# Patient Record
Sex: MALE | Race: BLACK OR AFRICAN AMERICAN | Employment: UNEMPLOYED | URBAN - METROPOLITAN AREA
[De-identification: names, ages, dates, MRNs, and addresses within clinical notes are randomized per-mention and may not be internally consistent; named-entity substitution may affect disease eponyms.]

---

## 2020-06-09 ENCOUNTER — HOSPITAL ENCOUNTER (EMERGENCY)
Age: 65
Discharge: HOME | End: 2020-06-09
Attending: EMERGENCY MEDICINE
Payer: MEDICAID

## 2020-06-09 ENCOUNTER — APPOINTMENT (OUTPATIENT)
Dept: GENERAL RADIOLOGY | Age: 65
End: 2020-06-09
Attending: EMERGENCY MEDICINE
Payer: MEDICAID

## 2020-06-09 VITALS
HEIGHT: 68 IN | OXYGEN SATURATION: 98 % | TEMPERATURE: 99.6 F | RESPIRATION RATE: 18 BRPM | BODY MASS INDEX: 26.07 KG/M2 | SYSTOLIC BLOOD PRESSURE: 136 MMHG | DIASTOLIC BLOOD PRESSURE: 64 MMHG | HEART RATE: 81 BPM | WEIGHT: 172 LBS

## 2020-06-09 DIAGNOSIS — S61.211A LACERATION OF LEFT INDEX FINGER WITHOUT FOREIGN BODY WITHOUT DAMAGE TO NAIL, INITIAL ENCOUNTER: ICD-10-CM

## 2020-06-09 DIAGNOSIS — S62.631B OPEN DISPLACED FRACTURE OF DISTAL PHALANX OF LEFT INDEX FINGER, INITIAL ENCOUNTER: Primary | ICD-10-CM

## 2020-06-09 PROCEDURE — 74011000258 HC RX REV CODE- 258: Performed by: EMERGENCY MEDICINE

## 2020-06-09 PROCEDURE — 73140 X-RAY EXAM OF FINGER(S): CPT

## 2020-06-09 PROCEDURE — 77030003028 HC SUT VCRL J&J -A

## 2020-06-09 PROCEDURE — 74011000250 HC RX REV CODE- 250: Performed by: PHYSICIAN ASSISTANT

## 2020-06-09 PROCEDURE — 99284 EMERGENCY DEPT VISIT MOD MDM: CPT

## 2020-06-09 PROCEDURE — 77030002916 HC SUT ETHLN J&J -A

## 2020-06-09 PROCEDURE — 74011000250 HC RX REV CODE- 250: Performed by: EMERGENCY MEDICINE

## 2020-06-09 PROCEDURE — 74011250637 HC RX REV CODE- 250/637: Performed by: EMERGENCY MEDICINE

## 2020-06-09 PROCEDURE — 96365 THER/PROPH/DIAG IV INF INIT: CPT

## 2020-06-09 PROCEDURE — 75810000293 HC SIMP/SUPERF WND  RPR

## 2020-06-09 PROCEDURE — 74011250636 HC RX REV CODE- 250/636: Performed by: EMERGENCY MEDICINE

## 2020-06-09 RX ORDER — LIDOCAINE HYDROCHLORIDE 10 MG/ML
8 INJECTION, SOLUTION EPIDURAL; INFILTRATION; INTRACAUDAL; PERINEURAL ONCE
Status: COMPLETED | OUTPATIENT
Start: 2020-06-09 | End: 2020-06-09

## 2020-06-09 RX ORDER — IBUPROFEN 800 MG/1
800 TABLET ORAL
Qty: 30 TAB | Refills: 0 | Status: SHIPPED | OUTPATIENT
Start: 2020-06-09 | End: 2021-01-30

## 2020-06-09 RX ORDER — OXYCODONE AND ACETAMINOPHEN 5; 325 MG/1; MG/1
1 TABLET ORAL
Status: COMPLETED | OUTPATIENT
Start: 2020-06-09 | End: 2020-06-09

## 2020-06-09 RX ORDER — AMOXICILLIN AND CLAVULANATE POTASSIUM 875; 125 MG/1; MG/1
1 TABLET, FILM COATED ORAL 2 TIMES DAILY
Qty: 20 TAB | Refills: 0 | Status: SHIPPED | OUTPATIENT
Start: 2020-06-09 | End: 2020-06-19

## 2020-06-09 RX ORDER — HYDROCODONE BITARTRATE AND ACETAMINOPHEN 5; 325 MG/1; MG/1
1 TABLET ORAL
Qty: 10 TAB | Refills: 0 | Status: SHIPPED | OUTPATIENT
Start: 2020-06-09 | End: 2020-06-12

## 2020-06-09 RX ORDER — BUPIVACAINE HYDROCHLORIDE 5 MG/ML
5 INJECTION, SOLUTION EPIDURAL; INTRACAUDAL
Status: COMPLETED | OUTPATIENT
Start: 2020-06-09 | End: 2020-06-09

## 2020-06-09 RX ADMIN — SODIUM CHLORIDE 1 G: 900 INJECTION, SOLUTION INTRAVENOUS at 13:42

## 2020-06-09 RX ADMIN — BUPIVACAINE HYDROCHLORIDE 25 MG: 5 INJECTION, SOLUTION EPIDURAL; INTRACAUDAL; PERINEURAL at 13:49

## 2020-06-09 RX ADMIN — LIDOCAINE HYDROCHLORIDE 8 ML: 10 INJECTION, SOLUTION EPIDURAL; INFILTRATION; INTRACAUDAL; PERINEURAL at 13:42

## 2020-06-09 RX ADMIN — OXYCODONE HYDROCHLORIDE AND ACETAMINOPHEN 1 TABLET: 5; 325 TABLET ORAL at 12:29

## 2020-06-09 NOTE — ED TRIAGE NOTES
Left 2nd finger avulsion, working with a band saw immediately prior to arrival, bleeding controlled with pressure and ice.

## 2020-06-09 NOTE — ED PROVIDER NOTES
EMERGENCY DEPARTMENT HISTORY AND PHYSICAL EXAM      Date: 6/9/2020  Patient Name: Etta Menjivar    History of Presenting Illness     Chief Complaint   Patient presents with    Hand Laceration     History Provided By: Patient    HPI: Etta Menjivar, 60 y.o. male with past medical history significant for diabetes, hypertension, and CAD who presents via private vehicle to the ED with cc of left index finger laceration that occurred 15 minutes prior to arrival.  Patient states he was using a band saw to frame in a door at his house and believes his hand got too close to the band saw and cut his finger. He thought he had cut more than just the one finger but after cleaning of the blood, only noticed injury to that left index finger. He is right-hand dominant. He denies take anything for the pain prior to arrival.  He believes his tetanus shot is up-to-date. PMHx: Diabetes, hypertension, CAD  Social Hx: Former smoker, denies alcohol use, denies illegal drug use    PCP: No primary care provider on file. There are no other complaints, changes, or physical findings at this time. No current facility-administered medications on file prior to encounter. No current outpatient medications on file prior to encounter. Past History     Past Medical History:  Past Medical History:   Diagnosis Date    Diabetes ([address]     Hypertension      Past Surgical History:  History reviewed. No pertinent surgical history. Family History:  History reviewed. No pertinent family history. Social History:  Social History     Tobacco Use    Smoking status: Former Smoker    Smokeless tobacco: Never Used   Substance Use Topics    Alcohol use: Not Currently    Drug use: Not Currently     Allergies:  No Known Allergies  Review of Systems   Review of Systems   Constitutional: Negative for chills and fever. HENT: Negative for congestion, rhinorrhea, sneezing and sore throat.     Eyes: Negative for redness and visual disturbance. Respiratory: Negative for shortness of breath. Cardiovascular: Negative for chest pain and leg swelling. Gastrointestinal: Negative for abdominal pain, nausea and vomiting. Genitourinary: Negative for difficulty urinating and frequency. Musculoskeletal: Positive for arthralgias. Negative for back pain, myalgias and neck stiffness. Skin: Positive for wound. Negative for rash. Neurological: Negative for dizziness, syncope, weakness and headaches. Hematological: Negative for adenopathy. All other systems reviewed and are negative. Physical Exam   Physical Exam  Vitals signs and nursing note reviewed. Constitutional:       Appearance: Normal appearance. He is well-developed. HENT:      Head: Normocephalic and atraumatic. Neck:      Musculoskeletal: Full passive range of motion without pain, normal range of motion and neck supple. Cardiovascular:      Rate and Rhythm: Normal rate and regular rhythm. Pulses: Normal pulses. Heart sounds: Normal heart sounds. No murmur. Pulmonary:      Effort: Pulmonary effort is normal. No respiratory distress. Breath sounds: Normal breath sounds. Chest:      Chest wall: No tenderness. Abdominal:      General: Bowel sounds are normal.      Palpations: Abdomen is soft. Tenderness: There is no abdominal tenderness. There is no guarding or rebound. Musculoskeletal:        Hands:    Skin:     General: Skin is warm and dry. Findings: Laceration present. No erythema or rash. Neurological:      Mental Status: He is alert and oriented to person, place, and time. Psychiatric:         Speech: Speech normal.         Behavior: Behavior normal.         Thought Content: Thought content normal.         Judgment: Judgment normal.       Diagnostic Study Results   Labs -   No results found for this or any previous visit (from the past 12 hour(s)).     Radiologic Studies -   XR 2ND FINGER LT MIN 2 V   Final Result   IMPRESSION: Soft tissue injury of the distal second digit with displacement   comminuted fractures of the second distal phalanx. No results found. Medical Decision Making   I am the first provider for this patient. I reviewed the vital signs, available nursing notes, past medical history, past surgical history, family history and social history. Vital Signs-Reviewed the patient's vital signs. Patient Vitals for the past 24 hrs:   Temp Pulse Resp BP SpO2   06/09/20 1219    152/78 100 %   06/09/20 1215 99.6 °F (37.6 °C) 81 18 152/78 98 %     Pulse Oximetry Analysis - 100% on RA    Records Reviewed: Nursing Notes    Provider Notes (Medical Decision Making):   51-year-old male presents with a left index finger laceration and obvious deformity suggestive of a distal phalanx fracture. His tetanus shot is up-to-date. Will x-ray the hand, treat the wound with a dose of Ancef given that it is an open fracture, and repair of the laceration and splint. ED Course:   Initial assessment performed. The patients presenting problems have been discussed, and they are in agreement with the care plan formulated and outlined with them. I have encouraged them to ask questions as they arise throughout their visit.    1:06 PM  Aly Trevino MD spoke with Dr. Daniel Hilario, Consult for Hand Surgery. Discussed HPI and PE, available diagnostic tests and clinical findings. He is in agreement with care plans as outlined. He is still in clinic until 4:00 and can come see the patient after he gets out of clinic. If the patient is unwilling to wait, he recommends reapproximating the wound and ensuring that the open fracture is completely covered and loosely suturing the laceration and applying a big, bulky dressing and have the patient follow-up in the clinic in the next few days. ED Course as of Ramin 10 1112   Tue Jun 09, 2020   1520 Procedure Note - Wound Repair:    Performed by Heather Grant PA-C .      Tendon/Joint function was Abnormal: Open fracture. Neurovascular function was Intact. Site prepped with Betadine. Anesthesia was obtained via digital block with 0.5% bupivicaine and 1% lidocaine. Wound irrigated with copious amounts of normal saline and explored. Wound was located on the left index finger, measured 2-3 cm and was stellate, avulsion, and skin loss and mangled due to type of trauam.  Level of complexity was: complex. Wound was closed using One layer suture closure: Skin Layer:  10 sutures placed, stitch type:simple interrupted, suture: 5-0 nylon. .  Foreign body was not suspected. Foreign body was not found. Procedure was tolerated well      [AH]      ED Course User Index  [AH] Chiquis Blood PA-C     Progress Note:   Updated pt on all returned results and findings. Discussed the importance of proper follow up as referred below along with return precautions. Pt in agreement with the care plan and expresses agreement with and understanding of all items discussed. Disposition:  Discharge Note:  The pt is ready for discharge. The pt's signs, symptoms, diagnosis, and discharge instructions have been discussed and pt has conveyed their understanding. The pt is to follow up as recommended or return to ER should their symptoms worsen. Plan has been discussed and pt is in agreement. PLAN:  1. Discharge Medication List as of 6/9/2020  3:26 PM      START taking these medications    Details   HYDROcodone-acetaminophen (Norco) 5-325 mg per tablet Take 1 Tab by mouth every six (6) hours as needed for Pain for up to 3 days. Max Daily Amount: 4 Tabs., Normal, Disp-10 Tab, R-0      ibuprofen (MOTRIN) 800 mg tablet Take 1 Tab by mouth every eight (8) hours as needed for Pain., Normal, Disp-30 Tab, R-0      amoxicillin-clavulanate (Augmentin) 875-125 mg per tablet Take 1 Tab by mouth two (2) times a day for 10 days. , Normal, Disp-20 Tab, R-0           2.    Follow-up Information     Follow up With Specialties Details Why Contact Info    Shoshana Edward MD Hand Surgery, General Surgery Call in 1 day  [address]  [address] [facility] EMERGENCY DEPT Emergency Medicine  As needed, If symptoms worsen [address]  [phone]        Return to ED if worse     Diagnosis     Clinical Impression:   1. Open displaced fracture of distal phalanx of left index finger, initial encounter    2. Laceration of left index finger without foreign body without damage to nail, initial encounter            Please note that this dictation was completed with Dragon, computer voice recognition software. Quite often unanticipated grammatical, syntax, homophones, and other interpretive errors are inadvertently transcribed by the computer software. Please disregard these errors. Additionally, please excuse any errors that have escaped final proofreading.

## 2020-06-09 NOTE — DISCHARGE INSTRUCTIONS
Patient Education        Finger Fracture: Care Instructions  Your Care Instructions     Breaks in the bones of the finger usually heal well in about 3 to 4 weeks. The pain and swelling from a broken finger can last for weeks. But it should steadily improve, starting a few days after you break it. It is very important that you wear and take care of the cast or splint exactly as your doctor tells you to so that your finger heals properly and does not end up crooked. Wearing a splint may interfere with your normal activities. Ask for help with daily tasks if you need it. You heal best when you take good care of yourself. Eat a variety of healthy foods, and don't smoke. Follow-up care is a key part of your treatment and safety. Be sure to make and go to all appointments, and call your doctor if you are having problems. It's also a good idea to know your test results and keep a list of the medicines you take. How can you care for yourself at home? · If your doctor put a splint on your finger, wear the splint exactly as directed. Do not remove it until your doctor says that you can. · Keep your hand raised above the level of your heart as much as you can. This will help reduce swelling. · Put ice or a cold pack on your finger for 10 to 20 minutes at a time. Try to do this every 1 to 2 hours for the next 3 days (when you are awake) or until the swelling goes down. Put a thin cloth between the ice and your skin. Keep the splint dry. · Be safe with medicines. Take pain medicines exactly as directed. ? If the doctor gave you a prescription medicine for pain, take it as prescribed. ? If you are not taking a prescription pain medicine, ask your doctor if you can take an over-the-counter medicine. When should you call for help? [id number] anytime you think you may need emergency care. For example, call if:  · Your finger is cool or pale or changes color.   Call your doctor now or seek immediate medical care if:  · Your pain gets much worse. · You have tingling, weakness, or numbness in your finger. · You have signs of infection, such as:  ? Increased pain, swelling, warmth, or redness. ? Red streaks leading from the area. ? Pus draining from the area. ? Swollen lymph nodes in your neck, armpits, or groin. ? A fever. Watch closely for changes in your health, and be sure to contact your doctor if:  · Your finger is not steadily improving. Where can you learn more? Go to http://dylan-carson.info/  Enter X243 in the search box to learn more about \"Finger Fracture: Care Instructions. \"  Current as of: March 2, 2020               Content Version: 12.5  © 2006-2020 [employer]. Care instructions adapted under license by [employer] (which disclaims liability or warranty for this information). If you have questions about a medical condition or this instruction, always ask your healthcare professional. [place] [address] any warranty or liability for your use of this information. Patient Education        Cuts Closed With Stitches: Care Instructions  Your Care Instructions  A cut can happen anywhere on your body. The doctor used stitches to close the cut. Using stitches also helps the cut heal and reduces scarring. Sometimes pieces of tape called Steri-Strips are put over the stitches. If the cut went deep and through the skin, the doctor may have put in two layers of stitches. The deeper layer brings the deep part of the cut together. These stitches will dissolve and don't need to be removed. The stitches in the upper layer are the ones you see on the cut. You will probably have a bandage over the stitches. You will need to have the stitches removed, usually in 7 to 14 days. The doctor has checked you carefully, but problems can develop later. If you notice any problems or new symptoms, get medical treatment right away.    Follow-up care is a key part of your treatment and safety. Be sure to make and go to all appointments, and call your doctor if you are having problems. It's also a good idea to know your test results and keep a list of the medicines you take. How can you care for yourself at home? · Keep the cut dry for the first 24 to 48 hours. After this, you can shower if your doctor okays it. Pat the cut dry. · Don't soak the cut, such as in a bathtub. Your doctor will tell you when it's safe to get the cut wet. · If your doctor told you how to care for your cut, follow your doctor's instructions. If you did not get instructions, follow this general advice:  ? After the first 24 to 48 hours, wash around the cut with clean water 2 times a day. Don't use hydrogen peroxide or alcohol, which can slow healing. ? You may cover the cut with a thin layer of petroleum jelly, such as Vaseline, and a nonstick bandage. ? Apply more petroleum jelly and replace the bandage as needed. · Prop up the sore area on a pillow anytime you sit or lie down during the next 3 days. Try to keep it above the level of your heart. This will help reduce swelling. · Avoid any activity that could cause your cut to reopen. · Do not remove the stitches on your own. Your doctor will tell you when to come back to have the stitches removed. · Leave Steri-Strips on until they fall off. · Be safe with medicines. Read and follow all instructions on the label. ? If the doctor gave you a prescription medicine for pain, take it as prescribed. ? If you are not taking a prescription pain medicine, ask your doctor if you can take an over-the-counter medicine. When should you call for help? Call your doctor now or seek immediate medical care if:  · You have new pain, or your pain gets worse. · The skin near the cut is cold or pale or changes color. · You have tingling, weakness, or numbness near the cut. · The cut starts to bleed, and blood soaks through the bandage.  Oozing small amounts of blood is normal.  · You have trouble moving the area near the cut. · You have symptoms of infection, such as:  ? Increased pain, swelling, warmth, or redness around the cut.  ? Red streaks leading from the cut.  ? Pus draining from the cut.  ? A fever. Watch closely for changes in your health, and be sure to contact your doctor if:  · The cut reopens. · You do not get better as expected. Where can you learn more? Go to http://dylan-carson.info/  Enter R217 in the search box to learn more about \"Cuts Closed With Stitches: Care Instructions. \"  Current as of: June 26, 2019               Content Version: 12.5  © 7437-4317 [employer], [employer]. Care instructions adapted under license by [employer] (which disclaims liability or warranty for this information). If you have questions about a medical condition or this instruction, always ask your healthcare professional. [address] any warranty or liability for your use of this information.

## 2020-06-09 NOTE — ED NOTES
Emergency Department Nursing Plan of Care       The Nursing Plan of Care is developed from the Nursing assessment and Emergency Department Attending provider initial evaluation. The plan of care may be reviewed in the ED Provider note.     The Plan of Care was developed with the following considerations:   Patient / Family readiness to learn indicated by:verbalized understanding  Persons(s) to be included in education: patient  Barriers to Learning/Limitations:No    [facility]    6/9/2020   12:20 PM

## 2020-06-09 NOTE — ED NOTES
Patient has been instructed that they have been given PERCOCET which contains opioids, benzodiazepines, or other sedating drugs. Patient is aware that they  will need to refrain from driving or operating heavy machinery after taking this medication. Patient also instructed that they need to avoid drinking alcohol and using other products containing opioids, benzodiazepines, or other sedating drugs. Patient verbalized understanding.

## 2021-01-30 ENCOUNTER — APPOINTMENT (OUTPATIENT)
Dept: ULTRASOUND IMAGING | Age: 66
DRG: 720 | End: 2021-01-30
Attending: EMERGENCY MEDICINE
Payer: MEDICAID

## 2021-01-30 ENCOUNTER — APPOINTMENT (OUTPATIENT)
Dept: GENERAL RADIOLOGY | Age: 66
DRG: 720 | End: 2021-01-30
Attending: EMERGENCY MEDICINE
Payer: MEDICAID

## 2021-01-30 ENCOUNTER — APPOINTMENT (OUTPATIENT)
Dept: CT IMAGING | Age: 66
DRG: 720 | End: 2021-01-30
Attending: EMERGENCY MEDICINE
Payer: MEDICAID

## 2021-01-30 ENCOUNTER — HOSPITAL ENCOUNTER (INPATIENT)
Age: 66
LOS: 34 days | Discharge: NURSING, REHAB OR LONG-TERM CARE | DRG: 720 | End: 2021-03-05
Attending: EMERGENCY MEDICINE | Admitting: EMERGENCY MEDICINE
Payer: MEDICAID

## 2021-01-30 DIAGNOSIS — J96.01 ACUTE RESPIRATORY FAILURE WITH HYPOXIA (HCC): ICD-10-CM

## 2021-01-30 DIAGNOSIS — U07.1 PNEUMONIA DUE TO COVID-19 VIRUS: Primary | ICD-10-CM

## 2021-01-30 DIAGNOSIS — J12.82 PNEUMONIA DUE TO COVID-19 VIRUS: Primary | ICD-10-CM

## 2021-01-30 DIAGNOSIS — I50.21 ACUTE SYSTOLIC CONGESTIVE HEART FAILURE (HCC): ICD-10-CM

## 2021-01-30 DIAGNOSIS — R77.8 ELEVATED TROPONIN: ICD-10-CM

## 2021-01-30 LAB
ALBUMIN SERPL-MCNC: 1.9 G/DL (ref 3.5–5)
ALBUMIN SERPL-MCNC: 2.3 G/DL (ref 3.5–5)
ALBUMIN/GLOB SERPL: 0.5 {RATIO} (ref 1.1–2.2)
ALBUMIN/GLOB SERPL: 0.5 {RATIO} (ref 1.1–2.2)
ALP SERPL-CCNC: 124 U/L (ref 45–117)
ALP SERPL-CCNC: 94 U/L (ref 45–117)
ALT SERPL-CCNC: 22 U/L (ref 12–78)
ALT SERPL-CCNC: 28 U/L (ref 12–78)
ANION GAP SERPL CALC-SCNC: 8 MMOL/L (ref 5–15)
ANION GAP SERPL CALC-SCNC: 8 MMOL/L (ref 5–15)
APPEARANCE UR: ABNORMAL
APTT PPP: 24.2 SEC (ref 22.1–31)
APTT PPP: 25.5 SEC (ref 22.1–31)
APTT PPP: 27 SEC (ref 22.1–31)
ARTERIAL PATENCY WRIST A: ABNORMAL
ARTERIAL PATENCY WRIST A: YES
ARTERIAL PATENCY WRIST A: YES
AST SERPL-CCNC: 32 U/L (ref 15–37)
AST SERPL-CCNC: 53 U/L (ref 15–37)
BACTERIA URNS QL MICRO: NEGATIVE /HPF
BASE DEFICIT BLD-SCNC: 10 MMOL/L
BASE DEFICIT BLD-SCNC: 11 MMOL/L
BASE DEFICIT BLD-SCNC: 3 MMOL/L
BASOPHILS # BLD: 0 K/UL (ref 0–0.1)
BASOPHILS # BLD: 0 K/UL (ref 0–0.1)
BASOPHILS NFR BLD: 0 % (ref 0–1)
BASOPHILS NFR BLD: 0 % (ref 0–1)
BDY SITE: ABNORMAL
BILIRUB SERPL-MCNC: 0.3 MG/DL (ref 0.2–1)
BILIRUB SERPL-MCNC: 0.3 MG/DL (ref 0.2–1)
BILIRUB UR QL: NEGATIVE
BNP SERPL-MCNC: ABNORMAL PG/ML
BUN SERPL-MCNC: 40 MG/DL (ref 6–20)
BUN SERPL-MCNC: 43 MG/DL (ref 6–20)
BUN/CREAT SERPL: 23 (ref 12–20)
BUN/CREAT SERPL: 24 (ref 12–20)
CA-I BLD-SCNC: 1.14 MMOL/L (ref 1.12–1.32)
CA-I BLD-SCNC: 1.16 MMOL/L (ref 1.12–1.32)
CA-I BLD-SCNC: 1.17 MMOL/L (ref 1.12–1.32)
CALCIUM SERPL-MCNC: 7.5 MG/DL (ref 8.5–10.1)
CALCIUM SERPL-MCNC: 8.4 MG/DL (ref 8.5–10.1)
CHLORIDE SERPL-SCNC: 114 MMOL/L (ref 97–108)
CHLORIDE SERPL-SCNC: 123 MMOL/L (ref 97–108)
CK SERPL-CCNC: 135 U/L (ref 39–308)
CO2 SERPL-SCNC: 18 MMOL/L (ref 21–32)
CO2 SERPL-SCNC: 21 MMOL/L (ref 21–32)
COLOR UR: ABNORMAL
COVID-19 RAPID TEST, COVR: DETECTED
CREAT SERPL-MCNC: 1.73 MG/DL (ref 0.7–1.3)
CREAT SERPL-MCNC: 1.8 MG/DL (ref 0.7–1.3)
CREAT UR-MCNC: 50 MG/DL
D DIMER PPP FEU-MCNC: 5.37 MG/L FEU (ref 0–0.65)
DIFFERENTIAL METHOD BLD: ABNORMAL
DIFFERENTIAL METHOD BLD: ABNORMAL
EOSINOPHIL # BLD: 0 K/UL (ref 0–0.4)
EOSINOPHIL # BLD: 0 K/UL (ref 0–0.4)
EOSINOPHIL NFR BLD: 0 % (ref 0–7)
EOSINOPHIL NFR BLD: 0 % (ref 0–7)
EPITH CASTS URNS QL MICRO: ABNORMAL /LPF
ERYTHROCYTE [DISTWIDTH] IN BLOOD BY AUTOMATED COUNT: 16.6 % (ref 11.5–14.5)
ERYTHROCYTE [DISTWIDTH] IN BLOOD BY AUTOMATED COUNT: 16.9 % (ref 11.5–14.5)
GAS FLOW.O2 O2 DELIVERY SYS: ABNORMAL L/MIN
GAS FLOW.O2 SETTING OXYMISER: 16 BPM
GAS FLOW.O2 SETTING OXYMISER: 16 BPM
GLOBULIN SER CALC-MCNC: 3.9 G/DL (ref 2–4)
GLOBULIN SER CALC-MCNC: 4.7 G/DL (ref 2–4)
GLUCOSE BLD STRIP.AUTO-MCNC: 132 MG/DL (ref 65–100)
GLUCOSE BLD STRIP.AUTO-MCNC: 185 MG/DL (ref 65–100)
GLUCOSE BLD STRIP.AUTO-MCNC: 294 MG/DL (ref 65–100)
GLUCOSE SERPL-MCNC: 139 MG/DL (ref 65–100)
GLUCOSE SERPL-MCNC: 242 MG/DL (ref 65–100)
GLUCOSE UR STRIP.AUTO-MCNC: NEGATIVE MG/DL
GRAN CASTS URNS QL MICRO: ABNORMAL /LPF
HCO3 BLD-SCNC: 17.2 MMOL/L (ref 22–26)
HCO3 BLD-SCNC: 17.8 MMOL/L (ref 22–26)
HCO3 BLD-SCNC: 22.9 MMOL/L (ref 22–26)
HCT VFR BLD AUTO: 33.1 % (ref 36.6–50.3)
HCT VFR BLD AUTO: 36.3 % (ref 36.6–50.3)
HGB BLD-MCNC: 10.1 G/DL (ref 12.1–17)
HGB BLD-MCNC: 10.8 G/DL (ref 12.1–17)
HGB UR QL STRIP: ABNORMAL
IMM GRANULOCYTES # BLD AUTO: 0 K/UL
IMM GRANULOCYTES # BLD AUTO: 0.1 K/UL (ref 0–0.04)
IMM GRANULOCYTES NFR BLD AUTO: 0 %
IMM GRANULOCYTES NFR BLD AUTO: 1 % (ref 0–0.5)
INSPIRATION.DURATION SETTING TIME VENT: 0.8 SEC
KETONES UR QL STRIP.AUTO: NEGATIVE MG/DL
LACTATE SERPL-SCNC: 1.1 MMOL/L (ref 0.4–2)
LACTATE SERPL-SCNC: 1.5 MMOL/L (ref 0.4–2)
LACTATE SERPL-SCNC: 2.8 MMOL/L (ref 0.4–2)
LACTATE SERPL-SCNC: 3.1 MMOL/L (ref 0.4–2)
LEUKOCYTE ESTERASE UR QL STRIP.AUTO: NEGATIVE
LYMPHOCYTES # BLD: 1.6 K/UL (ref 0.8–3.5)
LYMPHOCYTES # BLD: 4.5 K/UL (ref 0.8–3.5)
LYMPHOCYTES NFR BLD: 13 % (ref 12–49)
LYMPHOCYTES NFR BLD: 24 % (ref 12–49)
MAGNESIUM SERPL-MCNC: 1.6 MG/DL (ref 1.6–2.4)
MAGNESIUM SERPL-MCNC: 1.7 MG/DL (ref 1.6–2.4)
MCH RBC QN AUTO: 25.4 PG (ref 26–34)
MCH RBC QN AUTO: 25.4 PG (ref 26–34)
MCHC RBC AUTO-ENTMCNC: 29.8 G/DL (ref 30–36.5)
MCHC RBC AUTO-ENTMCNC: 30.5 G/DL (ref 30–36.5)
MCV RBC AUTO: 83.4 FL (ref 80–99)
MCV RBC AUTO: 85.2 FL (ref 80–99)
MONOCYTES # BLD: 0.6 K/UL (ref 0–1)
MONOCYTES # BLD: 0.7 K/UL (ref 0–1)
MONOCYTES NFR BLD: 4 % (ref 5–13)
MONOCYTES NFR BLD: 5 % (ref 5–13)
NEUTS SEG # BLD: 13.5 K/UL (ref 1.8–8)
NEUTS SEG # BLD: 9.9 K/UL (ref 1.8–8)
NEUTS SEG NFR BLD: 72 % (ref 32–75)
NEUTS SEG NFR BLD: 81 % (ref 32–75)
NITRITE UR QL STRIP.AUTO: NEGATIVE
NRBC # BLD: 0 K/UL (ref 0–0.01)
NRBC # BLD: 0 K/UL (ref 0–0.01)
NRBC BLD-RTO: 0 PER 100 WBC
NRBC BLD-RTO: 0 PER 100 WBC
O2/TOTAL GAS SETTING VFR VENT: 100 %
O2/TOTAL GAS SETTING VFR VENT: 100 %
O2/TOTAL GAS SETTING VFR VENT: 60 %
PCO2 BLD: 43.5 MMHG (ref 35–45)
PCO2 BLD: 44.7 MMHG (ref 35–45)
PCO2 BLD: 45 MMHG (ref 35–45)
PEEP RESPIRATORY: 10 CMH2O
PEEP RESPIRATORY: 8 CMH2O
PEEP RESPIRATORY: 8 CMH2O
PH BLD: 7.19 [PH] (ref 7.35–7.45)
PH BLD: 7.22 [PH] (ref 7.35–7.45)
PH BLD: 7.31 [PH] (ref 7.35–7.45)
PH UR STRIP: 5 [PH] (ref 5–8)
PHOSPHATE SERPL-MCNC: 4.6 MG/DL (ref 2.6–4.7)
PIP ISTAT,IPIP: 14
PLATELET # BLD AUTO: 380 K/UL (ref 150–400)
PLATELET # BLD AUTO: 440 K/UL (ref 150–400)
PLATELET COMMENTS,PCOM: ABNORMAL
PMV BLD AUTO: 10 FL (ref 8.9–12.9)
PMV BLD AUTO: 10.7 FL (ref 8.9–12.9)
PO2 BLD: 109 MMHG (ref 80–100)
PO2 BLD: 35 MMHG (ref 80–100)
PO2 BLD: 57 MMHG (ref 80–100)
POTASSIUM SERPL-SCNC: 4.3 MMOL/L (ref 3.5–5.1)
POTASSIUM SERPL-SCNC: 5.2 MMOL/L (ref 3.5–5.1)
PROCALCITONIN SERPL-MCNC: 0.08 NG/ML
PROT SERPL-MCNC: 5.8 G/DL (ref 6.4–8.2)
PROT SERPL-MCNC: 7 G/DL (ref 6.4–8.2)
PROT UR STRIP-MCNC: 100 MG/DL
RBC # BLD AUTO: 3.97 M/UL (ref 4.1–5.7)
RBC # BLD AUTO: 4.26 M/UL (ref 4.1–5.7)
RBC #/AREA URNS HPF: ABNORMAL /HPF (ref 0–5)
RBC MORPH BLD: ABNORMAL
RBC MORPH BLD: ABNORMAL
SAO2 % BLD: 62 % (ref 92–97)
SAO2 % BLD: 82 % (ref 92–97)
SAO2 % BLD: 97 % (ref 92–97)
SARS-COV-2, COV2: NORMAL
SERVICE CMNT-IMP: ABNORMAL
SODIUM SERPL-SCNC: 140 MMOL/L (ref 136–145)
SODIUM SERPL-SCNC: 152 MMOL/L (ref 136–145)
SODIUM UR-SCNC: 66 MMOL/L
SOURCE, COVRS: ABNORMAL
SP GR UR REFRACTOMETRY: 1.02 (ref 1–1.03)
SPECIMEN TYPE: ABNORMAL
THERAPEUTIC RANGE,PTTT: NORMAL SECS (ref 58–77)
TOTAL RESP. RATE, ITRR: 21
TOTAL RESP. RATE, ITRR: 30
TOTAL RESP. RATE, ITRR: 38
TROPONIN I SERPL-MCNC: 0.22 NG/ML
TROPONIN I SERPL-MCNC: 0.81 NG/ML
TROPONIN I SERPL-MCNC: 1.32 NG/ML
TROPONIN I SERPL-MCNC: 1.38 NG/ML
URATE SERPL-MCNC: 7.8 MG/DL (ref 3.5–7.2)
UROBILINOGEN UR QL STRIP.AUTO: 0.2 EU/DL (ref 0.2–1)
UUN UR-MCNC: 293 MG/DL
VENTILATION MODE VENT: ABNORMAL
VENTILATION MODE VENT: ABNORMAL
VT SETTING VENT: 450 ML
VT SETTING VENT: 450 ML
WBC # BLD AUTO: 12.4 K/UL (ref 4.1–11.1)
WBC # BLD AUTO: 18.7 K/UL (ref 4.1–11.1)
WBC URNS QL MICRO: ABNORMAL /HPF (ref 0–4)

## 2021-01-30 PROCEDURE — 82803 BLOOD GASES ANY COMBINATION: CPT

## 2021-01-30 PROCEDURE — 74011000636 HC RX REV CODE- 636: Performed by: EMERGENCY MEDICINE

## 2021-01-30 PROCEDURE — 85730 THROMBOPLASTIN TIME PARTIAL: CPT

## 2021-01-30 PROCEDURE — 87449 NOS EACH ORGANISM AG IA: CPT

## 2021-01-30 PROCEDURE — 31500 INSERT EMERGENCY AIRWAY: CPT

## 2021-01-30 PROCEDURE — 74011000258 HC RX REV CODE- 258: Performed by: EMERGENCY MEDICINE

## 2021-01-30 PROCEDURE — 71045 X-RAY EXAM CHEST 1 VIEW: CPT

## 2021-01-30 PROCEDURE — 82962 GLUCOSE BLOOD TEST: CPT

## 2021-01-30 PROCEDURE — 84540 ASSAY OF URINE/UREA-N: CPT

## 2021-01-30 PROCEDURE — 83605 ASSAY OF LACTIC ACID: CPT

## 2021-01-30 PROCEDURE — 36600 WITHDRAWAL OF ARTERIAL BLOOD: CPT

## 2021-01-30 PROCEDURE — 84100 ASSAY OF PHOSPHORUS: CPT

## 2021-01-30 PROCEDURE — 80053 COMPREHEN METABOLIC PANEL: CPT

## 2021-01-30 PROCEDURE — 94660 CPAP INITIATION&MGMT: CPT

## 2021-01-30 PROCEDURE — 74011000250 HC RX REV CODE- 250: Performed by: EMERGENCY MEDICINE

## 2021-01-30 PROCEDURE — 74011250636 HC RX REV CODE- 250/636: Performed by: EMERGENCY MEDICINE

## 2021-01-30 PROCEDURE — 84550 ASSAY OF BLOOD/URIC ACID: CPT

## 2021-01-30 PROCEDURE — 85379 FIBRIN DEGRADATION QUANT: CPT

## 2021-01-30 PROCEDURE — 74011000250 HC RX REV CODE- 250

## 2021-01-30 PROCEDURE — 74011250636 HC RX REV CODE- 250/636

## 2021-01-30 PROCEDURE — 71275 CT ANGIOGRAPHY CHEST: CPT

## 2021-01-30 PROCEDURE — 87086 URINE CULTURE/COLONY COUNT: CPT

## 2021-01-30 PROCEDURE — 5A09357 ASSISTANCE WITH RESPIRATORY VENTILATION, LESS THAN 24 CONSECUTIVE HOURS, CONTINUOUS POSITIVE AIRWAY PRESSURE: ICD-10-PCS | Performed by: EMERGENCY MEDICINE

## 2021-01-30 PROCEDURE — 83735 ASSAY OF MAGNESIUM: CPT

## 2021-01-30 PROCEDURE — 94002 VENT MGMT INPAT INIT DAY: CPT

## 2021-01-30 PROCEDURE — 83880 ASSAY OF NATRIURETIC PEPTIDE: CPT

## 2021-01-30 PROCEDURE — 87040 BLOOD CULTURE FOR BACTERIA: CPT

## 2021-01-30 PROCEDURE — 74011250637 HC RX REV CODE- 250/637: Performed by: EMERGENCY MEDICINE

## 2021-01-30 PROCEDURE — 76770 US EXAM ABDO BACK WALL COMP: CPT

## 2021-01-30 PROCEDURE — 84484 ASSAY OF TROPONIN QUANT: CPT

## 2021-01-30 PROCEDURE — 82570 ASSAY OF URINE CREATININE: CPT

## 2021-01-30 PROCEDURE — 0BH17EZ INSERTION OF ENDOTRACHEAL AIRWAY INTO TRACHEA, VIA NATURAL OR ARTIFICIAL OPENING: ICD-10-PCS | Performed by: EMERGENCY MEDICINE

## 2021-01-30 PROCEDURE — 93005 ELECTROCARDIOGRAM TRACING: CPT

## 2021-01-30 PROCEDURE — 99285 EMERGENCY DEPT VISIT HI MDM: CPT

## 2021-01-30 PROCEDURE — 36415 COLL VENOUS BLD VENIPUNCTURE: CPT

## 2021-01-30 PROCEDURE — 85025 COMPLETE CBC W/AUTO DIFF WBC: CPT

## 2021-01-30 PROCEDURE — 87635 SARS-COV-2 COVID-19 AMP PRB: CPT

## 2021-01-30 PROCEDURE — 02HV33Z INSERTION OF INFUSION DEVICE INTO SUPERIOR VENA CAVA, PERCUTANEOUS APPROACH: ICD-10-PCS | Performed by: EMERGENCY MEDICINE

## 2021-01-30 PROCEDURE — 81001 URINALYSIS AUTO W/SCOPE: CPT

## 2021-01-30 PROCEDURE — 5A1945Z RESPIRATORY VENTILATION, 24-96 CONSECUTIVE HOURS: ICD-10-PCS | Performed by: EMERGENCY MEDICINE

## 2021-01-30 PROCEDURE — 96375 TX/PRO/DX INJ NEW DRUG ADDON: CPT

## 2021-01-30 PROCEDURE — 96376 TX/PRO/DX INJ SAME DRUG ADON: CPT

## 2021-01-30 PROCEDURE — 82550 ASSAY OF CK (CPK): CPT

## 2021-01-30 PROCEDURE — 84145 PROCALCITONIN (PCT): CPT

## 2021-01-30 PROCEDURE — 65610000006 HC RM INTENSIVE CARE

## 2021-01-30 PROCEDURE — 96365 THER/PROPH/DIAG IV INF INIT: CPT

## 2021-01-30 PROCEDURE — 84300 ASSAY OF URINE SODIUM: CPT

## 2021-01-30 RX ORDER — SENNOSIDES 8.6 MG/1
1 TABLET ORAL DAILY
COMMUNITY
End: 2022-08-09

## 2021-01-30 RX ORDER — SUCCINYLCHOLINE CHLORIDE 20 MG/ML
INJECTION INTRAMUSCULAR; INTRAVENOUS
Status: COMPLETED
Start: 2021-01-30 | End: 2021-01-30

## 2021-01-30 RX ORDER — HEPARIN SODIUM 10000 [USP'U]/100ML
11-25 INJECTION, SOLUTION INTRAVENOUS
Status: DISCONTINUED | OUTPATIENT
Start: 2021-01-30 | End: 2021-01-30

## 2021-01-30 RX ORDER — ETOMIDATE 2 MG/ML
INJECTION INTRAVENOUS
Status: COMPLETED
Start: 2021-01-30 | End: 2021-01-30

## 2021-01-30 RX ORDER — GUAIFENESIN 100 MG/5ML
81 LIQUID (ML) ORAL DAILY
COMMUNITY

## 2021-01-30 RX ORDER — VANCOMYCIN 1.75 GRAM/500 ML IN 0.9 % SODIUM CHLORIDE INTRAVENOUS
1750 ONCE
Status: COMPLETED | OUTPATIENT
Start: 2021-01-30 | End: 2021-01-31

## 2021-01-30 RX ORDER — ATORVASTATIN CALCIUM 80 MG/1
80 TABLET, FILM COATED ORAL DAILY
Status: ON HOLD | COMMUNITY
End: 2021-03-04 | Stop reason: SDUPTHER

## 2021-01-30 RX ORDER — DEXTROSE 50 % IN WATER (D50W) INTRAVENOUS SYRINGE
12.5-25 AS NEEDED
Status: DISCONTINUED | OUTPATIENT
Start: 2021-01-30 | End: 2021-03-05 | Stop reason: HOSPADM

## 2021-01-30 RX ORDER — ETOMIDATE 2 MG/ML
20 INJECTION INTRAVENOUS ONCE
Status: COMPLETED | OUTPATIENT
Start: 2021-01-30 | End: 2021-01-30

## 2021-01-30 RX ORDER — GUAIFENESIN 100 MG/5ML
81 LIQUID (ML) ORAL DAILY
Status: DISCONTINUED | OUTPATIENT
Start: 2021-01-30 | End: 2021-03-05 | Stop reason: HOSPADM

## 2021-01-30 RX ORDER — FENTANYL CITRATE 50 UG/ML
50 INJECTION, SOLUTION INTRAMUSCULAR; INTRAVENOUS ONCE
Status: COMPLETED | OUTPATIENT
Start: 2021-01-30 | End: 2021-01-30

## 2021-01-30 RX ORDER — GABAPENTIN 250 MG/5ML
300 SOLUTION ORAL EVERY 8 HOURS
Status: DISCONTINUED | OUTPATIENT
Start: 2021-01-30 | End: 2021-02-01

## 2021-01-30 RX ORDER — METRONIDAZOLE 500 MG/100ML
500 INJECTION, SOLUTION INTRAVENOUS EVERY 12 HOURS
Status: DISCONTINUED | OUTPATIENT
Start: 2021-01-30 | End: 2021-02-04

## 2021-01-30 RX ORDER — MELATONIN
1000 DAILY
COMMUNITY
End: 2022-08-09

## 2021-01-30 RX ORDER — FUROSEMIDE 10 MG/ML
40 INJECTION INTRAMUSCULAR; INTRAVENOUS ONCE
Status: COMPLETED | OUTPATIENT
Start: 2021-01-30 | End: 2021-01-30

## 2021-01-30 RX ORDER — PROPOFOL 10 MG/ML
0-50 VIAL (ML) INTRAVENOUS
Status: DISCONTINUED | OUTPATIENT
Start: 2021-01-30 | End: 2021-02-01 | Stop reason: SDUPTHER

## 2021-01-30 RX ORDER — FENTANYL CITRATE 50 UG/ML
INJECTION, SOLUTION INTRAMUSCULAR; INTRAVENOUS
Status: COMPLETED
Start: 2021-01-30 | End: 2021-01-30

## 2021-01-30 RX ORDER — ASCORBIC ACID 500 MG
500 TABLET ORAL 2 TIMES DAILY
COMMUNITY
End: 2022-08-09

## 2021-01-30 RX ORDER — MAGNESIUM SULFATE 100 %
4 CRYSTALS MISCELLANEOUS AS NEEDED
Status: DISCONTINUED | OUTPATIENT
Start: 2021-01-30 | End: 2021-03-05 | Stop reason: HOSPADM

## 2021-01-30 RX ORDER — ONDANSETRON 2 MG/ML
8 INJECTION INTRAMUSCULAR; INTRAVENOUS
Status: COMPLETED | OUTPATIENT
Start: 2021-01-30 | End: 2021-01-30

## 2021-01-30 RX ORDER — SODIUM BICARBONATE 650 MG/1
650 TABLET ORAL EVERY 8 HOURS
Status: DISCONTINUED | OUTPATIENT
Start: 2021-01-30 | End: 2021-01-31

## 2021-01-30 RX ORDER — HEPARIN SODIUM 10000 [USP'U]/100ML
12-25 INJECTION, SOLUTION INTRAVENOUS
Status: DISCONTINUED | OUTPATIENT
Start: 2021-01-30 | End: 2021-01-30 | Stop reason: ALTCHOICE

## 2021-01-30 RX ORDER — ZINC SULFATE 50(220)MG
220 CAPSULE ORAL DAILY
COMMUNITY
End: 2022-08-09

## 2021-01-30 RX ORDER — LANOLIN ALCOHOL/MO/W.PET/CERES
CREAM (GRAM) TOPICAL
COMMUNITY
End: 2022-08-09

## 2021-01-30 RX ORDER — DOXYCYCLINE 100 MG/1
100 TABLET ORAL 2 TIMES DAILY
COMMUNITY
End: 2021-03-05

## 2021-01-30 RX ORDER — SODIUM CHLORIDE 0.9 % (FLUSH) 0.9 %
5-40 SYRINGE (ML) INJECTION EVERY 8 HOURS
Status: DISCONTINUED | OUTPATIENT
Start: 2021-01-30 | End: 2021-03-05 | Stop reason: HOSPADM

## 2021-01-30 RX ORDER — ATORVASTATIN CALCIUM 40 MG/1
40 TABLET, FILM COATED ORAL
Status: DISCONTINUED | OUTPATIENT
Start: 2021-01-30 | End: 2021-03-05 | Stop reason: HOSPADM

## 2021-01-30 RX ORDER — SUCCINYLCHOLINE CHLORIDE 20 MG/ML
100 INJECTION INTRAMUSCULAR; INTRAVENOUS
Status: COMPLETED | OUTPATIENT
Start: 2021-01-30 | End: 2021-01-30

## 2021-01-30 RX ORDER — INSULIN LISPRO 100 [IU]/ML
INJECTION, SOLUTION INTRAVENOUS; SUBCUTANEOUS EVERY 6 HOURS
Status: DISCONTINUED | OUTPATIENT
Start: 2021-01-30 | End: 2021-02-08

## 2021-01-30 RX ORDER — CARVEDILOL 12.5 MG/1
TABLET ORAL 2 TIMES DAILY
Status: ON HOLD | COMMUNITY
End: 2021-03-04 | Stop reason: SDUPTHER

## 2021-01-30 RX ORDER — LISINOPRIL 20 MG/1
20 TABLET ORAL DAILY
COMMUNITY
End: 2021-03-05

## 2021-01-30 RX ORDER — ONDANSETRON 2 MG/ML
8 INJECTION INTRAMUSCULAR; INTRAVENOUS
Status: DISPENSED | OUTPATIENT
Start: 2021-01-30 | End: 2021-01-30

## 2021-01-30 RX ORDER — INSULIN LISPRO 100 [IU]/ML
10 INJECTION, SOLUTION INTRAVENOUS; SUBCUTANEOUS
COMMUNITY
End: 2022-04-13

## 2021-01-30 RX ORDER — ACETAMINOPHEN 650 MG/1
650 SUPPOSITORY RECTAL
Status: DISCONTINUED | OUTPATIENT
Start: 2021-01-30 | End: 2021-03-05 | Stop reason: HOSPADM

## 2021-01-30 RX ORDER — DEXAMETHASONE SODIUM PHOSPHATE 10 MG/ML
6 INJECTION INTRAMUSCULAR; INTRAVENOUS EVERY 24 HOURS
Status: COMPLETED | OUTPATIENT
Start: 2021-01-30 | End: 2021-02-08

## 2021-01-30 RX ORDER — ACETAMINOPHEN 325 MG/1
975 TABLET ORAL
COMMUNITY

## 2021-01-30 RX ORDER — HEPARIN SODIUM 5000 [USP'U]/ML
4000 INJECTION, SOLUTION INTRAVENOUS; SUBCUTANEOUS ONCE
Status: DISCONTINUED | OUTPATIENT
Start: 2021-01-30 | End: 2021-01-30

## 2021-01-30 RX ORDER — SODIUM CHLORIDE 0.9 % (FLUSH) 0.9 %
5-40 SYRINGE (ML) INJECTION AS NEEDED
Status: DISCONTINUED | OUTPATIENT
Start: 2021-01-30 | End: 2021-03-05 | Stop reason: HOSPADM

## 2021-01-30 RX ORDER — THERA TABS 400 MCG
1 TAB ORAL DAILY
COMMUNITY
End: 2022-08-09

## 2021-01-30 RX ORDER — ENOXAPARIN SODIUM 100 MG/ML
30 INJECTION SUBCUTANEOUS EVERY 12 HOURS
Status: DISCONTINUED | OUTPATIENT
Start: 2021-01-30 | End: 2021-02-11

## 2021-01-30 RX ORDER — ACETAMINOPHEN 325 MG/1
650 TABLET ORAL
Status: DISCONTINUED | OUTPATIENT
Start: 2021-01-30 | End: 2021-03-05 | Stop reason: HOSPADM

## 2021-01-30 RX ORDER — DEXMEDETOMIDINE HYDROCHLORIDE 4 UG/ML
.1-1.5 INJECTION, SOLUTION INTRAVENOUS
Status: DISCONTINUED | OUTPATIENT
Start: 2021-01-31 | End: 2021-02-12

## 2021-01-30 RX ORDER — DOCUSATE SODIUM 100 MG/1
100 CAPSULE, LIQUID FILLED ORAL
COMMUNITY
End: 2022-08-09

## 2021-01-30 RX ORDER — FENTANYL CITRATE 50 UG/ML
50 INJECTION, SOLUTION INTRAMUSCULAR; INTRAVENOUS
Status: DISPENSED | OUTPATIENT
Start: 2021-01-30 | End: 2021-01-30

## 2021-01-30 RX ORDER — CODEINE PHOSPHATE AND GUAIFENESIN 10; 100 MG/5ML; MG/5ML
5 SOLUTION ORAL
COMMUNITY
End: 2021-03-05

## 2021-01-30 RX ORDER — ONDANSETRON 2 MG/ML
INJECTION INTRAMUSCULAR; INTRAVENOUS
Status: COMPLETED
Start: 2021-01-30 | End: 2021-01-30

## 2021-01-30 RX ORDER — ASPIRIN 81 MG
1 TABLET, DELAYED RELEASE (ENTERIC COATED) ORAL DAILY
COMMUNITY
End: 2021-01-30

## 2021-01-30 RX ORDER — GABAPENTIN 400 MG/1
400 CAPSULE ORAL 3 TIMES DAILY
COMMUNITY
End: 2022-08-09

## 2021-01-30 RX ORDER — VANCOMYCIN 1.75 GRAM/500 ML IN 0.9 % SODIUM CHLORIDE INTRAVENOUS
1750 ONCE
Status: DISPENSED | OUTPATIENT
Start: 2021-01-30 | End: 2021-01-30

## 2021-01-30 RX ORDER — METHOCARBAMOL 500 MG/1
500 TABLET, FILM COATED ORAL 3 TIMES DAILY
COMMUNITY
End: 2021-03-05

## 2021-01-30 RX ORDER — LANOLIN ALCOHOL/MO/W.PET/CERES
1 CREAM (GRAM) TOPICAL
Status: DISCONTINUED | OUTPATIENT
Start: 2021-01-30 | End: 2021-02-01

## 2021-01-30 RX ORDER — PANTOPRAZOLE SODIUM 40 MG/1
40 TABLET, DELAYED RELEASE ORAL DAILY
COMMUNITY
End: 2022-04-13

## 2021-01-30 RX ORDER — PROPOFOL 10 MG/ML
INJECTION, EMULSION INTRAVENOUS
Status: COMPLETED
Start: 2021-01-30 | End: 2021-01-30

## 2021-01-30 RX ORDER — INSULIN GLARGINE 100 [IU]/ML
20 INJECTION, SOLUTION SUBCUTANEOUS
Status: ON HOLD | COMMUNITY
End: 2022-04-13 | Stop reason: SDUPTHER

## 2021-01-30 RX ORDER — MIDODRINE HYDROCHLORIDE 5 MG/1
15 TABLET ORAL EVERY 8 HOURS
Status: DISCONTINUED | OUTPATIENT
Start: 2021-01-30 | End: 2021-02-01

## 2021-01-30 RX ORDER — VANCOMYCIN HYDROCHLORIDE
1250
Status: DISCONTINUED | OUTPATIENT
Start: 2021-01-31 | End: 2021-01-31 | Stop reason: DRUGHIGH

## 2021-01-30 RX ORDER — SODIUM BICARBONATE 84 MG/ML
50 INJECTION, SOLUTION INTRAVENOUS EVERY 6 HOURS
Status: DISCONTINUED | OUTPATIENT
Start: 2021-01-30 | End: 2021-01-31

## 2021-01-30 RX ORDER — OXYCODONE HYDROCHLORIDE 5 MG/1
5 TABLET ORAL
COMMUNITY
End: 2021-03-05

## 2021-01-30 RX ORDER — ISOSORBIDE MONONITRATE 60 MG/1
60 TABLET, EXTENDED RELEASE ORAL
COMMUNITY
End: 2021-03-05

## 2021-01-30 RX ORDER — PROPOFOL 10 MG/ML
0-50 VIAL (ML) INTRAVENOUS
Status: DISCONTINUED | OUTPATIENT
Start: 2021-01-30 | End: 2021-02-01

## 2021-01-30 RX ADMIN — PROPOFOL 50 MCG/KG/MIN: 10 INJECTION, EMULSION INTRAVENOUS at 07:06

## 2021-01-30 RX ADMIN — FENTANYL CITRATE 50 MCG: 50 INJECTION, SOLUTION INTRAMUSCULAR; INTRAVENOUS at 06:51

## 2021-01-30 RX ADMIN — IOPAMIDOL 70 ML: 755 INJECTION, SOLUTION INTRAVENOUS at 01:25

## 2021-01-30 RX ADMIN — Medication 10 ML: at 22:43

## 2021-01-30 RX ADMIN — MIDODRINE HYDROCHLORIDE 15 MG: 5 TABLET ORAL at 21:34

## 2021-01-30 RX ADMIN — FUROSEMIDE 40 MG: 10 INJECTION, SOLUTION INTRAVENOUS at 07:27

## 2021-01-30 RX ADMIN — PROPOFOL 25 MCG/KG/MIN: 10 INJECTION, EMULSION INTRAVENOUS at 21:34

## 2021-01-30 RX ADMIN — Medication 50 MCG/HR: at 09:47

## 2021-01-30 RX ADMIN — AZITHROMYCIN MONOHYDRATE 500 MG: 500 INJECTION, POWDER, LYOPHILIZED, FOR SOLUTION INTRAVENOUS at 02:52

## 2021-01-30 RX ADMIN — ONDANSETRON 8 MG: 2 INJECTION INTRAMUSCULAR; INTRAVENOUS at 06:49

## 2021-01-30 RX ADMIN — METRONIDAZOLE 500 MG: 500 INJECTION, SOLUTION INTRAVENOUS at 09:01

## 2021-01-30 RX ADMIN — PROPOFOL 50 MCG/KG/MIN: 10 INJECTION, EMULSION INTRAVENOUS at 09:36

## 2021-01-30 RX ADMIN — MIDODRINE HYDROCHLORIDE 15 MG: 5 TABLET ORAL at 15:54

## 2021-01-30 RX ADMIN — SODIUM BICARBONATE 50 MEQ: 84 INJECTION, SOLUTION INTRAVENOUS at 14:02

## 2021-01-30 RX ADMIN — SODIUM BICARBONATE 50 MEQ: 84 INJECTION, SOLUTION INTRAVENOUS at 18:43

## 2021-01-30 RX ADMIN — SODIUM BICARBONATE 50 MEQ: 84 INJECTION, SOLUTION INTRAVENOUS at 09:44

## 2021-01-30 RX ADMIN — DOXYCYCLINE 100 MG: 100 INJECTION, POWDER, LYOPHILIZED, FOR SOLUTION INTRAVENOUS at 22:41

## 2021-01-30 RX ADMIN — LANSOPRAZOLE 30 MG: KIT at 09:11

## 2021-01-30 RX ADMIN — FERROUS SULFATE TAB 325 MG (65 MG ELEMENTAL FE) 325 MG: 325 (65 FE) TAB at 09:17

## 2021-01-30 RX ADMIN — Medication 10 ML: at 14:48

## 2021-01-30 RX ADMIN — CEFEPIME 2 G: 2 INJECTION, POWDER, FOR SOLUTION INTRAVENOUS at 09:01

## 2021-01-30 RX ADMIN — DEXAMETHASONE SODIUM PHOSPHATE 6 MG: 10 INJECTION, SOLUTION INTRAMUSCULAR; INTRAVENOUS at 09:00

## 2021-01-30 RX ADMIN — Medication 75 MCG/HR: at 10:17

## 2021-01-30 RX ADMIN — ETOMIDATE 20 MG: 2 INJECTION INTRAVENOUS at 06:29

## 2021-01-30 RX ADMIN — SODIUM CHLORIDE 1000 ML: 9 INJECTION, SOLUTION INTRAVENOUS at 02:54

## 2021-01-30 RX ADMIN — CEFEPIME 2 G: 2 INJECTION, POWDER, FOR SOLUTION INTRAVENOUS at 21:25

## 2021-01-30 RX ADMIN — CEFEPIME 2 G: 2 INJECTION, POWDER, FOR SOLUTION INTRAVENOUS at 02:13

## 2021-01-30 RX ADMIN — MIDODRINE HYDROCHLORIDE 15 MG: 5 TABLET ORAL at 09:17

## 2021-01-30 RX ADMIN — SODIUM BICARBONATE 650 MG: 650 TABLET ORAL at 21:35

## 2021-01-30 RX ADMIN — VANCOMYCIN HYDROCHLORIDE 1750 MG: 10 INJECTION, POWDER, LYOPHILIZED, FOR SOLUTION INTRAVENOUS at 20:37

## 2021-01-30 RX ADMIN — ASPIRIN 81 MG: 81 TABLET, CHEWABLE ORAL at 09:17

## 2021-01-30 RX ADMIN — SUCCINYLCHOLINE CHLORIDE 100 MG: 20 INJECTION INTRAMUSCULAR; INTRAVENOUS at 06:29

## 2021-01-30 RX ADMIN — SODIUM BICARBONATE 650 MG: 650 TABLET ORAL at 12:40

## 2021-01-30 RX ADMIN — ENOXAPARIN SODIUM 30 MG: 30 INJECTION SUBCUTANEOUS at 09:00

## 2021-01-30 RX ADMIN — PROPOFOL 50 MCG/KG/MIN: 10 INJECTION, EMULSION INTRAVENOUS at 15:21

## 2021-01-30 RX ADMIN — DOXYCYCLINE 100 MG: 100 INJECTION, POWDER, LYOPHILIZED, FOR SOLUTION INTRAVENOUS at 09:01

## 2021-01-30 RX ADMIN — GABAPENTIN 300 MG: 250 SOLUTION ORAL at 09:11

## 2021-01-30 RX ADMIN — SUCCINYLCHOLINE CHLORIDE 100 MG: 20 INJECTION, SOLUTION INTRAMUSCULAR; INTRAVENOUS at 06:29

## 2021-01-30 RX ADMIN — ONDANSETRON 8 MG: 2 INJECTION INTRAMUSCULAR; INTRAVENOUS at 02:12

## 2021-01-30 RX ADMIN — METRONIDAZOLE 500 MG: 500 INJECTION, SOLUTION INTRAVENOUS at 22:40

## 2021-01-30 RX ADMIN — ENOXAPARIN SODIUM 30 MG: 30 INJECTION SUBCUTANEOUS at 21:36

## 2021-01-30 RX ADMIN — SODIUM CHLORIDE 1000 ML: 9 INJECTION, SOLUTION INTRAVENOUS at 02:14

## 2021-01-30 RX ADMIN — ATORVASTATIN CALCIUM 40 MG: 40 TABLET, FILM COATED ORAL at 21:35

## 2021-01-30 RX ADMIN — GABAPENTIN 300 MG: 250 SOLUTION ORAL at 15:54

## 2021-01-30 NOTE — ED NOTES
Multiple unsuccessful attempts of venipucture sticks and arterial sticks to draw troponin and lactic. Intensivitst notified. Awaiting central line placement.

## 2021-01-30 NOTE — ED PROVIDER NOTES
HPI     82-year-old male with a history of diabetes hypertension presents from [facility] with acute onset shortness of breath and hypoxia. Patient states his symptoms started tonight. EMS states the patient is Covid positive. Patient denies any chest pain. He denies any underlying lung disease. He does not smoke. He states he is never felt like this before. Patient states he has not been tested for Covid, there is Covid at his facility    Past Medical History:   Diagnosis Date    Diabetes ([facility] [address]     Hypertension        No past surgical history on file. No family history on file.     Social History     Socioeconomic History    Marital status:      Spouse name: Not on file    Number of children: Not on file    Years of education: Not on file    Highest education level: Not on file   Occupational History    Not on file   Social Needs    Financial resource strain: Not on file    Food insecurity     Worry: Not on file     Inability: Not on file    Transportation needs     Medical: Not on file     Non-medical: Not on file   Tobacco Use    Smoking status: Former Smoker    Smokeless tobacco: Never Used   Substance and Sexual Activity    Alcohol use: Not Currently    Drug use: Not Currently    Sexual activity: Not on file   Lifestyle    Physical activity     Days per week: Not on file     Minutes per session: Not on file    Stress: Not on file   Relationships    Social connections     Talks on phone: Not on file     Gets together: Not on file     Attends Je[employer]'s witness service: Not on file     Active member of club or organization: Not on file     Attends meetings of clubs or organizations: Not on file     Relationship status: Not on file    Intimate partner violence     Fear of current or ex partner: Not on file     Emotionally abused: Not on file     Physically abused: Not on file     Forced sexual activity: Not on file   Other Topics Concern    Not on file   Social History Narrative    Not on file         ALLERGIES: Patient has no known allergies. Review of Systems   Unable to perform ROS: Severe respiratory distress   Constitutional: Negative for fever. Respiratory: Positive for shortness of breath. Cardiovascular: Negative for chest pain and leg swelling. There were no vitals filed for this visit. Physical Exam  Constitutional:       General: He is in acute distress. Appearance: He is well-developed. HENT:      Head: Normocephalic and atraumatic. Mouth/Throat:      Pharynx: No oropharyngeal exudate. Eyes:      General: No scleral icterus. Right eye: No discharge. Left eye: No discharge. Pupils: Pupils are equal, round, and reactive to light. Neck:      Musculoskeletal: Normal range of motion and neck supple. Vascular: No JVD. Cardiovascular:      Rate and Rhythm: Regular rhythm. Tachycardia present. Heart sounds: Normal heart sounds. No murmur. Pulmonary:      Effort: Respiratory distress present. Breath sounds: Normal breath sounds. No stridor. No wheezing or rales. Comments: Coarse breath sounds throughout  Chest:      Chest wall: No tenderness. Abdominal:      General: Bowel sounds are normal. There is no distension. Palpations: Abdomen is soft. There is no mass. Tenderness: There is no abdominal tenderness. There is no guarding or rebound. Musculoskeletal: Normal range of motion. Skin:     General: Skin is warm and dry. Capillary Refill: Capillary refill takes less than 2 seconds. Findings: No rash. Neurological:      Mental Status: He is oriented to person, place, and time. Psychiatric:         Behavior: Behavior normal.         Thought Content:  Thought content normal.         Judgment: Judgment normal.          [facility]  90 minutes of critical care time       Intubation    Date/Time: 1/30/2021 6:50 AM  Performed by: Jaydon Cummins MD  Authorized by: Jaydon Cummins MD     Consent:     Consent obtained:  Emergent situation    Alternatives discussed:  No treatment  Pre-procedure details:     Patient status:  Unresponsive    Mallampati score:  III    Pretreatment meds: etomidate. Paralytics:  Succinylcholine  Procedure details:     CPR in progress: no      Intubation method:  Oral    Oral intubation technique:  Video-assisted    Laryngoscope blade: Mac 3    Tube size (mm):  7.5    Tube type:  Cuffed    Number of attempts:  1    Ventilation between attempts: no      Cricoid pressure: no      Tube visualized through cords: yes    Placement assessment:     ETT to lip:  23    Tube secured with:  ETT de santiago    Breath sounds:  Equal    Placement verification: CXR verification and ETCO2 detector    Post-procedure details:     Patient tolerance of procedure: Tolerated well, no immediate complications          ED EKG interpretation:  Rhythm: sinus tachycardia; and regular . Rate (approx.): 131; Axis: normal; P wave: normal; QRS interval: normal ; ST/T wave: non-specific changes; This EKG was interpreted by Ragini Newell MD,ED Provider. Patient placed on BiPap on arrival. States he is starting to feel a little better       Ragini Newell MD  1:27 AM  Patient is doing much better on BiPAP. He tells me now that he had acute onset of nausea vomiting and diarrhea and after vomiting he developed difficulty breathing. He states he has 4 stents but has no chest pain. No leg swelling. He states he was tested for Covid 2 weeks ago but has not had any symptoms until tonight. Patient states he is in the nursing home because of an infection in his neck he is on doxycycline and is going to have a skin graft      [facility] Consult for Admission  5:25 AM    ED Room Number: ER16/16  Patient Name and age:  Kenyetta Momin 63 y.o.  male  Working Diagnosis: No diagnosis found.     COVID-19 Suspicion:  yes  Sepsis present:  yes  Reassessment needed: yes  Code Status:  Full Code  Readmission: no  Isolation Requirements: yes  Recommended Level of Care:  step down  Department:[facility] Adult ED - ([phone]  Other:    63year old male coming from rehab center with a history of diabetes and hypertension, in rehab for wound infection around  his neck, tested Covid 2 weeks ago but did not get short of breath until tonight. He is on BiPAP resting comfortably. His tachycardia and hypertension have resolved. He does have an elevated lactate of 2.8. His BNP is markedly elevated his troponin is 0.22. CT scan is negative for PE. Misty Walsh MD  6:49 AM      Patient acutely decompensated. He became lethargic less responsive with dropping oxygen levels. Decision was made to intubate him. Patient is very difficult to oxygenate even after intubation and suction. I have ordered Lasix to marcello. I spoken with the ICU he will come down to evaluate. I did speak to CT surgery  in case ECMO was a consideration-he would like the ICU to evaluate first.  He put somebody on ECMO yesterday he is not sure the system can support another one.

## 2021-01-30 NOTE — H&P
[facility]    ICU Team Consult Note    Name: Anderson Mcdonald   : 1955   MRN: [id number]   Date: 2021      Subjective:     19-year-old man who presented from [facility] with acute onset shortness of breath, hypoxia, nausea, vomiting and diarrhea-shortness of breath acutely got worse after one of the vomiting episodes. Diagnosed with Covid about 2 weeks prior to presentation. Patient placed on BiPAP on arrival-felt much better. After getting fluids for resuscitation/elevated lactate level patient acutely decompensated-developed lethargy and drop in oxygen levels-intubated emergently requiring moderate to high vent support. Patient was at [facility] because he he was getting IV antibiotics for neck infection and was going to have a skin graft    Active Problem List:     Problem List  Never Reviewed          Codes Class    Acute hypoxemic respiratory failure due to COVID-19 [facility]) ICD-10-CM: U07.1, J96.01  ICD-9-CM: 518.81, 079.89, 799.02               Past Medical History:      has a past medical history of Diabetes ([facility] [address] and Hypertension. Past Surgical History:      has no past surgical history on file. Home Medications:     Prior to Admission medications    Medication Sig Start Date End Date Taking? Authorizing Provider   aspirin 81 mg chewable tablet Take 81 mg by mouth daily. Yes Candelaria, MD Jessi   atorvastatin (LIPITOR) 80 mg tablet Take 80 mg by mouth daily. Yes Jessi Gaona MD   carvediloL (COREG) 12.5 mg tablet Take  by mouth two (2) times a day. Yes Jessi Gaona MD   docusate sodium (Colace) 100 mg capsule Take 100 mg by mouth two (2) times daily as needed for Constipation. Yes Candelaria, MD Jessi   doxycycline (ADOXA) 100 mg tablet Take 100 mg by mouth two (2) times a day. Yes Jessi Gaona MD   ferrous sulfate 325 mg (65 mg iron) tablet Take  by mouth every fourty-eight (48) hours.    Yes Jessi Gaona MD   gabapentin (NEURONTIN) 400 mg capsule Take 400 mg by mouth three (3) times daily. Yes Candelaria, MD Jessi   insulin lispro (HUMALOG) 100 unit/mL injection 10 Units by SubCUTAneous route Before breakfast, lunch, and dinner. Yes Jessi Gaona MD   isosorbide mononitrate ER (IMDUR) 60 mg CR tablet Take 60 mg by mouth every morning. Yes Candelaria, MD Jessi   insulin glargine (Lantus U-100 Insulin) 100 unit/mL injection 20 Units by SubCUTAneous route nightly. Yes Jessi Gaona MD   lisinopriL (PRINIVIL, ZESTRIL) 20 mg tablet Take 20 mg by mouth daily. Yes Jessi Gaona MD   methocarbamoL (ROBAXIN) 500 mg tablet Take 500 mg by mouth three (3) times daily. Yes Jessi Gaona MD   oxyCODONE IR (ROXICODONE) 5 mg immediate release tablet Take 5 mg by mouth every six (6) hours as needed for Pain. Yes Jessi Gaona MD   pantoprazole (PROTONIX) 40 mg tablet Take 40 mg by mouth daily. Yes Jesis Gaona MD   guaiFENesin-codeine (Guaiatussin AC) 100-10 mg/5 mL solution Take 5 mL by mouth four (4) times daily as needed for Cough. Yes Jessi Gaona MD   senna (Senna) 8.6 mg tablet Take 1 Tab by mouth daily. Yes Jessi Gaona MD   ticagrelor (BRILINTA) 90 mg tablet Take 90 mg by mouth two (2) times a day. Yes Jessi Gaona MD   ascorbic acid, vitamin C, (Vitamin C) 500 mg tablet Take 500 mg by mouth two (2) times a day. Yes Candelaria, MD Jessi   cholecalciferol (Vitamin D3) (1000 Units /25 mcg) tablet Take 1,000 Units by mouth daily. Yes Candelaria, MD Jessi   zinc sulfate (ZINCATE) 220 (50) mg capsule Take 220 mg by mouth daily. Yes Provider, Historical   therapeutic multivitamin (THERAGRAN) tablet Take 1 Tab by mouth daily. Yes Provider, Historical   acetaminophen (TYLENOL) 325 mg tablet Take 975 mg by mouth every six (6) hours as needed for Pain.     Other, MD Jessi       Allergies/Social/Family History:     No Known Allergies   Social History     Tobacco Use    Smoking status: Former Smoker    Smokeless tobacco: Never Used   Substance Use Topics    Alcohol use: Not Currently      No family history on file. Review of Systems:     Unable to assess at this time    Objective:   Vital Signs:  Visit Vitals  /78   Pulse (!) 115   Temp 98.4 °F (36.9 °C)   Resp 16   Wt 84.8 kg (187 lb)   SpO2 99%   BMI 28.43 kg/m²      O2 Device: Endotracheal tube, Ventilator Temp (24hrs), Av.4 °F (36.9 °C), Min:98.4 °F (36.9 °C), Max:98.4 °F (36.9 °C)           Intake/Output:     Intake/Output Summary (Last 24 hours) at 2021 1529  Last data filed at 2021 1447  Gross per 24 hour   Intake --   Output 1200 ml   Net -1200 ml       Physical Exam:  General-intubated, sedated  Neuro-pupils reactive, withdraws in uppers, strong cough and gag  Cardiac-tachycardic, regular  Lungs-coarse bilaterally  Abdomen-soft, nontender, nondistended  Extremities-warm    LABS AND  DATA: Personally reviewed  Recent Labs     21  0541 21  0145   WBC 18.7* 12.4*   HGB 10.8* 10.1*   HCT 36.3* 33.1*   * 380     Recent Labs     21  1413 21  0145   * 140   K 4.3 5.2*   * 114*   CO2 21 18*   BUN 43* 40*   CREA 1.80* 1.73*   * 242*   CA 7.5* 8.4*   MG 1.6 1.7   PHOS 4.6  --      Recent Labs     21  1413 21  0145   AP 94 124*   TP 5.8* 7.0   ALB 1.9* 2.3*   GLOB 3.9 4.7*     Recent Labs     21  0542 21  0451   APTT 25.5 24.2      Recent Labs     21  0753 21  0057   PHI 7.19* 7.22*   PCO2I 44.7 43.5   PO2I 57* 109*   FIO2I 100 100     Recent Labs     21  1413 21  0800 21  0541   CPK  --  135  --    TROIQ 1.38*  --  0.81*     Ventilator Settings:  Mode Rate Tidal Volume Pressure FiO2 PEEP   Assist control, VC+   450 ml    60 % 8 cm H20     Peak airway pressure: 18 cm H2O    Minute ventilation: 11 l/min        MEDS: Reviewed    Imaging reviewed    Assessment:   Acute hypoxic respiratory failure-pulmonary edema, Covid pneumonia, aspiration pneumonitis  Tachycardia secondary to above  Acute kidney injury  Diarrhea-?   Secondary to Covid or C. difficile    ICU Comprehensive Plan of Care:     Analgesia/sedation with opioids and propofol as needed, early mobility as tolerated    Tachycardic-likely secondary to respiratory embarrassment-monitor, elevated lactate levels-trend for now, keep maps above 65-we will likely need high-dose sedation-use pressors if necessary    Continue lung protective strategies on the ventilator, PEEP currently at 8, permissive hypercapnia, saturation goal greater than 88%, start steroid therapy, pulmonary edema-diuresed in the ER-we will diurese as needed    Start tube feeding, PPI GI prophylaxis, trend LFTs, monitor stool output, test for C. difficile    JUAN C-monitor urine output closely, dose medication renally, avoid nephrotoxic agents, granular casts on UA-ATN, will work-up further, pulmonary edema-we will diurese as needed, significant metabolic acidosis-we will treat with bicarb to achieve a pH greater than 7.2, correct electrolyte derangements as needed    Lovenox for DVT prophylaxis    For now we will treat for Covid pneumonia, possible HCAP and possible aspiration pneumonia-vancomycin, cefepime, Flagyl, will work-up for different causes of pneumonia    Keep glucose less than 180    NOTE OF PERSONAL INVOLVEMENT IN CARE   This patient has a high probability of imminent, clinically significant deterioration, which requires the highest level of preparedness to intervene urgently. I participated in the decision-making and personally managed or directed the management of the following life and organ supporting interventions that required my frequent assessment to treat or prevent imminent deterioration. I personally spent 80 minutes of critical care time. This does not include any procedural time.     Signed By: Vega Travis MD     January 30, 2021

## 2021-01-30 NOTE — ED NOTES
Patient became less responsive, spO2 in the high 80's. Respiratory and Dr. Angela Marino informed. Patient was intubated, given etomidate and Succ. ETT 23 at the lip with positive color change.

## 2021-01-30 NOTE — ED TRIAGE NOTES
Patient presents to ED via EMS from [facility] for respiratory distress. EMS states patient is COVID + and having trouble breathing.  When EMS arrived patient was spO2 57% on 4L NC.

## 2021-01-30 NOTE — PROGRESS NOTES
Admission Medication Reconciliation:        PTA med list compiled from [facility] transfer document, which were already scanned into electronic medical record. Notes:  Doxycycline: started 1/4/2021 for wound management x 4 weeks    Medication changes (since last review): Added:  Zinc sulfate    Revised:  MVT to formulary alternative  Ferrous sulfate to every other day  Lispro to 3x daily before meals    Deleted:  Ibuprofen    Thank you for allowing me to participate in the care of your patient. Bernard Stewart PharmD, RN # [phone]       [facility] pharmacy benefit data reflects medications filled and processed through the patient's insurance, however   this data does NOT capture whether the medication was picked up or is currently being taken by the patient. Allergies:  Patient has no known allergies. Significant PMH/Disease States:   Past Medical History:   Diagnosis Date    Diabetes ([address]     Hypertension      Chief Complaint for this Admission:    Chief Complaint   Patient presents with    Respiratory Distress     Prior to Admission Medications:   Prior to Admission Medications   Prescriptions Last Dose Informant Taking?   acetaminophen (TYLENOL) 325 mg tablet   No   Sig: Take 975 mg by mouth every six (6) hours as needed for Pain. ascorbic acid, vitamin C, (Vitamin C) 500 mg tablet   Yes   Sig: Take 500 mg by mouth two (2) times a day. aspirin 81 mg chewable tablet   Yes   Sig: Take 81 mg by mouth daily. atorvastatin (LIPITOR) 80 mg tablet   Yes   Sig: Take 80 mg by mouth daily. carvediloL (COREG) 12.5 mg tablet   Yes   Sig: Take  by mouth two (2) times a day. cholecalciferol (Vitamin D3) (1000 Units /25 mcg) tablet   Yes   Sig: Take 1,000 Units by mouth daily. docusate sodium (Colace) 100 mg capsule   Yes   Sig: Take 100 mg by mouth two (2) times daily as needed for Constipation. doxycycline (ADOXA) 100 mg tablet   Yes   Sig: Take 100 mg by mouth two (2) times a day.    ferrous sulfate 325 mg (65 mg iron) tablet   Yes   Sig: Take  by mouth every fourty-eight (48) hours. gabapentin (NEURONTIN) 400 mg capsule   Yes   Sig: Take 400 mg by mouth three (3) times daily. guaiFENesin-codeine (Guaiatussin AC) 100-10 mg/5 mL solution   Yes   Sig: Take 5 mL by mouth four (4) times daily as needed for Cough. insulin glargine (Lantus U-100 Insulin) 100 unit/mL injection   Yes   Si Units by SubCUTAneous route nightly. insulin lispro (HUMALOG) 100 unit/mL injection   Yes   Sig: 10 Units by SubCUTAneous route Before breakfast, lunch, and dinner. isosorbide mononitrate ER (IMDUR) 60 mg CR tablet   Yes   Sig: Take 60 mg by mouth every morning. lisinopriL (PRINIVIL, ZESTRIL) 20 mg tablet   Yes   Sig: Take 20 mg by mouth daily. methocarbamoL (ROBAXIN) 500 mg tablet   Yes   Sig: Take 500 mg by mouth three (3) times daily. oxyCODONE IR (ROXICODONE) 5 mg immediate release tablet   Yes   Sig: Take 5 mg by mouth every six (6) hours as needed for Pain.   pantoprazole (PROTONIX) 40 mg tablet   Yes   Sig: Take 40 mg by mouth daily. senna (Senna) 8.6 mg tablet   Yes   Sig: Take 1 Tab by mouth daily. therapeutic multivitamin (THERAGRAN) tablet   Yes   Sig: Take 1 Tab by mouth daily. ticagrelor (BRILINTA) 90 mg tablet   Yes   Sig: Take 90 mg by mouth two (2) times a day. zinc sulfate (ZINCATE) 220 (50) mg capsule   Yes   Sig: Take 220 mg by mouth daily. Facility-Administered Medications: None     Please contact the main inpatient pharmacy with any questions or concerns at [phone] and we will direct you to the clinical pharmacist covering this patient's care while in-house.    MARIO Dumont

## 2021-01-30 NOTE — PROCEDURES
Done emergently. The patient was placed in a dependent position appropriate for central line placement based on the vein to be cannulated. The patients left neck/shoulder was prepped and draped in sterile fashion. 1% Lidocaine was used to anesthetize the surrounding skin area. A quadruple lumen catheter was introduced into the the subclavian vein using the Seldinger technique. The catheter was threaded smoothly over the guide wire and appropriate blood return was obtained. Each lumen of the catheter was evacuated of air and flushed with sterile saline. The catheter was then sutured in place to the skin and a sterile dressing applied.

## 2021-01-30 NOTE — ED NOTES
Bedside shift change report given to Dontrell Del Castillo (oncoming nurse) by Fco Goldstein (offgoing nurse). Report included the following information SBAR, Kardex, ED Summary and MAR.

## 2021-01-30 NOTE — PROGRESS NOTES
08:15: Discussed with  Ettube placement. Initial placement is at 22 cm at the gum. Cheched Xray with Dr, he agreed Ida Brooks needs to be moved to 25 cm at the gum. 08:20: Moved Ettube to 25 cm at the gum. Equal bilateral Breath sound, good chest excursion, + tube condensation.

## 2021-01-30 NOTE — PROGRESS NOTES
Pharmacist Note - Vancomycin Dosing    Consult provided for this 63 y.o. male for indication of sepsis. Antibiotic regimen(s): Vancomycin and Cefepime  Patient on vancomycin PTA? NO     Recent Labs     21  0541 21  0145   WBC 18.7* 12.4*   CREA  --  1.73*   BUN  --  40*     Frequency of BMP: daily through   Height: 68 inches in   Weight: 84.8 kg  Est CrCl: ~45-50 ml/min  Temp (24hrs), Av.4 °F (36.9 °C), Min:98.4 °F (36.9 °C), Max:98.4 °F (36.9 °C)    Cultures:   Blood pending    Goal trough = 15 - 20 mcg/mL    Therapy will be initiated with a loading dose of 1750 mg IV x 1 to be followed by a maintenance dose of 1250 mg IV every 18 hours. Pharmacy to follow patient daily and order levels / make dose adjustments as appropriate.

## 2021-01-31 LAB
ALBUMIN SERPL-MCNC: 1.8 G/DL (ref 3.5–5)
ALBUMIN SERPL-MCNC: 1.8 G/DL (ref 3.5–5)
ALBUMIN/GLOB SERPL: 0.5 {RATIO} (ref 1.1–2.2)
ALBUMIN/GLOB SERPL: 0.5 {RATIO} (ref 1.1–2.2)
ALP SERPL-CCNC: 83 U/L (ref 45–117)
ALP SERPL-CCNC: 89 U/L (ref 45–117)
ALT SERPL-CCNC: 20 U/L (ref 12–78)
ALT SERPL-CCNC: 25 U/L (ref 12–78)
ANION GAP SERPL CALC-SCNC: 5 MMOL/L (ref 5–15)
ANION GAP SERPL CALC-SCNC: 6 MMOL/L (ref 5–15)
APTT PPP: 30.8 SEC (ref 22.1–31)
ARTERIAL PATENCY WRIST A: ABNORMAL
ARTERIAL PATENCY WRIST A: YES
AST SERPL-CCNC: 28 U/L (ref 15–37)
AST SERPL-CCNC: 35 U/L (ref 15–37)
BACTERIA SPEC CULT: NORMAL
BASE DEFICIT BLDA-SCNC: 4.9 MMOL/L
BASE DEFICIT BLDA-SCNC: 6.2 MMOL/L
BDY SITE: ABNORMAL
BDY SITE: ABNORMAL
BILIRUB SERPL-MCNC: 0.3 MG/DL (ref 0.2–1)
BILIRUB SERPL-MCNC: 0.3 MG/DL (ref 0.2–1)
BUN SERPL-MCNC: 47 MG/DL (ref 6–20)
BUN SERPL-MCNC: 50 MG/DL (ref 6–20)
BUN/CREAT SERPL: 21 (ref 12–20)
BUN/CREAT SERPL: 22 (ref 12–20)
CALCIUM SERPL-MCNC: 7.6 MG/DL (ref 8.5–10.1)
CALCIUM SERPL-MCNC: 7.6 MG/DL (ref 8.5–10.1)
CHLORIDE SERPL-SCNC: 121 MMOL/L (ref 97–108)
CHLORIDE SERPL-SCNC: 122 MMOL/L (ref 97–108)
CO2 SERPL-SCNC: 22 MMOL/L (ref 21–32)
CO2 SERPL-SCNC: 23 MMOL/L (ref 21–32)
CREAT SERPL-MCNC: 2.1 MG/DL (ref 0.7–1.3)
CREAT SERPL-MCNC: 2.38 MG/DL (ref 0.7–1.3)
ERYTHROCYTE [DISTWIDTH] IN BLOOD BY AUTOMATED COUNT: 16.9 % (ref 11.5–14.5)
GLOBULIN SER CALC-MCNC: 4 G/DL (ref 2–4)
GLOBULIN SER CALC-MCNC: 4 G/DL (ref 2–4)
GLUCOSE BLD STRIP.AUTO-MCNC: 155 MG/DL (ref 65–100)
GLUCOSE BLD STRIP.AUTO-MCNC: 165 MG/DL (ref 65–100)
GLUCOSE BLD STRIP.AUTO-MCNC: 194 MG/DL (ref 65–100)
GLUCOSE BLD STRIP.AUTO-MCNC: 203 MG/DL (ref 65–100)
GLUCOSE SERPL-MCNC: 155 MG/DL (ref 65–100)
GLUCOSE SERPL-MCNC: 172 MG/DL (ref 65–100)
HCO3 BLDA-SCNC: 19 MMOL/L (ref 22–26)
HCO3 BLDA-SCNC: 22 MMOL/L (ref 22–26)
HCT VFR BLD AUTO: 27.4 % (ref 36.6–50.3)
HGB BLD-MCNC: 8.3 G/DL (ref 12.1–17)
MAGNESIUM SERPL-MCNC: 1.5 MG/DL (ref 1.6–2.4)
MAGNESIUM SERPL-MCNC: 1.9 MG/DL (ref 1.6–2.4)
MCH RBC QN AUTO: 25.6 PG (ref 26–34)
MCHC RBC AUTO-ENTMCNC: 30.3 G/DL (ref 30–36.5)
MCV RBC AUTO: 84.6 FL (ref 80–99)
NRBC # BLD: 0 K/UL (ref 0–0.01)
NRBC BLD-RTO: 0 PER 100 WBC
PCO2 BLDA: 36 MMHG (ref 35–45)
PCO2 BLDA: 44 MMHG (ref 35–45)
PH BLDA: 7.3 [PH] (ref 7.35–7.45)
PH BLDA: 7.33 [PH] (ref 7.35–7.45)
PHOSPHATE SERPL-MCNC: 5.7 MG/DL (ref 2.6–4.7)
PHOSPHATE SERPL-MCNC: 6.4 MG/DL (ref 2.6–4.7)
PLATELET # BLD AUTO: 295 K/UL (ref 150–400)
PMV BLD AUTO: 10.9 FL (ref 8.9–12.9)
PO2 BLDA: 203 MMHG (ref 80–100)
PO2 BLDA: 295 MMHG (ref 80–100)
POTASSIUM SERPL-SCNC: 4.6 MMOL/L (ref 3.5–5.1)
POTASSIUM SERPL-SCNC: 4.9 MMOL/L (ref 3.5–5.1)
PROT SERPL-MCNC: 5.8 G/DL (ref 6.4–8.2)
PROT SERPL-MCNC: 5.8 G/DL (ref 6.4–8.2)
RBC # BLD AUTO: 3.24 M/UL (ref 4.1–5.7)
SAO2 % BLD: 100 % (ref 92–97)
SAO2 % BLD: 99 % (ref 92–97)
SAO2% DEVICE SAO2% SENSOR NAME: ABNORMAL
SAO2% DEVICE SAO2% SENSOR NAME: ABNORMAL
SERVICE CMNT-IMP: ABNORMAL
SERVICE CMNT-IMP: NORMAL
SODIUM SERPL-SCNC: 149 MMOL/L (ref 136–145)
SODIUM SERPL-SCNC: 150 MMOL/L (ref 136–145)
SPECIMEN SITE: ABNORMAL
SPECIMEN SITE: ABNORMAL
THERAPEUTIC RANGE,PTTT: NORMAL SECS (ref 58–77)
UR CULT HOLD, URHOLD: NORMAL
WBC # BLD AUTO: 12.7 K/UL (ref 4.1–11.1)

## 2021-01-31 PROCEDURE — 85730 THROMBOPLASTIN TIME PARTIAL: CPT

## 2021-01-31 PROCEDURE — 36600 WITHDRAWAL OF ARTERIAL BLOOD: CPT

## 2021-01-31 PROCEDURE — 65610000006 HC RM INTENSIVE CARE

## 2021-01-31 PROCEDURE — 74011000258 HC RX REV CODE- 258: Performed by: EMERGENCY MEDICINE

## 2021-01-31 PROCEDURE — 84100 ASSAY OF PHOSPHORUS: CPT

## 2021-01-31 PROCEDURE — 74011250636 HC RX REV CODE- 250/636: Performed by: EMERGENCY MEDICINE

## 2021-01-31 PROCEDURE — 74011000250 HC RX REV CODE- 250: Performed by: NURSE PRACTITIONER

## 2021-01-31 PROCEDURE — 74011636637 HC RX REV CODE- 636/637: Performed by: EMERGENCY MEDICINE

## 2021-01-31 PROCEDURE — 83735 ASSAY OF MAGNESIUM: CPT

## 2021-01-31 PROCEDURE — 94003 VENT MGMT INPAT SUBQ DAY: CPT

## 2021-01-31 PROCEDURE — 87899 AGENT NOS ASSAY W/OPTIC: CPT

## 2021-01-31 PROCEDURE — 82962 GLUCOSE BLOOD TEST: CPT

## 2021-01-31 PROCEDURE — 85027 COMPLETE CBC AUTOMATED: CPT

## 2021-01-31 PROCEDURE — 86738 MYCOPLASMA ANTIBODY: CPT

## 2021-01-31 PROCEDURE — 74011250637 HC RX REV CODE- 250/637: Performed by: EMERGENCY MEDICINE

## 2021-01-31 PROCEDURE — 80053 COMPREHEN METABOLIC PANEL: CPT

## 2021-01-31 PROCEDURE — 87205 SMEAR GRAM STAIN: CPT

## 2021-01-31 PROCEDURE — 87070 CULTURE OTHR SPECIMN AEROBIC: CPT

## 2021-01-31 PROCEDURE — 36415 COLL VENOUS BLD VENIPUNCTURE: CPT

## 2021-01-31 RX ORDER — ALLOPURINOL 100 MG/1
100 TABLET ORAL DAILY
Status: DISCONTINUED | OUTPATIENT
Start: 2021-01-31 | End: 2021-03-05 | Stop reason: HOSPADM

## 2021-01-31 RX ADMIN — ASPIRIN 81 MG: 81 TABLET, CHEWABLE ORAL at 10:32

## 2021-01-31 RX ADMIN — MIDODRINE HYDROCHLORIDE 15 MG: 5 TABLET ORAL at 05:37

## 2021-01-31 RX ADMIN — PROPOFOL 30 MCG/KG/MIN: 10 INJECTION, EMULSION INTRAVENOUS at 20:56

## 2021-01-31 RX ADMIN — DEXAMETHASONE SODIUM PHOSPHATE 6 MG: 10 INJECTION, SOLUTION INTRAMUSCULAR; INTRAVENOUS at 10:32

## 2021-01-31 RX ADMIN — INSULIN LISPRO 2 UNITS: 100 INJECTION, SOLUTION INTRAVENOUS; SUBCUTANEOUS at 00:15

## 2021-01-31 RX ADMIN — GABAPENTIN 300 MG: 250 SOLUTION ORAL at 22:09

## 2021-01-31 RX ADMIN — CEFEPIME 2 G: 2 INJECTION, POWDER, FOR SOLUTION INTRAVENOUS at 11:40

## 2021-01-31 RX ADMIN — INSULIN LISPRO 2 UNITS: 100 INJECTION, SOLUTION INTRAVENOUS; SUBCUTANEOUS at 11:45

## 2021-01-31 RX ADMIN — GABAPENTIN 300 MG: 250 SOLUTION ORAL at 10:31

## 2021-01-31 RX ADMIN — ENOXAPARIN SODIUM 30 MG: 30 INJECTION SUBCUTANEOUS at 10:31

## 2021-01-31 RX ADMIN — METRONIDAZOLE 500 MG: 500 INJECTION, SOLUTION INTRAVENOUS at 20:56

## 2021-01-31 RX ADMIN — INSULIN LISPRO 3 UNITS: 100 INJECTION, SOLUTION INTRAVENOUS; SUBCUTANEOUS at 17:44

## 2021-01-31 RX ADMIN — Medication 150 MCG/HR: at 17:49

## 2021-01-31 RX ADMIN — DOXYCYCLINE 100 MG: 100 INJECTION, POWDER, LYOPHILIZED, FOR SOLUTION INTRAVENOUS at 10:39

## 2021-01-31 RX ADMIN — DEXMEDETOMIDINE HYDROCHLORIDE 0.4 MCG/KG/HR: 400 INJECTION INTRAVENOUS at 00:11

## 2021-01-31 RX ADMIN — METRONIDAZOLE 500 MG: 500 INJECTION, SOLUTION INTRAVENOUS at 10:32

## 2021-01-31 RX ADMIN — PROPOFOL 25 MCG/KG/MIN: 10 INJECTION, EMULSION INTRAVENOUS at 14:49

## 2021-01-31 RX ADMIN — LANSOPRAZOLE 30 MG: KIT at 10:31

## 2021-01-31 RX ADMIN — DEXMEDETOMIDINE HYDROCHLORIDE 0.5 MCG/KG/HR: 400 INJECTION INTRAVENOUS at 19:14

## 2021-01-31 RX ADMIN — DOXYCYCLINE 100 MG: 100 INJECTION, POWDER, LYOPHILIZED, FOR SOLUTION INTRAVENOUS at 22:07

## 2021-01-31 RX ADMIN — ATORVASTATIN CALCIUM 40 MG: 40 TABLET, FILM COATED ORAL at 21:00

## 2021-01-31 RX ADMIN — GABAPENTIN 300 MG: 250 SOLUTION ORAL at 00:15

## 2021-01-31 RX ADMIN — GABAPENTIN 300 MG: 250 SOLUTION ORAL at 17:39

## 2021-01-31 RX ADMIN — ALLOPURINOL 100 MG: 100 TABLET ORAL at 10:32

## 2021-01-31 RX ADMIN — ENOXAPARIN SODIUM 30 MG: 30 INJECTION SUBCUTANEOUS at 20:56

## 2021-01-31 RX ADMIN — Medication 10 ML: at 13:43

## 2021-01-31 RX ADMIN — FERROUS SULFATE TAB 325 MG (65 MG ELEMENTAL FE) 325 MG: 325 (65 FE) TAB at 10:32

## 2021-01-31 RX ADMIN — SODIUM BICARBONATE 650 MG: 650 TABLET ORAL at 05:37

## 2021-01-31 RX ADMIN — INSULIN LISPRO 2 UNITS: 100 INJECTION, SOLUTION INTRAVENOUS; SUBCUTANEOUS at 06:30

## 2021-01-31 RX ADMIN — Medication 10 ML: at 21:00

## 2021-01-31 RX ADMIN — Medication 10 ML: at 05:39

## 2021-01-31 RX ADMIN — DEXMEDETOMIDINE HYDROCHLORIDE 0.4 MCG/KG/HR: 400 INJECTION INTRAVENOUS at 10:30

## 2021-01-31 NOTE — PROGRESS NOTES
Day #2 of Vancomycin  Indication:  Sepsis  Current regimen:  1250 mg IV Q18hrs  Abx regimen:  Vancomycin, cefepime, doxycycline, and Flagyl  ID Following ?: NO  Concomitant nephrotoxic drugs (requires more frequent monitoring): None  Frequency of BMP?: Daily through   Recent Labs     21  0704 21  0412 21  2132 21  1413 21  0541 21  0145   WBC 12.7*  --   --   --  18.7* 12.4*   CREA  --  2.38* 2.10* 1.80*  --  1.73*   BUN  --  50* 47* 43*  --  40*   Est CrCl: 33.5 ml/min; UO: 0.8  ml/kg/hr  Temp (24hrs), Av.6 °F (37 °C), Min:97.8 °F (36.6 °C), Max:99.2 °F (37.3 °C)    Cultures:    Blood - NGTD - pending   COVID+     Goal trough = 15 - 20 mcg/mL    Recent trough history (date/time/level/dose/action taken):  None thus far    Original LD scheduled and send for administration [email or web address] however was never given and order . When this was discovered the LD reordered, sent, and finally administered [email or web address]. Plan: SCr has jumped significantly since maintenance dose calculated (1.73->2.38 mg/dL). Will discontinue maintenance dose and dose by levels. Vancomycin, random level tomorrow with AM labs. Pharmacy will continue to monitor this patient daily for changes in clinical status and renal function.     Kristie Hernandez, PharmD  Clinical Pharmacist  [facility] ()

## 2021-01-31 NOTE — PROGRESS NOTES
01/31/21 1149   Weaning Parameters   Spontaneous Breathing Trial Complete No (Comments)   Resp Rate Observed 7.6   Ve 7.8   VT 1101   RSBI 6.8   SBT Results  Patient returned to previous vent settings due to decreased RR & periods of apnea. RN at bedside. Will continue to monitor patient.

## 2021-01-31 NOTE — ED NOTES
TRANSFER - OUT REPORT:    Verbal report given to ANAHI Vee(name) on Jason Sams  being transferred to ICU(unit) for routine progression of care       Report consisted of patients Situation, Background, Assessment and   Recommendations(SBAR). Information from the following report(s) SBAR, ED Summary, MAR, Recent Results and Cardiac Rhythm SR was reviewed with the receiving nurse. Lines:   Quad Lumen 01/30/21 Right Subclavian (Active)   Central Line Being Utilized Yes 01/30/21 1243   Criteria for Appropriate Use Hemodynamically unstable, requiring monitoring lines, vasopressors, or volume resuscitation 01/30/21 1243   Site Assessment Clean, dry, & intact 01/30/21 1243   Infiltration Assessment 0 01/30/21 1243   Date of Last Dressing Change 01/30/21 01/30/21 1243   Dressing Status Clean, dry, & intact 01/30/21 1243   Dressing Type Disk with Chlorhexadine gluconate (CHG); Transparent 01/30/21 1243   Action Taken Blood drawn 01/30/21 1243   Proximal Hub Color/Line Status Brown;Flushed 01/30/21 1243   Positive Blood Return (Medial Site) Yes 01/30/21 1243   Medial 1 Hub Color/Line Status Blue;Flushed 01/30/21 1243   Positive Blood Return (Lateral Site) Yes 01/30/21 1243   Medial 2 Hub Color/Line Status Gray;Flushed 01/30/21 1243   Positive Blood Return (Site #3) Yes 01/30/21 1243   Distal Hub Color/Line Status White;Flushed 01/30/21 1243   Positive Blood Return (Site #4) Yes 01/30/21 1243       Peripheral IV 01/30/21 Left Forearm (Active)   Site Assessment Clean, dry, & intact 01/30/21 0128   Phlebitis Assessment 0 01/30/21 0128   Infiltration Assessment 0 01/30/21 0128   Dressing Status Clean, dry, & intact 01/30/21 0128       Peripheral IV 01/30/21 Right Forearm (Active)   Site Assessment Clean, dry, & intact 01/30/21 0926   Phlebitis Assessment 0 01/30/21 0926   Infiltration Assessment 0 01/30/21 0926   Dressing Status Clean, dry, & intact 01/30/21 1021        Opportunity for questions and clarification was provided.       Patient transported with:   Monitor  O2 @ 15 liters  Registered Nurse   Respiratory Therapist      Deann Gaming RN

## 2021-01-31 NOTE — PROGRESS NOTES
[facility]    ICU Team Consult Note    Name: Adrianne Leach   : 1955   MRN: [id number]   Date: 2021      Subjective:     61-year-old man who presented from [facility] with acute onset shortness of breath, hypoxia, nausea, vomiting and diarrhea-shortness of breath acutely got worse after one of the vomiting episodes. Diagnosed with Covid about 2 weeks prior to presentation. Patient placed on BiPAP on arrival-felt much better. After getting fluids for resuscitation/elevated lactate level patient acutely decompensated-developed lethargy and drop in oxygen levels-intubated emergently requiring moderate to high vent support. Patient was at [facility] because he he was getting IV antibiotics for neck infection and was going to have a skin graft        Interval changes      - remains intubated    Active Problem List:     Problem List  Never Reviewed          Codes Class    Acute hypoxemic respiratory failure due to COVID-19 [facility]) ICD-10-CM: U07.1, J96.01  ICD-9-CM: 518.81, 079.89, 799.02               Past Medical History:      has a past medical history of Diabetes ([facility] [address] and Hypertension. Past Surgical History:      has no past surgical history on file. Home Medications:     Prior to Admission medications    Medication Sig Start Date End Date Taking? Authorizing Provider   aspirin 81 mg chewable tablet Take 81 mg by mouth daily. Yes Candelaria, MD Jessi   atorvastatin (LIPITOR) 80 mg tablet Take 80 mg by mouth daily. Yes Jessi Gaona MD   carvediloL (COREG) 12.5 mg tablet Take  by mouth two (2) times a day. Yes Jessi Gaona MD   docusate sodium (Colace) 100 mg capsule Take 100 mg by mouth two (2) times daily as needed for Constipation. Yes Jessi Gaona MD   doxycycline (ADOXA) 100 mg tablet Take 100 mg by mouth two (2) times a day. Yes Jessi Gaona MD   ferrous sulfate 325 mg (65 mg iron) tablet Take  by mouth every fourty-eight (48) hours.    Yes Jessi Gaona MD   gabapentin (NEURONTIN) 400 mg capsule Take 400 mg by mouth three (3) times daily. Yes Candelaria, MD Jessi   insulin lispro (HUMALOG) 100 unit/mL injection 10 Units by SubCUTAneous route Before breakfast, lunch, and dinner. Yes Jessi Gaona MD   isosorbide mononitrate ER (IMDUR) 60 mg CR tablet Take 60 mg by mouth every morning. Yes Candelaria, MD Jessi   insulin glargine (Lantus U-100 Insulin) 100 unit/mL injection 20 Units by SubCUTAneous route nightly. Yes Jessi Gaona MD   lisinopriL (PRINIVIL, ZESTRIL) 20 mg tablet Take 20 mg by mouth daily. Yes Jessi Gaona MD   methocarbamoL (ROBAXIN) 500 mg tablet Take 500 mg by mouth three (3) times daily. Yes Jessi Gaona MD   oxyCODONE IR (ROXICODONE) 5 mg immediate release tablet Take 5 mg by mouth every six (6) hours as needed for Pain. Yes Jessi Gaona MD   pantoprazole (PROTONIX) 40 mg tablet Take 40 mg by mouth daily. Yes Candelaria, MD Jessi   guaiFENesin-codeine (Guaiatussin AC) 100-10 mg/5 mL solution Take 5 mL by mouth four (4) times daily as needed for Cough. Yes Jessi Gaona MD   senna (Senna) 8.6 mg tablet Take 1 Tab by mouth daily. Yes Jessi Gaona MD   ticagrelor (BRILINTA) 90 mg tablet Take 90 mg by mouth two (2) times a day. Yes Candelaria, MD Jessi   ascorbic acid, vitamin C, (Vitamin C) 500 mg tablet Take 500 mg by mouth two (2) times a day. Yes Candelaria, MD Jessi   cholecalciferol (Vitamin D3) (1000 Units /25 mcg) tablet Take 1,000 Units by mouth daily. Yes Candelaria, MD Jessi   zinc sulfate (ZINCATE) 220 (50) mg capsule Take 220 mg by mouth daily. Yes Provider, Historical   therapeutic multivitamin (THERAGRAN) tablet Take 1 Tab by mouth daily. Yes Provider, Historical   acetaminophen (TYLENOL) 325 mg tablet Take 975 mg by mouth every six (6) hours as needed for Pain.     Other, MD Jessi       Allergies/Social/Family History:     No Known Allergies   Social History     Tobacco Use    Smoking status: Former Smoker    Smokeless tobacco: Never Used   Substance Use Topics    Alcohol use: Not Currently      No family history on file.     Review of Systems:     Unable to assess at this time    Objective:   Vital Signs:  Visit Vitals  BP (!) 153/108   Pulse (!) 111   Temp 97.8 °F (36.6 °C)   Resp 16   Ht 5' 7.99\" (1.727 m) Comment: Historic   Wt 84 kg (185 lb 3 oz)   SpO2 100%   BMI 28.16 kg/m²      O2 Device: Endotracheal tube, Ventilator Temp (24hrs), Av.6 °F (37 °C), Min:97.8 °F (36.6 °C), Max:99.2 °F (37.3 °C)           Intake/Output:     Intake/Output Summary (Last 24 hours) at 2021 1437  Last data filed at 2021 0700  Gross per 24 hour   Intake 1376.25 ml   Output 1575 ml   Net -198.75 ml       Physical Exam:  General-intubated, sedated  Neuro-pupils reactive, withdraws in uppers, strong cough and gag  Cardiac-tachycardic, regular  Lungs-coarse bilaterally  Abdomen-soft, nontender, nondistended  Extremities-warm    LABS AND  DATA: Personally reviewed  Recent Labs     21  0704 21  0541   WBC 12.7* 18.7*   HGB 8.3* 10.8*   HCT 27.4* 36.3*    440*     Recent Labs     21  0412 21   * 150*   K 4.9 4.6   * 122*   CO2 22 23   BUN 50* 47*   CREA 2.38* 2.10*   * 172*   CA 7.6* 7.6*   MG 1.9 1.5*   PHOS 6.4* 5.7*     Recent Labs     21  0412 21   AP 83 89   TP 5.8* 5.8*   ALB 1.8* 1.8*   GLOB 4.0 4.0     Recent Labs     21  0412 21   APTT 30.8 27.0      Recent Labs     21  1928 21  0753   PHI 7.31* 7.19*   PCO2I 45.0 44.7   PO2I 35* 57*   FIO2I 60 100     Recent Labs     21  2132 21  1413 21  0800   CPK  --   --  135   TROIQ 1.32* 1.38*  --      Ventilator Settings:  Mode Rate Tidal Volume Pressure FiO2 PEEP   Spontaneous, Pressure support   450 ml  12 cm H2O 35 %(weaned sats remain 100%) 8 cm H20     Peak airway pressure: 20 cm H2O    Minute ventilation: 7.85 l/min        MEDS: Reviewed    Imaging reviewed    Assessment:   Acute hypoxic respiratory failure-pulmonary edema, Covid pneumonia, aspiration pneumonitis  Tachycardia secondary to above  Acute kidney injury    ICU Comprehensive Plan of Care:     Analgesia/sedation with opioids and precedex as needed, early mobility as tolerated    Cont ASA, lipitor, midodrine    Continue lung protective strategies on the ventilator, PEEP currently at 8, permissive hypercapnia, saturation goal greater than 88%, continue steroid therapy, daily weaning trials     Start tube feeding, PPI GI prophylaxis, trend LFTs, ensure BMs    JUAN C, worsening Cr-monitor urine output closely, dose medication renally, avoid nephrotoxic agents, granular casts on UA-ATN, elevated uric acid levels - start allopurino, correct electrolyte derangements as needed     Lovenox for DVT prophylaxis, iron    Cont vancomycin, cefepime, Flagyl, doxy for Covid pneumonia, possible HCAP and possible aspiration pneumonia, f/u micro studies, wound consult for neck wound    Keep glucose less than 180 with as needed SC insulin    NOTE OF PERSONAL INVOLVEMENT IN CARE   This patient has a high probability of imminent, clinically significant deterioration, which requires the highest level of preparedness to intervene urgently. I participated in the decision-making and personally managed or directed the management of the following life and organ supporting interventions that required my frequent assessment to treat or prevent imminent deterioration. I personally spent 40 minutes of critical care time. This does not include any procedural time.     Signed By: Cruz Miller MD     January 31, 2021

## 2021-01-31 NOTE — PROGRESS NOTES
2130: TRANSFER - IN REPORT:    Verbal report received from Yue(name) on Yumiko King  being received from ED(unit) for routine progression of care      Report consisted of patients Situation, Background, Assessment and   Recommendations(SBAR). Information from the following report(s) SBAR, Kardex, ED Summary, Intake/Output, MAR, Accordion, Recent Results and Cardiac Rhythm NSR was reviewed with the receiving nurse. Opportunity for questions and clarification was provided. Assessment completed upon patients arrival to unit and care assumed. Pt arrived to unit stable on vent. On Fentanyl, propofol gtt. 0000: Per intensivist, transitioned pt to precedex gtt, off propofol gtt. Pt moving AE spontaneously. Will open eyes to pain.

## 2021-02-01 ENCOUNTER — APPOINTMENT (OUTPATIENT)
Dept: NON INVASIVE DIAGNOSTICS | Age: 66
DRG: 720 | End: 2021-02-01
Attending: EMERGENCY MEDICINE
Payer: MEDICAID

## 2021-02-01 ENCOUNTER — APPOINTMENT (OUTPATIENT)
Dept: GENERAL RADIOLOGY | Age: 66
DRG: 720 | End: 2021-02-01
Attending: EMERGENCY MEDICINE
Payer: MEDICAID

## 2021-02-01 ENCOUNTER — APPOINTMENT (OUTPATIENT)
Dept: GENERAL RADIOLOGY | Age: 66
DRG: 720 | End: 2021-02-01
Attending: ANESTHESIOLOGY
Payer: MEDICAID

## 2021-02-01 LAB
ALBUMIN SERPL-MCNC: 1.9 G/DL (ref 3.5–5)
ALBUMIN/GLOB SERPL: 0.4 {RATIO} (ref 1.1–2.2)
ALP SERPL-CCNC: 100 U/L (ref 45–117)
ALT SERPL-CCNC: 359 U/L (ref 12–78)
ANION GAP SERPL CALC-SCNC: 7 MMOL/L (ref 5–15)
APTT PPP: 28.2 SEC (ref 22.1–31)
ARTERIAL PATENCY WRIST A: YES
AST SERPL-CCNC: 673 U/L (ref 15–37)
ATRIAL RATE: 131 BPM
ATRIAL RATE: 89 BPM
BASE DEFICIT BLDA-SCNC: 5.3 MMOL/L
BASOPHILS # BLD: 0 K/UL (ref 0–0.1)
BASOPHILS NFR BLD: 0 % (ref 0–1)
BDY SITE: ABNORMAL
BILIRUB SERPL-MCNC: 0.3 MG/DL (ref 0.2–1)
BUN SERPL-MCNC: 64 MG/DL (ref 6–20)
BUN/CREAT SERPL: 27 (ref 12–20)
CALCIUM SERPL-MCNC: 8.4 MG/DL (ref 8.5–10.1)
CALCULATED P AXIS, ECG09: 35 DEGREES
CALCULATED P AXIS, ECG09: 44 DEGREES
CALCULATED R AXIS, ECG10: 32 DEGREES
CALCULATED R AXIS, ECG10: 34 DEGREES
CALCULATED T AXIS, ECG11: 118 DEGREES
CALCULATED T AXIS, ECG11: 90 DEGREES
CHLORIDE SERPL-SCNC: 120 MMOL/L (ref 97–108)
CO2 SERPL-SCNC: 20 MMOL/L (ref 21–32)
CREAT SERPL-MCNC: 2.33 MG/DL (ref 0.7–1.3)
DIAGNOSIS, 93000: NORMAL
DIAGNOSIS, 93000: NORMAL
DIFFERENTIAL METHOD BLD: ABNORMAL
ECHO AO ROOT DIAM: 3.06 CM
ECHO AR MAX VEL PISA: 272.75 CM/S
ECHO AV PEAK GRADIENT: 6.46 MMHG
ECHO AV PEAK VELOCITY: 127.07 CM/S
ECHO AV REGURGITANT PHT: 3839.13 MS
ECHO LA MAJOR AXIS: 4.35 CM
ECHO LA MINOR AXIS: 2.22 CM
ECHO LV EDV A2C: 137.32 ML
ECHO LV EDV A4C: 157.24 ML
ECHO LV EDV BP: 155.83 ML (ref 67–155)
ECHO LV EDV INDEX A4C: 80.3 ML/M2
ECHO LV EDV INDEX BP: 79.6 ML/M2
ECHO LV EDV NDEX A2C: 70.2 ML/M2
ECHO LV EJECTION FRACTION A2C: 29 PERCENT
ECHO LV EJECTION FRACTION A4C: 22 PERCENT
ECHO LV EJECTION FRACTION BIPLANE: 23.3 PERCENT (ref 55–100)
ECHO LV ESV A2C: 98.09 ML
ECHO LV ESV A4C: 122.24 ML
ECHO LV ESV BP: 119.52 ML (ref 22–58)
ECHO LV ESV INDEX A2C: 50.1 ML/M2
ECHO LV ESV INDEX A4C: 62.5 ML/M2
ECHO LV ESV INDEX BP: 61.1 ML/M2
ECHO LV INTERNAL DIMENSION DIASTOLIC: 5.95 CM (ref 4.2–5.9)
ECHO LV INTERNAL DIMENSION SYSTOLIC: 5.25 CM
ECHO LV IVSD: 1.15 CM (ref 0.6–1)
ECHO LV MASS 2D: 306.2 G (ref 88–224)
ECHO LV MASS INDEX 2D: 156.5 G/M2 (ref 49–115)
ECHO LV POSTERIOR WALL DIASTOLIC: 1.23 CM (ref 0.6–1)
ECHO LVOT PEAK GRADIENT: 2.81 MMHG
ECHO LVOT PEAK VELOCITY: 83.83 CM/S
ECHO MV A VELOCITY: 109.53 CM/S
ECHO MV AREA PHT: 3.26 CM2
ECHO MV E DECELERATION TIME (DT): 232.39 MS
ECHO MV E VELOCITY: 44.86 CM/S
ECHO MV E/A RATIO: 0.41
ECHO MV PRESSURE HALF TIME (PHT): 67.39 MS
ECHO PV PEAK INSTANTANEOUS GRADIENT SYSTOLIC: 1.87 MMHG
ECHO RV TAPSE: 1.33 CM (ref 1.5–2)
ECHO TV REGURGITANT MAX VELOCITY: 363 CM/S
ECHO TV REGURGITANT PEAK GRADIENT: 52.71 MMHG
EOSINOPHIL # BLD: 0 K/UL (ref 0–0.4)
EOSINOPHIL NFR BLD: 0 % (ref 0–7)
ERYTHROCYTE [DISTWIDTH] IN BLOOD BY AUTOMATED COUNT: 17.3 % (ref 11.5–14.5)
GLOBULIN SER CALC-MCNC: 4.3 G/DL (ref 2–4)
GLUCOSE BLD STRIP.AUTO-MCNC: 214 MG/DL (ref 65–100)
GLUCOSE BLD STRIP.AUTO-MCNC: 223 MG/DL (ref 65–100)
GLUCOSE BLD STRIP.AUTO-MCNC: 226 MG/DL (ref 65–100)
GLUCOSE BLD STRIP.AUTO-MCNC: 242 MG/DL (ref 65–100)
GLUCOSE BLD STRIP.AUTO-MCNC: 257 MG/DL (ref 65–100)
GLUCOSE SERPL-MCNC: 208 MG/DL (ref 65–100)
HCO3 BLDA-SCNC: 20 MMOL/L (ref 22–26)
HCT VFR BLD AUTO: 27.6 % (ref 36.6–50.3)
HGB BLD-MCNC: 8.4 G/DL (ref 12.1–17)
IMM GRANULOCYTES # BLD AUTO: 0.1 K/UL (ref 0–0.04)
IMM GRANULOCYTES NFR BLD AUTO: 1 % (ref 0–0.5)
L PNEUMO1 AG UR QL IA: NEGATIVE
LYMPHOCYTES # BLD: 1.4 K/UL (ref 0.8–3.5)
LYMPHOCYTES NFR BLD: 13 % (ref 12–49)
MAGNESIUM SERPL-MCNC: 2.1 MG/DL (ref 1.6–2.4)
MCH RBC QN AUTO: 25.1 PG (ref 26–34)
MCHC RBC AUTO-ENTMCNC: 30.4 G/DL (ref 30–36.5)
MCV RBC AUTO: 82.6 FL (ref 80–99)
MONOCYTES # BLD: 0.7 K/UL (ref 0–1)
MONOCYTES NFR BLD: 6 % (ref 5–13)
NEUTS SEG # BLD: 8.6 K/UL (ref 1.8–8)
NEUTS SEG NFR BLD: 80 % (ref 32–75)
NRBC # BLD: 0 K/UL (ref 0–0.01)
NRBC BLD-RTO: 0 PER 100 WBC
P-R INTERVAL, ECG05: 140 MS
P-R INTERVAL, ECG05: 152 MS
PCO2 BLDA: 40 MMHG (ref 35–45)
PH BLDA: 7.33 [PH] (ref 7.35–7.45)
PHOSPHATE SERPL-MCNC: 5.9 MG/DL (ref 2.6–4.7)
PLATELET # BLD AUTO: 290 K/UL (ref 150–400)
PMV BLD AUTO: 12 FL (ref 8.9–12.9)
PO2 BLDA: 71 MMHG (ref 80–100)
POTASSIUM SERPL-SCNC: 5 MMOL/L (ref 3.5–5.1)
PROT SERPL-MCNC: 6.2 G/DL (ref 6.4–8.2)
Q-T INTERVAL, ECG07: 292 MS
Q-T INTERVAL, ECG07: 394 MS
QRS DURATION, ECG06: 84 MS
QRS DURATION, ECG06: 84 MS
QTC CALCULATION (BEZET), ECG08: 431 MS
QTC CALCULATION (BEZET), ECG08: 479 MS
RBC # BLD AUTO: 3.34 M/UL (ref 4.1–5.7)
SAO2 % BLD: 93 % (ref 92–97)
SAO2% DEVICE SAO2% SENSOR NAME: ABNORMAL
SERVICE CMNT-IMP: ABNORMAL
SODIUM SERPL-SCNC: 147 MMOL/L (ref 136–145)
SPECIMEN SITE: ABNORMAL
SPECIMEN SOURCE: NORMAL
THERAPEUTIC RANGE,PTTT: NORMAL SECS (ref 58–77)
URATE SERPL-MCNC: 9 MG/DL (ref 3.5–7.2)
VANCOMYCIN SERPL-MCNC: 10.8 UG/ML
VENTRICULAR RATE, ECG03: 131 BPM
VENTRICULAR RATE, ECG03: 89 BPM
WBC # BLD AUTO: 10.8 K/UL (ref 4.1–11.1)

## 2021-02-01 PROCEDURE — 82962 GLUCOSE BLOOD TEST: CPT

## 2021-02-01 PROCEDURE — 74011250636 HC RX REV CODE- 250/636

## 2021-02-01 PROCEDURE — 82803 BLOOD GASES ANY COMBINATION: CPT

## 2021-02-01 PROCEDURE — 94003 VENT MGMT INPAT SUBQ DAY: CPT

## 2021-02-01 PROCEDURE — 74011636637 HC RX REV CODE- 636/637: Performed by: EMERGENCY MEDICINE

## 2021-02-01 PROCEDURE — 65610000006 HC RM INTENSIVE CARE

## 2021-02-01 PROCEDURE — 74011250637 HC RX REV CODE- 250/637: Performed by: ANESTHESIOLOGY

## 2021-02-01 PROCEDURE — 74011000250 HC RX REV CODE- 250: Performed by: ANESTHESIOLOGY

## 2021-02-01 PROCEDURE — 5A1955Z RESPIRATORY VENTILATION, GREATER THAN 96 CONSECUTIVE HOURS: ICD-10-PCS | Performed by: ANESTHESIOLOGY

## 2021-02-01 PROCEDURE — 93005 ELECTROCARDIOGRAM TRACING: CPT

## 2021-02-01 PROCEDURE — 74011250636 HC RX REV CODE- 250/636: Performed by: EMERGENCY MEDICINE

## 2021-02-01 PROCEDURE — 85730 THROMBOPLASTIN TIME PARTIAL: CPT

## 2021-02-01 PROCEDURE — 74011000250 HC RX REV CODE- 250: Performed by: NURSE PRACTITIONER

## 2021-02-01 PROCEDURE — 36600 WITHDRAWAL OF ARTERIAL BLOOD: CPT

## 2021-02-01 PROCEDURE — 84100 ASSAY OF PHOSPHORUS: CPT

## 2021-02-01 PROCEDURE — 74011250636 HC RX REV CODE- 250/636: Performed by: ANESTHESIOLOGY

## 2021-02-01 PROCEDURE — 74011000258 HC RX REV CODE- 258: Performed by: EMERGENCY MEDICINE

## 2021-02-01 PROCEDURE — 0BH17EZ INSERTION OF ENDOTRACHEAL AIRWAY INTO TRACHEA, VIA NATURAL OR ARTIFICIAL OPENING: ICD-10-PCS | Performed by: ANESTHESIOLOGY

## 2021-02-01 PROCEDURE — 71045 X-RAY EXAM CHEST 1 VIEW: CPT

## 2021-02-01 PROCEDURE — 83735 ASSAY OF MAGNESIUM: CPT

## 2021-02-01 PROCEDURE — 84550 ASSAY OF BLOOD/URIC ACID: CPT

## 2021-02-01 PROCEDURE — 36415 COLL VENOUS BLD VENIPUNCTURE: CPT

## 2021-02-01 PROCEDURE — 77030008771 HC TU NG SALEM SUMP -A

## 2021-02-01 PROCEDURE — 87899 AGENT NOS ASSAY W/OPTIC: CPT

## 2021-02-01 PROCEDURE — 85025 COMPLETE CBC W/AUTO DIFF WBC: CPT

## 2021-02-01 PROCEDURE — 80202 ASSAY OF VANCOMYCIN: CPT

## 2021-02-01 PROCEDURE — 74011000250 HC RX REV CODE- 250

## 2021-02-01 PROCEDURE — 80053 COMPREHEN METABOLIC PANEL: CPT

## 2021-02-01 PROCEDURE — 87449 NOS EACH ORGANISM AG IA: CPT

## 2021-02-01 PROCEDURE — 74011250637 HC RX REV CODE- 250/637: Performed by: EMERGENCY MEDICINE

## 2021-02-01 PROCEDURE — 74018 RADEX ABDOMEN 1 VIEW: CPT

## 2021-02-01 PROCEDURE — 93306 TTE W/DOPPLER COMPLETE: CPT

## 2021-02-01 RX ORDER — ETOMIDATE 2 MG/ML
20 INJECTION INTRAVENOUS ONCE
Status: COMPLETED | OUTPATIENT
Start: 2021-02-01 | End: 2021-02-01

## 2021-02-01 RX ORDER — ROCURONIUM BROMIDE 10 MG/ML
INJECTION, SOLUTION INTRAVENOUS
Status: COMPLETED
Start: 2021-02-01 | End: 2021-02-01

## 2021-02-01 RX ORDER — VANCOMYCIN HYDROCHLORIDE
1250 ONCE
Status: COMPLETED | OUTPATIENT
Start: 2021-02-01 | End: 2021-02-01

## 2021-02-01 RX ORDER — PROPOFOL 10 MG/ML
0-50 VIAL (ML) INTRAVENOUS
Status: DISCONTINUED | OUTPATIENT
Start: 2021-02-01 | End: 2021-02-01

## 2021-02-01 RX ORDER — PROPOFOL 10 MG/ML
0-50 VIAL (ML) INTRAVENOUS
Status: DISCONTINUED | OUTPATIENT
Start: 2021-02-01 | End: 2021-02-10

## 2021-02-01 RX ORDER — GABAPENTIN 250 MG/5ML
150 SOLUTION ORAL EVERY 8 HOURS
Status: DISCONTINUED | OUTPATIENT
Start: 2021-02-01 | End: 2021-02-06

## 2021-02-01 RX ORDER — METOPROLOL TARTRATE 5 MG/5ML
5 INJECTION INTRAVENOUS
Status: DISCONTINUED | OUTPATIENT
Start: 2021-02-01 | End: 2021-03-04

## 2021-02-01 RX ORDER — FERROUS SULFATE 300 MG/5ML
300 LIQUID (ML) ORAL DAILY
Status: DISCONTINUED | OUTPATIENT
Start: 2021-02-02 | End: 2021-02-18

## 2021-02-01 RX ORDER — VANCOMYCIN HYDROCHLORIDE
1250 EVERY 24 HOURS
Status: DISCONTINUED | OUTPATIENT
Start: 2021-02-02 | End: 2021-02-04

## 2021-02-01 RX ORDER — NOREPINEPHRINE BITARTRATE/D5W 8 MG/250ML
.5-3 PLASTIC BAG, INJECTION (ML) INTRAVENOUS
Status: DISCONTINUED | OUTPATIENT
Start: 2021-02-01 | End: 2021-02-01

## 2021-02-01 RX ORDER — ROCURONIUM BROMIDE 10 MG/ML
100 INJECTION, SOLUTION INTRAVENOUS
Status: COMPLETED | OUTPATIENT
Start: 2021-02-01 | End: 2021-02-01

## 2021-02-01 RX ORDER — PROPOFOL 10 MG/ML
INJECTION, EMULSION INTRAVENOUS
Status: COMPLETED
Start: 2021-02-01 | End: 2021-02-01

## 2021-02-01 RX ORDER — CHLORHEXIDINE GLUCONATE 0.12 MG/ML
15 RINSE ORAL EVERY 12 HOURS
Status: DISCONTINUED | OUTPATIENT
Start: 2021-02-01 | End: 2021-02-15

## 2021-02-01 RX ADMIN — DOXYCYCLINE 100 MG: 100 INJECTION, POWDER, LYOPHILIZED, FOR SOLUTION INTRAVENOUS at 21:35

## 2021-02-01 RX ADMIN — PROPOFOL 30 MCG/KG/MIN: 10 INJECTION, EMULSION INTRAVENOUS at 21:46

## 2021-02-01 RX ADMIN — INSULIN LISPRO 3 UNITS: 100 INJECTION, SOLUTION INTRAVENOUS; SUBCUTANEOUS at 06:09

## 2021-02-01 RX ADMIN — PROPOFOL 20 MCG/KG/MIN: 10 INJECTION, EMULSION INTRAVENOUS at 03:30

## 2021-02-01 RX ADMIN — GABAPENTIN 150 MG: 250 SOLUTION ORAL at 18:51

## 2021-02-01 RX ADMIN — FERROUS SULFATE TAB 325 MG (65 MG ELEMENTAL FE) 325 MG: 325 (65 FE) TAB at 08:43

## 2021-02-01 RX ADMIN — Medication 10 ML: at 15:09

## 2021-02-01 RX ADMIN — ETOMIDATE 20 MG: 2 INJECTION INTRAVENOUS at 14:30

## 2021-02-01 RX ADMIN — PROPOFOL 25 MCG/KG/MIN: 10 INJECTION, EMULSION INTRAVENOUS at 14:56

## 2021-02-01 RX ADMIN — ROCURONIUM BROMIDE 100 MG: 50 INJECTION INTRAVENOUS at 14:30

## 2021-02-01 RX ADMIN — ASPIRIN 81 MG: 81 TABLET, CHEWABLE ORAL at 08:44

## 2021-02-01 RX ADMIN — Medication 50 MCG/HR: at 15:09

## 2021-02-01 RX ADMIN — VANCOMYCIN HYDROCHLORIDE 1250 MG: 10 INJECTION, POWDER, LYOPHILIZED, FOR SOLUTION INTRAVENOUS at 07:01

## 2021-02-01 RX ADMIN — GABAPENTIN 300 MG: 250 SOLUTION ORAL at 08:43

## 2021-02-01 RX ADMIN — ROCURONIUM BROMIDE 100 MG: 10 INJECTION, SOLUTION INTRAVENOUS at 14:30

## 2021-02-01 RX ADMIN — INSULIN LISPRO 3 UNITS: 100 INJECTION, SOLUTION INTRAVENOUS; SUBCUTANEOUS at 23:39

## 2021-02-01 RX ADMIN — ALLOPURINOL 100 MG: 100 TABLET ORAL at 08:43

## 2021-02-01 RX ADMIN — DEXAMETHASONE SODIUM PHOSPHATE 6 MG: 10 INJECTION, SOLUTION INTRAMUSCULAR; INTRAVENOUS at 08:43

## 2021-02-01 RX ADMIN — CEFEPIME 2 G: 2 INJECTION, POWDER, FOR SOLUTION INTRAVENOUS at 23:37

## 2021-02-01 RX ADMIN — DEXMEDETOMIDINE HYDROCHLORIDE 0.5 MCG/KG/HR: 400 INJECTION INTRAVENOUS at 03:50

## 2021-02-01 RX ADMIN — METRONIDAZOLE 500 MG: 500 INJECTION, SOLUTION INTRAVENOUS at 20:31

## 2021-02-01 RX ADMIN — INSULIN LISPRO 3 UNITS: 100 INJECTION, SOLUTION INTRAVENOUS; SUBCUTANEOUS at 00:32

## 2021-02-01 RX ADMIN — CEFEPIME 2 G: 2 INJECTION, POWDER, FOR SOLUTION INTRAVENOUS at 00:06

## 2021-02-01 RX ADMIN — Medication 150 MCG/HR: at 03:30

## 2021-02-01 RX ADMIN — CEFEPIME 2 G: 2 INJECTION, POWDER, FOR SOLUTION INTRAVENOUS at 12:19

## 2021-02-01 RX ADMIN — INSULIN LISPRO 3 UNITS: 100 INJECTION, SOLUTION INTRAVENOUS; SUBCUTANEOUS at 18:50

## 2021-02-01 RX ADMIN — DOXYCYCLINE 100 MG: 100 INJECTION, POWDER, LYOPHILIZED, FOR SOLUTION INTRAVENOUS at 08:42

## 2021-02-01 RX ADMIN — METRONIDAZOLE 500 MG: 500 INJECTION, SOLUTION INTRAVENOUS at 08:44

## 2021-02-01 RX ADMIN — Medication 10 ML: at 05:25

## 2021-02-01 RX ADMIN — ENOXAPARIN SODIUM 30 MG: 30 INJECTION SUBCUTANEOUS at 20:31

## 2021-02-01 RX ADMIN — ENOXAPARIN SODIUM 30 MG: 30 INJECTION SUBCUTANEOUS at 08:43

## 2021-02-01 RX ADMIN — INSULIN LISPRO 5 UNITS: 100 INJECTION, SOLUTION INTRAVENOUS; SUBCUTANEOUS at 12:35

## 2021-02-01 RX ADMIN — ATORVASTATIN CALCIUM 40 MG: 40 TABLET, FILM COATED ORAL at 21:35

## 2021-02-01 RX ADMIN — Medication 10 ML: at 21:35

## 2021-02-01 RX ADMIN — LANSOPRAZOLE 30 MG: KIT at 07:02

## 2021-02-01 RX ADMIN — CHLORHEXIDINE GLUCONATE 15 ML: 0.12 RINSE ORAL at 20:31

## 2021-02-01 RX ADMIN — GABAPENTIN 150 MG: 250 SOLUTION ORAL at 22:02

## 2021-02-01 NOTE — PROGRESS NOTES
[facility]    ICU Team Consult Note    Name: Kolby Meneses   : 1955   MRN: [id number]   Date: 2021      Subjective:     65-year-old man who presented from [facility] with acute onset shortness of breath, hypoxia, nausea, vomiting and diarrhea-shortness of breath acutely got worse after one of the vomiting episodes. Diagnosed with Covid about 2 weeks prior to presentation.  Patient placed on BiPAP on arrival-felt much better.  After getting fluids for resuscitation/elevated lactate level patient acutely decompensated-developed lethargy and drop in oxygen levels-intubated emergently requiring moderate to high vent support.    Patient was at [facility] because he he was getting IV antibiotics for neck infection and was going to have a skin graft        Interval changes      - Remains intubated     - Precedex; MV    Active Problem List:     Problem List  Never Reviewed          Codes Class    Acute hypoxemic respiratory failure due to COVID-19 (HCC) ICD-10-CM: U07.1, J96.01  ICD-9-CM: 518.81, 079.89, 799.02               Past Medical History:      has a past medical history of Diabetes (HCC) and Hypertension.    Past Surgical History:      has no past surgical history on file.    Home Medications:     Prior to Admission medications    Medication Sig Start Date End Date Taking? Authorizing Provider   aspirin 81 mg chewable tablet Take 81 mg by mouth daily.   Yes Candelaria, MD Jessi   atorvastatin (LIPITOR) 80 mg tablet Take 80 mg by mouth daily.   Yes Candelaria, MD Jessi   carvediloL (COREG) 12.5 mg tablet Take  by mouth two (2) times a day.   Yes Candelaria, MD Jessi   docusate sodium (Colace) 100 mg capsule Take 100 mg by mouth two (2) times daily as needed for Constipation.   Yes Candelaria, MD Jessi   doxycycline (ADOXA) 100 mg tablet Take 100 mg by mouth two (2) times a day.   Yes Candelaria, MD Jessi   ferrous sulfate 325 mg (65 mg iron) tablet Take  by mouth every fourty-eight (48) hours.   Yes Candelaria,  MD Jessi   gabapentin (NEURONTIN) 400 mg capsule Take 400 mg by mouth three (3) times daily. Yes Candelaria, MD Jessi   insulin lispro (HUMALOG) 100 unit/mL injection 10 Units by SubCUTAneous route Before breakfast, lunch, and dinner. Yes Jessi Gaona MD   isosorbide mononitrate ER (IMDUR) 60 mg CR tablet Take 60 mg by mouth every morning. Yes Jessi Gaona MD   insulin glargine (Lantus U-100 Insulin) 100 unit/mL injection 20 Units by SubCUTAneous route nightly. Yes Jessi Gaona MD   lisinopriL (PRINIVIL, ZESTRIL) 20 mg tablet Take 20 mg by mouth daily. Yes Jessi Gaona MD   methocarbamoL (ROBAXIN) 500 mg tablet Take 500 mg by mouth three (3) times daily. Yes Jessi Gaona MD   oxyCODONE IR (ROXICODONE) 5 mg immediate release tablet Take 5 mg by mouth every six (6) hours as needed for Pain. Yes Jessi Gaona MD   pantoprazole (PROTONIX) 40 mg tablet Take 40 mg by mouth daily. Yes Jessi Gaona MD   guaiFENesin-codeine (Guaiatussin AC) 100-10 mg/5 mL solution Take 5 mL by mouth four (4) times daily as needed for Cough. Yes Jessi Gaona MD   senna (Senna) 8.6 mg tablet Take 1 Tab by mouth daily. Yes Jessi Gaona MD   ticagrelor (BRILINTA) 90 mg tablet Take 90 mg by mouth two (2) times a day. Yes Candelaria, MD Jessi   ascorbic acid, vitamin C, (Vitamin C) 500 mg tablet Take 500 mg by mouth two (2) times a day. Yes Candelaria, MD Jessi   cholecalciferol (Vitamin D3) (1000 Units /25 mcg) tablet Take 1,000 Units by mouth daily. Yes Candelaria, MD Jessi   zinc sulfate (ZINCATE) 220 (50) mg capsule Take 220 mg by mouth daily. Yes Provider, Historical   therapeutic multivitamin (THERAGRAN) tablet Take 1 Tab by mouth daily. Yes Provider, Historical   acetaminophen (TYLENOL) 325 mg tablet Take 975 mg by mouth every six (6) hours as needed for Pain.     Other, MD Jessi       Allergies/Social/Family History:     No Known Allergies   Social History     Tobacco Use    Smoking status: Former Smoker    Smokeless tobacco: Never Used   Substance Use Topics    Alcohol use: Not Currently      No family history on file.     Review of Systems:     Unable to assess at this time    Objective:   Vital Signs:  Visit Vitals  /78   Pulse 79   Temp 98 °F (36.7 °C)   Resp 20   Ht 5' 7.99\" (1.727 m) Comment: Historic   Wt 84.4 kg (186 lb 1.1 oz)   SpO2 100%   BMI 28.30 kg/m²      O2 Device: Endotracheal tube, Ventilator Temp (24hrs), Av.3 °F (37.4 °C), Min:97.8 °F (36.6 °C), Max:100.3 °F (37.9 °C)           Intake/Output:     Intake/Output Summary (Last 24 hours) at 2021 1007  Last data filed at 2021 0800  Gross per 24 hour   Intake 1083.62 ml   Output 2035 ml   Net -951.38 ml       Physical Exam:  General-intubated, sedated  Neuro-pupils reactive, withdraws in uppers, strong cough and gag  Cardiac-tachycardic, regular  Lungs-coarse bilaterally  Abdomen-soft, nontender, nondistended  Extremities-warm    LABS AND  DATA: Personally reviewed  Recent Labs     21  03521  0704   WBC 10.8 12.7*   HGB 8.4* 8.3*   HCT 27.6* 27.4*    295     Recent Labs     21  03521  0412   * 149*   K 5.0 4.9   * 121*   CO2 20* 22   BUN 64* 50*   CREA 2.33* 2.38*   * 155*   CA 8.4* 7.6*   MG 2.1 1.9   PHOS 5.9* 6.4*     Recent Labs     21  03521  041    83   TP 6.2* 5.8*   ALB 1.9* 1.8*   GLOB 4.3* 4.0     Recent Labs     21  03521  0412   APTT 28.2 30.8      Recent Labs     21  1928 21  0753   PHI 7.31* 7.19*   PCO2I 45.0 44.7   PO2I 35* 57*   FIO2I 60 100     Recent Labs     21  2132 21  1413 21  0800   CPK  --   --  135   TROIQ 1.32* 1.38*  --      Ventilator Settings:  Mode Rate Tidal Volume Pressure FiO2 PEEP   VC+   450 ml  12 cm H2O 30 % 5 cm H20     Peak airway pressure: 14 cm H2O    Minute ventilation: 9.91 l/min        MEDS: Reviewed    Imaging reviewed    Assessment:   Acute hypoxic respiratory failure  Pulmonary edema  Covid pneumonia  Aspiration pneumonitis  Tachycardia secondary to above  Acute kidney injury    ICU Comprehensive Plan of Care:     Analgesia/sedation with opioids and precedex as needed, early mobility as tolerated    Cont ASA, lipitor, midodrine    SAT/SBT this AM    Tube feeding, PPI GI prophylaxis, trend LFTs, ensure BMs    JUAN C, worsening Cr-monitor urine output closely, dose medication renally, avoid nephrotoxic agents, granular casts on UA-ATN, elevated uric acid levels - start allopurinol, correct electrolyte derangements as needed     Lovenox for DVT prophylaxis, iron    Cont vancomycin, cefepime, Flagyl, doxy for Covid pneumonia, possible HCAP and possible aspiration pneumonia, f/u micro studies, wound consult for neck wound - de-escalate in coming days once culture data is back    Keep glucose less than 180 with as needed SC insulin    NOTE OF PERSONAL INVOLVEMENT IN CARE   This patient has a high probability of imminent, clinically significant deterioration, which requires the highest level of preparedness to intervene urgently. I participated in the decision-making and personally managed or directed the management of the following life and organ supporting interventions that required my frequent assessment to treat or prevent imminent deterioration. I personally spent 85 minutes of critical care time. This does not include any procedural time.     Signed By: Casimiro Luna DO     February 1, 2021

## 2021-02-01 NOTE — PROGRESS NOTES
0730: Bedside shift change report given to [facility] ANAHI JUÁREZ (oncoming nurse) by Natalia Marie RN (offgoing nurse). Report included the following information SBAR, Intake/Output, MAR, Recent Results, Cardiac Rhythm SR and Alarm Parameters . 0800: Pt alert and following commands, TF paused for SBT  1145: RT at bedside, pt pulling out ETT, extubated by RT, placed on NC. OGT removed with extubation. 21 : RN at bedside to assess pt post extubation, pt oriented to self and place, weak cough present, precedex and fentanyl gtt stopped  1240: EKG obtained to evaluate rhythm, NSR with PACs. 1415: Spoke with Dr. Aide Whiting rt SBP >160, orders placed for PRN metoprolol. 1420: RN at bedside, pt attempting to get out of bed, pt alert, oriented, and converses appropriately, pt concerned about getting cleaned up from BM. Pt with increased respiratory effort. MD notified   821 [id number]: Pt in marked respiratory distress RR 40's, 's, hypertensive, with harsh rattlely breath sounds.  at bedside for emergent intubation. 1430: 20 mg etomidate and 100 mg rocuronium administered, 7.5 ETT inserted, 23 at teeth, (+) colometric, (B) chest rise, CXR ordered. 1456: Propofol started  1509: Fentanyl gtt started at 50 mcg. 1515: NGT inserted, 65 cm in (R) nare. 1900: Tube feed restarted. 1930: Bedside shift change report given to ANAHI Wyman (oncoming nurse) by [facility] ANAHI JUÁREZ (offgoing nurse). Report included the following information SBAR, Intake/Output, MAR, Recent Results, Cardiac Rhythm NSR, PAC and Alarm Parameters .

## 2021-02-01 NOTE — PROCEDURES
[facility]      Procedure Note - Intubation:   Performed by Jabier Lazo DO .   Staff Anesthesiologist/Intensivist    Immediately prior to the procedure, the patient was reevaluated and found suitable for the planned procedure and any planned medications. Immediately prior to the procedure a time out was called to verify the correct patient, procedure, equipment, staff, and marking as appropriate. Medications given were etomidate and rocuronium (Zemuron). A number 7.5 cuffed   ETT was placed to 23 cm at the teeth. Placement was evaluated by noting bilateral, symmetric breath sounds, good end-tidal CO2 detector color change , no breath sounds over stomach, bulb aspirator expands promptly and chest x-ray visualization. Attempts required: 1. Complications: none. RSI was used. .  The procedure was tolerated well.

## 2021-02-01 NOTE — WOUND CARE
Wound Care Note:     New consult placed by nurse request for PTA neck wound    Patient on Droplet Plus Precautions for COVID-19 positive. PPE:  N95, face shield, gown and gloves. Chart shows:  Admitted for    Past Medical History:   Diagnosis Date    Diabetes ([facility] [address]     Hypertension      WBC = 10.8 on 2/1/21  Admitted from [facility] 31:   Patient is intubated and sedated, incontinent with moderate assistance needed in repositioning. Bed: In Touch [place]  Patient has a Bourgeois. Diet: NPO- tube feeding  Patient did not moan or grimace with repositioning. Bilateral heels, buttocks, and sacral skin intact and without erythema. 1. POA posterior neck with wound measuring 2.5 cm x 5 cm x 0.1 cm wound bed is beefy red, no granulation tissue, small sero/sang drainage, wound edges are open, carlene-wound intact. Opticell AG and Optifoam Gentle applied. This is a chronic wound and patient awaiting skin graft. 2.  POA gluteal cleft with small fissure, wound bed is pink, no drainage, wound edges are open, carlene-wound intact. Z guard paste to be applied. Spoke with Dr. Raad Sung, wound care orders obtained. Patient repositioned on right side. Heels offloaded on pillows. Recommendations:    Posterior neck- Every other day cleanse with normal saline, wipe wound bed clean and dry, apply a piece of Opticell AG and cover with Optifoam Gentle (or other dressing that will remain in place). Gluteal cleft- Every 12 hours and as needed apply Z guard paste (orange tube). Skin Care & Pressure Prevention:  Minimize layers of linen/pads under patient to optimize support surface. Turn/reposition approximately every 2 hours and offload heels.   Manage incontinence / promote continence   Nourishing Skin Cream to dry skin, minimize use of briefs when able    Discussed above plan with patient & Karlee Witt RN    Transition of Care: Plan to follow as needed while admitted to hospital.    SONAM HaskinsN, RN, [facility], [facility].  office [phone]  pager [phone] or call  to page

## 2021-02-01 NOTE — PROGRESS NOTES
Day #3 of Vancomycin - Renal Dosing Update  Indication:  Sepsis, possibly 2/2 HAP versus neck wound (POA)  Current regimen:  Based on levels, last dose: 1250 mg on  @ 0701  Abx regimen: Cefepime + Doxycycline + Flagyl + Vancomycin  ID Following ?: NO  Concomitant nephrotoxic drugs (requires more frequent monitoring): None  Frequency of BMP?: Daily through     Recent Labs     21  0359 21  0704 21  0412 21  2132 21  0541 21  0541   WBC 10.8 12.7*  --   --   --  18.7*   CREA 2.33*  --  2.38* 2.10*   < >  --    BUN 64*  --  50* 47*   < >  --     < > = values in this interval not displayed. Est CrCl: ~30-40 ml/min; UO: ~1 ml/kg/hr  Temp (24hrs), Av.2 °F (37.3 °C), Min:97.8 °F (36.6 °C), Max:100.3 °F (37.9 °C)    Cultures:    Blood: NGTD   COVID-19: (+)   Urine: NG, final   Legionella Ag [Ur]: pending   Wound [neck]: pending   Sputum: pending    Goal trough = 15 - 20 mcg/mL    Recent trough history (date/time/level/dose/action taken):   @ 0359 = 10.8 mcg/ml (drawn ~31 hrs post-dose), 1.25 gm IV x 1 given    Plan: Scr has started to stabilize today (2.33 from 2.38 yesterday), will go ahead and order a maintenance dose of 1250 mg IV Q 24 hr (to start tomorrow am at 0700). Pharmacy will continue to monitor patient daily and will make dosage adjustments based upon changing renal function.

## 2021-02-01 NOTE — PROGRESS NOTES
[place]  Patient presented to the ED from [facility] with acute shortness of breath and hypoxia  COVID positive  RUR 24%   Disposition Return to [facility]    Patient admitted to the ICU intubated. Patient was at [facility] for IV abx treatment for neck infection in preparation for a skin graft. CM attempted to contact wife Brittny Garsia, however the phone number was not in service. CM left a message in admissions at [facility] to obtain a working number for patient's wife.    Lion Rodriguez RN,Care Management

## 2021-02-01 NOTE — PROGRESS NOTES
Pharmacist Note - Vancomycin Dosing  Therapy day 3  Indication: Sepsis  Current regimen: Dosing by levels. Loading dose of 1750mg given 20:37 on 1/30/2021    Recent Labs     02/01/21  0359 01/31/21  0704 01/31/21  0412 01/30/21  2132 01/30/21  0541 01/30/21  0541   WBC 10.8 12.7*  --   --   --  18.7*   CREA 2.33*  --  2.38* 2.10*   < >  --    BUN 64*  --  50* 47*   < >  --     < > = values in this interval not displayed. A Random Level resulted at 10.8 mcg/mL which was obtained ~31 hrs post-dose. This level is after only the loading dose and as such can only allow an additional dose to be given at this time as the patient is not yet at steady state. Goal trough: 15 - 20 mcg/mL       Plan: Will order 1250mg IV x1. Recommend checking a random level 24 hours after this dose (not yet ordered). Pharmacy will continue to monitor this patient daily for changes in clinical status and renal function.

## 2021-02-02 LAB
ANION GAP SERPL CALC-SCNC: 9 MMOL/L (ref 5–15)
ARTERIAL PATENCY WRIST A: ABNORMAL
BACTERIA SPEC CULT: ABNORMAL
BACTERIA SPEC CULT: NORMAL
BASE DEFICIT BLDA-SCNC: 6.7 MMOL/L
BASOPHILS # BLD: 0 K/UL (ref 0–0.1)
BASOPHILS NFR BLD: 0 % (ref 0–1)
BDY SITE: ABNORMAL
BUN SERPL-MCNC: 74 MG/DL (ref 6–20)
BUN/CREAT SERPL: 31 (ref 12–20)
CALCIUM SERPL-MCNC: 8.6 MG/DL (ref 8.5–10.1)
CHLORIDE SERPL-SCNC: 122 MMOL/L (ref 97–108)
CO2 SERPL-SCNC: 18 MMOL/L (ref 21–32)
CREAT SERPL-MCNC: 2.39 MG/DL (ref 0.7–1.3)
DIFFERENTIAL METHOD BLD: ABNORMAL
EOSINOPHIL # BLD: 0 K/UL (ref 0–0.4)
EOSINOPHIL NFR BLD: 0 % (ref 0–7)
ERYTHROCYTE [DISTWIDTH] IN BLOOD BY AUTOMATED COUNT: 17.3 % (ref 11.5–14.5)
FIO2 ON VENT: 60 %
FLUID CULTURE, SPNG2: ABNORMAL
FLUID CULTURE, SPNG2: NORMAL
GAS FLOW.O2 SETTING OXYMISER: 16 L/MIN
GLUCOSE BLD STRIP.AUTO-MCNC: 165 MG/DL (ref 65–100)
GLUCOSE BLD STRIP.AUTO-MCNC: 178 MG/DL (ref 65–100)
GLUCOSE BLD STRIP.AUTO-MCNC: 185 MG/DL (ref 65–100)
GLUCOSE BLD STRIP.AUTO-MCNC: 254 MG/DL (ref 65–100)
GLUCOSE SERPL-MCNC: 162 MG/DL (ref 65–100)
GRAM STN SPEC: ABNORMAL
GRAM STN SPEC: ABNORMAL
GRAM STN SPEC: NORMAL
HCO3 BLDA-SCNC: 18 MMOL/L (ref 22–26)
HCT VFR BLD AUTO: 29 % (ref 36.6–50.3)
HGB BLD-MCNC: 8.8 G/DL (ref 12.1–17)
IMM GRANULOCYTES # BLD AUTO: 0.1 K/UL (ref 0–0.04)
IMM GRANULOCYTES NFR BLD AUTO: 1 % (ref 0–0.5)
L PNEUMO1 AG UR QL IA: NEGATIVE
LACTATE SERPL-SCNC: 2 MMOL/L (ref 0.4–2)
LYMPHOCYTES # BLD: 1.7 K/UL (ref 0.8–3.5)
LYMPHOCYTES NFR BLD: 15 % (ref 12–49)
MAGNESIUM SERPL-MCNC: 2.4 MG/DL (ref 1.6–2.4)
MCH RBC QN AUTO: 25.4 PG (ref 26–34)
MCHC RBC AUTO-ENTMCNC: 30.3 G/DL (ref 30–36.5)
MCV RBC AUTO: 83.8 FL (ref 80–99)
MONOCYTES # BLD: 0.8 K/UL (ref 0–1)
MONOCYTES NFR BLD: 7 % (ref 5–13)
NEUTS SEG # BLD: 8.9 K/UL (ref 1.8–8)
NEUTS SEG NFR BLD: 77 % (ref 32–75)
NRBC # BLD: 0.02 K/UL (ref 0–0.01)
NRBC BLD-RTO: 0.2 PER 100 WBC
ORGANISM ID, SPNG3: ABNORMAL
ORGANISM ID, SPNG3: NORMAL
PCO2 BLDA: 36 MMHG (ref 35–45)
PEEP RESPIRATORY: 5 CM[H2O]
PH BLDA: 7.33 [PH] (ref 7.35–7.45)
PHOSPHATE SERPL-MCNC: 5 MG/DL (ref 2.6–4.7)
PLATELET # BLD AUTO: 332 K/UL (ref 150–400)
PLEASE NOTE, SPNG4: ABNORMAL
PLEASE NOTE, SPNG4: NORMAL
PMV BLD AUTO: 11.7 FL (ref 8.9–12.9)
PO2 BLDA: 88 MMHG (ref 80–100)
POTASSIUM SERPL-SCNC: 4.9 MMOL/L (ref 3.5–5.1)
RBC # BLD AUTO: 3.46 M/UL (ref 4.1–5.7)
S PNEUM AG SPEC QL LA: NEGATIVE
S PNEUM AG SPEC QL LA: POSITIVE
SAO2 % BLD: 96 % (ref 92–97)
SAO2% DEVICE SAO2% SENSOR NAME: ABNORMAL
SERVICE CMNT-IMP: ABNORMAL
SERVICE CMNT-IMP: NORMAL
SODIUM SERPL-SCNC: 149 MMOL/L (ref 136–145)
SPECIMEN SITE: ABNORMAL
SPECIMEN SOURCE: ABNORMAL
SPECIMEN SOURCE: NORMAL
SPECIMEN SOURCE: NORMAL
SPECIMEN, SPNG1: ABNORMAL
SPECIMEN, SPNG1: NORMAL
URATE SERPL-MCNC: 9 MG/DL (ref 3.5–7.2)
VENTILATION MODE VENT: ABNORMAL
VT SETTING VENT: 450 ML
WBC # BLD AUTO: 11.5 K/UL (ref 4.1–11.1)

## 2021-02-02 PROCEDURE — 84550 ASSAY OF BLOOD/URIC ACID: CPT

## 2021-02-02 PROCEDURE — 83735 ASSAY OF MAGNESIUM: CPT

## 2021-02-02 PROCEDURE — 74011250637 HC RX REV CODE- 250/637: Performed by: ANESTHESIOLOGY

## 2021-02-02 PROCEDURE — 74011250636 HC RX REV CODE- 250/636: Performed by: ANESTHESIOLOGY

## 2021-02-02 PROCEDURE — 74011636637 HC RX REV CODE- 636/637: Performed by: STUDENT IN AN ORGANIZED HEALTH CARE EDUCATION/TRAINING PROGRAM

## 2021-02-02 PROCEDURE — 85025 COMPLETE CBC W/AUTO DIFF WBC: CPT

## 2021-02-02 PROCEDURE — 74011000250 HC RX REV CODE- 250: Performed by: ANESTHESIOLOGY

## 2021-02-02 PROCEDURE — 83605 ASSAY OF LACTIC ACID: CPT

## 2021-02-02 PROCEDURE — 36600 WITHDRAWAL OF ARTERIAL BLOOD: CPT

## 2021-02-02 PROCEDURE — 80048 BASIC METABOLIC PNL TOTAL CA: CPT

## 2021-02-02 PROCEDURE — 74011250636 HC RX REV CODE- 250/636: Performed by: EMERGENCY MEDICINE

## 2021-02-02 PROCEDURE — 74011250637 HC RX REV CODE- 250/637: Performed by: EMERGENCY MEDICINE

## 2021-02-02 PROCEDURE — 36415 COLL VENOUS BLD VENIPUNCTURE: CPT

## 2021-02-02 PROCEDURE — 82803 BLOOD GASES ANY COMBINATION: CPT

## 2021-02-02 PROCEDURE — 65610000006 HC RM INTENSIVE CARE

## 2021-02-02 PROCEDURE — 74011000258 HC RX REV CODE- 258: Performed by: EMERGENCY MEDICINE

## 2021-02-02 PROCEDURE — 94003 VENT MGMT INPAT SUBQ DAY: CPT

## 2021-02-02 PROCEDURE — 74011636637 HC RX REV CODE- 636/637: Performed by: EMERGENCY MEDICINE

## 2021-02-02 PROCEDURE — 84100 ASSAY OF PHOSPHORUS: CPT

## 2021-02-02 PROCEDURE — 82962 GLUCOSE BLOOD TEST: CPT

## 2021-02-02 RX ORDER — INSULIN GLARGINE 100 [IU]/ML
10 INJECTION, SOLUTION SUBCUTANEOUS DAILY
Status: DISCONTINUED | OUTPATIENT
Start: 2021-02-02 | End: 2021-02-03

## 2021-02-02 RX ADMIN — LANSOPRAZOLE 30 MG: KIT at 06:46

## 2021-02-02 RX ADMIN — INSULIN LISPRO 2 UNITS: 100 INJECTION, SOLUTION INTRAVENOUS; SUBCUTANEOUS at 06:45

## 2021-02-02 RX ADMIN — Medication 150 MCG/HR: at 14:10

## 2021-02-02 RX ADMIN — ENOXAPARIN SODIUM 30 MG: 30 INJECTION SUBCUTANEOUS at 08:31

## 2021-02-02 RX ADMIN — METOPROLOL TARTRATE 5 MG: 5 INJECTION INTRAVENOUS at 06:46

## 2021-02-02 RX ADMIN — ATORVASTATIN CALCIUM 40 MG: 40 TABLET, FILM COATED ORAL at 21:22

## 2021-02-02 RX ADMIN — GABAPENTIN 150 MG: 250 SOLUTION ORAL at 08:31

## 2021-02-02 RX ADMIN — METRONIDAZOLE 500 MG: 500 INJECTION, SOLUTION INTRAVENOUS at 21:29

## 2021-02-02 RX ADMIN — ENOXAPARIN SODIUM 30 MG: 30 INJECTION SUBCUTANEOUS at 21:28

## 2021-02-02 RX ADMIN — ASPIRIN 81 MG: 81 TABLET, CHEWABLE ORAL at 08:31

## 2021-02-02 RX ADMIN — INSULIN LISPRO 5 UNITS: 100 INJECTION, SOLUTION INTRAVENOUS; SUBCUTANEOUS at 17:30

## 2021-02-02 RX ADMIN — DOXYCYCLINE 100 MG: 100 INJECTION, POWDER, LYOPHILIZED, FOR SOLUTION INTRAVENOUS at 21:19

## 2021-02-02 RX ADMIN — METOPROLOL TARTRATE 5 MG: 5 INJECTION INTRAVENOUS at 14:38

## 2021-02-02 RX ADMIN — DOXYCYCLINE 100 MG: 100 INJECTION, POWDER, LYOPHILIZED, FOR SOLUTION INTRAVENOUS at 08:32

## 2021-02-02 RX ADMIN — INSULIN GLARGINE 10 UNITS: 100 INJECTION, SOLUTION SUBCUTANEOUS at 12:06

## 2021-02-02 RX ADMIN — PROPOFOL 40 MCG/KG/MIN: 10 INJECTION, EMULSION INTRAVENOUS at 06:44

## 2021-02-02 RX ADMIN — GABAPENTIN 150 MG: 250 SOLUTION ORAL at 15:31

## 2021-02-02 RX ADMIN — INSULIN LISPRO 2 UNITS: 100 INJECTION, SOLUTION INTRAVENOUS; SUBCUTANEOUS at 12:13

## 2021-02-02 RX ADMIN — CHLORHEXIDINE GLUCONATE 15 ML: 0.12 RINSE ORAL at 08:34

## 2021-02-02 RX ADMIN — PROPOFOL 30 MCG/KG/MIN: 10 INJECTION, EMULSION INTRAVENOUS at 02:00

## 2021-02-02 RX ADMIN — VANCOMYCIN HYDROCHLORIDE 1250 MG: 10 INJECTION, POWDER, LYOPHILIZED, FOR SOLUTION INTRAVENOUS at 06:46

## 2021-02-02 RX ADMIN — Medication 10 ML: at 21:42

## 2021-02-02 RX ADMIN — PROPOFOL 40 MCG/KG/MIN: 10 INJECTION, EMULSION INTRAVENOUS at 12:05

## 2021-02-02 RX ADMIN — DEXAMETHASONE SODIUM PHOSPHATE 6 MG: 10 INJECTION, SOLUTION INTRAMUSCULAR; INTRAVENOUS at 08:31

## 2021-02-02 RX ADMIN — Medication 150 MCG/HR: at 05:00

## 2021-02-02 RX ADMIN — PROPOFOL 35 MCG/KG/MIN: 10 INJECTION, EMULSION INTRAVENOUS at 17:30

## 2021-02-02 RX ADMIN — PROPOFOL 40 MCG/KG/MIN: 10 INJECTION, EMULSION INTRAVENOUS at 22:00

## 2021-02-02 RX ADMIN — CEFEPIME 2 G: 2 INJECTION, POWDER, FOR SOLUTION INTRAVENOUS at 12:06

## 2021-02-02 RX ADMIN — METRONIDAZOLE 500 MG: 500 INJECTION, SOLUTION INTRAVENOUS at 08:33

## 2021-02-02 RX ADMIN — METOPROLOL TARTRATE 5 MG: 5 INJECTION INTRAVENOUS at 21:23

## 2021-02-02 RX ADMIN — CHLORHEXIDINE GLUCONATE 15 ML: 0.12 RINSE ORAL at 21:18

## 2021-02-02 RX ADMIN — ALLOPURINOL 100 MG: 100 TABLET ORAL at 08:31

## 2021-02-02 RX ADMIN — Medication 10 ML: at 05:21

## 2021-02-02 RX ADMIN — MINERAL SUPPLEMENT IRON 300 MG / 5 ML STRENGTH LIQUID 100 PER BOX UNFLAVORED 300 MG: at 08:32

## 2021-02-02 NOTE — PROGRESS NOTES
0730: Bedside and Verbal shift change report given to Seven Rich RN (oncoming nurse) by Surinder Cali RN (offgoing nurse). Report included the following information SBAR, Kardex, Procedure Summary, Intake/Output, MAR, Accordion, Recent Results, Med Rec Status, Cardiac Rhythm Sinus Tach and Alarm Parameters . 0800: Primary Nurse Cindy Hanna and ANAHI Boo performed a dual skin assessment on this patient Impairment noted- see wound doc flow sheet  Cruzito score is 12    1100: ICU multidisciplinary rounds lead by Dr. Harriet Nguyen (Intensivist): The following were reviewed and discussed: current labs,  recent imaging, lines/drains, review of body systems, nutrition, cultures, mobility, length of ICU stay. The plan of care for the day is as follows  Lantus ordered; wean sedation as tolerated; increase tube feeds (written note by RN)       1930: Bedside and Verbal shift change report given to Freedom Sauceda (oncoming nurse) by Seven Rich RN (offgoing nurse). Report included the following information SBAR, Kardex, Intake/Output, MAR, Accordion, Recent Results, Med Rec Status, Cardiac Rhythm NSR and Alarm Parameters .

## 2021-02-02 NOTE — PROGRESS NOTES
Bedside shift change report given to Myesha Cottrell RN (oncoming nurse) by Preston Anderson RN (offgoing nurse). Report included the following information SBAR, Kardex, Intake/Output, MAR, Recent Results and Cardiac Rhythm NSR w/ PACs. 2030: Patient sedated and restrained, No movement to any stimulus. Pupils 1mm sluggish. Sedation titrated down. Will continue to monitor. 0400: Patient asynchronous with vent. Sedation increased slightly. 0630: Patient HR and BP elevated. PRN Metoprolol given. Bedside shift change report given to Hoa Varela RN (oncoming nurse) by Myesha Cottrell RN (offgoing nurse). Report included the following information SBAR, Kardex, Intake/Output, MAR, Recent Results and Cardiac Rhythm NSR/Sinus Tach.

## 2021-02-02 NOTE — PROGRESS NOTES
[facility]    ICU Team Consult Note    Name: Carroll Lara   : 1955   MRN: [id number]   Date: 2021      Subjective:     81-year-old man who presented from [facility] with acute onset shortness of breath, hypoxia, nausea, vomiting and diarrhea-shortness of breath acutely got worse after one of the vomiting episodes. Diagnosed with Covid about 2 weeks prior to presentation. Patient placed on BiPAP on arrival-felt much better. After getting fluids for resuscitation/elevated lactate level patient acutely decompensated-developed lethargy and drop in oxygen levels-intubated emergently requiring moderate to high vent support. Patient was at [facility] because he he was getting IV antibiotics for neck infection and was going to have a skin graft    Interval changes      - Remains intubated     - Self extubated - reintubated several hours later for worsening hypoxia and agitation.       - No acute events overnight    Active Problem List:     Problem List  Never Reviewed          Codes Class    Acute hypoxemic respiratory failure due to COVID-19 [facility]) ICD-10-CM: U07.1, J96.01  ICD-9-CM: 518.81, 079.89, 799.02               Objective:   Visit Vitals  /81   Pulse (!) 102   Temp 97.4 °F (36.3 °C)   Resp 17   Ht 5' 7\" (1.702 m)   Wt 84 kg (185 lb 3 oz)   SpO2 98%   BMI 29.00 kg/m²    O2 Flow Rate (L/min): 6 l/min O2 Device: Endotracheal tube Temp (24hrs), Av.7 °F (36.5 °C), Min:96.4 °F (35.8 °C), Max:99.1 °F (37.3 °C)    Intake/Output:     Intake/Output Summary (Last 24 hours) at 2021 0841  Last data filed at 2021 0700  Gross per 24 hour   Intake 2140.45 ml   Output 1700 ml   Net 440.45 ml       Physical Exam:  General-intubated, sedated, comfortable  Neuro-pupils reactive, withdraws in uppers, strong cough and gag  Cardiac-tachycardic, regular  Lungs-coarse bilaterally  Abdomen-soft, nontender, nondistended  Extremities-warm    LABS AND  DATA: Personally reviewed  Recent Labs     02/02/21  0401 02/01/21  0359   WBC 11.5* 10.8   HGB 8.8* 8.4*   HCT 29.0* 27.6*    290     Recent Labs     02/02/21  0401 02/01/21  0359   * 147*   K 4.9 5.0   * 120*   CO2 18* 20*   BUN 74* 64*   CREA 2.39* 2.33*   * 208*   CA 8.6 8.4*   MG 2.4 2.1   PHOS 5.0* 5.9*     Recent Labs     02/01/21  0359 01/31/21  0412    83   TP 6.2* 5.8*   ALB 1.9* 1.8*   GLOB 4.3* 4.0     Recent Labs     02/01/21  0359 01/31/21  0412   APTT 28.2 30.8      Recent Labs     01/30/21  1928   PHI 7.31*   PCO2I 45.0   PO2I 35*   FIO2I 60     Recent Labs     01/30/21  2132 01/30/21  1413   TROIQ 1.32* 1.38*     Ventilator Settings:  Mode Rate Tidal Volume Pressure FiO2 PEEP   Assist control   450 ml  12 cm H2O 60 % 5 cm H20     Peak airway pressure: 27 cm H2O    Minute ventilation: 8.36 l/min        MEDS: Reviewed    Imaging reviewed    Assessment:   Acute hypoxic respiratory failure  Pulmonary edema  Covid pneumonia  Aspiration pneumonitis  Tachycardia secondary to above  Acute kidney injury    ICU Comprehensive Plan of Care:     Analgesia/sedation with opioids and precedex as needed, early mobility as tolerated    Cont ASA, lipitor, midodrine    Daily SAT/SBT     Tube feeding, PPI GI prophylaxis, trend LFTs, ensure BMs    JUAN C, worsening Cr-monitor urine output closely, dose medication renally, avoid nephrotoxic agents, granular casts on UA-ATN, elevated uric acid levels - start allopurinol, correct electrolyte derangements as needed     Lovenox for DVT prophylaxis, iron    Cont vancomycin, cefepime, Flagyl, doxy for Covid pneumonia, possible HCAP and possible aspiration pneumonia, f/u micro studies, wound consult for neck wound - de-escalate today    Keep glucose less than 180 with as needed SC insulin    NOTE OF PERSONAL INVOLVEMENT IN CARE   This patient has a high probability of imminent, clinically significant deterioration, which requires the highest level of preparedness to intervene urgently. I participated in the decision-making and personally managed or directed the management of the following life and organ supporting interventions that required my frequent assessment to treat or prevent imminent deterioration. I personally spent 45 minutes of critical care time. This does not include any procedural time.     Signed By: Marylu Slater MD     February 2, 2021

## 2021-02-02 NOTE — PROGRESS NOTES
Comprehensive Nutrition Assessment    Type and Reason for Visit: Initial, Consult    Nutrition Recommendations/Plan:      Modify tube feeding: Nepro @ 30 ml/hr with 2 packets Prosource daily; continue 250 ml water flush q 4 hr    Supplement with Vit C and zinc sulfate (x 10 days.)    Check for Vit D deficiency     Nutrition Assessment:    Patient admitted with Acute hypoxemic respiratory failure d/t COVID 19. PMHx: DM, HTN, neck wound/infection-@ NH for IV antibiotics, plan for skin graft. Intubated in ED d/t acute decompensation (pulmonary edema, COVID 19 PNA, Aspiration pneumonitis). Self- extubated yesterday but had to be re-intubated. JUAN C. WOCN following for neck wound (beefy red). Trickle tube feeding ordered 1/30-patient has tolerated well. Will advance tube feeding to better meet estimated protein needs. Propofol @ current rate provides 465 lipid calories per day. Suggest Nepro @ 30 ml/hr with 2 packets Prosource daily; continue 250 ml water flush q 4 hr 2/2 hypernatremia. This will provide 660 ml, 1290 calories (1755 including Diprivan), 83 gm protein and ~2100 ml free water (tube feeding/flush) per day to meet estimated needs. BUN and creatinine trending up d/t JUAN C. Phosphorus elevated but actually improving. Lantus ordered today. A1c not available-?how well BG controlled PTA. Recommend supplementing with Vit C and zinc sulfate (x 10 days) to support immune system. Check Vit D for deficiency. Malnutrition Assessment:  Malnutrition Status:  Insufficient data-at risk for malnutrition d/t critical illness   Context:  Acute illness       Nutritionally Significant Medications:   Propofol, Fentanyl, Lipitor, ferrous sulfate, Lantus, Humalog, Prevacid    Estimated Daily Nutrient Needs:  Energy (kcal): 1760 (20 kcal/kg); Weight Used for Energy Requirements: Current(88 kg)  Protein (g): 88-97 (1.0-1.1g/kg or 1.3-1.4g/kg IBW);  Weight Used for Protein Requirements: Current(88 kg)  Fluid (ml/day): 1760; Method Used for Fluid Requirements: 1 ml/kcal    Nutrition Related Findings:       BM: 2/2  Edema: None  Wounds:  Open wounds       Current Nutrition Therapies:   Diet: NPO  Tube Feeding: Nepro @ 10 ml/hr with 250 ml water flush q 4 hr  Additional Caloric Sources:  Propofol @ 17.6 ml/hr    Anthropometric Measures:  · Height:  5' 7\" (170.2 cm)  · Current Body Wt:  88 kg (194 lb 0.1 oz)   · Admission Body Wt:  186 lb 15.2 oz     · Ideal Body Wt: 148#  :  131.1 %   Wt Readings from Last 10 Encounters:   02/02/21 88 kg (194 lb 0.1 oz)   06/09/20 78 kg (172 lb)       Nutrition Diagnosis:   · Inadequate oral intake related to impaired respiratory function as evidenced by NPO or clear liquid status due to medical condition, intubation    Nutrition Interventions:   Food and/or Nutrient Delivery: Modify tube feeding  Nutrition Education and Counseling: No recommendations at this time  Coordination of Nutrition Care: Continue to monitor while inpatient, Interdisciplinary rounds    Goals: Tolerate tube feeding at goal in next 1-2 days. Nutrition Monitoring and Evaluation:   Behavioral-Environmental Outcomes: None identified  Food/Nutrient Intake Outcomes: Enteral nutrition intake/tolerance  Physical Signs/Symptoms Outcomes: Biochemical data, GI status, Fluid status or edema, Hemodynamic status, Weight    Discharge Planning:     Too soon to determine     Elena Weldon RD CNSC  Contact: Jacques Pepper

## 2021-02-02 NOTE — PROGRESS NOTES
DIEGO  Patient admitted from [facility] with hypoxia, COVID positive. Patient remains intubated. Care manager has left 2 messages with admissions at [facility] to obtain a working phone number for patient's wife. Waiting for return call.    Julia Mcdaniel RN,Care Management

## 2021-02-03 LAB
ANION GAP SERPL CALC-SCNC: 8 MMOL/L (ref 5–15)
ARTERIAL PATENCY WRIST A: ABNORMAL
BASE DEFICIT BLDA-SCNC: 7 MMOL/L
BASOPHILS # BLD: 0 K/UL (ref 0–0.1)
BASOPHILS NFR BLD: 0 % (ref 0–1)
BDY SITE: ABNORMAL
BODY TEMPERATURE: 37
BUN SERPL-MCNC: 78 MG/DL (ref 6–20)
BUN/CREAT SERPL: 36 (ref 12–20)
CALCIUM SERPL-MCNC: 8.4 MG/DL (ref 8.5–10.1)
CHLORIDE SERPL-SCNC: 122 MMOL/L (ref 97–108)
CO2 SERPL-SCNC: 20 MMOL/L (ref 21–32)
CREAT SERPL-MCNC: 2.17 MG/DL (ref 0.7–1.3)
DIFFERENTIAL METHOD BLD: ABNORMAL
EOSINOPHIL # BLD: 0 K/UL (ref 0–0.4)
EOSINOPHIL NFR BLD: 0 % (ref 0–7)
ERYTHROCYTE [DISTWIDTH] IN BLOOD BY AUTOMATED COUNT: 17.5 % (ref 11.5–14.5)
FIO2 ON VENT: 50 %
GAS FLOW.O2 SETTING OXYMISER: 16 L/MIN
GLUCOSE BLD STRIP.AUTO-MCNC: 165 MG/DL (ref 65–100)
GLUCOSE BLD STRIP.AUTO-MCNC: 189 MG/DL (ref 65–100)
GLUCOSE BLD STRIP.AUTO-MCNC: 211 MG/DL (ref 65–100)
GLUCOSE BLD STRIP.AUTO-MCNC: 285 MG/DL (ref 65–100)
GLUCOSE SERPL-MCNC: 164 MG/DL (ref 65–100)
HCO3 BLDA-SCNC: 19 MMOL/L (ref 22–26)
HCT VFR BLD AUTO: 28.8 % (ref 36.6–50.3)
HGB BLD-MCNC: 8.5 G/DL (ref 12.1–17)
IMM GRANULOCYTES # BLD AUTO: 0.2 K/UL (ref 0–0.04)
IMM GRANULOCYTES NFR BLD AUTO: 1 % (ref 0–0.5)
LYMPHOCYTES # BLD: 1.7 K/UL (ref 0.8–3.5)
LYMPHOCYTES NFR BLD: 14 % (ref 12–49)
M PNEUMO IGG SER IA-ACNC: 1411 U/ML (ref 0–99)
MAGNESIUM SERPL-MCNC: 2.5 MG/DL (ref 1.6–2.4)
MCH RBC QN AUTO: 24.9 PG (ref 26–34)
MCHC RBC AUTO-ENTMCNC: 29.5 G/DL (ref 30–36.5)
MCV RBC AUTO: 84.5 FL (ref 80–99)
MONOCYTES # BLD: 1.1 K/UL (ref 0–1)
MONOCYTES NFR BLD: 9 % (ref 5–13)
NEUTS SEG # BLD: 8.9 K/UL (ref 1.8–8)
NEUTS SEG NFR BLD: 76 % (ref 32–75)
NRBC # BLD: 0.03 K/UL (ref 0–0.01)
NRBC BLD-RTO: 0.3 PER 100 WBC
PCO2 BLDA: 38 MMHG (ref 35–45)
PEEP RESPIRATORY: 5 CM[H2O]
PH BLDA: 7.31 [PH] (ref 7.35–7.45)
PHOSPHATE SERPL-MCNC: 4.2 MG/DL (ref 2.6–4.7)
PLATELET # BLD AUTO: 307 K/UL (ref 150–400)
PMV BLD AUTO: 11.2 FL (ref 8.9–12.9)
PO2 BLDA: 82 MMHG (ref 80–100)
POTASSIUM SERPL-SCNC: 4.8 MMOL/L (ref 3.5–5.1)
RBC # BLD AUTO: 3.41 M/UL (ref 4.1–5.7)
SAO2 % BLD: 95 % (ref 92–97)
SAO2% DEVICE SAO2% SENSOR NAME: ABNORMAL
SERVICE CMNT-IMP: ABNORMAL
SODIUM SERPL-SCNC: 150 MMOL/L (ref 136–145)
SPECIMEN SITE: ABNORMAL
URATE SERPL-MCNC: 7.5 MG/DL (ref 3.5–7.2)
VENTILATION MODE VENT: ABNORMAL
VT SETTING VENT: 450 ML
WBC # BLD AUTO: 11.9 K/UL (ref 4.1–11.1)

## 2021-02-03 PROCEDURE — 74011636637 HC RX REV CODE- 636/637: Performed by: STUDENT IN AN ORGANIZED HEALTH CARE EDUCATION/TRAINING PROGRAM

## 2021-02-03 PROCEDURE — 94003 VENT MGMT INPAT SUBQ DAY: CPT

## 2021-02-03 PROCEDURE — 85025 COMPLETE CBC W/AUTO DIFF WBC: CPT

## 2021-02-03 PROCEDURE — 74011250636 HC RX REV CODE- 250/636: Performed by: ANESTHESIOLOGY

## 2021-02-03 PROCEDURE — 82962 GLUCOSE BLOOD TEST: CPT

## 2021-02-03 PROCEDURE — 74011250637 HC RX REV CODE- 250/637: Performed by: EMERGENCY MEDICINE

## 2021-02-03 PROCEDURE — 84100 ASSAY OF PHOSPHORUS: CPT

## 2021-02-03 PROCEDURE — 80048 BASIC METABOLIC PNL TOTAL CA: CPT

## 2021-02-03 PROCEDURE — 82803 BLOOD GASES ANY COMBINATION: CPT

## 2021-02-03 PROCEDURE — 74011000258 HC RX REV CODE- 258: Performed by: EMERGENCY MEDICINE

## 2021-02-03 PROCEDURE — 65610000006 HC RM INTENSIVE CARE

## 2021-02-03 PROCEDURE — 36415 COLL VENOUS BLD VENIPUNCTURE: CPT

## 2021-02-03 PROCEDURE — 94760 N-INVAS EAR/PLS OXIMETRY 1: CPT

## 2021-02-03 PROCEDURE — 74011000250 HC RX REV CODE- 250: Performed by: ANESTHESIOLOGY

## 2021-02-03 PROCEDURE — 83735 ASSAY OF MAGNESIUM: CPT

## 2021-02-03 PROCEDURE — 36600 WITHDRAWAL OF ARTERIAL BLOOD: CPT

## 2021-02-03 PROCEDURE — 74011250637 HC RX REV CODE- 250/637: Performed by: ANESTHESIOLOGY

## 2021-02-03 PROCEDURE — 84550 ASSAY OF BLOOD/URIC ACID: CPT

## 2021-02-03 PROCEDURE — 74011250636 HC RX REV CODE- 250/636: Performed by: EMERGENCY MEDICINE

## 2021-02-03 PROCEDURE — 74011000250 HC RX REV CODE- 250: Performed by: STUDENT IN AN ORGANIZED HEALTH CARE EDUCATION/TRAINING PROGRAM

## 2021-02-03 PROCEDURE — 74011636637 HC RX REV CODE- 636/637: Performed by: EMERGENCY MEDICINE

## 2021-02-03 RX ORDER — INSULIN GLARGINE 100 [IU]/ML
5 INJECTION, SOLUTION SUBCUTANEOUS ONCE
Status: COMPLETED | OUTPATIENT
Start: 2021-02-03 | End: 2021-02-03

## 2021-02-03 RX ORDER — LABETALOL HYDROCHLORIDE 5 MG/ML
20 INJECTION, SOLUTION INTRAVENOUS ONCE
Status: COMPLETED | OUTPATIENT
Start: 2021-02-03 | End: 2021-02-03

## 2021-02-03 RX ORDER — HYDRALAZINE HYDROCHLORIDE 20 MG/ML
20 INJECTION INTRAMUSCULAR; INTRAVENOUS ONCE
Status: DISPENSED | OUTPATIENT
Start: 2021-02-03 | End: 2021-02-03

## 2021-02-03 RX ORDER — INSULIN GLARGINE 100 [IU]/ML
15 INJECTION, SOLUTION SUBCUTANEOUS DAILY
Status: DISCONTINUED | OUTPATIENT
Start: 2021-02-04 | End: 2021-02-05

## 2021-02-03 RX ADMIN — GABAPENTIN 150 MG: 250 SOLUTION ORAL at 08:32

## 2021-02-03 RX ADMIN — ENOXAPARIN SODIUM 30 MG: 30 INJECTION SUBCUTANEOUS at 08:31

## 2021-02-03 RX ADMIN — DOXYCYCLINE 100 MG: 100 INJECTION, POWDER, LYOPHILIZED, FOR SOLUTION INTRAVENOUS at 08:33

## 2021-02-03 RX ADMIN — PROPOFOL 40 MCG/KG/MIN: 10 INJECTION, EMULSION INTRAVENOUS at 06:43

## 2021-02-03 RX ADMIN — ASPIRIN 81 MG: 81 TABLET, CHEWABLE ORAL at 08:32

## 2021-02-03 RX ADMIN — ENOXAPARIN SODIUM 30 MG: 30 INJECTION SUBCUTANEOUS at 21:19

## 2021-02-03 RX ADMIN — INSULIN GLARGINE 10 UNITS: 100 INJECTION, SOLUTION SUBCUTANEOUS at 08:32

## 2021-02-03 RX ADMIN — ATORVASTATIN CALCIUM 40 MG: 40 TABLET, FILM COATED ORAL at 21:19

## 2021-02-03 RX ADMIN — CEFEPIME 2 G: 2 INJECTION, POWDER, FOR SOLUTION INTRAVENOUS at 23:18

## 2021-02-03 RX ADMIN — INSULIN LISPRO 2 UNITS: 100 INJECTION, SOLUTION INTRAVENOUS; SUBCUTANEOUS at 00:09

## 2021-02-03 RX ADMIN — LABETALOL HYDROCHLORIDE 20 MG: 5 INJECTION INTRAVENOUS at 09:56

## 2021-02-03 RX ADMIN — DEXAMETHASONE SODIUM PHOSPHATE 6 MG: 10 INJECTION, SOLUTION INTRAMUSCULAR; INTRAVENOUS at 08:32

## 2021-02-03 RX ADMIN — INSULIN LISPRO 2 UNITS: 100 INJECTION, SOLUTION INTRAVENOUS; SUBCUTANEOUS at 23:20

## 2021-02-03 RX ADMIN — CHLORHEXIDINE GLUCONATE 15 ML: 0.12 RINSE ORAL at 08:33

## 2021-02-03 RX ADMIN — PROPOFOL 35 MCG/KG/MIN: 10 INJECTION, EMULSION INTRAVENOUS at 16:57

## 2021-02-03 RX ADMIN — MINERAL SUPPLEMENT IRON 300 MG / 5 ML STRENGTH LIQUID 100 PER BOX UNFLAVORED 300 MG: at 08:32

## 2021-02-03 RX ADMIN — Medication 150 MCG/HR: at 19:04

## 2021-02-03 RX ADMIN — GABAPENTIN 150 MG: 250 SOLUTION ORAL at 00:08

## 2021-02-03 RX ADMIN — LANSOPRAZOLE 30 MG: KIT at 06:42

## 2021-02-03 RX ADMIN — GABAPENTIN 150 MG: 250 SOLUTION ORAL at 23:18

## 2021-02-03 RX ADMIN — CHLORHEXIDINE GLUCONATE 15 ML: 0.12 RINSE ORAL at 21:23

## 2021-02-03 RX ADMIN — INSULIN LISPRO 2 UNITS: 100 INJECTION, SOLUTION INTRAVENOUS; SUBCUTANEOUS at 06:41

## 2021-02-03 RX ADMIN — Medication 10 ML: at 21:23

## 2021-02-03 RX ADMIN — INSULIN LISPRO 5 UNITS: 100 INJECTION, SOLUTION INTRAVENOUS; SUBCUTANEOUS at 17:20

## 2021-02-03 RX ADMIN — Medication 10 ML: at 06:41

## 2021-02-03 RX ADMIN — METOPROLOL TARTRATE 5 MG: 5 INJECTION INTRAVENOUS at 19:11

## 2021-02-03 RX ADMIN — DOXYCYCLINE 100 MG: 100 INJECTION, POWDER, LYOPHILIZED, FOR SOLUTION INTRAVENOUS at 21:20

## 2021-02-03 RX ADMIN — ALLOPURINOL 100 MG: 100 TABLET ORAL at 08:33

## 2021-02-03 RX ADMIN — METRONIDAZOLE 500 MG: 500 INJECTION, SOLUTION INTRAVENOUS at 21:23

## 2021-02-03 RX ADMIN — METOPROLOL TARTRATE 5 MG: 5 INJECTION INTRAVENOUS at 12:40

## 2021-02-03 RX ADMIN — METOPROLOL TARTRATE 5 MG: 5 INJECTION INTRAVENOUS at 04:22

## 2021-02-03 RX ADMIN — Medication 150 MCG/HR: at 00:00

## 2021-02-03 RX ADMIN — Medication 150 MCG/HR: at 09:51

## 2021-02-03 RX ADMIN — INSULIN GLARGINE 5 UNITS: 100 INJECTION, SOLUTION SUBCUTANEOUS at 12:40

## 2021-02-03 RX ADMIN — CEFEPIME 2 G: 2 INJECTION, POWDER, FOR SOLUTION INTRAVENOUS at 11:14

## 2021-02-03 RX ADMIN — GABAPENTIN 150 MG: 250 SOLUTION ORAL at 16:57

## 2021-02-03 RX ADMIN — INSULIN LISPRO 3 UNITS: 100 INJECTION, SOLUTION INTRAVENOUS; SUBCUTANEOUS at 11:27

## 2021-02-03 RX ADMIN — PROPOFOL 40 MCG/KG/MIN: 10 INJECTION, EMULSION INTRAVENOUS at 04:12

## 2021-02-03 RX ADMIN — PROPOFOL 40 MCG/KG/MIN: 10 INJECTION, EMULSION INTRAVENOUS at 21:17

## 2021-02-03 RX ADMIN — VANCOMYCIN HYDROCHLORIDE 1250 MG: 10 INJECTION, POWDER, LYOPHILIZED, FOR SOLUTION INTRAVENOUS at 06:42

## 2021-02-03 RX ADMIN — CEFEPIME 2 G: 2 INJECTION, POWDER, FOR SOLUTION INTRAVENOUS at 00:09

## 2021-02-03 RX ADMIN — PROPOFOL 40 MCG/KG/MIN: 10 INJECTION, EMULSION INTRAVENOUS at 11:27

## 2021-02-03 RX ADMIN — METRONIDAZOLE 500 MG: 500 INJECTION, SOLUTION INTRAVENOUS at 08:33

## 2021-02-03 NOTE — PROGRESS NOTES
[facility]    ICU Team Consult Note    Name: Gillian Trujillo   : 1955   MRN: [id number]   Date: 2/3/2021      Subjective:     62-year-old man who presented from [facility] with acute onset shortness of breath, hypoxia, nausea, vomiting and diarrhea-shortness of breath acutely got worse after one of the vomiting episodes. Diagnosed with Covid about 2 weeks prior to presentation. Patient placed on BiPAP on arrival-felt much better. After getting fluids for resuscitation/elevated lactate level patient acutely decompensated-developed lethargy and drop in oxygen levels-intubated emergently requiring moderate to high vent support. Patient was at [facility] because he he was getting IV antibiotics for neck infection and was going to have a skin graft    Interval changes      - Remains intubated     - Self extubated - reintubated several hours later for worsening hypoxia and agitation.  - No acute events overnight    2/3 - No acute events overnight. Perplexing micro data returning. Continuing to wean FiO2.      Active Problem List:     Problem List  Never Reviewed          Codes Class    Acute hypoxemic respiratory failure due to COVID-19 [facility]) ICD-10-CM: U07.1, J96.01  ICD-9-CM: 518.81, 079.89, 799.02               Objective:     Visit Vitals  /67   Pulse 66   Temp 99.2 °F (37.3 °C)   Resp 16   Ht 5' 7\" (1.702 m)   Wt 88 kg (194 lb 0.1 oz)   SpO2 100%   BMI 30.39 kg/m²    O2 Flow Rate (L/min): 6 l/min O2 Device: Endotracheal tube Temp (24hrs), Av.7 °F (37.1 °C), Min:97.7 °F (36.5 °C), Max:99.5 °F (37.5 °C)    Intake/Output:     Intake/Output Summary (Last 24 hours) at 2/3/2021 0901  Last data filed at 2/3/2021 0700  Gross per 24 hour   Intake 3020.86 ml   Output 2275 ml   Net 745.86 ml     Ventilator Settings:  Mode Rate Tidal Volume Pressure FiO2 PEEP   Assist control, Volume control   450 ml  12 cm H2O 50 % 5 cm H20     Peak airway pressure: 19 cm H2O    Minute ventilation: 7.41 l/min      Physical Exam:  General-intubated, sedated, comfortable  Neuro-pupils reactive, withdraws in uppers, strong cough and gag  Cardiac-tachycardic, regular  Lungs-coarse bilaterally  Abdomen-soft, nontender, nondistended  Extremities-warm    Labs and Data: Reviewed    MEDS: Reviewed    Imaging: Reviewed    Assessment:   Acute hypoxic respiratory failure  Pulmonary edema  Covid pneumonia  Aspiration pneumonitis  Tachycardia secondary to above  Acute kidney injury    ICU Comprehensive Plan of Care:     Analgesia/sedation with opioids and precedex as needed, early mobility as tolerated    Cont ASA, lipitor, midodrine    Daily SAT/SBT. OK to wean FiO2 by SpO2    Tube feeding, PPI GI prophylaxis, trend LFTs, ensure BMs    JUAN C, worsening Cr-monitor urine output closely, dose medication renally, avoid nephrotoxic agents, granular casts on UA-ATN, elevated uric acid levels - start allopurinol, correct electrolyte derangements as needed     Lovenox for DVT prophylaxis, iron    Cont vancomycin, cefepime, Flagyl, doxy for Covid pneumonia, possible HCAP and possible aspiration pneumonia, f/u micro studies, wound consult for neck wound - ID consult     Keep glucose less than 180 with as needed SC insulin    NOTE OF PERSONAL INVOLVEMENT IN CARE   This patient has a high probability of imminent, clinically significant deterioration, which requires the highest level of preparedness to intervene urgently. I participated in the decision-making and personally managed or directed the management of the following life and organ supporting interventions that required my frequent assessment to treat or prevent imminent deterioration. I personally spent 45 minutes of critical care time. This does not include any procedural time.     Signed By: Mary Sanz MD     February 3, 2021

## 2021-02-03 NOTE — PROGRESS NOTES
Bedside shift change report given to Ariella Smith RN (oncoming nurse) by Airam Khan RN (offgoing nurse). Report included the following information SBAR, Kardex, Intake/Output, MAR, Recent Results and Cardiac Rhythm NSR/Sinus Tach. 2000: Golf ball sized, semi soft mass noted in upper left abdomen. NP notified. Bedside shift change report given to Airam Khan RN (oncoming nurse) by Ariella Smith RN (offgoing nurse). Report included the following information SBAR, Kardex, Intake/Output, MAR, Recent Results and Cardiac Rhythm NSR.

## 2021-02-04 ENCOUNTER — APPOINTMENT (OUTPATIENT)
Dept: GENERAL RADIOLOGY | Age: 66
DRG: 720 | End: 2021-02-04
Attending: STUDENT IN AN ORGANIZED HEALTH CARE EDUCATION/TRAINING PROGRAM
Payer: MEDICAID

## 2021-02-04 LAB
ANION GAP SERPL CALC-SCNC: 3 MMOL/L (ref 5–15)
APTT PPP: 22.8 SEC (ref 22.1–31)
ARTERIAL PATENCY WRIST A: YES
BACTERIA SPEC CULT: NORMAL
BASE DEFICIT BLDA-SCNC: 9 MMOL/L
BASOPHILS # BLD: 0 K/UL (ref 0–0.1)
BASOPHILS NFR BLD: 0 % (ref 0–1)
BDY SITE: ABNORMAL
BUN SERPL-MCNC: 71 MG/DL (ref 6–20)
BUN/CREAT SERPL: 36 (ref 12–20)
CALCIUM SERPL-MCNC: 8.3 MG/DL (ref 8.5–10.1)
CHLORIDE SERPL-SCNC: 123 MMOL/L (ref 97–108)
CO2 SERPL-SCNC: 22 MMOL/L (ref 21–32)
CREAT SERPL-MCNC: 1.96 MG/DL (ref 0.7–1.3)
D DIMER PPP FEU-MCNC: 3.62 MG/L FEU (ref 0–0.65)
DATE LAST DOSE: ABNORMAL
DIFFERENTIAL METHOD BLD: ABNORMAL
EOSINOPHIL # BLD: 0 K/UL (ref 0–0.4)
EOSINOPHIL NFR BLD: 0 % (ref 0–7)
ERYTHROCYTE [DISTWIDTH] IN BLOOD BY AUTOMATED COUNT: 17.2 % (ref 11.5–14.5)
FIO2 ON VENT: 35 %
GAS FLOW.O2 SETTING OXYMISER: 16 L/MIN
GLUCOSE BLD STRIP.AUTO-MCNC: 173 MG/DL (ref 65–100)
GLUCOSE BLD STRIP.AUTO-MCNC: 226 MG/DL (ref 65–100)
GLUCOSE BLD STRIP.AUTO-MCNC: 274 MG/DL (ref 65–100)
GLUCOSE SERPL-MCNC: 149 MG/DL (ref 65–100)
HCO3 BLDA-SCNC: 17 MMOL/L (ref 22–26)
HCT VFR BLD AUTO: 24 % (ref 36.6–50.3)
HCT VFR BLD AUTO: 30.7 % (ref 36.6–50.3)
HGB BLD-MCNC: 7.2 G/DL (ref 12.1–17)
HGB BLD-MCNC: 9 G/DL (ref 12.1–17)
IMM GRANULOCYTES # BLD AUTO: 0.1 K/UL (ref 0–0.04)
IMM GRANULOCYTES NFR BLD AUTO: 1 % (ref 0–0.5)
LACTATE SERPL-SCNC: 1.1 MMOL/L (ref 0.4–2)
LACTATE SERPL-SCNC: 1.9 MMOL/L (ref 0.4–2)
LYMPHOCYTES # BLD: 1.7 K/UL (ref 0.8–3.5)
LYMPHOCYTES NFR BLD: 20 % (ref 12–49)
MAGNESIUM SERPL-MCNC: 2.5 MG/DL (ref 1.6–2.4)
MCH RBC QN AUTO: 25.5 PG (ref 26–34)
MCHC RBC AUTO-ENTMCNC: 30 G/DL (ref 30–36.5)
MCV RBC AUTO: 85.1 FL (ref 80–99)
MONOCYTES # BLD: 1 K/UL (ref 0–1)
MONOCYTES NFR BLD: 12 % (ref 5–13)
NEUTS SEG # BLD: 5.5 K/UL (ref 1.8–8)
NEUTS SEG NFR BLD: 67 % (ref 32–75)
NRBC # BLD: 0.03 K/UL (ref 0–0.01)
NRBC BLD-RTO: 0.4 PER 100 WBC
PCO2 BLDA: 37 MMHG (ref 35–45)
PEEP RESPIRATORY: 5 CM[H2O]
PH BLDA: 7.28 [PH] (ref 7.35–7.45)
PHOSPHATE SERPL-MCNC: 3.2 MG/DL (ref 2.6–4.7)
PLATELET # BLD AUTO: 226 K/UL (ref 150–400)
PMV BLD AUTO: 11.1 FL (ref 8.9–12.9)
PO2 BLDA: 96 MMHG (ref 80–100)
POTASSIUM SERPL-SCNC: 4.6 MMOL/L (ref 3.5–5.1)
RBC # BLD AUTO: 2.82 M/UL (ref 4.1–5.7)
REPORTED DOSE,DOSE: ABNORMAL UNITS
REPORTED DOSE/TIME,TMG: ABNORMAL
SAO2 % BLD: 97 % (ref 92–97)
SAO2% DEVICE SAO2% SENSOR NAME: ABNORMAL
SERVICE CMNT-IMP: ABNORMAL
SERVICE CMNT-IMP: NORMAL
SODIUM SERPL-SCNC: 148 MMOL/L (ref 136–145)
SPECIMEN SITE: ABNORMAL
THERAPEUTIC RANGE,PTTT: NORMAL SECS (ref 58–77)
URATE SERPL-MCNC: 6.1 MG/DL (ref 3.5–7.2)
VANCOMYCIN TROUGH SERPL-MCNC: 18.8 UG/ML (ref 5–10)
VENTILATION MODE VENT: ABNORMAL
VT SETTING VENT: 450 ML
WBC # BLD AUTO: 8.2 K/UL (ref 4.1–11.1)

## 2021-02-04 PROCEDURE — 87040 BLOOD CULTURE FOR BACTERIA: CPT

## 2021-02-04 PROCEDURE — 74011250636 HC RX REV CODE- 250/636: Performed by: STUDENT IN AN ORGANIZED HEALTH CARE EDUCATION/TRAINING PROGRAM

## 2021-02-04 PROCEDURE — 74011250636 HC RX REV CODE- 250/636: Performed by: ANESTHESIOLOGY

## 2021-02-04 PROCEDURE — 80202 ASSAY OF VANCOMYCIN: CPT

## 2021-02-04 PROCEDURE — 85014 HEMATOCRIT: CPT

## 2021-02-04 PROCEDURE — 74011000250 HC RX REV CODE- 250: Performed by: STUDENT IN AN ORGANIZED HEALTH CARE EDUCATION/TRAINING PROGRAM

## 2021-02-04 PROCEDURE — 84100 ASSAY OF PHOSPHORUS: CPT

## 2021-02-04 PROCEDURE — 94003 VENT MGMT INPAT SUBQ DAY: CPT

## 2021-02-04 PROCEDURE — 74011250636 HC RX REV CODE- 250/636: Performed by: EMERGENCY MEDICINE

## 2021-02-04 PROCEDURE — 85730 THROMBOPLASTIN TIME PARTIAL: CPT

## 2021-02-04 PROCEDURE — 74011000250 HC RX REV CODE- 250: Performed by: ANESTHESIOLOGY

## 2021-02-04 PROCEDURE — 71045 X-RAY EXAM CHEST 1 VIEW: CPT

## 2021-02-04 PROCEDURE — 83605 ASSAY OF LACTIC ACID: CPT

## 2021-02-04 PROCEDURE — 77030018798 HC PMP KT ENTRL FED COVD -A

## 2021-02-04 PROCEDURE — 74011250637 HC RX REV CODE- 250/637: Performed by: EMERGENCY MEDICINE

## 2021-02-04 PROCEDURE — 83735 ASSAY OF MAGNESIUM: CPT

## 2021-02-04 PROCEDURE — 74011636637 HC RX REV CODE- 636/637: Performed by: STUDENT IN AN ORGANIZED HEALTH CARE EDUCATION/TRAINING PROGRAM

## 2021-02-04 PROCEDURE — 85379 FIBRIN DEGRADATION QUANT: CPT

## 2021-02-04 PROCEDURE — 80048 BASIC METABOLIC PNL TOTAL CA: CPT

## 2021-02-04 PROCEDURE — 74011000250 HC RX REV CODE- 250: Performed by: NURSE PRACTITIONER

## 2021-02-04 PROCEDURE — 94760 N-INVAS EAR/PLS OXIMETRY 1: CPT

## 2021-02-04 PROCEDURE — 36415 COLL VENOUS BLD VENIPUNCTURE: CPT

## 2021-02-04 PROCEDURE — 36600 WITHDRAWAL OF ARTERIAL BLOOD: CPT

## 2021-02-04 PROCEDURE — 65610000006 HC RM INTENSIVE CARE

## 2021-02-04 PROCEDURE — 85025 COMPLETE CBC W/AUTO DIFF WBC: CPT

## 2021-02-04 PROCEDURE — 84550 ASSAY OF BLOOD/URIC ACID: CPT

## 2021-02-04 PROCEDURE — 82803 BLOOD GASES ANY COMBINATION: CPT

## 2021-02-04 PROCEDURE — 74011000258 HC RX REV CODE- 258: Performed by: EMERGENCY MEDICINE

## 2021-02-04 PROCEDURE — 74011250637 HC RX REV CODE- 250/637: Performed by: ANESTHESIOLOGY

## 2021-02-04 PROCEDURE — 82962 GLUCOSE BLOOD TEST: CPT

## 2021-02-04 PROCEDURE — 74011636637 HC RX REV CODE- 636/637: Performed by: EMERGENCY MEDICINE

## 2021-02-04 RX ORDER — FENTANYL CITRATE 50 UG/ML
100 INJECTION, SOLUTION INTRAMUSCULAR; INTRAVENOUS ONCE
Status: COMPLETED | OUTPATIENT
Start: 2021-02-04 | End: 2021-02-04

## 2021-02-04 RX ADMIN — LANSOPRAZOLE 30 MG: KIT at 07:04

## 2021-02-04 RX ADMIN — ENOXAPARIN SODIUM 30 MG: 30 INJECTION SUBCUTANEOUS at 21:43

## 2021-02-04 RX ADMIN — Medication 10 ML: at 15:53

## 2021-02-04 RX ADMIN — DOXYCYCLINE 100 MG: 100 INJECTION, POWDER, LYOPHILIZED, FOR SOLUTION INTRAVENOUS at 21:43

## 2021-02-04 RX ADMIN — MINERAL SUPPLEMENT IRON 300 MG / 5 ML STRENGTH LIQUID 100 PER BOX UNFLAVORED 300 MG: at 09:00

## 2021-02-04 RX ADMIN — DEXMEDETOMIDINE HYDROCHLORIDE 0.4 MCG/KG/HR: 400 INJECTION INTRAVENOUS at 02:00

## 2021-02-04 RX ADMIN — GABAPENTIN 150 MG: 250 SOLUTION ORAL at 22:04

## 2021-02-04 RX ADMIN — METOPROLOL TARTRATE 5 MG: 5 INJECTION INTRAVENOUS at 08:47

## 2021-02-04 RX ADMIN — ASPIRIN 81 MG: 81 TABLET, CHEWABLE ORAL at 08:47

## 2021-02-04 RX ADMIN — ATORVASTATIN CALCIUM 40 MG: 40 TABLET, FILM COATED ORAL at 21:44

## 2021-02-04 RX ADMIN — INSULIN LISPRO 2 UNITS: 100 INJECTION, SOLUTION INTRAVENOUS; SUBCUTANEOUS at 07:04

## 2021-02-04 RX ADMIN — PROPOFOL 35 MCG/KG/MIN: 10 INJECTION, EMULSION INTRAVENOUS at 10:19

## 2021-02-04 RX ADMIN — METRONIDAZOLE 500 MG: 500 INJECTION, SOLUTION INTRAVENOUS at 09:20

## 2021-02-04 RX ADMIN — DEXAMETHASONE SODIUM PHOSPHATE 6 MG: 10 INJECTION, SOLUTION INTRAMUSCULAR; INTRAVENOUS at 08:47

## 2021-02-04 RX ADMIN — PROPOFOL 25 MCG/KG/MIN: 10 INJECTION, EMULSION INTRAVENOUS at 09:14

## 2021-02-04 RX ADMIN — GABAPENTIN 150 MG: 250 SOLUTION ORAL at 08:47

## 2021-02-04 RX ADMIN — CHLORHEXIDINE GLUCONATE 15 ML: 0.12 RINSE ORAL at 21:44

## 2021-02-04 RX ADMIN — PROPOFOL 20 MCG/KG/MIN: 10 INJECTION, EMULSION INTRAVENOUS at 18:21

## 2021-02-04 RX ADMIN — CHLORHEXIDINE GLUCONATE 15 ML: 0.12 RINSE ORAL at 09:00

## 2021-02-04 RX ADMIN — CEFEPIME 2 G: 2 INJECTION, POWDER, FOR SOLUTION INTRAVENOUS at 12:35

## 2021-02-04 RX ADMIN — VANCOMYCIN HYDROCHLORIDE 1250 MG: 10 INJECTION, POWDER, LYOPHILIZED, FOR SOLUTION INTRAVENOUS at 07:04

## 2021-02-04 RX ADMIN — ALLOPURINOL 100 MG: 100 TABLET ORAL at 08:47

## 2021-02-04 RX ADMIN — INSULIN LISPRO 5 UNITS: 100 INJECTION, SOLUTION INTRAVENOUS; SUBCUTANEOUS at 18:00

## 2021-02-04 RX ADMIN — Medication 200 MCG/HR: at 22:03

## 2021-02-04 RX ADMIN — Medication 50 MCG/HR: at 13:20

## 2021-02-04 RX ADMIN — FENTANYL CITRATE 100 MCG: 50 INJECTION INTRAMUSCULAR; INTRAVENOUS at 15:49

## 2021-02-04 RX ADMIN — Medication 10 ML: at 06:00

## 2021-02-04 RX ADMIN — Medication 10 ML: at 21:44

## 2021-02-04 RX ADMIN — ENOXAPARIN SODIUM 30 MG: 30 INJECTION SUBCUTANEOUS at 10:22

## 2021-02-04 RX ADMIN — DOXYCYCLINE 100 MG: 100 INJECTION, POWDER, LYOPHILIZED, FOR SOLUTION INTRAVENOUS at 08:47

## 2021-02-04 RX ADMIN — GABAPENTIN 150 MG: 250 SOLUTION ORAL at 15:49

## 2021-02-04 RX ADMIN — DEXMEDETOMIDINE HYDROCHLORIDE 0.5 MCG/KG/HR: 400 INJECTION INTRAVENOUS at 18:28

## 2021-02-04 RX ADMIN — SODIUM CHLORIDE 5 MG/HR: 900 INJECTION, SOLUTION INTRAVENOUS at 18:42

## 2021-02-04 RX ADMIN — PROPOFOL 35 MCG/KG/MIN: 10 INJECTION, EMULSION INTRAVENOUS at 02:00

## 2021-02-04 RX ADMIN — INSULIN LISPRO 2 UNITS: 100 INJECTION, SOLUTION INTRAVENOUS; SUBCUTANEOUS at 12:46

## 2021-02-04 RX ADMIN — DEXMEDETOMIDINE HYDROCHLORIDE 0.5 MCG/KG/HR: 400 INJECTION INTRAVENOUS at 09:10

## 2021-02-04 RX ADMIN — INSULIN GLARGINE 15 UNITS: 100 INJECTION, SOLUTION SUBCUTANEOUS at 08:46

## 2021-02-04 NOTE — PROGRESS NOTES
0730: Bedside and Verbal shift change report given to Heather Montoya RN (oncoming nurse) by Terrell Larose RN (offgoing nurse). Report included the following information SBAR, Kardex, Procedure Summary, Intake/Output, MAR, Accordion, Recent Results, Med Rec Status, Cardiac Rhythm NSR and Alarm Parameters . Shift Summary: Overall uneventful shift; attempted to wean sedation but pt became hypertensive and agitated; RT weaned Fio2 to 35% and PEEP of 5, pt tolerating well; occasional episodes of increased secretions requiring suctioning; tolerating tube feeds at goal; UOP adequate; BM x 3    1930: Bedside and Verbal shift change report given to Terrell Larose RN (oncoming nurse) by Heather Montoya RN (offgoing nurse). Report included the following information SBAR, Kardex, Procedure Summary, Intake/Output, MAR, Accordion, Recent Results, Med Rec Status, Cardiac Rhythm NSR and Alarm Parameters .

## 2021-02-04 NOTE — PROGRESS NOTES
[facility]    ICU Team Consult Note    Name: Major Demarco   : 1955   MRN: [id number]   Date: 2021      Subjective:     61-year-old man who presented from [facility] with acute onset shortness of breath, hypoxia, nausea, vomiting and diarrhea-shortness of breath acutely got worse after one of the vomiting episodes. Diagnosed with Covid about 2 weeks prior to presentation. Patient placed on BiPAP on arrival-felt much better. After getting fluids for resuscitation/elevated lactate level patient acutely decompensated-developed lethargy and drop in oxygen levels-intubated emergently requiring moderate to high vent support. Patient was at [facility] because he he was getting IV antibiotics for neck infection and was going to have a skin graft    Interval changes      - Remains intubated     - Self extubated - reintubated several hours later for worsening hypoxia and agitation. 2/2 - No acute events overnight    2/3 - No acute events overnight. Perplexing micro data returning. Continuing to wean FiO2.      - Metabolic acidosis noted on labs. Try SBT again.      Active Problem List:     Problem List  Never Reviewed          Codes Class    Acute hypoxemic respiratory failure due to COVID-19 [facility]) ICD-10-CM: U07.1, J96.01  ICD-9-CM: 518.81, 079.89, 799.02               Objective:     Visit Vitals  BP (!) 203/98   Pulse 97   Temp 97.6 °F (36.4 °C)   Resp 28   Ht 5' 7\" (1.702 m)   Wt 87 kg (191 lb 12.8 oz)   SpO2 97%   BMI 30.04 kg/m²    O2 Flow Rate (L/min): 6 l/min O2 Device: Endotracheal tube Temp (24hrs), Av °F (36.7 °C), Min:97 °F (36.1 °C), Max:98.6 °F (37 °C)    Intake/Output:     Intake/Output Summary (Last 24 hours) at 2021 1000  Last data filed at 2021 0901  Gross per 24 hour   Intake 2754.85 ml   Output 2475 ml   Net 279.85 ml     Ventilator Settings:  Mode Rate Tidal Volume Pressure FiO2 PEEP   Assist control, Volume control   450 ml  12 cm H2O 35 % 5 cm H20 Peak airway pressure: 26 cm H2O    Minute ventilation: 10 l/min      Physical Exam:  General-intubated, sedated, comfortable  Neuro-pupils reactive, withdraws in uppers, strong cough and gag  Cardiac-tachycardic, regular  Lungs-coarse bilaterally  Abdomen-soft, nontender, nondistended  Extremities-warm    Labs and Data: Reviewed    MEDS: Reviewed    Imaging: Reviewed    Assessment:   Acute hypoxic respiratory failure  Pulmonary edema  Covid pneumonia  Aspiration pneumonitis  Tachycardia secondary to above  Acute kidney injury    ICU Comprehensive Plan of Care:     Analgesia/sedation with opioids and precedex as needed, early mobility as tolerated    Cont ASA, lipitor, midodrine    Daily SAT/SBT. OK to wean FiO2 by SpO2    Tube feeding, PPI GI prophylaxis, trend LFTs, ensure BMs    JUAN C stable, dose medication renally, avoid nephrotoxic agents, granular casts on UA-ATN, elevated uric acid levels - start allopurinol, correct electrolyte derangements as needed     Lovenox for DVT prophylaxis, iron    Possible HCAP and possible aspiration pneumonia, f/u micro studies, wound consult for neck wound - finish 7 day course of doxy/cefepime 2/5. D/C vanc and flagyl today. Keep glucose less than 180 with as needed SC insulin    NOTE OF PERSONAL INVOLVEMENT IN CARE   This patient has a high probability of imminent, clinically significant deterioration, which requires the highest level of preparedness to intervene urgently. I participated in the decision-making and personally managed or directed the management of the following life and organ supporting interventions that required my frequent assessment to treat or prevent imminent deterioration. I personally spent 35 minutes of critical care time. This does not include any procedural time.     Signed By: Mary Sanz MD     February 4, 2021

## 2021-02-04 NOTE — PROGRESS NOTES
note:    DIEGO:    RUR 26%    Patient admitted from [facility] for IV ABX for skilled care    Disposition - TBD    CM staff and Mita Torres [phone] or [phone] at [facility] have attempted to reach wife via phone at [phone] and then via patients contact info [phone]; [phone] - CM spoke with Mita Torres and obtained patients home address at [address] [place] [address] called Chance Dixon [phone] to send for a wellness check to mary's home in attempt to locate Oscar Merino wife. CM supervisor made aware. AMBER De Jesus Cm received a call back from Oscar Merino at [phone]. She is agreeable for patient to return to [facility] if appropriate upon discharge.  AMBER De Jesus

## 2021-02-04 NOTE — PROGRESS NOTES
Spiritual Care Assessment/Progress Note  [facility]      NAME: Candelario Cranker      MRN: [id number]  AGE: 63 y.o. SEX: male  Druze Affiliation: Other   Language: English     2/4/2021           Spiritual Assessment begun in [facility] [address] through conversation with:         []Patient        [] Family    [] Friend(s)        Reason for Consult: Initial/Spiritual assessment, critical care     Spiritual beliefs: (Please include comment if needed)     [] Identifies with a andrez tradition:         [] Supported by a andrez community:            [] Claims no spiritual orientation:           [] Seeking spiritual identity:                [] Adheres to an individual form of spirituality:           [x] Not able to assess:                           Identified resources for coping:      [] Prayer                               [] Music                  [] Guided Imagery     [] Family/friends                 [] Pet visits     [] Devotional reading                         [] Unknown     [] Other:                                               Interventions offered during this visit: (See comments for more details)                Plan of Care:     [] Support spiritual and/or cultural needs    [] Support AMD and/or advance care planning process      [] Support grieving process   [] Coordinate Rites and/or Rituals    [] Coordination with community clergy   [] No spiritual needs identified at this time   [] Detailed Plan of Care below (See Comments)  [] Make referral to Music Therapy  [] Make referral to Pet Therapy     [] Make referral to Addiction services  [] Make referral to [facility]  [] Make referral to Spiritual Care Partner  [] No future visits requested        [] Follow up upon further referrals     Comments: It was noted that Mr Mela Petersen in [address] [place] [address] was COVID+;  did not enter patient's room. Consulted with patient's nurse, Jamari Ames, who updated  on patient and family status.  Chaplains continue to be available for patient/family support per phone. : Rev. Clyde Muir.  Briana Mathur; [facility], to contact [phone] Trell Patel call: [phone]

## 2021-02-04 NOTE — PROGRESS NOTES
Bedside shift change report given to Beto Beaver RN (oncoming nurse) by Dorothy Mreedith RN (offgoing nurse). Report included the following information SBAR, Kardex, Intake/Output, MAR, Recent Results and Cardiac Rhythm NSR. Shift summary: Slowly weaning patient off sedation for SBT in the morning. Bedside shift change report given to Antonia Valdes RN (oncoming nurse) by Beto Beaver RN (offgoing nurse). Report included the following information SBAR, Kardex, Intake/Output, MAR, Recent Results and Cardiac Rhythm NSR.

## 2021-02-04 NOTE — PROGRESS NOTES
Physician Progress Note      PATIENT:               Km Harvey  CSN #:                  [id number]  :                       1955  ADMIT DATE:       2021 12:48 AM  DISCH DATE:  RESPONDING  PROVIDER #:        Nelson Clark MD          QUERY TEXT:    Pt admitted with COVID-19 PNA and noted to have elevated WBC, elevated lactic acid, tachycardia, and tachypnea on admission. If possible, please document in progress notes and discharge summary if you are evaluating and/or treating: The medical record reflects the following:  Risk Factors: 66yo admitted with COVID PNA  Clinical Indicators:  - WBC: 12.4 -- 18.7 -- 12.7 -- 10.8 -- 11.5 -- 11.9  - Lactic Acid: 2.8 -- 3.1 -- 1.1 -- 1.5 -- 2.0  - HR (on admission): 134  - RR (on admission): 38  Treatment: vancomycin, cefepime, flagyl, doxy, f/u micro studies, daily labs    Thank you,  Cathy Hicks  [phone]  Options provided:  -- Sepsis present on admission due to COVID-19 pneumonia  -- Sepsis not present on admission due to COVID-19 pneumonia  -- Covid-19 pneumonia without sepsis  -- Other - I will add my own diagnosis  -- Disagree - Not applicable / Not valid  -- Disagree - Clinically unable to determine / Unknown  -- Refer to Clinical Documentation Reviewer    PROVIDER RESPONSE TEXT:    This patient has sepsis which was present on admission due to COVID-19 pneumonia.     Query created by: Martinez Calhoun on 2/3/2021 12:29 PM      Electronically signed by:  Nelson Clark MD 2021 5:34 PM

## 2021-02-04 NOTE — PROGRESS NOTES
Bedside and Verbal shift change report given to Kimberly Londono RN  (oncoming nurse) by [facility], RN (offgoing nurse). Report included the following information SBAR, Kardex, Intake/Output, MAR, Recent Results, Cardiac Rhythm ST and Alarm Parameters . 0800 Patient drowsy. Vent frequently alarming high peak pressures. Attempts made to calm patient unsuccessful.     0900 High peak pressures persist. Propofol gtt increased. Patient resting comfortably. ICU multidisciplinary rounds lead by Margie Ram (Intensivist): The following were reviewed and discussed: current labs,  recent imaging, lines/drains, review of body systems, nutrition, cultures, mobility, length of ICU stay. The plan of care for the day is as follows  SAT patient. Restart Fentanyl to allow for SAT and go down on propofol. (written note by RN) Fentanyl re-start to cover HTN per MD.       1320 Fentanyl restarted per MD. Patient diaphoretic and tachypneic on vent. FI02 in upper 80's. Nothing in line. HTN persists. Patient withdrawing to pain, otherwise resting in bed.     12 Called MD to bedside d/t respiratory decline. MD orders to restart propofol and increase FIO2 to 45%. 1330 RR 28 on 45% and FI02 in upper 80's.

## 2021-02-05 LAB
AMORPH CRY URNS QL MICRO: ABNORMAL
ANION GAP SERPL CALC-SCNC: 6 MMOL/L (ref 5–15)
APPEARANCE UR: ABNORMAL
APTT PPP: 25.7 SEC (ref 22.1–31)
ARTERIAL PATENCY WRIST A: YES
BACTERIA URNS QL MICRO: ABNORMAL /HPF
BASE DEFICIT BLDA-SCNC: 8.1 MMOL/L
BASOPHILS # BLD: 0 K/UL (ref 0–0.1)
BASOPHILS NFR BLD: 0 % (ref 0–1)
BDY SITE: ABNORMAL
BILIRUB UR QL: NEGATIVE
BUN SERPL-MCNC: 75 MG/DL (ref 6–20)
BUN/CREAT SERPL: 37 (ref 12–20)
CALCIUM SERPL-MCNC: 8.5 MG/DL (ref 8.5–10.1)
CHLORIDE SERPL-SCNC: 124 MMOL/L (ref 97–108)
CO2 SERPL-SCNC: 19 MMOL/L (ref 21–32)
COLOR UR: ABNORMAL
CREAT SERPL-MCNC: 2.02 MG/DL (ref 0.7–1.3)
DIFFERENTIAL METHOD BLD: ABNORMAL
EOSINOPHIL # BLD: 0 K/UL (ref 0–0.4)
EOSINOPHIL NFR BLD: 0 % (ref 0–7)
EPITH CASTS URNS QL MICRO: ABNORMAL /LPF
ERYTHROCYTE [DISTWIDTH] IN BLOOD BY AUTOMATED COUNT: 17.2 % (ref 11.5–14.5)
FIO2 ON VENT: 30 %
GAS FLOW.O2 SETTING OXYMISER: 18 L/MIN
GLUCOSE BLD STRIP.AUTO-MCNC: 194 MG/DL (ref 65–100)
GLUCOSE BLD STRIP.AUTO-MCNC: 210 MG/DL (ref 65–100)
GLUCOSE BLD STRIP.AUTO-MCNC: 245 MG/DL (ref 65–100)
GLUCOSE BLD STRIP.AUTO-MCNC: 270 MG/DL (ref 65–100)
GLUCOSE BLD STRIP.AUTO-MCNC: 277 MG/DL (ref 65–100)
GLUCOSE BLD STRIP.AUTO-MCNC: 288 MG/DL (ref 65–100)
GLUCOSE SERPL-MCNC: 210 MG/DL (ref 65–100)
GLUCOSE UR STRIP.AUTO-MCNC: NEGATIVE MG/DL
GRAN CASTS URNS QL MICRO: ABNORMAL /LPF
HCO3 BLDA-SCNC: 17 MMOL/L (ref 22–26)
HCT VFR BLD AUTO: 29.4 % (ref 36.6–50.3)
HGB BLD-MCNC: 9 G/DL (ref 12.1–17)
HGB UR QL STRIP: ABNORMAL
IMM GRANULOCYTES # BLD AUTO: 0.1 K/UL (ref 0–0.04)
IMM GRANULOCYTES NFR BLD AUTO: 1 % (ref 0–0.5)
KETONES UR QL STRIP.AUTO: NEGATIVE MG/DL
LACTATE SERPL-SCNC: 0.9 MMOL/L (ref 0.4–2)
LACTATE SERPL-SCNC: 1.3 MMOL/L (ref 0.4–2)
LEUKOCYTE ESTERASE UR QL STRIP.AUTO: NEGATIVE
LYMPHOCYTES # BLD: 2.3 K/UL (ref 0.8–3.5)
LYMPHOCYTES NFR BLD: 22 % (ref 12–49)
MAGNESIUM SERPL-MCNC: 2.4 MG/DL (ref 1.6–2.4)
MCH RBC QN AUTO: 25.9 PG (ref 26–34)
MCHC RBC AUTO-ENTMCNC: 30.6 G/DL (ref 30–36.5)
MCV RBC AUTO: 84.7 FL (ref 80–99)
MONOCYTES # BLD: 1.2 K/UL (ref 0–1)
MONOCYTES NFR BLD: 12 % (ref 5–13)
NEUTS SEG # BLD: 6.7 K/UL (ref 1.8–8)
NEUTS SEG NFR BLD: 65 % (ref 32–75)
NITRITE UR QL STRIP.AUTO: NEGATIVE
NRBC # BLD: 0.02 K/UL (ref 0–0.01)
NRBC BLD-RTO: 0.2 PER 100 WBC
PCO2 BLDA: 34 MMHG (ref 35–45)
PEEP RESPIRATORY: 5 CM[H2O]
PH BLDA: 7.32 [PH] (ref 7.35–7.45)
PH UR STRIP: 5 [PH] (ref 5–8)
PHOSPHATE SERPL-MCNC: 2.6 MG/DL (ref 2.6–4.7)
PLATELET # BLD AUTO: 260 K/UL (ref 150–400)
PMV BLD AUTO: 12.2 FL (ref 8.9–12.9)
PO2 BLDA: 100 MMHG (ref 80–100)
POTASSIUM SERPL-SCNC: 4.8 MMOL/L (ref 3.5–5.1)
PROT UR STRIP-MCNC: 100 MG/DL
RBC # BLD AUTO: 3.47 M/UL (ref 4.1–5.7)
RBC #/AREA URNS HPF: ABNORMAL /HPF (ref 0–5)
SAO2 % BLD: 97 % (ref 92–97)
SAO2% DEVICE SAO2% SENSOR NAME: ABNORMAL
SERVICE CMNT-IMP: ABNORMAL
SODIUM SERPL-SCNC: 149 MMOL/L (ref 136–145)
SP GR UR REFRACTOMETRY: 1.02 (ref 1–1.03)
SPECIMEN SITE: ABNORMAL
SPERM URNS QL MICRO: PRESENT
THERAPEUTIC RANGE,PTTT: NORMAL SECS (ref 58–77)
UA: UC IF INDICATED,UAUC: ABNORMAL
URATE SERPL-MCNC: 5.7 MG/DL (ref 3.5–7.2)
UROBILINOGEN UR QL STRIP.AUTO: 0.2 EU/DL (ref 0.2–1)
VENTILATION MODE VENT: ABNORMAL
VT SETTING VENT: 450 ML
WBC # BLD AUTO: 10.4 K/UL (ref 4.1–11.1)
WBC URNS QL MICRO: ABNORMAL /HPF (ref 0–4)

## 2021-02-05 PROCEDURE — 74011250636 HC RX REV CODE- 250/636: Performed by: STUDENT IN AN ORGANIZED HEALTH CARE EDUCATION/TRAINING PROGRAM

## 2021-02-05 PROCEDURE — 94760 N-INVAS EAR/PLS OXIMETRY 1: CPT

## 2021-02-05 PROCEDURE — 74011000258 HC RX REV CODE- 258: Performed by: EMERGENCY MEDICINE

## 2021-02-05 PROCEDURE — 74011000258 HC RX REV CODE- 258: Performed by: NURSE PRACTITIONER

## 2021-02-05 PROCEDURE — 84100 ASSAY OF PHOSPHORUS: CPT

## 2021-02-05 PROCEDURE — 85730 THROMBOPLASTIN TIME PARTIAL: CPT

## 2021-02-05 PROCEDURE — 36415 COLL VENOUS BLD VENIPUNCTURE: CPT

## 2021-02-05 PROCEDURE — 2709999900 HC NON-CHARGEABLE SUPPLY

## 2021-02-05 PROCEDURE — 74011000250 HC RX REV CODE- 250: Performed by: INTERNAL MEDICINE

## 2021-02-05 PROCEDURE — 84550 ASSAY OF BLOOD/URIC ACID: CPT

## 2021-02-05 PROCEDURE — 80048 BASIC METABOLIC PNL TOTAL CA: CPT

## 2021-02-05 PROCEDURE — 74011636637 HC RX REV CODE- 636/637: Performed by: EMERGENCY MEDICINE

## 2021-02-05 PROCEDURE — 83605 ASSAY OF LACTIC ACID: CPT

## 2021-02-05 PROCEDURE — 85025 COMPLETE CBC W/AUTO DIFF WBC: CPT

## 2021-02-05 PROCEDURE — 74011636637 HC RX REV CODE- 636/637: Performed by: INTERNAL MEDICINE

## 2021-02-05 PROCEDURE — 74011250636 HC RX REV CODE- 250/636: Performed by: NURSE PRACTITIONER

## 2021-02-05 PROCEDURE — 83735 ASSAY OF MAGNESIUM: CPT

## 2021-02-05 PROCEDURE — 94003 VENT MGMT INPAT SUBQ DAY: CPT

## 2021-02-05 PROCEDURE — 81001 URINALYSIS AUTO W/SCOPE: CPT

## 2021-02-05 PROCEDURE — 74011000250 HC RX REV CODE- 250: Performed by: NURSE PRACTITIONER

## 2021-02-05 PROCEDURE — 74011250636 HC RX REV CODE- 250/636: Performed by: INTERNAL MEDICINE

## 2021-02-05 PROCEDURE — 65610000006 HC RM INTENSIVE CARE

## 2021-02-05 PROCEDURE — 74011250637 HC RX REV CODE- 250/637: Performed by: ANESTHESIOLOGY

## 2021-02-05 PROCEDURE — 74011250637 HC RX REV CODE- 250/637: Performed by: EMERGENCY MEDICINE

## 2021-02-05 PROCEDURE — 82962 GLUCOSE BLOOD TEST: CPT

## 2021-02-05 PROCEDURE — 87086 URINE CULTURE/COLONY COUNT: CPT

## 2021-02-05 PROCEDURE — 87070 CULTURE OTHR SPECIMN AEROBIC: CPT

## 2021-02-05 PROCEDURE — 74011250636 HC RX REV CODE- 250/636: Performed by: EMERGENCY MEDICINE

## 2021-02-05 PROCEDURE — 36600 WITHDRAWAL OF ARTERIAL BLOOD: CPT

## 2021-02-05 PROCEDURE — 74011250636 HC RX REV CODE- 250/636: Performed by: ANESTHESIOLOGY

## 2021-02-05 PROCEDURE — 74011636637 HC RX REV CODE- 636/637: Performed by: STUDENT IN AN ORGANIZED HEALTH CARE EDUCATION/TRAINING PROGRAM

## 2021-02-05 PROCEDURE — 82803 BLOOD GASES ANY COMBINATION: CPT

## 2021-02-05 PROCEDURE — 74011000250 HC RX REV CODE- 250: Performed by: ANESTHESIOLOGY

## 2021-02-05 RX ORDER — INSULIN GLARGINE 100 [IU]/ML
22 INJECTION, SOLUTION SUBCUTANEOUS DAILY
Status: DISCONTINUED | OUTPATIENT
Start: 2021-02-06 | End: 2021-02-08

## 2021-02-05 RX ORDER — FENTANYL CITRATE 50 UG/ML
100 INJECTION, SOLUTION INTRAMUSCULAR; INTRAVENOUS ONCE
Status: COMPLETED | OUTPATIENT
Start: 2021-02-05 | End: 2021-02-05

## 2021-02-05 RX ORDER — INSULIN GLARGINE 100 [IU]/ML
7 INJECTION, SOLUTION SUBCUTANEOUS ONCE
Status: COMPLETED | OUTPATIENT
Start: 2021-02-05 | End: 2021-02-05

## 2021-02-05 RX ORDER — LABETALOL HYDROCHLORIDE 5 MG/ML
20 INJECTION, SOLUTION INTRAVENOUS ONCE
Status: COMPLETED | OUTPATIENT
Start: 2021-02-05 | End: 2021-02-05

## 2021-02-05 RX ADMIN — ALLOPURINOL 100 MG: 100 TABLET ORAL at 09:18

## 2021-02-05 RX ADMIN — CEFEPIME 2 G: 2 INJECTION, POWDER, FOR SOLUTION INTRAVENOUS at 00:45

## 2021-02-05 RX ADMIN — METOPROLOL TARTRATE 5 MG: 5 INJECTION INTRAVENOUS at 05:54

## 2021-02-05 RX ADMIN — GABAPENTIN 150 MG: 250 SOLUTION ORAL at 23:01

## 2021-02-05 RX ADMIN — INSULIN LISPRO 5 UNITS: 100 INJECTION, SOLUTION INTRAVENOUS; SUBCUTANEOUS at 14:00

## 2021-02-05 RX ADMIN — Medication 200 MCG/HR: at 05:46

## 2021-02-05 RX ADMIN — GABAPENTIN 150 MG: 250 SOLUTION ORAL at 09:16

## 2021-02-05 RX ADMIN — PROPOFOL 40 MCG/KG/MIN: 10 INJECTION, EMULSION INTRAVENOUS at 11:53

## 2021-02-05 RX ADMIN — INSULIN LISPRO 5 UNITS: 100 INJECTION, SOLUTION INTRAVENOUS; SUBCUTANEOUS at 18:36

## 2021-02-05 RX ADMIN — CEFEPIME 2 G: 2 INJECTION, POWDER, FOR SOLUTION INTRAVENOUS at 11:33

## 2021-02-05 RX ADMIN — DOXYCYCLINE 100 MG: 100 INJECTION, POWDER, LYOPHILIZED, FOR SOLUTION INTRAVENOUS at 09:17

## 2021-02-05 RX ADMIN — PROPOFOL 20 MCG/KG/MIN: 10 INJECTION, EMULSION INTRAVENOUS at 11:26

## 2021-02-05 RX ADMIN — ASPIRIN 81 MG: 81 TABLET, CHEWABLE ORAL at 09:16

## 2021-02-05 RX ADMIN — CEFEPIME 2 G: 2 INJECTION, POWDER, FOR SOLUTION INTRAVENOUS at 23:44

## 2021-02-05 RX ADMIN — LANSOPRAZOLE 30 MG: KIT at 06:51

## 2021-02-05 RX ADMIN — INSULIN LISPRO 5 UNITS: 100 INJECTION, SOLUTION INTRAVENOUS; SUBCUTANEOUS at 23:09

## 2021-02-05 RX ADMIN — ACETAMINOPHEN 650 MG: 325 TABLET ORAL at 14:00

## 2021-02-05 RX ADMIN — DEXAMETHASONE SODIUM PHOSPHATE 6 MG: 10 INJECTION, SOLUTION INTRAMUSCULAR; INTRAVENOUS at 09:18

## 2021-02-05 RX ADMIN — DEXMEDETOMIDINE HYDROCHLORIDE 1 MCG/KG/HR: 400 INJECTION INTRAVENOUS at 11:27

## 2021-02-05 RX ADMIN — Medication 10 ML: at 21:11

## 2021-02-05 RX ADMIN — INSULIN GLARGINE 15 UNITS: 100 INJECTION, SOLUTION SUBCUTANEOUS at 09:15

## 2021-02-05 RX ADMIN — INSULIN LISPRO 2 UNITS: 100 INJECTION, SOLUTION INTRAVENOUS; SUBCUTANEOUS at 05:54

## 2021-02-05 RX ADMIN — ATORVASTATIN CALCIUM 40 MG: 40 TABLET, FILM COATED ORAL at 21:11

## 2021-02-05 RX ADMIN — DOXYCYCLINE 100 MG: 100 INJECTION, POWDER, LYOPHILIZED, FOR SOLUTION INTRAVENOUS at 20:39

## 2021-02-05 RX ADMIN — Medication 200 MCG/HR: at 14:00

## 2021-02-05 RX ADMIN — PROPOFOL 20 MCG/KG/MIN: 10 INJECTION, EMULSION INTRAVENOUS at 23:08

## 2021-02-05 RX ADMIN — CHLORHEXIDINE GLUCONATE 15 ML: 0.12 RINSE ORAL at 11:34

## 2021-02-05 RX ADMIN — LABETALOL HYDROCHLORIDE 20 MG: 5 INJECTION INTRAVENOUS at 13:28

## 2021-02-05 RX ADMIN — GABAPENTIN 150 MG: 250 SOLUTION ORAL at 18:29

## 2021-02-05 RX ADMIN — CHLORHEXIDINE GLUCONATE 15 ML: 0.12 RINSE ORAL at 20:39

## 2021-02-05 RX ADMIN — Medication 10 ML: at 05:54

## 2021-02-05 RX ADMIN — Medication 10 ML: at 19:08

## 2021-02-05 RX ADMIN — PROPOFOL 20 MCG/KG/MIN: 10 INJECTION, EMULSION INTRAVENOUS at 04:08

## 2021-02-05 RX ADMIN — FENTANYL CITRATE 100 MCG: 50 INJECTION, SOLUTION INTRAMUSCULAR; INTRAVENOUS at 13:31

## 2021-02-05 RX ADMIN — INSULIN GLARGINE 7 UNITS: 100 INJECTION, SOLUTION SUBCUTANEOUS at 13:27

## 2021-02-05 RX ADMIN — DEXMEDETOMIDINE HYDROCHLORIDE 1 MCG/KG/HR: 400 INJECTION INTRAVENOUS at 12:33

## 2021-02-05 RX ADMIN — MINERAL SUPPLEMENT IRON 300 MG / 5 ML STRENGTH LIQUID 100 PER BOX UNFLAVORED 300 MG: at 09:21

## 2021-02-05 RX ADMIN — DEXMEDETOMIDINE HYDROCHLORIDE 0.4 MCG/KG/HR: 400 INJECTION INTRAVENOUS at 05:54

## 2021-02-05 RX ADMIN — ENOXAPARIN SODIUM 30 MG: 30 INJECTION SUBCUTANEOUS at 09:17

## 2021-02-05 RX ADMIN — INSULIN LISPRO 3 UNITS: 100 INJECTION, SOLUTION INTRAVENOUS; SUBCUTANEOUS at 00:55

## 2021-02-05 RX ADMIN — ENOXAPARIN SODIUM 30 MG: 30 INJECTION SUBCUTANEOUS at 20:39

## 2021-02-05 RX ADMIN — Medication 200 MCG/HR: at 22:58

## 2021-02-05 RX ADMIN — METOPROLOL TARTRATE 5 MG: 5 INJECTION INTRAVENOUS at 11:50

## 2021-02-05 NOTE — CONSULTS
Infectious Disease Consult    Today's Date: 2/5/2021   Admit Date: 1/30/2021    Impression:   Acute respiratory failure secondary to COVID 19 PNA  mycoplasma and s. pneumoniae   - Mycoplasma IgG  1411, S Pneumo (+)    Legionella (-)    T-max 100.3, WBC 10.4    Blood cx (1/30) no growth    Blood cx (2/4) no growth so far    CTA chest: no PE, airspace opacities in bilateral lower lobe consolidation. Supportive care for COVID 19    Mid posterior cervical wound infection  - chronic wound; pt was waiting for skin graft    Wound cx (1/31) LIGHT DIPHTHEROIDS     JUAN C  - creat 2.0    Plan:   - Continue with IV cefepime, flagyl, and vancomycin    Still febrile; send resp cx (frothy sputum when suction) and U/A with reflex (bird draining cloudy yellow with sediment)    Complete total 10 days of Doxycycline for mycoplasma    Send Quantiferon in am; hx of being homeless   - fever work up every 24 hours if temp >= 100.4  - posterior mid cervical wound; appreciate wound care team's input    Anti-infectives:   IV cefepime 1/30 -  IV decadrone 1/30 -   IV doxycycline 1/30 -  IV flagyl 1/30 -   IV vancomycin 1/30 -     Subjective:   Date of Consultation:  February 5, 2021  Referring Physician: Dr. Dayne Lopez    Patient is a 63 y.o. male was admitted to the hospital on 1/30 from [facility] with SOB, hypoxia, nausea, vomiting, and diarrhea. Pt was dianged with COVID  2 weeks ago prior to the admission. Pt was at the [facility] care for IV abx to treat posterior neck infection, and was waiting for skin graft. It is unclear which provider was following up on his IV ABX order as outpatient at this time. Pt was on BIPAP upon admission then got intubated due to hypoxia. Pt has been on maxium support medication therapy. Information obtained through chart. Other medical hx include type II DM and HTN. ID team was consulted for COVID PNA  And mycoplasma.      Patient Active Problem List   Diagnosis Code    Acute hypoxemic respiratory failure due to COVID-19 ([facility] U07.1, J96.01     Past Medical History:   Diagnosis Date    Diabetes ([facility]     Hypertension       No family history on file. Social History     Tobacco Use    Smoking status: Former Smoker    Smokeless tobacco: Never Used   Substance Use Topics    Alcohol use: Not Currently     No past surgical history on file. Prior to Admission medications    Medication Sig Start Date End Date Taking? Authorizing Provider   aspirin 81 mg chewable tablet Take 81 mg by mouth daily. Yes Candelaria, MD Jessi   atorvastatin (LIPITOR) 80 mg tablet Take 80 mg by mouth daily. Yes Candelaria, MD Jessi   carvediloL (COREG) 12.5 mg tablet Take  by mouth two (2) times a day. Yes Jessi Gaona MD   docusate sodium (Colace) 100 mg capsule Take 100 mg by mouth two (2) times daily as needed for Constipation. Yes Jessi Gaona MD   doxycycline (ADOXA) 100 mg tablet Take 100 mg by mouth two (2) times a day. Yes Jessi Gaona MD   ferrous sulfate 325 mg (65 mg iron) tablet Take  by mouth every fourty-eight (48) hours. Yes Jessi Gaona MD   gabapentin (NEURONTIN) 400 mg capsule Take 400 mg by mouth three (3) times daily. Yes Jessi Gaona MD   insulin lispro (HUMALOG) 100 unit/mL injection 10 Units by SubCUTAneous route Before breakfast, lunch, and dinner. Yes Jessi Gaona MD   isosorbide mononitrate ER (IMDUR) 60 mg CR tablet Take 60 mg by mouth every morning. Yes Jessi Gaona MD   insulin glargine (Lantus U-100 Insulin) 100 unit/mL injection 20 Units by SubCUTAneous route nightly. Yes Candelaria, MD Jessi   lisinopriL (PRINIVIL, ZESTRIL) 20 mg tablet Take 20 mg by mouth daily. Yes Jessi Gaona MD   methocarbamoL (ROBAXIN) 500 mg tablet Take 500 mg by mouth three (3) times daily. Yes Jessi Gaona MD   oxyCODONE IR (ROXICODONE) 5 mg immediate release tablet Take 5 mg by mouth every six (6) hours as needed for Pain. Yes Jessi Gaona MD   pantoprazole (PROTONIX) 40 mg tablet Take 40 mg by mouth daily.    Yes Other, MD Jessi   guaiFENesin-codeine (Guaiatussin AC) 100-10 mg/5 mL solution Take 5 mL by mouth four (4) times daily as needed for Cough. Yes Jessi Gaona MD   senna (Senna) 8.6 mg tablet Take 1 Tab by mouth daily. Yes Jessi Gaona MD   ticagrelor (BRILINTA) 90 mg tablet Take 90 mg by mouth two (2) times a day. Yes Jessi Gaona MD   ascorbic acid, vitamin C, (Vitamin C) 500 mg tablet Take 500 mg by mouth two (2) times a day. Yes Jessi Gaona MD   cholecalciferol (Vitamin D3) (1000 Units /25 mcg) tablet Take 1,000 Units by mouth daily. Yes Jessi Gaona MD   zinc sulfate (ZINCATE) 220 (50) mg capsule Take 220 mg by mouth daily. Yes Provider, Historical   therapeutic multivitamin (THERAGRAN) tablet Take 1 Tab by mouth daily. Yes Provider, Historical   acetaminophen (TYLENOL) 325 mg tablet Take 975 mg by mouth every six (6) hours as needed for Pain.     Other, MD Jessi     a  No Known Allergies     REVIEW OF SYSTEMS:     Total of 12 systems reviewed as follows:   I am not able to complete the review of systems because:     y The patient is intubated and sedated    The patient has altered mental status due to his acute medical problems    The patient has baseline aphasia from prior stroke(s)    The patient has baseline dementia and is not reliable historian                 POSITIVE= underlined text  Negative = text not underlined  General:  fever, chills, sweats, generalized weakness, weight loss/gain,      loss of appetite   Eyes:    blurred vision, eye pain, loss of vision, double vision  ENT:    rhinorrhea, pharyngitis   Respiratory:   cough, sputum production, SOB, wheezing, WEBB, pleuritic pain   Cardiology:   chest pain, palpitations, orthopnea, PND, edema, syncope   Gastrointestinal:  abdominal pain , N/V, dysphagia, diarrhea, constipation, bleeding   Genitourinary:  frequency, urgency, dysuria, hematuria, incontinence   Muskuloskeletal :  arthralgia, myalgia   Hematology:  easy bruising, nose or gum bleeding, lymphadenopathy   Dermatological: rash, ulceration, pruritis   Endocrine:   hot flashes or polydipsia   Neurological:  headache, dizziness, confusion, focal weakness, paresthesia,     Speech difficulties, memory loss, gait disturbance  Psychological: Feelings of anxiety, depression, agitation    Objective:     Visit Vitals  BP (!) 142/70   Pulse (!) 126   Temp 100.3 °F (37.9 °C)   Resp 22   Ht 5' 7\" (1.702 m)   Wt 85.4 kg (188 lb 4.4 oz)   SpO2 100%   BMI 29.49 kg/m²     Temp (24hrs), Av.6 °F (37 °C), Min:97 °F (36.1 °C), Max:100.3 °F (37.9 °C)       Lines:  C-line   PHYSICAL EXAM:   General:    Intubated it      HEENT: Atraumatic, anicteric sclerae, pink conjunctivae     No oral ulcers, mucosa dry  Neck:  No JVD or bruit, posterior mid cervical dressing dry and intact  Lungs:   Clear in apex with decreased breath sounds at bases  No Wheezing or Rhonchi. No rales.  Chest wall:  No tenderness  No Accessory muscle use.  Heart:   Regular  rhythm,  No  murmur   No edema  Abdomen:   Soft, non-tender. Not distended.  Bowel sounds normal  Extremities: No cyanosis.  No clubbing  Skin:     Not pale.  Not Jaundiced  No rashes   Psych:  Unable to assess insight.  Not anxious or agitated.  Neurologic: Pupils are reactive to light      Data Review:     CBC:  Recent Labs     21  1835 21  0413   WBC 10.4  --  8.2 11.9*   GRANS 65  --  67 76*   MONOS 12  --  12 9   EOS 0  --  0 0   ANEU 6.7  --  5.5 8.9*   ABL 2.3  --  1.7 1.7   HGB 9.0* 9.0* 7.2* 8.5*   HCT 29.4* 30.7* 24.0* 28.8*     --  226 307       BMP:  Recent Labs     21  0420 21  0438 21  0413   CREA 2.02* 1.96* 2.17*   BUN 75* 71* 78*   * 148* 150*   K 4.8 4.6 4.8   * 123* 122*   CO2 19* 22 20*   AGAP 6 3* 8   * 149* 164*       LFTS:  No results for input(s): TBILI, ALT, AP, TP, ALB in the last 72 hours.    No lab exists for component: SGOT    Microbiology:     All Micro  Results     Procedure Component Value Units Date/Time    CULTURE, BLOOD, PAIRED [[id number]] Collected: 02/04/21 1835    Order Status: Completed Specimen: Blood Updated: 02/05/21 0617     Special Requests: NO SPECIAL REQUESTS        Culture result: NO GROWTH AFTER 9 HOURS       CULTURE, BLOOD, PAIRED [[id number]]     Order Status: Sent Specimen: Blood     CULTURE, BLOOD, PAIRED [[id number]] Collected: 01/30/21 0145    Order Status: Completed Specimen: Blood Updated: 02/04/21 0532     Special Requests: NO SPECIAL REQUESTS        Culture result: NO GROWTH 5 DAYS       MYCOPLASMA AB, IGG [[id number]]  (Abnormal) Collected: 01/31/21 1447    Order Status: Completed Specimen: Serum Updated: 02/03/21 0135     M. pneumoniae Ab, IgG 1,411 U/mL      Comment: (NOTE)                              Negative:           <100                              Indeterminate: 100 - 320                              Positive:           >320  The reference interval established is intended as a  baseline only. Values >100 may indicate a recent  infection with Mycoplasma pneumoniae and need to be  confirmed either by a positive IgM result and/or an  additional specimen drawn 2-4 weeks later showing a  significant increase in antibody levels. Performed At: [address] [id number]  Prasad Chamberlain MD XI:[id number]         Sherral Fix STAIN [[id number]]  (Abnormal) Collected: 01/31/21 1439    Order Status: Completed Specimen: Wound from Neck Updated: 02/02/21 1605     Special Requests: NO SPECIAL REQUESTS        GRAM STAIN RARE WBCS SEEN         NO ORGANISMS SEEN        Culture result: LIGHT DIPHTHEROIDS       S. Bladimir Gauss, UR/CSF [[id number]]  (Abnormal) Collected: 02/01/21 1524    Order Status: Completed Specimen: Miscellaneous sample Updated: 02/02/21 1536     Source URINE        Specimen Urine     Streptococcus pneumoniae Ag Positive        Comment: (NOTE)  Reported pos strep pneumo ag to [facility] Bee Epps at 3:23 pm on  02/02/2021. Faxed to [phone] [facility]  Performed At: [facility] [address] [id number]  Saadia Snyder MD HY:[id number]          Fluid culture Not indicated. Organism ID Not indicated. Please note Comment        Comment: (NOTE)  College of [employer]ologists standards require a culture to be  performed on CSF specimens submitted for bacterial antigen testing. (CAP [id number]) Urine specimens will not be cultured. Performed At: [facility]  [employer] [address] [id number]  Saadia Snyder MD PH:[phone]         S. Lanora Agent, UR/CSF [[id number]] Collected: 01/31/21 1447    Order Status: Completed Specimen: Miscellaneous sample Updated: 02/02/21 1437     Source GAUTAM SPECIMEN        Specimen Urine     Streptococcus pneumoniae Ag Negative        Fluid culture Not indicated. Organism ID Not indicated. Please note Comment        Comment: (NOTE)  College of American Pathologists standards require a culture to be  performed on CSF specimens submitted for bacterial antigen testing. (CAP [id number]) Urine specimens will not be cultured. Performed At: [facility]  [employer] [address] [id number]  Saadia Snyder MD ZO:[id number]         Rolene Records [[id number]] Collected: 02/01/21 1504    Order Status: Completed Specimen: Urine Updated: 02/02/21 1437     Source URINE        L pneumophila S1 Ag, urine Negative        Comment: (NOTE)  Presumptive negative for L. pneumophila serogroup 1 antigen in urine,  suggesting no recent or current infection. Legionnaires' disease  cannot be ruled out since other serogroups and species may also  cause disease.   Performed At: [address] [id number]  Saadia Sndyer MD NE:[id number]         CULTURE, RESPIRATORY/SPUTUM/BRONCH Anita Virk [[id number]] Collected: 01/31/21 1447    Order Status: Completed Specimen: Sputum from Endotracheal aspirate Updated: 02/02/21 1240     Special Requests: NO SPECIAL REQUESTS        GRAM STAIN FEW WBCS SEEN               RARE EPITHELIAL CELLS SEEN            RARE GRAM NEGATIVE RODS        Culture result:       RARE NORMAL RESPIRATORY CORI          LEGIONELLA PNEUMOPHILA AG, URINE [[id number]] Collected: 01/30/21 1418    Order Status: Completed Specimen: Urine Updated: 02/01/21 1336     Source URINE        L pneumophila S1 Ag, urine Negative        Comment: (NOTE)  Presumptive negative for L. pneumophila serogroup 1 antigen in urine,  suggesting no recent or current infection. Legionnaires' disease  cannot be ruled out since other serogroups and species may also  cause disease.  Performed At:  [facility] [id number]  Demetris John MD Ph:[phone]         URINE CULTURE HOLD SAMPLE [[id number]] Collected: 01/31/21 1447    Order Status: Completed Specimen: Urine from Serum Updated: 01/31/21 1627     Urine culture hold       Urine on hold in Microbiology dept for 2 days.  If unpreserved urine is submitted, it cannot be used for addtional testing after 24 hours, recollection will be required.          CULTURE, URINE [[id number]] Collected: 01/30/21 1419    Order Status: Completed Specimen: Urine Updated: 01/31/21 1432     Special Requests: NO SPECIAL REQUESTS        Culture result: No growth (<1,000 CFU/ML)       URINE CULTURE HOLD SAMPLE [[id number]]     Order Status: Canceled Specimen: Urine     S.PNEUMO AG, UR/CSF [[id number]] Collected: 01/30/21 0745    Order Status: Canceled     MYCOPLASMA AB, IGM [[id number]] Collected: 01/30/21 0745    Order Status: Canceled Specimen: Serum     CULTURE, RESPIRATORY/SPUTUM/BRONCH W GRAM STAIN [[id number]] Collected: 01/30/21 0745    Order Status: Canceled Specimen: Sputum from Tracheal Aspirate     C. DIFFICILE AG & TOXIN A/B [[id number]] Collected: 01/30/21 0745    Order Status: Canceled Specimen: Stool     COVID-19 RAPID TEST  [[id number]]  (Abnormal) Collected: 01/30/21 0100    Order Status: Completed Specimen: Nasopharyngeal Updated: 01/30/21 0202     Specimen source Nasopharyngeal        COVID-19 rapid test Detected        Comment: Rapid Abbott ID Now       The specimen is POSITIVE for SARS-CoV-2, the novel coronavirus associated with COVID-19. This test has been authorized by the FDA under an Emergency Use Authorization (EUA) for use by authorized laboratories.         Fact sheet for Healthcare Providers: ConventionUpdate.co.nz  Fact sheet for Patients: ConventionUpdate.co.nz       Methodology: Isothermal Nucleic Acid Amplification  CALLED TO AND READ BACK BY  [email or web address]         CULTURE, BLOOD, PAIRED [[id number]]     Order Status: Canceled Specimen: Blood             Signed By: Gloria Swenson NP     February 5, 2021

## 2021-02-05 NOTE — ACP (ADVANCE CARE PLANNING)
Advance Care Planning     Advance Care Planning Activator (Inpatient)  Conversation Note      Date of ACP Conversation: 02/05/21     [facility] Conducted with:  Patient's wife Yonathan Denise [phone]   ACP Activator: Idris Talbot RN        Health Care Decision Maker:    Current Designated Health Care Decision Maker:     Click here to complete [employer] including selection of the Healthcare Decision Maker Relationship (ie \"Primary\")      Care Preferences    Ventilation: \"If you were in your present state of health and suddenly became very ill and were unable to breathe on your own, what would your preference be about the use of a ventilator (breathing machine) if it were available to you? \"Yes       \"If your health worsens and it becomes clear that your chance of recovery is unlikely, what would your preference be about the use of a ventilator (breathing machine) if it were available to you? \" No      Resuscitation  \"CPR works best to restart the heart when there is a sudden event, like a heart attack, in someone who is otherwise healthy. Unfortunately, CPR does not typically restart the heart for people who have serious health conditions or who are very sick. \"    \"In the event your heart stopped as a result of an underlying serious health condition, would you want attempts to be made to restart your heart Yes    [] Yes  [x] No   Educated Patient / Decision Maker regarding differences between Advance Directives and portable DNR orders.     Length of ACP Conversation in minutes:  10    Conversation Outcomes:  [x] ACP discussion completed  [] Existing advance directive reviewed with patient; no changes to patient's previously recorded wishes     [] New Advance Directive completed   [] Portable Do Not Resuscitate prepared for Provider review and signature  [] POLST/POST/MOLST/MOST prepared for Provider review and signature      Follow-up plan:    [] Schedule follow-up conversation to continue planning  [] Referred individual to Provider for additional questions/concerns   [] Advised patient/agent/surrogate to review completed ACP document and update if needed with changes in condition, patient preferences or care setting     [] This note routed to one or more involved healthcare providers

## 2021-02-05 NOTE — PROGRESS NOTES
1930: Bedside and Verbal shift change report given to Narda Santana RN (oncoming nurse) by Larisa Glez RN (offgoing nurse). Report included the following information SBAR, Kardex, Intake/Output, MAR, Cardiac Rhythm NSR-ST and Alarm Parameters . 0730: Bedside and Verbal shift change report given to Larisa Glez RN (oncoming nurse) by Narda Santana RN (offgoing nurse). Report included the following information SBAR.

## 2021-02-05 NOTE — PROGRESS NOTES
[facility]    ICU TEAM Progress Note    Name: Paulo Waters   : 1955   MRN: [id number]   Date: 2021      I  Subjective:   Progress Note: 2021      Reason for ICU Admission: Acute hypoxic respiratory failure, COVID-19 pneumonia    Interval history: From \[facility]\"" on  63year-old man who presented from [facility] with acute onset shortness of breath, hypoxia, nausea, vomiting and diarrhea-shortness of breath acutely got worse after one of the vomiting episodes. Diagnosed with Covid about 2 weeks prior to presentation. Patient placed on BiPAP on arrival-felt much better. After getting fluids for resuscitation/elevated lactate level patient acutely decompensated-developed lethargy and drop in oxygen levels-intubated emergently requiring moderate to high vent support.     Patient was at [facility] because he he was getting IV antibiotics for neck infection and was going to have a skin graft  Interval changes       - Remains intubated     / - Self extubated - reintubated several hours later for worsening hypoxia and agitation.      2/2 - No acute events overnight     2/3 - No acute events overnight. Perplexing micro data returning. Continuing to wean FiO2. Overnight Events:   No acute event overnight. Had to go back on sedation as he became agitated dyssynchronous with the ventilator. Otherwise no major issues    Active Problem List:     Problem List  Never Reviewed          Codes Class    Acute hypoxemic respiratory failure due to COVID-19 [facility]) ICD-10-CM: U07.1, J96.01  ICD-9-CM: 518.81, 079.89, 799.02               Past Medical History:      has a past medical history of Diabetes ([facility] [address] and Hypertension. Past Surgical History:      has no past surgical history on file. Home Medications:     Prior to Admission medications    Medication Sig Start Date End Date Taking? Authorizing Provider   aspirin 81 mg chewable tablet Take 81 mg by mouth daily.    Yes Other, MD Jessi atorvastatin (LIPITOR) 80 mg tablet Take 80 mg by mouth daily. Yes Candelaria, MD Jessi   carvediloL (COREG) 12.5 mg tablet Take  by mouth two (2) times a day. Yes Jessi Gaona MD   docusate sodium (Colace) 100 mg capsule Take 100 mg by mouth two (2) times daily as needed for Constipation. Yes Jessi Gaona MD   doxycycline (ADOXA) 100 mg tablet Take 100 mg by mouth two (2) times a day. Yes Jessi Gaona MD   ferrous sulfate 325 mg (65 mg iron) tablet Take  by mouth every fourty-eight (48) hours. Yes Jessi Gaona MD   gabapentin (NEURONTIN) 400 mg capsule Take 400 mg by mouth three (3) times daily. Yes Jessi Gaona MD   insulin lispro (HUMALOG) 100 unit/mL injection 10 Units by SubCUTAneous route Before breakfast, lunch, and dinner. Yes Jessi Gaona MD   isosorbide mononitrate ER (IMDUR) 60 mg CR tablet Take 60 mg by mouth every morning. Yes Jessi Gaona MD   insulin glargine (Lantus U-100 Insulin) 100 unit/mL injection 20 Units by SubCUTAneous route nightly. Yes Jessi Gaona MD   lisinopriL (PRINIVIL, ZESTRIL) 20 mg tablet Take 20 mg by mouth daily. Yes Jessi Gaona MD   methocarbamoL (ROBAXIN) 500 mg tablet Take 500 mg by mouth three (3) times daily. Yes Jessi Gaona MD   oxyCODONE IR (ROXICODONE) 5 mg immediate release tablet Take 5 mg by mouth every six (6) hours as needed for Pain. Yes Candelaria, MD Jessi   pantoprazole (PROTONIX) 40 mg tablet Take 40 mg by mouth daily. Yes Jessi Gaona MD   guaiFENesin-codeine (Guaiatussin AC) 100-10 mg/5 mL solution Take 5 mL by mouth four (4) times daily as needed for Cough. Yes Jessi Gaona MD   senna (Senna) 8.6 mg tablet Take 1 Tab by mouth daily. Yes Jessi Gaona MD   ticagrelor (BRILINTA) 90 mg tablet Take 90 mg by mouth two (2) times a day. Yes Jessi Gaona MD   ascorbic acid, vitamin C, (Vitamin C) 500 mg tablet Take 500 mg by mouth two (2) times a day.    Yes Jessi Gaona, MD   cholecalciferol (Vitamin D3) (1000 Units /25 mcg) tablet Take 1,000 Units by mouth daily. Yes Other, MD Jessi   zinc sulfate (ZINCATE) 220 (50) mg capsule Take 220 mg by mouth daily. Yes Provider, Historical   therapeutic multivitamin (THERAGRAN) tablet Take 1 Tab by mouth daily. Yes Provider, Historical   acetaminophen (TYLENOL) 325 mg tablet Take 975 mg by mouth every six (6) hours as needed for Pain. Other, MD Jessi       Allergies/Social/Family History:     No Known Allergies   Social History     Tobacco Use    Smoking status: Former Smoker    Smokeless tobacco: Never Used   Substance Use Topics    Alcohol use: Not Currently      No family history on file. Review of Systems:     Not able to obtain due to patient medical condition    Objective:   Vital Signs:  Visit Vitals  BP (!) 142/70   Pulse 72   Temp 97 °F (36.1 °C)   Resp 18   Ht 5' 7\" (1.702 m)   Wt 85.4 kg (188 lb 4.4 oz)   SpO2 100%   BMI 29.49 kg/m²    O2 Flow Rate (L/min): 6 l/min O2 Device: Ventilator, Endotracheal tube Temp (24hrs), Av.6 °F (37 °C), Min:97 °F (36.1 °C), Max:100.3 °F (37.9 °C)           Intake/Output:     Intake/Output Summary (Last 24 hours) at 2021 8765  Last data filed at 2021 0600  Gross per 24 hour   Intake 2100.59 ml   Output 1930 ml   Net 170.59 ml       Physical Exam:  General-intubated, sedated, comfortable  Neuro-pupils reactive, withdraws in uppers, strong cough and gag  Cardiac-tachycardic, regular  Lungs-coarse bilaterally  Abdomen-soft, nontender, nondistended  Extremities-warm    LABS AND  DATA: Personally reviewed  Recent Labs     21  1835 21  0438   WBC 10.4  --  8.2   HGB 9.0* 9.0* 7.2*   HCT 29.4* 30.7* 24.0*     --  226     Recent Labs     21  0438   * 148*   K 4.8 4.6   * 123*   CO2 19* 22   BUN 75* 71*   CREA 2.02* 1.96*   * 149*   CA 8.5 8.3*   MG 2.4 2.5*   PHOS 2.6 3.2     No results for input(s): AP, TBIL, TP, ALB, GLOB, AML, LPSE in the last 72 hours.     No lab exists for component: SGOT, GPT, AMYP  Recent Labs     02/04/21  1607   APTT 22.8      No results for input(s): PHI, PCO2I, PO2I, FIO2I in the last 72 hours. No results for input(s): CPK, CKMB, TROIQ, BNPP in the last 72 hours. Hemodynamics:   PAP:   CO:     Wedge:   CI:     CVP:    SVR:       PVR:       Ventilator Settings:  Mode Rate Tidal Volume Pressure FiO2 PEEP   Assist control, Volume control   450 ml  12 cm H2O 30 % 5 cm H20     Peak airway pressure: 15 cm H2O    Minute ventilation: 8.74 l/min        MEDS: Reviewed    Chest X-Ray:  CXR Results  (Last 48 hours)               02/04/21 1932  XR CHEST PORT Final result    Impression:  1. No significant change in the appearance of the support hardware. 2. Improved aeration in the right upper lobe. 3. Bilateral pleural effusions with associated passive atelectasis and/or   consolidation. Narrative:  INDICATION: . change in respiratory status   COMPARISON: Previous chest xray, 2/1/2021. LIMITATIONS: Portable technique. [facility] FINDINGS: Single frontal view of the chest.    .   Lines/tubes/surgical: No significant change in the appearance of the ET tube,   gastric tube and left subclavian approach catheter. Heart/mediastinum: Unremarkable. Lungs/pleura: Improved aeration in the right upper lung. Persistent hazy opacity   and obscured bases. No visible pneumothorax. Additional Comments: None. .             Assessment and Plan:   Acute hypoxic respiratory failure  Pulmonary edema  Covid pneumonia  Aspiration pneumonitis  Tachycardia secondary to above  Acute kidney injury    Analgesia/sedation with opioids and precedex as needed, early mobility as tolerated: Today with weaning propofol off, hopefully will be able to wean fentanyl as well and will titrate Precedex to assess readiness for liberation from mechanical ventilation     Cont ASA, lipitor, midodrine     Daily SAT/SBT.  OK to wean FiO2 by SpO2     Tube feeding, PPI GI prophylaxis, trend LFTs, ensure BMs     JUAN C, good urine output, some improvement in creatinine. Not sure what is his baseline but I suspect element of chronic kidney disease. Prior to this admission kidney function was normal but that was in 2010. Continue to monitor closely.     Lovenox for DVT prophylaxis, iron     Currently on cefepime and doxycycline, received Vanco and Flagyl on admission, I am not sure what was his antibiotic regimen in the nursing home for the neck wound and I am not sure about the plans for skin graft. We will try to obtain more records from the referring facility. I will place a consult infectious disease and I appreciate their input. Also his IgG mycoplasma was elevated though not sure of the significance of this finding and I appreciate ID input in that regard as well.     Keep glucose less than 180 with as needed SC insulin    DISPOSITION  Stay in ICU    CRITICAL CARE CONSULTANT NOTE  I had a face to face encounter with the patient, reviewed and interpreted patient data including clinical events, labs, images, vital signs, I/O's, and examined patient. I have discussed the case and the plan and management of the patient's care with the consulting services, the bedside nurses and the respiratory therapist.      NOTE OF PERSONAL INVOLVEMENT IN CARE   This patient has a high probability of imminent, clinically significant deterioration, which requires the highest level of preparedness to intervene urgently. I participated in the decision-making and personally managed or directed the management of the following life and organ supporting interventions that required my frequent assessment to treat or prevent imminent deterioration. I personally spent 40 minutes of critical care time. This is time spent at this critically ill patient's bedside actively involved in patient care as well as the coordination of care and discussions with the patient's family.   This does not include any procedural time which has been billed separately. Cm Padron M.D.   Staff Intensivist/Pulmonologist  [facility]  2/5/2021

## 2021-02-05 NOTE — PROGRESS NOTES
[place]IE[place]  Patient admitted from [facility] with hypoxia, COVID positive. RUR 23 %  Disposition: [facility] has accepted patient once medically stable. Patient remains in the ICU vented. Care manager spoke with patient's wife Meghann Long [phone] to introduce self and explain role. Patient is independent no previous HH or equipment needs, he drives himself to appointments. She does not know who his PCP is but he uses the [facility] as his pharmacy. Per wife she has agreed to be his health care decision maker. Per wife she and patient are , but live in the same household. Care management will continue to follow.    Bree Felder RN,Care Management

## 2021-02-06 ENCOUNTER — APPOINTMENT (OUTPATIENT)
Dept: GENERAL RADIOLOGY | Age: 66
DRG: 720 | End: 2021-02-06
Attending: INTERNAL MEDICINE
Payer: MEDICAID

## 2021-02-06 LAB
ANION GAP SERPL CALC-SCNC: 8 MMOL/L (ref 5–15)
ARTERIAL PATENCY WRIST A: ABNORMAL
BASE DEFICIT BLDA-SCNC: 8 MMOL/L
BASOPHILS # BLD: 0 K/UL (ref 0–0.1)
BASOPHILS NFR BLD: 0 % (ref 0–1)
BDY SITE: ABNORMAL
BUN SERPL-MCNC: 86 MG/DL (ref 6–20)
BUN/CREAT SERPL: 42 (ref 12–20)
CALCIUM SERPL-MCNC: 8.1 MG/DL (ref 8.5–10.1)
CHLORIDE SERPL-SCNC: 123 MMOL/L (ref 97–108)
CO2 SERPL-SCNC: 18 MMOL/L (ref 21–32)
CREAT SERPL-MCNC: 2.06 MG/DL (ref 0.7–1.3)
DIFFERENTIAL METHOD BLD: ABNORMAL
EOSINOPHIL # BLD: 0 K/UL (ref 0–0.4)
EOSINOPHIL NFR BLD: 0 % (ref 0–7)
ERYTHROCYTE [DISTWIDTH] IN BLOOD BY AUTOMATED COUNT: 17.2 % (ref 11.5–14.5)
GLUCOSE BLD STRIP.AUTO-MCNC: 217 MG/DL (ref 65–100)
GLUCOSE BLD STRIP.AUTO-MCNC: 237 MG/DL (ref 65–100)
GLUCOSE BLD STRIP.AUTO-MCNC: 321 MG/DL (ref 65–100)
GLUCOSE SERPL-MCNC: 209 MG/DL (ref 65–100)
HCO3 BLDA-SCNC: 17 MMOL/L (ref 22–26)
HCT VFR BLD AUTO: 27.1 % (ref 36.6–50.3)
HGB BLD-MCNC: 8.2 G/DL (ref 12.1–17)
IMM GRANULOCYTES # BLD AUTO: 0.1 K/UL (ref 0–0.04)
IMM GRANULOCYTES NFR BLD AUTO: 1 % (ref 0–0.5)
LACTATE SERPL-SCNC: 1.2 MMOL/L (ref 0.4–2)
LYMPHOCYTES # BLD: 1.8 K/UL (ref 0.8–3.5)
LYMPHOCYTES NFR BLD: 20 % (ref 12–49)
MAGNESIUM SERPL-MCNC: 2.7 MG/DL (ref 1.6–2.4)
MCH RBC QN AUTO: 25.6 PG (ref 26–34)
MCHC RBC AUTO-ENTMCNC: 30.3 G/DL (ref 30–36.5)
MCV RBC AUTO: 84.7 FL (ref 80–99)
MONOCYTES # BLD: 1 K/UL (ref 0–1)
MONOCYTES NFR BLD: 10 % (ref 5–13)
NEUTS SEG # BLD: 6.4 K/UL (ref 1.8–8)
NEUTS SEG NFR BLD: 69 % (ref 32–75)
NRBC # BLD: 0.03 K/UL (ref 0–0.01)
NRBC BLD-RTO: 0.3 PER 100 WBC
PCO2 BLDA: 36 MMHG (ref 35–45)
PH BLDA: 7.31 [PH] (ref 7.35–7.45)
PHOSPHATE SERPL-MCNC: 4.1 MG/DL (ref 2.6–4.7)
PLATELET # BLD AUTO: 188 K/UL (ref 150–400)
PMV BLD AUTO: 12 FL (ref 8.9–12.9)
PO2 BLDA: 102 MMHG (ref 80–100)
POTASSIUM SERPL-SCNC: 4.9 MMOL/L (ref 3.5–5.1)
RBC # BLD AUTO: 3.2 M/UL (ref 4.1–5.7)
SAO2 % BLD: 97 % (ref 92–97)
SAO2% DEVICE SAO2% SENSOR NAME: ABNORMAL
SERVICE CMNT-IMP: ABNORMAL
SODIUM SERPL-SCNC: 149 MMOL/L (ref 136–145)
SPECIMEN SITE: ABNORMAL
URATE SERPL-MCNC: 5.3 MG/DL (ref 3.5–7.2)
WBC # BLD AUTO: 9.3 K/UL (ref 4.1–11.1)

## 2021-02-06 PROCEDURE — 74011250636 HC RX REV CODE- 250/636: Performed by: EMERGENCY MEDICINE

## 2021-02-06 PROCEDURE — 82962 GLUCOSE BLOOD TEST: CPT

## 2021-02-06 PROCEDURE — 80048 BASIC METABOLIC PNL TOTAL CA: CPT

## 2021-02-06 PROCEDURE — 36600 WITHDRAWAL OF ARTERIAL BLOOD: CPT

## 2021-02-06 PROCEDURE — 84550 ASSAY OF BLOOD/URIC ACID: CPT

## 2021-02-06 PROCEDURE — 74011250636 HC RX REV CODE- 250/636: Performed by: ANESTHESIOLOGY

## 2021-02-06 PROCEDURE — 74011250637 HC RX REV CODE- 250/637: Performed by: EMERGENCY MEDICINE

## 2021-02-06 PROCEDURE — 65610000006 HC RM INTENSIVE CARE

## 2021-02-06 PROCEDURE — 74011250637 HC RX REV CODE- 250/637: Performed by: ANESTHESIOLOGY

## 2021-02-06 PROCEDURE — 82803 BLOOD GASES ANY COMBINATION: CPT

## 2021-02-06 PROCEDURE — 83735 ASSAY OF MAGNESIUM: CPT

## 2021-02-06 PROCEDURE — 74011000258 HC RX REV CODE- 258: Performed by: NURSE PRACTITIONER

## 2021-02-06 PROCEDURE — 74011000250 HC RX REV CODE- 250: Performed by: ANESTHESIOLOGY

## 2021-02-06 PROCEDURE — 36415 COLL VENOUS BLD VENIPUNCTURE: CPT

## 2021-02-06 PROCEDURE — 74011250636 HC RX REV CODE- 250/636: Performed by: STUDENT IN AN ORGANIZED HEALTH CARE EDUCATION/TRAINING PROGRAM

## 2021-02-06 PROCEDURE — 74011636637 HC RX REV CODE- 636/637: Performed by: INTERNAL MEDICINE

## 2021-02-06 PROCEDURE — 71045 X-RAY EXAM CHEST 1 VIEW: CPT

## 2021-02-06 PROCEDURE — 74011636637 HC RX REV CODE- 636/637: Performed by: EMERGENCY MEDICINE

## 2021-02-06 PROCEDURE — 86738 MYCOPLASMA ANTIBODY: CPT

## 2021-02-06 PROCEDURE — 83605 ASSAY OF LACTIC ACID: CPT

## 2021-02-06 PROCEDURE — 74011250636 HC RX REV CODE- 250/636: Performed by: NURSE PRACTITIONER

## 2021-02-06 PROCEDURE — 94003 VENT MGMT INPAT SUBQ DAY: CPT

## 2021-02-06 PROCEDURE — 84100 ASSAY OF PHOSPHORUS: CPT

## 2021-02-06 PROCEDURE — 86480 TB TEST CELL IMMUN MEASURE: CPT

## 2021-02-06 PROCEDURE — 74011000250 HC RX REV CODE- 250: Performed by: STUDENT IN AN ORGANIZED HEALTH CARE EDUCATION/TRAINING PROGRAM

## 2021-02-06 PROCEDURE — 85025 COMPLETE CBC W/AUTO DIFF WBC: CPT

## 2021-02-06 PROCEDURE — 74011000250 HC RX REV CODE- 250: Performed by: NURSE PRACTITIONER

## 2021-02-06 RX ORDER — GABAPENTIN 250 MG/5ML
300 SOLUTION ORAL EVERY 8 HOURS
Status: DISCONTINUED | OUTPATIENT
Start: 2021-02-06 | End: 2021-02-13

## 2021-02-06 RX ORDER — HYDROMORPHONE HYDROCHLORIDE 1 MG/ML
0.5 INJECTION, SOLUTION INTRAMUSCULAR; INTRAVENOUS; SUBCUTANEOUS
Status: DISCONTINUED | OUTPATIENT
Start: 2021-02-06 | End: 2021-02-17

## 2021-02-06 RX ADMIN — METOPROLOL TARTRATE 5 MG: 5 INJECTION INTRAVENOUS at 21:51

## 2021-02-06 RX ADMIN — PROPOFOL 20 MCG/KG/MIN: 10 INJECTION, EMULSION INTRAVENOUS at 06:52

## 2021-02-06 RX ADMIN — ASPIRIN 81 MG: 81 TABLET, CHEWABLE ORAL at 09:21

## 2021-02-06 RX ADMIN — PROPOFOL 50 MCG/KG/MIN: 10 INJECTION, EMULSION INTRAVENOUS at 12:35

## 2021-02-06 RX ADMIN — DOXYCYCLINE 100 MG: 100 INJECTION, POWDER, LYOPHILIZED, FOR SOLUTION INTRAVENOUS at 20:43

## 2021-02-06 RX ADMIN — CHLORHEXIDINE GLUCONATE 15 ML: 0.12 RINSE ORAL at 20:43

## 2021-02-06 RX ADMIN — GABAPENTIN 300 MG: 250 SOLUTION ORAL at 16:24

## 2021-02-06 RX ADMIN — ENOXAPARIN SODIUM 30 MG: 30 INJECTION SUBCUTANEOUS at 09:15

## 2021-02-06 RX ADMIN — LANSOPRAZOLE 30 MG: KIT at 06:53

## 2021-02-06 RX ADMIN — Medication 10 ML: at 14:30

## 2021-02-06 RX ADMIN — Medication 10 ML: at 22:54

## 2021-02-06 RX ADMIN — DEXMEDETOMIDINE HYDROCHLORIDE 0.6 MCG/KG/HR: 400 INJECTION INTRAVENOUS at 02:17

## 2021-02-06 RX ADMIN — GABAPENTIN 300 MG: 250 SOLUTION ORAL at 22:54

## 2021-02-06 RX ADMIN — ENOXAPARIN SODIUM 30 MG: 30 INJECTION SUBCUTANEOUS at 20:44

## 2021-02-06 RX ADMIN — DEXMEDETOMIDINE HYDROCHLORIDE 1.3 MCG/KG/HR: 400 INJECTION INTRAVENOUS at 21:15

## 2021-02-06 RX ADMIN — INSULIN LISPRO 3 UNITS: 100 INJECTION, SOLUTION INTRAVENOUS; SUBCUTANEOUS at 12:31

## 2021-02-06 RX ADMIN — Medication 10 ML: at 06:53

## 2021-02-06 RX ADMIN — INSULIN GLARGINE 22 UNITS: 100 INJECTION, SOLUTION SUBCUTANEOUS at 09:14

## 2021-02-06 RX ADMIN — Medication 125 MCG/HR: at 23:45

## 2021-02-06 RX ADMIN — PROPOFOL 30 MCG/KG/MIN: 10 INJECTION, EMULSION INTRAVENOUS at 17:17

## 2021-02-06 RX ADMIN — DEXAMETHASONE SODIUM PHOSPHATE 6 MG: 10 INJECTION, SOLUTION INTRAMUSCULAR; INTRAVENOUS at 09:15

## 2021-02-06 RX ADMIN — DOXYCYCLINE 100 MG: 100 INJECTION, POWDER, LYOPHILIZED, FOR SOLUTION INTRAVENOUS at 09:16

## 2021-02-06 RX ADMIN — SODIUM CHLORIDE 5 MG/HR: 900 INJECTION, SOLUTION INTRAVENOUS at 11:06

## 2021-02-06 RX ADMIN — INSULIN LISPRO 3 UNITS: 100 INJECTION, SOLUTION INTRAVENOUS; SUBCUTANEOUS at 07:03

## 2021-02-06 RX ADMIN — Medication 200 MCG/HR: at 06:30

## 2021-02-06 RX ADMIN — CHLORHEXIDINE GLUCONATE 15 ML: 0.12 RINSE ORAL at 09:21

## 2021-02-06 RX ADMIN — CEFEPIME 2 G: 2 INJECTION, POWDER, FOR SOLUTION INTRAVENOUS at 11:08

## 2021-02-06 RX ADMIN — Medication 175 MCG/HR: at 14:27

## 2021-02-06 RX ADMIN — METOPROLOL TARTRATE 5 MG: 5 INJECTION INTRAVENOUS at 10:48

## 2021-02-06 RX ADMIN — ALLOPURINOL 100 MG: 100 TABLET ORAL at 09:21

## 2021-02-06 RX ADMIN — INSULIN LISPRO 7 UNITS: 100 INJECTION, SOLUTION INTRAVENOUS; SUBCUTANEOUS at 17:24

## 2021-02-06 RX ADMIN — GABAPENTIN 150 MG: 250 SOLUTION ORAL at 09:16

## 2021-02-06 RX ADMIN — DEXMEDETOMIDINE HYDROCHLORIDE 0.6 MCG/KG/HR: 400 INJECTION INTRAVENOUS at 10:47

## 2021-02-06 RX ADMIN — ATORVASTATIN CALCIUM 40 MG: 40 TABLET, FILM COATED ORAL at 22:54

## 2021-02-06 RX ADMIN — DEXMEDETOMIDINE HYDROCHLORIDE 0.9 MCG/KG/HR: 400 INJECTION INTRAVENOUS at 16:27

## 2021-02-06 RX ADMIN — MINERAL SUPPLEMENT IRON 300 MG / 5 ML STRENGTH LIQUID 100 PER BOX UNFLAVORED 300 MG: at 09:21

## 2021-02-06 NOTE — PROGRESS NOTES
1930: Bedside and Verbal shift change report given to Gio Hilton RN (oncoming nurse) by Larry Rutherford RN (offgoing nurse). Report included the following information SBAR.     0630: Attempted to draw blood for Quanteferon test, no success after 30 minutes. 0730: Bedside and Verbal shift change report given to Michi Ramos RN (oncoming nurse) by Gio Hilton RN (offgoing nurse). Report included the following information SBAR, Kardex, Intake/Output, MAR, Recent Results, Cardiac Rhythm NSR and Alarm Parameters .

## 2021-02-06 NOTE — PROGRESS NOTES
0730 Bedside and Verbal shift change report given to MICHAEL DÍAZ RN (oncoming nurse) by Coleen Calhoun RN (offgoing nurse). Report included the following information SBAR, Kardex, ED Summary, Intake/Output, MAR, Accordion, Recent Results, Med Rec Status, Cardiac Rhythm NSR and Alarm Parameters . [address] for tubes for Quanteferon test.     1930 Bedside shift change report given to ANAHI BYRD (oncoming nurse) by MICHAEL DÍAZ RN (offgoing nurse). Report included the following information SBAR, Kardex, ED Summary, Intake/Output, MAR, Accordion, Recent Results, Med Rec Status, Cardiac Rhythm NSR and Alarm Parameters . SHIFT SUMMERY: Pt had a episode of becoming asynchronous with vent and SBP in 200's. PRN metoprolol given, which did not bring pressure down and Cardene was started briefly. Propofol increased to 50 mcg/kg/min. RT notified. MD would like propofol titrated down and precedex titrated up, as well as, titrated down fentanyl (PRN diluadid ordered). Pt currently on 25 mcg/kg/min propofol, 1 mcg/kg/hr of precedex, and 175 mcg/hr of fentanyl. Cardene currently off. VSS.

## 2021-02-07 LAB
ANION GAP SERPL CALC-SCNC: 8 MMOL/L (ref 5–15)
ARTERIAL PATENCY WRIST A: ABNORMAL
BACTERIA SPEC CULT: ABNORMAL
BACTERIA SPEC CULT: ABNORMAL
BASE DEFICIT BLDA-SCNC: 7.9 MMOL/L
BASOPHILS # BLD: 0 K/UL (ref 0–0.1)
BASOPHILS NFR BLD: 0 % (ref 0–1)
BDY SITE: ABNORMAL
BUN SERPL-MCNC: 88 MG/DL (ref 6–20)
BUN/CREAT SERPL: 41 (ref 12–20)
CALCIUM SERPL-MCNC: 8.1 MG/DL (ref 8.5–10.1)
CHLORIDE SERPL-SCNC: 124 MMOL/L (ref 97–108)
CO2 SERPL-SCNC: 17 MMOL/L (ref 21–32)
CREAT SERPL-MCNC: 2.13 MG/DL (ref 0.7–1.3)
DIFFERENTIAL METHOD BLD: ABNORMAL
EOSINOPHIL # BLD: 0.1 K/UL (ref 0–0.4)
EOSINOPHIL NFR BLD: 1 % (ref 0–7)
ERYTHROCYTE [DISTWIDTH] IN BLOOD BY AUTOMATED COUNT: 17.7 % (ref 11.5–14.5)
GLUCOSE BLD STRIP.AUTO-MCNC: 182 MG/DL (ref 65–100)
GLUCOSE BLD STRIP.AUTO-MCNC: 230 MG/DL (ref 65–100)
GLUCOSE BLD STRIP.AUTO-MCNC: 258 MG/DL (ref 65–100)
GLUCOSE BLD STRIP.AUTO-MCNC: 335 MG/DL (ref 65–100)
GLUCOSE BLD STRIP.AUTO-MCNC: 340 MG/DL (ref 65–100)
GLUCOSE BLD STRIP.AUTO-MCNC: 385 MG/DL (ref 65–100)
GLUCOSE SERPL-MCNC: 196 MG/DL (ref 65–100)
GRAM STN SPEC: ABNORMAL
HCO3 BLDA-SCNC: 15 MMOL/L (ref 22–26)
HCT VFR BLD AUTO: 28.9 % (ref 36.6–50.3)
HGB BLD-MCNC: 8.9 G/DL (ref 12.1–17)
IMM GRANULOCYTES # BLD AUTO: 0.2 K/UL (ref 0–0.04)
IMM GRANULOCYTES NFR BLD AUTO: 2 % (ref 0–0.5)
LACTATE SERPL-SCNC: 1 MMOL/L (ref 0.4–2)
LACTATE SERPL-SCNC: 1.2 MMOL/L (ref 0.4–2)
LYMPHOCYTES # BLD: 2.9 K/UL (ref 0.8–3.5)
LYMPHOCYTES NFR BLD: 24 % (ref 12–49)
MAGNESIUM SERPL-MCNC: 2.6 MG/DL (ref 1.6–2.4)
MCH RBC QN AUTO: 25.2 PG (ref 26–34)
MCHC RBC AUTO-ENTMCNC: 30.8 G/DL (ref 30–36.5)
MCV RBC AUTO: 81.9 FL (ref 80–99)
MONOCYTES # BLD: 1.4 K/UL (ref 0–1)
MONOCYTES NFR BLD: 12 % (ref 5–13)
NEUTS SEG # BLD: 7.4 K/UL (ref 1.8–8)
NEUTS SEG NFR BLD: 61 % (ref 32–75)
NRBC # BLD: 0.03 K/UL (ref 0–0.01)
NRBC BLD-RTO: 0.2 PER 100 WBC
PCO2 BLDA: 27 MMHG (ref 35–45)
PH BLDA: 7.38 [PH] (ref 7.35–7.45)
PHOSPHATE SERPL-MCNC: 2.8 MG/DL (ref 2.6–4.7)
PLATELET # BLD AUTO: 251 K/UL (ref 150–400)
PMV BLD AUTO: 12.8 FL (ref 8.9–12.9)
PO2 BLDA: 86 MMHG (ref 80–100)
POTASSIUM SERPL-SCNC: 4.2 MMOL/L (ref 3.5–5.1)
RBC # BLD AUTO: 3.53 M/UL (ref 4.1–5.7)
SAO2 % BLD: 97 % (ref 92–97)
SAO2% DEVICE SAO2% SENSOR NAME: ABNORMAL
SERVICE CMNT-IMP: ABNORMAL
SODIUM SERPL-SCNC: 149 MMOL/L (ref 136–145)
SPECIMEN SITE: ABNORMAL
URATE SERPL-MCNC: 5.6 MG/DL (ref 3.5–7.2)
WBC # BLD AUTO: 12.1 K/UL (ref 4.1–11.1)

## 2021-02-07 PROCEDURE — 36600 WITHDRAWAL OF ARTERIAL BLOOD: CPT

## 2021-02-07 PROCEDURE — 83605 ASSAY OF LACTIC ACID: CPT

## 2021-02-07 PROCEDURE — 74011250636 HC RX REV CODE- 250/636: Performed by: NURSE PRACTITIONER

## 2021-02-07 PROCEDURE — 74011250636 HC RX REV CODE- 250/636: Performed by: EMERGENCY MEDICINE

## 2021-02-07 PROCEDURE — 82962 GLUCOSE BLOOD TEST: CPT

## 2021-02-07 PROCEDURE — 74011250637 HC RX REV CODE- 250/637: Performed by: ANESTHESIOLOGY

## 2021-02-07 PROCEDURE — 74011636637 HC RX REV CODE- 636/637: Performed by: INTERNAL MEDICINE

## 2021-02-07 PROCEDURE — 85025 COMPLETE CBC W/AUTO DIFF WBC: CPT

## 2021-02-07 PROCEDURE — 74011636637 HC RX REV CODE- 636/637: Performed by: EMERGENCY MEDICINE

## 2021-02-07 PROCEDURE — 36415 COLL VENOUS BLD VENIPUNCTURE: CPT

## 2021-02-07 PROCEDURE — 83735 ASSAY OF MAGNESIUM: CPT

## 2021-02-07 PROCEDURE — 84100 ASSAY OF PHOSPHORUS: CPT

## 2021-02-07 PROCEDURE — 74011000258 HC RX REV CODE- 258: Performed by: STUDENT IN AN ORGANIZED HEALTH CARE EDUCATION/TRAINING PROGRAM

## 2021-02-07 PROCEDURE — 94003 VENT MGMT INPAT SUBQ DAY: CPT

## 2021-02-07 PROCEDURE — 80048 BASIC METABOLIC PNL TOTAL CA: CPT

## 2021-02-07 PROCEDURE — 74011000258 HC RX REV CODE- 258: Performed by: NURSE PRACTITIONER

## 2021-02-07 PROCEDURE — 74011250636 HC RX REV CODE- 250/636: Performed by: ANESTHESIOLOGY

## 2021-02-07 PROCEDURE — 84550 ASSAY OF BLOOD/URIC ACID: CPT

## 2021-02-07 PROCEDURE — 82803 BLOOD GASES ANY COMBINATION: CPT

## 2021-02-07 PROCEDURE — 74011250637 HC RX REV CODE- 250/637: Performed by: EMERGENCY MEDICINE

## 2021-02-07 PROCEDURE — 74011250636 HC RX REV CODE- 250/636: Performed by: STUDENT IN AN ORGANIZED HEALTH CARE EDUCATION/TRAINING PROGRAM

## 2021-02-07 PROCEDURE — 65610000006 HC RM INTENSIVE CARE

## 2021-02-07 PROCEDURE — 74011000250 HC RX REV CODE- 250: Performed by: NURSE PRACTITIONER

## 2021-02-07 PROCEDURE — 74011000250 HC RX REV CODE- 250: Performed by: STUDENT IN AN ORGANIZED HEALTH CARE EDUCATION/TRAINING PROGRAM

## 2021-02-07 RX ORDER — VANCOMYCIN 1.75 GRAM/500 ML IN 0.9 % SODIUM CHLORIDE INTRAVENOUS
1750 ONCE
Status: COMPLETED | OUTPATIENT
Start: 2021-02-07 | End: 2021-02-07

## 2021-02-07 RX ORDER — VANCOMYCIN HYDROCHLORIDE
1250 EVERY 24 HOURS
Status: DISCONTINUED | OUTPATIENT
Start: 2021-02-08 | End: 2021-02-09

## 2021-02-07 RX ORDER — QUETIAPINE FUMARATE 25 MG/1
50 TABLET, FILM COATED ORAL 3 TIMES DAILY
Status: DISCONTINUED | OUTPATIENT
Start: 2021-02-07 | End: 2021-02-08

## 2021-02-07 RX ADMIN — DOXYCYCLINE 100 MG: 100 INJECTION, POWDER, LYOPHILIZED, FOR SOLUTION INTRAVENOUS at 21:32

## 2021-02-07 RX ADMIN — Medication 125 MCG/HR: at 22:43

## 2021-02-07 RX ADMIN — CEFEPIME 2 G: 2 INJECTION, POWDER, FOR SOLUTION INTRAVENOUS at 23:43

## 2021-02-07 RX ADMIN — GABAPENTIN 300 MG: 250 SOLUTION ORAL at 22:43

## 2021-02-07 RX ADMIN — GABAPENTIN 300 MG: 250 SOLUTION ORAL at 16:52

## 2021-02-07 RX ADMIN — CEFEPIME 2 G: 2 INJECTION, POWDER, FOR SOLUTION INTRAVENOUS at 00:43

## 2021-02-07 RX ADMIN — DEXMEDETOMIDINE HYDROCHLORIDE 1.5 MCG/KG/HR: 400 INJECTION INTRAVENOUS at 18:53

## 2021-02-07 RX ADMIN — ASPIRIN 81 MG: 81 TABLET, CHEWABLE ORAL at 08:14

## 2021-02-07 RX ADMIN — DEXMEDETOMIDINE HYDROCHLORIDE 1.5 MCG/KG/HR: 400 INJECTION INTRAVENOUS at 16:52

## 2021-02-07 RX ADMIN — SODIUM CHLORIDE 5 MG/HR: 900 INJECTION, SOLUTION INTRAVENOUS at 06:50

## 2021-02-07 RX ADMIN — MINERAL SUPPLEMENT IRON 300 MG / 5 ML STRENGTH LIQUID 100 PER BOX UNFLAVORED 300 MG: at 08:14

## 2021-02-07 RX ADMIN — SODIUM CHLORIDE 5 MG/HR: 900 INJECTION, SOLUTION INTRAVENOUS at 11:57

## 2021-02-07 RX ADMIN — DEXAMETHASONE SODIUM PHOSPHATE 6 MG: 10 INJECTION, SOLUTION INTRAMUSCULAR; INTRAVENOUS at 08:13

## 2021-02-07 RX ADMIN — GABAPENTIN 300 MG: 250 SOLUTION ORAL at 08:13

## 2021-02-07 RX ADMIN — SODIUM CHLORIDE 5 MG/HR: 900 INJECTION, SOLUTION INTRAVENOUS at 02:04

## 2021-02-07 RX ADMIN — INSULIN LISPRO 2 UNITS: 100 INJECTION, SOLUTION INTRAVENOUS; SUBCUTANEOUS at 06:17

## 2021-02-07 RX ADMIN — DEXMEDETOMIDINE HYDROCHLORIDE 1.5 MCG/KG/HR: 400 INJECTION INTRAVENOUS at 21:36

## 2021-02-07 RX ADMIN — INSULIN LISPRO 7 UNITS: 100 INJECTION, SOLUTION INTRAVENOUS; SUBCUTANEOUS at 23:49

## 2021-02-07 RX ADMIN — INSULIN LISPRO 5 UNITS: 100 INJECTION, SOLUTION INTRAVENOUS; SUBCUTANEOUS at 18:51

## 2021-02-07 RX ADMIN — INSULIN GLARGINE 22 UNITS: 100 INJECTION, SOLUTION SUBCUTANEOUS at 08:13

## 2021-02-07 RX ADMIN — QUETIAPINE FUMARATE 50 MG: 25 TABLET ORAL at 21:36

## 2021-02-07 RX ADMIN — CHLORHEXIDINE GLUCONATE 15 ML: 0.12 RINSE ORAL at 20:41

## 2021-02-07 RX ADMIN — Medication 10 ML: at 14:18

## 2021-02-07 RX ADMIN — ENOXAPARIN SODIUM 30 MG: 30 INJECTION SUBCUTANEOUS at 08:12

## 2021-02-07 RX ADMIN — Medication 10 ML: at 21:36

## 2021-02-07 RX ADMIN — HYDROMORPHONE HYDROCHLORIDE 0.5 MG: 1 INJECTION, SOLUTION INTRAMUSCULAR; INTRAVENOUS; SUBCUTANEOUS at 13:40

## 2021-02-07 RX ADMIN — Medication 10 ML: at 06:18

## 2021-02-07 RX ADMIN — ALLOPURINOL 100 MG: 100 TABLET ORAL at 08:14

## 2021-02-07 RX ADMIN — DOXYCYCLINE 100 MG: 100 INJECTION, POWDER, LYOPHILIZED, FOR SOLUTION INTRAVENOUS at 08:14

## 2021-02-07 RX ADMIN — LANSOPRAZOLE 30 MG: KIT at 06:50

## 2021-02-07 RX ADMIN — QUETIAPINE FUMARATE 50 MG: 25 TABLET ORAL at 10:38

## 2021-02-07 RX ADMIN — DEXMEDETOMIDINE HYDROCHLORIDE 1.5 MCG/KG/HR: 400 INJECTION INTRAVENOUS at 13:32

## 2021-02-07 RX ADMIN — PROPOFOL 15 MCG/KG/MIN: 10 INJECTION, EMULSION INTRAVENOUS at 08:15

## 2021-02-07 RX ADMIN — PROPOFOL 15 MCG/KG/MIN: 10 INJECTION, EMULSION INTRAVENOUS at 13:32

## 2021-02-07 RX ADMIN — DEXMEDETOMIDINE HYDROCHLORIDE 1.5 MCG/KG/HR: 400 INJECTION INTRAVENOUS at 10:38

## 2021-02-07 RX ADMIN — INSULIN LISPRO 7 UNITS: 100 INJECTION, SOLUTION INTRAVENOUS; SUBCUTANEOUS at 13:43

## 2021-02-07 RX ADMIN — INSULIN LISPRO 3 UNITS: 100 INJECTION, SOLUTION INTRAVENOUS; SUBCUTANEOUS at 00:52

## 2021-02-07 RX ADMIN — CHLORHEXIDINE GLUCONATE 15 ML: 0.12 RINSE ORAL at 08:12

## 2021-02-07 RX ADMIN — HYDROMORPHONE HYDROCHLORIDE 0.5 MG: 1 INJECTION, SOLUTION INTRAMUSCULAR; INTRAVENOUS; SUBCUTANEOUS at 00:57

## 2021-02-07 RX ADMIN — PROPOFOL 10 MCG/KG/MIN: 10 INJECTION, EMULSION INTRAVENOUS at 16:53

## 2021-02-07 RX ADMIN — ENOXAPARIN SODIUM 30 MG: 30 INJECTION SUBCUTANEOUS at 20:42

## 2021-02-07 RX ADMIN — ATORVASTATIN CALCIUM 40 MG: 40 TABLET, FILM COATED ORAL at 21:36

## 2021-02-07 RX ADMIN — DEXMEDETOMIDINE HYDROCHLORIDE 1.5 MCG/KG/HR: 400 INJECTION INTRAVENOUS at 23:51

## 2021-02-07 RX ADMIN — DEXMEDETOMIDINE HYDROCHLORIDE 1.5 MCG/KG/HR: 400 INJECTION INTRAVENOUS at 08:11

## 2021-02-07 RX ADMIN — Medication 125 MCG/HR: at 10:09

## 2021-02-07 RX ADMIN — QUETIAPINE FUMARATE 50 MG: 25 TABLET ORAL at 16:52

## 2021-02-07 RX ADMIN — DEXMEDETOMIDINE HYDROCHLORIDE 1.5 MCG/KG/HR: 400 INJECTION INTRAVENOUS at 04:26

## 2021-02-07 RX ADMIN — CEFEPIME 2 G: 2 INJECTION, POWDER, FOR SOLUTION INTRAVENOUS at 13:33

## 2021-02-07 RX ADMIN — DEXMEDETOMIDINE HYDROCHLORIDE 1.5 MCG/KG/HR: 400 INJECTION INTRAVENOUS at 00:43

## 2021-02-07 RX ADMIN — VANCOMYCIN HYDROCHLORIDE 1750 MG: 10 INJECTION, POWDER, LYOPHILIZED, FOR SOLUTION INTRAVENOUS at 14:15

## 2021-02-07 NOTE — PROGRESS NOTES
1930: Bedside and Verbal shift change report given to Herbie Kelly RN (oncoming nurse) by Phylicia Dozier RN (offgoing nurse). Report included the following information SBAR.     0045: Pt's sedation titrated to Precedex 1.5 mcg/kg/hr, Fentanyl 125 mcg/hr, and Propofol 5 mcg/kg/hr. Pt's eyes open spontaneously, following commands, will look in this RN's direction when his name is said but does not focus. Pt setting off vent and becoming tachypnic, BP and temperature increasing. Propofol increased to 10 mcg/kg/hr and PRN Dilaudid given. Pt resting comfortably, VS stable. 0415: Pt started on SBT.     0645: Pt calm, cooperative, VS stable. Discussed extubation with Beatrice Perkins NP. Beatrice Perkins NP would like ABG first. Pt's bicarb low so failed SBT. 0730: Bedside and Verbal shift change report given to Phylicia Dozier RN (oncoming nurse) by Herbie Kelly RN (offgoing nurse). Report included the following information SBAR.

## 2021-02-07 NOTE — PROGRESS NOTES
Pharmacist Note - Vancomycin Dosing    Consult provided for this 63 y.o. male for indication of head/neck infection. Antibiotic regimen(s): cefepime -, doxycycline -    Patient on vancomycin PTA? NO - Of note, patient previously on vancomycin - for sepsis 2/2 HAP vs head/neck infection (POA). Recent Labs     21  0444 21  0502 21  0420   WBC 12.1* 9.3 10.4   CREA 2.13* 2.06* 2.02*   BUN 88* 86* 75*     Frequency of BMP: Daily through   Height: 170.2 cm  Weight: 85.1 kg  Est CrCl: 37 ml/min  Temp (24hrs), Av.6 °F (37.6 °C), Min:99.1 °F (37.3 °C), Max:99.9 °F (37.7 °C)    Cultures:   blood: neg, final    COVID-19: (+)    legionella: neg, final   wound, neck: light diphtheroids, final    sputum: rare normal resp tom, final   mycoplasma: Ab IgG pos, final    legionella Ag (urine): neg, final   Strep pneumo Ag (urine): positive   blood: NGTD, pending   sputum: light yeast (apparent Candida albicans), few normal resp tom, final   mycoplasma: Ab IgG/IgM, pending    Goal trough = 15 - 20 mcg/mL    Therapy will be initiated with a loading dose of 1750 mg IV x 1 to be followed by a maintenance dose of 1250 mg IV every 24 hours. Pharmacy to follow patient daily and order levels / make dose adjustments as appropriate. Of note, previous 1250mg IV q24h regimen with trough  @ 07:15 = 18.8 mcg/mL, dose documented being started  @ 07:04.

## 2021-02-07 NOTE — PROGRESS NOTES
[facility]    ICU TEAM Progress Note    Name: Charleen Kocher   : 1955   MRN: [id number]   Date: 2021      I  Subjective:   Progress Note: 2021      Reason for ICU Admission: Acute hypoxic respiratory failure, COVID-19 pneumonia    Interval history: From [facility] on  63year-old man who presented from [facility] with acute onset shortness of breath, hypoxia, nausea, vomiting and diarrhea-shortness of breath acutely got worse after one of the vomiting episodes. Diagnosed with Covid about 2 weeks prior to presentation. Patient placed on BiPAP on arrival-felt much better. After getting fluids for resuscitation/elevated lactate level patient acutely decompensated-developed lethargy and drop in oxygen levels-intubated emergently requiring moderate to high vent support.     Patient was at [facility] because he he was getting IV antibiotics for neck infection and was going to have a skin graft    Interval changes       - Remains intubated      - Self extubated - reintubated several hours later for worsening hypoxia and agitation.      2/2 - No acute events overnight     2/3 - No acute events overnight. Perplexing micro data returning. Continuing to wean FiO2.     : SHELBY overnight. Overnight Events:   No acute event overnight. Had to go back on sedation as he became agitated dyssynchronous with the ventilator. Otherwise no major issues    Active Problem List:     Problem List  Never Reviewed          Codes Class    Acute hypoxemic respiratory failure due to COVID-19 [facility]) ICD-10-CM: U07.1, J96.01  ICD-9-CM: 518.81, 079.89, 799.02               Past Medical History:      has a past medical history of Diabetes ([facility] [address] and Hypertension. Past Surgical History:      has no past surgical history on file. Home Medications:     Prior to Admission medications    Medication Sig Start Date End Date Taking?  Authorizing Provider   aspirin 81 mg chewable tablet Take 81 mg by mouth daily. Yes Candelaria, MD Jessi   atorvastatin (LIPITOR) 80 mg tablet Take 80 mg by mouth daily. Yes Jessi Gaona MD   carvediloL (COREG) 12.5 mg tablet Take  by mouth two (2) times a day. Yes Jessi Gaona MD   docusate sodium (Colace) 100 mg capsule Take 100 mg by mouth two (2) times daily as needed for Constipation. Yes Jessi Gaona MD   doxycycline (ADOXA) 100 mg tablet Take 100 mg by mouth two (2) times a day. Yes Jessi Gaona MD   ferrous sulfate 325 mg (65 mg iron) tablet Take  by mouth every fourty-eight (48) hours. Yes Jessi Gaona MD   gabapentin (NEURONTIN) 400 mg capsule Take 400 mg by mouth three (3) times daily. Yes Jessi Gaona MD   insulin lispro (HUMALOG) 100 unit/mL injection 10 Units by SubCUTAneous route Before breakfast, lunch, and dinner. Yes Jessi Gaona MD   isosorbide mononitrate ER (IMDUR) 60 mg CR tablet Take 60 mg by mouth every morning. Yes Jessi Gaona MD   insulin glargine (Lantus U-100 Insulin) 100 unit/mL injection 20 Units by SubCUTAneous route nightly. Yes Jessi Gaona MD   lisinopriL (PRINIVIL, ZESTRIL) 20 mg tablet Take 20 mg by mouth daily. Yes Jessi Gaona MD   methocarbamoL (ROBAXIN) 500 mg tablet Take 500 mg by mouth three (3) times daily. Yes Jessi Gaona MD   oxyCODONE IR (ROXICODONE) 5 mg immediate release tablet Take 5 mg by mouth every six (6) hours as needed for Pain. Yes Jessi Gaona MD   pantoprazole (PROTONIX) 40 mg tablet Take 40 mg by mouth daily. Yes Candelaria, MD Jessi   guaiFENesin-codeine (Guaiatussin AC) 100-10 mg/5 mL solution Take 5 mL by mouth four (4) times daily as needed for Cough. Yes Jessi Gaona MD   senna (Senna) 8.6 mg tablet Take 1 Tab by mouth daily. Yes Jessi Gaona MD   ticagrelor (BRILINTA) 90 mg tablet Take 90 mg by mouth two (2) times a day. Yes Jessi Gaona MD   ascorbic acid, vitamin C, (Vitamin C) 500 mg tablet Take 500 mg by mouth two (2) times a day.    Yes Other, MD Jessi   cholecalciferol (Vitamin D3) (1000 Units /25 mcg) tablet Take 1,000 Units by mouth daily. Yes Other, MD Jessi   zinc sulfate (ZINCATE) 220 (50) mg capsule Take 220 mg by mouth daily. Yes Provider, Historical   therapeutic multivitamin (THERAGRAN) tablet Take 1 Tab by mouth daily. Yes Provider, Historical   acetaminophen (TYLENOL) 325 mg tablet Take 975 mg by mouth every six (6) hours as needed for Pain. Other, MD Jessi       Allergies/Social/Family History:     No Known Allergies   Social History     Tobacco Use    Smoking status: Former Smoker    Smokeless tobacco: Never Used   Substance Use Topics    Alcohol use: Not Currently      No family history on file. Review of Systems:     Not able to obtain due to patient medical condition    Objective:   Vital Signs:  Visit Vitals  BP (!) 148/77   Pulse 73   Temp 99.1 °F (37.3 °C)   Resp 21   Ht 5' 7\" (1.702 m)   Wt 86 kg (189 lb 9.5 oz)   SpO2 100%   BMI 29.69 kg/m²    O2 Flow Rate (L/min): 6 l/min O2 Device: Endotracheal tube, Ventilator Temp (24hrs), Av.4 °F (36.9 °C), Min:97.2 °F (36.2 °C), Max:99.6 °F (37.6 °C)           Intake/Output:     Intake/Output Summary (Last 24 hours) at 2021 1905  Last data filed at 2021 1725  Gross per 24 hour   Intake 3073.54 ml   Output 2060 ml   Net 1013.54 ml       Physical Exam:  General-intubated, sedated, comfortable  Neuro-pupils reactive, withdraws in uppers, strong cough and gag  Cardiac-tachycardic, regular  Lungs-coarse bilaterally  Abdomen-soft, nontender, nondistended  Extremities-warm    LABS AND  DATA: Personally reviewed  Recent Labs     21  0502 21  0420   WBC 9.3 10.4   HGB 8.2* 9.0*   HCT 27.1* 29.4*    260     Recent Labs     21  0502 21  0420   * 149*   K 4.9 4.8   * 124*   CO2 18* 19*   BUN 86* 75*   CREA 2.06* 2.02*   * 210*   CA 8.1* 8.5   MG 2.7* 2.4   PHOS 4.1 2.6     No results for input(s): AP, TBIL, TP, ALB, GLOB, AML, LPSE in the last 72 hours.     No lab exists for component: SGOT, GPT, AMYP  Recent Labs     02/05/21  1910 02/04/21  1607   APTT 25.7 22.8      No results for input(s): PHI, PCO2I, PO2I, FIO2I in the last 72 hours. No results for input(s): CPK, CKMB, TROIQ, BNPP in the last 72 hours. Hemodynamics:   PAP:   CO:     Wedge:   CI:     CVP:    SVR:       PVR:       Ventilator Settings:  Mode Rate Tidal Volume Pressure FiO2 PEEP   Assist control   450 ml  12 cm H2O 30 % 5 cm H20     Peak airway pressure: 18 cm H2O    Minute ventilation: 8.32 l/min        MEDS: Reviewed    Chest X-Ray:  CXR Results  (Last 48 hours)               02/06/21 0516  XR CHEST PORT Final result    Impression:  No significant interval change. Narrative:  Clinical history: resp failure   INDICATION:   resp failure   COMPARISON: 2/4/2021       FINDINGS:   AP portable upright view of the chest demonstrates a stable  cardiopericardial   silhouette. There is no pleural effusion. .Scattered parenchymal opacities are not   changed. ET tube and NG tube and central venous access catheter are stable. .   Patient is on a cardiac monitor. 02/04/21 1932  XR CHEST PORT Final result    Impression:  1. No significant change in the appearance of the support hardware. 2. Improved aeration in the right upper lobe. 3. Bilateral pleural effusions with associated passive atelectasis and/or   consolidation. Narrative:  INDICATION: . change in respiratory status   COMPARISON: Previous chest xray, 2/1/2021. LIMITATIONS: Portable technique. Amy Sciota FINDINGS: Single frontal view of the chest.    .   Lines/tubes/surgical: No significant change in the appearance of the ET tube,   gastric tube and left subclavian approach catheter. Heart/mediastinum: Unremarkable. Lungs/pleura: Improved aeration in the right upper lung. Persistent hazy opacity   and obscured bases. No visible pneumothorax. Additional Comments: None.     .             Assessment and Plan:   Acute hypoxic respiratory failure  Pulmonary edema  Covid pneumonia  Aspiration pneumonitis  Tachycardia secondary to above  Acute kidney injury    Neuro: agitated  - increase gabapentin  - Precedex gtt  - Fent gtt    Pulm: Needs SBT once agitation controlled. - AC mode, RR 18, 30%  - Daily SAT/SBT. OK to wean FiO2 by SpO2     CV: No active issues  - Cont ASA, lipitor, midodrine     GI:   - Lansoprazole  - TFs    R/E:  - dex 6mg every day  - Lantus 22u every day  - SSI    H/ID:  - Allopurinol  - ASA  - Statin  - Cefepime, doxycycline  - Lovenox 30mg q12h      DISPOSITION  Stay in ICU    CRITICAL CARE CONSULTANT NOTE  I had a face to face encounter with the patient, reviewed and interpreted patient data including clinical events, labs, images, vital signs, I/O's, and examined patient. I have discussed the case and the plan and management of the patient's care with the consulting services, the bedside nurses and the respiratory therapist.      NOTE OF PERSONAL INVOLVEMENT IN CARE   This patient has a high probability of imminent, clinically significant deterioration, which requires the highest level of preparedness to intervene urgently. I participated in the decision-making and personally managed or directed the management of the following life and organ supporting interventions that required my frequent assessment to treat or prevent imminent deterioration. I personally spent 35 minutes of critical care time. This is time spent at this critically ill patient's bedside actively involved in patient care as well as the coordination of care and discussions with the patient's family. This does not include any procedural time which has been billed separately. Karoline Guardado M.D.   Staff TOBIN/ [place] [address]  2/6/2021

## 2021-02-07 NOTE — PROGRESS NOTES
[facility]    ICU TEAM Progress Note    Name: Charleen Kocher   : 1955   MRN: [id number]   Date: 2021      I  Subjective:   Progress Note: 2021      Reason for ICU Admission: Acute hypoxic respiratory failure, COVID-19 pneumonia    Interval history: From [facility] on  63year-old man who presented from [facility] with acute onset shortness of breath, hypoxia, nausea, vomiting and diarrhea-shortness of breath acutely got worse after one of the vomiting episodes. Diagnosed with Covid about 2 weeks prior to presentation. Patient placed on BiPAP on arrival-felt much better. After getting fluids for resuscitation/elevated lactate level patient acutely decompensated-developed lethargy and drop in oxygen levels-intubated emergently requiring moderate to high vent support.     Patient was at [facility] because he he was getting IV antibiotics for neck infection and was going to have a skin graft    Interval changes       - Remains intubated      - Self extubated - reintubated several hours later for worsening hypoxia and agitation.      2/2 - No acute events overnight     2/3 - No acute events overnight. Perplexing micro data returning. Continuing to wean FiO2.     : SHELBY overnight. Overnight Events:   Failed SBT overnight due to mental status    Active Problem List:     Problem List  Never Reviewed          Codes Class    Acute hypoxemic respiratory failure due to COVID-19 [facility]) ICD-10-CM: U07.1, J96.01  ICD-9-CM: 518.81, 079.89, 799.02               Past Medical History:      has a past medical history of Diabetes ([facility] [address] and Hypertension. Past Surgical History:      has no past surgical history on file. Home Medications:     Prior to Admission medications    Medication Sig Start Date End Date Taking? Authorizing Provider   aspirin 81 mg chewable tablet Take 81 mg by mouth daily. Yes Other, MD Jessi   atorvastatin (LIPITOR) 80 mg tablet Take 80 mg by mouth daily.    Yes Other, MD Jessi   carvediloL (COREG) 12.5 mg tablet Take  by mouth two (2) times a day. Yes Jessi Gaona MD   docusate sodium (Colace) 100 mg capsule Take 100 mg by mouth two (2) times daily as needed for Constipation. Yes Jessi Gaona MD   doxycycline (ADOXA) 100 mg tablet Take 100 mg by mouth two (2) times a day. Yes Jessi Gaona MD   ferrous sulfate 325 mg (65 mg iron) tablet Take  by mouth every fourty-eight (48) hours. Yes Jessi Gaona MD   gabapentin (NEURONTIN) 400 mg capsule Take 400 mg by mouth three (3) times daily. Yes Jessi Gaona MD   insulin lispro (HUMALOG) 100 unit/mL injection 10 Units by SubCUTAneous route Before breakfast, lunch, and dinner. Yes Jessi Gaona MD   isosorbide mononitrate ER (IMDUR) 60 mg CR tablet Take 60 mg by mouth every morning. Yes Jessi Gaona MD   insulin glargine (Lantus U-100 Insulin) 100 unit/mL injection 20 Units by SubCUTAneous route nightly. Yes Jessi Gaona MD   lisinopriL (PRINIVIL, ZESTRIL) 20 mg tablet Take 20 mg by mouth daily. Yes Jessi Gaona MD   methocarbamoL (ROBAXIN) 500 mg tablet Take 500 mg by mouth three (3) times daily. Yes Jessi Gaona MD   oxyCODONE IR (ROXICODONE) 5 mg immediate release tablet Take 5 mg by mouth every six (6) hours as needed for Pain. Yes Jessi Gaona MD   pantoprazole (PROTONIX) 40 mg tablet Take 40 mg by mouth daily. Yes Jessi Gaona MD   guaiFENesin-codeine (Guaiatussin AC) 100-10 mg/5 mL solution Take 5 mL by mouth four (4) times daily as needed for Cough. Yes Jessi Gaona MD   senna (Senna) 8.6 mg tablet Take 1 Tab by mouth daily. Yes Jessi Gaona MD   ticagrelor (BRILINTA) 90 mg tablet Take 90 mg by mouth two (2) times a day. Yes Jessi Gaona MD   ascorbic acid, vitamin C, (Vitamin C) 500 mg tablet Take 500 mg by mouth two (2) times a day. Yes Jessi Gaona MD   cholecalciferol (Vitamin D3) (1000 Units /25 mcg) tablet Take 1,000 Units by mouth daily.    Yes Other, MD Jessi   zinc sulfate (ZINCATE) 220 (50) mg capsule Take 220 mg by mouth daily. Yes Provider, Historical   therapeutic multivitamin (THERAGRAN) tablet Take 1 Tab by mouth daily. Yes Provider, Historical   acetaminophen (TYLENOL) 325 mg tablet Take 975 mg by mouth every six (6) hours as needed for Pain. Other, MD Jessi       Allergies/Social/Family History:     No Known Allergies   Social History     Tobacco Use    Smoking status: Former Smoker    Smokeless tobacco: Never Used   Substance Use Topics    Alcohol use: Not Currently      No family history on file. Review of Systems:     Not able to obtain due to patient medical condition    Objective:   Vital Signs:  Visit Vitals  /69   Pulse 69   Temp 97.5 °F (36.4 °C)   Resp 21   Ht 5' 7\" (1.702 m)   Wt 85.1 kg (187 lb 9.8 oz)   SpO2 100%   BMI 29.38 kg/m²    O2 Flow Rate (L/min): 6 l/min O2 Device: Endotracheal tube, Ventilator Temp (24hrs), Av.2 °F (37.3 °C), Min:97.5 °F (36.4 °C), Max:99.9 °F (37.7 °C)           Intake/Output:     Intake/Output Summary (Last 24 hours) at 2021 1634  Last data filed at 2021 1400  Gross per 24 hour   Intake 2590.98 ml   Output 3325 ml   Net -734.02 ml       Physical Exam:  General-intubated, sedated, comfortable  Neuro-pupils reactive, withdraws in uppers, strong cough and gag  Cardiac-tachycardic, regular  Lungs-coarse bilaterally  Abdomen-soft, nontender, nondistended  Extremities-warm    LABS AND  DATA: Personally reviewed  Recent Labs     21  0444 21  0502   WBC 12.1* 9.3   HGB 8.9* 8.2*   HCT 28.9* 27.1*    188     Recent Labs     21  0444 21  0502   * 149*   K 4.2 4.9   * 123*   CO2 17* 18*   BUN 88* 86*   CREA 2.13* 2.06*   * 209*   CA 8.1* 8.1*   MG 2.6* 2.7*   PHOS 2.8 4.1     No results for input(s): AP, TBIL, TP, ALB, GLOB, AML, LPSE in the last 72 hours.     No lab exists for component: SGOT, GPT, AMYP  Recent Labs     21   APTT 25.7      No results for input(s): PHI, PCO2I, PO2I, FIO2I in the last 72 hours. No results for input(s): CPK, CKMB, TROIQ, BNPP in the last 72 hours. Hemodynamics:   PAP:   CO:     Wedge:   CI:     CVP:    SVR:       PVR:       Ventilator Settings:  Mode Rate Tidal Volume Pressure FiO2 PEEP   SIMV   450 ml  10 cm H2O 30 % 5 cm H20     Peak airway pressure: 16 cm H2O    Minute ventilation: 9.41 l/min        MEDS: Reviewed    Chest X-Ray:  CXR Results  (Last 48 hours)               02/06/21 0516  XR CHEST PORT Final result    Impression:  No significant interval change. Narrative:  Clinical history: resp failure   INDICATION:   resp failure   COMPARISON: 2/4/2021       FINDINGS:   AP portable upright view of the chest demonstrates a stable  cardiopericardial   silhouette. There is no pleural effusion. .Scattered parenchymal opacities are not   changed. ET tube and NG tube and central venous access catheter are stable. .   Patient is on a cardiac monitor. Assessment and Plan:   Acute hypoxic respiratory failure  Pulmonary edema  Covid pneumonia  Aspiration pneumonitis  Tachycardia secondary to above  Acute kidney injury    Neuro: Continue to be agitated despite changes with sedation preventing extuabtion.   - Add seroquel 50mg q8h  - Continue gabapentin  - Precedex gtt  - Fent gtt    Pulm: Needs SBT once agitation controlled. Has been intubated since 1/30, need to start thinking about trach given his mental status is the limiting factor in extubation.   - Continue to wean vent as able, VC, 50%, PEEP 8.   - Daily SAT/SBT.      CV: No active issues  - Cont ASA, lipitor, midodrine     GI:   - Lansoprazole  - TFs    Renal:  - Continue bird for strict I/Os. Endocrine:  - dex 6mg every day  - Lantus 22u every day  - SSI    Heme:  - ASA  - Statin  - Lovenox 30mg q12h    ID: Mycoplasma positive being treated with doxycycline.  ID on board and recommended continuing vacn, flagyl, and cefepime for broad spectrum antibiotics but only on cefepime. His WBC is rising today so will restart vancomycin and talk to ID about the flagyl.  - Cefepime, doxycycline  - Add vanc back with pharmacy help. DISPOSITION  Stay in ICU    CRITICAL CARE CONSULTANT NOTE  I had a face to face encounter with the patient, reviewed and interpreted patient data including clinical events, labs, images, vital signs, I/O's, and examined patient. I have discussed the case and the plan and management of the patient's care with the consulting services, the bedside nurses and the respiratory therapist.      NOTE OF PERSONAL INVOLVEMENT IN CARE   This patient has a high probability of imminent, clinically significant deterioration, which requires the highest level of preparedness to intervene urgently. I participated in the decision-making and personally managed or directed the management of the following life and organ supporting interventions that required my frequent assessment to treat or prevent imminent deterioration. I personally spent 43 minutes of critical care time. This is time spent at this critically ill patient's bedside actively involved in patient care as well as the coordination of care and discussions with the patient's family. This does not include any procedural time which has been billed separately. Mariana Almaraz M.D.   Staff Intensivist/Anesthesiologist  [facility]  2/7/2021

## 2021-02-07 NOTE — PROGRESS NOTES
Pt is on Spont  Breathing Trial as below settings       02/07/21 0415   Patient Observations   Pulse (Heart Rate) 91   Resp Rate 13   O2 Sat (%) 100 %   Vent Settings   FIO2 (%) 30 %   SpO2/FIO2 Ratio 333.33   Vt Set (ml) 450 ml   Pressure Support (cm H2O) 5 cm H2O   PEEP/VENT (cm H2O) 5 cm H20   Insp Flow (l/min) 60 l/min   Insp Rise Time % 50 %   Flow Trigger 3   Expiratory Sensitivity 25 %   Ventilator Measurements   Resp Rate Observed 13   Vt Spont (ml) 642 ml   Ve Observed (l/min) 8.86 l/min   PIP Observed (cm H2O) 11 cm H2O   MAP (cm H2O) 7.1   I:E Ratio Actual 1:2.5   Weaning Parameters   Spontaneous Breathing Trial Complete Yes   Resp Rate Observed 13   Ve 8.8      RSBI 19   PEF 25   Vent Method/Mode   Ventilation Method Conventional   Ventilator Mode Spontaneous

## 2021-02-08 LAB
ANION GAP SERPL CALC-SCNC: 4 MMOL/L (ref 5–15)
APTT PPP: 24.7 SEC (ref 22.1–31)
APTT PPP: 26 SEC (ref 22.1–31)
ARTERIAL PATENCY WRIST A: NO
ARTERIAL PATENCY WRIST A: YES
ATRIAL RATE: 75 BPM
BASE DEFICIT BLDA-SCNC: 8.5 MMOL/L
BASE DEFICIT BLDA-SCNC: 9.9 MMOL/L
BASOPHILS # BLD: 0 K/UL (ref 0–0.1)
BASOPHILS NFR BLD: 0 % (ref 0–1)
BDY SITE: ABNORMAL
BDY SITE: ABNORMAL
BUN SERPL-MCNC: 94 MG/DL (ref 6–20)
BUN/CREAT SERPL: 46 (ref 12–20)
CALCIUM SERPL-MCNC: 8.3 MG/DL (ref 8.5–10.1)
CALCULATED P AXIS, ECG09: 35 DEGREES
CALCULATED R AXIS, ECG10: 9 DEGREES
CALCULATED T AXIS, ECG11: 141 DEGREES
CHLORIDE SERPL-SCNC: 124 MMOL/L (ref 97–108)
CO2 SERPL-SCNC: 18 MMOL/L (ref 21–32)
CREAT SERPL-MCNC: 2.03 MG/DL (ref 0.7–1.3)
DIAGNOSIS, 93000: NORMAL
DIFFERENTIAL METHOD BLD: ABNORMAL
EOSINOPHIL # BLD: 0 K/UL (ref 0–0.4)
EOSINOPHIL NFR BLD: 0 % (ref 0–7)
ERYTHROCYTE [DISTWIDTH] IN BLOOD BY AUTOMATED COUNT: 17.5 % (ref 11.5–14.5)
FIO2 ON VENT: 30 %
GLUCOSE BLD STRIP.AUTO-MCNC: 212 MG/DL (ref 65–100)
GLUCOSE BLD STRIP.AUTO-MCNC: 274 MG/DL (ref 65–100)
GLUCOSE BLD STRIP.AUTO-MCNC: 281 MG/DL (ref 65–100)
GLUCOSE BLD STRIP.AUTO-MCNC: 314 MG/DL (ref 65–100)
GLUCOSE SERPL-MCNC: 275 MG/DL (ref 65–100)
HCO3 BLDA-SCNC: 15 MMOL/L (ref 22–26)
HCO3 BLDA-SCNC: 16 MMOL/L (ref 22–26)
HCT VFR BLD AUTO: 26.7 % (ref 36.6–50.3)
HGB BLD-MCNC: 8.2 G/DL (ref 12.1–17)
IMM GRANULOCYTES # BLD AUTO: 0.1 K/UL (ref 0–0.04)
IMM GRANULOCYTES NFR BLD AUTO: 1 % (ref 0–0.5)
LACTATE SERPL-SCNC: 1 MMOL/L (ref 0.4–2)
LYMPHOCYTES # BLD: 1.4 K/UL (ref 0.8–3.5)
LYMPHOCYTES NFR BLD: 16 % (ref 12–49)
MAGNESIUM SERPL-MCNC: 2.3 MG/DL (ref 1.6–2.4)
MCH RBC QN AUTO: 25.5 PG (ref 26–34)
MCHC RBC AUTO-ENTMCNC: 30.7 G/DL (ref 30–36.5)
MCV RBC AUTO: 83.2 FL (ref 80–99)
MONOCYTES # BLD: 0.8 K/UL (ref 0–1)
MONOCYTES NFR BLD: 9 % (ref 5–13)
NEUTS SEG # BLD: 6.6 K/UL (ref 1.8–8)
NEUTS SEG NFR BLD: 74 % (ref 32–75)
NRBC # BLD: 0.02 K/UL (ref 0–0.01)
NRBC BLD-RTO: 0.2 PER 100 WBC
P-R INTERVAL, ECG05: 144 MS
PCO2 BLDA: 28 MMHG (ref 35–45)
PCO2 BLDA: 35 MMHG (ref 35–45)
PEEP RESPIRATORY: 5 CM[H2O]
PH BLDA: 7.28 [PH] (ref 7.35–7.45)
PH BLDA: 7.36 [PH] (ref 7.35–7.45)
PHOSPHATE SERPL-MCNC: 5 MG/DL (ref 2.6–4.7)
PLATELET # BLD AUTO: 179 K/UL (ref 150–400)
PMV BLD AUTO: 12.4 FL (ref 8.9–12.9)
PO2 BLDA: 125 MMHG (ref 80–100)
PO2 BLDA: 87 MMHG (ref 80–100)
POTASSIUM SERPL-SCNC: 5 MMOL/L (ref 3.5–5.1)
PRESSURE SUPPORT SETTING VENT: 5 CM[H2O]
Q-T INTERVAL, ECG07: 398 MS
QRS DURATION, ECG06: 82 MS
QTC CALCULATION (BEZET), ECG08: 444 MS
RBC # BLD AUTO: 3.21 M/UL (ref 4.1–5.7)
SAO2 % BLD: 97 % (ref 92–97)
SAO2 % BLD: 98 % (ref 92–97)
SAO2% DEVICE SAO2% SENSOR NAME: ABNORMAL
SAO2% DEVICE SAO2% SENSOR NAME: ABNORMAL
SERVICE CMNT-IMP: ABNORMAL
SODIUM SERPL-SCNC: 146 MMOL/L (ref 136–145)
SPECIMEN SITE: ABNORMAL
SPECIMEN SITE: ABNORMAL
THERAPEUTIC RANGE,PTTT: NORMAL SECS (ref 58–77)
THERAPEUTIC RANGE,PTTT: NORMAL SECS (ref 58–77)
URATE SERPL-MCNC: 5 MG/DL (ref 3.5–7.2)
VENTILATION MODE VENT: ABNORMAL
VENTRICULAR RATE, ECG03: 75 BPM
WBC # BLD AUTO: 9 K/UL (ref 4.1–11.1)

## 2021-02-08 PROCEDURE — 77030041076 HC DRSG AG OPTICELL MDII -A

## 2021-02-08 PROCEDURE — 83605 ASSAY OF LACTIC ACID: CPT

## 2021-02-08 PROCEDURE — 74011000250 HC RX REV CODE- 250: Performed by: NURSE PRACTITIONER

## 2021-02-08 PROCEDURE — 2709999900 HC NON-CHARGEABLE SUPPLY

## 2021-02-08 PROCEDURE — 83735 ASSAY OF MAGNESIUM: CPT

## 2021-02-08 PROCEDURE — 80048 BASIC METABOLIC PNL TOTAL CA: CPT

## 2021-02-08 PROCEDURE — 93005 ELECTROCARDIOGRAM TRACING: CPT

## 2021-02-08 PROCEDURE — 74011250636 HC RX REV CODE- 250/636: Performed by: STUDENT IN AN ORGANIZED HEALTH CARE EDUCATION/TRAINING PROGRAM

## 2021-02-08 PROCEDURE — 74011250636 HC RX REV CODE- 250/636: Performed by: ANESTHESIOLOGY

## 2021-02-08 PROCEDURE — 94003 VENT MGMT INPAT SUBQ DAY: CPT

## 2021-02-08 PROCEDURE — 36415 COLL VENOUS BLD VENIPUNCTURE: CPT

## 2021-02-08 PROCEDURE — 84550 ASSAY OF BLOOD/URIC ACID: CPT

## 2021-02-08 PROCEDURE — 74011250637 HC RX REV CODE- 250/637: Performed by: ANESTHESIOLOGY

## 2021-02-08 PROCEDURE — 84100 ASSAY OF PHOSPHORUS: CPT

## 2021-02-08 PROCEDURE — 82962 GLUCOSE BLOOD TEST: CPT

## 2021-02-08 PROCEDURE — 74011636637 HC RX REV CODE- 636/637: Performed by: INTERNAL MEDICINE

## 2021-02-08 PROCEDURE — 74011636637 HC RX REV CODE- 636/637: Performed by: EMERGENCY MEDICINE

## 2021-02-08 PROCEDURE — 85730 THROMBOPLASTIN TIME PARTIAL: CPT

## 2021-02-08 PROCEDURE — 85025 COMPLETE CBC W/AUTO DIFF WBC: CPT

## 2021-02-08 PROCEDURE — 74011000258 HC RX REV CODE- 258: Performed by: NURSE PRACTITIONER

## 2021-02-08 PROCEDURE — 74011250636 HC RX REV CODE- 250/636: Performed by: NURSE PRACTITIONER

## 2021-02-08 PROCEDURE — 65610000006 HC RM INTENSIVE CARE

## 2021-02-08 PROCEDURE — 74011250637 HC RX REV CODE- 250/637: Performed by: EMERGENCY MEDICINE

## 2021-02-08 PROCEDURE — 36600 WITHDRAWAL OF ARTERIAL BLOOD: CPT

## 2021-02-08 PROCEDURE — 74011250636 HC RX REV CODE- 250/636: Performed by: EMERGENCY MEDICINE

## 2021-02-08 PROCEDURE — 74011636637 HC RX REV CODE- 636/637: Performed by: ANESTHESIOLOGY

## 2021-02-08 PROCEDURE — 82803 BLOOD GASES ANY COMBINATION: CPT

## 2021-02-08 RX ORDER — INSULIN GLARGINE 100 [IU]/ML
18 INJECTION, SOLUTION SUBCUTANEOUS ONCE
Status: COMPLETED | OUTPATIENT
Start: 2021-02-08 | End: 2021-02-08

## 2021-02-08 RX ORDER — DOCUSATE SODIUM 50 MG/5ML
100 LIQUID ORAL 2 TIMES DAILY
Status: DISCONTINUED | OUTPATIENT
Start: 2021-02-08 | End: 2021-02-13

## 2021-02-08 RX ORDER — ASCORBIC ACID 500 MG
500 TABLET ORAL 2 TIMES DAILY
Status: DISCONTINUED | OUTPATIENT
Start: 2021-02-08 | End: 2021-03-03

## 2021-02-08 RX ORDER — INSULIN LISPRO 100 [IU]/ML
INJECTION, SOLUTION INTRAVENOUS; SUBCUTANEOUS EVERY 4 HOURS
Status: DISCONTINUED | OUTPATIENT
Start: 2021-02-08 | End: 2021-02-10

## 2021-02-08 RX ORDER — SODIUM BICARBONATE 84 MG/ML
50 INJECTION, SOLUTION INTRAVENOUS
Status: COMPLETED | OUTPATIENT
Start: 2021-02-08 | End: 2021-02-08

## 2021-02-08 RX ORDER — MELATONIN
2000 DAILY
Status: DISCONTINUED | OUTPATIENT
Start: 2021-02-08 | End: 2021-03-03

## 2021-02-08 RX ORDER — QUETIAPINE FUMARATE 100 MG/1
100 TABLET, FILM COATED ORAL 3 TIMES DAILY
Status: DISCONTINUED | OUTPATIENT
Start: 2021-02-08 | End: 2021-02-10

## 2021-02-08 RX ORDER — INSULIN GLARGINE 100 [IU]/ML
40 INJECTION, SOLUTION SUBCUTANEOUS DAILY
Status: DISCONTINUED | OUTPATIENT
Start: 2021-02-09 | End: 2021-02-10

## 2021-02-08 RX ORDER — ZINC SULFATE 50(220)MG
1 CAPSULE ORAL DAILY
Status: DISPENSED | OUTPATIENT
Start: 2021-02-08 | End: 2021-02-18

## 2021-02-08 RX ADMIN — INSULIN LISPRO 5 UNITS: 100 INJECTION, SOLUTION INTRAVENOUS; SUBCUTANEOUS at 06:22

## 2021-02-08 RX ADMIN — MINERAL SUPPLEMENT IRON 300 MG / 5 ML STRENGTH LIQUID 100 PER BOX UNFLAVORED 300 MG: at 09:44

## 2021-02-08 RX ADMIN — INSULIN LISPRO 7 UNITS: 100 INJECTION, SOLUTION INTRAVENOUS; SUBCUTANEOUS at 21:02

## 2021-02-08 RX ADMIN — Medication 125 MCG/HR: at 22:45

## 2021-02-08 RX ADMIN — DOCUSATE SODIUM 100 MG: 50 LIQUID ORAL at 17:01

## 2021-02-08 RX ADMIN — ALLOPURINOL 100 MG: 100 TABLET ORAL at 09:44

## 2021-02-08 RX ADMIN — LANSOPRAZOLE 30 MG: KIT at 07:30

## 2021-02-08 RX ADMIN — QUETIAPINE FUMARATE 100 MG: 100 TABLET ORAL at 21:05

## 2021-02-08 RX ADMIN — CEFEPIME 2 G: 2 INJECTION, POWDER, FOR SOLUTION INTRAVENOUS at 11:20

## 2021-02-08 RX ADMIN — DEXMEDETOMIDINE HYDROCHLORIDE 1.3 MCG/KG/HR: 400 INJECTION INTRAVENOUS at 06:25

## 2021-02-08 RX ADMIN — INSULIN GLARGINE 22 UNITS: 100 INJECTION, SOLUTION SUBCUTANEOUS at 09:42

## 2021-02-08 RX ADMIN — DEXMEDETOMIDINE HYDROCHLORIDE 1.3 MCG/KG/HR: 400 INJECTION INTRAVENOUS at 13:42

## 2021-02-08 RX ADMIN — Medication 10 ML: at 13:44

## 2021-02-08 RX ADMIN — CHOLECALCIFEROL (VITAMIN D3) 25 MCG (1,000 UNIT) TABLET 2000 UNITS: TABLET at 13:44

## 2021-02-08 RX ADMIN — INSULIN GLARGINE 18 UNITS: 100 INJECTION, SOLUTION SUBCUTANEOUS at 13:43

## 2021-02-08 RX ADMIN — DOXYCYCLINE 100 MG: 100 INJECTION, POWDER, LYOPHILIZED, FOR SOLUTION INTRAVENOUS at 09:41

## 2021-02-08 RX ADMIN — Medication 125 MCG/HR: at 10:00

## 2021-02-08 RX ADMIN — ASPIRIN 81 MG: 81 TABLET, CHEWABLE ORAL at 09:43

## 2021-02-08 RX ADMIN — OXYCODONE HYDROCHLORIDE AND ACETAMINOPHEN 500 MG: 500 TABLET ORAL at 17:01

## 2021-02-08 RX ADMIN — GABAPENTIN 300 MG: 250 SOLUTION ORAL at 16:51

## 2021-02-08 RX ADMIN — QUETIAPINE FUMARATE 100 MG: 100 TABLET ORAL at 16:52

## 2021-02-08 RX ADMIN — ENOXAPARIN SODIUM 30 MG: 30 INJECTION SUBCUTANEOUS at 09:44

## 2021-02-08 RX ADMIN — SODIUM BICARBONATE 50 MEQ: 84 INJECTION, SOLUTION INTRAVENOUS at 06:22

## 2021-02-08 RX ADMIN — QUETIAPINE FUMARATE 100 MG: 100 TABLET ORAL at 09:42

## 2021-02-08 RX ADMIN — INSULIN LISPRO 10 UNITS: 100 INJECTION, SOLUTION INTRAVENOUS; SUBCUTANEOUS at 16:55

## 2021-02-08 RX ADMIN — DEXMEDETOMIDINE HYDROCHLORIDE 1.3 MCG/KG/HR: 400 INJECTION INTRAVENOUS at 18:57

## 2021-02-08 RX ADMIN — DEXMEDETOMIDINE HYDROCHLORIDE 1.3 MCG/KG/HR: 400 INJECTION INTRAVENOUS at 16:55

## 2021-02-08 RX ADMIN — INSULIN LISPRO 3 UNITS: 100 INJECTION, SOLUTION INTRAVENOUS; SUBCUTANEOUS at 12:00

## 2021-02-08 RX ADMIN — DEXMEDETOMIDINE HYDROCHLORIDE 1.3 MCG/KG/HR: 400 INJECTION INTRAVENOUS at 09:46

## 2021-02-08 RX ADMIN — CHLORHEXIDINE GLUCONATE 15 ML: 0.12 RINSE ORAL at 21:01

## 2021-02-08 RX ADMIN — VANCOMYCIN HYDROCHLORIDE 1250 MG: 10 INJECTION, POWDER, LYOPHILIZED, FOR SOLUTION INTRAVENOUS at 13:43

## 2021-02-08 RX ADMIN — DEXAMETHASONE SODIUM PHOSPHATE 6 MG: 10 INJECTION, SOLUTION INTRAMUSCULAR; INTRAVENOUS at 09:44

## 2021-02-08 RX ADMIN — ENOXAPARIN SODIUM 30 MG: 30 INJECTION SUBCUTANEOUS at 21:04

## 2021-02-08 RX ADMIN — DEXMEDETOMIDINE HYDROCHLORIDE 1.4 MCG/KG/HR: 400 INJECTION INTRAVENOUS at 03:04

## 2021-02-08 RX ADMIN — CHLORHEXIDINE GLUCONATE 15 ML: 0.12 RINSE ORAL at 09:40

## 2021-02-08 RX ADMIN — DEXMEDETOMIDINE HYDROCHLORIDE 1.2 MCG/KG/HR: 400 INJECTION INTRAVENOUS at 22:38

## 2021-02-08 RX ADMIN — Medication 10 ML: at 05:30

## 2021-02-08 RX ADMIN — ATORVASTATIN CALCIUM 40 MG: 40 TABLET, FILM COATED ORAL at 21:05

## 2021-02-08 RX ADMIN — Medication 1 CAPSULE: at 13:43

## 2021-02-08 RX ADMIN — DOXYCYCLINE 100 MG: 100 INJECTION, POWDER, LYOPHILIZED, FOR SOLUTION INTRAVENOUS at 21:05

## 2021-02-08 RX ADMIN — GABAPENTIN 300 MG: 250 SOLUTION ORAL at 22:36

## 2021-02-08 RX ADMIN — GABAPENTIN 300 MG: 250 SOLUTION ORAL at 11:18

## 2021-02-08 RX ADMIN — Medication 10 ML: at 21:04

## 2021-02-08 NOTE — WOUND CARE
Attempted to see patient. RN not going back into room until 4:00 p.m. Dressing changed yesterday, due to be changed tomorrow. Wound care will see patient tomorrow.     ZOEY Kamara, RN, [facility]  Office x [phone]  Pager x [phone]

## 2021-02-08 NOTE — PROGRESS NOTES
ID Progress Note  2021    Subjective: Intubated it  Review of Systems:            Symptom Y/N Comments   Symptom Y/N Comments   Fever/Chills       Chest Pain        Poor Appetite       Edema        Cough       Abdominal Pain        Sputum       Joint Pain        SOB/WEBB       Pruritis/Rash        Nausea/vomit       Tolerating PT/OT        Diarrhea       Tolerating Diet        Constipation       Other           Could NOT obtain due to:       Objective:     Vitals:   Visit Vitals  /73   Pulse 79   Temp 98.1 °F (36.7 °C)   Resp 18   Ht 5' 7\" (1.702 m)   Wt 86 kg (189 lb 9.5 oz)   SpO2 100%   BMI 29.69 kg/m²        Tmax:  Temp (24hrs), Av.9 °F (36.6 °C), Min:97.1 °F (36.2 °C), Max:99.5 °F (37.5 °C)      PHYSICAL EXAM:  General: Intubated it, no acute distress    EENT:  Pupils are reactive to light,  Anicteric sclerae. MMM  Resp:  Clear in apex with decreased breath sounds at bases, no wheezing or rales. No accessory muscle use  CV:  Regular  rhythm,  No edema  GI:  Soft, Non distended,  unable to appreciate Bowel sounds  Neurologic:  Intubated it  Psych:            Unable to assess insight. Appear Not anxious nor agitated  Skin:  No rashes.   No jaundice, posterior mid cervical dressing dry and intact    Labs:   Lab Results   Component Value Date/Time    WBC 9.0 2021 04:07 AM    HGB 8.2 (L) 2021 04:07 AM    HCT 26.7 (L) 2021 04:07 AM    PLATELET 569  04:07 AM    MCV 83.2 2021 04:07 AM     Lab Results   Component Value Date/Time    Sodium 146 (H) 2021 04:10 AM    Potassium 5.0 2021 04:10 AM    Chloride 124 (H) 2021 04:10 AM    CO2 18 (L) 2021 04:10 AM    Anion gap 4 (L) 2021 04:10 AM    Glucose 275 (H) 2021 04:10 AM    BUN 94 (H) 2021 04:10 AM    Creatinine 2.03 (H) 2021 04:10 AM    BUN/Creatinine ratio 46 (H) 2021 04:10 AM    GFR est AA 40 (L) 2021 04:10 AM    GFR est non-AA 33 (L) 2021 04:10 AM Calcium 8.3 (L) 02/08/2021 04:10 AM    Bilirubin, total 0.3 02/01/2021 03:59 AM    Alk. phosphatase 100 02/01/2021 03:59 AM    Protein, total 6.2 (L) 02/01/2021 03:59 AM    Albumin 1.9 (L) 02/01/2021 03:59 AM    Globulin 4.3 (H) 02/01/2021 03:59 AM    A-G Ratio 0.4 (L) 02/01/2021 03:59 AM    ALT (SGPT) 359 (H) 02/01/2021 03:59 AM       Assessment and Plan   Acute respiratory failure secondary to COVID 19 PNA  mycoplasma and s. pneumoniae   - Mycoplasma IgG  1411, S Pneumo (+)    Legionella (-)    T-max 99.5, WBC 9    Blood cx (1/30) no growth    Blood cx (2/4) no growth so far    resp cx (2/5) candida albicans - oropharyngeal contamination     CTA chest: no PE, airspace opacities in bilateral lower lobe consolidation.     Supportive care for COVID 19      Continue with IV cefepime, doxycycline and vancomycin, (IV Flagyl 1/30-2/4)    Complete total 10 days of Doxycycline for mycoplasma     Quantiferon; pending     fever work up every 24 hours if temp >= 100.4     posterior mid cervical wound; appreciate wound care team's input     Mid posterior cervical wound infection  - chronic wound; pt was waiting for skin graft    Wound cx (1/31) LIGHT DIPHTHEROIDS       Managing provider at [facility], Dr. Camacho, [place] [phone]; left a message for him to return my call    Wound care nurse, Ms. Janet Lewis [phone]. According to the wound care nurse, the wound was created after the removal of infected cyst.     JUAN C  - creat 2.0        Geoffrey Ulrich NP

## 2021-02-08 NOTE — PROGRESS NOTES
[facility]    ICU TEAM Progress Note    Name: Carroll Lara   : 1955   MRN: [id number]   Date: 2021        Subjective:   Progress Note: 2021      Reason for ICU Admission: Acute hypoxic respiratory failure, COVID-19 pneumonia    Interval history: From [facility] on  63year-old man who presented from [facility] with acute onset shortness of breath, hypoxia, nausea, vomiting and diarrhea-shortness of breath acutely got worse after one of the vomiting episodes. Diagnosed with Covid about 2 weeks prior to presentation. Patient placed on BiPAP on arrival-felt much better. After getting fluids for resuscitation/elevated lactate level patient acutely decompensated-developed lethargy and drop in oxygen levels-intubated emergently requiring moderate to high vent support.     Patient was at [facility] because he he was getting IV antibiotics for neck infection and was going to have a skin graft    Interval changes       - Remains intubated      - Self extubated - reintubated several hours later for worsening hypoxia and agitation.      2/2 - No acute events overnight     2/3 - No acute events overnight. Perplexing micro data returning. Continuing to wean FiO2.     : SHELBY overnight. Overnight Events:   Failed SBT overnight due to mental status again on morning of     Active Problem List:     Problem List  Never Reviewed          Codes Class    Acute hypoxemic respiratory failure due to COVID-19 [facility]) ICD-10-CM: U07.1, J96.01  ICD-9-CM: 518.81, 079.89, 799.02               Past Medical History:      has a past medical history of Diabetes ([facility] [address] and Hypertension. Past Surgical History:      has no past surgical history on file. Home Medications:     Prior to Admission medications    Medication Sig Start Date End Date Taking? Authorizing Provider   aspirin 81 mg chewable tablet Take 81 mg by mouth daily.    Yes Other, MD Jessi   atorvastatin (LIPITOR) 80 mg tablet Take 80 mg by mouth daily. Yes Candelaria, MD Jessi   carvediloL (COREG) 12.5 mg tablet Take  by mouth two (2) times a day. Yes Jessi Gaona MD   docusate sodium (Colace) 100 mg capsule Take 100 mg by mouth two (2) times daily as needed for Constipation. Yes Candelaria, MD Jessi   doxycycline (ADOXA) 100 mg tablet Take 100 mg by mouth two (2) times a day. Yes Candelaria, MD Jessi   ferrous sulfate 325 mg (65 mg iron) tablet Take  by mouth every fourty-eight (48) hours. Yes Jessi Gaona MD   gabapentin (NEURONTIN) 400 mg capsule Take 400 mg by mouth three (3) times daily. Yes Jessi Gaona MD   insulin lispro (HUMALOG) 100 unit/mL injection 10 Units by SubCUTAneous route Before breakfast, lunch, and dinner. Yes Jessi Gaona MD   isosorbide mononitrate ER (IMDUR) 60 mg CR tablet Take 60 mg by mouth every morning. Yes Jessi Gaona MD   insulin glargine (Lantus U-100 Insulin) 100 unit/mL injection 20 Units by SubCUTAneous route nightly. Yes Candelaria, MD Jessi   lisinopriL (PRINIVIL, ZESTRIL) 20 mg tablet Take 20 mg by mouth daily. Yes Jessi Gaona MD   methocarbamoL (ROBAXIN) 500 mg tablet Take 500 mg by mouth three (3) times daily. Yes Jessi Gaona MD   oxyCODONE IR (ROXICODONE) 5 mg immediate release tablet Take 5 mg by mouth every six (6) hours as needed for Pain. Yes Candelaria, MD Jessi   pantoprazole (PROTONIX) 40 mg tablet Take 40 mg by mouth daily. Yes Candelaria, MD Jessi   guaiFENesin-codeine (Guaiatussin AC) 100-10 mg/5 mL solution Take 5 mL by mouth four (4) times daily as needed for Cough. Yes Jessi Gaona MD   senna (Senna) 8.6 mg tablet Take 1 Tab by mouth daily. Yes Jessi Gaona MD   ticagrelor (BRILINTA) 90 mg tablet Take 90 mg by mouth two (2) times a day. Yes Candelaria, MD Jessi   ascorbic acid, vitamin C, (Vitamin C) 500 mg tablet Take 500 mg by mouth two (2) times a day. Yes Jessi Gaona MD   cholecalciferol (Vitamin D3) (1000 Units /25 mcg) tablet Take 1,000 Units by mouth daily.    Yes Other, MD Jessi   zinc sulfate (ZINCATE) 220 (50) mg capsule Take 220 mg by mouth daily. Yes Provider, Historical   therapeutic multivitamin (THERAGRAN) tablet Take 1 Tab by mouth daily. Yes Provider, Historical   acetaminophen (TYLENOL) 325 mg tablet Take 975 mg by mouth every six (6) hours as needed for Pain. Other, MD Jessi       Allergies/Social/Family History:     No Known Allergies   Social History     Tobacco Use    Smoking status: Former Smoker    Smokeless tobacco: Never Used   Substance Use Topics    Alcohol use: Not Currently      No family history on file. Review of Systems:     Not able to obtain due to patient medical condition    Objective:   Vital Signs:  Visit Vitals  BP (!) 157/78   Pulse 82   Temp 99.2 °F (37.3 °C)   Resp 20   Ht 5' 7\" (1.702 m)   Wt 86 kg (189 lb 9.5 oz)   SpO2 100%   BMI 29.69 kg/m²    O2 Flow Rate (L/min): 6 l/min O2 Device: Endotracheal tube, Ventilator Temp (24hrs), Av.3 °F (36.8 °C), Min:97.5 °F (36.4 °C), Max:99.2 °F (37.3 °C)           Intake/Output:     Intake/Output Summary (Last 24 hours) at 2021 1714  Last data filed at 2021 1500  Gross per 24 hour   Intake 1835.51 ml   Output 2550 ml   Net -714.49 ml       Physical Exam:  General-intubated, sedated, comfortable  Neuro-pupils reactive, withdraws in uppers, strong cough and gag  Cardiac-tachycardic, regular  Lungs-coarse bilaterally  Abdomen-soft, nontender, nondistended  Extremities-warm    LABS AND  DATA: Personally reviewed  Recent Labs     21  0407 21  0444   WBC 9.0 12.1*   HGB 8.2* 8.9*   HCT 26.7* 28.9*    251     Recent Labs     21  0410 21  0444   * 149*   K 5.0 4.2   * 124*   CO2 18* 17*   BUN 94* 88*   CREA 2.03* 2.13*   * 196*   CA 8.3* 8.1*   MG 2.3 2.6*   PHOS 5.0* 2.8     No results for input(s): AP, TBIL, TP, ALB, GLOB, AML, LPSE in the last 72 hours.     No lab exists for component: SGOT, GPT, AMYP  Recent Labs     21  1013 21  0410   APTT 24.7 26.0      No results for input(s): PHI, PCO2I, PO2I, FIO2I in the last 72 hours. No results for input(s): CPK, CKMB, TROIQ, BNPP in the last 72 hours. Hemodynamics:   PAP:   CO:     Wedge:   CI:     CVP:    SVR:       PVR:       Ventilator Settings:  Mode Rate Tidal Volume Pressure FiO2 PEEP   SIMV, Volume control   450 ml  10 cm H2O 30 % 5 cm H20     Peak airway pressure: 15 cm H2O    Minute ventilation: 9.69 l/min        MEDS: Reviewed    Chest X-Ray:  CXR Results  (Last 48 hours)    None          Assessment and Plan:   Acute hypoxic respiratory failure  Pulmonary edema  Covid pneumonia  Aspiration pneumonitis  Tachycardia secondary to above  Acute kidney injury    Neuro: Continue to be agitated despite changes with sedation preventing extuabtion.   - Increase seroquel to 100mg q8h  - Continue gabapentin  - Precedex gtt, please wean off propofol if able. - Fent gtt    Pulm: Needs SBT once agitation controlled. Has been intubated since 1/30, need to start thinking about trach given his mental status is the limiting factor in extubation.   - Continue to wean vent as able, SIMV, 30%, PEEP 5.   - Daily SAT/SBT.      CV: No active issues  - EKG to check qTc given increase in antipsychotics. - Cont ASA, lipitor, midodrine     GI:   - Lansoprazole  - TFs, please advance to goal.     Renal:  - Continue bird for strict I/Os. Endocrine:  - dex 6mg every day  - Lantus 22u daily, will increase to 40u daily  - SSI    Heme:  - ASA  - Statin  - Lovenox 30mg q12h    ID: Previously being treated for neck infection when diagnosed with COVID. Mycoplasma positive being treated with doxycycline. ID following.   - Cefepime, doxycycline, vancomycin to continue per ID.   - Please culture if he becomes febrile.      DISPOSITION  Stay in ICU    CRITICAL CARE CONSULTANT NOTE  I had a face to face encounter with the patient, reviewed and interpreted patient data including clinical events, labs, images, vital signs, I/O's, and examined patient. I have discussed the case and the plan and management of the patient's care with the consulting services, the bedside nurses and the respiratory therapist.      NOTE OF PERSONAL INVOLVEMENT IN CARE   This patient has a high probability of imminent, clinically significant deterioration, which requires the highest level of preparedness to intervene urgently. I participated in the decision-making and personally managed or directed the management of the following life and organ supporting interventions that required my frequent assessment to treat or prevent imminent deterioration. I personally spent 45 minutes of critical care time. This is time spent at this critically ill patient's bedside actively involved in patient care as well as the coordination of care and discussions with the patient's family. This does not include any procedural time which has been billed separately. Dina Hoyos M.D.   Staff Intensivist/Anesthesiologist  [facility]  2/8/2021

## 2021-02-08 NOTE — PROGRESS NOTES
0730 Bedside and Verbal shift change report given to MICHAEL DÍAZ RN (oncoming nurse) by Jennifer Santillan RN (offgoing nurse). Report included the following information SBAR, Kardex, ED Summary, Intake/Output, MAR, Accordion, Recent Results, Med Rec Status, Cardiac Rhythm NSR and Alarm Parameters . 1930 Bedside and Verbal shift change report given to ELBA WITT RN (oncoming nurse) by Rosi DÍAZ RN (offgoing nurse). Report included the following information SBAR, Kardex, ED Summary, Intake/Output, MAR, Accordion, Recent Results, Med Rec Status and Cardiac Rhythm NSR.

## 2021-02-09 LAB
ANION GAP SERPL CALC-SCNC: 4 MMOL/L (ref 5–15)
ARTERIAL PATENCY WRIST A: YES
BACTERIA SPEC CULT: NORMAL
BACTERIA SPEC CULT: NORMAL
BASE DEFICIT BLDA-SCNC: 9.5 MMOL/L
BASOPHILS # BLD: 0 K/UL (ref 0–0.1)
BASOPHILS NFR BLD: 0 % (ref 0–1)
BDY SITE: ABNORMAL
BODY TEMPERATURE: 37
BUN SERPL-MCNC: 94 MG/DL (ref 6–20)
BUN/CREAT SERPL: 41 (ref 12–20)
CALCIUM SERPL-MCNC: 8.4 MG/DL (ref 8.5–10.1)
CHLORIDE SERPL-SCNC: 125 MMOL/L (ref 97–108)
CO2 SERPL-SCNC: 19 MMOL/L (ref 21–32)
CREAT SERPL-MCNC: 2.27 MG/DL (ref 0.7–1.3)
DIFFERENTIAL METHOD BLD: ABNORMAL
EOSINOPHIL # BLD: 0.2 K/UL (ref 0–0.4)
EOSINOPHIL NFR BLD: 2 % (ref 0–7)
ERYTHROCYTE [DISTWIDTH] IN BLOOD BY AUTOMATED COUNT: 18.2 % (ref 11.5–14.5)
GLUCOSE BLD STRIP.AUTO-MCNC: 136 MG/DL (ref 65–100)
GLUCOSE BLD STRIP.AUTO-MCNC: 161 MG/DL (ref 65–100)
GLUCOSE BLD STRIP.AUTO-MCNC: 172 MG/DL (ref 65–100)
GLUCOSE BLD STRIP.AUTO-MCNC: 185 MG/DL (ref 65–100)
GLUCOSE BLD STRIP.AUTO-MCNC: 188 MG/DL (ref 65–100)
GLUCOSE BLD STRIP.AUTO-MCNC: 203 MG/DL (ref 65–100)
GLUCOSE SERPL-MCNC: 170 MG/DL (ref 65–100)
HCO3 BLDA-SCNC: 17 MMOL/L (ref 22–26)
HCT VFR BLD AUTO: 26.4 % (ref 36.6–50.3)
HGB BLD-MCNC: 8.1 G/DL (ref 12.1–17)
IMM GRANULOCYTES # BLD AUTO: 0.1 K/UL (ref 0–0.04)
IMM GRANULOCYTES NFR BLD AUTO: 1 % (ref 0–0.5)
LYMPHOCYTES # BLD: 2.3 K/UL (ref 0.8–3.5)
LYMPHOCYTES NFR BLD: 22 % (ref 12–49)
M PNEUMO IGG SER IA-ACNC: 952 U/ML (ref 0–99)
M PNEUMO IGM SER IA-ACNC: <770 U/ML (ref 0–769)
M TB IFN-G BLD-IMP: NEGATIVE
MAGNESIUM SERPL-MCNC: 2.4 MG/DL (ref 1.6–2.4)
MCH RBC QN AUTO: 25.9 PG (ref 26–34)
MCHC RBC AUTO-ENTMCNC: 30.7 G/DL (ref 30–36.5)
MCV RBC AUTO: 84.3 FL (ref 80–99)
MONOCYTES # BLD: 0.9 K/UL (ref 0–1)
MONOCYTES NFR BLD: 9 % (ref 5–13)
NEUTS SEG # BLD: 6.8 K/UL (ref 1.8–8)
NEUTS SEG NFR BLD: 66 % (ref 32–75)
NRBC # BLD: 0 K/UL (ref 0–0.01)
NRBC BLD-RTO: 0 PER 100 WBC
PCO2 BLDA: 39 MMHG (ref 35–45)
PH BLDA: 7.26 [PH] (ref 7.35–7.45)
PHOSPHATE SERPL-MCNC: 5 MG/DL (ref 2.6–4.7)
PLATELET # BLD AUTO: 188 K/UL (ref 150–400)
PO2 BLDA: 110 MMHG (ref 80–100)
POTASSIUM SERPL-SCNC: 4.6 MMOL/L (ref 3.5–5.1)
QUANTIFERON CRITERIA, QFI1T: NORMAL
QUANTIFERON MITOGEN VALUE: >10 IU/ML
QUANTIFERON NIL VALUE: 0.02 IU/ML
QUANTIFERON TB1 AG: 0.01 IU/ML
QUANTIFERON TB2 AG: 0.01 IU/ML
RBC # BLD AUTO: 3.13 M/UL (ref 4.1–5.7)
RBC MORPH BLD: ABNORMAL
SAO2 % BLD: 97 % (ref 92–97)
SAO2% DEVICE SAO2% SENSOR NAME: ABNORMAL
SERVICE CMNT-IMP: ABNORMAL
SERVICE CMNT-IMP: NORMAL
SERVICE CMNT-IMP: NORMAL
SODIUM SERPL-SCNC: 148 MMOL/L (ref 136–145)
SPECIMEN SITE: ABNORMAL
URATE SERPL-MCNC: 5.3 MG/DL (ref 3.5–7.2)
VENTILATION MODE VENT: ABNORMAL
WBC # BLD AUTO: 10.3 K/UL (ref 4.1–11.1)

## 2021-02-09 PROCEDURE — 84100 ASSAY OF PHOSPHORUS: CPT

## 2021-02-09 PROCEDURE — 82803 BLOOD GASES ANY COMBINATION: CPT

## 2021-02-09 PROCEDURE — 36415 COLL VENOUS BLD VENIPUNCTURE: CPT

## 2021-02-09 PROCEDURE — 74011250637 HC RX REV CODE- 250/637: Performed by: ANESTHESIOLOGY

## 2021-02-09 PROCEDURE — 85025 COMPLETE CBC W/AUTO DIFF WBC: CPT

## 2021-02-09 PROCEDURE — 84550 ASSAY OF BLOOD/URIC ACID: CPT

## 2021-02-09 PROCEDURE — 74011636637 HC RX REV CODE- 636/637: Performed by: ANESTHESIOLOGY

## 2021-02-09 PROCEDURE — 74011250636 HC RX REV CODE- 250/636: Performed by: EMERGENCY MEDICINE

## 2021-02-09 PROCEDURE — 74011250636 HC RX REV CODE- 250/636: Performed by: NURSE PRACTITIONER

## 2021-02-09 PROCEDURE — 36600 WITHDRAWAL OF ARTERIAL BLOOD: CPT

## 2021-02-09 PROCEDURE — 74011000250 HC RX REV CODE- 250: Performed by: NURSE PRACTITIONER

## 2021-02-09 PROCEDURE — 74011250637 HC RX REV CODE- 250/637: Performed by: EMERGENCY MEDICINE

## 2021-02-09 PROCEDURE — 82962 GLUCOSE BLOOD TEST: CPT

## 2021-02-09 PROCEDURE — 83735 ASSAY OF MAGNESIUM: CPT

## 2021-02-09 PROCEDURE — 74011250636 HC RX REV CODE- 250/636: Performed by: ANESTHESIOLOGY

## 2021-02-09 PROCEDURE — 74011000258 HC RX REV CODE- 258: Performed by: NURSE PRACTITIONER

## 2021-02-09 PROCEDURE — 94003 VENT MGMT INPAT SUBQ DAY: CPT

## 2021-02-09 PROCEDURE — 65610000006 HC RM INTENSIVE CARE

## 2021-02-09 PROCEDURE — 94760 N-INVAS EAR/PLS OXIMETRY 1: CPT

## 2021-02-09 PROCEDURE — 80048 BASIC METABOLIC PNL TOTAL CA: CPT

## 2021-02-09 RX ORDER — LINEZOLID 2 MG/ML
600 INJECTION, SOLUTION INTRAVENOUS EVERY 12 HOURS
Status: DISCONTINUED | OUTPATIENT
Start: 2021-02-09 | End: 2021-02-09

## 2021-02-09 RX ADMIN — PROPOFOL 10 MCG/KG/MIN: 10 INJECTION, EMULSION INTRAVENOUS at 03:49

## 2021-02-09 RX ADMIN — Medication 10 ML: at 13:39

## 2021-02-09 RX ADMIN — CHLORHEXIDINE GLUCONATE 15 ML: 0.12 RINSE ORAL at 21:08

## 2021-02-09 RX ADMIN — INSULIN LISPRO 4 UNITS: 100 INJECTION, SOLUTION INTRAVENOUS; SUBCUTANEOUS at 00:53

## 2021-02-09 RX ADMIN — QUETIAPINE FUMARATE 100 MG: 100 TABLET ORAL at 09:42

## 2021-02-09 RX ADMIN — INSULIN LISPRO 2 UNITS: 100 INJECTION, SOLUTION INTRAVENOUS; SUBCUTANEOUS at 04:03

## 2021-02-09 RX ADMIN — DEXMEDETOMIDINE HYDROCHLORIDE 1.2 MCG/KG/HR: 400 INJECTION INTRAVENOUS at 06:32

## 2021-02-09 RX ADMIN — ALLOPURINOL 100 MG: 100 TABLET ORAL at 09:42

## 2021-02-09 RX ADMIN — DOCUSATE SODIUM 100 MG: 50 LIQUID ORAL at 17:33

## 2021-02-09 RX ADMIN — INSULIN LISPRO 3 UNITS: 100 INJECTION, SOLUTION INTRAVENOUS; SUBCUTANEOUS at 08:00

## 2021-02-09 RX ADMIN — INSULIN LISPRO 3 UNITS: 100 INJECTION, SOLUTION INTRAVENOUS; SUBCUTANEOUS at 13:19

## 2021-02-09 RX ADMIN — DOCUSATE SODIUM 100 MG: 50 LIQUID ORAL at 09:42

## 2021-02-09 RX ADMIN — GABAPENTIN 300 MG: 250 SOLUTION ORAL at 09:10

## 2021-02-09 RX ADMIN — CEFEPIME 2 G: 2 INJECTION, POWDER, FOR SOLUTION INTRAVENOUS at 00:54

## 2021-02-09 RX ADMIN — OXYCODONE HYDROCHLORIDE AND ACETAMINOPHEN 500 MG: 500 TABLET ORAL at 17:33

## 2021-02-09 RX ADMIN — MINERAL SUPPLEMENT IRON 300 MG / 5 ML STRENGTH LIQUID 100 PER BOX UNFLAVORED 300 MG: at 11:06

## 2021-02-09 RX ADMIN — LANSOPRAZOLE 30 MG: KIT at 06:32

## 2021-02-09 RX ADMIN — DEXMEDETOMIDINE HYDROCHLORIDE 0.6 MCG/KG/HR: 400 INJECTION INTRAVENOUS at 10:21

## 2021-02-09 RX ADMIN — GABAPENTIN 300 MG: 250 SOLUTION ORAL at 15:24

## 2021-02-09 RX ADMIN — ATORVASTATIN CALCIUM 40 MG: 40 TABLET, FILM COATED ORAL at 21:06

## 2021-02-09 RX ADMIN — DEXMEDETOMIDINE HYDROCHLORIDE 1.2 MCG/KG/HR: 400 INJECTION INTRAVENOUS at 02:37

## 2021-02-09 RX ADMIN — DOXYCYCLINE 100 MG: 100 INJECTION, POWDER, LYOPHILIZED, FOR SOLUTION INTRAVENOUS at 22:00

## 2021-02-09 RX ADMIN — DEXMEDETOMIDINE HYDROCHLORIDE 1.2 MCG/KG/HR: 400 INJECTION INTRAVENOUS at 21:21

## 2021-02-09 RX ADMIN — DOXYCYCLINE 100 MG: 100 INJECTION, POWDER, LYOPHILIZED, FOR SOLUTION INTRAVENOUS at 11:11

## 2021-02-09 RX ADMIN — OXYCODONE HYDROCHLORIDE AND ACETAMINOPHEN 500 MG: 500 TABLET ORAL at 09:42

## 2021-02-09 RX ADMIN — ENOXAPARIN SODIUM 30 MG: 30 INJECTION SUBCUTANEOUS at 09:42

## 2021-02-09 RX ADMIN — Medication 10 ML: at 06:29

## 2021-02-09 RX ADMIN — INSULIN GLARGINE 40 UNITS: 100 INJECTION, SOLUTION SUBCUTANEOUS at 09:12

## 2021-02-09 RX ADMIN — QUETIAPINE FUMARATE 100 MG: 100 TABLET ORAL at 15:26

## 2021-02-09 RX ADMIN — ENOXAPARIN SODIUM 30 MG: 30 INJECTION SUBCUTANEOUS at 21:06

## 2021-02-09 RX ADMIN — CEFEPIME 2 G: 2 INJECTION, POWDER, FOR SOLUTION INTRAVENOUS at 13:20

## 2021-02-09 RX ADMIN — Medication 125 MCG/HR: at 10:19

## 2021-02-09 RX ADMIN — CHLORHEXIDINE GLUCONATE 15 ML: 0.12 RINSE ORAL at 09:42

## 2021-02-09 RX ADMIN — DEXMEDETOMIDINE HYDROCHLORIDE 1.2 MCG/KG/HR: 400 INJECTION INTRAVENOUS at 13:39

## 2021-02-09 RX ADMIN — Medication 10 ML: at 21:08

## 2021-02-09 RX ADMIN — LINEZOLID 600 MG: 600 INJECTION, SOLUTION INTRAVENOUS at 10:26

## 2021-02-09 RX ADMIN — Medication 100 MCG/HR: at 21:38

## 2021-02-09 RX ADMIN — CHOLECALCIFEROL (VITAMIN D3) 25 MCG (1,000 UNIT) TABLET 2000 UNITS: TABLET at 09:42

## 2021-02-09 RX ADMIN — ASPIRIN 81 MG: 81 TABLET, CHEWABLE ORAL at 09:42

## 2021-02-09 RX ADMIN — ALTEPLASE 1 MG: 2.2 INJECTION, POWDER, LYOPHILIZED, FOR SOLUTION INTRAVENOUS at 06:29

## 2021-02-09 RX ADMIN — DEXMEDETOMIDINE HYDROCHLORIDE 1.2 MCG/KG/HR: 400 INJECTION INTRAVENOUS at 17:33

## 2021-02-09 RX ADMIN — INSULIN LISPRO 3 UNITS: 100 INJECTION, SOLUTION INTRAVENOUS; SUBCUTANEOUS at 15:29

## 2021-02-09 RX ADMIN — QUETIAPINE FUMARATE 100 MG: 100 TABLET ORAL at 21:32

## 2021-02-09 RX ADMIN — Medication 1 CAPSULE: at 09:42

## 2021-02-09 NOTE — WOUND CARE
Wound Care Note:     Follow-up visit for posterior neck wound    Patient on Droplet Plus Precautions for COVID-19 positive  PPE:  N95, faceshield, gown and gloves. Chart shows:  Admitted for acute hypoxemic respiratory failure due to COVID-19  Past Medical History:   Diagnosis Date    Diabetes ([facility] [address]     Hypertension      WBC = 10.3 on 2/9/21  Admitted from [facility] 31:   Patient is alert, intubated, nods head yes or no, incontinent with moderate assistance needed in repositioning. Bed: In Touch [place]  Patient has a Bourgeois. Diet: NPO- tube feeding  Patient did not moan or grimace with repositioning or wound care. Bilateral heels, buttocks, and sacral skin intact and without erythema. 1. POA posterior neck wound looks good, wound measures 3.5 cm x 4 cm x 0.1 cm, wound bed is pink, light scattered granulation tissue, small serous drainage, wound edges are open, carlene-wound intact without erythema. Opticell AG and Optifoam gentle applied. 2.  POA fissure in gluteal cleft is improving, approximately 0.5 cm x 0.1 cm x 0.1 cm, wound bed is pink, no drainage, wound edges are open, carlene-wound intact. Z guard paste applied. PLAN:  To follow up next Tuesday with Zia Leavitt NP, if available. Patient repositioned on right side. Heels offloaded on pillows. Recommendations:    Continue with current wound care. Posterior neck- Every other day cleanse with normal saline, wipe wound bed clean and dry, apply a piece of Opticell AG and cover with Optifoam Gentle (or other dressing that will remain in place).     Gluteal cleft- Every 12 hours and as needed apply Z guard paste (orange tube). Skin Care & Pressure Prevention:  Minimize layers of linen/pads under patient to optimize support surface. Turn/reposition approximately every 2 hours and offload heels.   Manage incontinence / promote continence   Nourishing Skin Cream to dry skin, minimize use of briefs when able    Discussed above plan with patient & Dianelys Shannon RN    Transition of Care: Plan to follow as needed while admitted to hospital.    SONAM RoeN, RN, [facility], [facility].  office [phone]  pager [phone] or call  to page

## 2021-02-09 NOTE — PROGRESS NOTES
1930: Bedside and Verbal shift change report given to Jacqueline1 MARGE Dohertyy 98 (oncoming nurse) by Charles Jamison RN (offgoing nurse). Report included the following information SBAR, Kardex, ED Summary, Procedure Summary, Intake/Output, MAR, Recent Results, Cardiac Rhythm NSR and Alarm Parameters . 0730: Bedside and Verbal shift change report given to Lauren Collins (oncoming nurse) by Angely Olivas RN (offgoing nurse). Report included the following information SBAR, Kardex, ED Summary, Procedure Summary, Intake/Output, MAR, Recent Results, Cardiac Rhythm NSR and Alarm Parameters .

## 2021-02-09 NOTE — PROGRESS NOTES
[facility]    ICU TEAM Progress Note    Name: Kenyetta Momin   : 1955   MRN: [id number]   Date: 2021        Subjective:   Progress Note: 2021      Reason for ICU Admission: Acute hypoxic respiratory failure, COVID-19 pneumonia    Interval history: From [facility] on  63year-old man who presented from [facility] with acute onset shortness of breath, hypoxia, nausea, vomiting and diarrhea-shortness of breath acutely got worse after one of the vomiting episodes. Diagnosed with Covid about 2 weeks prior to presentation. Patient placed on BiPAP on arrival-felt much better. After getting fluids for resuscitation/elevated lactate level patient acutely decompensated-developed lethargy and drop in oxygen levels-intubated emergently requiring moderate to high vent support.     Patient was at [facility] because he he was getting IV antibiotics for neck infection and was going to have a skin graft    Interval changes       - Remains intubated      - Self extubated - reintubated several hours later for worsening hypoxia and agitation.      2/2 - No acute events overnight     2/3 - No acute events overnight. Perplexing micro data returning. Continuing to wean FiO2.     : SHELBY overnight. Overnight Events:   Failed SBT overnight due to mental status again on morning of     Active Problem List:     Problem List  Never Reviewed          Codes Class    Acute hypoxemic respiratory failure due to COVID-19 [facility]) ICD-10-CM: U07.1, J96.01  ICD-9-CM: 518.81, 079.89, 799.02               Past Medical History:      has a past medical history of Diabetes ([facility] [address] and Hypertension. Past Surgical History:      has no past surgical history on file. Home Medications:     Prior to Admission medications    Medication Sig Start Date End Date Taking? Authorizing Provider   aspirin 81 mg chewable tablet Take 81 mg by mouth daily.    Yes Other, MD Jessi   atorvastatin (LIPITOR) 80 mg tablet Take 80 mg by mouth daily. Yes Candelaria, MD Jessi   carvediloL (COREG) 12.5 mg tablet Take  by mouth two (2) times a day. Yes Jessi Gaona MD   docusate sodium (Colace) 100 mg capsule Take 100 mg by mouth two (2) times daily as needed for Constipation. Yes Candelaria, MD Jessi   doxycycline (ADOXA) 100 mg tablet Take 100 mg by mouth two (2) times a day. Yes Candelaria, MD Jessi   ferrous sulfate 325 mg (65 mg iron) tablet Take  by mouth every fourty-eight (48) hours. Yes Jessi Gaona MD   gabapentin (NEURONTIN) 400 mg capsule Take 400 mg by mouth three (3) times daily. Yes Jessi Gaona MD   insulin lispro (HUMALOG) 100 unit/mL injection 10 Units by SubCUTAneous route Before breakfast, lunch, and dinner. Yes Jessi Gaona MD   isosorbide mononitrate ER (IMDUR) 60 mg CR tablet Take 60 mg by mouth every morning. Yes Jessi Gaona MD   insulin glargine (Lantus U-100 Insulin) 100 unit/mL injection 20 Units by SubCUTAneous route nightly. Yes Candelaria, MD Jessi   lisinopriL (PRINIVIL, ZESTRIL) 20 mg tablet Take 20 mg by mouth daily. Yes Jessi Gaona MD   methocarbamoL (ROBAXIN) 500 mg tablet Take 500 mg by mouth three (3) times daily. Yes Jessi Gaona MD   oxyCODONE IR (ROXICODONE) 5 mg immediate release tablet Take 5 mg by mouth every six (6) hours as needed for Pain. Yes Candelaria, MD Jessi   pantoprazole (PROTONIX) 40 mg tablet Take 40 mg by mouth daily. Yes Candelaria, MD Jessi   guaiFENesin-codeine (Guaiatussin AC) 100-10 mg/5 mL solution Take 5 mL by mouth four (4) times daily as needed for Cough. Yes Jessi Gaona MD   senna (Senna) 8.6 mg tablet Take 1 Tab by mouth daily. Yes Jessi Gaona MD   ticagrelor (BRILINTA) 90 mg tablet Take 90 mg by mouth two (2) times a day. Yes Candelaria, MD Jessi   ascorbic acid, vitamin C, (Vitamin C) 500 mg tablet Take 500 mg by mouth two (2) times a day. Yes Jessi Gaona MD   cholecalciferol (Vitamin D3) (1000 Units /25 mcg) tablet Take 1,000 Units by mouth daily.    Yes Other, MD Jessi   zinc sulfate (ZINCATE) 220 (50) mg capsule Take 220 mg by mouth daily. Yes Provider, Historical   therapeutic multivitamin (THERAGRAN) tablet Take 1 Tab by mouth daily. Yes Provider, Historical   acetaminophen (TYLENOL) 325 mg tablet Take 975 mg by mouth every six (6) hours as needed for Pain. Other, MD Jessi       Allergies/Social/Family History:     No Known Allergies   Social History     Tobacco Use    Smoking status: Former Smoker    Smokeless tobacco: Never Used   Substance Use Topics    Alcohol use: Not Currently      No family history on file. Review of Systems:     Not able to obtain due to patient medical condition    Objective:   Vital Signs:  Visit Vitals  /67   Pulse 76   Temp 97.5 °F (36.4 °C)   Resp 18   Ht 5' 7\" (1.702 m)   Wt 86 kg (189 lb 9.5 oz)   SpO2 100%   BMI 29.69 kg/m²    O2 Flow Rate (L/min): 6 l/min O2 Device: Endotracheal tube, Ventilator Temp (24hrs), Av.5 °F (36.9 °C), Min:97.5 °F (36.4 °C), Max:99.2 °F (37.3 °C)           Intake/Output:     Intake/Output Summary (Last 24 hours) at 2021 0959  Last data filed at 2021 0900  Gross per 24 hour   Intake 4200.26 ml   Output 2175 ml   Net 2025.26 ml       Physical Exam:  General-intubated, sedated, comfortable  Neuro-pupils reactive, withdraws in uppers, strong cough and gag  Cardiac-tachycardic, regular  Lungs-coarse bilaterally  Abdomen-soft, nontender, nondistended  Extremities-warm    LABS AND  DATA: Personally reviewed  Recent Labs     21  03421  0407   WBC 10.3 9.0   HGB 8.1* 8.2*   HCT 26.4* 26.7*    179     Recent Labs     21  0346 21  0410   * 146*   K 4.6 5.0   * 124*   CO2 19* 18*   BUN 94* 94*   CREA 2.27* 2.03*   * 275*   CA 8.4* 8.3*   MG 2.4 2.3   PHOS 5.0* 5.0*     No results for input(s): AP, TBIL, TP, ALB, GLOB, AML, LPSE in the last 72 hours.     No lab exists for component: SGOT, GPT, AMYP  Recent Labs     21  1013 21  0410   APTT 24.7 26.0      No results for input(s): PHI, PCO2I, PO2I, FIO2I in the last 72 hours. No results for input(s): CPK, CKMB, TROIQ, BNPP in the last 72 hours. Hemodynamics:   PAP:   CO:     Wedge:   CI:     CVP:    SVR:       PVR:       Ventilator Settings:  Mode Rate Tidal Volume Pressure FiO2 PEEP   SIMV, Volume control   450 ml  10 cm H2O 30 % 5 cm H20     Peak airway pressure: 15 cm H2O    Minute ventilation: 7.46 l/min        MEDS: Reviewed    Chest X-Ray:  CXR Results  (Last 48 hours)    None          Assessment and Plan:   Acute hypoxic respiratory failure  Pulmonary edema  Covid pneumonia  Aspiration pneumonitis  Tachycardia secondary to above  Acute kidney injury    Neuro: Decreased LOC, failed SBT secondary to neuro status, will consider CT once stable. - Increase seroquel to 100mg q8h  - Continue gabapentin  - Propofol weaned off, minimal neurological response. - On Precedex and fentanyl    - Plans for eeg and CT once more stable     Pulm: Needs SBT once more awake. Has been intubated since 1/30, need to start thinking about trach given his mental status is the limiting factor in extubation.   - Continue to wean vent as able, SIMV, 30%, PEEP 5.   - Daily SAT/SBT.      CV: No active issues  - EKG to check qTc given increase in antipsychotics. - Cont ASA, lipitor, midodrine     GI:   - Lansoprazole  - TFs, please advance to goal.     Renal:  - Continue bird for strict I/Os. Endocrine:  - dex 6mg every day  - Lantus 22u daily, will increase to 40u daily  - SSI    Heme:  - ASA  - Statin  - Lovenox 30mg q12h    ID: Previously being treated for neck infection when diagnosed with COVID. Mycoplasma positive being treated with doxycycline. ID following.   - Cefepime, doxycycline, vancomycin to continue per ID.   - Please culture if he becomes febrile.      DISPOSITION  Stay in ICU    CRITICAL CARE CONSULTANT NOTE  I had a face to face encounter with the patient, reviewed and interpreted patient data including clinical events, labs, images, vital signs, I/O's, and examined patient. I have discussed the case and the plan and management of the patient's care with the consulting services, the bedside nurses and the respiratory therapist.      NOTE OF PERSONAL INVOLVEMENT IN CARE   This patient has a high probability of imminent, clinically significant deterioration, which requires the highest level of preparedness to intervene urgently. I participated in the decision-making and personally managed or directed the management of the following life and organ supporting interventions that required my frequent assessment to treat or prevent imminent deterioration. I personally spent 45 minutes of critical care time. This is time spent at this critically ill patient's bedside actively involved in patient care as well as the coordination of care and discussions with the patient's family. This does not include any procedural time which has been billed separately.     Sarha Mcgovern NP    [facility], [place] [facility]  2/9/2021

## 2021-02-09 NOTE — PROGRESS NOTES
ID Progress Note  2021    Subjective: Intubated it  Review of Systems:            Symptom Y/N Comments   Symptom Y/N Comments   Fever/Chills       Chest Pain        Poor Appetite       Edema        Cough       Abdominal Pain        Sputum       Joint Pain        SOB/WEBB       Pruritis/Rash        Nausea/vomit       Tolerating PT/OT        Diarrhea       Tolerating Diet        Constipation       Other           Could NOT obtain due to:       Objective:     Vitals:   Visit Vitals  /70   Pulse 69   Temp 98.1 °F (36.7 °C)   Resp 16   Ht 5' 7\" (1.702 m)   Wt 86 kg (189 lb 9.5 oz)   SpO2 100%   BMI 29.69 kg/m²        Tmax:  Temp (24hrs), Av.7 °F (37.1 °C), Min:98.1 °F (36.7 °C), Max:99.2 °F (37.3 °C)      PHYSICAL EXAM:  General: Intubated it, no acute distress    EENT:  Pupils are reactive to light,  Anicteric sclerae. MMM  Resp:  Clear in apex with decreased breath sounds at bases, no wheezing or rales. No accessory muscle use  CV:  Regular  rhythm,  No edema  GI:  Soft, Non distended,  unable to appreciate Bowel sounds  Neurologic:  Intubated it  Psych:            Unable to assess insight. Appear Not anxious nor agitated  Skin:  No rashes.   No jaundice, posterior mid cervical dressing dry and intact    Labs:   Lab Results   Component Value Date/Time    WBC 10.3 2021 03:49 AM    HGB 8.1 (L) 2021 03:49 AM    HCT 26.4 (L) 2021 03:49 AM    PLATELET 399  03:49 AM    MCV 84.3 2021 03:49 AM     Lab Results   Component Value Date/Time    Sodium 148 (H) 2021 03:46 AM    Potassium 4.6 2021 03:46 AM    Chloride 125 (H) 2021 03:46 AM    CO2 19 (L) 2021 03:46 AM    Anion gap 4 (L) 2021 03:46 AM    Glucose 170 (H) 2021 03:46 AM    BUN 94 (H) 2021 03:46 AM    Creatinine 2.27 (H) 2021 03:46 AM    BUN/Creatinine ratio 41 (H) 2021 03:46 AM    GFR est AA 36 (L) 2021 03:46 AM    GFR est non-AA 29 (L) 2021 03:46 AM Calcium 8.4 (L) 02/09/2021 03:46 AM    Bilirubin, total 0.3 02/01/2021 03:59 AM    Alk. phosphatase 100 02/01/2021 03:59 AM    Protein, total 6.2 (L) 02/01/2021 03:59 AM    Albumin 1.9 (L) 02/01/2021 03:59 AM    Globulin 4.3 (H) 02/01/2021 03:59 AM    A-G Ratio 0.4 (L) 02/01/2021 03:59 AM    ALT (SGPT) 359 (H) 02/01/2021 03:59 AM       Assessment and Plan   Acute respiratory failure secondary to COVID 19 PNA  mycoplasma and s. pneumoniae   - Mycoplasma IgG  1411, S Pneumo (+)    Legionella (-)    T-max 99.5, WBC 9    Blood cx (1/30) no growth    Blood cx (2/4) no growth so far    resp cx (2/5) candida albicans - oropharyngeal contamination     Quantiferon; pending    CTA chest: no PE, airspace opacities in bilateral lower lobe consolidation. Supportive care for COVID 19      Continue with IV cefepime, doxycycline;  will start deescalate the ABX. D/c Zyvox x1 on 2/9 (was changed from Vancomycin due to worsening renal function)    vancomycin 1/30-2/9    IV Flagyl 1/30-2/4       fever work up every 24 hours if temp >= 100.4     posterior mid cervical wound; appreciate wound care team's input     Mid posterior cervical wound infection  - chronic wound; pt was waiting for skin graft according to the provider at [facility], Dr. Wilbur Khoury    Wound cx (1/31) LIGHT DIPHTHEROIDS     Wound measurement per our wound care team; 3.5 cm x 4 cm x 0.1 cm.      2/9: Spoke with the Managing provider at [facility], Dr. Wilbur Khoury, [phone]. Pt had infected cyst removed about one month ago, completed IV ABX therapy via PICC line which was removed after the last dose. Dr. Wilbur Khoury was not able to provide the surgeon's name and original wound cx result. At this point, we will continue the wound care per our wound care team recommendation. Pt should follow up with the surgeon upon discharge. Wound care nurse, Ms. Gio Mora [phone].  According to the wound care nurse, the wound was created after the removal of infected cyst.     JUAN C  - creat 2.0->2.27        Geoffrey Slaughter, NP

## 2021-02-09 NOTE — PROGRESS NOTES
DIEGO  Patient admitted from [facility] with hypoxia, COVID positive.   RUR 31 %  Disposition: [facility] has accepted patient once medically stable.    Patient remains in the ICU, vented, sedated on Propofol and Precedex gtts. Care management is continuing to follow.   Carmelita Lemus RN,Care Management

## 2021-02-09 NOTE — PROGRESS NOTES
0730- Verbal shift change report given to Elaine Szymanski RN (oncoming nurse) by Valeria Gonzalez RN (offgoing nurse). Report included the following information SBAR, Kardex, Intake/Output, MAR, Recent Results and Cardiac Rhythm NSR. Shift summary-pt. On  precedex 1.2 mcg/kg/hr, fentanyl 125 mcg/hr, propofol off. When sedation is paused, patient follows commands, nods appropriately. Eyes open spontaneously. SR, VSS, cardene remains stopped. Vent settings unchanged through day. Bourgeois in place, with adequate urine output. No BM throughout day.   Family updated

## 2021-02-09 NOTE — PROGRESS NOTES
Bedside and Verbal shift change report given to reji alvarado rn (oncoming nurse) by Mehdi Smith rn (offgoing nurse). Report included the following information SBAR, Kardex, Intake/Output, MAR, Recent Results, Cardiac Rhythm normaql sinus pvcs and Alarm Parameters . pt alert and calm through entire shift    1800 slightly agitated and pulling at lines so propofol restarted    ECG showed no changes    Bedside and Verbal shift change report given to Elmo Ybarra 71 (oncoming nurse) by Danielle Ann RN (offgoing nurse). Report included the following information SBAR, Kardex, Intake/Output, Recent Results, Cardiac Rhythm normal sinus with PVCs and Alarm Parameters .

## 2021-02-10 LAB
AMORPH CRY URNS QL MICRO: ABNORMAL
ANION GAP SERPL CALC-SCNC: 9 MMOL/L (ref 5–15)
APPEARANCE UR: ABNORMAL
ARTERIAL PATENCY WRIST A: YES
BACTERIA URNS QL MICRO: NEGATIVE /HPF
BASE DEFICIT BLDA-SCNC: 10.3 MMOL/L
BASE DEFICIT BLDV-SCNC: 6.7 MMOL/L
BASOPHILS # BLD: 0 K/UL (ref 0–0.1)
BASOPHILS NFR BLD: 0 % (ref 0–1)
BDY SITE: ABNORMAL
BDY SITE: ABNORMAL
BILIRUB UR QL: NEGATIVE
BODY TEMPERATURE: 37
BUN SERPL-MCNC: 98 MG/DL (ref 6–20)
BUN/CREAT SERPL: 40 (ref 12–20)
CALCIUM SERPL-MCNC: 8.5 MG/DL (ref 8.5–10.1)
CHLORIDE SERPL-SCNC: 124 MMOL/L (ref 97–108)
CO2 SERPL-SCNC: 16 MMOL/L (ref 21–32)
COLOR UR: ABNORMAL
CREAT SERPL-MCNC: 2.44 MG/DL (ref 0.7–1.3)
CREAT UR-MCNC: 29.9 MG/DL
DIFFERENTIAL METHOD BLD: ABNORMAL
EOSINOPHIL # BLD: 0.3 K/UL (ref 0–0.4)
EOSINOPHIL NFR BLD: 3 % (ref 0–7)
EPITH CASTS URNS QL MICRO: ABNORMAL /LPF
ERYTHROCYTE [DISTWIDTH] IN BLOOD BY AUTOMATED COUNT: 18.7 % (ref 11.5–14.5)
FIO2 ON VENT: 35 %
GAS FLOW.O2 SETTING OXYMISER: 16 L/MIN
GLUCOSE BLD STRIP.AUTO-MCNC: 118 MG/DL (ref 65–100)
GLUCOSE BLD STRIP.AUTO-MCNC: 121 MG/DL (ref 65–100)
GLUCOSE BLD STRIP.AUTO-MCNC: 122 MG/DL (ref 65–100)
GLUCOSE BLD STRIP.AUTO-MCNC: 144 MG/DL (ref 65–100)
GLUCOSE BLD STRIP.AUTO-MCNC: 95 MG/DL (ref 65–100)
GLUCOSE SERPL-MCNC: 112 MG/DL (ref 65–100)
GLUCOSE UR STRIP.AUTO-MCNC: NEGATIVE MG/DL
GRAN CASTS URNS QL MICRO: ABNORMAL /LPF
HCO3 BLDA-SCNC: 14 MMOL/L (ref 22–26)
HCO3 BLDV-SCNC: 19 MMOL/L (ref 23–28)
HCT VFR BLD AUTO: 26.2 % (ref 36.6–50.3)
HGB BLD-MCNC: 8 G/DL (ref 12.1–17)
HGB UR QL STRIP: ABNORMAL
HYALINE CASTS URNS QL MICRO: ABNORMAL /LPF (ref 0–5)
IMM GRANULOCYTES # BLD AUTO: 0.1 K/UL (ref 0–0.04)
IMM GRANULOCYTES NFR BLD AUTO: 1 % (ref 0–0.5)
KETONES UR QL STRIP.AUTO: NEGATIVE MG/DL
LEUKOCYTE ESTERASE UR QL STRIP.AUTO: NEGATIVE
LYMPHOCYTES # BLD: 2.1 K/UL (ref 0.8–3.5)
LYMPHOCYTES NFR BLD: 24 % (ref 12–49)
MAGNESIUM SERPL-MCNC: 2.3 MG/DL (ref 1.6–2.4)
MCH RBC QN AUTO: 25.6 PG (ref 26–34)
MCHC RBC AUTO-ENTMCNC: 30.5 G/DL (ref 30–36.5)
MCV RBC AUTO: 84 FL (ref 80–99)
MONOCYTES # BLD: 0.7 K/UL (ref 0–1)
MONOCYTES NFR BLD: 8 % (ref 5–13)
NEUTS SEG # BLD: 5.9 K/UL (ref 1.8–8)
NEUTS SEG NFR BLD: 65 % (ref 32–75)
NITRITE UR QL STRIP.AUTO: NEGATIVE
NRBC # BLD: 0 K/UL (ref 0–0.01)
NRBC BLD-RTO: 0 PER 100 WBC
PCO2 BLDA: 28 MMHG (ref 35–45)
PCO2 BLDV: 39.9 MMHG (ref 41–51)
PEEP RESPIRATORY: 5 CM[H2O]
PH BLDA: 7.32 [PH] (ref 7.35–7.45)
PH BLDV: 7.3 [PH] (ref 7.32–7.42)
PH UR STRIP: 5 [PH] (ref 5–8)
PHOSPHATE SERPL-MCNC: 5.4 MG/DL (ref 2.6–4.7)
PLATELET # BLD AUTO: 157 K/UL (ref 150–400)
PO2 BLDA: 98 MMHG (ref 80–100)
PO2 BLDV: 38 MMHG (ref 25–40)
POTASSIUM SERPL-SCNC: 4.2 MMOL/L (ref 3.5–5.1)
PRESSURE SUPPORT SETTING VENT: 10 CM[H2O]
PROT UR STRIP-MCNC: 100 MG/DL
PROT UR-MCNC: 143 MG/DL (ref 0–11.9)
PROT/CREAT UR-RTO: 4.8
RBC # BLD AUTO: 3.12 M/UL (ref 4.1–5.7)
RBC #/AREA URNS HPF: ABNORMAL /HPF (ref 0–5)
SAO2 % BLD: 97 % (ref 92–97)
SAO2 % BLDV: 67 % (ref 65–88)
SAO2% DEVICE SAO2% SENSOR NAME: ABNORMAL
SAO2% DEVICE SAO2% SENSOR NAME: ABNORMAL
SERVICE CMNT-IMP: ABNORMAL
SERVICE CMNT-IMP: NORMAL
SODIUM SERPL-SCNC: 149 MMOL/L (ref 136–145)
SP GR UR REFRACTOMETRY: 1.01 (ref 1–1.03)
SPECIMEN SITE: ABNORMAL
SPECIMEN SITE: ABNORMAL
UR CULT HOLD, URHOLD: NORMAL
URATE SERPL-MCNC: 4.6 MG/DL (ref 3.5–7.2)
UROBILINOGEN UR QL STRIP.AUTO: 0.2 EU/DL (ref 0.2–1)
VENTILATION MODE VENT: ABNORMAL
VT SETTING VENT: 450 ML
WAXY CASTS URNS QL MICRO: ABNORMAL /LPF
WBC # BLD AUTO: 9 K/UL (ref 4.1–11.1)
WBC URNS QL MICRO: ABNORMAL /HPF (ref 0–4)

## 2021-02-10 PROCEDURE — 74011000250 HC RX REV CODE- 250: Performed by: ANESTHESIOLOGY

## 2021-02-10 PROCEDURE — 77030018798 HC PMP KT ENTRL FED COVD -A

## 2021-02-10 PROCEDURE — 74011250637 HC RX REV CODE- 250/637: Performed by: NURSE PRACTITIONER

## 2021-02-10 PROCEDURE — 84550 ASSAY OF BLOOD/URIC ACID: CPT

## 2021-02-10 PROCEDURE — 36415 COLL VENOUS BLD VENIPUNCTURE: CPT

## 2021-02-10 PROCEDURE — 84484 ASSAY OF TROPONIN QUANT: CPT

## 2021-02-10 PROCEDURE — 80048 BASIC METABOLIC PNL TOTAL CA: CPT

## 2021-02-10 PROCEDURE — 82962 GLUCOSE BLOOD TEST: CPT

## 2021-02-10 PROCEDURE — 74011636637 HC RX REV CODE- 636/637: Performed by: NURSE PRACTITIONER

## 2021-02-10 PROCEDURE — 74011250636 HC RX REV CODE- 250/636: Performed by: ANESTHESIOLOGY

## 2021-02-10 PROCEDURE — 84156 ASSAY OF PROTEIN URINE: CPT

## 2021-02-10 PROCEDURE — 74011000250 HC RX REV CODE- 250: Performed by: INTERNAL MEDICINE

## 2021-02-10 PROCEDURE — 85025 COMPLETE CBC W/AUTO DIFF WBC: CPT

## 2021-02-10 PROCEDURE — 74011250637 HC RX REV CODE- 250/637: Performed by: ANESTHESIOLOGY

## 2021-02-10 PROCEDURE — 81001 URINALYSIS AUTO W/SCOPE: CPT

## 2021-02-10 PROCEDURE — 82803 BLOOD GASES ANY COMBINATION: CPT

## 2021-02-10 PROCEDURE — 83735 ASSAY OF MAGNESIUM: CPT

## 2021-02-10 PROCEDURE — 74011250636 HC RX REV CODE- 250/636: Performed by: STUDENT IN AN ORGANIZED HEALTH CARE EDUCATION/TRAINING PROGRAM

## 2021-02-10 PROCEDURE — 74011250636 HC RX REV CODE- 250/636: Performed by: NURSE PRACTITIONER

## 2021-02-10 PROCEDURE — 74011250637 HC RX REV CODE- 250/637: Performed by: EMERGENCY MEDICINE

## 2021-02-10 PROCEDURE — 36600 WITHDRAWAL OF ARTERIAL BLOOD: CPT

## 2021-02-10 PROCEDURE — 74011250636 HC RX REV CODE- 250/636: Performed by: EMERGENCY MEDICINE

## 2021-02-10 PROCEDURE — 74011000250 HC RX REV CODE- 250: Performed by: NURSE PRACTITIONER

## 2021-02-10 PROCEDURE — 65610000006 HC RM INTENSIVE CARE

## 2021-02-10 PROCEDURE — 84100 ASSAY OF PHOSPHORUS: CPT

## 2021-02-10 PROCEDURE — 74011000258 HC RX REV CODE- 258: Performed by: INTERNAL MEDICINE

## 2021-02-10 PROCEDURE — 74011000250 HC RX REV CODE- 250: Performed by: STUDENT IN AN ORGANIZED HEALTH CARE EDUCATION/TRAINING PROGRAM

## 2021-02-10 PROCEDURE — 74011000258 HC RX REV CODE- 258: Performed by: NURSE PRACTITIONER

## 2021-02-10 PROCEDURE — 93005 ELECTROCARDIOGRAM TRACING: CPT

## 2021-02-10 RX ORDER — SODIUM BICARBONATE 84 MG/ML
100 INJECTION, SOLUTION INTRAVENOUS
Status: COMPLETED | OUTPATIENT
Start: 2021-02-10 | End: 2021-02-10

## 2021-02-10 RX ORDER — INSULIN GLARGINE 100 [IU]/ML
30 INJECTION, SOLUTION SUBCUTANEOUS DAILY
Status: DISCONTINUED | OUTPATIENT
Start: 2021-02-10 | End: 2021-02-11

## 2021-02-10 RX ORDER — QUETIAPINE FUMARATE 25 MG/1
50 TABLET, FILM COATED ORAL 3 TIMES DAILY
Status: DISCONTINUED | OUTPATIENT
Start: 2021-02-10 | End: 2021-02-15

## 2021-02-10 RX ORDER — INSULIN LISPRO 100 [IU]/ML
INJECTION, SOLUTION INTRAVENOUS; SUBCUTANEOUS EVERY 6 HOURS
Status: DISCONTINUED | OUTPATIENT
Start: 2021-02-10 | End: 2021-02-21

## 2021-02-10 RX ADMIN — Medication 1 CAPSULE: at 09:48

## 2021-02-10 RX ADMIN — CHLORHEXIDINE GLUCONATE 15 ML: 0.12 RINSE ORAL at 21:23

## 2021-02-10 RX ADMIN — ALLOPURINOL 100 MG: 100 TABLET ORAL at 09:47

## 2021-02-10 RX ADMIN — METOPROLOL TARTRATE 5 MG: 5 INJECTION INTRAVENOUS at 06:42

## 2021-02-10 RX ADMIN — LANSOPRAZOLE 30 MG: KIT at 06:30

## 2021-02-10 RX ADMIN — DEXMEDETOMIDINE HYDROCHLORIDE 1 MCG/KG/HR: 400 INJECTION INTRAVENOUS at 17:49

## 2021-02-10 RX ADMIN — DEXMEDETOMIDINE HYDROCHLORIDE 1.1 MCG/KG/HR: 400 INJECTION INTRAVENOUS at 13:16

## 2021-02-10 RX ADMIN — MINERAL SUPPLEMENT IRON 300 MG / 5 ML STRENGTH LIQUID 100 PER BOX UNFLAVORED 300 MG: at 09:48

## 2021-02-10 RX ADMIN — DOXYCYCLINE 100 MG: 100 INJECTION, POWDER, LYOPHILIZED, FOR SOLUTION INTRAVENOUS at 10:05

## 2021-02-10 RX ADMIN — DOXYCYCLINE 100 MG: 100 INJECTION, POWDER, LYOPHILIZED, FOR SOLUTION INTRAVENOUS at 21:23

## 2021-02-10 RX ADMIN — DEXMEDETOMIDINE HYDROCHLORIDE 1.2 MCG/KG/HR: 400 INJECTION INTRAVENOUS at 09:36

## 2021-02-10 RX ADMIN — ENOXAPARIN SODIUM 30 MG: 30 INJECTION SUBCUTANEOUS at 21:25

## 2021-02-10 RX ADMIN — ENOXAPARIN SODIUM 30 MG: 30 INJECTION SUBCUTANEOUS at 09:50

## 2021-02-10 RX ADMIN — DEXMEDETOMIDINE HYDROCHLORIDE 1.1 MCG/KG/HR: 400 INJECTION INTRAVENOUS at 04:50

## 2021-02-10 RX ADMIN — SODIUM BICARBONATE 100 MEQ: 84 INJECTION, SOLUTION INTRAVENOUS at 12:04

## 2021-02-10 RX ADMIN — CHOLECALCIFEROL (VITAMIN D3) 25 MCG (1,000 UNIT) TABLET 2000 UNITS: TABLET at 09:48

## 2021-02-10 RX ADMIN — SODIUM BICARBONATE: 84 INJECTION, SOLUTION INTRAVENOUS at 13:35

## 2021-02-10 RX ADMIN — HYDROMORPHONE HYDROCHLORIDE 0.5 MG: 1 INJECTION, SOLUTION INTRAMUSCULAR; INTRAVENOUS; SUBCUTANEOUS at 22:20

## 2021-02-10 RX ADMIN — HYDROMORPHONE HYDROCHLORIDE 0.5 MG: 1 INJECTION, SOLUTION INTRAMUSCULAR; INTRAVENOUS; SUBCUTANEOUS at 05:39

## 2021-02-10 RX ADMIN — ASPIRIN 81 MG: 81 TABLET, CHEWABLE ORAL at 09:48

## 2021-02-10 RX ADMIN — CEFEPIME 2 G: 2 INJECTION, POWDER, FOR SOLUTION INTRAVENOUS at 12:10

## 2021-02-10 RX ADMIN — GABAPENTIN 300 MG: 250 SOLUTION ORAL at 00:00

## 2021-02-10 RX ADMIN — OXYCODONE HYDROCHLORIDE AND ACETAMINOPHEN 500 MG: 500 TABLET ORAL at 09:47

## 2021-02-10 RX ADMIN — QUETIAPINE FUMARATE 50 MG: 25 TABLET ORAL at 17:47

## 2021-02-10 RX ADMIN — Medication 100 MCG/HR: at 12:10

## 2021-02-10 RX ADMIN — ATORVASTATIN CALCIUM 40 MG: 40 TABLET, FILM COATED ORAL at 21:24

## 2021-02-10 RX ADMIN — GABAPENTIN 300 MG: 250 SOLUTION ORAL at 22:19

## 2021-02-10 RX ADMIN — CHLORHEXIDINE GLUCONATE 15 ML: 0.12 RINSE ORAL at 09:56

## 2021-02-10 RX ADMIN — DEXMEDETOMIDINE HYDROCHLORIDE 1.1 MCG/KG/HR: 400 INJECTION INTRAVENOUS at 00:36

## 2021-02-10 RX ADMIN — Medication 10 ML: at 05:41

## 2021-02-10 RX ADMIN — SODIUM CHLORIDE 5 MG/HR: 900 INJECTION, SOLUTION INTRAVENOUS at 10:02

## 2021-02-10 RX ADMIN — INSULIN GLARGINE 30 UNITS: 100 INJECTION, SOLUTION SUBCUTANEOUS at 09:52

## 2021-02-10 RX ADMIN — DOCUSATE SODIUM 100 MG: 50 LIQUID ORAL at 09:48

## 2021-02-10 RX ADMIN — Medication 10 ML: at 21:23

## 2021-02-10 RX ADMIN — QUETIAPINE FUMARATE 50 MG: 25 TABLET ORAL at 21:23

## 2021-02-10 RX ADMIN — OXYCODONE HYDROCHLORIDE AND ACETAMINOPHEN 500 MG: 500 TABLET ORAL at 17:47

## 2021-02-10 RX ADMIN — GABAPENTIN 300 MG: 250 SOLUTION ORAL at 09:48

## 2021-02-10 RX ADMIN — SODIUM CHLORIDE 7.5 MG/HR: 900 INJECTION, SOLUTION INTRAVENOUS at 13:16

## 2021-02-10 RX ADMIN — GABAPENTIN 300 MG: 250 SOLUTION ORAL at 17:47

## 2021-02-10 RX ADMIN — INSULIN LISPRO 3 UNITS: 100 INJECTION, SOLUTION INTRAVENOUS; SUBCUTANEOUS at 13:20

## 2021-02-10 RX ADMIN — Medication 10 ML: at 13:36

## 2021-02-10 RX ADMIN — METOPROLOL TARTRATE 5 MG: 5 INJECTION INTRAVENOUS at 22:31

## 2021-02-10 RX ADMIN — CEFEPIME 2 G: 2 INJECTION, POWDER, FOR SOLUTION INTRAVENOUS at 00:24

## 2021-02-10 NOTE — CONSULTS
NEPHROLOGY CONSULT NOTE     Patient: Jason Sams MRN: [id number]  PCP: Annie Almanza MD   :     1955  Age:   63 y.o. Sex:  male      Referring physician: Angi Olmstead DO  Reason for consultation: 63 y.o. male with Acute hypoxemic respiratory failure due to COVID-19 [facility]) [T90.8, Q42.61] complicated by JUAN C   Admission Date: 2021 12:48 AM  LOS: 11 days      ASSESSMENT and PLAN :   JUAN C:  - unclear baseline but suspect some component of CKD  - Renal U/S on  confirms CKD  - suspect JUAN C due to COVID-19 related illness  - UA from  with blood and protein bird in place  - repeat UA, PCR  - wean cardene as able  - start bicarb infusion  - daily labs  - no urgent need for RRT at this time    Hypernatremia:  - FW flushes via NGT    Nongap acidosis:  - start bicarb drip  - acidemia improving    COVID-19 PNA:  - abx and steroids per CCM team    Resp failure:  - vent dependent  - moving toward trach    DM2:  - on insulin     Active Problems / Assessment AAActive  : Active Problems:    [facility] ([facility])         Subjective:   HPI: Jason Sams is a 63 y.o.  male who has been admitted to the hospital for SOB and hypoxia on . He has a hx of DM, HTN, recent COVID-23. Res[place] at [facility] [place] [place] prior to admission. He was intubated on admission. and given fluids. Cr on admission was 1.7, unclear of baseline, last Cr was normal in . Cr rising slowly and is 2.4 today. Pt is on cardene for HTN. Nonoliguric, making about 100-200cc/hr per RN. Pt vent dependent, failing SBTs. Remains sedated this AM.  Not examined in the room. Past Medical Hx:   Past Medical History:   Diagnosis Date    Diabetes ([facility] [address]     Hypertension         Past Surgical Hx:   No past surgical history on file. Medications:  Prior to Admission medications    Medication Sig Start Date End Date Taking?  Authorizing Provider   aspirin 81 mg chewable tablet Take 81 mg by mouth daily. Yes Candelaria, MD Jessi   atorvastatin (LIPITOR) 80 mg tablet Take 80 mg by mouth daily. Yes Jessi Gaona MD   carvediloL (COREG) 12.5 mg tablet Take  by mouth two (2) times a day. Yes Jessi Gaona MD   docusate sodium (Colace) 100 mg capsule Take 100 mg by mouth two (2) times daily as needed for Constipation. Yes Jessi Gaona MD   doxycycline (ADOXA) 100 mg tablet Take 100 mg by mouth two (2) times a day. Yes Jessi Gaona MD   ferrous sulfate 325 mg (65 mg iron) tablet Take  by mouth every fourty-eight (48) hours. Yes Jessi Gaona MD   gabapentin (NEURONTIN) 400 mg capsule Take 400 mg by mouth three (3) times daily. Yes Jessi Gaona MD   insulin lispro (HUMALOG) 100 unit/mL injection 10 Units by SubCUTAneous route Before breakfast, lunch, and dinner. Yes Jessi Gaona MD   isosorbide mononitrate ER (IMDUR) 60 mg CR tablet Take 60 mg by mouth every morning. Yes Candelaria, MD Jessi   insulin glargine (Lantus U-100 Insulin) 100 unit/mL injection 20 Units by SubCUTAneous route nightly. Yes Jessi Gaona MD   lisinopriL (PRINIVIL, ZESTRIL) 20 mg tablet Take 20 mg by mouth daily. Yes Jessi Gaona MD   methocarbamoL (ROBAXIN) 500 mg tablet Take 500 mg by mouth three (3) times daily. Yes Candelaria, MD Jessi   oxyCODONE IR (ROXICODONE) 5 mg immediate release tablet Take 5 mg by mouth every six (6) hours as needed for Pain. Yes Candelaria, MD Jessi   pantoprazole (PROTONIX) 40 mg tablet Take 40 mg by mouth daily. Yes Candelaria, MD Jessi   guaiFENesin-codeine (Guaiatussin AC) 100-10 mg/5 mL solution Take 5 mL by mouth four (4) times daily as needed for Cough. Yes Jessi Gaona MD   senna (Senna) 8.6 mg tablet Take 1 Tab by mouth daily. Yes Jessi Gaona MD   ticagrelor (BRILINTA) 90 mg tablet Take 90 mg by mouth two (2) times a day. Yes Jessi Gaona MD   ascorbic acid, vitamin C, (Vitamin C) 500 mg tablet Take 500 mg by mouth two (2) times a day.    Yes Other, MD Jessi cholecalciferol (Vitamin D3) (1000 Units /25 mcg) tablet Take 1,000 Units by mouth daily. Yes Other, MD Jessi   zinc sulfate (ZINCATE) 220 (50) mg capsule Take 220 mg by mouth daily. Yes Provider, Historical   therapeutic multivitamin (THERAGRAN) tablet Take 1 Tab by mouth daily. Yes Provider, Historical   acetaminophen (TYLENOL) 325 mg tablet Take 975 mg by mouth every six (6) hours as needed for Pain. Other, MD Jessi       No Known Allergies    Social Hx:  reports that he has quit smoking. He has never used smokeless tobacco. He reports previous alcohol use. He reports previous drug use. No family history on file.     Review of Systems:  Unable to obtain due to the patient's condition     Objective:    Vitals:    Vitals:    02/10/21 1000 02/10/21 1002 02/10/21 1013 02/10/21 1020   BP: (!) 179/71 (!) 179/71 (!) 154/102    Pulse: 87 91     Resp: 17      Temp:    99.4 °F (37.4 °C)   SpO2: 100%      Weight:       Height:         I&O's:  02/09 0701 - 02/10 0700  In: 4257.3 [I.V.:1312.3]  Out: 1743 [Urine:1743]  Visit Vitals  BP (!) 154/102   Pulse 91   Temp 99.4 °F (37.4 °C)   Resp 17   Ht 5' 7\" (1.702 m)   Wt 89.4 kg (197 lb 1.5 oz)   SpO2 100%   BMI 30.87 kg/m²       Physical Exam:  Not examined in the room due to COVID-19 infection:    General:sedated on the vent  HEENT: ETT in place  Lungs : stable O2 on the vent  CVS: RRR on the monitor, mild LE edema  Neurologic: sedated  Psych: unable to evaluate  : bird in place    Laboratory Results:    Lab Results   Component Value Date    BUN 98 (H) 02/10/2021     (H) 02/10/2021    K 4.2 02/10/2021     (H) 02/10/2021    CO2 16 (L) 02/10/2021       Lab Results   Component Value Date    BUN 98 (H) 02/10/2021    BUN 94 (H) 02/09/2021    BUN 94 (H) 02/08/2021    BUN 88 (H) 02/07/2021    BUN 86 (H) 02/06/2021    K 4.2 02/10/2021    K 4.6 02/09/2021    K 5.0 02/08/2021    K 4.2 02/07/2021    K 4.9 02/06/2021       Lab Results   Component Value Date WBC 9.0 02/10/2021    RBC 3.12 (L) 02/10/2021    HGB 8.0 (L) 02/10/2021    HCT 26.2 (L) 02/10/2021    MCV 84.0 02/10/2021    MCH 25.6 (L) 02/10/2021    RDW 18.7 (H) 02/10/2021     02/10/2021       Lab Results   Component Value Date    PHOS 5.4 (H) 02/10/2021       Urine dipstick:   Lab Results   Component Value Date/Time    Color YELLOW/STRAW 02/05/2021 07:10 PM    Appearance TURBID (A) 02/05/2021 07:10 PM    Specific gravity 1.017 02/05/2021 07:10 PM    Specific gravity 1.015 02/26/2010 12:32 PM    pH (UA) 5.0 02/05/2021 07:10 PM    Protein 100 (A) 02/05/2021 07:10 PM    Glucose Negative 02/05/2021 07:10 PM    Ketone Negative 02/05/2021 07:10 PM    Bilirubin Negative 02/05/2021 07:10 PM    Urobilinogen 0.2 02/05/2021 07:10 PM    Nitrites Negative 02/05/2021 07:10 PM    Leukocyte Esterase Negative 02/05/2021 07:10 PM    Epithelial cells FEW 02/05/2021 07:10 PM    Bacteria 1+ (A) 02/05/2021 07:10 PM    WBC 0-4 02/05/2021 07:10 PM    RBC 5-10 02/05/2021 07:10 PM       I have reviewed the following: All pertinent labs, microbiology data, radiology imaging for my assessment         Thank you for allowing us to participate in the care of this patient. We will follow patient. Please dont hesitate to call with any questions    Emil Pace MD  2/10/2021    [facility] [address]  Phone - [phone]   Fax - [phone]  [email or web address]

## 2021-02-10 NOTE — PROGRESS NOTES
[facility]    ICU TEAM Progress Note    Name: Anderson cMdonald   : 1955   MRN: [id number]   Date: 2/10/2021        Subjective:   Progress Note: 2/10/2021      Reason for ICU Admission: Acute hypoxic respiratory failure, COVID-19 pneumonia    Interval history: From [facility] on  63year-old man who presented from [facility] with acute onset shortness of breath, hypoxia, nausea, vomiting and diarrhea-shortness of breath acutely got worse after one of the vomiting episodes. Diagnosed with Covid about 2 weeks prior to presentation. Patient placed on BiPAP on arrival-felt much better. After getting fluids for resuscitation/elevated lactate level patient acutely decompensated-developed lethargy and drop in oxygen levels-intubated emergently requiring moderate to high vent support.     Patient was at [facility] because he he was getting IV antibiotics for neck infection and was going to have a skin graft    Interval changes       - Remains intubated      - Self extubated - reintubated several hours later for worsening hypoxia and agitation.      2/2 - No acute events overnight     2/3 - No acute events overnight. Perplexing micro data returning. Continuing to wean FiO2.     : SHELBY overnight. :Failed SBT overnight due to mental status again    Overnight Events:   SBT planned for this am, still on precedex     Active Problem List:     Problem List  Never Reviewed          Codes Class    Acute hypoxemic respiratory failure due to COVID-19 [facility]) ICD-10-CM: U07.1, J96.01  ICD-9-CM: 518.81, 079.89, 799.02               Past Medical History:      has a past medical history of Diabetes ([facility] [address] and Hypertension. Past Surgical History:      has no past surgical history on file. Home Medications:     Prior to Admission medications    Medication Sig Start Date End Date Taking? Authorizing Provider   aspirin 81 mg chewable tablet Take 81 mg by mouth daily.    Yes Other, MD Jessi atorvastatin (LIPITOR) 80 mg tablet Take 80 mg by mouth daily. Yes Candelaria, MD Jessi   carvediloL (COREG) 12.5 mg tablet Take  by mouth two (2) times a day. Yes Jessi Gaona MD   docusate sodium (Colace) 100 mg capsule Take 100 mg by mouth two (2) times daily as needed for Constipation. Yes Jessi Gaona MD   doxycycline (ADOXA) 100 mg tablet Take 100 mg by mouth two (2) times a day. Yes Jessi Gaona MD   ferrous sulfate 325 mg (65 mg iron) tablet Take  by mouth every fourty-eight (48) hours. Yes Jessi Gaona MD   gabapentin (NEURONTIN) 400 mg capsule Take 400 mg by mouth three (3) times daily. Yes Jessi Gaona MD   insulin lispro (HUMALOG) 100 unit/mL injection 10 Units by SubCUTAneous route Before breakfast, lunch, and dinner. Yes Jessi Gaona MD   isosorbide mononitrate ER (IMDUR) 60 mg CR tablet Take 60 mg by mouth every morning. Yes Jessi Gaona MD   insulin glargine (Lantus U-100 Insulin) 100 unit/mL injection 20 Units by SubCUTAneous route nightly. Yes Jessi Gaona MD   lisinopriL (PRINIVIL, ZESTRIL) 20 mg tablet Take 20 mg by mouth daily. Yes Jessi Gaona MD   methocarbamoL (ROBAXIN) 500 mg tablet Take 500 mg by mouth three (3) times daily. Yes Jessi Gaona MD   oxyCODONE IR (ROXICODONE) 5 mg immediate release tablet Take 5 mg by mouth every six (6) hours as needed for Pain. Yes Candelaria, MD Jessi   pantoprazole (PROTONIX) 40 mg tablet Take 40 mg by mouth daily. Yes Jessi Gaona MD   guaiFENesin-codeine (Guaiatussin AC) 100-10 mg/5 mL solution Take 5 mL by mouth four (4) times daily as needed for Cough. Yes Jessi Gaona MD   senna (Senna) 8.6 mg tablet Take 1 Tab by mouth daily. Yes Jessi Gaona MD   ticagrelor (BRILINTA) 90 mg tablet Take 90 mg by mouth two (2) times a day. Yes Jessi Gaona MD   ascorbic acid, vitamin C, (Vitamin C) 500 mg tablet Take 500 mg by mouth two (2) times a day.    Yes Jessi Gaona, MD   cholecalciferol (Vitamin D3) (1000 Units /25 mcg) tablet Take 1,000 Units by mouth daily. Yes Other, MD Jessi   zinc sulfate (ZINCATE) 220 (50) mg capsule Take 220 mg by mouth daily. Yes Provider, Historical   therapeutic multivitamin (THERAGRAN) tablet Take 1 Tab by mouth daily. Yes Provider, Historical   acetaminophen (TYLENOL) 325 mg tablet Take 975 mg by mouth every six (6) hours as needed for Pain. Other, MD Jessi       Allergies/Social/Family History:     No Known Allergies   Social History     Tobacco Use    Smoking status: Former Smoker    Smokeless tobacco: Never Used   Substance Use Topics    Alcohol use: Not Currently      No family history on file. Review of Systems:     Not able to obtain due to patient medical condition    Objective:   Vital Signs:  Visit Vitals  BP (!) 154/102   Pulse 91   Temp 97.8 °F (36.6 °C)   Resp 28   Ht 5' 7\" (1.702 m)   Wt 89.4 kg (197 lb 1.5 oz)   SpO2 100%   BMI 30.87 kg/m²    O2 Flow Rate (L/min): 6 l/min O2 Device: Endotracheal tube, Ventilator Temp (24hrs), Av.8 °F (36.6 °C), Min:97.1 °F (36.2 °C), Max:99.4 °F (37.4 °C)           Intake/Output:     Intake/Output Summary (Last 24 hours) at 2/10/2021 1042  Last data filed at 2/10/2021 1001  Gross per 24 hour   Intake 3638.61 ml   Output 2158 ml   Net 1480.61 ml       Physical Exam:  General-intubated, sedated, comfortable  Neuro-pupils reactive, withdraws in uppers, strong cough and gag  Cardiac-tachycardic, regular  Lungs-coarse bilaterally  Abdomen-soft, nontender, nondistended  Extremities-warm    LABS AND  DATA: Personally reviewed  Recent Labs     02/10/21  0337 02/09/21  034   WBC 9.0 10.3   HGB 8.0* 8.1*   HCT 26.2* 26.4*    188     Recent Labs     02/10/21  0331 02/09/21  034   * 148*   K 4.2 4.6   * 125*   CO2 16* 19*   BUN 98* 94*   CREA 2.44* 2.27*   * 170*   CA 8.5 8.4*   MG 2.3 2.4   PHOS 5.4* 5.0*     No results for input(s): AP, TBIL, TP, ALB, GLOB, AML, LPSE in the last 72 hours.     No lab exists for component: SGOT, GPT, AMYP  Recent Labs     02/08/21  1013 02/08/21  0410   APTT 24.7 26.0      No results for input(s): PHI, PCO2I, PO2I, FIO2I in the last 72 hours. No results for input(s): CPK, CKMB, TROIQ, BNPP in the last 72 hours. Hemodynamics:   PAP:   CO:     Wedge:   CI:     CVP:    SVR:       PVR:       Ventilator Settings:  Mode Rate Tidal Volume Pressure FiO2 PEEP   SIMV   450 ml  10 cm H2O 30 % 5 cm H20     Peak airway pressure: 16 cm H2O    Minute ventilation: 6.63 l/min        MEDS: Reviewed    Chest X-Ray:  CXR Results  (Last 48 hours)    None          Assessment and Plan:   Acute hypoxic respiratory failure  Pulmonary edema  Covid pneumonia  Aspiration pneumonitis  Tachycardia secondary to above  Acute kidney injury    Neuro: Decreased LOC, failed SBT secondary to neuro status, will consider CT once stable. - Seroquel to 50mg q8h  - Continue gabapentin  - Propofol weaned off, minimal neurological response. - On Precedex and fentanyl    - Plans for eeg and CT once more stable     Pulm: Needs SBT once more awake. Has been intubated since 1/30, need to start thinking about trach given his mental status is the limiting factor in extubation.   - Continue to wean vent as able, SIMV, 30%, PEEP 5.   - Daily SAT/SBT. - Increased secretion management, likely will need trach      CV: No active issues  - EKG to check qTc given increase in antipsychotics. - Cont ASA, lipitor, midodrine     GI:   - Lansoprazole  - TFs, please advance to goal.     Renal:  - Continue bird for strict I/Os. -Nephrology consulted secondary to increased BUN, Cr and metabolic acidosis     Endocrine:  - dex 6mg every day  - Lantus 22u daily, will increase to 40u daily  - SSI    Heme:  - ASA  - Statin  - Lovenox 30mg q12h    ID: Previously being treated for neck infection when diagnosed with COVID. Mycoplasma positive being treated with doxycycline.  ID following.   - Cefepime, doxycycline, vancomycin to continue per ID.   - Please culture if he becomes febrile. DISPOSITION  Stay in ICU    CRITICAL CARE CONSULTANT NOTE  I had a face to face encounter with the patient, reviewed and interpreted patient data including clinical events, labs, images, vital signs, I/O's, and examined patient. I have discussed the case and the plan and management of the patient's care with the consulting services, the bedside nurses and the respiratory therapist.      NOTE OF PERSONAL INVOLVEMENT IN CARE   This patient has a high probability of imminent, clinically significant deterioration, which requires the highest level of preparedness to intervene urgently. I participated in the decision-making and personally managed or directed the management of the following life and organ supporting interventions that required my frequent assessment to treat or prevent imminent deterioration. I personally spent 60 minutes of critical care time. This is time spent at this critically ill patient's bedside actively involved in patient care as well as the coordination of care and discussions with the patient's family. This does not include any procedural time which has been billed separately.     Lucy Billings NP    [facility] Critical Care  2/10/2021

## 2021-02-10 NOTE — PROGRESS NOTES
ID Progress Note  2/10/2021    Subjective: On the vent. Had some fever earlier. No pressors. ROS: unobtainable       Objective:     Vitals:   Visit Vitals  BP (!) 154/102   Pulse 91   Temp 99.1 °F (37.3 °C)   Resp 21   Ht 5' 7\" (1.702 m)   Wt 89.4 kg (197 lb 1.5 oz)   SpO2 100%   BMI 30.87 kg/m²        Tmax:  Temp (24hrs), Av.8 °F (37.1 °C), Min:97.1 °F (36.2 °C), Max:100.6 °F (38.1 °C)      PHYSICAL EXAM:  On the vent  Pink conjunctivae, anicteric sclerae  No cervical lymphdenopathy   Lung clear, no rales, wheezes or rhonchi   Heart: s1, s2, RRR, no murmurs rubs or clicks  Abdomen: soft nontender, no guarding or rebound  Knees not warm or tender      Labs:   Lab Results   Component Value Date/Time    WBC 9.0 02/10/2021 03:37 AM    HGB 8.0 (L) 02/10/2021 03:37 AM    HCT 26.2 (L) 02/10/2021 03:37 AM    PLATELET 438  03:37 AM    MCV 84.0 02/10/2021 03:37 AM     Lab Results   Component Value Date/Time    Sodium 149 (H) 02/10/2021 03:31 AM    Potassium 4.2 02/10/2021 03:31 AM    Chloride 124 (H) 02/10/2021 03:31 AM    CO2 16 (L) 02/10/2021 03:31 AM    Anion gap 9 02/10/2021 03:31 AM    Glucose 112 (H) 02/10/2021 03:31 AM    BUN 98 (H) 02/10/2021 03:31 AM    Creatinine 2.44 (H) 02/10/2021 03:31 AM    BUN/Creatinine ratio 40 (H) 02/10/2021 03:31 AM    GFR est AA 33 (L) 02/10/2021 03:31 AM    GFR est non-AA 27 (L) 02/10/2021 03:31 AM    Calcium 8.5 02/10/2021 03:31 AM    Bilirubin, total 0.3 2021 03:59 AM    Alk.  phosphatase 100 2021 03:59 AM    Protein, total 6.2 (L) 2021 03:59 AM    Albumin 1.9 (L) 2021 03:59 AM    Globulin 4.3 (H) 2021 03:59 AM    A-G Ratio 0.4 (L) 2021 03:59 AM    ALT (SGPT) 359 (H) 2021 03:59 AM       Assessment and Plan   Acute respiratory failure secondary to COVID 19 PNA  mycoplasma and s. pneumoniae   - Mycoplasma IgG  1411, but IgM neg, S Pneumo (+)    Legionella (-)    Blood cx () no growth    Blood cx () no growth    resp cx (2/5) candida albicans - oropharyngeal contamination     Quantiferon; neg    CTA chest: no PE, airspace opacities in bilateral lower lobe consolidation. Supportive care for COVID 19      Continue with IV cefepime, doxycycline      D/c Zyvox x1 on 2/9    vancomycin 1/30-2/9    IV Flagyl 1/30-2/4     Mid posterior cervical wound infection  - chronic wound; pt was waiting for skin graft according to the provider at [facility], Dr. Ministerio Rashid    Wound cx (1/31) LIGHT DIPHTHEROIDS     Wound measurement per our wound care team; 3.5 cm x 4 cm x 0.1 cm.      2/9: Nurse practioner KARIE spoke with the Managing provider at [facility], Dr. Ministerio Rashid[facility] Pt had infected cyst removed about one month ago, completed IV ABX therapy via PICC line which was removed after the last dose. Dr. Ministerio Rashid was not able to provide the surgeon's name and original wound cx result. Continue the wound care per our wound care team recommendation. Pt should follow up with the surgeon upon discharge. Wound care nurse, Ms. Alanis Henderson [phone].  According to the wound care nurse, the wound was created after the removal of infected cyst.     JUAN C Umana MD

## 2021-02-10 NOTE — PROGRESS NOTES
1930: Bedside and Verbal shift change report given to [address] (oncoming nurse) by Diamond Santiago RN (offgoing nurse). Report included the following information SBAR, Kardex, Intake/Output, MAR, Recent Results, Cardiac Rhythm NSR and Alarm Parameters . 0730: Bedside and Verbal shift change report given to [address] (oncoming nurse) by Magnus Francis RN (offgoing nurse). Report included the following information SBAR, Kardex, ED Summary, Procedure Summary, Intake/Output, MAR, Recent Results, Cardiac Rhythm NSR and Alarm Parameters .

## 2021-02-10 NOTE — PROGRESS NOTES
0730: Bedside shift change report given to Mata Aguilar RN (oncoming nurse) by Jessica Kohler RN (offgoing nurse). Report included the following information SBAR, Kardex, Intake/Output, MAR, Accordion and Recent Results. 1002: Cardene gtt started at 5mg/hr for SBP>160. LUKASZ Reeves aware. 1049: ICU multidisciplinary rounds lead by Kenan Way NP (Intensivist): The following were reviewed and discussed: current labs,  recent imaging, lines/drains, review of body systems, nutrition, cultures, mobility, length of ICU stay. The plan of care for the day is as follows: Hold on SBT, consult nephrology, 100mEq bicarb. 1052: Nephrology consult called to Dr. Leyda Mart. 1930: Bedside shift change report given to Jessica Kohler RN (oncoming nurse) by Mata Aguilar RN (offgoing nurse). Report included the following information SBAR, Kardex, Intake/Output, MAR, Accordion and Recent Results.

## 2021-02-11 ENCOUNTER — APPOINTMENT (OUTPATIENT)
Dept: GENERAL RADIOLOGY | Age: 66
DRG: 720 | End: 2021-02-11
Attending: INTERNAL MEDICINE
Payer: MEDICAID

## 2021-02-11 ENCOUNTER — APPOINTMENT (OUTPATIENT)
Dept: CT IMAGING | Age: 66
DRG: 720 | End: 2021-02-11
Attending: INTERNAL MEDICINE
Payer: MEDICAID

## 2021-02-11 LAB
ALBUMIN SERPL-MCNC: 1.3 G/DL (ref 3.5–5)
ALBUMIN/GLOB SERPL: 0.4 {RATIO} (ref 1.1–2.2)
ALP SERPL-CCNC: 162 U/L (ref 45–117)
ALT SERPL-CCNC: 142 U/L (ref 12–78)
ANION GAP SERPL CALC-SCNC: 10 MMOL/L (ref 5–15)
ANION GAP SERPL CALC-SCNC: 9 MMOL/L (ref 5–15)
AST SERPL-CCNC: 110 U/L (ref 15–37)
ATRIAL RATE: 267 BPM
BASOPHILS # BLD: 0 K/UL (ref 0–0.1)
BASOPHILS NFR BLD: 0 % (ref 0–1)
BILIRUB SERPL-MCNC: 0.2 MG/DL (ref 0.2–1)
BUN SERPL-MCNC: 102 MG/DL (ref 6–20)
BUN SERPL-MCNC: 96 MG/DL (ref 6–20)
BUN/CREAT SERPL: 35 (ref 12–20)
BUN/CREAT SERPL: 36 (ref 12–20)
CALCIUM SERPL-MCNC: 8.1 MG/DL (ref 8.5–10.1)
CALCIUM SERPL-MCNC: 8.2 MG/DL (ref 8.5–10.1)
CALCULATED R AXIS, ECG10: 19 DEGREES
CALCULATED T AXIS, ECG11: 166 DEGREES
CHLORIDE SERPL-SCNC: 120 MMOL/L (ref 97–108)
CHLORIDE SERPL-SCNC: 121 MMOL/L (ref 97–108)
CO2 SERPL-SCNC: 19 MMOL/L (ref 21–32)
CO2 SERPL-SCNC: 19 MMOL/L (ref 21–32)
CREAT SERPL-MCNC: 2.72 MG/DL (ref 0.7–1.3)
CREAT SERPL-MCNC: 2.86 MG/DL (ref 0.7–1.3)
DIAGNOSIS, 93000: NORMAL
DIFFERENTIAL METHOD BLD: ABNORMAL
EOSINOPHIL # BLD: 0.2 K/UL (ref 0–0.4)
EOSINOPHIL NFR BLD: 2 % (ref 0–7)
ERYTHROCYTE [DISTWIDTH] IN BLOOD BY AUTOMATED COUNT: 18.9 % (ref 11.5–14.5)
GLOBULIN SER CALC-MCNC: 3.4 G/DL (ref 2–4)
GLUCOSE BLD STRIP.AUTO-MCNC: 101 MG/DL (ref 65–100)
GLUCOSE BLD STRIP.AUTO-MCNC: 138 MG/DL (ref 65–100)
GLUCOSE BLD STRIP.AUTO-MCNC: 60 MG/DL (ref 65–100)
GLUCOSE BLD STRIP.AUTO-MCNC: 82 MG/DL (ref 65–100)
GLUCOSE BLD STRIP.AUTO-MCNC: 90 MG/DL (ref 65–100)
GLUCOSE SERPL-MCNC: 92 MG/DL (ref 65–100)
GLUCOSE SERPL-MCNC: 93 MG/DL (ref 65–100)
HCT VFR BLD AUTO: 24 % (ref 36.6–50.3)
HGB BLD-MCNC: 7.5 G/DL (ref 12.1–17)
IMM GRANULOCYTES # BLD AUTO: 0.1 K/UL (ref 0–0.04)
IMM GRANULOCYTES NFR BLD AUTO: 1 % (ref 0–0.5)
LYMPHOCYTES # BLD: 2.1 K/UL (ref 0.8–3.5)
LYMPHOCYTES NFR BLD: 21 % (ref 12–49)
MAGNESIUM SERPL-MCNC: 2.1 MG/DL (ref 1.6–2.4)
MAGNESIUM SERPL-MCNC: 2.3 MG/DL (ref 1.6–2.4)
MCH RBC QN AUTO: 26.2 PG (ref 26–34)
MCHC RBC AUTO-ENTMCNC: 31.3 G/DL (ref 30–36.5)
MCV RBC AUTO: 83.9 FL (ref 80–99)
MONOCYTES # BLD: 1 K/UL (ref 0–1)
MONOCYTES NFR BLD: 10 % (ref 5–13)
NEUTS SEG # BLD: 6.4 K/UL (ref 1.8–8)
NEUTS SEG NFR BLD: 66 % (ref 32–75)
NRBC # BLD: 0 K/UL (ref 0–0.01)
NRBC BLD-RTO: 0 PER 100 WBC
PHOSPHATE SERPL-MCNC: 5.4 MG/DL (ref 2.6–4.7)
PLATELET # BLD AUTO: 144 K/UL (ref 150–400)
PMV BLD AUTO: ABNORMAL FL (ref 8.9–12.9)
POTASSIUM SERPL-SCNC: 3.6 MMOL/L (ref 3.5–5.1)
POTASSIUM SERPL-SCNC: 3.9 MMOL/L (ref 3.5–5.1)
PROT SERPL-MCNC: 4.7 G/DL (ref 6.4–8.2)
Q-T INTERVAL, ECG07: 290 MS
QRS DURATION, ECG06: 86 MS
QTC CALCULATION (BEZET), ECG08: 442 MS
RBC # BLD AUTO: 2.86 M/UL (ref 4.1–5.7)
RBC MORPH BLD: ABNORMAL
SERVICE CMNT-IMP: ABNORMAL
SERVICE CMNT-IMP: NORMAL
SERVICE CMNT-IMP: NORMAL
SODIUM SERPL-SCNC: 148 MMOL/L (ref 136–145)
SODIUM SERPL-SCNC: 150 MMOL/L (ref 136–145)
TROPONIN I SERPL-MCNC: 0.37 NG/ML
TROPONIN I SERPL-MCNC: 0.49 NG/ML
TROPONIN I SERPL-MCNC: 0.56 NG/ML
URATE SERPL-MCNC: 4.5 MG/DL (ref 3.5–7.2)
VENTRICULAR RATE, ECG03: 140 BPM
WBC # BLD AUTO: 9.8 K/UL (ref 4.1–11.1)

## 2021-02-11 PROCEDURE — 74011250637 HC RX REV CODE- 250/637: Performed by: ANESTHESIOLOGY

## 2021-02-11 PROCEDURE — 74011636637 HC RX REV CODE- 636/637: Performed by: NURSE PRACTITIONER

## 2021-02-11 PROCEDURE — 65610000006 HC RM INTENSIVE CARE

## 2021-02-11 PROCEDURE — 77010033678 HC OXYGEN DAILY

## 2021-02-11 PROCEDURE — 85025 COMPLETE CBC W/AUTO DIFF WBC: CPT

## 2021-02-11 PROCEDURE — 74011000250 HC RX REV CODE- 250: Performed by: NURSE PRACTITIONER

## 2021-02-11 PROCEDURE — 84484 ASSAY OF TROPONIN QUANT: CPT

## 2021-02-11 PROCEDURE — 84550 ASSAY OF BLOOD/URIC ACID: CPT

## 2021-02-11 PROCEDURE — 83735 ASSAY OF MAGNESIUM: CPT

## 2021-02-11 PROCEDURE — 94760 N-INVAS EAR/PLS OXIMETRY 1: CPT

## 2021-02-11 PROCEDURE — 74011000250 HC RX REV CODE- 250: Performed by: INTERNAL MEDICINE

## 2021-02-11 PROCEDURE — 74011250636 HC RX REV CODE- 250/636: Performed by: NURSE PRACTITIONER

## 2021-02-11 PROCEDURE — 74011000250 HC RX REV CODE- 250: Performed by: EMERGENCY MEDICINE

## 2021-02-11 PROCEDURE — 82962 GLUCOSE BLOOD TEST: CPT

## 2021-02-11 PROCEDURE — 74011250636 HC RX REV CODE- 250/636: Performed by: EMERGENCY MEDICINE

## 2021-02-11 PROCEDURE — 74011250637 HC RX REV CODE- 250/637: Performed by: NURSE PRACTITIONER

## 2021-02-11 PROCEDURE — 74011250637 HC RX REV CODE- 250/637: Performed by: INTERNAL MEDICINE

## 2021-02-11 PROCEDURE — 84100 ASSAY OF PHOSPHORUS: CPT

## 2021-02-11 PROCEDURE — 74011250637 HC RX REV CODE- 250/637: Performed by: EMERGENCY MEDICINE

## 2021-02-11 PROCEDURE — 71045 X-RAY EXAM CHEST 1 VIEW: CPT

## 2021-02-11 PROCEDURE — 36591 DRAW BLOOD OFF VENOUS DEVICE: CPT

## 2021-02-11 PROCEDURE — 74011000250 HC RX REV CODE- 250: Performed by: STUDENT IN AN ORGANIZED HEALTH CARE EDUCATION/TRAINING PROGRAM

## 2021-02-11 PROCEDURE — 74011000258 HC RX REV CODE- 258: Performed by: INTERNAL MEDICINE

## 2021-02-11 PROCEDURE — 94003 VENT MGMT INPAT SUBQ DAY: CPT

## 2021-02-11 PROCEDURE — 74011000258 HC RX REV CODE- 258: Performed by: NURSE PRACTITIONER

## 2021-02-11 PROCEDURE — 74011250636 HC RX REV CODE- 250/636: Performed by: ANESTHESIOLOGY

## 2021-02-11 PROCEDURE — 74011250636 HC RX REV CODE- 250/636: Performed by: STUDENT IN AN ORGANIZED HEALTH CARE EDUCATION/TRAINING PROGRAM

## 2021-02-11 PROCEDURE — 70450 CT HEAD/BRAIN W/O DYE: CPT

## 2021-02-11 PROCEDURE — 80053 COMPREHEN METABOLIC PANEL: CPT

## 2021-02-11 RX ORDER — CARVEDILOL 12.5 MG/1
12.5 TABLET ORAL EVERY 12 HOURS
Status: DISCONTINUED | OUTPATIENT
Start: 2021-02-11 | End: 2021-02-12

## 2021-02-11 RX ORDER — INSULIN GLARGINE 100 [IU]/ML
15 INJECTION, SOLUTION SUBCUTANEOUS DAILY
Status: DISCONTINUED | OUTPATIENT
Start: 2021-02-12 | End: 2021-02-14

## 2021-02-11 RX ORDER — ENOXAPARIN SODIUM 100 MG/ML
30 INJECTION SUBCUTANEOUS EVERY 24 HOURS
Status: DISCONTINUED | OUTPATIENT
Start: 2021-02-12 | End: 2021-02-15 | Stop reason: ALTCHOICE

## 2021-02-11 RX ORDER — AMLODIPINE BESYLATE 5 MG/1
10 TABLET ORAL DAILY
Status: DISCONTINUED | OUTPATIENT
Start: 2021-02-11 | End: 2021-03-03

## 2021-02-11 RX ORDER — AMLODIPINE BESYLATE 5 MG/1
5 TABLET ORAL DAILY
Status: DISCONTINUED | OUTPATIENT
Start: 2021-02-11 | End: 2021-02-11

## 2021-02-11 RX ORDER — SODIUM BICARBONATE IN D5W 150/1000ML
PLASTIC BAG, INJECTION (ML) INTRAVENOUS CONTINUOUS
Status: DISCONTINUED | OUTPATIENT
Start: 2021-02-11 | End: 2021-02-12

## 2021-02-11 RX ADMIN — OXYCODONE HYDROCHLORIDE AND ACETAMINOPHEN 500 MG: 500 TABLET ORAL at 08:34

## 2021-02-11 RX ADMIN — ALLOPURINOL 100 MG: 100 TABLET ORAL at 08:34

## 2021-02-11 RX ADMIN — DEXMEDETOMIDINE HYDROCHLORIDE 0.8 MCG/KG/HR: 400 INJECTION INTRAVENOUS at 00:19

## 2021-02-11 RX ADMIN — ALTEPLASE 1 MG: 2.2 INJECTION, POWDER, LYOPHILIZED, FOR SOLUTION INTRAVENOUS at 06:37

## 2021-02-11 RX ADMIN — DEXMEDETOMIDINE HYDROCHLORIDE 0.8 MCG/KG/HR: 400 INJECTION INTRAVENOUS at 06:15

## 2021-02-11 RX ADMIN — ENOXAPARIN SODIUM 30 MG: 30 INJECTION SUBCUTANEOUS at 08:33

## 2021-02-11 RX ADMIN — QUETIAPINE FUMARATE 50 MG: 25 TABLET ORAL at 21:06

## 2021-02-11 RX ADMIN — Medication: at 20:26

## 2021-02-11 RX ADMIN — CHLORHEXIDINE GLUCONATE 15 ML: 0.12 RINSE ORAL at 20:31

## 2021-02-11 RX ADMIN — OXYCODONE HYDROCHLORIDE AND ACETAMINOPHEN 500 MG: 500 TABLET ORAL at 18:20

## 2021-02-11 RX ADMIN — Medication 100 MCG/HR: at 01:48

## 2021-02-11 RX ADMIN — Medication 10 ML: at 06:35

## 2021-02-11 RX ADMIN — SODIUM BICARBONATE: 84 INJECTION, SOLUTION INTRAVENOUS at 02:45

## 2021-02-11 RX ADMIN — CEFEPIME 2 G: 2 INJECTION, POWDER, FOR SOLUTION INTRAVENOUS at 23:56

## 2021-02-11 RX ADMIN — CHLORHEXIDINE GLUCONATE 15 ML: 0.12 RINSE ORAL at 08:27

## 2021-02-11 RX ADMIN — DOCUSATE SODIUM 100 MG: 50 LIQUID ORAL at 18:20

## 2021-02-11 RX ADMIN — Medication 10 ML: at 21:15

## 2021-02-11 RX ADMIN — SODIUM CHLORIDE 2.5 MG/HR: 900 INJECTION, SOLUTION INTRAVENOUS at 23:53

## 2021-02-11 RX ADMIN — SODIUM BICARBONATE: 84 INJECTION, SOLUTION INTRAVENOUS at 14:01

## 2021-02-11 RX ADMIN — CARVEDILOL 12.5 MG: 12.5 TABLET, FILM COATED ORAL at 20:32

## 2021-02-11 RX ADMIN — ACETAMINOPHEN 650 MG: 325 TABLET ORAL at 21:13

## 2021-02-11 RX ADMIN — AMLODIPINE BESYLATE 10 MG: 5 TABLET ORAL at 11:37

## 2021-02-11 RX ADMIN — GABAPENTIN 300 MG: 250 SOLUTION ORAL at 23:54

## 2021-02-11 RX ADMIN — CARVEDILOL 12.5 MG: 12.5 TABLET, FILM COATED ORAL at 11:37

## 2021-02-11 RX ADMIN — LANSOPRAZOLE 30 MG: KIT at 06:39

## 2021-02-11 RX ADMIN — QUETIAPINE FUMARATE 50 MG: 25 TABLET ORAL at 16:20

## 2021-02-11 RX ADMIN — ATORVASTATIN CALCIUM 40 MG: 40 TABLET, FILM COATED ORAL at 21:06

## 2021-02-11 RX ADMIN — DEXTROSE MONOHYDRATE 12.5 G: 25 INJECTION, SOLUTION INTRAVENOUS at 18:25

## 2021-02-11 RX ADMIN — GABAPENTIN 300 MG: 250 SOLUTION ORAL at 16:20

## 2021-02-11 RX ADMIN — ASPIRIN 81 MG: 81 TABLET, CHEWABLE ORAL at 08:34

## 2021-02-11 RX ADMIN — DOXYCYCLINE 100 MG: 100 INJECTION, POWDER, LYOPHILIZED, FOR SOLUTION INTRAVENOUS at 21:13

## 2021-02-11 RX ADMIN — SODIUM BICARBONATE: 84 INJECTION, SOLUTION INTRAVENOUS at 01:46

## 2021-02-11 RX ADMIN — CHOLECALCIFEROL (VITAMIN D3) 25 MCG (1,000 UNIT) TABLET 2000 UNITS: TABLET at 08:34

## 2021-02-11 RX ADMIN — CEFEPIME 2 G: 2 INJECTION, POWDER, FOR SOLUTION INTRAVENOUS at 00:16

## 2021-02-11 RX ADMIN — CEFEPIME 2 G: 2 INJECTION, POWDER, FOR SOLUTION INTRAVENOUS at 11:40

## 2021-02-11 RX ADMIN — DEXMEDETOMIDINE HYDROCHLORIDE 0.3 MCG/KG/HR: 400 INJECTION INTRAVENOUS at 14:01

## 2021-02-11 RX ADMIN — Medication 1 CAPSULE: at 08:33

## 2021-02-11 RX ADMIN — Medication 50 MCG/HR: at 14:52

## 2021-02-11 RX ADMIN — QUETIAPINE FUMARATE 50 MG: 25 TABLET ORAL at 08:33

## 2021-02-11 RX ADMIN — GABAPENTIN 300 MG: 250 SOLUTION ORAL at 08:34

## 2021-02-11 RX ADMIN — INSULIN GLARGINE 30 UNITS: 100 INJECTION, SOLUTION SUBCUTANEOUS at 08:33

## 2021-02-11 RX ADMIN — MINERAL SUPPLEMENT IRON 300 MG / 5 ML STRENGTH LIQUID 100 PER BOX UNFLAVORED 300 MG: at 08:34

## 2021-02-11 RX ADMIN — DOCUSATE SODIUM 100 MG: 50 LIQUID ORAL at 08:34

## 2021-02-11 RX ADMIN — DOXYCYCLINE 100 MG: 100 INJECTION, POWDER, LYOPHILIZED, FOR SOLUTION INTRAVENOUS at 08:36

## 2021-02-11 RX ADMIN — Medication 10 ML: at 16:20

## 2021-02-11 NOTE — PROGRESS NOTES
1037: Pt in SVT, hypertensive, Marissa RN at bedside, [address] NP notified, metoprolol pulled for RN, verbal order from NP to give the ordered prn dilaudid to pt, RN previously gave dose, med unable to be returned, pharmacy tech on floor, dilaudid 1mg/1mL given to Gonzalo Marion, [address] [facility].

## 2021-02-11 NOTE — PROGRESS NOTES
Comprehensive Nutrition Assessment    Type and Reason for Visit: Reassess    Nutrition Recommendations/Plan:      Modify tube feeding: Nepro @ 55 ml/hr with 250 ml water flush q 4 hr    Nutrition Assessment:    Patient admitted with Acute hypoxemic respiratory failure d/t COVID 19. PMHx: DM, HTN, neck wound/infection-@ NH for IV antibiotics, plan for skin graft. Intubated in ED d/t acute decompensation (pulmonary edema, COVID 19 PNA, Aspiration pneumonitis). Self- extubated 2/1 but had to be re-intubated; failing SBT's. JUAN C on CKD, metabolic acidosis-nephrology consulted 2/10. WOCN following for neck wound-improvement noted. CT head today. Mr Gerry Weston is tolerating enteral feeding well. Will increase feeding today since pt no longer on Propofol. Patient now having BM's (last recorded 2/5). Recommend increasing tube feeding to goal 55 ml/hr; continue 250 ml water flush q 4 hr. This will provide 1210 ml, 2140 calories, 96 gm protein and 2370 ml free water (tube feeding/flush) per day. Once sodium WNL reduce flush to 200 ml q 4 hr. Sodium bicarb is providing 2 liters of fluid per day as well. BUN, creatinine and phosphorus elevated d/t renal failure. Tube feeding at goal provides 850 mg Phosphorus per day; add binder if lab continues to trend up. BG well controlled; Lantus reduced today. Monitor BG with increased enteral nutrition and adjust Lantus dose prn. Malnutrition Assessment:  Malnutrition Status:  Insufficient data-at risk d/t critical illnes  Context:  Acute illness       Nutritionally Significant Medications:   Vit C, Lipitor,  Vit D3, Colace, ferrous sulfate, Lantus, Humalog, Prevacid, zinc sulfate, sodium bicarb in 1/2 NS @ 84 ml/hr, Fentanyl    Estimated Daily Nutrient Needs:  Energy (kcal): 2200 (25 kcal/kg); Weight Used for Energy Requirements: Current(88 kg)  Protein (g): 88-97 (1.0-1.1g/kg or 1.3-1.4g/kg IBW);  Weight Used for Protein Requirements: Current(88 kg)  Fluid (ml/day): 2200; Method Used for Fluid Requirements: 1 ml/kcal    Nutrition Related Findings:       BM: 2/10  Edema: 1+ generalized, BUE, BLE  Wounds:  Open wounds       Current Nutrition Therapies:   Diet: NPO  Tube Feeding: Nepro @ 30 ml/hr with 2 packets Prosource daily and 250 ml water flush q 4 hr  Additional Caloric Sources:   None    Anthropometric Measures:  · Height:  5' 7\" (170.2 cm)  · Current Body Wt:  89.4 kg (197 lb 1.5 oz)   · Admission Body Wt:  186 lb 15.2 oz       · Ideal Body Wt: 148#  :  133.2 %     Wt Readings from Last 10 Encounters:   02/10/21 89.4 kg (197 lb 1.5 oz)   06/09/20 78 kg (172 lb)       Nutrition Diagnosis:   · Inadequate oral intake related to impaired respiratory function as evidenced by intubation, nutrition support-enteral nutrition    Nutrition Interventions:   Food and/or Nutrient Delivery: Modify tube feeding  Nutrition Education and Counseling: No recommendations at this time  Coordination of Nutrition Care: Continue to monitor while inpatient, Interdisciplinary rounds    Goals:  Tube feeding to meet 90% estimated needs x 5-7 days. Nutrition Monitoring and Evaluation:   Behavioral-Environmental Outcomes: None identified  Food/Nutrient Intake Outcomes: Enteral nutrition intake/tolerance  Physical Signs/Symptoms Outcomes: Biochemical data, GI status, Fluid status or edema, Hemodynamic status, Weight    Discharge Planning:     Too soon to determine     Alecia Tsang RD CNSC  Contact: [employer]

## 2021-02-11 NOTE — PROGRESS NOTES
1930: Bedside and Verbal shift change report given to [address] (oncoming nurse) by Nathan Kwan (offgoing nurse). Report included the following information SBAR, Kardex, Procedure Summary, Intake/Output, MAR, Recent Results, Cardiac Rhythm NSR and Alarm Parameters . 2220: Pt hypertensive, ventilator alarming, HR in 80s with frequent PVCs. PRN Dilaudid administered and Precedex gtt increased for comfort. 2225: Pt HR increased to 170s, appears to be SVT. Cuco NP called to bedside. STAT EKG and labs ordered. EKG results showing A fib RVR. 2330: 5 mg PRN Metoprolol given. Pt rate converted to A fib rate controlled. STAT BMP, Magnesium and Troponin sent. 0000: Pt noted to be in NSR.    0120: STAT labs sent at 2300, have not resulted. Called lab to ask status.  stated that labs have never been run.    0300: Reported Troponin level (0.56) to [facility] NP. Orders received to re-draw with AM labs. 0400: Bourgeois temp 96.7. Reported to [facility] NP. Orders received to place pt on [employer]. 0730: Bedside and Verbal shift change report given to Nathan Kwan (oncoming nurse) by Urbano Lew RN (offgoing nurse). Report included the following information SBAR, Kardex, Intake/Output, MAR, Recent Results, Cardiac Rhythm NSR and Alarm Parameters .

## 2021-02-11 NOTE — PROGRESS NOTES
Lovenox Monitoring  Indication: Low intensity anticoagulation for COVID-19  Recent Labs     02/11/21  0435 02/10/21  2245 02/10/21  0337 02/10/21  0331 02/09/21  0349   HGB 7.5*  --  8.0*  --  8.1*   *  --  157  --  188   CREA 2.86* 2.72*  --  2.44*  --      Current Weight: 89.4 kg (BMI ~30)  Est. CrCl = ~25-30 ml/min  Current Dose: 30 mg subcutaneously every 12 hours. Plan: Change to 30 mg subcutaneously every 24 hours per [address] [place] [address] P&T Committee Protocol with respect to renal function. Pharmacy will continue to monitor patient daily and will make dosage adjustments based upon changing renal function.

## 2021-02-11 NOTE — PROGRESS NOTES
ID Progress Note  2021    Subjective:     Afebrile. Off the ventilator. ROS: unobtainable       Objective:     Vitals:   Visit Vitals  /67   Pulse 80   Temp 99.1 °F (37.3 °C)   Resp 13   Ht 5' 7\" (1.702 m)   Wt 89.4 kg (197 lb 1.5 oz)   SpO2 100%   BMI 30.87 kg/m²        Tmax:  Temp (24hrs), Av.1 °F (36.7 °C), Min:96.7 °F (35.9 °C), Max:99.1 °F (37.3 °C)      PHYSICAL EXAM:  Extubated  No accessory muscle use      Labs:   Lab Results   Component Value Date/Time    WBC 9.8 2021 04:35 AM    HGB 7.5 (L) 2021 04:35 AM    HCT 24.0 (L) 2021 04:35 AM    PLATELET 782 (L)  04:35 AM    MCV 83.9 2021 04:35 AM     Lab Results   Component Value Date/Time    Sodium 148 (H) 2021 04:35 AM    Potassium 3.9 2021 04:35 AM    Chloride 120 (H) 2021 04:35 AM    CO2 19 (L) 2021 04:35 AM    Anion gap 9 2021 04:35 AM    Glucose 93 2021 04:35 AM     (H) 2021 04:35 AM    Creatinine 2.86 (H) 2021 04:35 AM    BUN/Creatinine ratio 36 (H) 2021 04:35 AM    GFR est AA 27 (L) 2021 04:35 AM    GFR est non-AA 22 (L) 2021 04:35 AM    Calcium 8.2 (L) 2021 04:35 AM    Bilirubin, total 0.2 2021 04:35 AM    Alk. phosphatase 162 (H) 2021 04:35 AM    Protein, total 4.7 (L) 2021 04:35 AM    Albumin 1.3 (L) 2021 04:35 AM    Globulin 3.4 2021 04:35 AM    A-G Ratio 0.4 (L) 2021 04:35 AM    ALT (SGPT) 142 (H) 2021 04:35 AM       Assessment and Plan   Acute respiratory failure secondary to COVID 19 PNA  mycoplasma and s. pneumoniae   - Mycoplasma IgG  1411, but IgM neg, S Pneumo (+)    Legionella (-)    Blood cx () no growth    Blood cx () no growth    resp cx () candida albicans - oropharyngeal contamination     Quantiferon; neg    CTA chest: no PE, airspace opacities in bilateral lower lobe consolidation.      Supportive care for COVID 19      Continue with IV cefepime, doxycycline      D/c Zyvox x1 on 2/9    vancomycin 1/30-2/9    IV Flagyl 1/30-2/4     Mid posterior cervical wound infection  - chronic wound; pt was waiting for skin graft according to the provider at [facility], Dr. Luh Zavala cx (1/31) LIGHT DIPHTHEROIDS     Wound measurement per our wound care team; 3.5 cm x 4 cm x 0.1 cm.      2/9: Nurse practioner KARIE spoke with the Managing provider at [facility], Dr[facility] Minna Marquez[facility] Pt had infected cyst removed about one month ago, completed IV ABX therapy via PICC line which was removed after the last dose. Dr. Minna Marquez was not able to provide the surgeon's name and original wound cx result. Continue the wound care per our wound care team recommendation. Pt should follow up with the surgeon upon discharge. Wound care nurse, Ms. Marley Weinstein [phone].  According to the wound care nurse, the wound was created after the removal of infected cyst.     JUAN C Grove MD

## 2021-02-11 NOTE — PROGRESS NOTES
Nephrology Progress Note  Florencio Aase  Date of Admission : 1/30/2021    CC: Follow up for JUAN C        Assessment and Plan     JUAN C:  - unclear baseline but suspect some component of CKD  - Renal U/S on 1/30 confirms CKD  - suspect JUAN C due to COVID-19 related illness  - UA from 2/5 with blood and protein, bird in place  - PCR 4g/g  - cont bicarb drip  - daily labs and I/Os  - no urgent need for RRT at this time     Hypernatremia:  - improving some  - FW flushes via NGT     Nongap acidosis:  - cont bicarb drip     COVID-19 PNA:  - abx and steroids per CCM team     Resp failure:  - vent dependent     DM2:  - on insulin       Interval History:  Not examined in the room    Stable overall. Had afib overnight. Cr up, rate of rise slowing. UOP 2.3L in the past 24 hrs. Stable oxygenation per RN. Current Medications: all current  Medications have been eviewed in EPIC  Review of Systems: Pertinent items are noted in HPI. Objective:  Vitals:    Vitals:    02/11/21 0400 02/11/21 0500 02/11/21 0600 02/11/21 0700   BP: 107/61 (!) 155/58 134/70 (!) 151/71   Pulse: 63 74 68 80   Resp: 16 17 14 14   Temp: (!) 96.7 °F (35.9 °C)   98 °F (36.7 °C)   SpO2: 100% 100% 100% 100%   Weight:       Height:         Intake and Output:  No intake/output data recorded. 02/09 1901 - 02/11 0700  In: 7257.1 [I.V.:3212.1]  Out: 2271 [Urine:3092]    Physical Examination:  Not examined in the room due to COVID-19 infection:    Pt intubated    Yes  General: sedated  Resp:  Stable oxygenation on the vent  CV:  afib on monitor,  no reported edema  Neurologic:  sedated  Skin:  No Rash  :  Bird in place    []    High complexity decision making was performed  []    Patient is at high-risk of decompensation with multiple organ involvement    Lab Data Personally Reviewed: I have reviewed all the pertinent labs, microbiology data and radiology studies during assessment.     Recent Labs     02/11/21  0435 02/10/21  6185 02/10/21  1608 02/09/21  0346   * 150* 149* 148*   K 3.9 3.6 4.2 4.6   * 121* 124* 125*   CO2 19* 19* 16* 19*   GLU 93 92 112* 170*   * 96* 98* 94*   CREA 2.86* 2.72* 2.44* 2.27*   CA 8.2* 8.1* 8.5 8.4*   MG 2.3 2.1 2.3 2.4   PHOS 5.4*  --  5.4* 5.0*   ALB 1.3*  --   --   --    *  --   --   --      Recent Labs     02/11/21  0435 02/10/21  0337 02/09/21  0349   WBC 9.8 9.0 10.3   HGB 7.5* 8.0* 8.1*   HCT 24.0* 26.2* 26.4*   * 157 188     Lab Results   Component Value Date/Time    Specimen Description: BLOOD 02/27/2010 07:00 PM     Lab Results   Component Value Date/Time    Culture result: LIGHT YEAST, (APPARENT CANDIDA ALBICANS) (A) 02/05/2021 07:10 PM    Culture result: FEW NORMAL RESPIRATORY CORI 02/05/2021 07:10 PM    Culture result: No growth (<1,000 CFU/ML) 02/05/2021 07:10 PM     Recent Results (from the past 24 hour(s))   GLUCOSE, POC    Collection Time: 02/10/21  9:52 AM   Result Value Ref Range    Glucose (POC) 118 (H) 65 - 100 mg/dL    Performed by Isiah Hermosillo    GLUCOSE, POC    Collection Time: 02/10/21 12:22 PM   Result Value Ref Range    Glucose (POC) 144 (H) 65 - 100 mg/dL    Performed by Isiah Hermosillo    URINALYSIS W/ RFLX MICROSCOPIC    Collection Time: 02/10/21  1:26 PM   Result Value Ref Range    Color YELLOW/STRAW      Appearance TURBID (A) CLEAR      Specific gravity 1.014 1.003 - 1.030      pH (UA) 5.0 5.0 - 8.0      Protein 100 (A) NEG mg/dL    Glucose Negative NEG mg/dL    Ketone Negative NEG mg/dL    Bilirubin Negative NEG      Blood LARGE (A) NEG      Urobilinogen 0.2 0.2 - 1.0 EU/dL    Nitrites Negative NEG      Leukocyte Esterase Negative NEG      WBC 0-4 0 - 4 /hpf    RBC  0 - 5 /hpf    Epithelial cells FEW FEW /lpf    Bacteria Negative NEG /hpf    Amorphous Crystals 2+ (A) NEG    Hyaline cast 0-2 0 - 5 /lpf    Granular cast 5-10 (A) NEG /lpf    Waxy cast 2-5 (A) NEG /lpf   PROTEIN/CREATININE RATIO, URINE    Collection Time: 02/10/21  1:26 PM   Result Value Ref Range    Protein, urine random 143 (H) 0.0 - 11.9 mg/dL    Creatinine, urine 29.90 mg/dL    Protein/Creat. urine Ratio 4.8     URINE CULTURE HOLD SAMPLE    Collection Time: 02/10/21  1:26 PM    Specimen: Serum   Result Value Ref Range    Urine culture hold        Urine on hold in Microbiology dept for 2 days. If unpreserved urine is submitted, it cannot be used for addtional testing after 24 hours, recollection will be required. GLUCOSE, POC    Collection Time: 02/10/21  5:46 PM   Result Value Ref Range    Glucose (POC) 95 65 - 100 mg/dL    Performed by [facility], VENOUS    Collection Time: 02/10/21  8:16 PM   Result Value Ref Range    VENOUS PH 7.30 (L) 7.32 - 7.42      VENOUS PCO2 39.9 (L) 41 - 51 mmHg    VENOUS PO2 38 25 - 40 mmHg    VENOUS BICARBONATE 19 (L) 23 - 28 mmol/L    VENOUS BASE DEFICIT 6.7 mmol/L    VENOUS O2 SATURATION 67 65 - 88 %    O2 METHOD VENT      Sample source VENOUS BLOOD      SITE RIGHT RADIAL      Critical value read back Called to Dr. Ozzy Pérez on 02/10/2021 at 20:16    EKG, 12 LEAD, INITIAL    Collection Time: 02/10/21 10:38 PM   Result Value Ref Range    Ventricular Rate 140 BPM    Atrial Rate 267 BPM    QRS Duration 86 ms    Q-T Interval 290 ms    QTC Calculation (Bezet) 442 ms    Calculated R Axis 19 degrees    Calculated T Axis 166 degrees    Diagnosis       Atrial fibrillation with rapid ventricular response with premature   ventricular or aberrantly conducted complexes  ST & T wave abnormality, consider lateral ischemia or digitalis effect  When compared with ECG of 08-FEB-2021 12:57,  Atrial fibrillation has replaced Sinus rhythm  Vent.  rate has increased BY  65 BPM  T wave inversion no longer evident in Anterior leads     METABOLIC PANEL, BASIC    Collection Time: 02/10/21 10:45 PM   Result Value Ref Range    Sodium 150 (H) 136 - 145 mmol/L    Potassium 3.6 3.5 - 5.1 mmol/L    Chloride 121 (H) 97 - 108 mmol/L    CO2 19 (L) 21 - 32 mmol/L    Anion gap 10 5 - 15 mmol/L Glucose 92 65 - 100 mg/dL    BUN 96 (H) 6 - 20 MG/DL    Creatinine 2.72 (H) 0.70 - 1.30 MG/DL    BUN/Creatinine ratio 35 (H) 12 - 20      GFR est AA 29 (L) >60 ml/min/1.73m2    GFR est non-AA 24 (L) >60 ml/min/1.73m2    Calcium 8.1 (L) 8.5 - 10.1 MG/DL   MAGNESIUM    Collection Time: 02/10/21 10:45 PM   Result Value Ref Range    Magnesium 2.1 1.6 - 2.4 mg/dL   TROPONIN I    Collection Time: 02/10/21 10:45 PM   Result Value Ref Range    Troponin-I, Qt. 0.56 (H) <0.05 ng/mL   GLUCOSE, POC    Collection Time: 02/11/21 12:15 AM   Result Value Ref Range    Glucose (POC) 90 65 - 100 mg/dL    Performed by Joan Sprague    MAGNESIUM    Collection Time: 02/11/21  4:35 AM   Result Value Ref Range    Magnesium 2.3 1.6 - 2.4 mg/dL   PHOSPHORUS    Collection Time: 02/11/21  4:35 AM   Result Value Ref Range    Phosphorus 5.4 (H) 2.6 - 4.7 MG/DL   URIC ACID    Collection Time: 02/11/21  4:35 AM   Result Value Ref Range    Uric acid 4.5 3.5 - 7.2 MG/DL   TROPONIN I    Collection Time: 02/11/21  4:35 AM   Result Value Ref Range    Troponin-I, Qt. 0.49 (H) <0.05 ng/mL   CBC WITH AUTOMATED DIFF    Collection Time: 02/11/21  4:35 AM   Result Value Ref Range    WBC 9.8 4.1 - 11.1 K/uL    RBC 2.86 (L) 4.10 - 5.70 M/uL    HGB 7.5 (L) 12.1 - 17.0 g/dL    HCT 24.0 (L) 36.6 - 50.3 %    MCV 83.9 80.0 - 99.0 FL    MCH 26.2 26.0 - 34.0 PG    MCHC 31.3 30.0 - 36.5 g/dL    RDW 18.9 (H) 11.5 - 14.5 %    PLATELET 748 (L) 900 - 400 K/uL    MPV ABNORMAL 8.9 - 12.9 FL    NRBC 0.0 0  WBC    ABSOLUTE NRBC 0.00 0.00 - 0.01 K/uL    NEUTROPHILS PENDING %    LYMPHOCYTES PENDING %    MONOCYTES PENDING %    EOSINOPHILS PENDING %    BASOPHILS PENDING %    IMMATURE GRANULOCYTES PENDING %    ABS. NEUTROPHILS PENDING K/UL    ABS. LYMPHOCYTES PENDING K/UL    ABS. MONOCYTES PENDING K/UL    ABS. EOSINOPHILS PENDING K/UL    ABS. BASOPHILS PENDING K/UL    ABS. IMM. GRANS.  PENDING K/UL    DF PENDING    METABOLIC PANEL, COMPREHENSIVE    Collection Time: 02/11/21  4:35 AM   Result Value Ref Range    Sodium 148 (H) 136 - 145 mmol/L    Potassium 3.9 3.5 - 5.1 mmol/L    Chloride 120 (H) 97 - 108 mmol/L    CO2 19 (L) 21 - 32 mmol/L    Anion gap 9 5 - 15 mmol/L    Glucose 93 65 - 100 mg/dL     (H) 6 - 20 MG/DL    Creatinine 2.86 (H) 0.70 - 1.30 MG/DL    BUN/Creatinine ratio 36 (H) 12 - 20      GFR est AA 27 (L) >60 ml/min/1.73m2    GFR est non-AA 22 (L) >60 ml/min/1.73m2    Calcium 8.2 (L) 8.5 - 10.1 MG/DL    Bilirubin, total 0.2 0.2 - 1.0 MG/DL    ALT (SGPT) 142 (H) 12 - 78 U/L    AST (SGOT) 110 (H) 15 - 37 U/L    Alk. phosphatase 162 (H) 45 - 117 U/L    Protein, total 4.7 (L) 6.4 - 8.2 g/dL    Albumin 1.3 (L) 3.5 - 5.0 g/dL    Globulin 3.4 2.0 - 4.0 g/dL    A-G Ratio 0.4 (L) 1.1 - 2.2     GLUCOSE, POC    Collection Time: 02/11/21  6:36 AM   Result Value Ref Range    Glucose (POC) 101 (H) 65 - 100 mg/dL    Performed by Armond Bhardwaj MD  [address]  Phone - [phone]   Fax - [phone]  www. [facility]

## 2021-02-11 NOTE — PROGRESS NOTES
[facility]    ICU TEAM Progress Note    Name: Kolby Meneses   : 1955   MRN: [id number]   Date: 2021      I  Subjective:   Progress Note: 2021      Reason for ICU Admission: Acute hypoxic respiratory failure, COVID-19 pneumonia    Interval history:  From [facility] on   65-year-old man who presented from [facility] with acute onset shortness of breath, hypoxia, nausea, vomiting and diarrhea-shortness of breath acutely got worse after one of the vomiting episodes. Diagnosed with Covid about 2 weeks prior to presentation.  Patient placed on BiPAP on arrival-felt much better.  After getting fluids for resuscitation/elevated lactate level patient acutely decompensated-developed lethargy and drop in oxygen levels-intubated emergently requiring moderate to high vent support.     Overnight Events:   No acute event, had SVT requiring beta-blocker and increase in sedation and analgesia as he was dyssynchronous with the ventilator.  Good urine output no nausea no vomiting tolerating tube feeding and free water via NG tube    Active Problem List:     Problem List  Never Reviewed          Codes Class    Acute hypoxemic respiratory failure due to COVID-19 (HCC) ICD-10-CM: U07.1, J96.01  ICD-9-CM: 518.81, 079.89, 799.02               Past Medical History:      has a past medical history of Diabetes (HCC) and Hypertension.    Past Surgical History:      has no past surgical history on file.    Home Medications:     Prior to Admission medications    Medication Sig Start Date End Date Taking? Authorizing Provider   aspirin 81 mg chewable tablet Take 81 mg by mouth daily.   Yes Candelaria, MD Jessi   atorvastatin (LIPITOR) 80 mg tablet Take 80 mg by mouth daily.   Yes Candelaria, MD Jessi   carvediloL (COREG) 12.5 mg tablet Take  by mouth two (2) times a day.   Yes Candelaria, MD Jessi   docusate sodium (Colace) 100 mg capsule Take 100 mg by mouth two (2) times daily as needed for Constipation.   Yes Jessi Gaona MD  doxycycline (ADOXA) 100 mg tablet Take 100 mg by mouth two (2) times a day. Yes Candelaria, MD Jessi   ferrous sulfate 325 mg (65 mg iron) tablet Take  by mouth every fourty-eight (48) hours. Yes Candelaria, MD Jessi   gabapentin (NEURONTIN) 400 mg capsule Take 400 mg by mouth three (3) times daily. Yes Candelaria, MD Jessi   insulin lispro (HUMALOG) 100 unit/mL injection 10 Units by SubCUTAneous route Before breakfast, lunch, and dinner. Yes Candelaria, MD Jessi   isosorbide mononitrate ER (IMDUR) 60 mg CR tablet Take 60 mg by mouth every morning. Yes Candelaria, MD Jessi   insulin glargine (Lantus U-100 Insulin) 100 unit/mL injection 20 Units by SubCUTAneous route nightly. Yes Jessi Gaona MD   lisinopriL (PRINIVIL, ZESTRIL) 20 mg tablet Take 20 mg by mouth daily. Yes Candelaria, MD Jessi   methocarbamoL (ROBAXIN) 500 mg tablet Take 500 mg by mouth three (3) times daily. Yes Jessi Gaona MD   oxyCODONE IR (ROXICODONE) 5 mg immediate release tablet Take 5 mg by mouth every six (6) hours as needed for Pain. Yes Jessi Gaona MD   pantoprazole (PROTONIX) 40 mg tablet Take 40 mg by mouth daily. Yes Candelaria, MD Jessi   guaiFENesin-codeine (Guaiatussin AC) 100-10 mg/5 mL solution Take 5 mL by mouth four (4) times daily as needed for Cough. Yes Candelaria, MD Jessi   senna (Senna) 8.6 mg tablet Take 1 Tab by mouth daily. Yes Candelaria, MD Jessi   ticagrelor (BRILINTA) 90 mg tablet Take 90 mg by mouth two (2) times a day. Yes Candelaria, MD Jessi   ascorbic acid, vitamin C, (Vitamin C) 500 mg tablet Take 500 mg by mouth two (2) times a day. Yes Candelaria, MD Jessi   cholecalciferol (Vitamin D3) (1000 Units /25 mcg) tablet Take 1,000 Units by mouth daily. Yes Candelaria, MD Jessi   zinc sulfate (ZINCATE) 220 (50) mg capsule Take 220 mg by mouth daily. Yes Provider, Historical   therapeutic multivitamin (THERAGRAN) tablet Take 1 Tab by mouth daily.    Yes Provider, Historical   acetaminophen (TYLENOL) 325 mg tablet Take 975 mg by mouth every six (6) hours as needed for Pain. Jessi Gaona MD       Allergies/Social/Family History:     No Known Allergies   Social History     Tobacco Use    Smoking status: Former Smoker    Smokeless tobacco: Never Used   Substance Use Topics    Alcohol use: Not Currently      No family history on file. Review of Systems:     Not able to obtain due to his medical condition    Objective:   Vital Signs:  Visit Vitals  /70   Pulse 68   Temp (!) 96.7 °F (35.9 °C) Comment: Nicola gudino on   Resp 14   Ht 5' 7\" (1.702 m)   Wt 89.4 kg (197 lb 1.5 oz)   SpO2 100%   BMI 30.87 kg/m²    O2 Flow Rate (L/min): 6 l/min O2 Device: Endotracheal tube, Ventilator Temp (24hrs), Av °F (37.2 °C), Min:96.7 °F (35.9 °C), Max:100.6 °F (38.1 °C)           Intake/Output:     Intake/Output Summary (Last 24 hours) at 2021 0710  Last data filed at 2021 0600  Gross per 24 hour   Intake 4733.73 ml   Output 2337 ml   Net 2396.73 ml       Physical Exam:    General-intubated, sedated, comfortable  Neuro-pupils reactive, withdraws in uppers, strong cough and gag  Cardiac-tachycardic, regular  Lungs-coarse bilaterally  Abdomen-soft, nontender, nondistended  Extremities-warm    LABS AND  DATA: Personally reviewed  Recent Labs     02/11/21  0435 02/10/21  0337   WBC 9.8 9.0   HGB 7.5* 8.0*   HCT 24.0* 26.2*   * 157     Recent Labs     02/11/21  0435 02/10/21  2245 02/10/21  0331   * 150* 149*   K 3.9 3.6 4.2   * 121* 124*   CO2 19* 19* 16*   * 96* 98*   CREA 2.86* 2.72* 2.44*   GLU 93 92 112*   CA 8.2* 8.1* 8.5   MG 2.3 2.1 2.3   PHOS 5.4*  --  5.4*     Recent Labs     21   *   TP 4.7*   ALB 1.3*   GLOB 3.4     Recent Labs     21  1013   APTT 24.7      No results for input(s): PHI, PCO2I, PO2I, FIO2I in the last 72 hours.   Recent Labs     21  0435 02/10/21  2245   TROIQ 0.49* 0.56*       Hemodynamics:   PAP:   CO:     Wedge:   CI:     CVP:    SVR:       PVR:       Ventilator Settings:  Mode Rate Tidal Volume Pressure FiO2 PEEP   SIMV, Volume control   450 ml  10 cm H2O 30 % 5 cm H20     Peak airway pressure: 18 cm H2O    Minute ventilation: 7.52 l/min        MEDS: Reviewed    Chest X-Ray:  CXR Results  (Last 48 hours)    None            Assessment and Plan:   Acute hypoxic respiratory failure  Pulmonary edema  Covid pneumonia  Aspiration pneumonitis  Tachycardia secondary to above  Acute kidney injury     Neuro: Currently patient on Precedex and fentanyl, still with episode of agitation but seems more responsive and follow simple command at times. - Seroquel to 50mg q8h  - Continue gabapentin  -Wean Precedex as tolerated. -CT head today     Pulm: Minimal vent setting, main obstacle for liberation from mechanical ventilation as his mental status. Intubated since January 30. We may consider tracheostomy early next week if no improvement  - Continue to wean vent as able, SIMV, 30%, PEEP 5.   - Daily SAT/SBT.        CV:   - Cont ASA, lipitor, midodrine     GI:   - Lansoprazole  - TFs, please advance to goal.      Renal:  - Continue bird for strict I/Os. -Nephrology consulted secondary to increased BUN, Cr and metabolic acidosis . I appreciate their input, on bicarb drip and is receiving free water via NG tube     Endocrine:  - Lantus 30u daily,  - SSI     Heme:  - ASA  - Statin  - Lovenox 30mg q12h     ID: Appreciate infectious disease. Currently on cefepime and doxycycline both last doses on February 12 per infectious disease order. DISPOSITION  Stay in ICU    CRITICAL CARE CONSULTANT NOTE  I had a face to face encounter with the patient, reviewed and interpreted patient data including clinical events, labs, images, vital signs, I/O's, and examined patient.   I have discussed the case and the plan and management of the patient's care with the consulting services, the bedside nurses and the respiratory therapist.      NOTE OF PERSONAL INVOLVEMENT IN CARE   This patient has a high probability of imminent, clinically significant deterioration, which requires the highest level of preparedness to intervene urgently. I participated in the decision-making and personally managed or directed the management of the following life and organ supporting interventions that required my frequent assessment to treat or prevent imminent deterioration. I personally spent 40 minutes of critical care time. This is time spent at this critically ill patient's bedside actively involved in patient care as well as the coordination of care and discussions with the patient's family. This does not include any procedural time which has been billed separately. Elder Garces M.D.   Staff Intensivist/Pulmonologist  [facility]  2/11/2021

## 2021-02-11 NOTE — PROGRESS NOTES
0730: Bedside shift change report given to Cory Marcum RN (oncoming nurse) by Valeria Gonzalez RN (offgoing nurse). Report included the following information SBAR, Kardex, Intake/Output, MAR, Accordion, Recent Results and Cardiac Rhythm NSR (SVT/controlled afib overnight). 1050: ICU multidisciplinary rounds lead by Dr. Taylor Mayfield): The following were reviewed and discussed: current labs,  recent imaging, lines/drains, review of body systems, nutrition, cultures, mobility, length of ICU stay. The plan of care for the day is as follows: Head CT, increase tube feeds, redraw troponin, restart home antihypertensives. 1332: Dr. Ena Lundberg notified of troponin 0.37. Order received for troponin tomorrow morning. 1439: Back from scheduled CT, placed on SBT by RT.    1606: Pt extubated as ordered by Dr. Ena Lundberg. Placed on 4L nc. Pt orientedx4, drowsy, delayed responses. Fentanyl gtt stopped. 1839: [address] [facility]    Patient with hypoglycemic episode(s) at [phone] (time) on 2/11/2021 (date). BG value(s) pre-treatment 61    Was patient symptomatic? [x] yes, [] no  Patient was treated with the following rescue medications/treatments: [x] D50                [] Glucose tablets                [] Glucagon                [] 4oz juice                [] 6oz reg soda                [] 8oz low fat milk  BG value post-treatment: 82  Dr. Ena Lundberg notified. Order received to change bicarb gtt from 0.45% NS to D5W. 1930: Bedside shift change report given to Tab Reyes RN (oncoming nurse) by Cory Marcum RN (offgoing nurse). Report included the following information SBAR, Kardex, Intake/Output, MAR, Accordion, Recent Results and Cardiac Rhythm NSR.

## 2021-02-12 LAB
ANION GAP SERPL CALC-SCNC: 6 MMOL/L (ref 5–15)
BASOPHILS # BLD: 0 K/UL (ref 0–0.1)
BASOPHILS NFR BLD: 0 % (ref 0–1)
BUN SERPL-MCNC: 91 MG/DL (ref 6–20)
BUN/CREAT SERPL: 27 (ref 12–20)
CALCIUM SERPL-MCNC: 7.7 MG/DL (ref 8.5–10.1)
CHLORIDE SERPL-SCNC: 119 MMOL/L (ref 97–108)
CO2 SERPL-SCNC: 23 MMOL/L (ref 21–32)
COMMENT, HOLDF: NORMAL
CREAT SERPL-MCNC: 3.37 MG/DL (ref 0.7–1.3)
DIFFERENTIAL METHOD BLD: ABNORMAL
EOSINOPHIL # BLD: 0.2 K/UL (ref 0–0.4)
EOSINOPHIL NFR BLD: 2 % (ref 0–7)
ERYTHROCYTE [DISTWIDTH] IN BLOOD BY AUTOMATED COUNT: 19 % (ref 11.5–14.5)
GLUCOSE BLD STRIP.AUTO-MCNC: 140 MG/DL (ref 65–100)
GLUCOSE BLD STRIP.AUTO-MCNC: 153 MG/DL (ref 65–100)
GLUCOSE BLD STRIP.AUTO-MCNC: 191 MG/DL (ref 65–100)
GLUCOSE BLD STRIP.AUTO-MCNC: 221 MG/DL (ref 65–100)
GLUCOSE BLD STRIP.AUTO-MCNC: 74 MG/DL (ref 65–100)
GLUCOSE SERPL-MCNC: 176 MG/DL (ref 65–100)
HCT VFR BLD AUTO: 26.7 % (ref 36.6–50.3)
HGB BLD-MCNC: 8.4 G/DL (ref 12.1–17)
IMM GRANULOCYTES # BLD AUTO: 0.1 K/UL (ref 0–0.04)
IMM GRANULOCYTES NFR BLD AUTO: 1 % (ref 0–0.5)
LYMPHOCYTES # BLD: 1.8 K/UL (ref 0.8–3.5)
LYMPHOCYTES NFR BLD: 14 % (ref 12–49)
MAGNESIUM SERPL-MCNC: 2.3 MG/DL (ref 1.6–2.4)
MCH RBC QN AUTO: 25.4 PG (ref 26–34)
MCHC RBC AUTO-ENTMCNC: 31.5 G/DL (ref 30–36.5)
MCV RBC AUTO: 80.7 FL (ref 80–99)
MONOCYTES # BLD: 1.2 K/UL (ref 0–1)
MONOCYTES NFR BLD: 9 % (ref 5–13)
NEUTS SEG # BLD: 9.5 K/UL (ref 1.8–8)
NEUTS SEG NFR BLD: 74 % (ref 32–75)
NRBC # BLD: 0 K/UL (ref 0–0.01)
NRBC BLD-RTO: 0 PER 100 WBC
PHOSPHATE SERPL-MCNC: 4 MG/DL (ref 2.6–4.7)
PLATELET # BLD AUTO: 196 K/UL (ref 150–400)
POTASSIUM SERPL-SCNC: 2.8 MMOL/L (ref 3.5–5.1)
RBC # BLD AUTO: 3.31 M/UL (ref 4.1–5.7)
SAMPLES BEING HELD,HOLD: NORMAL
SARS-COV-2, COV2: NORMAL
SERVICE CMNT-IMP: ABNORMAL
SERVICE CMNT-IMP: NORMAL
SODIUM SERPL-SCNC: 148 MMOL/L (ref 136–145)
TROPONIN I SERPL-MCNC: 0.28 NG/ML
TROPONIN I SERPL-MCNC: 0.32 NG/ML
URATE SERPL-MCNC: 4.3 MG/DL (ref 3.5–7.2)
WBC # BLD AUTO: 12.8 K/UL (ref 4.1–11.1)

## 2021-02-12 PROCEDURE — 74011250637 HC RX REV CODE- 250/637: Performed by: NURSE PRACTITIONER

## 2021-02-12 PROCEDURE — 74011250636 HC RX REV CODE- 250/636: Performed by: NURSE PRACTITIONER

## 2021-02-12 PROCEDURE — 74011250636 HC RX REV CODE- 250/636: Performed by: STUDENT IN AN ORGANIZED HEALTH CARE EDUCATION/TRAINING PROGRAM

## 2021-02-12 PROCEDURE — 74011636637 HC RX REV CODE- 636/637: Performed by: NURSE PRACTITIONER

## 2021-02-12 PROCEDURE — 74011250637 HC RX REV CODE- 250/637: Performed by: INTERNAL MEDICINE

## 2021-02-12 PROCEDURE — 74011000250 HC RX REV CODE- 250: Performed by: STUDENT IN AN ORGANIZED HEALTH CARE EDUCATION/TRAINING PROGRAM

## 2021-02-12 PROCEDURE — 74011000258 HC RX REV CODE- 258: Performed by: NURSE PRACTITIONER

## 2021-02-12 PROCEDURE — U0005 INFEC AGEN DETEC AMPLI PROBE: HCPCS

## 2021-02-12 PROCEDURE — 86038 ANTINUCLEAR ANTIBODIES: CPT

## 2021-02-12 PROCEDURE — 74011250637 HC RX REV CODE- 250/637: Performed by: ANESTHESIOLOGY

## 2021-02-12 PROCEDURE — 74011250636 HC RX REV CODE- 250/636: Performed by: INTERNAL MEDICINE

## 2021-02-12 PROCEDURE — 77010033678 HC OXYGEN DAILY

## 2021-02-12 PROCEDURE — 74011000250 HC RX REV CODE- 250: Performed by: INTERNAL MEDICINE

## 2021-02-12 PROCEDURE — 86235 NUCLEAR ANTIGEN ANTIBODY: CPT

## 2021-02-12 PROCEDURE — 74011250636 HC RX REV CODE- 250/636: Performed by: ANESTHESIOLOGY

## 2021-02-12 PROCEDURE — 65610000006 HC RM INTENSIVE CARE

## 2021-02-12 PROCEDURE — 86160 COMPLEMENT ANTIGEN: CPT

## 2021-02-12 PROCEDURE — 97530 THERAPEUTIC ACTIVITIES: CPT

## 2021-02-12 PROCEDURE — 74011250637 HC RX REV CODE- 250/637: Performed by: EMERGENCY MEDICINE

## 2021-02-12 PROCEDURE — 97165 OT EVAL LOW COMPLEX 30 MIN: CPT

## 2021-02-12 PROCEDURE — 82784 ASSAY IGA/IGD/IGG/IGM EACH: CPT

## 2021-02-12 PROCEDURE — 84100 ASSAY OF PHOSPHORUS: CPT

## 2021-02-12 PROCEDURE — 84484 ASSAY OF TROPONIN QUANT: CPT

## 2021-02-12 PROCEDURE — 92610 EVALUATE SWALLOWING FUNCTION: CPT | Performed by: SPEECH-LANGUAGE PATHOLOGIST

## 2021-02-12 PROCEDURE — 87070 CULTURE OTHR SPECIMN AEROBIC: CPT

## 2021-02-12 PROCEDURE — 97535 SELF CARE MNGMENT TRAINING: CPT

## 2021-02-12 PROCEDURE — 36415 COLL VENOUS BLD VENIPUNCTURE: CPT

## 2021-02-12 PROCEDURE — 97162 PT EVAL MOD COMPLEX 30 MIN: CPT

## 2021-02-12 PROCEDURE — 85025 COMPLETE CBC W/AUTO DIFF WBC: CPT

## 2021-02-12 PROCEDURE — 74011000250 HC RX REV CODE- 250: Performed by: NURSE PRACTITIONER

## 2021-02-12 PROCEDURE — 82962 GLUCOSE BLOOD TEST: CPT

## 2021-02-12 PROCEDURE — 80048 BASIC METABOLIC PNL TOTAL CA: CPT

## 2021-02-12 PROCEDURE — 74011636637 HC RX REV CODE- 636/637: Performed by: INTERNAL MEDICINE

## 2021-02-12 PROCEDURE — 84550 ASSAY OF BLOOD/URIC ACID: CPT

## 2021-02-12 PROCEDURE — 83735 ASSAY OF MAGNESIUM: CPT

## 2021-02-12 RX ORDER — SODIUM CHLORIDE 9 MG/ML
100 INJECTION, SOLUTION INTRAVENOUS CONTINUOUS
Status: DISCONTINUED | OUTPATIENT
Start: 2021-02-12 | End: 2021-02-12

## 2021-02-12 RX ORDER — POTASSIUM CHLORIDE 14.9 MG/ML
10 INJECTION INTRAVENOUS
Status: COMPLETED | OUTPATIENT
Start: 2021-02-12 | End: 2021-02-12

## 2021-02-12 RX ORDER — CARVEDILOL 12.5 MG/1
12.5 TABLET ORAL ONCE
Status: COMPLETED | OUTPATIENT
Start: 2021-02-12 | End: 2021-02-12

## 2021-02-12 RX ORDER — CARVEDILOL 12.5 MG/1
25 TABLET ORAL EVERY 12 HOURS
Status: DISCONTINUED | OUTPATIENT
Start: 2021-02-12 | End: 2021-03-03

## 2021-02-12 RX ORDER — ISOSORBIDE DINITRATE 10 MG/1
20 TABLET ORAL 2 TIMES DAILY
Status: DISCONTINUED | OUTPATIENT
Start: 2021-02-12 | End: 2021-02-17

## 2021-02-12 RX ADMIN — SODIUM CHLORIDE 100 ML/HR: 9 INJECTION, SOLUTION INTRAVENOUS at 10:35

## 2021-02-12 RX ADMIN — Medication: at 09:14

## 2021-02-12 RX ADMIN — HYDROMORPHONE HYDROCHLORIDE 0.5 MG: 1 INJECTION, SOLUTION INTRAMUSCULAR; INTRAVENOUS; SUBCUTANEOUS at 16:45

## 2021-02-12 RX ADMIN — CHLORHEXIDINE GLUCONATE 15 ML: 0.12 RINSE ORAL at 20:59

## 2021-02-12 RX ADMIN — ALLOPURINOL 100 MG: 100 TABLET ORAL at 08:36

## 2021-02-12 RX ADMIN — SODIUM BICARBONATE: 84 INJECTION, SOLUTION INTRAVENOUS at 18:35

## 2021-02-12 RX ADMIN — SODIUM CHLORIDE 100 ML/HR: 9 INJECTION, SOLUTION INTRAVENOUS at 12:14

## 2021-02-12 RX ADMIN — QUETIAPINE FUMARATE 50 MG: 25 TABLET ORAL at 22:00

## 2021-02-12 RX ADMIN — GABAPENTIN 300 MG: 250 SOLUTION ORAL at 16:45

## 2021-02-12 RX ADMIN — INSULIN LISPRO 3 UNITS: 100 INJECTION, SOLUTION INTRAVENOUS; SUBCUTANEOUS at 06:00

## 2021-02-12 RX ADMIN — OXYCODONE HYDROCHLORIDE AND ACETAMINOPHEN 500 MG: 500 TABLET ORAL at 08:36

## 2021-02-12 RX ADMIN — LANSOPRAZOLE 30 MG: KIT at 08:36

## 2021-02-12 RX ADMIN — CEFEPIME HYDROCHLORIDE 2 G: 2 INJECTION, POWDER, FOR SOLUTION INTRAVENOUS at 17:11

## 2021-02-12 RX ADMIN — Medication 10 ML: at 22:00

## 2021-02-12 RX ADMIN — CARVEDILOL 12.5 MG: 12.5 TABLET, FILM COATED ORAL at 08:36

## 2021-02-12 RX ADMIN — INSULIN LISPRO 4 UNITS: 100 INJECTION, SOLUTION INTRAVENOUS; SUBCUTANEOUS at 12:56

## 2021-02-12 RX ADMIN — CHLORHEXIDINE GLUCONATE 15 ML: 0.12 RINSE ORAL at 08:37

## 2021-02-12 RX ADMIN — POTASSIUM CHLORIDE 10 MEQ: 200 INJECTION, SOLUTION INTRAVENOUS at 16:48

## 2021-02-12 RX ADMIN — SODIUM CHLORIDE 7.5 MG/HR: 900 INJECTION, SOLUTION INTRAVENOUS at 10:24

## 2021-02-12 RX ADMIN — QUETIAPINE FUMARATE 50 MG: 25 TABLET ORAL at 16:45

## 2021-02-12 RX ADMIN — CEFEPIME 2 G: 2 INJECTION, POWDER, FOR SOLUTION INTRAVENOUS at 12:09

## 2021-02-12 RX ADMIN — POTASSIUM CHLORIDE 10 MEQ: 200 INJECTION, SOLUTION INTRAVENOUS at 18:34

## 2021-02-12 RX ADMIN — MINERAL SUPPLEMENT IRON 300 MG / 5 ML STRENGTH LIQUID 100 PER BOX UNFLAVORED 300 MG: at 08:42

## 2021-02-12 RX ADMIN — CARVEDILOL 12.5 MG: 12.5 TABLET, FILM COATED ORAL at 12:07

## 2021-02-12 RX ADMIN — AMLODIPINE BESYLATE 10 MG: 5 TABLET ORAL at 08:36

## 2021-02-12 RX ADMIN — GABAPENTIN 300 MG: 250 SOLUTION ORAL at 08:35

## 2021-02-12 RX ADMIN — ATORVASTATIN CALCIUM 40 MG: 40 TABLET, FILM COATED ORAL at 22:00

## 2021-02-12 RX ADMIN — Medication 10 ML: at 06:33

## 2021-02-12 RX ADMIN — ENOXAPARIN SODIUM 30 MG: 30 INJECTION SUBCUTANEOUS at 08:35

## 2021-02-12 RX ADMIN — GABAPENTIN 300 MG: 250 SOLUTION ORAL at 23:16

## 2021-02-12 RX ADMIN — INSULIN GLARGINE 15 UNITS: 100 INJECTION, SOLUTION SUBCUTANEOUS at 08:34

## 2021-02-12 RX ADMIN — SODIUM CHLORIDE 7.5 MG/HR: 900 INJECTION, SOLUTION INTRAVENOUS at 06:54

## 2021-02-12 RX ADMIN — DOXYCYCLINE 100 MG: 100 INJECTION, POWDER, LYOPHILIZED, FOR SOLUTION INTRAVENOUS at 10:28

## 2021-02-12 RX ADMIN — Medication 10 ML: at 17:07

## 2021-02-12 RX ADMIN — CHOLECALCIFEROL (VITAMIN D3) 25 MCG (1,000 UNIT) TABLET 2000 UNITS: TABLET at 08:36

## 2021-02-12 RX ADMIN — ASPIRIN 81 MG: 81 TABLET, CHEWABLE ORAL at 08:36

## 2021-02-12 RX ADMIN — QUETIAPINE FUMARATE 50 MG: 25 TABLET ORAL at 08:35

## 2021-02-12 RX ADMIN — CARVEDILOL 25 MG: 12.5 TABLET, FILM COATED ORAL at 21:00

## 2021-02-12 RX ADMIN — ISOSORBIDE DINITRATE 20 MG: 20 TABLET ORAL at 12:06

## 2021-02-12 RX ADMIN — ISOSORBIDE DINITRATE 20 MG: 20 TABLET ORAL at 17:07

## 2021-02-12 RX ADMIN — POTASSIUM CHLORIDE 10 MEQ: 200 INJECTION, SOLUTION INTRAVENOUS at 14:26

## 2021-02-12 RX ADMIN — INSULIN LISPRO 3 UNITS: 100 INJECTION, SOLUTION INTRAVENOUS; SUBCUTANEOUS at 17:12

## 2021-02-12 RX ADMIN — POTASSIUM CHLORIDE 10 MEQ: 200 INJECTION, SOLUTION INTRAVENOUS at 12:57

## 2021-02-12 RX ADMIN — OXYCODONE HYDROCHLORIDE AND ACETAMINOPHEN 500 MG: 500 TABLET ORAL at 17:07

## 2021-02-12 RX ADMIN — Medication 1 CAPSULE: at 08:35

## 2021-02-12 NOTE — PROGRESS NOTES
ID Progress Note  2021    Subjective:     Alert and orient to self and place  C/o generalized weakness    Review of Systems:            Symptom Y/N Comments   Symptom Y/N Comments   Fever/Chills n      Chest Pain n       Poor Appetite       Edema        Cough y      Abdominal Pain        Sputum       Joint Pain        SOB/WEBB  y     Pruritis/Rash        Nausea/vomit  n     Tolerating PT/OT        Diarrhea       Tolerating Diet        Constipation       Other           Could NOT obtain due to:       Objective:     Vitals:   Visit Vitals  BP (!) 157/67   Pulse (!) 106   Temp 99.2 °F (37.3 °C)   Resp 15   Ht 5' 7\" (1.702 m)   Wt 92.9 kg (204 lb 12.9 oz)   SpO2 98%   BMI 32.08 kg/m²        Tmax:  Temp (24hrs), Av.3 °F (37.4 °C), Min:98.8 °F (37.1 °C), Max:100.3 °F (37.9 °C)      PHYSICAL EXAM:  General: WD, WN. Alert, cooperative, no acute distress    EENT:  EOMI. Anicteric sclerae. MMM  Resp:  Clear in apex with decreased breath sounds at bases, no wheezing or rales. No accessory muscle use  CV:  Regular  rhythm,  No edema  GI:  Soft, Non distended, Non tender. +Bowel sounds  Neurologic:  Alert and oriented X self and place, speech soft, limited ROM, able to squeeze hands and weak flexion/extention of toes  Psych:   Fair insight. Not anxious nor agitated  Skin:  No rashes.   No jaundice, posterior mid cervical dressing dry and intact    Labs:   Lab Results   Component Value Date/Time    WBC 9.8 2021 04:35 AM    HGB 7.5 (L) 2021 04:35 AM    HCT 24.0 (L) 2021 04:35 AM    PLATELET 569 (L)  04:35 AM    MCV 83.9 2021 04:35 AM     Lab Results   Component Value Date/Time    Sodium 148 (H) 2021 04:35 AM    Potassium 3.9 2021 04:35 AM    Chloride 120 (H) 2021 04:35 AM    CO2 19 (L) 2021 04:35 AM    Anion gap 9 2021 04:35 AM    Glucose 93 2021 04:35 AM     (H) 2021 04:35 AM    Creatinine 2.86 (H) 2021 04:35 AM    BUN/Creatinine ratio 36 (H) 02/11/2021 04:35 AM    GFR est AA 27 (L) 02/11/2021 04:35 AM    GFR est non-AA 22 (L) 02/11/2021 04:35 AM    Calcium 8.2 (L) 02/11/2021 04:35 AM    Bilirubin, total 0.2 02/11/2021 04:35 AM    Alk. phosphatase 162 (H) 02/11/2021 04:35 AM    Protein, total 4.7 (L) 02/11/2021 04:35 AM    Albumin 1.3 (L) 02/11/2021 04:35 AM    Globulin 3.4 02/11/2021 04:35 AM    A-G Ratio 0.4 (L) 02/11/2021 04:35 AM    ALT (SGPT) 142 (H) 02/11/2021 04:35 AM       Assessment and Plan   Acute respiratory failure secondary to COVID 19 PNA  mycoplasma and s. pneumoniae   - Mycoplasma IgG  1411, S Pneumo (+)    Legionella (-)    T-max 100.3, WBC 12.8 - pls send another sputum cx    Blood cx (1/30) no growth    Blood cx (2/4) no growth so far    resp cx (2/5) candida albicans - oropharyngeal contamination     Quantiferon (-)    CXR (2/11): Significant decrease in bilateral interstitial and airspace opacities. There is persistent opacity at the left lung base consistent left pleural effusion and left lower lobe atelectasis. .     Supportive care for COVID 19      Continue with IV cefepime due to worsening CXR and temp 100.3      ABX hx;    Zyvox x1 on 2/9    vancomycin 1/30-2/9    IV Flagyl 1/30-2/4    IV doxy 1/30-2/12       fever work up every 24 hours if temp >= 100.4     posterior mid cervical wound; appreciate wound care team's input     Mid posterior cervical wound infection  - chronic wound; pt was waiting for skin graft according to the provider at [facility], [place]. Macy Beltran    Wound cx (1/31) LIGHT DIPHTHEROIDS     Wound measurement per our wound care team; 3.5 cm x 4 cm x 0.1 cm. Spoke with the Managing provider at [facility], Dr. Macy Beltran, [address]. Wound care nurse, Ms. Darlyn Fernandez [phone]. According to the wound care nurse, the wound was created after the removal of infected cyst.    Pt completed IV Zosyn then Vancomycin for 30 days at the rehab center.     Blood cx and wound cx grew MRSA    Pt should follow up with Dr Maria Isabel Gautam at [facility] upon discharge for his cervical wound     JUAN C  - creat 2.0->2.27->3.37        Geoffrey Vang NP

## 2021-02-12 NOTE — PROGRESS NOTES
Problem: Mobility Impaired (Adult and Pediatric)  Goal: *Acute Goals and Plan of Care (Insert Text)  Description: FUNCTIONAL STATUS PRIOR TO ADMISSION: Patient was independent and active without use of DME.    HOME SUPPORT PRIOR TO ADMISSION: The patient lived with wife but did not require assist.    Physical Therapy Goals  Initiated 2/12/2021  1. Patient will move from supine to sit and sit to supine , scoot up and down, and roll side to side in bed with minimal assistance/contact guard assist within 7 day(s). 2.  Patient will transfer from bed to chair and chair to bed with minimal assistance/contact guard assist using the least restrictive device within 7 day(s). 3.  Patient will perform sit to stand with minimal assistance/contact guard assist within 7 day(s). 4.  Patient will ambulate with minimal assistance/contact guard assist for 10 feet with the least restrictive device within 7 day(s). 5.  Patient will ascend/descend 2 stairs with one handrail(s) with minimal assistance/contact guard assist within 7 day(s). Outcome: Not Met   PHYSICAL THERAPY EVALUATION  Patient: German Ye (37 y.o. male)  Date: 2/12/2021  Primary Diagnosis: Acute hypoxemic respiratory failure due to COVID[facility] [U07.1, J96.01]        Precautions:   Fall(droplet plus'''')      ASSESSMENT  Based on the objective data described below, the patient presents with decreased activity tolerance, balance deficits, and weakness. Pt extubated 2/11 after 10 days of intubation. Pt with increased LLE>RLE weakness and reports of pain when L>R LE touched. Pt performed B rolling for linen changes and toileting with pt attempted to hold rails. Slowly transitioned bed into chair position requiring breaks 2/2 abdomen discomfort. Pt tended to lean toward L side and able to assist in moving trunk toward R side. Recommend chair position in bed at least 3x/day for 30-60 min, RN aware.  Will attempt sitting EOB with 2 assist next session as appropriate. Pt appears significantly below functional mobility baseline. Current Level of Function Impacting Discharge (mobility/balance): max A x 2 for bed mobility    Functional Outcome Measure: The patient scored Total: 5/100 on the Barthel Index which is indicative of 95% impaired ability to care for basic self needs/dependency on others. Other factors to consider for discharge: fall risk, 2 person A, independent PTA, 10 days of intubation     Patient will benefit from skilled therapy intervention to address the above noted impairments. PLAN :  Recommendations and Planned Interventions: bed mobility training, transfer training, gait training, therapeutic exercises, neuromuscular re-education, patient and family training/education, and therapeutic activities      Frequency/Duration: Patient will be followed by physical therapy:  5 times a week to address goals. Recommendation for discharge: (in order for the patient to meet his/her long term goals)  Therapy 3 hours per day 5-7 days per week  If pt were to d/c home, would benefit from HHPT and require assistance/supervision for 2 assist as pt is a fall risk    This discharge recommendation:  Has not yet been discussed the attending provider and/or case management    IF patient discharges home will need the following DME: hospital bed, mechanical lift, and wheelchair         SUBJECTIVE:   Patient stated ow my abdomen.     OBJECTIVE DATA SUMMARY:   HISTORY:    Past Medical History:   Diagnosis Date    Diabetes ([facility]     Hypertension    No past surgical history on file.     Personal factors and/or comorbidities impacting plan of care: PMH, COVID2    Home Situation  Home Environment: Private residence  # Steps to Enter: 4  Rails to Enter: Yes  Hand Rails : Bilateral  One/Two Story Residence: One story  Living Alone: No  Support Systems: Spouse/Significant Other/Partner  Current DME Used/Available at Home: None  Tub or Shower Type: Tub/Shower combination    EXAMINATION/PRESENTATION/DECISION MAKING:   Critical Behavior:  Neurologic State: Sleeping, Eyes open to voice, Pharmacologically induced (comment)  Orientation Level: Oriented X4  Cognition: Follows commands     Hearing:     Skin:  noted breakdown on scrotum and buttock- RN aware  Edema: none BLE, RN aware  Range Of Motion:  AROM: Generally decreased, functional           PROM: Generally decreased, functional           Strength:    Strength: Generally decreased, functional  3-/5 for R knee extension  2+/5 for L knee extension  Tone & Sensation:   Tone: Abnormal        Coordination:  Coordination: Generally decreased, functional  Vision:      Functional Mobility:  Bed Mobility:  Rolling: Maximum assistance;Assist x2; Moderate assistance        Scooting: Maximum assistance;Assist x2       Balance:   Sitting: Impaired; With support    Functional Measure:  Barthel Index:    Bathin  Bladder: 0  Bowels: 0  Groomin  Dressin  Feedin  Mobility: 0  Stairs: 0  Toilet Use: 0  Transfer (Bed to Chair and Back): 0  Total: 5/100       The Barthel ADL Index: Guidelines  1. The index should be used as a record of what a patient does, not as a record of what a patient could do. 2. The main aim is to establish degree of independence from any help, physical or verbal, however minor and for whatever reason. 3. The need for supervision renders the patient not independent. 4. A patient's performance should be established using the best available evidence. Asking the patient, friends/relatives and nurses are the usual sources, but direct observation and common sense are also important. However direct testing is not needed. 5. Usually the patient's performance over the preceding 24-48 hours is important, but occasionally longer periods will be relevant. 6. Middle categories imply that the patient supplies over 50 per cent of the effort. 7. Use of aids to be independent is allowed.     Regan Gonsalez., Barthel, DCaronW. (1965). Functional evaluation: the Barthel Index. [address] [facility] (14)2. Zachary Winchester TL Hardy Romualdo Holster.Macie.[place], [address] (1999). Measuring the change indisability after inpatient rehabilitation; comparison of the responsiveness of the Barthel Index and Functional [place] Measure. Journal of Neurology, Neurosurgery, and Psychiatry, 66(4), 050-503. PIEDAD Teague, TOMI Andrew, & Dianelys Shanks M.A. (2004.) Assessment of post-stroke quality of life in cost-effectiveness studies: The usefulness of the Barthel Index and the EuroQoL-5D. Quality of Life Research, 15, 141-82        Physical Therapy Evaluation Charge Determination   History Examination Presentation Decision-Making   HIGH Complexity :3+ comorbidities / personal factors will impact the outcome/ POC  MEDIUM Complexity : 3 Standardized tests and measures addressing body structure, function, activity limitation and / or participation in recreation  MEDIUM Complexity : Evolving with changing characteristics  Other outcome measures barthel index  HIGH       Based on the above components, the patient evaluation is determined to be of the following complexity level: MEDIUM        Activity Tolerance:   Fair and requires rest breaks       02/12/21 1451 02/12/21 1517   Vital Signs   Pulse (Heart Rate) (!) 105  --    /66 138/75   MAP (Calculated) 90 96   BP 1 Location Left upper arm Left upper arm   BP 1 Method Automatic Automatic   BP Patient Position  --  Supine   Resp Rate 22  --    O2 Sat (%) 98 % 96 %   O2 Device Nasal cannula  --    O2 Flow Rate (L/min) 4 l/min  --        After treatment patient left in no apparent distress:   Supine in bed, Call bell within reach, and Side rails x 3    COMMUNICATION/EDUCATION:   The patients plan of care was discussed with: Occupational therapist, Speech therapist, and Registered nurse.      Fall prevention education was provided and the patient/caregiver indicated understanding., Patient/family have participated as able in goal setting and plan of care. , and Patient/family agree to work toward stated goals and plan of care.     Thank you for this referral.  Juan F Pierson, PT, DPT   Time Calculation: 36 mins

## 2021-02-12 NOTE — PROGRESS NOTES
Problem: Self Care Deficits Care Plan (Adult)  Goal: *Acute Goals and Plan of Care (Insert Text)  Description:   FUNCTIONAL STATUS PRIOR TO ADMISSION: Patient was independent and active without use of DME. Patient was independent for basic and instrumental ADLs at his true baseline, however [place] [facility] [place] in prep for skin graft. HOME SUPPORT: The patient lived with his wife but did not require assist.    Occupational Therapy Goals  Initiated 2/12/2021  1. Patient will perform self-feeding with moderate assistance & compensatory techniques PRN within 7 day(s). 2.  Patient will perform grooming with moderate assistance within 7 day(s). 3.  Patient will perform anterior neck to thigh bathing with moderate assistance within 7 day(s). 4.  Patient will perform rolling in supine toilet transfers with moderate assistance within 7 day(s). 5.  Patient will participate in upper extremity therapeutic exercise/activities with moderate assistance for 10 minutes within 7 day(s). 7.  Patient will utilize energy conservation techniques during functional activities with verbal cues within 7 day(s). Outcome: Not Met     OCCUPATIONAL THERAPY EVALUATION  Patient: Kristyn Cordova (89 y.o. male)  Date: 2/12/2021  Primary Diagnosis: Acute hypoxemic respiratory failure due to COVID-19 ([facility] [U07.1, J96.01]        Precautions: Fall(droplet plus'''')    ASSESSMENT  Based on the objective data described below, the patient presents with impaired mobility, cardiopulmonary endurance, ROM (RUE>LUE, LLE>RLE), fine & gross strength, coordination, & attention to the R functional reach s/p admission for COVID-19 associated respiratory failure with intubation, extubated 2/11. At his baseline, patient is IND for ADLs and mobility, [place] [facility] [place], awaiting skin graft for neck.  He now presents far from his baseline, globally weak with limited BUE<BLE ROM, hypersensitive to LLE touch/ROM dependent toileting and bed mobility completed this session. He is highly motivated to participate and progress, however is currently Max-Total A x1-2 for ADLs and bed mobility with L lateral cervical and trunk rotation/lean requiring cues to correct. Poor FM control and strength noted with attempts for call bell use, will benefit from pancake call bell to alert staff for needs, discussed with RN. Recommend d/c to IPR to maximize safety & functional independence, working towards tolerating 3 hours/day of therapy. Current Level of Function Impacting Discharge (ADLs/self-care): Max-Total A x1-2 for ADLs and mobility    Functional Outcome Measure: The patient scored Total: 0/100 on the Barthel Index outcome measure which is indicative of 100% impaired ability to care for basic self needs/dependency on others; inferred 100% dependency on others for instrumental ADLs. Other factors to consider for discharge: fall risk, medical complexity, global deficits, high PLOF, PMH     Patient will benefit from skilled therapy intervention to address the above noted impairments. PLAN :  Recommendations and Planned Interventions: self care training, functional mobility training, therapeutic exercise, balance training, visual/perceptual training, therapeutic activities, cognitive retraining, endurance activities, neuromuscular re-education, patient education, home safety training, and family training/education    Frequency/Duration: Patient will be followed by occupational therapy 5 times a week to address goals. Recommendation for discharge: (in order for the patient to meet his/her long term goals)  Therapy 3 hours per day 5-7 days per week    This discharge recommendation:  A follow-up discussion with the attending provider and/or case management is planned    IF patient discharges home will need the following DME: To be determined (TBD)         SUBJECTIVE:   Patient stated This feels excellent!  re: modified bed in chair position    OBJECTIVE DATA SUMMARY:   HISTORY:   Past Medical History:   Diagnosis Date    Diabetes ([facility]     Hypertension    No past surgical history on file. Expanded or extensive additional review of patient history:     Home Situation  Home Environment: Private residence  # Steps to Enter: 4  Rails to Enter: Yes  Hand Rails : Bilateral  One/Two Story Residence: One story  Living Alone: No  Support Systems: Spouse/Significant Other/Partner  Current DME Used/Available at Home: None  Tub or Shower Type: Tub/Shower combination    Hand dominance: Right    EXAMINATION OF PERFORMANCE DEFICITS:  Cognitive/Behavioral Status:  Neurologic State: Alert  Orientation Level: Oriented X4  Cognition: Follows commands;Decreased attention/concentration  Perception: Cues to maintain midline in sitting;Cues to attend right visual field(L cervical & trunk lean preference)  Perseveration: No perseveration noted  Safety/Judgement: Decreased awareness of environment    Skin: Appears intact    Edema: BLE edema and abdominal swelling    Hearing:       Vision/Perceptual:    Tracking: Able to track stimulus in all quadrants w/o difficulty; Requires cues, head turns, or add eye shifts to track(cues for attention to R side d/t cervical/trunk pref)                                Range of Motion:  AROM: Generally decreased, functional  PROM: Generally decreased, functional                      Strength:  Strength: Generally decreased, functional                Coordination:  Coordination: Generally decreased, functional  Fine Motor Skills-Upper: Left Impaired;Right Impaired(R moreso decreased)    Gross Motor Skills-Upper: Left Impaired;Right Impaired(R moreso decreased)    Tone & Sensation:  Tone: Abnormal                         Balance:  Sitting: Impaired; With support    Functional Mobility and Transfers for ADLs:  Bed Mobility:  Rolling: Maximum assistance;Assist x2; Moderate assistance  Supine to Sit: Bed Modified  Scooting: Maximum assistance;Assist x2    Transfers: Toilet Transfer : Maximum assistance;Assist x2(rolling in supine)    ADL Assessment:  Feeding: Total assistance    Oral Facial Hygiene/Grooming: Maximum assistance    Bathing: Total assistance    Upper Body Dressing: Total assistance    Lower Body Dressing: Total assistance    Toileting: Total assistance                ADL Intervention and task modifications:                 Lower Body Bathing  Bathing Assistance: Total assistance(dependent)  Perineal  : Total assistance (dependent)  Position Performed: Supine              Toileting  Toileting Assistance: Total assistance(dependent)(bed-level)  Bladder Hygiene: Total assistance (dependent)  Bowel Hygiene: Total assistance (dependent)  Cues: Tactile cues provided;Verbal cues provided;Visual cues provided    Cognitive Retraining  Safety/Judgement: Decreased awareness of environment    Functional Measure:  Barthel Index:    Bathin  Bladder: 0  Bowels: 0  Groomin  Dressin  Feedin  Mobility: 0  Stairs: 0  Toilet Use: 0  Transfer (Bed to Chair and Back): 0  Total: 0/100        The Barthel ADL Index: Guidelines  1. The index should be used as a record of what a patient does, not as a record of what a patient could do. 2. The main aim is to establish degree of independence from any help, physical or verbal, however minor and for whatever reason. 3. The need for supervision renders the patient not independent. 4. A patient's performance should be established using the best available evidence. Asking the patient, friends/relatives and nurses are the usual sources, but direct observation and common sense are also important. However direct testing is not needed. 5. Usually the patient's performance over the preceding 24-48 hours is important, but occasionally longer periods will be relevant. 6. Middle categories imply that the patient supplies over 50 per cent of the effort.   7. Use of aids to be independent is forest. Val Roberson. Barthel, DCaronWCaron ([id number]). Functional evaluation: the Barthel Index. [address] (14)2. TL Campo, Annette Goldsmith, Stewart Hedrick., [place], [address] (1999). Measuring the change indisability after inpatient rehabilitation; comparison of the responsiveness of the Barthel Index and Functional Dyer Measure. Journal of Neurology, Neurosurgery, and Psychiatry, 66(4), 442-057. PIEDAD Franco, TOMI Andrew, & Chaya George M.A. (2004.) Assessment of post-stroke quality of life in cost-effectiveness studies: The usefulness of the Barthel Index and the EuroQoL-5D. Quality of Life Research, 15, 245-51         Occupational Therapy Evaluation Charge Determination   History Examination Decision-Making   LOW Complexity : Brief history review  HIGH Complexity : 5 or more performance deficits relating to physical, cognitive , or psychosocial skils that result in activity limitations and / or participation restrictions HIGH Complexity : Patient presents with comorbidities that affect occupational performance. Signifigant modification of tasks or assistance (eg, physical or verbal) with assessment (s) is necessary to enable patient to complete evaluation       Based on the above components, the patient evaluation is determined to be of the following complexity level: LOW   Pain Rating:  LLE, neck, and posterior leg pain reported    Activity Tolerance:   Good considering medical complexity & extended intubation    After treatment patient left in no apparent distress:    Call bell within reach, Side rails x 3, and modified bed in chair position    COMMUNICATION/EDUCATION:   The patients plan of care was discussed with: Physical therapist and Registered nurse. Home safety education was provided and the patient/caregiver indicated understanding., Patient/family have participated as able in goal setting and plan of care. , and Patient/family agree to work toward stated goals and plan of care. This patients plan of care is appropriate for delegation to [facility].     Thank you for this referral.  SANTINO Butterfield, OTR/L  Time Calculation: 36 mins

## 2021-02-12 NOTE — PROGRESS NOTES
0730: Bedside shift change report given to Shelly Mosley RN (oncoming nurse) by Flavia Smith RN (offgoing nurse). Report included the following information SBAR, Kardex, Intake/Output, MAR, Accordion and Recent Results. 0900: Bedside dysphagia screen performed. Pt failed with water by cup d/t coughing and clearing throat. Speech therapy consulted. 1608: Dr. Corrina Merlin notified of UO dropping to 30-40mL/hr. Order received to stop NS infusion and restart sodium bicarb gtt 150mEq in sterile water at 100mL/hr.     1700: Dr. Corrie Land notified of pt's continued generalized weakness and slurred/garbled speech. Assessment remains unchanged. No new orders received. 1737: Attempted to call pt's wife back on the number provided [phone]), no answer, unable to leave message d/t mailbox not being set up. Called number on chart [phone]), no answer, message left. 1930: Bedside shift change report given to Shelly Mosley RN (oncoming nurse) by Flavia Smith RN (offgoing nurse). Report included the following information SBAR, Kardex, Intake/Output, MAR, Accordion and Recent Results. 1955: Update given to pt's wife, Gisele Singer. Per wife, pt's sister, Francoise Olsen, may receive information if she calls.

## 2021-02-12 NOTE — PROGRESS NOTES
Problem: Dysphagia (Adult)  Goal: *Acute Goals and Plan of Care (Insert Text)  Description: Speech therapy goals  Initiated 2/12/2021   1. Patient will participate in re-evaluation of swallow function within 7 days   Outcome: Progressing Towards Goal     [address] EVALUATION  Patient: Tobi Mcconnell (2 y.o. male)  Date: 2/12/2021  Primary Diagnosis: Acute hypoxemic respiratory failure due to COVID-19 ([facility] [U07.1, J96.01]        Precautions: aspiration  Fall(droplet plus'''')    ASSESSMENT :  Based on the objective data described below, the patient presents with mild oral and suspected mod/severe pharyngeal dysphagia which is not unexpected given prolonged intubation with COVID-19, just extubated yesterday. Patient with suspected delayed swallow initiation, weak hyolaryngeal elevation/excursion via palpation and 6-8 swallows per 1/2 tsp puree bolus suggestive of pharyngeal residue. Intermittent weak cough with both puree and ice chips. Given tenuous respiratory status from COVID-19 with intubation, patient likely with low tolerance of any degree of aspiration and he is at high risk for aspiration at this time. Hopeful that he will do well as he gets further out from extubation. Patient will benefit from skilled intervention to address the above impairments. Patients rehabilitation potential is considered to be Fair     PLAN :  Recommendations and Planned Interventions:  --recommend continued NPO with NG tube for alternative route of nutrition and medications. --recommend allow a few ice chips every hour or two after oral care to maintain oral integrity and prevent disuse atrophy. --will follow up next week for re-assessment of swallow function   Frequency/Duration: Patient will be followed by speech-language pathology 3 times a week to address goals. Discharge Recommendations:  To Be Determined     SUBJECTIVE:   Patient stated so when can I have some water. OBJECTIVE:     Past Medical History:   Diagnosis Date    Diabetes ([address]     Hypertension    No past surgical history on file. Prior Level of Function/Home Situation:   Home Situation  Home Environment: Private residence  # Steps to Enter: 4  Rails to Enter: Yes  Hand Rails : Bilateral  One/Two Story Residence: One story  Living Alone: No  Support Systems: Spouse/Significant Other/Partner  Current DME Used/Available at Home: None  Tub or Shower Type: Tub/Shower combination  Diet prior to admission: regular  Current Diet:  NPO with NG   Cognitive and Communication Status:  Neurologic State: Alert  Orientation Level: Oriented X4  Cognition: Follows commands, Decreased attention/concentration  Perception: Appears intact  Perseveration: No perseveration noted  Safety/Judgement: Not assessed  Oral Assessment:  Oral Assessment  Labial: Decreased rate;Decreased seal  Dentition: Natural;Limited  Oral Hygiene: dry mucosa   Lingual: Decreased strength;Decreased rate  Mandible: No impairment  P.O. Trials:  Patient Position: upright in bed   Vocal quality prior to P.O.: Low volume  Consistency Presented: Ice chips;Puree  How Presented: SLP-fed/presented;Spoon     Bolus Acceptance: No impairment  Bolus Formation/Control: Impaired  Type of Impairment: Delayed  Propulsion: Delayed (# of seconds)  Oral Residue: None  Initiation of Swallow: Delayed (# of seconds)  Laryngeal Elevation: Decreased;Weak  Aspiration Signs/Symptoms: Weak cough(intermittently with puree and ice )  Pharyngeal Phase Characteristics: Multiple swallows; Suspected pharyngeal residue;Poor endurance(6-8 swallows per 1/2 tsp puree concerning for residue)  Effective Modifications: None     Comments: high risk for aspiration with prolond intubation and COVID +    Oral Phase Severity: Mild  Pharyngeal Phase Severity : Moderate-severe    NOMS:   The NOMS functional outcome measure was used to quantify this patient's level of swallowing impairment.   Based on the NOMS, the patient was determined to be at level 2 for swallow function       NOMS Swallowing Levels:  Level 1 (CN): NPO  Level 2 (CM): NPO but takes consistency in therapy  Level 3 (CL): Takes less than 50% of nutrition p.o. and continues with nonoral feedings; and/or safe with mod cues; and/or max diet restriction  Level 4 (CK): Safe swallow but needs mod cues; and/or mod diet restriction; and/or still requires some nonoral feeding/supplements  Level 5 (CJ): Safe swallow with min diet restriction; and/or needs min cues  Level 6 (CI): Independent with p.o.; rare cues; usually self cues; may need to avoid some foods or needs extra time  Level 7 ([address] [place] [address]): Independent for all p.o.  ABISAI. (2003). National Outcomes Measurement System (NOMS): Adult Speech-Language Pathology User's Guide. Pain:  Pain Scale 1: Numeric (0 - 10)  Pain Intensity 1: 0       After treatment:   Patient left in no apparent distress in bed, Call bell within reach, and Nursing notified    COMMUNICATION/EDUCATION:     The patient's plan of care including recommendations, planned interventions, and recommended diet changes were discussed with: Registered nurse. Patient/family have participated as able in goal setting and plan of care. Patient/family agree to work toward stated goals and plan of care. Thank you for this referral.  Monse Crews M.CD.  CCC-SLP   Time Calculation: 13 mins

## 2021-02-12 NOTE — PROGRESS NOTES
[facility]    ICU TEAM Progress Note    Name: Saddie Apgar   : 1955   MRN: [id number]   Date: 2021      I  Subjective:   Progress Note: 2021      Reason for ICU Admission: Respiratory failure, COVID-19 pneumonia    Interval history:  From [facility] on  63year-old man who presented from [facility] with acute onset shortness of breath, hypoxia, nausea, vomiting and diarrhea-shortness of breath acutely got worse after one of the vomiting episodes. Diagnosed with Covid about 2 weeks prior to presentation.  Patient placed on BiPAP on arrival-felt much better.  After getting fluids for resuscitation/elevated lactate level patient acutely decompensated-developed lethargy and drop in oxygen levels-intubated emergently requiring moderate to high vent support. Overnight Events:   Extubated on  afternoon, doing well, fentanyl and Precedex turned off overnight good urine output no nausea no vomiting    Active Problem List:     Problem List  Never Reviewed          Codes Class    Acute hypoxemic respiratory failure due to COVID-19 [facility]) ICD-10-CM: U07.1, J96.01  ICD-9-CM: 518.81, 079.89, 799.02               Past Medical History:      has a past medical history of Diabetes ([facility] [address] and Hypertension. Past Surgical History:      has no past surgical history on file. Home Medications:     Prior to Admission medications    Medication Sig Start Date End Date Taking? Authorizing Provider   aspirin 81 mg chewable tablet Take 81 mg by mouth daily. Yes Candelaria, MD Jessi   atorvastatin (LIPITOR) 80 mg tablet Take 80 mg by mouth daily. Yes Jessi Gaona MD   carvediloL (COREG) 12.5 mg tablet Take  by mouth two (2) times a day. Yes Jessi Gaona MD   docusate sodium (Colace) 100 mg capsule Take 100 mg by mouth two (2) times daily as needed for Constipation. Yes Jessi Gaona MD   doxycycline (ADOXA) 100 mg tablet Take 100 mg by mouth two (2) times a day.    Yes Jessi Gaona MD ferrous sulfate 325 mg (65 mg iron) tablet Take  by mouth every fourty-eight (48) hours. Yes Candelaria, MD Jessi   gabapentin (NEURONTIN) 400 mg capsule Take 400 mg by mouth three (3) times daily. Yes Jessi Gaona MD   insulin lispro (HUMALOG) 100 unit/mL injection 10 Units by SubCUTAneous route Before breakfast, lunch, and dinner. Yes Jessi Gaona MD   isosorbide mononitrate ER (IMDUR) 60 mg CR tablet Take 60 mg by mouth every morning. Yes Jessi Gaona MD   insulin glargine (Lantus U-100 Insulin) 100 unit/mL injection 20 Units by SubCUTAneous route nightly. Yes Jessi Gaona MD   lisinopriL (PRINIVIL, ZESTRIL) 20 mg tablet Take 20 mg by mouth daily. Yes Jessi Gaona MD   methocarbamoL (ROBAXIN) 500 mg tablet Take 500 mg by mouth three (3) times daily. Yes Jessi Gaona MD   oxyCODONE IR (ROXICODONE) 5 mg immediate release tablet Take 5 mg by mouth every six (6) hours as needed for Pain. Yes Jessi Gaona MD   pantoprazole (PROTONIX) 40 mg tablet Take 40 mg by mouth daily. Yes Jessi Gaona MD   guaiFENesin-codeine (Guaiatussin AC) 100-10 mg/5 mL solution Take 5 mL by mouth four (4) times daily as needed for Cough. Yes Jessi Gaona MD   senna (Senna) 8.6 mg tablet Take 1 Tab by mouth daily. Yes Jessi Gaona MD   ticagrelor (BRILINTA) 90 mg tablet Take 90 mg by mouth two (2) times a day. Yes Jessi Gaona MD   ascorbic acid, vitamin C, (Vitamin C) 500 mg tablet Take 500 mg by mouth two (2) times a day. Yes Jessi Gaona MD   cholecalciferol (Vitamin D3) (1000 Units /25 mcg) tablet Take 1,000 Units by mouth daily. Yes Jessi Gaona MD   zinc sulfate (ZINCATE) 220 (50) mg capsule Take 220 mg by mouth daily. Yes Provider, Historical   therapeutic multivitamin (THERAGRAN) tablet Take 1 Tab by mouth daily. Yes Provider, Historical   acetaminophen (TYLENOL) 325 mg tablet Take 975 mg by mouth every six (6) hours as needed for Pain.     Other, MD Jessi       Allergies/Social/Family History:     No Known Allergies   Social History     Tobacco Use    Smoking status: Former Smoker    Smokeless tobacco: Never Used   Substance Use Topics    Alcohol use: Not Currently      No family history on file. Review of Systems:     Difficult to obtain given his medical condition    Objective:   Vital Signs:  Visit Vitals  BP (!) 152/67   Pulse (!) 115   Temp 99.2 °F (37.3 °C)   Resp 15   Ht 5' 7\" (1.702 m)   Wt 92.9 kg (204 lb 12.9 oz)   SpO2 98%   BMI 32.08 kg/m²    O2 Flow Rate (L/min): 4 l/min O2 Device: Nasal cannula, Humidifier Temp (24hrs), Av.4 °F (37.4 °C), Min:98.8 °F (37.1 °C), Max:100.3 °F (37.9 °C)           Intake/Output:     Intake/Output Summary (Last 24 hours) at 2021 0838  Last data filed at 2021 9642  Gross per 24 hour   Intake 2964.05 ml   Output 3150 ml   Net -185.95 ml       Physical Exam:  General: Chronically ill looking gentleman, husky voice answer simple question and follow simple command  Neuro-pupils reactive, conscious alert oriented to himself, husky voice, moves 4 limbs with profound weakness  Cardiac-tachycardic, regular  Lungs-coarse bilaterally  Abdomen-soft, nontender, nondistended  Extremities-warm    LABS AND  DATA: Personally reviewed  Recent Labs     02/11/21  0435 02/10/21  0337   WBC 9.8 9.0   HGB 7.5* 8.0*   HCT 24.0* 26.2*   * 157     Recent Labs     21  0520 21  0435 02/10/21  2245   NA  --  148* 150*   K  --  3.9 3.6   CL  --  120* 121*   CO2  --  19* 19*   BUN  --  102* 96*   CREA  --  2.86* 2.72*   GLU  --  93 92   CA  --  8.2* 8.1*   MG 2.3 2.3 2.1   PHOS 4.0 5.4*  --      Recent Labs     21   *   TP 4.7*   ALB 1.3*   GLOB 3.4     No results for input(s): INR, PTP, APTT, INREXT in the last 72 hours. No results for input(s): PHI, PCO2I, PO2I, FIO2I in the last 72 hours.   Recent Labs     21  0520 21  1146   TROIQ 0.32* 0.37*       Hemodynamics:   PAP:   CO:     Wedge:   CI:     CVP:    SVR:       PVR: Ventilator Settings:  Mode Rate Tidal Volume Pressure FiO2 PEEP   Spontaneous   450 ml  8 cm H2O 30 % 5 cm H20     Peak airway pressure: 13 cm H2O    Minute ventilation: 7.94 l/min        MEDS: Reviewed    Chest X-Ray:  I reviewed the images done here myself and agree with the reports as below  CXR Results  (Last 48 hours)               02/11/21 1011  XR CHEST PORT Final result    Impression:  1. Significant decrease in bilateral interstitial and airspace opacities. 2. There is persistent opacity at the left lung base consistent left pleural   effusion and left lower lobe atelectasis. .        Narrative:  EXAM: XR CHEST PORT       INDICATION: resp failure       COMPARISON: February 6, 2021       FINDINGS: A portable AP radiograph of the chest was obtained at 1005 hours. The   patient is on a cardiac monitor. The ET tube is in satisfactory position. Left   subclavian catheter overlies the distal left brachycephalic vein unchanged. The lungs demonstrate decrease in the bilateral interstitial airspace opacities. The aeration has improved. . There is persistent opacity in left lung base   consistent left pleural effusion left lower lobe atelectasis. Assessment and Plan:   Acute hypoxic respiratory failure  Pulmonary edema  Covid pneumonia  Aspiration pneumonitis  Tachycardia secondary to above  Acute kidney injury     Neuro:  Patient improving, off Precedex and off fentanyl. Still on Seroquel 50 3 times daily. CT head negative on February 11. Pulm:  Extubated on February 11. Doing well. Wean FiO2 to maintain saturation above 92%. Continue incentive spirometry.      CV:   - Cont ASA, lipitor, on beta-blocker and added calcium channel blocker to control his hypertension     GI:   - Lansoprazole  - TFs, please advance to goal.      Renal:  - Continue bird for strict I/Os.    -Nephrology consulted secondary to increased BUN, Cr and metabolic acidosis .  I appreciate their input, on bicarb drip and is receiving free water via NG tube. Good urine output     Endocrine:  - Lantus 15u daily,  - SSI     Heme:  - ASA  - Statin  - Lovenox 30mg q12h     ID: Appreciate infectious disease. Currently on cefepime and doxycycline both last doses on February 12 per infectious disease order. DISPOSITION  Stay in ICU    CRITICAL CARE CONSULTANT NOTE  I had a face to face encounter with the patient, reviewed and interpreted patient data including clinical events, labs, images, vital signs, I/O's, and examined patient. I have discussed the case and the plan and management of the patient's care with the consulting services, the bedside nurses and the respiratory therapist.      NOTE OF PERSONAL INVOLVEMENT IN CARE   This patient has a high probability of imminent, clinically significant deterioration, which requires the highest level of preparedness to intervene urgently. I participated in the decision-making and personally managed or directed the management of the following life and organ supporting interventions that required my frequent assessment to treat or prevent imminent deterioration. I personally spent 40 minutes of critical care time. This is time spent at this critically ill patient's bedside actively involved in patient care as well as the coordination of care and discussions with the patient's family. This does not include any procedural time which has been billed separately. Kaleb Harding M.D.   Staff Intensivist/Pulmonologist  [facility]  2/12/2021

## 2021-02-12 NOTE — PROGRESS NOTES
DIEGO  Patient admitted for respiratory failure r/t COVID. RUR 32%  Disposition TBD, Was at [facility] receiving IV abx in preparation for a skin graft to his neck. Patient remains in the ICU extubated on O2 @4L. Per notes patient is A &O x 1, is following son simple commands. On a Cardene gtt. Care Management continues to follow.    Jaky Villegas RN,Care Management

## 2021-02-12 NOTE — PROGRESS NOTES
Nephrology Progress Note  Chilo Aguilar  Date of Admission : 1/30/2021    CC: Follow up for JUAN C        Assessment and Plan     JUAN C:  - unclear baseline but suspect some component of CKD  - Renal U/S on 1/30 confirms CKD  - suspect JUAN C due to COVID-19 related illness vs possible volume depletion  - UA from 2/5 with blood and protein, bird in place  - PCR 4g/g - suspect all due to DM - will check SPEP/UPEP, BRANDO  - cont IVF - change to NS  - daily labs and I/Os  - no urgent need for dialysis at this time    Hypokalemia:  -  Repletion ordered     Hypernatremia:  - improving some  - FW flushes via NGT     Nongap acidosis:  - cont bicarb drip     COVID-19 PNA:  - abx and steroids per CCM team     Resp failure:  - vent dependent     DM2:  - on insulin       Interval History:  Not examined in the room    Stable overall. Extubated, lethargic. Oxygenation stable. Cr up, K low. Voiding well, over 3 L UOP. Getting IV bicarb infusion    Current Medications: all current  Medications have been eviewed in EPIC  Review of Systems: Pertinent items are noted in HPI.     Objective:  Vitals:    Vitals:    02/12/21 0645 02/12/21 0700 02/12/21 0800 02/12/21 0835   BP: (!) 162/67 (!) 157/67  (!) 152/67   Pulse: (!) 111 (!) 106  (!) 115   Resp: 19 15     Temp:   99.8 °F (37.7 °C)    SpO2: 100% 98%     Weight:       Height:         Intake and Output:  02/12 0701 - 02/12 1900  In: -   Out: 280 [Urine:280]  02/10 1901 - 02/12 0700  In: 6044.8 [I.V.:3004.8]  Out: 9235 [Urine:4295]    Physical Examination:  Not examined in the room due to COVID-19 infection:    Pt intubated    Yes  General: sedated  Resp:  Stable oxygenation on the vent  CV:  afib on monitor,  no reported edema  Neurologic:  sedated  Skin:  No Rash  :  Bird in place    []    High complexity decision making was performed  []    Patient is at high-risk of decompensation with multiple organ involvement    Lab Data Personally Reviewed: I have reviewed all the pertinent labs, microbiology data and radiology studies during assessment.     Recent Labs     02/12/21  0902 02/12/21  0520 02/11/21  0435 02/10/21  2245 02/10/21  0331   *  --  148* 150* 149*   K 2.8*  --  3.9 3.6 4.2   *  --  120* 121* 124*   CO2 23  --  19* 19* 16*   *  --  93 92 112*   BUN 91*  --  102* 96* 98*   CREA 3.37*  --  2.86* 2.72* 2.44*   CA 7.7*  --  8.2* 8.1* 8.5   MG  --  2.3 2.3 2.1 2.3   PHOS  --  4.0 5.4*  --  5.4*   ALB  --   --  1.3*  --   --    ALT  --   --  142*  --   --      Recent Labs     02/12/21  0902 02/11/21  0435 02/10/21  0337   WBC 12.8* 9.8 9.0   HGB 8.4* 7.5* 8.0*   HCT 26.7* 24.0* 26.2*    144* 157     Lab Results   Component Value Date/Time    Specimen Description: BLOOD 02/27/2010 07:00 PM     Lab Results   Component Value Date/Time    Culture result: LIGHT YEAST, (APPARENT CANDIDA ALBICANS) (A) 02/05/2021 07:10 PM    Culture result: FEW NORMAL RESPIRATORY CORI 02/05/2021 07:10 PM    Culture result: No growth (<1,000 CFU/ML) 02/05/2021 07:10 PM     Recent Results (from the past 24 hour(s))   TROPONIN I    Collection Time: 02/11/21 11:46 AM   Result Value Ref Range    Troponin-I, Qt. 0.37 (H) <0.05 ng/mL   GLUCOSE, POC    Collection Time: 02/11/21 11:49 AM   Result Value Ref Range    Glucose (POC) 138 (H) 65 - 100 mg/dL    Performed by Kiana Simeon    GLUCOSE, POC    Collection Time: 02/11/21  6:21 PM   Result Value Ref Range    Glucose (POC) 60 (L) 65 - 100 mg/dL    Performed by Kiana Furry    GLUCOSE, POC    Collection Time: 02/11/21  6:38 PM   Result Value Ref Range    Glucose (POC) 82 65 - 100 mg/dL    Performed by Kiana Simeon    GLUCOSE, POC    Collection Time: 02/12/21  1:16 AM   Result Value Ref Range    Glucose (POC) 74 65 - 100 mg/dL    Performed by JOHANNA GARZA    MAGNESIUM    Collection Time: 02/12/21  5:20 AM   Result Value Ref Range    Magnesium 2.3 1.6 - 2.4 mg/dL   PHOSPHORUS    Collection Time: 02/12/21  5:20 AM   Result Value Ref Range Phosphorus 4.0 2.6 - 4.7 MG/DL   URIC ACID    Collection Time: 02/12/21  5:20 AM   Result Value Ref Range    Uric acid 4.3 3.5 - 7.2 MG/DL   TROPONIN I    Collection Time: 02/12/21  5:20 AM   Result Value Ref Range    Troponin-I, Qt. 0.32 (H) <0.05 ng/mL   GLUCOSE, POC    Collection Time: 02/12/21  6:33 AM   Result Value Ref Range    Glucose (POC) 140 (H) 65 - 100 mg/dL    Performed by JOHANNA GREG    METABOLIC PANEL, BASIC    Collection Time: 02/12/21  9:02 AM   Result Value Ref Range    Sodium 148 (H) 136 - 145 mmol/L    Potassium 2.8 (L) 3.5 - 5.1 mmol/L    Chloride 119 (H) 97 - 108 mmol/L    CO2 23 21 - 32 mmol/L    Anion gap 6 5 - 15 mmol/L    Glucose 176 (H) 65 - 100 mg/dL    BUN 91 (H) 6 - 20 MG/DL    Creatinine 3.37 (H) 0.70 - 1.30 MG/DL    BUN/Creatinine ratio 27 (H) 12 - 20      GFR est AA 23 (L) >60 ml/min/1.73m2    GFR est non-AA 19 (L) >60 ml/min/1.73m2    Calcium 7.7 (L) 8.5 - 10.1 MG/DL   CBC WITH AUTOMATED DIFF    Collection Time: 02/12/21  9:02 AM   Result Value Ref Range    WBC 12.8 (H) 4.1 - 11.1 K/uL    RBC 3.31 (L) 4.10 - 5.70 M/uL    HGB 8.4 (L) 12.1 - 17.0 g/dL    HCT 26.7 (L) 36.6 - 50.3 %    MCV 80.7 80.0 - 99.0 FL    MCH 25.4 (L) 26.0 - 34.0 PG    MCHC 31.5 30.0 - 36.5 g/dL    RDW 19.0 (H) 11.5 - 14.5 %    PLATELET 843 399 - 407 K/uL    NRBC 0.0 0  WBC    ABSOLUTE NRBC 0.00 0.00 - 0.01 K/uL    NEUTROPHILS 74 32 - 75 %    LYMPHOCYTES 14 12 - 49 %    MONOCYTES 9 5 - 13 %    EOSINOPHILS 2 0 - 7 %    BASOPHILS 0 0 - 1 %    IMMATURE GRANULOCYTES 1 (H) 0.0 - 0.5 %    ABS. NEUTROPHILS 9.5 (H) 1.8 - 8.0 K/UL    ABS. LYMPHOCYTES 1.8 0.8 - 3.5 K/UL    ABS. MONOCYTES 1.2 (H) 0.0 - 1.0 K/UL    ABS. EOSINOPHILS 0.2 0.0 - 0.4 K/UL    ABS. BASOPHILS 0.0 0.0 - 0.1 K/UL    ABS. IMM.  GRANS. 0.1 (H) 0.00 - 0.04 K/UL    DF AUTOMATED                 Ottoniel Verde MD  [facility]   [address] [address]  Phone - [phone]   Fax - [phone]  [email or web address]

## 2021-02-12 NOTE — PROGRESS NOTES
Day #1 of cefepime  Indication:  HAP  Current regimen:  2 gm q12hr  Abx regimen: cefepime  Recent Labs     21  0902 21  0435 02/10/21  2245 02/10/21  0337   WBC 12.8* 9.8  --  9.0   CREA 3.37* 2.86* 2.72*  --    BUN 91* 102* 96*  --      Est CrCl: ~24 ml/min  Temp (24hrs), Av.6 °F (37.6 °C), Min:98.9 °F (37.2 °C), Max:100.3 °F (37.9 °C)      Plan: Change to 2 gm q24hr per renal dosing protocol for creatinine clearance 11-29 ml/min. Pharmacy to monitor daily and adjust if necessary for any significant change in renal function.

## 2021-02-13 ENCOUNTER — APPOINTMENT (OUTPATIENT)
Dept: GENERAL RADIOLOGY | Age: 66
DRG: 720 | End: 2021-02-13
Attending: HOSPITALIST
Payer: MEDICAID

## 2021-02-13 LAB
ANA SER QL: POSITIVE
ANION GAP SERPL CALC-SCNC: 6 MMOL/L (ref 5–15)
BASOPHILS # BLD: 0 K/UL (ref 0–0.1)
BASOPHILS NFR BLD: 0 % (ref 0–1)
BUN SERPL-MCNC: 87 MG/DL (ref 6–20)
BUN/CREAT SERPL: 23 (ref 12–20)
CALCIUM SERPL-MCNC: 7.7 MG/DL (ref 8.5–10.1)
CENTROMERE B AB SER-ACNC: >8 AI (ref 0–0.9)
CHLORIDE SERPL-SCNC: 115 MMOL/L (ref 97–108)
CHROMATIN AB SERPL-ACNC: <0.2 AI (ref 0–0.9)
CO2 SERPL-SCNC: 25 MMOL/L (ref 21–32)
CREAT SERPL-MCNC: 3.78 MG/DL (ref 0.7–1.3)
DIFFERENTIAL METHOD BLD: ABNORMAL
DSDNA AB SER-ACNC: <1 IU/ML (ref 0–9)
ENA JO1 AB SER-ACNC: <0.2 AI (ref 0–0.9)
ENA RNP AB SER-ACNC: 0.6 AI (ref 0–0.9)
ENA SCL70 AB SER-ACNC: <0.2 AI (ref 0–0.9)
ENA SM AB SER-ACNC: <0.2 AI (ref 0–0.9)
ENA SS-A AB SER-ACNC: <0.2 AI (ref 0–0.9)
ENA SS-B AB SER-ACNC: 2.9 AI (ref 0–0.9)
EOSINOPHIL # BLD: 0.2 K/UL (ref 0–0.4)
EOSINOPHIL NFR BLD: 1 % (ref 0–7)
ERYTHROCYTE [DISTWIDTH] IN BLOOD BY AUTOMATED COUNT: 19.4 % (ref 11.5–14.5)
GLUCOSE BLD STRIP.AUTO-MCNC: 126 MG/DL (ref 65–100)
GLUCOSE BLD STRIP.AUTO-MCNC: 190 MG/DL (ref 65–100)
GLUCOSE BLD STRIP.AUTO-MCNC: 217 MG/DL (ref 65–100)
GLUCOSE BLD STRIP.AUTO-MCNC: 231 MG/DL (ref 65–100)
GLUCOSE SERPL-MCNC: 117 MG/DL (ref 65–100)
HCT VFR BLD AUTO: 22.9 % (ref 36.6–50.3)
HGB BLD-MCNC: 7.4 G/DL (ref 12.1–17)
IMM GRANULOCYTES # BLD AUTO: 0.1 K/UL (ref 0–0.04)
IMM GRANULOCYTES NFR BLD AUTO: 1 % (ref 0–0.5)
LYMPHOCYTES # BLD: 1.8 K/UL (ref 0.8–3.5)
LYMPHOCYTES NFR BLD: 16 % (ref 12–49)
MAGNESIUM SERPL-MCNC: 2.3 MG/DL (ref 1.6–2.4)
MCH RBC QN AUTO: 25.2 PG (ref 26–34)
MCHC RBC AUTO-ENTMCNC: 32.3 G/DL (ref 30–36.5)
MCV RBC AUTO: 77.9 FL (ref 80–99)
MONOCYTES # BLD: 1 K/UL (ref 0–1)
MONOCYTES NFR BLD: 9 % (ref 5–13)
NEUTS SEG # BLD: 8.6 K/UL (ref 1.8–8)
NEUTS SEG NFR BLD: 74 % (ref 32–75)
NRBC # BLD: 0 K/UL (ref 0–0.01)
NRBC BLD-RTO: 0 PER 100 WBC
PHOSPHATE SERPL-MCNC: 3.1 MG/DL (ref 2.6–4.7)
PLATELET # BLD AUTO: 176 K/UL (ref 150–400)
POTASSIUM SERPL-SCNC: 3 MMOL/L (ref 3.5–5.1)
RBC # BLD AUTO: 2.94 M/UL (ref 4.1–5.7)
SARS-COV-2, COV2: DETECTED
SEE BELOW, 164869: ABNORMAL
SERVICE CMNT-IMP: ABNORMAL
SODIUM SERPL-SCNC: 146 MMOL/L (ref 136–145)
SPECIMEN SOURCE, FCOV2M: ABNORMAL
URATE SERPL-MCNC: 3.9 MG/DL (ref 3.5–7.2)
WBC # BLD AUTO: 11.7 K/UL (ref 4.1–11.1)

## 2021-02-13 PROCEDURE — 80048 BASIC METABOLIC PNL TOTAL CA: CPT

## 2021-02-13 PROCEDURE — 74011250637 HC RX REV CODE- 250/637: Performed by: EMERGENCY MEDICINE

## 2021-02-13 PROCEDURE — 74011250637 HC RX REV CODE- 250/637: Performed by: HOSPITALIST

## 2021-02-13 PROCEDURE — 84100 ASSAY OF PHOSPHORUS: CPT

## 2021-02-13 PROCEDURE — 74011250637 HC RX REV CODE- 250/637: Performed by: ANESTHESIOLOGY

## 2021-02-13 PROCEDURE — 74011250637 HC RX REV CODE- 250/637: Performed by: INTERNAL MEDICINE

## 2021-02-13 PROCEDURE — 74011636637 HC RX REV CODE- 636/637: Performed by: INTERNAL MEDICINE

## 2021-02-13 PROCEDURE — 74011250636 HC RX REV CODE- 250/636: Performed by: STUDENT IN AN ORGANIZED HEALTH CARE EDUCATION/TRAINING PROGRAM

## 2021-02-13 PROCEDURE — 74011000250 HC RX REV CODE- 250: Performed by: ANESTHESIOLOGY

## 2021-02-13 PROCEDURE — 65660000001 HC RM ICU INTERMED STEPDOWN

## 2021-02-13 PROCEDURE — 83735 ASSAY OF MAGNESIUM: CPT

## 2021-02-13 PROCEDURE — 74011000250 HC RX REV CODE- 250: Performed by: INTERNAL MEDICINE

## 2021-02-13 PROCEDURE — 74011000258 HC RX REV CODE- 258: Performed by: INTERNAL MEDICINE

## 2021-02-13 PROCEDURE — 74011636637 HC RX REV CODE- 636/637: Performed by: NURSE PRACTITIONER

## 2021-02-13 PROCEDURE — 36415 COLL VENOUS BLD VENIPUNCTURE: CPT

## 2021-02-13 PROCEDURE — 82962 GLUCOSE BLOOD TEST: CPT

## 2021-02-13 PROCEDURE — 74011250636 HC RX REV CODE- 250/636: Performed by: NURSE PRACTITIONER

## 2021-02-13 PROCEDURE — 74011000258 HC RX REV CODE- 258: Performed by: NURSE PRACTITIONER

## 2021-02-13 PROCEDURE — 85025 COMPLETE CBC W/AUTO DIFF WBC: CPT

## 2021-02-13 PROCEDURE — 74011250637 HC RX REV CODE- 250/637: Performed by: NURSE PRACTITIONER

## 2021-02-13 PROCEDURE — 84550 ASSAY OF BLOOD/URIC ACID: CPT

## 2021-02-13 PROCEDURE — 74011000250 HC RX REV CODE- 250: Performed by: STUDENT IN AN ORGANIZED HEALTH CARE EDUCATION/TRAINING PROGRAM

## 2021-02-13 PROCEDURE — 74011250636 HC RX REV CODE- 250/636: Performed by: INTERNAL MEDICINE

## 2021-02-13 PROCEDURE — 74018 RADEX ABDOMEN 1 VIEW: CPT

## 2021-02-13 RX ORDER — GABAPENTIN 250 MG/5ML
125 SOLUTION ORAL EVERY 8 HOURS
Status: DISCONTINUED | OUTPATIENT
Start: 2021-02-13 | End: 2021-02-15

## 2021-02-13 RX ORDER — POTASSIUM CHLORIDE 29.8 MG/ML
20 INJECTION INTRAVENOUS ONCE
Status: COMPLETED | OUTPATIENT
Start: 2021-02-13 | End: 2021-02-13

## 2021-02-13 RX ORDER — SODIUM CHLORIDE 450 MG/100ML
100 INJECTION, SOLUTION INTRAVENOUS CONTINUOUS
Status: DISCONTINUED | OUTPATIENT
Start: 2021-02-13 | End: 2021-02-15

## 2021-02-13 RX ORDER — LABETALOL HYDROCHLORIDE 5 MG/ML
20 INJECTION, SOLUTION INTRAVENOUS
Status: DISCONTINUED | OUTPATIENT
Start: 2021-02-13 | End: 2021-03-04

## 2021-02-13 RX ADMIN — INSULIN LISPRO 4 UNITS: 100 INJECTION, SOLUTION INTRAVENOUS; SUBCUTANEOUS at 20:26

## 2021-02-13 RX ADMIN — GABAPENTIN 125 MG: 250 SOLUTION ORAL at 22:29

## 2021-02-13 RX ADMIN — QUETIAPINE FUMARATE 50 MG: 25 TABLET ORAL at 08:13

## 2021-02-13 RX ADMIN — LANSOPRAZOLE 30 MG: KIT at 08:14

## 2021-02-13 RX ADMIN — ISOSORBIDE DINITRATE 20 MG: 20 TABLET ORAL at 08:23

## 2021-02-13 RX ADMIN — CARVEDILOL 25 MG: 12.5 TABLET, FILM COATED ORAL at 20:46

## 2021-02-13 RX ADMIN — ISOSORBIDE DINITRATE 20 MG: 20 TABLET ORAL at 17:08

## 2021-02-13 RX ADMIN — Medication 10 ML: at 14:00

## 2021-02-13 RX ADMIN — INSULIN LISPRO 4 UNITS: 100 INJECTION, SOLUTION INTRAVENOUS; SUBCUTANEOUS at 11:59

## 2021-02-13 RX ADMIN — CHLORHEXIDINE GLUCONATE 15 ML: 0.12 RINSE ORAL at 08:14

## 2021-02-13 RX ADMIN — INSULIN LISPRO 3 UNITS: 100 INJECTION, SOLUTION INTRAVENOUS; SUBCUTANEOUS at 23:40

## 2021-02-13 RX ADMIN — ASPIRIN 81 MG: 81 TABLET, CHEWABLE ORAL at 08:13

## 2021-02-13 RX ADMIN — TICAGRELOR 90 MG: 90 TABLET ORAL at 20:46

## 2021-02-13 RX ADMIN — SODIUM CHLORIDE 5 MG/HR: 900 INJECTION, SOLUTION INTRAVENOUS at 07:05

## 2021-02-13 RX ADMIN — GABAPENTIN 300 MG: 250 SOLUTION ORAL at 08:12

## 2021-02-13 RX ADMIN — Medication 10 ML: at 06:16

## 2021-02-13 RX ADMIN — MINERAL SUPPLEMENT IRON 300 MG / 5 ML STRENGTH LIQUID 100 PER BOX UNFLAVORED 300 MG: at 08:13

## 2021-02-13 RX ADMIN — QUETIAPINE FUMARATE 50 MG: 25 TABLET ORAL at 16:51

## 2021-02-13 RX ADMIN — OXYCODONE HYDROCHLORIDE AND ACETAMINOPHEN 500 MG: 500 TABLET ORAL at 17:08

## 2021-02-13 RX ADMIN — POTASSIUM CHLORIDE 20 MEQ: 400 INJECTION, SOLUTION INTRAVENOUS at 09:03

## 2021-02-13 RX ADMIN — DOCUSATE SODIUM 100 MG: 50 LIQUID ORAL at 08:13

## 2021-02-13 RX ADMIN — CEFEPIME HYDROCHLORIDE 2 G: 2 INJECTION, POWDER, FOR SOLUTION INTRAVENOUS at 16:50

## 2021-02-13 RX ADMIN — CHOLECALCIFEROL (VITAMIN D3) 25 MCG (1,000 UNIT) TABLET 2000 UNITS: TABLET at 08:14

## 2021-02-13 RX ADMIN — ENOXAPARIN SODIUM 30 MG: 30 INJECTION SUBCUTANEOUS at 08:13

## 2021-02-13 RX ADMIN — SODIUM BICARBONATE: 84 INJECTION, SOLUTION INTRAVENOUS at 04:21

## 2021-02-13 RX ADMIN — INSULIN LISPRO 3 UNITS: 100 INJECTION, SOLUTION INTRAVENOUS; SUBCUTANEOUS at 01:57

## 2021-02-13 RX ADMIN — OXYCODONE HYDROCHLORIDE AND ACETAMINOPHEN 500 MG: 500 TABLET ORAL at 08:14

## 2021-02-13 RX ADMIN — METOPROLOL TARTRATE 5 MG: 5 INJECTION INTRAVENOUS at 17:07

## 2021-02-13 RX ADMIN — CHLORHEXIDINE GLUCONATE 15 ML: 0.12 RINSE ORAL at 20:47

## 2021-02-13 RX ADMIN — Medication 10 ML: at 22:29

## 2021-02-13 RX ADMIN — SODIUM CHLORIDE 75 ML/HR: 4.5 INJECTION, SOLUTION INTRAVENOUS at 14:00

## 2021-02-13 RX ADMIN — POTASSIUM CHLORIDE 20 MEQ: 400 INJECTION, SOLUTION INTRAVENOUS at 10:19

## 2021-02-13 RX ADMIN — ATORVASTATIN CALCIUM 40 MG: 40 TABLET, FILM COATED ORAL at 22:29

## 2021-02-13 RX ADMIN — CARVEDILOL 25 MG: 12.5 TABLET, FILM COATED ORAL at 08:14

## 2021-02-13 RX ADMIN — INSULIN GLARGINE 15 UNITS: 100 INJECTION, SOLUTION SUBCUTANEOUS at 08:13

## 2021-02-13 RX ADMIN — QUETIAPINE FUMARATE 50 MG: 25 TABLET ORAL at 22:29

## 2021-02-13 RX ADMIN — AMLODIPINE BESYLATE 10 MG: 5 TABLET ORAL at 08:14

## 2021-02-13 RX ADMIN — Medication 1 CAPSULE: at 08:14

## 2021-02-13 NOTE — PROGRESS NOTES
TRANSFER - OUT REPORT:    Verbal report given to Constanza Brandon RN( on Etta Menjivar  being transferred to [facility] [phone] (unit) for routine progression of care       Report consisted of patients Situation, Background, Assessment and   Recommendations(SBAR). Information from the following report(s) Kardex, Intake/Output, Recent Results, Cardiac Rhythm IV drips and Alarm Parameters  was reviewed with the receiving nurse. Lines:   Quad Lumen 01/30/21 Left Subclavian (Active)   Central Line Being Utilized Yes 02/13/21 1200   Criteria for Appropriate Use Hemodynamically unstable, requiring monitoring lines, vasopressors, or volume resuscitation 02/13/21 1200   Site Assessment Clean, dry, & intact 02/13/21 1200   Infiltration Assessment 0 02/13/21 1200   Affected Extremity/Extremities Color distal to insertion site pink (or appropriate for race) 02/13/21 1200   Date of Last Dressing Change 02/06/21 02/13/21 0400   Dressing Status Clean, dry, & intact; Clean;Dry 02/13/21 1200   Dressing Type Disk with Chlorhexadine gluconate (CHG); Transparent 02/13/21 1200   Action Taken Open ports on tubing capped 02/13/21 1200   Proximal Hub Color/Line Status White; Infusing;Flushed 02/13/21 1200   Positive Blood Return (Medial Site) Yes 02/13/21 1200   Medial 1 Hub Color/Line Status White Jaramillo; Infusing;Flushed 02/13/21 1200   Positive Blood Return (Lateral Site) Yes 02/13/21 1200   Medial 2 Hub Color/Line Status Blue; Infusing;Flushed 02/13/21 1200   Positive Blood Return (Site #3) Yes 02/13/21 1200   Distal Hub Color/Line Status Brown; Infusing;Flushed 02/13/21 1200   Positive Blood Return (Site #4) Yes 02/13/21 1200   Alcohol Cap Used Yes 02/13/21 1200       Peripheral IV 01/30/21 Left Forearm (Active)   Site Assessment Clean, dry, & intact 02/13/21 1200   Phlebitis Assessment 0 02/13/21 1200   Infiltration Assessment 0 02/13/21 1200   Dressing Status Clean, dry, & intact 02/13/21 1200   Dressing Type Transparent 02/13/21 1200   Hub Color/Line Status Pink 02/13/21 1200   Action Taken Open ports on tubing capped 02/13/21 1200   Alcohol Cap Used Yes 02/13/21 1200        Opportunity for questions and clarification was provided.       Patient transported with:   Monitor  Registered Nurse  Tech

## 2021-02-13 NOTE — PROGRESS NOTES
ID Progress Note  2021    Subjective:     Alert and orient to self and place. Follows commends. C/o generalized weakness    Review of Systems:            Symptom Y/N Comments   Symptom Y/N Comments   Fever/Chills n      Chest Pain n       Poor Appetite       Edema        Cough y      Abdominal Pain        Sputum       Joint Pain        SOB/WEBB  y     Pruritis/Rash        Nausea/vomit  n     Tolerating PT/OT        Diarrhea       Tolerating Diet        Constipation       Other           Could NOT obtain due to:       Objective:     Vitals:   Visit Vitals  BP (!) 169/72   Pulse 89   Temp 98.2 °F (36.8 °C)   Resp 18   Ht 5' 7\" (1.702 m)   Wt 96.1 kg (211 lb 13.8 oz)   SpO2 98%   BMI 33.18 kg/m²        Tmax:  Temp (24hrs), Av.4 °F (36.9 °C), Min:98.1 °F (36.7 °C), Max:99.2 °F (37.3 °C)      PHYSICAL EXAM:  General: WD, WN. Alert, cooperative, no acute distress    EENT:  EOMI. Anicteric sclerae. MMM  Resp:  Clear in apex with decreased breath sounds at bases, no wheezing or rales. No accessory muscle use  CV:  Regular  rhythm,  No edema  GI:  Soft, Non distended, Non tender. +Bowel sounds  Neurologic:  Alert and oriented X self and place, speech soft, limited ROM   Psych:   Fair insight. Not anxious nor agitated  Skin:  No rashes.   No jaundice, posterior mid cervical dressing dry and intact    Labs:   Lab Results   Component Value Date/Time    WBC 11.7 (H) 2021 04:34 AM    HGB 7.4 (L) 2021 04:34 AM    HCT 22.9 (L) 2021 04:34 AM    PLATELET 882  04:34 AM    MCV 77.9 (L) 2021 04:34 AM     Lab Results   Component Value Date/Time    Sodium 146 (H) 2021 04:34 AM    Potassium 3.0 (L) 2021 04:34 AM    Chloride 115 (H) 2021 04:34 AM    CO2 25 2021 04:34 AM    Anion gap 6 2021 04:34 AM    Glucose 117 (H) 2021 04:34 AM    BUN 87 (H) 2021 04:34 AM    Creatinine 3.78 (H) 2021 04:34 AM    BUN/Creatinine ratio 23 (H) 2021 04:34 AM GFR est AA 20 (L) 02/13/2021 04:34 AM    GFR est non-AA 16 (L) 02/13/2021 04:34 AM    Calcium 7.7 (L) 02/13/2021 04:34 AM    Bilirubin, total 0.2 02/11/2021 04:35 AM    Alk. phosphatase 162 (H) 02/11/2021 04:35 AM    Protein, total 4.7 (L) 02/11/2021 04:35 AM    Albumin 1.3 (L) 02/11/2021 04:35 AM    Globulin 3.4 02/11/2021 04:35 AM    A-G Ratio 0.4 (L) 02/11/2021 04:35 AM    ALT (SGPT) 142 (H) 02/11/2021 04:35 AM   ABX hx;    Zyvox x1 on 2/9    vancomycin 1/30-2/9    IV Flagyl 1/30-2/4    IV doxy 1/30-2/12      Assessment and Plan   Acute respiratory failure secondary to COVID 19 PNA  mycoplasma and s. pneumoniae   - Mycoplasma IgG  1411, S Pneumo (+)    Legionella (-)    T-max 99.8 WBC 11.7     resp cx (2/5) candida albicans - oropharyngeal contamination     sputum cx (2/12) gram positive cocci in pairs     Blood cx (1/30) no growth    Blood cx (2/4) no growth so far    Quantiferon (-)    CXR (2/11): Significant decrease in bilateral interstitial and airspace opacities. There is persistent opacity at the left lung base consistent left pleural effusion and left lower lobe atelectasis. Continue with IV cefepime due to worsening CXR; will continue to monitor    Supportive care for COVID 19         fever work up every 24 hours if temp >= 100.4     posterior mid cervical wound; appreciate wound care team's input     Mid posterior cervical wound infection  - chronic wound; pt was waiting for skin graft according to the provider at [facility], Dr. Elena Navarrete    Wound cx (1/31) LIGHT DIPHTHEROIDS     Wound measurement per our wound care team; 3.5 cm x 4 cm x 0.1 cm. Spoke with the Managing provider at [facility], Dr. Elena Navarrete, [address]. Wound care nurse, Ms. Suzy Paniagua [phone].  According to the wound care nurse, the wound was created after the removal of infected cyst.    Pt completed IV Zosyn then Vancomycin for 30 days at the rehab center; Blood cx and wound cx grew MRSA      Pt should follow up with Dr Elvira Corbett at [place] [address] upon discharge for his cervical wound     JUAN C  - creat 3.7; nephrology following        Geoffrey Rowan NP

## 2021-02-13 NOTE — PROGRESS NOTES
[facility] Adult  Hospitalist Group     ICU Transfer/Accept Summary     This patient is being [place] [address] [place]  DATE OF TRANSFER: 2/13/2021       PATIENT ID: Matthew Marinelli  MRN: [id number]   YOB: 1955    PRIMARY CARE PROVIDER: Mikayla Newell MD   DATE OF ADMISSION: 1/30/2021 12:48 AM    ATTENDING PHYSICIAN: Dex Roberts MD  CONSULTATIONS:   IP CONSULT TO CARDIAC SURGERY  IP CONSULT TO INFECTIOUS DISEASES  IP CONSULT TO NEPHROLOGY  IP CONSULT TO CARDIOLOGY    PROCEDURES/SURGERIES:   * No surgery found *    REASON FOR ADMISSION: <principal problem not specified>     HOSPITAL PROBLEM LIST:  Patient Active Problem List   Diagnosis Code    Acute hypoxemic respiratory failure due to COVID-19 ([facility] U07.1, J96.01         Brief HPI and Hospital Course: This is a 61-year-old -American male who was admitted on January 30, 2021 when he presented to the ED for acute shortness of breath referred from [facility]. Patient diagnosed with Covid about 2 weeks prior to presentation. He was initially placed on BiPAP in the ED. After getting fluid resuscitation for elevated lactate patient decompensated became lethargic and hypoxic and required emergent intubation. Patient extubated on 2/11. He is said to be stable to be transferred out of the ICU and will transfer to [facility] on 2/13. Assessment and plan    Dysphagia due to acute debility due to acute severe illness.  -Keep n.p.o.  -Tube feeding via NGT  -Speech evaluation on Monday. -Aspiration precaution, keep head of bed>30-40 degrees    Type 2 diabetes with hyperglycemia  -Accu-Cheks every 6. Continue Lantus and Humalog sliding scale coverage. JUAN C, creatinine worsening.  -Avoid nephrotoxic agents, renally dose adjust medications.  -Indwelling Bourgeois catheter in place, monitor input and output  -Renal ultrasound suggestive of CKD.   Nephrology on board    Hypokalemia: Replace and monitor    Mild hyponatremia: Water flush via NGT. Daily labs    Cardiomyopathy, LVEF 20-25% on echocardiogram on 2/1 with moderate to severe pulmonary hypertension. Not sure if patient has history of CHF or cardiomyopathy. According to his wife patient had MI and had stents put in last summer and MCV. He probably has chronic cardiomyopathy or this could all be due to the acute illness, COVID-19 related cardiomyopathy.  -Continue aspirin, Lipitor, carvedilol, Isordil. Resume Brilinta. No ACE or ARB or ARNI due to significant renal dysfunction  -Troponin mildly elevated which is now trending down.  -Consult cardiology. Acute hypoxic respiratory failure secondary to Covid pneumonia, acute pulmonary edema, aspiration pneumonitis. The acute respiratory failure resolved. Intubated on 1/30, extubated on 2/11.  -Continue supportive care  -Aspiration precaution  -Elevate head of bed>30-40 degrees aAcute hypoxic respiratory failure    Severe Covid is Covid pneumonia with acute respiratory failure. Patient was diagnosed with Covid 2 weeks prior to presentation. Had received steroids this admission. Mycoplasma and strep pneumoniae pneumonia  -On cefepime, continue. ID following    Chronic mid posterior cervical wound infection. Present prior to admission. Patient reportedly was awaiting for skin graft. Patient reportedly had completed intravenous Zosyn and vancomycin prior to admission. Patient to follow with Dr Himanshu Horton at [address] [place] [address] upon discharge    History of heroin abuse per his wife. CODE STATUS: Full  DVT prophylaxis: Enoxaparin  Surrogate decision maker: Yonathan Denise, wife. Phone [phone] (cell); [phone] (home)  I have called and talked with his wife on the phone. She confirmed his full CODE STATUS.           PHYSICAL EXAMINATION:  Visit Vitals  /74 (BP 1 Location: Left upper arm, BP Patient Position: At rest)   Pulse 82   Temp 98.9 °F (37.2 °C)   Resp 22   Ht 5' 7\" (1.702 m)   Wt 96.1 kg (211 lb 13.8 oz)   SpO2 97%   BMI 33.18 kg/m²       General:           Patient very awake, alert, oriented to self, person and place. He is on room air. Not in distress. HEENT:            NGT in place. Neck:              Left subclavian triple-lumen in place. Posterior neck wound bandaged. Lungs: On room air. Not in distress. Diminished air entry basally posteriorly. Heart:              Regular  rhythm,  No  murmur   No edema  Abdomen:        Protuberant, normoactive, mildly diffusely tender without rebound. Extremities:      No peripheral edema  Neurologic:      Alert, knows he is at [address] [place] [address], knows the month is February. Extremities are generally weak. Labs:     Recent Labs     02/13/21 0434 02/12/21  0902   WBC 11.7* 12.8*   HGB 7.4* 8.4*   HCT 22.9* 26.7*    196     Recent Labs     02/13/21  0434 02/12/21  0902 02/12/21  0520 02/11/21  0435   * 148*  --  148*   K 3.0* 2.8*  --  3.9   * 119*  --  120*   CO2 25 23  --  19*   BUN 87* 91*  --  102*   CREA 3.78* 3.37*  --  2.86*   * 176*  --  93   CA 7.7* 7.7*  --  8.2*   MG 2.3  --  2.3 2.3   PHOS 3.1  --  4.0 5.4*   URICA 3.9  --  4.3 4.5     Recent Labs     02/11/21  0435   *   *   TBILI 0.2   TP 4.7*   ALB 1.3*   GLOB 3.4     No results for input(s): INR, PTP, APTT, INREXT in the last 72 hours. No results for input(s): FE, TIBC, PSAT, FERR in the last 72 hours. No results found for: FOL, RBCF   No results for input(s): PH, PCO2, PO2 in the last 72 hours.   Recent Labs     02/12/21  0902 02/12/21  0520 02/11/21  1146   TROIQ 0.28* 0.32* 0.37*     Lab Results   Component Value Date/Time    Cholesterol, total 115 02/26/2010 05:20 PM    HDL Cholesterol 31 (L) 02/26/2010 05:20 PM    LDL, calculated 46.2 02/26/2010 05:20 PM    Triglyceride 189 02/26/2010 05:20 PM    CHOL/HDL Ratio 3.7 02/26/2010 05:20 PM     Lab Results   Component Value Date/Time    Glucose (POC) 231 (H) 02/13/2021 11:53 AM Glucose (POC) 126 (H) 02/13/2021 06:14 AM    Glucose (POC) 153 (H) 02/12/2021 11:33 PM    Glucose (POC) 191 (H) 02/12/2021 05:06 PM    Glucose (POC) 221 (H) 02/12/2021 12:20 PM     Lab Results   Component Value Date/Time    Color YELLOW/STRAW 02/10/2021 01:26 PM    Appearance TURBID (A) 02/10/2021 01:26 PM    Specific gravity 1.014 02/10/2021 01:26 PM    Specific gravity 1.015 02/26/2010 12:32 PM    pH (UA) 5.0 02/10/2021 01:26 PM    Protein 100 (A) 02/10/2021 01:26 PM    Glucose Negative 02/10/2021 01:26 PM    Ketone Negative 02/10/2021 01:26 PM    Bilirubin Negative 02/10/2021 01:26 PM    Urobilinogen 0.2 02/10/2021 01:26 PM    Nitrites Negative 02/10/2021 01:26 PM    Leukocyte Esterase Negative 02/10/2021 01:26 PM    Epithelial cells FEW 02/10/2021 01:26 PM    Bacteria Negative 02/10/2021 01:26 PM    WBC 0-4 02/10/2021 01:26 PM    RBC  02/10/2021 01:26 PM           Signed:    Jj Romeo MD  Date of Service:  2/13/2021  5:46 PM

## 2021-02-13 NOTE — PROGRESS NOTES
Nephrology Progress Note  Charleen Kocher  Date of Admission : 1/30/2021    CC: Follow up for JUAN C        Assessment and Plan     JUAN C:  - unclear baseline but suspect some component of CKD  - Renal U/S on 1/30 confirms CKD  - suspect JUAN C due to COVID-19 related illness vs possible volume depletion  - UA from 2/5 with blood and protein, bird in place  - PCR 4g/g - suspect all due to DM - will check SPEP/UPEP, BRANDO  - cont IVF - change to NS as bicarb  Has normalized   - daily labs and I/Os  - no urgent need for dialysis at this time  - made 1400 cc of urine in 24 hrs     Hypokalemia:  -  Repletion done by [facility]      Hypernatremia:  - improving some  - FW flushes via NGT     [place]ongap acidosis:  - resolved and will switch to 1/2 NS     COVID-19 PNA:  - abx and steroids per CCM team     Resp failure:  - vent dependent     DM2:  - on insulin       Interval History:  Not examined in the room    Stable overall. Extubated, lethargic. Oxygenation stable. Cr up, K low. Voiding well, over 1.4 L UOP. Current Medications: all current  Medications have been eviewed in EPIC  Review of Systems: Pertinent items are noted in HPI.     Objective:  Vitals:    Vitals:    02/13/21 0900 02/13/21 1000 02/13/21 1100 02/13/21 1200   BP: (!) 125/59 112/61 118/61 123/88   Pulse: 92 79 86 91   Resp: 19 21 18 23   Temp:    98.1 °F (36.7 °C)   SpO2: 95% 96% 97% 96%   Weight:       Height:         Intake and Output:  02/13 0701 - 02/13 1900  In: 1620.8 [I.V.:695.8]  Out: 260 [Urine:260]  02/11 1901 - 02/13 0700  In: 9164.1 [I.V.:4959.1]  Out: 6852 [Urine:3590]    Physical Examination:  Not examined in the room due to COVID-19 infection:    Pt intubated    No , extubated   General: sedated  Resp:  Stable oxygenation on the vent  CV:  afib on monitor,  no reported edema  Neurologic:  sedated  Skin:  No Rash  :  Bird in place    [x]    High complexity decision making was performed  [x]    Patient is at high-risk of decompensation with multiple organ involvement    Lab Data Personally Reviewed: I have reviewed all the pertinent labs, microbiology data and radiology studies during assessment. Recent Labs     02/13/21 0434 02/12/21 0902 02/12/21  0520 02/11/21 0435   * 148*  --  148*   K 3.0* 2.8*  --  3.9   * 119*  --  120*   CO2 25 23  --  19*   * 176*  --  93   BUN 87* 91*  --  102*   CREA 3.78* 3.37*  --  2.86*   CA 7.7* 7.7*  --  8.2*   MG 2.3  --  2.3 2.3   PHOS 3.1  --  4.0 5.4*   ALB  --   --   --  1.3*   ALT  --   --   --  142*     Recent Labs     02/13/21 0434 02/12/21 0902 02/11/21 0435   WBC 11.7* 12.8* 9.8   HGB 7.4* 8.4* 7.5*   HCT 22.9* 26.7* 24.0*    196 144*     Lab Results   Component Value Date/Time    Specimen Description: BLOOD 02/27/2010 07:00 PM     Lab Results   Component Value Date/Time    Culture result: PENDING 02/12/2021 04:53 PM    Culture result: LIGHT YEAST, (APPARENT CANDIDA ALBICANS) (A) 02/05/2021 07:10 PM    Culture result: FEW NORMAL RESPIRATORY CORI 02/05/2021 07:10 PM    Culture result: No growth (<1,000 CFU/ML) 02/05/2021 07:10 PM     Recent Results (from the past 24 hour(s))   CULTURE, RESPIRATORY/SPUTUM/BRONCH W GRAM STAIN    Collection Time: 02/12/21  4:53 PM    Specimen: Sputum   Result Value Ref Range    Special Requests: NO SPECIAL REQUESTS      GRAM STAIN RARE WBCS SEEN      GRAM STAIN FEW EPITHELIAL CELLS SEEN      GRAM STAIN RARE GRAM POSITIVE COCCI IN PAIRS      Culture result: PENDING    SAMPLES BEING HELD    Collection Time: 02/12/21  4:53 PM   Result Value Ref Range    SAMPLES BEING HELD 1SST     COMMENT        Add-on orders for these samples will be processed based on acceptable specimen integrity and analyte stability, which may vary by analyte.    GLUCOSE, POC    Collection Time: 02/12/21  5:06 PM   Result Value Ref Range    Glucose (POC) 191 (H) 65 - 100 mg/dL    Performed by Fernando East, POC    Collection Time: 02/12/21 11:33 PM   Result Value Ref Range Glucose (POC) 153 (H) 65 - 100 mg/dL    Performed by JOHANNA GARZA    MAGNESIUM    Collection Time: 02/13/21  4:34 AM   Result Value Ref Range    Magnesium 2.3 1.6 - 2.4 mg/dL   PHOSPHORUS    Collection Time: 02/13/21  4:34 AM   Result Value Ref Range    Phosphorus 3.1 2.6 - 4.7 MG/DL   URIC ACID    Collection Time: 02/13/21  4:34 AM   Result Value Ref Range    Uric acid 3.9 3.5 - 7.2 MG/DL   METABOLIC PANEL, BASIC    Collection Time: 02/13/21  4:34 AM   Result Value Ref Range    Sodium 146 (H) 136 - 145 mmol/L    Potassium 3.0 (L) 3.5 - 5.1 mmol/L    Chloride 115 (H) 97 - 108 mmol/L    CO2 25 21 - 32 mmol/L    Anion gap 6 5 - 15 mmol/L    Glucose 117 (H) 65 - 100 mg/dL    BUN 87 (H) 6 - 20 MG/DL    Creatinine 3.78 (H) 0.70 - 1.30 MG/DL    BUN/Creatinine ratio 23 (H) 12 - 20      GFR est AA 20 (L) >60 ml/min/1.73m2    GFR est non-AA 16 (L) >60 ml/min/1.73m2    Calcium 7.7 (L) 8.5 - 10.1 MG/DL   CBC WITH AUTOMATED DIFF    Collection Time: 02/13/21  4:34 AM   Result Value Ref Range    WBC 11.7 (H) 4.1 - 11.1 K/uL    RBC 2.94 (L) 4.10 - 5.70 M/uL    HGB 7.4 (L) 12.1 - 17.0 g/dL    HCT 22.9 (L) 36.6 - 50.3 %    MCV 77.9 (L) 80.0 - 99.0 FL    MCH 25.2 (L) 26.0 - 34.0 PG    MCHC 32.3 30.0 - 36.5 g/dL    RDW 19.4 (H) 11.5 - 14.5 %    PLATELET 098 527 - 514 K/uL    NRBC 0.0 0  WBC    ABSOLUTE NRBC 0.00 0.00 - 0.01 K/uL    NEUTROPHILS 74 32 - 75 %    LYMPHOCYTES 16 12 - 49 %    MONOCYTES 9 5 - 13 %    EOSINOPHILS 1 0 - 7 %    BASOPHILS 0 0 - 1 %    IMMATURE GRANULOCYTES 1 (H) 0.0 - 0.5 %    ABS. NEUTROPHILS 8.6 (H) 1.8 - 8.0 K/UL    ABS. LYMPHOCYTES 1.8 0.8 - 3.5 K/UL    ABS. MONOCYTES 1.0 0.0 - 1.0 K/UL    ABS. EOSINOPHILS 0.2 0.0 - 0.4 K/UL    ABS. BASOPHILS 0.0 0.0 - 0.1 K/UL    ABS. IMM.  GRANS. 0.1 (H) 0.00 - 0.04 K/UL    DF AUTOMATED     GLUCOSE, POC    Collection Time: 02/13/21  6:14 AM   Result Value Ref Range    Glucose (POC) 126 (H) 65 - 100 mg/dL    Performed by JOHANNA GARZA    GLUCOSE, POC    Collection Time: 02/13/21 11:53 AM   Result Value Ref Range    Glucose (POC) 231 (H) 65 - 100 mg/dL    Performed by Jose C Hall MD  [address]  Phone - [phone]   Fax - [phone]  [email or web address]

## 2021-02-13 NOTE — PROGRESS NOTES
[facility]    ICU TEAM Progress Note    Name: Carroll Lara   : 1955   MRN: [id number]   Date: 2021      I  Subjective:   Progress Note: 2021      Reason for ICU Admission: COVID-19 pneumonia,    Interval history:  From [facility] on  63year-old man who presented from [address] [place] [address] with acute onset shortness of breath, hypoxia, nausea, vomiting and diarrhea-shortness of breath acutely got worse after one of the vomiting episodes. Diagnosed with Covid about 2 weeks prior to presentation.  Patient placed on BiPAP on arrival-felt much better.  After getting fluids for resuscitation/elevated lactate level patient acutely decompensated-developed lethargy and drop in oxygen levels-intubated emergently requiring moderate to high vent support. Patient was successfully extubated on . Overnight Events:   No acute event overnight, maintained good oxygenation, blood pressure started to trend up and more oral antihypertensive was started. Failed swallow eval.  Tolerating tube feeding no nausea no vomiting no fever. Good urine output on bicarb drip. Active Problem List:     Problem List  Never Reviewed          Codes Class    Acute hypoxemic respiratory failure due to COVID-19 [facility]) ICD-10-CM: U07.1, J96.01  ICD-9-CM: 518.81, 079.89, 799.02               Past Medical History:      has a past medical history of Diabetes ([facility] and Hypertension. Past Surgical History:      has no past surgical history on file. Home Medications:     Prior to Admission medications    Medication Sig Start Date End Date Taking? Authorizing Provider   aspirin 81 mg chewable tablet Take 81 mg by mouth daily. Yes Other, MD Jessi   atorvastatin (LIPITOR) 80 mg tablet Take 80 mg by mouth daily. Yes Other, MD Jessi   carvediloL (COREG) 12.5 mg tablet Take  by mouth two (2) times a day.    Yes Other, MD Jessi   docusate sodium (Colace) 100 mg capsule Take 100 mg by mouth two (2) times daily as needed for Constipation. Yes Candelaria, MD Jessi   doxycycline (ADOXA) 100 mg tablet Take 100 mg by mouth two (2) times a day. Yes Jessi Gaona MD   ferrous sulfate 325 mg (65 mg iron) tablet Take  by mouth every fourty-eight (48) hours. Yes Candelaria, MD Jessi   gabapentin (NEURONTIN) 400 mg capsule Take 400 mg by mouth three (3) times daily. Yes Candelaria, MD Jessi   insulin lispro (HUMALOG) 100 unit/mL injection 10 Units by SubCUTAneous route Before breakfast, lunch, and dinner. Yes Jessi Gaona MD   isosorbide mononitrate ER (IMDUR) 60 mg CR tablet Take 60 mg by mouth every morning. Yes Jessi Gaona MD   insulin glargine (Lantus U-100 Insulin) 100 unit/mL injection 20 Units by SubCUTAneous route nightly. Yes Jessi Gaona MD   lisinopriL (PRINIVIL, ZESTRIL) 20 mg tablet Take 20 mg by mouth daily. Yes Jessi Gaona MD   methocarbamoL (ROBAXIN) 500 mg tablet Take 500 mg by mouth three (3) times daily. Yes Jessi Gaona MD   oxyCODONE IR (ROXICODONE) 5 mg immediate release tablet Take 5 mg by mouth every six (6) hours as needed for Pain. Yes Jessi Gaona MD   pantoprazole (PROTONIX) 40 mg tablet Take 40 mg by mouth daily. Yes Jessi Gaona MD   guaiFENesin-codeine (Guaiatussin AC) 100-10 mg/5 mL solution Take 5 mL by mouth four (4) times daily as needed for Cough. Yes Candelaria, MD Jessi   senna (Senna) 8.6 mg tablet Take 1 Tab by mouth daily. Yes Jessi Gaona MD   ticagrelor (BRILINTA) 90 mg tablet Take 90 mg by mouth two (2) times a day. Yes Jessi Gaona MD   ascorbic acid, vitamin C, (Vitamin C) 500 mg tablet Take 500 mg by mouth two (2) times a day. Yes Jessi Gaona MD   cholecalciferol (Vitamin D3) (1000 Units /25 mcg) tablet Take 1,000 Units by mouth daily. Yes Candelaria, MD Jessi   zinc sulfate (ZINCATE) 220 (50) mg capsule Take 220 mg by mouth daily. Yes Provider, Historical   therapeutic multivitamin (THERAGRAN) tablet Take 1 Tab by mouth daily.    Yes Provider, Historical   acetaminophen (TYLENOL) 325 mg tablet Take 975 mg by mouth every six (6) hours as needed for Pain. Candelaria, MD Jessi       Allergies/Social/Family History:     No Known Allergies   Social History     Tobacco Use    Smoking status: Former Smoker    Smokeless tobacco: Never Used   Substance Use Topics    Alcohol use: Not Currently      No family history on file. Review of Systems:   10 system reviewed and they are negative but as in interval history, patient is poor historian    Objective:   Vital Signs:  Visit Vitals  BP (!) 169/72   Pulse 89   Temp 98.2 °F (36.8 °C)   Resp 18   Ht 5' 7\" (1.702 m)   Wt 96.1 kg (211 lb 13.8 oz)   SpO2 98%   BMI 33.18 kg/m²    O2 Flow Rate (L/min): 4 l/min O2 Device: Room air Temp (24hrs), Av.4 °F (36.9 °C), Min:98.1 °F (36.7 °C), Max:99.2 °F (37.3 °C)           Intake/Output:     Intake/Output Summary (Last 24 hours) at 2021 0816  Last data filed at 2021 0700  Gross per 24 hour   Intake 6509.21 ml   Output 1600 ml   Net 4909.21 ml       Physical Exam:    General: Chronically ill looking gentleman, husky voice answer simple question and follow simple command  Neuro-pupils reactive, conscious alert oriented to himself, husky voice, moves 4 limbs with profound weakness  Cardiac-tachycardic, regular  Lungs-coarse bilaterally  Abdomen-soft, nontender, nondistended  Extremities-warm    LABS AND  DATA: Personally reviewed  Recent Labs     21  0902   WBC 11.7* 12.8*   HGB 7.4* 8.4*   HCT 22.9* 26.7*    196     Recent Labs     21  04321  0902 21  0520   * 148*  --    K 3.0* 2.8*  --    * 119*  --    CO2 25 23  --    BUN 87* 91*  --    CREA 3.78* 3.37*  --    * 176*  --    CA 7.7* 7.7*  --    MG 2.3  --  2.3   PHOS 3.1  --  4.0     Recent Labs     21  0435   *   TP 4.7*   ALB 1.3*   GLOB 3.4     No results for input(s): INR, PTP, APTT, INREXT in the last 72 hours.    No results for input(s): PHI, PCO2I, PO2I, FIO2I in the last 72 hours. Recent Labs     02/12/21  0902 02/12/21  0520   TROIQ 0.28* 0.32*       Hemodynamics:   PAP:   CO:     Wedge:   CI:     CVP:    SVR:       PVR:       Ventilator Settings:  Mode Rate Tidal Volume Pressure FiO2 PEEP   Spontaneous   450 ml  8 cm H2O 30 % 5 cm H20     Peak airway pressure: 13 cm H2O    Minute ventilation: 7.94 l/min        MEDS: Reviewed    Chest X-Ray:  CXR Results  (Last 48 hours)               02/11/21 1011  XR CHEST PORT Final result    Impression:  1. Significant decrease in bilateral interstitial and airspace opacities. 2. There is persistent opacity at the left lung base consistent left pleural   effusion and left lower lobe atelectasis. .        Narrative:  EXAM: XR CHEST PORT       INDICATION: resp failure       COMPARISON: February 6, 2021       FINDINGS: A portable AP radiograph of the chest was obtained at 1005 hours. The   patient is on a cardiac monitor. The ET tube is in satisfactory position. Left   subclavian catheter overlies the distal left brachycephalic vein unchanged. The lungs demonstrate decrease in the bilateral interstitial airspace opacities. The aeration has improved. . There is persistent opacity in left lung base   consistent left pleural effusion left lower lobe atelectasis. Assessment and Plan:   Acute hypoxic respiratory failure  Pulmonary edema  Covid pneumonia  Aspiration pneumonitis  Tachycardia secondary to above  Acute kidney injury     Neuro:  Patient improving, off Precedex and off fentanyl. Still on Seroquel 50 3 times daily. CT head negative on February 11. I will decrease the dose of gabapentin today given his renal function     Pulm:  Extubated on February 11. Doing well. Wean FiO2 to maintain saturation above 92%. Continue incentive spirometry.      CV:   - Cont ASA, lipitor, on beta-blocker and added calcium channel blocker to control his hypertension. Isosorbide dinitrate [home medication] resumed.   We will add as needed labetalol if needed to maintain systolic blood pressure below 180     GI:   - Lansoprazole  - TFs, please advance to goal.  Patient failed swallow eval yesterday. Speech therapy to continue to follow     Renal:  - Continue bird for strict I/Os.    -Nephrology consulted secondary to increased BUN, Cr and metabolic acidosis .  I appreciate their input, on bicarb drip and is receiving free water via NG tube. Good urine output  Replace potassium today     Endocrine:  - Lantus 15u daily,  - SSI     Heme:  - ASA  - Statin  - Lovenox 30mg q12h     ID: Appreciate infectious disease. Infectious disease team extended the cefepime course until February 2018  Repeated COVID-19 testing still pending    PT OT  DISPOSITION  At this time there is no immediate need for ICU level of care, I will transfer to intermediate care unit. CRITICAL CARE CONSULTANT NOTE  I had a face to face encounter with the patient, reviewed and interpreted patient data including clinical events, labs, images, vital signs, I/O's, and examined patient. I have discussed the case and the plan and management of the patient's care with the consulting services, the bedside nurses and the respiratory therapist.      NOTE OF PERSONAL INVOLVEMENT IN CARE   This patient has a high probability of imminent, clinically significant deterioration, which requires the highest level of preparedness to intervene urgently. I participated in the decision-making and personally managed or directed the management of the following life and organ supporting interventions that required my frequent assessment to treat or prevent imminent deterioration. I personally spent 40 minutes of critical care time. This is time spent at this critically ill patient's bedside actively involved in patient care as well as the coordination of care and discussions with the patient's family. This does not include any procedural time which has been billed separately.     Shahzad BEAUCHAMP Al Franco Black M.D.   Staff Intensivist/Pulmonologist  [facility]  2/13/2021

## 2021-02-14 ENCOUNTER — APPOINTMENT (OUTPATIENT)
Dept: GENERAL RADIOLOGY | Age: 66
DRG: 720 | End: 2021-02-14
Attending: HOSPITALIST
Payer: MEDICAID

## 2021-02-14 LAB
ALBUMIN SERPL-MCNC: 1.4 G/DL (ref 3.5–5)
ALBUMIN/GLOB SERPL: 0.4 {RATIO} (ref 1.1–2.2)
ALP SERPL-CCNC: 134 U/L (ref 45–117)
ALT SERPL-CCNC: 67 U/L (ref 12–78)
ANION GAP SERPL CALC-SCNC: 8 MMOL/L (ref 5–15)
ANION GAP SERPL CALC-SCNC: 9 MMOL/L (ref 5–15)
AST SERPL-CCNC: 56 U/L (ref 15–37)
BACTERIA SPEC CULT: ABNORMAL
BACTERIA SPEC CULT: ABNORMAL
BASOPHILS # BLD: 0 K/UL (ref 0–0.1)
BASOPHILS NFR BLD: 0 % (ref 0–1)
BILIRUB SERPL-MCNC: 0.2 MG/DL (ref 0.2–1)
BUN SERPL-MCNC: 88 MG/DL (ref 6–20)
BUN SERPL-MCNC: 90 MG/DL (ref 6–20)
BUN/CREAT SERPL: 20 (ref 12–20)
BUN/CREAT SERPL: 20 (ref 12–20)
C3 SERPL-MCNC: 89 MG/DL (ref 82–167)
C4 SERPL-MCNC: 15 MG/DL (ref 12–38)
CALCIUM SERPL-MCNC: 7.3 MG/DL (ref 8.5–10.1)
CALCIUM SERPL-MCNC: 7.3 MG/DL (ref 8.5–10.1)
CHLORIDE SERPL-SCNC: 112 MMOL/L (ref 97–108)
CHLORIDE SERPL-SCNC: 113 MMOL/L (ref 97–108)
CO2 SERPL-SCNC: 23 MMOL/L (ref 21–32)
CO2 SERPL-SCNC: 25 MMOL/L (ref 21–32)
COMMENT, HOLDF: NORMAL
CREAT SERPL-MCNC: 4.41 MG/DL (ref 0.7–1.3)
CREAT SERPL-MCNC: 4.59 MG/DL (ref 0.7–1.3)
DIFFERENTIAL METHOD BLD: ABNORMAL
EOSINOPHIL # BLD: 0.2 K/UL (ref 0–0.4)
EOSINOPHIL NFR BLD: 2 % (ref 0–7)
ERYTHROCYTE [DISTWIDTH] IN BLOOD BY AUTOMATED COUNT: 19 % (ref 11.5–14.5)
GLOBULIN SER CALC-MCNC: 3.9 G/DL (ref 2–4)
GLUCOSE BLD STRIP.AUTO-MCNC: 187 MG/DL (ref 65–100)
GLUCOSE BLD STRIP.AUTO-MCNC: 204 MG/DL (ref 65–100)
GLUCOSE BLD STRIP.AUTO-MCNC: 215 MG/DL (ref 65–100)
GLUCOSE BLD STRIP.AUTO-MCNC: 224 MG/DL (ref 65–100)
GLUCOSE SERPL-MCNC: 175 MG/DL (ref 65–100)
GLUCOSE SERPL-MCNC: 193 MG/DL (ref 65–100)
GRAM STN SPEC: ABNORMAL
HCT VFR BLD AUTO: 21.1 % (ref 36.6–50.3)
HCT VFR BLD AUTO: 22.3 % (ref 36.6–50.3)
HGB BLD-MCNC: 6.4 G/DL (ref 12.1–17)
HGB BLD-MCNC: 7 G/DL (ref 12.1–17)
HISTORY CHECKED?,CKHIST: NORMAL
IMM GRANULOCYTES # BLD AUTO: 0.1 K/UL (ref 0–0.04)
IMM GRANULOCYTES NFR BLD AUTO: 1 % (ref 0–0.5)
IRON SATN MFR SERPL: 16 % (ref 20–50)
IRON SERPL-MCNC: 23 UG/DL (ref 35–150)
LYMPHOCYTES # BLD: 1.8 K/UL (ref 0.8–3.5)
LYMPHOCYTES NFR BLD: 15 % (ref 12–49)
MAGNESIUM SERPL-MCNC: 2 MG/DL (ref 1.6–2.4)
MCH RBC QN AUTO: 25.1 PG (ref 26–34)
MCHC RBC AUTO-ENTMCNC: 31.4 G/DL (ref 30–36.5)
MCV RBC AUTO: 79.9 FL (ref 80–99)
MONOCYTES # BLD: 1.2 K/UL (ref 0–1)
MONOCYTES NFR BLD: 11 % (ref 5–13)
NEUTS SEG # BLD: 8.4 K/UL (ref 1.8–8)
NEUTS SEG NFR BLD: 72 % (ref 32–75)
NRBC # BLD: 0 K/UL (ref 0–0.01)
NRBC BLD-RTO: 0 PER 100 WBC
PHOSPHATE SERPL-MCNC: 2.6 MG/DL (ref 2.6–4.7)
PLATELET # BLD AUTO: 200 K/UL (ref 150–400)
POTASSIUM SERPL-SCNC: 2.9 MMOL/L (ref 3.5–5.1)
POTASSIUM SERPL-SCNC: 3.4 MMOL/L (ref 3.5–5.1)
PROT SERPL-MCNC: 5.3 G/DL (ref 6.4–8.2)
RBC # BLD AUTO: 2.79 M/UL (ref 4.1–5.7)
SAMPLES BEING HELD,HOLD: NORMAL
SERVICE CMNT-IMP: ABNORMAL
SODIUM SERPL-SCNC: 145 MMOL/L (ref 136–145)
SODIUM SERPL-SCNC: 145 MMOL/L (ref 136–145)
TIBC SERPL-MCNC: 146 UG/DL (ref 250–450)
URATE SERPL-MCNC: 3.9 MG/DL (ref 3.5–7.2)
WBC # BLD AUTO: 11.7 K/UL (ref 4.1–11.1)

## 2021-02-14 PROCEDURE — 74011250636 HC RX REV CODE- 250/636: Performed by: ANESTHESIOLOGY

## 2021-02-14 PROCEDURE — 85025 COMPLETE CBC W/AUTO DIFF WBC: CPT

## 2021-02-14 PROCEDURE — 74011000258 HC RX REV CODE- 258: Performed by: NURSE PRACTITIONER

## 2021-02-14 PROCEDURE — 84550 ASSAY OF BLOOD/URIC ACID: CPT

## 2021-02-14 PROCEDURE — 74011000250 HC RX REV CODE- 250: Performed by: ANESTHESIOLOGY

## 2021-02-14 PROCEDURE — 36415 COLL VENOUS BLD VENIPUNCTURE: CPT

## 2021-02-14 PROCEDURE — 74011000250 HC RX REV CODE- 250: Performed by: HOSPITALIST

## 2021-02-14 PROCEDURE — 65660000001 HC RM ICU INTERMED STEPDOWN

## 2021-02-14 PROCEDURE — 74011250636 HC RX REV CODE- 250/636: Performed by: HOSPITALIST

## 2021-02-14 PROCEDURE — 74011250637 HC RX REV CODE- 250/637: Performed by: ANESTHESIOLOGY

## 2021-02-14 PROCEDURE — 74011250637 HC RX REV CODE- 250/637: Performed by: EMERGENCY MEDICINE

## 2021-02-14 PROCEDURE — 84100 ASSAY OF PHOSPHORUS: CPT

## 2021-02-14 PROCEDURE — 82962 GLUCOSE BLOOD TEST: CPT

## 2021-02-14 PROCEDURE — 83735 ASSAY OF MAGNESIUM: CPT

## 2021-02-14 PROCEDURE — 86923 COMPATIBILITY TEST ELECTRIC: CPT

## 2021-02-14 PROCEDURE — C9113 INJ PANTOPRAZOLE SODIUM, VIA: HCPCS | Performed by: HOSPITALIST

## 2021-02-14 PROCEDURE — 74011250637 HC RX REV CODE- 250/637: Performed by: INTERNAL MEDICINE

## 2021-02-14 PROCEDURE — 71045 X-RAY EXAM CHEST 1 VIEW: CPT

## 2021-02-14 PROCEDURE — 85014 HEMATOCRIT: CPT

## 2021-02-14 PROCEDURE — 74011250637 HC RX REV CODE- 250/637: Performed by: HOSPITALIST

## 2021-02-14 PROCEDURE — 86901 BLOOD TYPING SEROLOGIC RH(D): CPT

## 2021-02-14 PROCEDURE — 74011250636 HC RX REV CODE- 250/636: Performed by: NURSE PRACTITIONER

## 2021-02-14 PROCEDURE — 74011000250 HC RX REV CODE- 250: Performed by: INTERNAL MEDICINE

## 2021-02-14 PROCEDURE — 74011250636 HC RX REV CODE- 250/636: Performed by: INTERNAL MEDICINE

## 2021-02-14 PROCEDURE — 83540 ASSAY OF IRON: CPT

## 2021-02-14 PROCEDURE — 74011000258 HC RX REV CODE- 258: Performed by: INTERNAL MEDICINE

## 2021-02-14 PROCEDURE — 74011636637 HC RX REV CODE- 636/637: Performed by: NURSE PRACTITIONER

## 2021-02-14 PROCEDURE — 74011636637 HC RX REV CODE- 636/637: Performed by: INTERNAL MEDICINE

## 2021-02-14 PROCEDURE — 80053 COMPREHEN METABOLIC PANEL: CPT

## 2021-02-14 PROCEDURE — 74011250637 HC RX REV CODE- 250/637: Performed by: NURSE PRACTITIONER

## 2021-02-14 RX ORDER — SODIUM CHLORIDE 9 MG/ML
250 INJECTION, SOLUTION INTRAVENOUS AS NEEDED
Status: DISCONTINUED | OUTPATIENT
Start: 2021-02-14 | End: 2021-03-05 | Stop reason: HOSPADM

## 2021-02-14 RX ORDER — INSULIN GLARGINE 100 [IU]/ML
20 INJECTION, SOLUTION SUBCUTANEOUS DAILY
Status: DISCONTINUED | OUTPATIENT
Start: 2021-02-15 | End: 2021-02-25

## 2021-02-14 RX ORDER — POTASSIUM CHLORIDE 29.8 MG/ML
20 INJECTION INTRAVENOUS
Status: COMPLETED | OUTPATIENT
Start: 2021-02-14 | End: 2021-02-14

## 2021-02-14 RX ADMIN — INSULIN LISPRO 2 UNITS: 100 INJECTION, SOLUTION INTRAVENOUS; SUBCUTANEOUS at 23:26

## 2021-02-14 RX ADMIN — SODIUM CHLORIDE 75 ML/HR: 4.5 INJECTION, SOLUTION INTRAVENOUS at 03:49

## 2021-02-14 RX ADMIN — ACETAMINOPHEN 650 MG: 325 TABLET ORAL at 12:53

## 2021-02-14 RX ADMIN — ISOSORBIDE DINITRATE 20 MG: 20 TABLET ORAL at 17:21

## 2021-02-14 RX ADMIN — QUETIAPINE FUMARATE 50 MG: 25 TABLET ORAL at 21:53

## 2021-02-14 RX ADMIN — CHOLECALCIFEROL (VITAMIN D3) 25 MCG (1,000 UNIT) TABLET 2000 UNITS: TABLET at 10:08

## 2021-02-14 RX ADMIN — ISOSORBIDE DINITRATE 20 MG: 20 TABLET ORAL at 10:08

## 2021-02-14 RX ADMIN — INSULIN GLARGINE 15 UNITS: 100 INJECTION, SOLUTION SUBCUTANEOUS at 10:19

## 2021-02-14 RX ADMIN — GABAPENTIN 125 MG: 250 SOLUTION ORAL at 17:20

## 2021-02-14 RX ADMIN — MINERAL SUPPLEMENT IRON 300 MG / 5 ML STRENGTH LIQUID 100 PER BOX UNFLAVORED 300 MG: at 12:49

## 2021-02-14 RX ADMIN — OXYCODONE HYDROCHLORIDE AND ACETAMINOPHEN 500 MG: 500 TABLET ORAL at 10:08

## 2021-02-14 RX ADMIN — AMLODIPINE BESYLATE 10 MG: 5 TABLET ORAL at 10:08

## 2021-02-14 RX ADMIN — INSULIN LISPRO 4 UNITS: 100 INJECTION, SOLUTION INTRAVENOUS; SUBCUTANEOUS at 06:49

## 2021-02-14 RX ADMIN — QUETIAPINE FUMARATE 50 MG: 25 TABLET ORAL at 17:20

## 2021-02-14 RX ADMIN — TICAGRELOR 90 MG: 90 TABLET ORAL at 17:21

## 2021-02-14 RX ADMIN — ACETAMINOPHEN 650 MG: 325 TABLET ORAL at 07:04

## 2021-02-14 RX ADMIN — GABAPENTIN 125 MG: 250 SOLUTION ORAL at 23:11

## 2021-02-14 RX ADMIN — QUETIAPINE FUMARATE 50 MG: 25 TABLET ORAL at 10:08

## 2021-02-14 RX ADMIN — TICAGRELOR 90 MG: 90 TABLET ORAL at 10:08

## 2021-02-14 RX ADMIN — METOPROLOL TARTRATE 5 MG: 5 INJECTION INTRAVENOUS at 10:19

## 2021-02-14 RX ADMIN — HYDROMORPHONE HYDROCHLORIDE 0.5 MG: 1 INJECTION, SOLUTION INTRAMUSCULAR; INTRAVENOUS; SUBCUTANEOUS at 07:04

## 2021-02-14 RX ADMIN — GABAPENTIN 125 MG: 250 SOLUTION ORAL at 10:09

## 2021-02-14 RX ADMIN — CHLORHEXIDINE GLUCONATE 15 ML: 0.12 RINSE ORAL at 21:00

## 2021-02-14 RX ADMIN — ATORVASTATIN CALCIUM 40 MG: 40 TABLET, FILM COATED ORAL at 21:53

## 2021-02-14 RX ADMIN — LABETALOL HYDROCHLORIDE 20 MG: 5 INJECTION INTRAVENOUS at 07:03

## 2021-02-14 RX ADMIN — CARVEDILOL 25 MG: 12.5 TABLET, FILM COATED ORAL at 21:55

## 2021-02-14 RX ADMIN — Medication 10 ML: at 22:12

## 2021-02-14 RX ADMIN — CARVEDILOL 25 MG: 12.5 TABLET, FILM COATED ORAL at 10:08

## 2021-02-14 RX ADMIN — Medication 1 CAPSULE: at 10:08

## 2021-02-14 RX ADMIN — INSULIN LISPRO 3 UNITS: 100 INJECTION, SOLUTION INTRAVENOUS; SUBCUTANEOUS at 18:59

## 2021-02-14 RX ADMIN — OXYCODONE HYDROCHLORIDE AND ACETAMINOPHEN 500 MG: 500 TABLET ORAL at 17:21

## 2021-02-14 RX ADMIN — Medication 10 ML: at 06:22

## 2021-02-14 RX ADMIN — HYDROMORPHONE HYDROCHLORIDE 0.5 MG: 1 INJECTION, SOLUTION INTRAMUSCULAR; INTRAVENOUS; SUBCUTANEOUS at 17:40

## 2021-02-14 RX ADMIN — METOPROLOL TARTRATE 5 MG: 5 INJECTION INTRAVENOUS at 02:35

## 2021-02-14 RX ADMIN — CHLORHEXIDINE GLUCONATE 15 ML: 0.12 RINSE ORAL at 09:00

## 2021-02-14 RX ADMIN — HYDROMORPHONE HYDROCHLORIDE 0.5 MG: 1 INJECTION, SOLUTION INTRAMUSCULAR; INTRAVENOUS; SUBCUTANEOUS at 22:12

## 2021-02-14 RX ADMIN — HYDROMORPHONE HYDROCHLORIDE 0.5 MG: 1 INJECTION, SOLUTION INTRAMUSCULAR; INTRAVENOUS; SUBCUTANEOUS at 02:34

## 2021-02-14 RX ADMIN — LANSOPRAZOLE 30 MG: KIT at 06:49

## 2021-02-14 RX ADMIN — ENOXAPARIN SODIUM 30 MG: 30 INJECTION SUBCUTANEOUS at 10:09

## 2021-02-14 RX ADMIN — POTASSIUM CHLORIDE 20 MEQ: 29.8 INJECTION, SOLUTION INTRAVENOUS at 04:22

## 2021-02-14 RX ADMIN — CEFEPIME HYDROCHLORIDE 2 G: 2 INJECTION, POWDER, FOR SOLUTION INTRAVENOUS at 17:20

## 2021-02-14 RX ADMIN — INSULIN LISPRO 4 UNITS: 100 INJECTION, SOLUTION INTRAVENOUS; SUBCUTANEOUS at 12:50

## 2021-02-14 RX ADMIN — ASPIRIN 81 MG: 81 TABLET, CHEWABLE ORAL at 10:08

## 2021-02-14 RX ADMIN — SODIUM CHLORIDE 40 MG: 9 INJECTION INTRAMUSCULAR; INTRAVENOUS; SUBCUTANEOUS at 17:39

## 2021-02-14 RX ADMIN — Medication 10 ML: at 17:20

## 2021-02-14 RX ADMIN — POTASSIUM CHLORIDE 20 MEQ: 29.8 INJECTION, SOLUTION INTRAVENOUS at 06:21

## 2021-02-14 NOTE — PROGRESS NOTES
Dr. Volodymyr Long notified of the following:     Pt has had 5 large loose watery stools since admission to IMCU at [place] [address] yesterday. KUB performed and showed non obstructive bowel gas pattern. Pt has swelling and excoriation to perineum with scrotal bleeding noted and c/o pain with any slight touch to area. Zinc paste applied liberally. New orders received to place FMS. FMS placed without difficulty. Syringe volume air 30mls. Pt tolerated well, excoriation noted throughout perineum and weeping to thighs, scant bleeding noted to scrotom. Pt very sensitive to touch. Zinc cream applied. Pt states pain tolerable. No change from previous assessment. Prepared for transfer to acu.

## 2021-02-14 NOTE — ACP (ADVANCE CARE PLANNING)
[facility] Adult  Hospitalist Group                                      Advance Care Planning Note    Name: Kendall Anand  YOB: 1955  MRN: [id number]  Admission Date: 1/30/2021 12:48 AM    Date of discussion: 2/13/2021    Active Diagnoses:  · Acute hypoxic respiratory failure  · Severe Covid with Covid pneumonia  · JUAN C, worsening  · Dysphagia, on tube feeding  · Cardiomyopathy, CAD status post a stent in the summer 2020  · Type 2 diabetes with hyperglycemia           Topics Discussed:  Patient's medical condition and diagnosis: [x  ] yes [  ] no   Surrogate decision maker: [ x ] yes [  ] no   Patient's current physical function/cognitive function/frailty: [ x ] yes [  ] no   Code Status: [x  ] yes [  ] no   Artificial Nutrition / Dialysis / Non-Invasive Ventilation / Blood Transfusion: [x  ] yes [  ] no  Potential Resources for home (durable medical equipment, home nursing, home O2): [  ] yes [ x ] no    Overview of Discussion: This advance care planning discussion was held with patient's surrogate decision maker/wife Torito Mejias on the phone. Mr. Dominique Clark is transferred out of the ICU today. He was intubated on 1/30 and extubated on 2/13. He is still severely ill. He is on tube feeding due to dysphagia. I had extensive discussion with patient's wife on the phone with following outcomes:  · Wish to continue aggressive medical care  · Wishes to maintain full CODE STATUS however does not want him to live on machines. · Will continue to address the feeding situation. I reviewed with her that he may need a feeding tube if he continues to fail to safely take orally.           Time Spent: >20 minutes      Prasanna Brown MD  Date of Service:  2/13/2021  7:00 PM

## 2021-02-14 NOTE — PROGRESS NOTES
During bath, right ear noted to have pressure injury. Mid earlobe has dry scab present on front and on back. Cleansed and hydrocolloid dressing applied. Was present on admission to [facility] on 2.13.21 from the ICU. Problem: Risk for Spread of Infection  Goal: Prevent transmission of infectious organism to others  Description: Prevent the transmission of infectious organisms to other patients, staff members, and visitors.   Outcome: Progressing Towards Goal     Problem: Breathing Pattern - Ineffective  Goal: *Absence of hypoxia  Outcome: Progressing Towards Goal  Goal: *Use of effective breathing techniques  Outcome: Progressing Towards Goal  Goal: *PALLIATIVE CARE:  Alleviation of Dyspnea  Outcome: Progressing Towards Goal

## 2021-02-14 NOTE — PROGRESS NOTES
[facility] Adult  Hospitalist Group                                                                                          Hospitalist Progress Note  Rankin Lefort, MD  Answering service: [phone] from in house phone        Date of Service:  2021  NAME:  Bony Laurent  :  1955  MRN:  [id number]    This documentation was facilitated by a Voice Recognition software and may contain inadvertent typographical errors. Admission Summary: This is a 82-year-old -American male who was admitted on 2021 when he presented to the ED for acute shortness of breath referred from [facility]. Patient diagnosed with Covid about 2 weeks prior to presentation. He was initially placed on BiPAP in the ED. After getting fluid resuscitation for elevated lactate patient decompensated became lethargic and hypoxic and required emergent intubation. Patient extubated on . He is said to be stable to be transferred out of the ICU and will transfer to [facility] on . Interval history / Subjective:     F/u severe covid pneumonia with acute respiratory failure. Awake,weak ,did not verbalize today. He makes a sound which seems like he want to cough,not in apparent respiratory distress. Assessment & Plan:   Acute severe anemia, chronic anemia. No apparent blood loss. Could be due to BM suppression due to severe acute illness,covid disease and frequent phlebotomy etc.  -HB 6.4 today. Transfue one unit to keep Hb>7. Dysphagia due to acute debility due to acute severe illness.  -Keep n.p.o.  -Tube feeding via NGT  -Speech evaluation on Monday. -Aspiration precaution, keep head of bed>30-40 degrees  -KUB  unremarkable. Type 2 diabetes with hyperglycemia. BG not in target  -Accu-Cheks every 6. Increase Lantus and Humalog sliding scale coverage.      JUAN C, creatinine worsening.  -Avoid nephrotoxic agents, renally dose adjust medications.  -Indwelling Bourgeois catheter in place, monitor input and output  -Renal ultrasound suggestive of CKD. Nephrology on board     Hypokalemia: Replace and monitor     Mild hyponatremia: Water flush via NGT. Daily labs     Cardiomyopathy, LVEF 20-25% on echocardiogram on 2/1 with moderate to severe pulmonary hypertension. Not sure if patient has history of CHF or cardiomyopathy. According to his wife patient had MI and had stents put in last summer and MCV. He probably has chronic cardiomyopathy or this could all be due to the acute illness, COVID-19 related cardiomyopathy.  -Continue aspirin, Lipitor, carvedilol, Isordil. Resume Brilinta. No ACE or ARB or ARNI due to significant renal dysfunction  -Troponin mildly elevated which is now trending down.  [facility] cardiology. Acute hypoxic respiratory failure secondary to Covid pneumonia, acute pulmonary edema, aspiration pneumonitis. The acute respiratory failure resolved. Intubated on 1/30, extubated on 2/11.  -Continue supportive care  -Aspiration precaution  -Elevate head of bed>30-40 degrees aAcute hypoxic respiratory failure     Severe Covid is Covid pneumonia with acute respiratory failure. Patient was diagnosed with Covid 2 weeks prior to presentation. Had received steroids this admission. Mycoplasma and strep pneumoniae pneumonia  -On cefepime, continue. ID following     Chronic mid posterior cervical wound infection. Present prior to admission. Patient reportedly was awaiting for skin graft. Patient reportedly had completed intravenous Zosyn and vancomycin prior to admission. Patient to follow with Dr Douglas Rinaldi at [address] [place] [address] upon discharge     History of heroin abuse per his wife. CODE STATUS: Full  DVT prophylaxis: Enoxaparin  Surrogate decision maker: Eden Ierland, wife. Phone [phone] (cell); [phone] (home)  2/14: Ms Maurice Lopez gave blood transfusion consent on the phone   I have called and talked with his wife on the phone.   She confirmed his full CODE STATUS. Hospital Problems  Never Reviewed          Codes Class Noted POA    Acute hypoxemic respiratory failure due to COVID-19 [facility]) ICD-10-CM: U07.1, J96.01  ICD-9-CM: 518.81, 079.89, 799.02  1/30/2021 Unknown                Review of Systems:   Review of systems not obtained due to patient factors. Vital Signs:    Last 24hrs VS reviewed since prior progress note. Most recent are:  Visit Vitals  BP (!) 154/94 (BP 1 Location: Left lower arm, BP Patient Position: At rest)   Pulse (!) 102   Temp 100.3 °F (37.9 °C)   Resp 21   Ht 5' 7\" (1.702 m)   Wt 93 kg (205 lb)   SpO2 99%   BMI 32.11 kg/m²         Intake/Output Summary (Last 24 hours) at 2/14/2021 1411  Last data filed at 2/14/2021 1200  Gross per 24 hour   Intake 2080 ml   Output 1326 ml   Net 754 ml        Physical Examination:     I had a face to face encounter with this patient and independently examined them on2/14/2021 as outlined below:  General:           Patient very weak,awake,alert,did not verbalize today  HEENT:            NGT in place. Neck:              Left subclavian triple-lumen in place. Posterior neck wound bandaged. Lungs: On room air. Not in distress. Diminished air entry basally posteriorly. Heart:              Regular  rhythm,  No  murmur     Abdomen:        Protuberant, normoactive, firm non tenbder. Extremities:      ++edema  Neurologic:      Alert,awake.weak. Extremities are generally weak.          Data Review:    Review and/or order of clinical lab test  Review and/or order of tests in the radiology section of CPT  Review and/or order of tests in the medicine section of CPT      Labs:     Recent Labs     02/14/21  1312 02/14/21  0239 02/13/21  0434   WBC  --  11.7* 11.7*   HGB 6.4* 7.0* 7.4*   HCT 21.1* 22.3* 22.9*   PLT  --  200 176     Recent Labs     02/14/21  1312 02/14/21  0239 02/13/21  0434 02/12/21  0520 02/12/21  0520    145 146*   < >  --    K 3.4* 2.9* 3.0* < >  --    * 112* 115*   < >  --    CO2 23 25 25   < >  --    BUN 90* 88* 87*   < >  --    CREA 4.59* 4.41* 3.78*   < >  --    * 193* 117*   < >  --    CA 7.3* 7.3* 7.7*   < >  --    MG  --  2.0 2.3  --  2.3   PHOS  --  2.6 3.1  --  4.0   URICA  --  3.9 3.9  --  4.3    < > = values in this interval not displayed. Recent Labs     02/14/21  0239   ALT 67   *   TBILI 0.2   TP 5.3*   ALB 1.4*   GLOB 3.9     No results for input(s): INR, PTP, APTT, INREXT in the last 72 hours. No results for input(s): FE, TIBC, PSAT, FERR in the last 72 hours. No results found for: FOL, RBCF   No results for input(s): PH, PCO2, PO2 in the last 72 hours.   Recent Labs     02/12/21  0902 02/12/21  0520   TROIQ 0.28* 0.32*     Lab Results   Component Value Date/Time    Cholesterol, total 115 02/26/2010 05:20 PM    HDL Cholesterol 31 (L) 02/26/2010 05:20 PM    LDL, calculated 46.2 02/26/2010 05:20 PM    Triglyceride 189 02/26/2010 05:20 PM    CHOL/HDL Ratio 3.7 02/26/2010 05:20 PM     Lab Results   Component Value Date/Time    Glucose (POC) 204 (H) 02/14/2021 12:03 PM    Glucose (POC) 215 (H) 02/14/2021 06:20 AM    Glucose (POC) 190 (H) 02/13/2021 11:29 PM    Glucose (POC) 217 (H) 02/13/2021 06:10 PM    Glucose (POC) 231 (H) 02/13/2021 11:53 AM     Lab Results   Component Value Date/Time    Color YELLOW/STRAW 02/10/2021 01:26 PM    Appearance TURBID (A) 02/10/2021 01:26 PM    Specific gravity 1.014 02/10/2021 01:26 PM    Specific gravity 1.015 02/26/2010 12:32 PM    pH (UA) 5.0 02/10/2021 01:26 PM    Protein 100 (A) 02/10/2021 01:26 PM    Glucose Negative 02/10/2021 01:26 PM    Ketone Negative 02/10/2021 01:26 PM    Bilirubin Negative 02/10/2021 01:26 PM    Urobilinogen 0.2 02/10/2021 01:26 PM    Nitrites Negative 02/10/2021 01:26 PM    Leukocyte Esterase Negative 02/10/2021 01:26 PM    Epithelial cells FEW 02/10/2021 01:26 PM    Bacteria Negative 02/10/2021 01:26 PM    WBC 0-4 02/10/2021 01:26 PM    RBC  02/10/2021 01:26 PM         Medications Reviewed:     Current Facility-Administered Medications   Medication Dose Route Frequency    0.9% sodium chloride infusion 250 mL  250 mL IntraVENous PRN    gabapentin (NEURONTIN) 250 mg/5 mL solution 125 mg  125 mg Per NG tube Q8H    labetaloL (NORMODYNE;TRANDATE) injection 20 mg  20 mg IntraVENous Q4H PRN    0.45% sodium chloride infusion  75 mL/hr IntraVENous CONTINUOUS    ticagrelor (BRILINTA) tablet 90 mg  90 mg Oral BID    carvediloL (COREG) tablet 25 mg  25 mg Per NG tube Q12H    isosorbide dinitrate (ISORDIL) tablet 20 mg  20 mg Per NG tube BID    cefepime (MAXIPIME) 2 g in 0.9% sodium chloride (MBP/ADV) 100 mL MBP  2 g IntraVENous Q24H    insulin glargine (LANTUS) injection 15 Units  15 Units SubCUTAneous DAILY    amLODIPine (NORVASC) tablet 10 mg  10 mg Per NG tube DAILY    enoxaparin (LOVENOX) injection 30 mg  30 mg SubCUTAneous Q24H    QUEtiapine (SEROquel) tablet 50 mg  50 mg Oral TID    insulin lispro (HUMALOG) injection   SubCUTAneous Q6H    alteplase (CATHFLO) 1 mg in sterile water (preservative free) 1 mL injection  1 mg InterCATHeter PRN    ascorbic acid (vitamin C) (VITAMIN C) tablet 500 mg  500 mg Per NG tube BID    cholecalciferol (VITAMIN D3) (1000 Units /25 mcg) tablet 2,000 Units  2,000 Units Per NG tube DAILY    zinc sulfate (ZINCATE) 220 (50) mg capsule 1 Cap  1 Cap Per NG tube DAILY    HYDROmorphone (PF) (DILAUDID) injection 0.5 mg  0.5 mg IntraVENous Q4H PRN    chlorhexidine (ORAL CARE KIT) 0.12 % mouthwash 15 mL  15 mL Oral Q12H    ferrous sulfate 300 mg (60 mg iron)/5 mL oral syrup 300 mg  300 mg Per NG tube DAILY    metoprolol (LOPRESSOR) injection 5 mg  5 mg IntraVENous Q6H PRN    [Held by provider] allopurinoL (ZYLOPRIM) tablet 100 mg  100 mg Oral DAILY    sodium chloride (NS) flush 5-40 mL  5-40 mL IntraVENous Q8H    sodium chloride (NS) flush 5-40 mL  5-40 mL IntraVENous PRN    acetaminophen (TYLENOL) tablet 650 mg 650 mg Oral Q6H PRN    Or    acetaminophen (TYLENOL) suppository 650 mg  650 mg Rectal Q6H PRN    glucose chewable tablet 16 g  4 Tab Oral PRN    dextrose (D50W) injection syrg 12.5-25 g  12.5-25 g IntraVENous PRN    glucagon (GLUCAGEN) injection 1 mg  1 mg IntraMUSCular PRN    aspirin chewable tablet 81 mg  81 mg Oral DAILY    atorvastatin (LIPITOR) tablet 40 mg  40 mg Oral QHS    lansoprazole (PREVACID) 3 mg/mL oral suspension 30 mg  30 mg Oral ACB     ______________________________________________________________________  EXPECTED LENGTH OF STAY: 4d 19h  ACTUAL LENGTH OF STAY:          15                 Barby Mason MD

## 2021-02-14 NOTE — PROGRESS NOTES
Bedside shift change report given to Juanita Enriquez RN (oncoming nurse) by Lauren Hardin RN (offgoing nurse). Report included the following information SBAR, Kardex, Intake/Output, MAR, Recent Results and Cardiac Rhythm SR-ST      1)  BP elevated; range 150-180s. Pt was given prn metoprolol and labetolol on two separate occasions based on prn orders with good response  2) Tmax 100.7; prn tylenol given  3) K level with am labs was 2.9 Flor Benitez NP Hospitalist notified. Ordered and given 2 runs of 20 meQ KCL via Left IJ. 4)  Pt had three loose stools overnight. Noted multiple excoriations in perineum area, scrotal area. Cleansed skin well after every stool and zinc paste applied to affected areas  5) Posterior neck wound  With mepilex. Noted purulent drainage on the dressing . I removed the dressing and cleansed the wound with normal saline and applied new mepilex. Neck wound clean, pink no drainage noted.

## 2021-02-15 ENCOUNTER — APPOINTMENT (OUTPATIENT)
Dept: CT IMAGING | Age: 66
DRG: 720 | End: 2021-02-15
Attending: NURSE PRACTITIONER
Payer: MEDICAID

## 2021-02-15 ENCOUNTER — APPOINTMENT (OUTPATIENT)
Dept: GENERAL RADIOLOGY | Age: 66
DRG: 720 | End: 2021-02-15
Attending: INTERNAL MEDICINE
Payer: MEDICAID

## 2021-02-15 LAB
ALBUMIN SERPL-MCNC: 1.5 G/DL (ref 3.5–5)
ANION GAP SERPL CALC-SCNC: 11 MMOL/L (ref 5–15)
ARTERIAL PATENCY WRIST A: NO
BASE DEFICIT BLD-SCNC: 3 MMOL/L
BASOPHILS # BLD: 0.1 K/UL (ref 0–0.1)
BASOPHILS NFR BLD: 0 % (ref 0–1)
BDY SITE: ABNORMAL
BNP SERPL-MCNC: ABNORMAL PG/ML
BUN SERPL-MCNC: 99 MG/DL (ref 6–20)
BUN/CREAT SERPL: 18 (ref 12–20)
CA-I BLD-SCNC: 1.09 MMOL/L (ref 1.12–1.32)
CALCIUM SERPL-MCNC: 7.6 MG/DL (ref 8.5–10.1)
CHLORIDE SERPL-SCNC: 110 MMOL/L (ref 97–108)
CO2 SERPL-SCNC: 24 MMOL/L (ref 21–32)
CREAT SERPL-MCNC: 5.39 MG/DL (ref 0.7–1.3)
DIFFERENTIAL METHOD BLD: ABNORMAL
EOSINOPHIL # BLD: 0 K/UL (ref 0–0.4)
EOSINOPHIL NFR BLD: 0 % (ref 0–7)
ERYTHROCYTE [DISTWIDTH] IN BLOOD BY AUTOMATED COUNT: 18.7 % (ref 11.5–14.5)
GAS FLOW.O2 O2 DELIVERY SYS: ABNORMAL L/MIN
GAS FLOW.O2 SETTING OXYMISER: 50 L/M
GLUCOSE BLD STRIP.AUTO-MCNC: 172 MG/DL (ref 65–100)
GLUCOSE BLD STRIP.AUTO-MCNC: 178 MG/DL (ref 65–100)
GLUCOSE BLD STRIP.AUTO-MCNC: 269 MG/DL (ref 65–100)
GLUCOSE SERPL-MCNC: 137 MG/DL (ref 65–100)
HCO3 BLD-SCNC: 21.8 MMOL/L (ref 22–26)
HCT VFR BLD AUTO: 27.6 % (ref 36.6–50.3)
HGB BLD-MCNC: 8.6 G/DL (ref 12.1–17)
IMM GRANULOCYTES # BLD AUTO: 0.2 K/UL (ref 0–0.04)
IMM GRANULOCYTES NFR BLD AUTO: 1 % (ref 0–0.5)
LACTATE SERPL-SCNC: 1 MMOL/L (ref 0.4–2)
LYMPHOCYTES # BLD: 1.9 K/UL (ref 0.8–3.5)
LYMPHOCYTES NFR BLD: 11 % (ref 12–49)
MAGNESIUM SERPL-MCNC: 2.4 MG/DL (ref 1.6–2.4)
MCH RBC QN AUTO: 25.8 PG (ref 26–34)
MCHC RBC AUTO-ENTMCNC: 31.2 G/DL (ref 30–36.5)
MCV RBC AUTO: 82.9 FL (ref 80–99)
MONOCYTES # BLD: 1.1 K/UL (ref 0–1)
MONOCYTES NFR BLD: 7 % (ref 5–13)
NEUTS SEG # BLD: 13.8 K/UL (ref 1.8–8)
NEUTS SEG NFR BLD: 81 % (ref 32–75)
NRBC # BLD: 0 K/UL (ref 0–0.01)
NRBC BLD-RTO: 0 PER 100 WBC
O2/TOTAL GAS SETTING VFR VENT: 93 %
PCO2 BLD: 33.7 MMHG (ref 35–45)
PH BLD: 7.42 [PH] (ref 7.35–7.45)
PHOSPHATE SERPL-MCNC: 5.1 MG/DL (ref 2.6–4.7)
PHOSPHATE SERPL-MCNC: 5.2 MG/DL (ref 2.6–4.7)
PLATELET # BLD AUTO: 254 K/UL (ref 150–400)
PMV BLD AUTO: 12.1 FL (ref 8.9–12.9)
PO2 BLD: 55 MMHG (ref 80–100)
POTASSIUM SERPL-SCNC: 3.8 MMOL/L (ref 3.5–5.1)
PROCALCITONIN SERPL-MCNC: 1.03 NG/ML
RBC # BLD AUTO: 3.33 M/UL (ref 4.1–5.7)
SAO2 % BLD: 89 % (ref 92–97)
SERVICE CMNT-IMP: ABNORMAL
SODIUM SERPL-SCNC: 145 MMOL/L (ref 136–145)
SPECIMEN TYPE: ABNORMAL
URATE SERPL-MCNC: 4.2 MG/DL (ref 3.5–7.2)
WBC # BLD AUTO: 17 K/UL (ref 4.1–11.1)

## 2021-02-15 PROCEDURE — P9016 RBC LEUKOCYTES REDUCED: HCPCS

## 2021-02-15 PROCEDURE — 83880 ASSAY OF NATRIURETIC PEPTIDE: CPT

## 2021-02-15 PROCEDURE — 36415 COLL VENOUS BLD VENIPUNCTURE: CPT

## 2021-02-15 PROCEDURE — 87040 BLOOD CULTURE FOR BACTERIA: CPT

## 2021-02-15 PROCEDURE — 36600 WITHDRAWAL OF ARTERIAL BLOOD: CPT

## 2021-02-15 PROCEDURE — 74011250636 HC RX REV CODE- 250/636: Performed by: NURSE PRACTITIONER

## 2021-02-15 PROCEDURE — 74011000258 HC RX REV CODE- 258: Performed by: NURSE PRACTITIONER

## 2021-02-15 PROCEDURE — 74011636637 HC RX REV CODE- 636/637: Performed by: NURSE PRACTITIONER

## 2021-02-15 PROCEDURE — 70450 CT HEAD/BRAIN W/O DYE: CPT

## 2021-02-15 PROCEDURE — 83605 ASSAY OF LACTIC ACID: CPT

## 2021-02-15 PROCEDURE — 80069 RENAL FUNCTION PANEL: CPT

## 2021-02-15 PROCEDURE — 82803 BLOOD GASES ANY COMBINATION: CPT

## 2021-02-15 PROCEDURE — 94660 CPAP INITIATION&MGMT: CPT

## 2021-02-15 PROCEDURE — 94640 AIRWAY INHALATION TREATMENT: CPT

## 2021-02-15 PROCEDURE — 74011000250 HC RX REV CODE- 250: Performed by: INTERNAL MEDICINE

## 2021-02-15 PROCEDURE — 85025 COMPLETE CBC W/AUTO DIFF WBC: CPT

## 2021-02-15 PROCEDURE — 84550 ASSAY OF BLOOD/URIC ACID: CPT

## 2021-02-15 PROCEDURE — 74011000250 HC RX REV CODE- 250: Performed by: HOSPITALIST

## 2021-02-15 PROCEDURE — 94664 DEMO&/EVAL PT USE INHALER: CPT

## 2021-02-15 PROCEDURE — 74011636637 HC RX REV CODE- 636/637: Performed by: HOSPITALIST

## 2021-02-15 PROCEDURE — 77010033711 HC HIGH FLOW OXYGEN

## 2021-02-15 PROCEDURE — 36430 TRANSFUSION BLD/BLD COMPNT: CPT

## 2021-02-15 PROCEDURE — 99222 1ST HOSP IP/OBS MODERATE 55: CPT | Performed by: PHYSICIAN ASSISTANT

## 2021-02-15 PROCEDURE — C9113 INJ PANTOPRAZOLE SODIUM, VIA: HCPCS | Performed by: HOSPITALIST

## 2021-02-15 PROCEDURE — 84145 PROCALCITONIN (PCT): CPT

## 2021-02-15 PROCEDURE — 82962 GLUCOSE BLOOD TEST: CPT

## 2021-02-15 PROCEDURE — 74011250636 HC RX REV CODE- 250/636: Performed by: HOSPITALIST

## 2021-02-15 PROCEDURE — 65610000006 HC RM INTENSIVE CARE

## 2021-02-15 PROCEDURE — 71045 X-RAY EXAM CHEST 1 VIEW: CPT

## 2021-02-15 PROCEDURE — 84100 ASSAY OF PHOSPHORUS: CPT

## 2021-02-15 PROCEDURE — 83735 ASSAY OF MAGNESIUM: CPT

## 2021-02-15 PROCEDURE — 74011250637 HC RX REV CODE- 250/637: Performed by: NURSE PRACTITIONER

## 2021-02-15 RX ORDER — FUROSEMIDE 10 MG/ML
20 INJECTION INTRAMUSCULAR; INTRAVENOUS 2 TIMES DAILY
Status: DISCONTINUED | OUTPATIENT
Start: 2021-02-15 | End: 2021-02-15

## 2021-02-15 RX ORDER — ALBUTEROL SULFATE 90 UG/1
2 AEROSOL, METERED RESPIRATORY (INHALATION)
Status: DISCONTINUED | OUTPATIENT
Start: 2021-02-15 | End: 2021-02-22

## 2021-02-15 RX ORDER — HEPARIN SODIUM 5000 [USP'U]/ML
5000 INJECTION, SOLUTION INTRAVENOUS; SUBCUTANEOUS EVERY 8 HOURS
Status: DISCONTINUED | OUTPATIENT
Start: 2021-02-15 | End: 2021-02-22

## 2021-02-15 RX ORDER — VANCOMYCIN 2 GRAM/500 ML IN 0.9 % SODIUM CHLORIDE INTRAVENOUS
2000 ONCE
Status: COMPLETED | OUTPATIENT
Start: 2021-02-15 | End: 2021-02-16

## 2021-02-15 RX ORDER — HYDROMORPHONE HYDROCHLORIDE 1 MG/ML
1 INJECTION, SOLUTION INTRAMUSCULAR; INTRAVENOUS; SUBCUTANEOUS ONCE
Status: COMPLETED | OUTPATIENT
Start: 2021-02-15 | End: 2021-02-15

## 2021-02-15 RX ORDER — FUROSEMIDE 10 MG/ML
40 INJECTION INTRAMUSCULAR; INTRAVENOUS DAILY
Status: DISCONTINUED | OUTPATIENT
Start: 2021-02-15 | End: 2021-02-15

## 2021-02-15 RX ORDER — BUMETANIDE 0.25 MG/ML
2 INJECTION INTRAMUSCULAR; INTRAVENOUS EVERY 12 HOURS
Status: DISCONTINUED | OUTPATIENT
Start: 2021-02-15 | End: 2021-03-05 | Stop reason: HOSPADM

## 2021-02-15 RX ADMIN — HEPARIN SODIUM 5000 UNITS: 5000 INJECTION INTRAVENOUS; SUBCUTANEOUS at 20:31

## 2021-02-15 RX ADMIN — SODIUM CHLORIDE 40 MG: 9 INJECTION INTRAMUSCULAR; INTRAVENOUS; SUBCUTANEOUS at 08:43

## 2021-02-15 RX ADMIN — INSULIN LISPRO 7 UNITS: 100 INJECTION, SOLUTION INTRAVENOUS; SUBCUTANEOUS at 07:26

## 2021-02-15 RX ADMIN — INSULIN LISPRO 3 UNITS: 100 INJECTION, SOLUTION INTRAVENOUS; SUBCUTANEOUS at 18:12

## 2021-02-15 RX ADMIN — BUMETANIDE 2 MG: 0.25 INJECTION INTRAMUSCULAR; INTRAVENOUS at 20:31

## 2021-02-15 RX ADMIN — SODIUM CHLORIDE 40 MG: 9 INJECTION INTRAMUSCULAR; INTRAVENOUS; SUBCUTANEOUS at 15:13

## 2021-02-15 RX ADMIN — INSULIN GLARGINE 20 UNITS: 100 INJECTION, SOLUTION SUBCUTANEOUS at 11:15

## 2021-02-15 RX ADMIN — Medication 10 ML: at 15:18

## 2021-02-15 RX ADMIN — HEPARIN SODIUM 5000 UNITS: 5000 INJECTION INTRAVENOUS; SUBCUTANEOUS at 13:06

## 2021-02-15 RX ADMIN — VANCOMYCIN HYDROCHLORIDE 2000 MG: 10 INJECTION, POWDER, LYOPHILIZED, FOR SOLUTION INTRAVENOUS at 11:16

## 2021-02-15 RX ADMIN — HYDROMORPHONE HYDROCHLORIDE 1 MG: 1 INJECTION, SOLUTION INTRAMUSCULAR; INTRAVENOUS; SUBCUTANEOUS at 02:37

## 2021-02-15 RX ADMIN — LABETALOL HYDROCHLORIDE 20 MG: 5 INJECTION INTRAVENOUS at 23:26

## 2021-02-15 RX ADMIN — ALBUTEROL SULFATE 2 PUFF: 90 AEROSOL, METERED RESPIRATORY (INHALATION) at 07:22

## 2021-02-15 RX ADMIN — BUMETANIDE 2 MG: 0.25 INJECTION INTRAMUSCULAR; INTRAVENOUS at 15:13

## 2021-02-15 RX ADMIN — CEFEPIME HYDROCHLORIDE 2 G: 2 INJECTION, POWDER, FOR SOLUTION INTRAVENOUS at 16:39

## 2021-02-15 RX ADMIN — INSULIN LISPRO 3 UNITS: 100 INJECTION, SOLUTION INTRAVENOUS; SUBCUTANEOUS at 13:05

## 2021-02-15 RX ADMIN — FUROSEMIDE 40 MG: 10 INJECTION, SOLUTION INTRAMUSCULAR; INTRAVENOUS at 08:43

## 2021-02-15 RX ADMIN — Medication 10 ML: at 22:56

## 2021-02-15 NOTE — PROGRESS NOTES
02/15/21 1050   Oxygen Therapy   O2 Sat (%) 97 %   Pulse via Oximetry 117 beats per minute   O2 Device BIPAP   FIO2 (%) 100 %       Patient placed on BIpap per MD order. RN at bedside. Breathing is labored, tachypneic, congested.

## 2021-02-15 NOTE — PROGRESS NOTES
[facility]    ICU TEAM Progress Note    Name: Jason Sams   : 1955   MRN: [id number]   Date: 2/15/2021        Subjective:   Progress Note: 2/15/2021      Reason for ICU Admission: Acute hypoxic respiratory failure    Interval history:  81-year-old man who presented from [facility] on  with acute onset shortness of breath, hypoxia, nausea, vomiting and diarrhea-shortness of breath acutely got worse after one of the vomiting episodes. Diagnosed with Covid about 2 weeks prior to presentation.  Patient placed on BiPAP on arrival-felt much better.  After getting fluids for resuscitation/elevated lactate level patient acutely decompensated-developed lethargy and drop in oxygen levels-intubated emergently requiring moderate to high vent support. His respiratory status improved but his renal function was declining suspecting acute kidney injury on top of chronic kidney disease, it was also difficult to control his agitation on the ventilator. However patient condition improved and he was successfully extubated on . Patient continued to do well and he was transferred to the intermediate care unit on . Over the last few days patient oxygen requirement was increasing and he is becoming more lethargic. This morning he was placed on high flow nasal cannula and later on on BiPAP. On BiPAP he seems to be much better with decreased work of breathing and good tidal volume and oxygenation. Reviewing his chest x-ray seems that he has progressive volume overload and perhaps a component of aspiration. He was giving 1 dose of Lasix with good urine output and ICU consulted. Of note this patient been with positive fluid balance progressively since admission, according to documentation since admission he is about 20 L positive, over the last 3 days he is positive about 5 L. He has good urine output despite worsening BUN and creatinine.   No fever, no hemoptysis no nausea no vomiting. Active Problem List:     Problem List  Never Reviewed          Codes Class    Acute hypoxemic respiratory failure due to COVID-19 [facility]) ICD-10-CM: U07.1, J96.01  ICD-9-CM: 518.81, 079.89, 799.02               Past Medical History:      has a past medical history of Diabetes ([address] and Hypertension. Past Surgical History:      has no past surgical history on file. Home Medications:     Prior to Admission medications    Medication Sig Start Date End Date Taking? Authorizing Provider   aspirin 81 mg chewable tablet Take 81 mg by mouth daily. Yes Candelaria, MD Jessi   atorvastatin (LIPITOR) 80 mg tablet Take 80 mg by mouth daily. Yes Jessi Gaona MD   carvediloL (COREG) 12.5 mg tablet Take  by mouth two (2) times a day. Yes Jessi Gaona MD   docusate sodium (Colace) 100 mg capsule Take 100 mg by mouth two (2) times daily as needed for Constipation. Yes Jessi Gaona MD   doxycycline (ADOXA) 100 mg tablet Take 100 mg by mouth two (2) times a day. Yes Candelaria, MD Jessi   ferrous sulfate 325 mg (65 mg iron) tablet Take  by mouth every fourty-eight (48) hours. Yes Jessi Gaona MD   gabapentin (NEURONTIN) 400 mg capsule Take 400 mg by mouth three (3) times daily. Yes Jessi Gaona MD   insulin lispro (HUMALOG) 100 unit/mL injection 10 Units by SubCUTAneous route Before breakfast, lunch, and dinner. Yes Jessi Gaona MD   isosorbide mononitrate ER (IMDUR) 60 mg CR tablet Take 60 mg by mouth every morning. Yes Candelaria, MD Jessi   insulin glargine (Lantus U-100 Insulin) 100 unit/mL injection 20 Units by SubCUTAneous route nightly. Yes Candelaria, MD Jessi   lisinopriL (PRINIVIL, ZESTRIL) 20 mg tablet Take 20 mg by mouth daily. Yes Jessi Gaona MD   methocarbamoL (ROBAXIN) 500 mg tablet Take 500 mg by mouth three (3) times daily. Yes Jessi Gaona MD   oxyCODONE IR (ROXICODONE) 5 mg immediate release tablet Take 5 mg by mouth every six (6) hours as needed for Pain.    Yes Jessi Gaona MD   pantoprazole (PROTONIX) 40 mg tablet Take 40 mg by mouth daily. Yes Candelaria, MD Jessi   guaiFENesin-codeine (Guaiatussin AC) 100-10 mg/5 mL solution Take 5 mL by mouth four (4) times daily as needed for Cough. Yes Candelaria, MD Jessi   senna (Senna) 8.6 mg tablet Take 1 Tab by mouth daily. Yes Candelaria, MD Jessi   ticagrelor (BRILINTA) 90 mg tablet Take 90 mg by mouth two (2) times a day. Yes Candelaria, MD Jessi   ascorbic acid, vitamin C, (Vitamin C) 500 mg tablet Take 500 mg by mouth two (2) times a day. Yes Candelaria, MD Jessi   cholecalciferol (Vitamin D3) (1000 Units /25 mcg) tablet Take 1,000 Units by mouth daily. Yes Candelaria, MD Jessi   zinc sulfate (ZINCATE) 220 (50) mg capsule Take 220 mg by mouth daily. Yes Provider, Historical   therapeutic multivitamin (THERAGRAN) tablet Take 1 Tab by mouth daily. Yes Provider, Historical   acetaminophen (TYLENOL) 325 mg tablet Take 975 mg by mouth every six (6) hours as needed for Pain. Other, MD Jessi       Allergies/Social/Family History:     No Known Allergies   Social History     Tobacco Use    Smoking status: Former Smoker    Smokeless tobacco: Never Used   Substance Use Topics    Alcohol use: Not Currently      No family history on file.     Review of Systems:     Not able to obtain due to the acuity of condition    Objective:   Vital Signs:  Visit Vitals  BP (!) 99/47   Pulse 99   Temp 99.9 °F (37.7 °C)   Resp 30   Ht 5' 7\" (1.702 m)   Wt 93 kg (205 lb)   SpO2 100%   BMI 32.11 kg/m²    O2 Flow Rate (L/min): 50 l/min O2 Device: BIPAP Temp (24hrs), Av.5 °F (38.1 °C), Min:99.9 °F (37.7 °C), Max:100.8 °F (38.2 °C)           Intake/Output:     Intake/Output Summary (Last 24 hours) at 2/15/2021 1545  Last data filed at 2/15/2021 1046  Gross per 24 hour   Intake 3693.33 ml   Output 2075 ml   Net 1618.33 ml       Physical Exam:    General: Chronically ill looking gentleman in mild distress on BiPAP, alert   HEENT: NG tube in place, BiPAP mask in place, setting of 18/12 with FiO2 of 100%, tidal volume    around 500 mL  Neck: Supple, left subclavian triple-lumen lumen central line, dressing intact no erythema  Lung: Fair air entry bilaterally, coarse crepitation bilaterally more on the left side  Heart: Regular rhythm, normal heart sound. Could not appreciate murmur  Abdomen: Nontender, positive bowel sound  Extremities: +2 edema  Neuro: Patient is alert profoundly weak in 4 limbs but withdraw to painful stimuli maintain eye contact at times try to follow simple command    LABS AND  DATA: Personally reviewed  Recent Labs     02/15/21  0710 02/14/21  1312 02/14/21  0239   WBC 17.0*  --  11.7*   HGB 8.6* 6.4* 7.0*   HCT 27.6* 21.1* 22.3*     --  200     Recent Labs     02/15/21  1312 02/15/21  0710 02/14/21  1312 02/14/21 0239     --  145 145   K 3.8  --  3.4* 2.9*   *  --  113* 112*   CO2 24  --  23 25   BUN 99*  --  90* 88*   CREA 5.39*  --  4.59* 4.41*   *  --  175* 193*   CA 7.6*  --  7.3* 7.3*   MG  --  2.4  --  2.0   PHOS 5.1* 5.2*  --  2.6     Recent Labs     02/15/21  1312 02/14/21  0239   AP  --  134*   TP  --  5.3*   ALB 1.5* 1.4*   GLOB  --  3.9     No results for input(s): INR, PTP, APTT, INREXT in the last 72 hours. Recent Labs     02/15/21  0925   PHI 7.42   PCO2I 33.7*   PO2I 55*   FIO2I 93     No results for input(s): CPK, CKMB, TROIQ, BNPP in the last 72 hours. Hemodynamics:   PAP:   CO:     Wedge:   CI:     CVP:    SVR:       PVR:       Ventilator Settings:  Mode Rate Tidal Volume Pressure FiO2 PEEP   Spontaneous   450 ml  8 cm H2O 100 % 5 cm H20     Peak airway pressure: 13 cm H2O    Minute ventilation: 19 l/min        MEDS: Reviewed    Chest X-Ray:  CXR Results  (Last 48 hours)               02/15/21 1108  XR CHEST PORT Final result    Impression:  Increasing bilateral pulmonary infiltrates and left pleural   effusion. Narrative: Indication: Respiratory failure       Comparison to 2/14/2021.  Portable exam obtained at [phone] demonstrates increasing   bilateral pulmonary infiltrates and left pleural effusion compared to prior   examination. 02/14/21 1743  XR CHEST PORT Final result    Impression:  1. Interval worsening of patchy bilateral airspace disease and interstitial   opacities, which may represent some combination of worsening infection and   edema. Suspected small bilateral pleural effusions. Narrative:  EXAM:  XR CHEST PORT       INDICATION:   f/u: covid pneumonia, respiratory failure       COMPARISON: Chest radiograph 2/11/2021. FINDINGS: AP radiograph of the chest was obtained. Interval removal of endotracheal tube. Stable positioning of left subclavian   central venous catheter. Enteric tube is again noted extending into the abdomen. There is been interval worsening in patchy bilateral airspace disease and   interstitial opacities. There appear to be small bilateral pleural effusions. No   pneumothorax. Stable cardiomediastinal silhouette with calcifications of the   thoracic aorta. ECHO: On February 1  · LV: Estimated LVEF is 20 - 25%. Normal cavity size. Upper normal wall thickness. Severely reduced systolic function. Severe (grade 3) left ventricular diastolic dysfunction. · LA: Dilated left atrium. · MV: Mild mitral valve regurgitation is present. · TV: Mild tricuspid valve regurgitation is present. · PA: Moderate to severe pulmonary hypertension. · RV: Reduced systolic function. Assessment and Plan:   -Acute hypoxic respiratory failure: This is multifactorial,   -Volume overload with pulmonary edema: Given his ejection fraction of 20 to 25% and the fact that his kidney function is worsening make this the most likely underlying etiology. He is nonoliguric with good urine output. This morning received 1 dose of Lasix and I appreciate nephrology input. Bumex started 1 mg IV twice daily.    -Aspiration pneumonia/pneumonitis: Patient already on antibiotic, no witnessed aspiration, tube feeding on hold   -COVID-19 pneumonia: Patient showed significant improvement over the last few weeks. Unlikely to be a chronic sequelae of his COVID-19 infection giving the transient improvement over the last 3 to 4 days  Acute kidney injury on chronic kidney disease: Nonoliguric. Appreciate nephrology input. Bumex twice daily started. According to the nephrologist note the wife is more interested in medical management at this time versus dialysis  -Congestive heart failure with reduced ejection fraction at 20 to 25%:  -History of coronary artery disease:  -Diabetes mellitus type 2  -Encephalopathy: Multifactorial, stop Seroquel and gabapentin. I called the patient wife over the phone, I discussed the patient condition and the possible need for reintubation. She stated that she knows that her  been sick for a while and he is not the healthiest person at baseline. She is aware of his heart disease and of the worsening of his kidney function. She asked me not to intubate him at this time as she going to go and discuss the situation with his sister and they will reach back to us with CODE STATUS and goals of care later today. I made it clear to her that we will continue medical treatment and in case of emergency at this time we will intubated emergently for could not reach out to her first.  She expressed understanding and agreement and stated again that she will give us a call soon to decide about the CODE STATUS. CRITICAL CARE CONSULTANT NOTE  I had a face to face encounter with the patient, reviewed and interpreted patient data including clinical events, labs, images, vital signs, I/O's, and examined patient.   I have discussed the case and the plan and management of the patient's care with the consulting services, the bedside nurses and the respiratory therapist.      NOTE OF PERSONAL INVOLVEMENT IN CARE   This patient has a high probability of imminent, clinically significant deterioration, which requires the highest level of preparedness to intervene urgently. I participated in the decision-making and personally managed or directed the management of the following life and organ supporting interventions that required my frequent assessment to treat or prevent imminent deterioration. I personally spent 40 minutes of critical care time. This is time spent at this critically ill patient's bedside actively involved in patient care as well as the coordination of care and discussions with the patient's family. This does not include any procedural time which has been billed separately. Chavo Wilkes M.D.   Staff Intensivist/Pulmonologist  [facility]  2/15/2021

## 2021-02-15 NOTE — PROGRESS NOTES
Pharmacist Note - Vancomycin Dosing    Consult provided for this 63 y.o. male for indication of Worsening HAP; covid +. Antibiotic regimen(s): Vanc + Cefepime  Patient on vancomycin PTA? NO     Recent Labs     02/15/21  1312 02/15/21  0710 21  1312 21  0239 21  0434   WBC  --  17.0*  --  11.7* 11.7*   CREA 5.39*  --  4.59* 4.41* 3.78*   BUN 99*  --  90* 88* 87*     Frequency of BMP: Daily through   Height: 170.2 cm  Weight: 93 kg  Est CrCl: ~15-20  ml/min; UO: 0.8 ml/kg/hr  Temp (24hrs), Av.6 °F (38.1 °C), Min:100.5 °F (38.1 °C), Max:100.8 °F (38.2 °C)    Cultures:   Sputum- light normal resp tom, final  2/15 Blood- in process    Goal trough = 15 - 20 mcg/mL    Therapy will be initiated with a loading dose of 2000 mg IV x 1 to be followed by dosing by levels. Pharmacy to follow patient daily and order levels / make dose adjustments as appropriate.     Trang Carver, [place], BCPS

## 2021-02-15 NOTE — PROGRESS NOTES
0830: RN getting report. MD just left bedside. Stated patient needed to be put on BIPAP, and ABG's. RN notified RT.     Apolinar Alvarez: NG tube on floor. Lasix given. Patient with labored breathing, very congested. 0845: RT at bedside, thinks maybe patient needs intubated not BIPAP. RN notified MD, who said they will notify intensivist.    9212: ABG done by second. 1100: rapid called. MD at bedside. ICU intensivist at bedside. Patient remains on BIPAP. Transfer orders placed for ICU.    1315: renal labs sent. Nephrology at bedside. Shift Summary: Patient now on BIPAP. Has transfer orders to ICU. Waiting for bed.

## 2021-02-15 NOTE — CONSULTS
Palliative Medicine Consult  Daniel: [phone])            Consult received and chart reviewed. Case discussed with Dr. Aida Mccray. The patient's condition has worsened and he is being transferred to ICU. He is currently on BiPAP. Dr. Aida Mccray spoke with the patient's wife, Pavan Cox, who is returning from out of town today. Pavan Cox wants to continue all restorative care, including transferring him to ICU, intubation and placement on the vent if needed, and his full code status for now. She will speak with the patient's siblings and children today and update us with any changes in the plan. Patient seen at the bedside. He is lethargic on BiPAP, but arouses briefly to verbal stimulation. He does not appear to be uncomfortable. Will follow up with patient and family tomorrow to discuss goals of care. Carla Bustamante, LIANG

## 2021-02-15 NOTE — PROGRESS NOTES
Throughout the shift the patient respirations were progressively getting worse. Patient went from 97% Room air to Hi-flow by the end of the shift. Patient was suctioned intermitenly throughout shift produced a light pink secretion that the patient was unable to cough out. NP was aware and came bedside 2x. Orders were placed. Wife was called to be updated on patients condition, Waiting to hear back.

## 2021-02-15 NOTE — PROGRESS NOTES
SLP Contact Note    Attempted to see patient for treatment. Pt unfortunately is not stable respiratory-wise. Will hold for now.       Thank you,  Derian Hutchinson, M.Ed, [id number] [facility]  Speech-Language Pathologist

## 2021-02-15 NOTE — PROGRESS NOTES
Occupational Therapy  2/15/2021    Chart reviewed. Per chart and discussing with SLP, pt with declining respiratory status and now on BIPAP. MD aware. Pt is not appropriate for therapy at this time. Will hold today. Eve Jaimes MS, OTR/L

## 2021-02-15 NOTE — PROGRESS NOTES
Nephrology Progress Note  Margie Guerrier  Date of Admission : 1/30/2021    CC: Follow up for JUAN C        Assessment and Plan     JUAN C:  - unclear baseline but suspect some component of CKD  - Renal U/S on 1/30 confirms CKD  - suspect JUAN C due to COVID-19 related illness   - UA from 2/5 with blood and protein, bird in place  - PCR 4g/g - suspect all due to DM - follow up SPEP/UPEP. BRANDO +ve   - fluid overloaded after IVF over w/e : agree w/ stopping   - d/w wife about dialysis. She wants to manage things conservatively for next 24 hrs   - Ordered IV Bumex 2 mg BID     Volume overload:  - diuresis as above      Acidosis : better      COVID-19 PNA:  - abx and steroids per CCM team     Resp failure:  - vent dependent     DM2:  - on insulin       Interval History:  Overnight and am events noted   He is awake, but not following any commands   Needing High flow O2    Current Medications: all current  Medications have been eviewed in EPIC  Review of Systems: Review of systems not obtained due to patient factors.     Objective:  Vitals:    Vitals:    02/15/21 1100 02/15/21 1117 02/15/21 1130 02/15/21 1145   BP: (!) 115/97 104/88 98/63 97/61   Pulse: (!) 112 (!) 107 (!) 106 (!) 105   Resp: (!) 32 28 27 25   Temp:       SpO2: 99% 100% 100% 100%   Weight:       Height:         Intake and Output:  02/15 0701 - 02/15 1900  In: 2073.3 [I.V.:2073.3]  Out: 1125 [Urine:725]  02/13 1901 - 02/15 0700  In: 3450   Out: 2101 [Urine:1900; Drains:200]    Physical Examination:      General: Confused, weak and hypoxic   Resp:  Diffuse rales, diminished BS at bases  HEENT            Pallor +  CV:  Irregular   Neurologic:  Confused   abd : Non tender   Ext : +edema   Skin:  No Rash  :  Bird in place    [x]    High complexity decision making was performed  [x]    Patient is at high-risk of decompensation with multiple organ involvement    Lab Data Personally Reviewed: I have reviewed all the pertinent labs, microbiology data and radiology studies during assessment.     Recent Labs     02/15/21  1312 02/15/21  0710 02/14/21 1312 02/14/21 0239 02/13/21  0434     --  145 145 146*   K 3.8  --  3.4* 2.9* 3.0*   *  --  113* 112* 115*   CO2 24  --  23 25 25   *  --  175* 193* 117*   BUN 99*  --  90* 88* 87*   CREA 5.39*  --  4.59* 4.41* 3.78*   CA 7.6*  --  7.3* 7.3* 7.7*   MG  --  2.4  --  2.0 2.3   PHOS 5.1* 5.2*  --  2.6 3.1   ALB 1.5*  --   --  1.4*  --    ALT  --   --   --  67  --      Recent Labs     02/15/21  0710 02/14/21  1312 02/14/21  0239 02/13/21  0434   WBC 17.0*  --  11.7* 11.7*   HGB 8.6* 6.4* 7.0* 7.4*   HCT 27.6* 21.1* 22.3* 22.9*     --  200 176     Lab Results   Component Value Date/Time    Specimen Description: BLOOD 02/27/2010 07:00 PM     Lab Results   Component Value Date/Time    Culture result: LIGHT YEAST, (APPARENT CANDIDA ALBICANS) (A) 02/12/2021 04:53 PM    Culture result: LIGHT NORMAL RESPIRATORY CORI 02/12/2021 04:53 PM    Culture result: LIGHT YEAST, (APPARENT CANDIDA ALBICANS) (A) 02/05/2021 07:10 PM    Culture result: FEW NORMAL RESPIRATORY CORI 02/05/2021 07:10 PM    Culture result: No growth (<1,000 CFU/ML) 02/05/2021 07:10 PM     Recent Results (from the past 24 hour(s))   GLUCOSE, POC    Collection Time: 02/14/21  6:28 PM   Result Value Ref Range    Glucose (POC) 187 (H) 65 - 100 mg/dL    Performed by Jordan Larson, POC    Collection Time: 02/14/21 11:09 PM   Result Value Ref Range    Glucose (POC) 224 (H) 65 - 100 mg/dL    Performed by [facility], POC    Collection Time: 02/15/21  7:05 AM   Result Value Ref Range    Glucose (POC) 269 (H) 65 - 100 mg/dL    Performed by Teressa Patel    Collection Time: 02/15/21  7:10 AM   Result Value Ref Range    Magnesium 2.4 1.6 - 2.4 mg/dL   PHOSPHORUS    Collection Time: 02/15/21  7:10 AM   Result Value Ref Range    Phosphorus 5.2 (H) 2.6 - 4.7 MG/DL   URIC ACID    Collection Time: 02/15/21  7:10 AM Result Value Ref Range    Uric acid 4.2 3.5 - 7.2 MG/DL   CBC WITH AUTOMATED DIFF    Collection Time: 02/15/21  7:10 AM   Result Value Ref Range    WBC 17.0 (H) 4.1 - 11.1 K/uL    RBC 3.33 (L) 4.10 - 5.70 M/uL    HGB 8.6 (L) 12.1 - 17.0 g/dL    HCT 27.6 (L) 36.6 - 50.3 %    MCV 82.9 80.0 - 99.0 FL    MCH 25.8 (L) 26.0 - 34.0 PG    MCHC 31.2 30.0 - 36.5 g/dL    RDW 18.7 (H) 11.5 - 14.5 %    PLATELET 918 181 - 142 K/uL    MPV 12.1 8.9 - 12.9 FL    NRBC 0.0 0  WBC    ABSOLUTE NRBC 0.00 0.00 - 0.01 K/uL    NEUTROPHILS 81 (H) 32 - 75 %    LYMPHOCYTES 11 (L) 12 - 49 %    MONOCYTES 7 5 - 13 %    EOSINOPHILS 0 0 - 7 %    BASOPHILS 0 0 - 1 %    IMMATURE GRANULOCYTES 1 (H) 0.0 - 0.5 %    ABS. NEUTROPHILS 13.8 (H) 1.8 - 8.0 K/UL    ABS. LYMPHOCYTES 1.9 0.8 - 3.5 K/UL    ABS. MONOCYTES 1.1 (H) 0.0 - 1.0 K/UL    ABS. EOSINOPHILS 0.0 0.0 - 0.4 K/UL    ABS. BASOPHILS 0.1 0.0 - 0.1 K/UL    ABS. IMM.  GRANS. 0.2 (H) 0.00 - 0.04 K/UL    DF AUTOMATED     POC EG7    Collection Time: 02/15/21  9:25 AM   Result Value Ref Range    Calcium, ionized (POC) 1.09 (L) 1.12 - 1.32 mmol/L    FIO2 (POC) 93 %    pH (POC) 7.42 7.35 - 7.45      pCO2 (POC) 33.7 (L) 35.0 - 45.0 MMHG    pO2 (POC) 55 (L) 80 - 100 MMHG    HCO3 (POC) 21.8 (L) 22 - 26 MMOL/L    Base deficit (POC) 3 mmol/L    sO2 (POC) 89 (L) 92 - 97 %    Site RIGHT RADIAL      Device: High Flow Nasal Cannula      Flow rate (POC) 50 L/M    Allens test (POC) NO      Specimen type (POC) ARTERIAL     LACTIC ACID    Collection Time: 02/15/21 10:56 AM   Result Value Ref Range    Lactic acid 1.0 0.4 - 2.0 MMOL/L   PROCALCITONIN    Collection Time: 02/15/21 10:56 AM   Result Value Ref Range    Procalcitonin 1.03 ng/mL   NT-PRO BNP    Collection Time: 02/15/21 10:56 AM   Result Value Ref Range    NT pro-BNP >35,000 (H) <125 PG/ML   GLUCOSE, POC    Collection Time: 02/15/21 11:56 AM   Result Value Ref Range    Glucose (POC) 178 (H) 65 - 100 mg/dL    Performed by 2 Hanane Waggoner FUNCTION PANEL    Collection Time: 02/15/21  1:12 PM   Result Value Ref Range    Sodium 145 136 - 145 mmol/L    Potassium 3.8 3.5 - 5.1 mmol/L    Chloride 110 (H) 97 - 108 mmol/L    CO2 24 21 - 32 mmol/L    Anion gap 11 5 - 15 mmol/L    Glucose 137 (H) 65 - 100 mg/dL    BUN 99 (H) 6 - 20 MG/DL    Creatinine 5.39 (H) 0.70 - 1.30 MG/DL    BUN/Creatinine ratio 18 12 - 20      GFR est AA 13 (L) >60 ml/min/1.73m2    GFR est non-AA 11 (L) >60 ml/min/1.73m2    Calcium 7.6 (L) 8.5 - 10.1 MG/DL    Phosphorus 5.1 (H) 2.6 - 4.7 MG/DL    Albumin 1.5 (L) 3.5 - 5.0 g/dL               Chao Marie MD  [address]  Phone - [phone]   Fax - [phone]  [email or web address]

## 2021-02-15 NOTE — PROGRESS NOTES
Physical Therapy - defer     Chart reviewed. Per chart and discussing with OT, pt with declining respiratory status and now on BIPAP. MD aware. Pt is not appropriate for therapy at this time. Will hold today.

## 2021-02-15 NOTE — PROGRESS NOTES
Responded to RRT called for Mr Sammy Zuleta in room 422. Mr Sammy Zuleta was COVID+;  did not enter his room. Multiple staff members were attending to patient and no family was present. Please notify  if support desired. : . Arleen Nguyen.  Lakia Zhou; [facility], to contact [phone] [address] call: [phone]

## 2021-02-15 NOTE — PROGRESS NOTES
[facility]  Hospitalist Group     ICU Transfer/Accept Summary     This patient is being transferred INTO THE ICU  DATE OF TRANSFER: 2/15/2021       PATIENT ID: Moshe Ramírez  MRN: [id number]   YOB: 1955    PRIMARY CARE PROVIDER: Sherlyn Chase MD   DATE OF ADMISSION: 1/30/2021 12:48 AM    ATTENDING PHYSICIAN: Julito Arvizu MD  CONSULTATIONS:   IP CONSULT TO CARDIAC SURGERY  IP CONSULT TO INFECTIOUS DISEASES  IP CONSULT TO NEPHROLOGY  IP CONSULT TO INTENSIVIST  IP CONSULT TO PALLIATIVE CARE - PROVIDER  IP CONSULT TO CARDIOLOGY    PROCEDURES/SURGERIES:   * No surgery found *    REASON FOR ADMISSION: <principal problem not specified>     HOSPITAL PROBLEM LIST:  Patient Active Problem List   Diagnosis Code    Acute hypoxemic respiratory failure due to COVID-19 ([facility] U07.1, J96.01         Brief HPI and Hospital Course: This is a 77-year-old -American male who was admitted on January 30, 2021 when he presented to the ED for acute shortness of breath referred from [facility]. Patient diagnosed with Covid about 2 weeks prior to presentation. He was initially placed on BiPAP in the ED. After getting fluid resuscitation for elevated lactate patient decompensated became lethargic and hypoxic and required emergent intubation. Patient extubated on 2/11. He is said to be stable to be transferred out of the ICU and will transfer to [facility] on 2/13. Patient became hypoxic overnight and was placed on high flow oxygen. He was still tachypneic although saturation is good, he is placed on BiPAP and transferred to the ICU for close monitoring. He is still full code. Assessment & Plan:   Acute hypoxic respiratory failure secondary to Covid pneumonia, acute pulmonary edema, aspiration pneumonitis. Intubated on 1/30, extubated on 2/11. Acute respiratory failure recurring and he is now on oxygen via high flow nasal cannula. .  -Chest x-ray on 2/14 showed interval worsening of patchy bilateral airspace disease and interstitial opacities. Small bilateral pleural effusion.  -Discontinue IV fluid. -IV Lasix 40 mg now. - Was on high flow but tachypneic, placed on BiPAP. -ICU team consulted and patient transferred to ICU for close monitoring, he still is full code. -Discussed with intensivist as well as nephrologist.     Mycoplasma and strep pneumoniae pneumonia  -Patient had completed doxycycline. Currently on cefepime,  ID following  -Now with intermittent low-grade fever and leukocytosis. Check blood culture, lactic acid, procalcitonin and would like to expand antibiotics coverage, start vancomycin. Chest x-ray from 2/14 as noted above.  -Not much diarrhea today. Acute severe anemia, chronic anemia. No apparent blood loss. Could be due to BM suppression due to severe acute illness,covid disease and frequent phlebotomy etc.  -HB dropped to 6.4 on 2/14 and now up to 8.6 after a unit of blood. Monitor hemoglobin. JUAN C, creatinine worsening.  -Avoid nephrotoxic agents, renally dose adjust medications.  -Indwelling Bourgeois catheter in place, monitor input and output  -Renal ultrasound suggestive of CKD. Nephrology on board  -IV fluid discontinued concerns of pulmonary edema and worsening respiratory status. Discussed with Dr. Cui Carry     Dysphagia due to acute debility due to acute severe illness.  -Keep n.p.o.  -Tube feeding via NGT  -Speech evaluation on Monday. -Aspiration precaution, keep head of bed>30-40 degrees  -KUB 2/13 unremarkable. Type 2 diabetes with hyperglycemia. BG not in target  -Accu-Cheks every 6. Increase Lantus and Humalog sliding scale coverage. Hypokalemia: Replace and monitor     Mild hyponatremia: Water flush via NGT. Daily labs     Cardiomyopathy, LVEF 20-25% on echocardiogram on 2/1 with moderate to severe pulmonary hypertension. Not sure if patient has history of CHF or cardiomyopathy.   According to his wife patient had MI and had stents put in last summer and MCV. He probably has chronic cardiomyopathy or this could all be due to the acute illness, COVID-19 related cardiomyopathy.  -Continue aspirin, Lipitor, carvedilol, Isordil. Resume Brilinta. No ACE or ARB or ARNI due to significant renal dysfunction  -Troponin mildly elevated which is now trending down.  [facility] cardiology. Severe Covid is Covid pneumonia with acute respiratory failure. Patient was diagnosed with Covid 2 weeks prior to presentation. Had received steroids this admission. Chronic mid posterior cervical wound infection. Present prior to admission. Patient reportedly was awaiting for skin graft. Patient reportedly had completed intravenous Zosyn and vancomycin prior to admission. Patient to follow with Dr Elizabeth Garcia at [address] [place] [address] upon discharge     History of heroin abuse per his wife. General:           Tachypneic, awake, poorly responsive verbally. HEENT:            BiPAP mask on. NGT in place. Neck:               Posterior cervical wound bandaged. Lungs: On BiPAP. Tachypneic. Diminished air entry basally  Heart:              Regular  rhythm,  No  murmur   No edema  Abdomen:       Soft, non-tender. Not distended. Bowel sounds normal  Extremities:    ++ Edema. SCDs on. Neurologic:      Alert with poor verbal response, very weak.     Labs:     Recent Labs     02/15/21  0710 02/14/21  1312 02/14/21  0239   WBC 17.0*  --  11.7*   HGB 8.6* 6.4* 7.0*   HCT 27.6* 21.1* 22.3*     --  200     Recent Labs     02/15/21  1312 02/15/21  0710 02/14/21  1312 02/14/21  0239 02/13/21  0434     --  145 145 146*   K 3.8  --  3.4* 2.9* 3.0*   *  --  113* 112* 115*   CO2 24  --  23 25 25   BUN 99*  --  90* 88* 87*   CREA 5.39*  --  4.59* 4.41* 3.78*   *  --  175* 193* 117*   CA 7.6*  --  7.3* 7.3* 7.7*   MG  --  2.4  --  2.0 2.3   PHOS 5.1* 5.2*  --  2.6 3.1   URICA  --  4.2  --  3.9 3.9     Recent Labs     02/15/21  1312 02/14/21  0239   ALT  --  67   AP  --  134*   TBILI  --  0.2   TP  --  5.3*   ALB 1.5* 1.4*   GLOB  --  3.9     No results for input(s): INR, PTP, APTT, INREXT in the last 72 hours. Recent Labs     02/14/21  1312   TIBC 146*   PSAT 16*      No results found for: FOL, RBCF   No results for input(s): PH, PCO2, PO2 in the last 72 hours. No results for input(s): CPK, CKNDX, TROIQ in the last 72 hours. No lab exists for component: CPKMB  Lab Results   Component Value Date/Time    Cholesterol, total 115 02/26/2010 05:20 PM    HDL Cholesterol 31 (L) 02/26/2010 05:20 PM    LDL, calculated 46.2 02/26/2010 05:20 PM    Triglyceride 189 02/26/2010 05:20 PM    CHOL/HDL Ratio 3.7 02/26/2010 05:20 PM     Lab Results   Component Value Date/Time    Glucose (POC) 178 (H) 02/15/2021 11:56 AM    Glucose (POC) 269 (H) 02/15/2021 07:05 AM    Glucose (POC) 224 (H) 02/14/2021 11:09 PM    Glucose (POC) 187 (H) 02/14/2021 06:28 PM    Glucose (POC) 204 (H) 02/14/2021 12:03 PM     Lab Results   Component Value Date/Time    Color YELLOW/STRAW 02/10/2021 01:26 PM    Appearance TURBID (A) 02/10/2021 01:26 PM    Specific gravity 1.014 02/10/2021 01:26 PM    Specific gravity 1.015 02/26/2010 12:32 PM    pH (UA) 5.0 02/10/2021 01:26 PM    Protein 100 (A) 02/10/2021 01:26 PM    Glucose Negative 02/10/2021 01:26 PM    Ketone Negative 02/10/2021 01:26 PM    Bilirubin Negative 02/10/2021 01:26 PM    Urobilinogen 0.2 02/10/2021 01:26 PM    Nitrites Negative 02/10/2021 01:26 PM    Leukocyte Esterase Negative 02/10/2021 01:26 PM    Epithelial cells FEW 02/10/2021 01:26 PM    Bacteria Negative 02/10/2021 01:26 PM    WBC 0-4 02/10/2021 01:26 PM    RBC  02/10/2021 01:26 PM           Signed:    Briana Batres MD  Date of Service:  2/15/2021

## 2021-02-15 NOTE — PROGRESS NOTES
[facility]  Hospitalist Group                                                                                          Hospitalist Progress Note  Ashwin Kumar MD  Answering service: [phone] from in house phone        Date of Service:  2/15/2021  NAME:  J Luis Ly  :  1955  MRN:  [id number]    This documentation was facilitated by a Voice Recognition software and may contain inadvertent typographical errors. Admission Summary: This is a 53-year-old -American male who was admitted on 2021 when he presented to the ED for acute shortness of breath referred from [facility]. Patient diagnosed with Covid about 2 weeks prior to presentation. He was initially placed on BiPAP in the ED. After getting fluid resuscitation for elevated lactate patient decompensated became lethargic and hypoxic and required emergent intubation. Patient extubated on . He is said to be stable to be transferred out of the ICU and will transfer to [facility] on . Interval history / Subjective:     F/u severe covid pneumonia with acute respiratory failure. Overnight events noted. Patient became hypoxic and was put on high flow oxygen. He has been spiking low-grade intermittent fever the last 24 hours. Patient seen on high flow this morning, SPO2 > 93% on the monitor during visit, tachypneic, attempting to cough, awake but not verbally interactive. Spoke with his wife Ms. Timmy Garcia on the phone and updated her that patient is declining, he is now on high flow oxygen and may need to go back on the vent. Revisited goals of care and CODE STATUS again. Her wishes for now is still to pursue aggressive care and for him to remain full code, with full resuscitation including intubation if needed. She Harrison Lisa she will talk to his siblings and children and get back to us if there is any change.   Assessment & Plan:   Acute hypoxic respiratory failure secondary to Covid pneumonia, acute pulmonary edema, aspiration pneumonitis. Intubated on 1/30, extubated on 2/11. Acute respiratory failure recurring and he is now on oxygen via high flow nasal cannula. .  -Chest x-ray on 2/14 showed interval worsening of patchy bilateral airspace disease and interstitial opacities. Small bilateral pleural effusion.  -Discontinue IV fluid. -IV Lasix 40 mg now. - ABG, suctioning and BiPAP for work of breathing as needed   -Asked ICU to see patient. Mycoplasma and strep pneumoniae pneumonia  -Patient had completed doxycycline. Currently on cefepime,  ID following  -Now with intermittent low-grade fever and leukocytosis. Check blood culture, lactic acid, procalcitonin and would like to expand antibiotics coverage, start vancomycin. Chest x-ray from 2/14 as noted above.  -He had loose bowel stools yesterday and if he continues to have loose bowel stools may consider testing for C. difficile. Acute severe anemia, chronic anemia. No apparent blood loss. Could be due to BM suppression due to severe acute illness,covid disease and frequent phlebotomy etc.  -HB dropped to 6.4 on 2/14 and now up to 8.6 after a unit of blood. Monitor hemoglobin. JUAN C, creatinine worsening.  -Avoid nephrotoxic agents, renally dose adjust medications.  -Indwelling Bourgeois catheter in place, monitor input and output  -Renal ultrasound suggestive of CKD. Nephrology on board  -IV fluid discontinued concerns of pulmonary edema and worsening respiratory status. Discussed with nephrology. Dysphagia due to acute debility due to acute severe illness.  -Keep n.p.o.  -Tube feeding via NGT  -Speech evaluation on Monday. -Aspiration precaution, keep head of bed>30-40 degrees  -KUB 2/13 unremarkable. Type 2 diabetes with hyperglycemia. BG not in target  -Accu-Cheks every 6. Increase Lantus and Humalog sliding scale coverage. Hypokalemia: Replace and monitor     Mild hyponatremia: Water flush via NGT. Daily labs     Cardiomyopathy, LVEF 20-25% on echocardiogram on 2/1 with moderate to severe pulmonary hypertension. Not sure if patient has history of CHF or cardiomyopathy. According to his wife patient had MI and had stents put in last summer and MCV. He probably has chronic cardiomyopathy or this could all be due to the acute illness, COVID-19 related cardiomyopathy.  -Continue aspirin, Lipitor, carvedilol, Isordil. Resume Brilinta. No ACE or ARB or ARNI due to significant renal dysfunction  -Troponin mildly elevated which is now trending down.  [facility] cardiology. Severe Covid is Covid pneumonia with acute respiratory failure. Patient was diagnosed with Covid 2 weeks prior to presentation. Had received steroids this admission. Chronic mid posterior cervical wound infection. Present prior to admission. Patient reportedly was awaiting for skin graft. Patient reportedly had completed intravenous Zosyn and vancomycin prior to admission. Patient to follow with Dr Constance Dorado at [address] [place] [address] upon discharge     History of heroin abuse per his wife. CODE STATUS: Full  DVT prophylaxis: Enoxaparin  Surrogate decision maker: Dipti Gutierrez, wife. Phone [phone] (cell); [phone] (home)  [address] [facility]. Hospital Problems  Never Reviewed          Codes Class Noted POA    Acute hypoxemic respiratory failure due to COVID-19 [facility]) ICD-10-CM: U07.1, J96.01  ICD-9-CM: 518.81, 079.89, 799.02  1/30/2021 Unknown                Review of Systems:   Review of systems not obtained due to patient factors. Vital Signs:    Last 24hrs VS reviewed since prior progress note.  Most recent are:  Visit Vitals  /81   Pulse 99   Temp (!) 100.5 °F (38.1 °C)   Resp (!) 32   Ht 5' 7\" (1.702 m)   Wt 93 kg (205 lb)   SpO2 93%   BMI 32.11 kg/m²         Intake/Output Summary (Last 24 hours) at 2/15/2021 0841  Last data filed at 2/15/2021 0713  Gross per 24 hour   Intake 2340 ml Output 1850 ml   Net 490 ml        Physical Examination:     I had a face to face encounter with this patient and independently examined them on2/15/2021 as outlined below:  General:            Awake, tachypneic, on high flow oxygen. HEENT:            High flow oxygen nasal cannula in place. NGT in place. Neck:              Left subclavian triple-lumen in place. Posterior neck wound bandaged. Lungs: Tachypneic. On high flow oxygen. Diminished air entry basally posteriorly. Heart:              Regular  rhythm,  No  murmur     Abdomen:        Protuberant, normoactive, firm non tenbder. Extremities:      ++edema  Neurologic:      Awake. Extremities are generally weak. Data Review:    Review and/or order of clinical lab test  Review and/or order of tests in the radiology section of CPT  Review and/or order of tests in the medicine section of CPT      Labs:     Recent Labs     02/15/21  0710 02/14/21  1312 02/14/21 0239   WBC 17.0*  --  11.7*   HGB 8.6* 6.4* 7.0*   HCT 27.6* 21.1* 22.3*     --  200     Recent Labs     02/15/21  0710 02/14/21  1312 02/14/21  0239 02/13/21  0434   NA  --  145 145 146*   K  --  3.4* 2.9* 3.0*   CL  --  113* 112* 115*   CO2  --  23 25 25   BUN  --  90* 88* 87*   CREA  --  4.59* 4.41* 3.78*   GLU  --  175* 193* 117*   CA  --  7.3* 7.3* 7.7*   MG 2.4  --  2.0 2.3   PHOS 5.2*  --  2.6 3.1   URICA 4.2  --  3.9 3.9     Recent Labs     02/14/21  0239   ALT 67   *   TBILI 0.2   TP 5.3*   ALB 1.4*   GLOB 3.9     No results for input(s): INR, PTP, APTT, INREXT, INREXT in the last 72 hours. Recent Labs     02/14/21  1312   TIBC 146*   PSAT 16*      No results found for: FOL, RBCF   No results for input(s): PH, PCO2, PO2 in the last 72 hours.   Recent Labs     02/12/21  0902   TROIQ 0.28*     Lab Results   Component Value Date/Time    Cholesterol, total 115 02/26/2010 05:20 PM    HDL Cholesterol 31 (L) 02/26/2010 05:20 PM    LDL, calculated 46.2 02/26/2010 05:20 PM    Triglyceride 189 02/26/2010 05:20 PM    CHOL/HDL Ratio 3.7 02/26/2010 05:20 PM     Lab Results   Component Value Date/Time    Glucose (POC) 269 (H) 02/15/2021 07:05 AM    Glucose (POC) 224 (H) 02/14/2021 11:09 PM    Glucose (POC) 187 (H) 02/14/2021 06:28 PM    Glucose (POC) 204 (H) 02/14/2021 12:03 PM    Glucose (POC) 215 (H) 02/14/2021 06:20 AM     Lab Results   Component Value Date/Time    Color YELLOW/STRAW 02/10/2021 01:26 PM    Appearance TURBID (A) 02/10/2021 01:26 PM    Specific gravity 1.014 02/10/2021 01:26 PM    Specific gravity 1.015 02/26/2010 12:32 PM    pH (UA) 5.0 02/10/2021 01:26 PM    Protein 100 (A) 02/10/2021 01:26 PM    Glucose Negative 02/10/2021 01:26 PM    Ketone Negative 02/10/2021 01:26 PM    Bilirubin Negative 02/10/2021 01:26 PM    Urobilinogen 0.2 02/10/2021 01:26 PM    Nitrites Negative 02/10/2021 01:26 PM    Leukocyte Esterase Negative 02/10/2021 01:26 PM    Epithelial cells FEW 02/10/2021 01:26 PM    Bacteria Negative 02/10/2021 01:26 PM    WBC 0-4 02/10/2021 01:26 PM    RBC  02/10/2021 01:26 PM         Medications Reviewed:     Current Facility-Administered Medications   Medication Dose Route Frequency    albuterol (PROVENTIL HFA, VENTOLIN HFA, PROAIR HFA) inhaler 2 Puff  2 Puff Inhalation Q4H RT    furosemide (LASIX) injection 40 mg  40 mg IntraVENous DAILY    0.9% sodium chloride infusion 250 mL  250 mL IntraVENous PRN    insulin glargine (LANTUS) injection 20 Units  20 Units SubCUTAneous DAILY    pantoprazole (PROTONIX) 40 mg in 0.9% sodium chloride 10 mL injection  40 mg IntraVENous Q12H    gabapentin (NEURONTIN) 250 mg/5 mL solution 125 mg  125 mg Per NG tube Q8H    labetaloL (NORMODYNE;TRANDATE) injection 20 mg  20 mg IntraVENous Q4H PRN    ticagrelor (BRILINTA) tablet 90 mg  90 mg Oral BID    carvediloL (COREG) tablet 25 mg  25 mg Per NG tube Q12H    isosorbide dinitrate (ISORDIL) tablet 20 mg  20 mg Per NG tube BID    cefepime (MAXIPIME) 2 g in 0.9% sodium chloride (MBP/ADV) 100 mL MBP  2 g IntraVENous Q24H    amLODIPine (NORVASC) tablet 10 mg  10 mg Per NG tube DAILY    enoxaparin (LOVENOX) injection 30 mg  30 mg SubCUTAneous Q24H    QUEtiapine (SEROquel) tablet 50 mg  50 mg Oral TID    insulin lispro (HUMALOG) injection   SubCUTAneous Q6H    alteplase (CATHFLO) 1 mg in sterile water (preservative free) 1 mL injection  1 mg InterCATHeter PRN    ascorbic acid (vitamin C) (VITAMIN C) tablet 500 mg  500 mg Per NG tube BID    cholecalciferol (VITAMIN D3) (1000 Units /25 mcg) tablet 2,000 Units  2,000 Units Per NG tube DAILY    zinc sulfate (ZINCATE) 220 (50) mg capsule 1 Cap  1 Cap Per NG tube DAILY    HYDROmorphone (PF) (DILAUDID) injection 0.5 mg  0.5 mg IntraVENous Q4H PRN    chlorhexidine (ORAL CARE KIT) 0.12 % mouthwash 15 mL  15 mL Oral Q12H    ferrous sulfate 300 mg (60 mg iron)/5 mL oral syrup 300 mg  300 mg Per NG tube DAILY    metoprolol (LOPRESSOR) injection 5 mg  5 mg IntraVENous Q6H PRN    [Held by provider] allopurinoL (ZYLOPRIM) tablet 100 mg  100 mg Oral DAILY    sodium chloride (NS) flush 5-40 mL  5-40 mL IntraVENous Q8H    sodium chloride (NS) flush 5-40 mL  5-40 mL IntraVENous PRN    acetaminophen (TYLENOL) tablet 650 mg  650 mg Oral Q6H PRN    Or    acetaminophen (TYLENOL) suppository 650 mg  650 mg Rectal Q6H PRN    glucose chewable tablet 16 g  4 Tab Oral PRN    dextrose (D50W) injection syrg 12.5-25 g  12.5-25 g IntraVENous PRN    glucagon (GLUCAGEN) injection 1 mg  1 mg IntraMUSCular PRN    aspirin chewable tablet 81 mg  81 mg Oral DAILY    atorvastatin (LIPITOR) tablet 40 mg  40 mg Oral QHS     ______________________________________________________________________  EXPECTED LENGTH OF STAY: 12d 7h  ACTUAL LENGTH OF STAY:          16                 Ham Vazquez MD

## 2021-02-15 NOTE — CONSULTS
Cardiology Consult Note      Patient Name: Betty Hawkins  : 1955 MRN: [id number]  Date: 2/15/2021  Time: 11:42 AM    Admit Diagnosis: Acute hypoxemic respiratory failure due to COVID-19 ([facility] [U07.1, J96.01]    Primary Cardiologist: Swapna Camacho Cardiologist: Dwight York MD    Reason for Consult: CM    Requesting MD:     HPI:  Betty Hawkins is a 63 y.o. male admitted on 2021  for Acute hypoxemic respiratory failure due to COVID-19 ([address] [U07.1, J96.01]. has a past medical history of Diabetes ([facility] [address] and Hypertension. [facility] on [facility]. Patient was noted to be COVID +, by EMS,  on arrival.  He stated at the time no significant medical history. He was ultimately intubated and admitted to ICU. Diagnosed with COVID PNA, pulm edema, aspiration pneumonitis. He was successfully managed off the vent and extubated on . He remains on COVID isolation, still positive by PCR. Chart reviewed as he has a worsening respiratory status, BIPAP resumed, and not verbally interactive. Initial attempts to contact his wife were met with a phone number not in service. Ultimately, [employer] were  Involved to provide a wellness check to the patient's home, to reach his wife. They are , but live in the same household. Patient's wife did confirm patient had MI and [place] [facility] [place]. Care Everywhere reviewed. [facility] has patient information with same name and , but address and phone number are different. Review of [facility] [place] shows information c/w what his wife reports - MI and stents last summer. [facility] record review  DM II  CAD  HTN  H/o STEMI - 3/24/2020  PCI 4/3/2020  FmHx - CAD, DM  HTN heart disease with HF  Smoker  HLD  ICM  HEP C    Subjective:  COVID + by PCR.   Now with worsening respiratory status, on BIPAP and transferring back to ICU Assessment and Plan     1. Cardiomyopathy   - systolic HF acute on chronic   - NYHA class IV  01/30/21   ECHO ADULT COMPLETE 02/01/2021 2/1/2021    Narrative · LV: Estimated LVEF is 20 - 25%. Normal cavity size. Upper normal wall   thickness. Severely reduced systolic function. Severe (grade 3) left   ventricular diastolic dysfunction. · LA: Dilated left atrium. · MV: Mild mitral valve regurgitation is present. · TV: Mild tricuspid valve regurgitation is present. · PA: Moderate to severe pulmonary hypertension. · RV: Reduced systolic function. Signed by: Kamille Adame MD    - Coreg, Isordil, Norvasc - consider reducing Coreg with current respiratory status. Can add Hydralazine for BP and GDT   - PTA Lisinopril, Coreg, Imdur and Norvasc  2. Acute Respiratory failure   - escalating oxygen requirements   - BIPAP and transferring back to ICU   - PCXR 2/15 - Increasing bilateral pulmonary infiltrates and left pleural     effusion. 3. COVID Positive  4. Mycoplasma and strep PNA   - ID following  5. JUAN C   - Nephrology following   - Worsening   - suspect CRS  6. CAD   - STEMI 3/24/20   - PCI 4/3/20   - PCI at [facility] with stents placed   - Brilinta, ASA, Coreg and Liptior  7. PHTN   - Mod to severe on echo   - Sildenafil PTA  8. H/o Heroine abuse   - Getting Methadone at [facility]  9. Anemia   - No stool occult completed   - Hgb 6.4 yesterday   - per Primary team  10. HLD   - Liptior 80 mg  11. HTN   - Coreg, Norvasc, Lasix, Isordil  12. DM II   - SSI    Worsening respiratory status, requiring BIPAP and most likely transfer back to ICU. Initially admitted to ICU on vent for COVID PNA. Managed off vent and to step down. Echo with EF 20-25%. Patient with h/o STEMI, PCI and ICM. Meds as above. JUAN C worsening, suspect he has CRS and should consider inotrope - Dobutamine. Hgb dropped, consider occult stool test.    H/o smoking, ETOH, Heroine use/abuse, has PHTN, CAD and above acute issues.   He is at high risk for further decline and death. Prognosis guarded. Agree with Palliative Care as Pt wife would like everything done. Patient Active Problem List   Diagnosis Code    Acute hypoxemic respiratory failure due to COVID-19 (HCC) U07.1, J96.01     No specialty comments available. Review of Systems:    [x] Patient unable to provide secondary to condition    [] All systems negative, except as checked below.   Constitutional:    []Weight Change  []Fever   []Chills   []Night Sweats  []Fatigue  []Malaise  []____  ENT/Mouth:     []Hearing Changes  []Ear Pain  []Nasal Congestion   []Sinus Pain  []Hoarseness   []Sore throat  []Rhinorrhea  []Swallowing Difficulty  []____  Eyes:    []Eye Pain  []Swelling  []Redness  []Foreign Body  []Discharge  []Vision Changes  []____  Cardiovascular:    []Chest Pain  []SOB  []PND  []WEBB  []Orthopnea  []Claudication  []Edema   []Palpitations  []____  Respiratory:    []Cough  []Sputum  []Wheezing,  []SOB  []Hemoptysis  []____  Gastrointestinal:    []Nausea  []Vomiting  []Diarrhea  []Constipation  []Pain  []Heartburn  []Anorexia  []Dysphagia  []Hematochezia  []Melena,  []Jaundice  []____  Genitourinary:    []Dysuria  []Urinary Frequency  []Hematuria  []Urinary Incontinence  []Urgency  []Flank Pain  []Hesitancy  []____  Musculoskeletal:    []Arthralgias  []Myalgias  []Joint Swelling  []Joint Stiffness  []Back Pain  []Neck Pain  []____  Skin:    []Skin Lesions  []Pruritis  []Hair Changes  []Skin rashes  []____  Neuro:    []Weakness  []Numbness  []Paresthesias  []Loss of Consciousness  []Syncope   []Dizziness  []Headache  []Coordination Changes  []Recent Falls  []____  Psych:    []Anxiety/Depression  []Insomnia  []Memory Changes  []Violence/Abuse Hx.  []____  Heme/Lymph:    []Bruising  []Bleeding  []Lymphadenopathy  []____  Endocrine:    []Polyuria  []Polydipsia  []Temperature Intolerance  []____         Previous treatment/evaluation includes   PCI with stents, Echocardiogram - TTE/SAMARA  Cardiac risk factors:   smoking/ tobacco exposure, family history, dyslipidemia, diabetes mellitus, male gender, hypertension. Past Medical History:   Diagnosis Date    Diabetes ([address]     Hypertension      No past surgical history on file.   Current Facility-Administered Medications   Medication Dose Route Frequency    albuterol (PROVENTIL HFA, VENTOLIN HFA, PROAIR HFA) inhaler 2 Puff  2 Puff Inhalation Q4H RT    furosemide (LASIX) injection 40 mg  40 mg IntraVENous DAILY    vancomycin (VANCOCIN) 2000 mg in  ml infusion  2,000 mg IntraVENous ONCE    heparin (porcine) injection 5,000 Units  5,000 Units SubCUTAneous Q8H    0.9% sodium chloride infusion 250 mL  250 mL IntraVENous PRN    insulin glargine (LANTUS) injection 20 Units  20 Units SubCUTAneous DAILY    pantoprazole (PROTONIX) 40 mg in 0.9% sodium chloride 10 mL injection  40 mg IntraVENous Q12H    gabapentin (NEURONTIN) 250 mg/5 mL solution 125 mg  125 mg Per NG tube Q8H    labetaloL (NORMODYNE;TRANDATE) injection 20 mg  20 mg IntraVENous Q4H PRN    ticagrelor (BRILINTA) tablet 90 mg  90 mg Oral BID    carvediloL (COREG) tablet 25 mg  25 mg Per NG tube Q12H    isosorbide dinitrate (ISORDIL) tablet 20 mg  20 mg Per NG tube BID    cefepime (MAXIPIME) 2 g in 0.9% sodium chloride (MBP/ADV) 100 mL MBP  2 g IntraVENous Q24H    amLODIPine (NORVASC) tablet 10 mg  10 mg Per NG tube DAILY    QUEtiapine (SEROquel) tablet 50 mg  50 mg Oral TID    insulin lispro (HUMALOG) injection   SubCUTAneous Q6H    alteplase (CATHFLO) 1 mg in sterile water (preservative free) 1 mL injection  1 mg InterCATHeter PRN    ascorbic acid (vitamin C) (VITAMIN C) tablet 500 mg  500 mg Per NG tube BID    cholecalciferol (VITAMIN D3) (1000 Units /25 mcg) tablet 2,000 Units  2,000 Units Per NG tube DAILY    zinc sulfate (ZINCATE) 220 (50) mg capsule 1 Cap  1 Cap Per NG tube DAILY    HYDROmorphone (PF) (DILAUDID) injection 0.5 mg  0.5 mg IntraVENous Q4H PRN  ferrous sulfate 300 mg (60 mg iron)/5 mL oral syrup 300 mg  300 mg Per NG tube DAILY    metoprolol (LOPRESSOR) injection 5 mg  5 mg IntraVENous Q6H PRN    [Held by provider] allopurinoL (ZYLOPRIM) tablet 100 mg  100 mg Oral DAILY    sodium chloride (NS) flush 5-40 mL  5-40 mL IntraVENous Q8H    sodium chloride (NS) flush 5-40 mL  5-40 mL IntraVENous PRN    acetaminophen (TYLENOL) tablet 650 mg  650 mg Oral Q6H PRN    Or    acetaminophen (TYLENOL) suppository 650 mg  650 mg Rectal Q6H PRN    glucose chewable tablet 16 g  4 Tab Oral PRN    dextrose (D50W) injection syrg 12.5-25 g  12.5-25 g IntraVENous PRN    glucagon (GLUCAGEN) injection 1 mg  1 mg IntraMUSCular PRN    aspirin chewable tablet 81 mg  81 mg Oral DAILY    atorvastatin (LIPITOR) tablet 40 mg  40 mg Oral QHS       No Known Allergies   No family history on file.    Social History     Socioeconomic History    Marital status:      Spouse name: Not on file    Number of children: Not on file    Years of education: Not on file    Highest education level: Not on file   Tobacco Use    Smoking status: Former Smoker    Smokeless tobacco: Never Used   Substance and Sexual Activity    Alcohol use: Not Currently    Drug use: Not Currently     Types: Heroin       Objective:    Physical Exam    Vitals:   Vitals:    02/15/21 1045 02/15/21 1100 02/15/21 1117 02/15/21 1130   BP: (!) 120/92 (!) 115/97 104/88 98/63   Pulse: (!) 107 (!) 112 (!) 107 (!) 106   Resp: (!) 32 (!) 32 28 27   Temp:       SpO2: 96% 99% 100% 100%   Weight:       Height:           Deferred 2/2 COVID PUI      Telemetry: normal sinus rhythm    ECG:   EKG Results     Procedure 720 Value Units Date/Time    EKG, 12 LEAD, INITIAL [[id number]] Collected: 02/10/21 2238    Order Status: Completed Updated: 02/11/21 1509     Ventricular Rate 140 BPM      Atrial Rate 267 BPM      QRS Duration 86 ms      Q-T Interval 290 ms      QTC Calculation (Bezet) 442 ms      Calculated R Axis 19 degrees      Calculated T Axis 166 degrees      Diagnosis --     Atrial fibrillation with rapid ventricular response with premature   ventricular or aberrantly conducted complexes  ST & T wave abnormality, consider lateral ischemia or digitalis effect  When compared with ECG of 08-FEB-2021 12:57,  Atrial fibrillation has replaced Sinus rhythm  Vent.  rate has increased BY  65 BPM  T wave inversion no longer evident in Anterior leads  Confirmed by Su Cochran MD, Sheila Hill ([id number]) on 2/11/2021 3:08:59 PM      EKG, 12 LEAD, INITIAL [[id number]] Collected: 02/08/21 1257    Order Status: Completed Updated: 02/08/21 1506     Ventricular Rate 75 BPM      Atrial Rate 75 BPM      P-R Interval 144 ms      QRS Duration 82 ms      Q-T Interval 398 ms      QTC Calculation (Bezet) 444 ms      Calculated P Axis 35 degrees      Calculated R Axis 9 degrees      Calculated T Axis 141 degrees      Diagnosis --     Normal sinus rhythm  T wave abnormality, consider anterolateral ischemia  When compared with ECG of 01-FEB-2021 12:48,  ST no longer depressed in Lateral leads  Confirmed by Su Cochran MD, Sheila Hill [id number]) on 2/8/2021 3:06:19 PM      EKG, 12 LEAD, INITIAL [[id number]] Collected: 02/01/21 1246    Order Status: Completed Updated: 02/01/21 1806     Ventricular Rate 89 BPM      Atrial Rate 89 BPM      P-R Interval 140 ms      QRS Duration 84 ms      Q-T Interval 394 ms      QTC Calculation (Bezet) 479 ms      Calculated P Axis 35 degrees      Calculated R Axis 34 degrees      Calculated T Axis 118 degrees      Diagnosis --     Normal sinus rhythm with sinus arrhythmia  ST & T wave abnormality, consider anterolateral ischemia  Prolonged QT  When compared with ECG of 01-FEB-2021 12:46,  premature atrial complexes are no longer present  Nonspecific T wave abnormality no longer evident in Inferior leads  QT has lengthened  Confirmed by [facility], MD, Imani Zamarripa ([id number]) on 2/1/2021 6:05:53 PM      EKG, 12 LEAD, INITIAL [[id number]] Collected: 01/30/21 0053 Order Status: Completed Updated: 02/01/21 0548     Ventricular Rate 131 BPM      Atrial Rate 131 BPM      P-R Interval 152 ms      QRS Duration 84 ms      Q-T Interval 292 ms      QTC Calculation (Bezet) 431 ms      Calculated P Axis 44 degrees      Calculated R Axis 32 degrees      Calculated T Axis 90 degrees      Diagnosis --     Sinus tachycardia  Nonspecific ST abnormality    No previous ECGs available  Confirmed by Stephanie Morales M.D., CANDICECOR ([id number]) on 2/1/2021 5:48:12 AM              Data Review:     Radiology:   XR Results (most recent):  Results from [place] encounter on 01/30/21   XR CHEST PORT    Narrative EXAM:  XR CHEST PORT    INDICATION:   f/u: covid pneumonia, respiratory failure    COMPARISON: Chest radiograph 2/11/2021. FINDINGS: AP radiograph of the chest was obtained. Interval removal of endotracheal tube. Stable positioning of left subclavian  central venous catheter. Enteric tube is again noted extending into the abdomen. There is been interval worsening in patchy bilateral airspace disease and  interstitial opacities. There appear to be small bilateral pleural effusions. No  pneumothorax. Stable cardiomediastinal silhouette with calcifications of the  thoracic aorta. Impression 1. Interval worsening of patchy bilateral airspace disease and interstitial  opacities, which may represent some combination of worsening infection and  edema. Suspected small bilateral pleural effusions. No results for input(s): CPK, TROIQ in the last 72 hours.     No lab exists for component: CKQMB, CPKMB, BMPP  Recent Labs     02/15/21  0710 02/14/21  1312 02/14/21  0239   NA  --  145 145   K  --  3.4* 2.9*   CL  --  113* 112*   CO2  --  23 25   BUN  --  90* 88*   CREA  --  4.59* 4.41*   GLU  --  175* 193*   PHOS 5.2*  --  2.6   CA  --  7.3* 7.3*     Recent Labs     02/15/21  0710 02/14/21  1312 02/14/21  0239   WBC 17.0*  --  11.7*   HGB 8.6* 6.4* 7.0*   HCT 27.6* 21.1* 22.3*   PLT 254  --  200     Recent Labs     02/14/21  0239   *     No results for input(s): CHOL, LDLC in the last 72 hours. No lab exists for component: TGL, HDLC,  HBA1C  No results for input(s): CRP, TSH, TSHEXT in the last 72 hours. No lab exists for component: ESR    Gabriellagabe Cueto. Pato Marie MD         [facility] [address] [place] [address], [address] [place] [address],[address]     [facility]     [phone]    CC: Jessi Gaona MD

## 2021-02-15 NOTE — ACP (ADVANCE CARE PLANNING)
[facility]                                      Advance Care Planning Note    Name: Mohinder Blum  YOB: 1955  MRN: [id number]  Admission Date: 1/30/2021 12:48 AM    Date of discussion: 2/15/2021    Active Diagnoses:  · Severe Covid disease  · Acute hypoxic respiratory failure, intubated and extubated, now with worsening respiratory status  · Bacterial pneumonia  · Worsening JUAN C  · Cardiomyopathy  · Severe anemia  · Acute metabolic encephalopathy  These active diagnoses are of sufficient risk that focused discussion on advance care planning is indicated in order to allow the patient to thoughtfully consider personal goals of care, and if situations arise that prevent the ability to personally give input, to ensure appropriate representation of their personal desires for different levels and aggressiveness of care. Topics Discussed:  Patient's medical condition and diagnosis: [ x  ] yes [  ] no   Surrogate decision maker: [  x ] yes [  ] no   Patient's current physical function/cognitive function/frailty: [x  ] yes [  ] no   Code Status: [ x ] yes [  ] no   Artificial Nutrition / Dialysis / Non-Invasive Ventilation / Blood Transfusion: [x  ] yes [  ] no  Potential Resources for home (durable medical equipment, home nursing, home O2): [  ] yes [ x ] no    Patient is critically ill, with acute encephalopathy as such she is not able to make [address] for himself. Surrogate she maker is his wife, Roshan Campos. I have been discussing with Mr. Yan Alford daily at the last 2 days and had lengthy conversation with her on the phone this morning updated her of his worsening status. We visited [facility] again. Indicated to her that his respiratory status is worsening and he may end up on a ventilator, and as he is very sick he may go to cardiorespiratory arrest anytime.   She wants to continue aggressive care including intubation, CPR, shock if needed for now. She said she is out of town and will be back today and she will be discussing with his siblings and children ,get back to us if there is a change. She is agreeable to discussed with the palliative care team.      Time Spent:     Total time spent face-to-face in education and discussion: >20 minutes.      Noemy Ozuna MD  Date of Service:  2/15/2021  9:12 AM

## 2021-02-15 NOTE — PROGRESS NOTES
Nocturnist NP Progress note    Name: J Luis Ly  YOB: 1955  MRN: [id number]  Admission Date: 1/30/2021 12:48 AM    Date of service: 2/15/2021 12:13 AM    Situation / Background:     Hypoxia requiring supplemental oxygen    82-year-old male hospitalized since 1/30/2021 with severe anemia, worsening running respiratory failure due to Covid pneumonia, acute pulmonary edema and aspiration pneumonitis and cardiomyopathy. Subjective:     Unable to elicit. Patient nonverbal at present, yelling intermittently and without obvious provocation. Objective:        · PERRLA, EOMI  · Chest with heavy upper airway congestion, lung sounds otherwise diminished throughout. Weak cough  · RR 24, but no accessory muscle use. SaO2 91 to 92% on room air, 100% on NRB.   · Nasotracheal suctioning for scant, thick bloody secretions  · Abdomen soft, nontender not distended  · No extremity edema    Assessment/ Plan:     Hypoxic respiratory failure D/T Covid, pneumonitis, CHF  Lung sounds diminished throughout, clinically not fluid overloaded  CXR 2/14 shows worsening pulmonary disease  · Will add scheduled albuterol  · Dilaudid 1 mg now  · Wean back to nasal cannula if possible  · Continue to monitor       Jairo Curry, MSN, RN, NP-C  [phone] or via [facility]

## 2021-02-15 NOTE — PROGRESS NOTES
1600: TRANSFER - IN REPORT:    [facility] on Chilo Aguilar  being received from [facility] (unit) for change in patient condition(Increased need for oxygen )      Report consisted of patients Situation, Background, Assessment and   Recommendations(SBAR). Information from the following report(s) SBAR, Intake/Output, Recent Results, Med Rec Status, Cardiac Rhythm NSR, Alarm Parameters  and Quality Measures was reviewed with the receiving nurse. Opportunity for questions and clarification was provided. Assessment completed upon patients arrival to unit and care assumed. 1615: Primary Nurse Vic Oconnor RN and Rashmi Chin RN performed a dual skin assessment on this patient Impairment noted- see wound doc flow sheet  Cruzito score is 11    1715: Spoke with intensivist regarding aspiration concerns on BIPAP. MD ordered ABG for baseline. MD spoke with family and updated them on patient situation and condition. Discussed DNR status. 1730: Patient placed on high-flow NC.     1815: Patient placed back on BIPAP due to tachypneic/increased work of breathing. 1930: Bedside, Verbal and Written shift change report given to Wicho Correa RN (oncoming nurse) by Dewayne Stein RN and Rashmi Chin RN (offgoing nurse). Report included the following information SBAR, Kardex, Intake/Output, MAR, Cardiac Rhythm NSR, Alarm Parameters  and Quality Measures. Shift Summary: Patient transferred from [facility] due to increased oxygen demand and need for continuous BIPAP. Spoke with Intensivist about aspiration concerns. MD opened comfort conversation with family. Awaiting call back from [facility].

## 2021-02-16 ENCOUNTER — APPOINTMENT (OUTPATIENT)
Dept: GENERAL RADIOLOGY | Age: 66
DRG: 720 | End: 2021-02-16
Attending: NURSE PRACTITIONER
Payer: MEDICAID

## 2021-02-16 PROBLEM — I50.23 ACUTE ON CHRONIC SYSTOLIC HEART FAILURE (HCC): Status: ACTIVE | Noted: 2021-02-16

## 2021-02-16 LAB
ABO + RH BLD: NORMAL
ALBUMIN SERPL ELPH-MCNC: 1.8 G/DL (ref 2.9–4.4)
ALBUMIN SERPL-MCNC: 1.5 G/DL (ref 3.5–5)
ALBUMIN/GLOB SERPL: 0.3 {RATIO} (ref 1.1–2.2)
ALBUMIN/GLOB SERPL: 0.7 {RATIO} (ref 0.7–1.7)
ALP SERPL-CCNC: 127 U/L (ref 45–117)
ALPHA1 GLOB SERPL ELPH-MCNC: 0.3 G/DL (ref 0–0.4)
ALPHA2 GLOB SERPL ELPH-MCNC: 0.8 G/DL (ref 0.4–1)
ALT SERPL-CCNC: 60 U/L (ref 12–78)
ANION GAP SERPL CALC-SCNC: 13 MMOL/L (ref 5–15)
ARTERIAL PATENCY WRIST A: ABNORMAL
AST SERPL-CCNC: 65 U/L (ref 15–37)
ATRIAL RATE: 108 BPM
B-GLOBULIN SERPL ELPH-MCNC: 0.6 G/DL (ref 0.7–1.3)
BASE DEFICIT BLDA-SCNC: 4.9 MMOL/L
BASOPHILS # BLD: 0.1 K/UL (ref 0–0.1)
BASOPHILS NFR BLD: 0 % (ref 0–1)
BDY SITE: ABNORMAL
BILIRUB SERPL-MCNC: 0.4 MG/DL (ref 0.2–1)
BLD PROD TYP BPU: NORMAL
BLOOD GROUP ANTIBODIES SERPL: NORMAL
BPU ID: NORMAL
BREATHS.SPONTANEOUS ON VENT: 28
BUN SERPL-MCNC: 101 MG/DL (ref 6–20)
BUN/CREAT SERPL: 16 (ref 12–20)
CALCIUM SERPL-MCNC: 8.3 MG/DL (ref 8.5–10.1)
CALCULATED R AXIS, ECG10: 21 DEGREES
CALCULATED T AXIS, ECG11: -93 DEGREES
CHLORIDE SERPL-SCNC: 114 MMOL/L (ref 97–108)
CO2 SERPL-SCNC: 18 MMOL/L (ref 21–32)
CREAT SERPL-MCNC: 6.18 MG/DL (ref 0.7–1.3)
CROSSMATCH RESULT,%XM: NORMAL
DIAGNOSIS, 93000: NORMAL
DIFFERENTIAL METHOD BLD: ABNORMAL
EOSINOPHIL # BLD: 0 K/UL (ref 0–0.4)
EOSINOPHIL NFR BLD: 0 % (ref 0–7)
ERYTHROCYTE [DISTWIDTH] IN BLOOD BY AUTOMATED COUNT: 18.4 % (ref 11.5–14.5)
FIO2 ON VENT: 50 %
GAMMA GLOB SERPL ELPH-MCNC: 0.8 G/DL (ref 0.4–1.8)
GAS FLOW.O2 SETTING OXYMISER: 4 L/MIN
GLOBULIN SER CALC-MCNC: 4.7 G/DL (ref 2–4)
GLOBULIN SER-MCNC: 2.6 G/DL (ref 2.2–3.9)
GLUCOSE BLD STRIP.AUTO-MCNC: 113 MG/DL (ref 65–100)
GLUCOSE BLD STRIP.AUTO-MCNC: 116 MG/DL (ref 65–100)
GLUCOSE BLD STRIP.AUTO-MCNC: 141 MG/DL (ref 65–100)
GLUCOSE BLD STRIP.AUTO-MCNC: 95 MG/DL (ref 65–100)
GLUCOSE SERPL-MCNC: 104 MG/DL (ref 65–100)
HCO3 BLDA-SCNC: 16 MMOL/L (ref 22–26)
HCT VFR BLD AUTO: 27.1 % (ref 36.6–50.3)
HGB BLD-MCNC: 8.8 G/DL (ref 12.1–17)
IGA SERPL-MCNC: 128 MG/DL (ref 61–437)
IGG SERPL-MCNC: 918 MG/DL (ref 603–1613)
IGM SERPL-MCNC: 422 MG/DL (ref 20–172)
IMM GRANULOCYTES # BLD AUTO: 0.1 K/UL (ref 0–0.04)
IMM GRANULOCYTES NFR BLD AUTO: 1 % (ref 0–0.5)
INTERPRETATION SERPL IEP-IMP: ABNORMAL
KAPPA LC FREE SER-MCNC: 185.6 MG/L (ref 3.3–19.4)
KAPPA LC FREE/LAMBDA FREE SER: 2.12 {RATIO} (ref 0.26–1.65)
LAMBDA LC FREE SERPL-MCNC: 87.6 MG/L (ref 5.7–26.3)
LYMPHOCYTES # BLD: 1.7 K/UL (ref 0.8–3.5)
LYMPHOCYTES NFR BLD: 12 % (ref 12–49)
M PROTEIN SERPL ELPH-MCNC: ABNORMAL G/DL
MAGNESIUM SERPL-MCNC: 2.2 MG/DL (ref 1.6–2.4)
MCH RBC QN AUTO: 26.3 PG (ref 26–34)
MCHC RBC AUTO-ENTMCNC: 32.5 G/DL (ref 30–36.5)
MCV RBC AUTO: 80.9 FL (ref 80–99)
MONOCYTES # BLD: 1 K/UL (ref 0–1)
MONOCYTES NFR BLD: 7 % (ref 5–13)
NEUTS SEG # BLD: 11.2 K/UL (ref 1.8–8)
NEUTS SEG NFR BLD: 80 % (ref 32–75)
NRBC # BLD: 0 K/UL (ref 0–0.01)
NRBC BLD-RTO: 0 PER 100 WBC
PCO2 BLDA: 21 MMHG (ref 35–45)
PEEP MAX SETTING VENT: 15 CM[H2O]
PEEP RESPIRATORY: 10 CM[H2O]
PH BLDA: 7.5 [PH] (ref 7.35–7.45)
PHOSPHATE SERPL-MCNC: 5.2 MG/DL (ref 2.6–4.7)
PLATELET # BLD AUTO: 234 K/UL (ref 150–400)
PMV BLD AUTO: 11.8 FL (ref 8.9–12.9)
PO2 BLDA: 85 MMHG (ref 80–100)
POTASSIUM SERPL-SCNC: 3.6 MMOL/L (ref 3.5–5.1)
PROT SERPL-MCNC: 4.4 G/DL (ref 6–8.5)
PROT SERPL-MCNC: 6.2 G/DL (ref 6.4–8.2)
Q-T INTERVAL, ECG07: 410 MS
QRS DURATION, ECG06: 86 MS
QTC CALCULATION (BEZET), ECG08: 549 MS
RBC # BLD AUTO: 3.35 M/UL (ref 4.1–5.7)
SAO2 % BLD: 97 % (ref 92–97)
SAO2% DEVICE SAO2% SENSOR NAME: ABNORMAL
SERVICE CMNT-IMP: ABNORMAL
SERVICE CMNT-IMP: NORMAL
SODIUM SERPL-SCNC: 145 MMOL/L (ref 136–145)
SPECIMEN EXP DATE BLD: NORMAL
SPECIMEN SITE: ABNORMAL
STATUS OF UNIT,%ST: NORMAL
UNIT DIVISION, %UDIV: 0
URATE SERPL-MCNC: 5.2 MG/DL (ref 3.5–7.2)
VANCOMYCIN SERPL-MCNC: 18.9 UG/ML
VENTILATION MODE VENT: ABNORMAL
VENTRICULAR RATE, ECG03: 108 BPM
WBC # BLD AUTO: 14 K/UL (ref 4.1–11.1)

## 2021-02-16 PROCEDURE — 94640 AIRWAY INHALATION TREATMENT: CPT

## 2021-02-16 PROCEDURE — 74011250636 HC RX REV CODE- 250/636: Performed by: NURSE PRACTITIONER

## 2021-02-16 PROCEDURE — 74011250637 HC RX REV CODE- 250/637: Performed by: NURSE PRACTITIONER

## 2021-02-16 PROCEDURE — 65610000006 HC RM INTENSIVE CARE

## 2021-02-16 PROCEDURE — 74011250637 HC RX REV CODE- 250/637: Performed by: INTERNAL MEDICINE

## 2021-02-16 PROCEDURE — 74011250636 HC RX REV CODE- 250/636: Performed by: INTERNAL MEDICINE

## 2021-02-16 PROCEDURE — 74011000250 HC RX REV CODE- 250: Performed by: INTERNAL MEDICINE

## 2021-02-16 PROCEDURE — 74011250637 HC RX REV CODE- 250/637: Performed by: HOSPITALIST

## 2021-02-16 PROCEDURE — 93005 ELECTROCARDIOGRAM TRACING: CPT

## 2021-02-16 PROCEDURE — 84100 ASSAY OF PHOSPHORUS: CPT

## 2021-02-16 PROCEDURE — 99233 SBSQ HOSP IP/OBS HIGH 50: CPT | Performed by: PHYSICIAN ASSISTANT

## 2021-02-16 PROCEDURE — 74011250637 HC RX REV CODE- 250/637: Performed by: ANESTHESIOLOGY

## 2021-02-16 PROCEDURE — 82962 GLUCOSE BLOOD TEST: CPT

## 2021-02-16 PROCEDURE — 74011000258 HC RX REV CODE- 258: Performed by: INTERNAL MEDICINE

## 2021-02-16 PROCEDURE — 85025 COMPLETE CBC W/AUTO DIFF WBC: CPT

## 2021-02-16 PROCEDURE — 82803 BLOOD GASES ANY COMBINATION: CPT

## 2021-02-16 PROCEDURE — 80053 COMPREHEN METABOLIC PANEL: CPT

## 2021-02-16 PROCEDURE — 74011636637 HC RX REV CODE- 636/637: Performed by: HOSPITALIST

## 2021-02-16 PROCEDURE — 77010033678 HC OXYGEN DAILY

## 2021-02-16 PROCEDURE — 36415 COLL VENOUS BLD VENIPUNCTURE: CPT

## 2021-02-16 PROCEDURE — 74011250637 HC RX REV CODE- 250/637: Performed by: EMERGENCY MEDICINE

## 2021-02-16 PROCEDURE — 74018 RADEX ABDOMEN 1 VIEW: CPT

## 2021-02-16 PROCEDURE — 74011636637 HC RX REV CODE- 636/637: Performed by: NURSE PRACTITIONER

## 2021-02-16 PROCEDURE — 03HY32Z INSERTION OF MONITORING DEVICE INTO UPPER ARTERY, PERCUTANEOUS APPROACH: ICD-10-PCS | Performed by: INTERNAL MEDICINE

## 2021-02-16 PROCEDURE — 94760 N-INVAS EAR/PLS OXIMETRY 1: CPT

## 2021-02-16 PROCEDURE — 94660 CPAP INITIATION&MGMT: CPT

## 2021-02-16 PROCEDURE — 83735 ASSAY OF MAGNESIUM: CPT

## 2021-02-16 PROCEDURE — 74011250636 HC RX REV CODE- 250/636: Performed by: ANESTHESIOLOGY

## 2021-02-16 PROCEDURE — 74011250636 HC RX REV CODE- 250/636: Performed by: HOSPITALIST

## 2021-02-16 PROCEDURE — 74011000250 HC RX REV CODE- 250: Performed by: HOSPITALIST

## 2021-02-16 PROCEDURE — C9113 INJ PANTOPRAZOLE SODIUM, VIA: HCPCS | Performed by: HOSPITALIST

## 2021-02-16 PROCEDURE — 99223 1ST HOSP IP/OBS HIGH 75: CPT | Performed by: NURSE PRACTITIONER

## 2021-02-16 PROCEDURE — 80202 ASSAY OF VANCOMYCIN: CPT

## 2021-02-16 PROCEDURE — 84550 ASSAY OF BLOOD/URIC ACID: CPT

## 2021-02-16 PROCEDURE — 77010033711 HC HIGH FLOW OXYGEN

## 2021-02-16 RX ORDER — HYDRALAZINE HYDROCHLORIDE 20 MG/ML
20 INJECTION INTRAMUSCULAR; INTRAVENOUS ONCE
Status: COMPLETED | OUTPATIENT
Start: 2021-02-16 | End: 2021-02-16

## 2021-02-16 RX ORDER — BALSAM PERU/CASTOR OIL
OINTMENT (GRAM) TOPICAL EVERY 12 HOURS
Status: DISCONTINUED | OUTPATIENT
Start: 2021-02-16 | End: 2021-03-05 | Stop reason: HOSPADM

## 2021-02-16 RX ADMIN — INSULIN GLARGINE 20 UNITS: 100 INJECTION, SOLUTION SUBCUTANEOUS at 09:46

## 2021-02-16 RX ADMIN — MINERAL SUPPLEMENT IRON 300 MG / 5 ML STRENGTH LIQUID 100 PER BOX UNFLAVORED 300 MG: at 10:20

## 2021-02-16 RX ADMIN — Medication 10 ML: at 15:12

## 2021-02-16 RX ADMIN — LABETALOL HYDROCHLORIDE 20 MG: 5 INJECTION INTRAVENOUS at 17:26

## 2021-02-16 RX ADMIN — ALBUTEROL SULFATE 2 PUFF: 90 AEROSOL, METERED RESPIRATORY (INHALATION) at 20:08

## 2021-02-16 RX ADMIN — HEPARIN SODIUM 5000 UNITS: 5000 INJECTION INTRAVENOUS; SUBCUTANEOUS at 12:10

## 2021-02-16 RX ADMIN — HYDRALAZINE HYDROCHLORIDE 20 MG: 20 INJECTION INTRAMUSCULAR; INTRAVENOUS at 00:29

## 2021-02-16 RX ADMIN — Medication 10 ML: at 21:00

## 2021-02-16 RX ADMIN — ISOSORBIDE DINITRATE 20 MG: 20 TABLET ORAL at 09:46

## 2021-02-16 RX ADMIN — HYDROMORPHONE HYDROCHLORIDE 0.5 MG: 1 INJECTION, SOLUTION INTRAMUSCULAR; INTRAVENOUS; SUBCUTANEOUS at 21:22

## 2021-02-16 RX ADMIN — Medication 1 CAPSULE: at 09:46

## 2021-02-16 RX ADMIN — Medication 10 ML: at 06:31

## 2021-02-16 RX ADMIN — BUMETANIDE 2 MG: 0.25 INJECTION INTRAMUSCULAR; INTRAVENOUS at 20:58

## 2021-02-16 RX ADMIN — MEROPENEM 500 MG: 500 INJECTION, POWDER, FOR SOLUTION INTRAVENOUS at 00:28

## 2021-02-16 RX ADMIN — SODIUM CHLORIDE 40 MG: 9 INJECTION INTRAMUSCULAR; INTRAVENOUS; SUBCUTANEOUS at 03:17

## 2021-02-16 RX ADMIN — ATORVASTATIN CALCIUM 40 MG: 40 TABLET, FILM COATED ORAL at 21:00

## 2021-02-16 RX ADMIN — BUMETANIDE 2 MG: 0.25 INJECTION INTRAMUSCULAR; INTRAVENOUS at 09:46

## 2021-02-16 RX ADMIN — TICAGRELOR 90 MG: 90 TABLET ORAL at 09:47

## 2021-02-16 RX ADMIN — AMLODIPINE BESYLATE 10 MG: 5 TABLET ORAL at 09:46

## 2021-02-16 RX ADMIN — INSULIN LISPRO 3 UNITS: 100 INJECTION, SOLUTION INTRAVENOUS; SUBCUTANEOUS at 17:40

## 2021-02-16 RX ADMIN — CARVEDILOL 25 MG: 12.5 TABLET, FILM COATED ORAL at 20:58

## 2021-02-16 RX ADMIN — HEPARIN SODIUM 5000 UNITS: 5000 INJECTION INTRAVENOUS; SUBCUTANEOUS at 20:58

## 2021-02-16 RX ADMIN — CARVEDILOL 25 MG: 12.5 TABLET, FILM COATED ORAL at 09:46

## 2021-02-16 RX ADMIN — HYDROMORPHONE HYDROCHLORIDE 0.5 MG: 1 INJECTION, SOLUTION INTRAMUSCULAR; INTRAVENOUS; SUBCUTANEOUS at 15:15

## 2021-02-16 RX ADMIN — CASTOR OIL AND BALSAM, PERU: 788; 87 OINTMENT TOPICAL at 20:59

## 2021-02-16 RX ADMIN — SODIUM CHLORIDE 5 MG/HR: 9 INJECTION, SOLUTION INTRAVENOUS at 02:04

## 2021-02-16 RX ADMIN — SODIUM CHLORIDE 40 MG: 9 INJECTION INTRAMUSCULAR; INTRAVENOUS; SUBCUTANEOUS at 15:12

## 2021-02-16 RX ADMIN — ISOSORBIDE DINITRATE 20 MG: 20 TABLET ORAL at 17:24

## 2021-02-16 RX ADMIN — MEROPENEM 500 MG: 500 INJECTION, POWDER, FOR SOLUTION INTRAVENOUS at 22:54

## 2021-02-16 RX ADMIN — HYDROMORPHONE HYDROCHLORIDE 0.5 MG: 1 INJECTION, SOLUTION INTRAMUSCULAR; INTRAVENOUS; SUBCUTANEOUS at 02:15

## 2021-02-16 RX ADMIN — ASPIRIN 81 MG: 81 TABLET, CHEWABLE ORAL at 09:46

## 2021-02-16 RX ADMIN — MEROPENEM 500 MG: 500 INJECTION, POWDER, FOR SOLUTION INTRAVENOUS at 12:12

## 2021-02-16 RX ADMIN — OXYCODONE HYDROCHLORIDE AND ACETAMINOPHEN 500 MG: 500 TABLET ORAL at 09:46

## 2021-02-16 RX ADMIN — CHOLECALCIFEROL (VITAMIN D3) 25 MCG (1,000 UNIT) TABLET 2000 UNITS: TABLET at 09:46

## 2021-02-16 RX ADMIN — OXYCODONE HYDROCHLORIDE AND ACETAMINOPHEN 500 MG: 500 TABLET ORAL at 17:33

## 2021-02-16 RX ADMIN — HEPARIN SODIUM 5000 UNITS: 5000 INJECTION INTRAVENOUS; SUBCUTANEOUS at 03:17

## 2021-02-16 RX ADMIN — TICAGRELOR 90 MG: 90 TABLET ORAL at 17:24

## 2021-02-16 NOTE — PROGRESS NOTES
1930: Bedside and Verbal shift change report given to [address] (oncoming nurse) by Deisi Zhang and Don RN (offgoing nurse). Report included the following information SBAR, Kardex, ED Summary, Intake/Output, MAR, Recent Results, Cardiac Rhythm SR/ST and Alarm Parameters . 2100: Pt follows commands on left side, barely moving right side. Adeel Terry NP notified. Stat head CT ordered. 2300: Unable to obtain accurate BP reading. Adeel Terry NP notified. At bedside to place a-line. After a-line placed, SBP reading 230s. 20mg labetalol given per NP.    0010: SBPs still 180s-190s after labetalol dose. Adeel Terry NP notified, 20mg hydralazine ordered. 0145: SBPs remain in 190s. Adeel Terry NP notified, cardene gtt ordered. Per NP titrate to .    0430: Monitor showing irregular rhythm, EKG obtained, EKG showing afib w/ RVR (HR 80s-110s). Adeel Terry NP notified, will continue to monitor. 0730: Bedside and Verbal shift change report given to [address] and Bernice RN (oncoming nurse) by Kamran Sellers RN (offgoing nurse). Report included the following information SBAR, Kardex, ED Summary, Intake/Output, MAR, Recent Results, Cardiac Rhythm afib and Alarm Parameters .

## 2021-02-16 NOTE — PROGRESS NOTES
Nephrology Progress Note  Мария Almaraz  Date of Admission : 1/30/2021    CC: Follow up for JUAN C        Assessment and Plan     JUAN C:  - unclear baseline but suspect some component of CKD  - Renal U/S on 1/30 confirms CKD  - suspect JUAN C due to COVID-19 related illness   - UA from 2/5 with blood and protein, bird in place  - PCR 4g/g - suspect all due to DM - follow up SPEP/UPEP. BRANDO +ve   - wife requested to hold off dialysis last night and I am unable to reach her this morning   - d/w ICU team and plan is not to escalate care any more   - continue IV bumex     Volume overload:  - diuresis as above      Acidosis : better      COVID-19 PNA:  - abx and steroids per CCM team     Resp failure:  - vent dependent     DM2:  - on insulin       Interval History:  Overnight, oxygenation improved  On High flow but requiring less o2  UOP 30 cc/ hr   Cr worse> 6, BUN >100    Current Medications: all current  Medications have been eviewed in EPIC  Review of Systems: Review of systems not obtained due to patient factors. Objective:  Vitals:    Vitals:    02/16/21 0500 02/16/21 0600 02/16/21 0625 02/16/21 0700   BP: (!) 157/104 (!) 156/98  (!) 168/94   Pulse: (!) 112 (!) 110  (!) 107   Resp: 29 27  26   Temp:       SpO2: 98% 99% 100% 100%   Weight:       Height:         Intake and Output:  No intake/output data recorded.   02/14 1901 - 02/16 0700  In: 2346.3 [I.V.:2346.3]  Out: 2635 [Urine:1435; Drains:800]    Physical Examination:  Not examined in room due to COVID isolation     General: High flow , lethargic   Resp:  Symmetrical chest movements   CV:  Irregular   Neurologic:  Confused   abd : Non distended   Ext : +edema   Skin:  No Rash  :  Bird in place    [x]    High complexity decision making was performed  [x]    Patient is at high-risk of decompensation with multiple organ involvement    Lab Data Personally Reviewed: I have reviewed all the pertinent labs, microbiology data and radiology studies during assessment.     Recent Labs     02/16/21  0317 02/15/21  1312 02/15/21  0710 02/14/21  1312 02/14/21  0239    145  --  145 145   K 3.6 3.8  --  3.4* 2.9*   * 110*  --  113* 112*   CO2 18* 24  --  23 25   * 137*  --  175* 193*   * 99*  --  90* 88*   CREA 6.18* 5.39*  --  4.59* 4.41*   CA 8.3* 7.6*  --  7.3* 7.3*   MG 2.2  --  2.4  --  2.0   PHOS 5.2* 5.1* 5.2*  --  2.6   ALB 1.5* 1.5*  --   --  1.4*   ALT 60  --   --   --  67     Recent Labs     02/16/21  0317 02/15/21  0710 02/14/21  1312 02/14/21  0239   WBC 14.0* 17.0*  --  11.7*   HGB 8.8* 8.6* 6.4* 7.0*   HCT 27.1* 27.6* 21.1* 22.3*    254  --  200     Lab Results   Component Value Date/Time    Specimen Description: BLOOD 02/27/2010 07:00 PM     Lab Results   Component Value Date/Time    Culture result: LIGHT YEAST, (APPARENT CANDIDA ALBICANS) (A) 02/12/2021 04:53 PM    Culture result: LIGHT NORMAL RESPIRATORY CORI 02/12/2021 04:53 PM    Culture result: LIGHT YEAST, (APPARENT CANDIDA ALBICANS) (A) 02/05/2021 07:10 PM    Culture result: FEW NORMAL RESPIRATORY CORI 02/05/2021 07:10 PM    Culture result: No growth (<1,000 CFU/ML) 02/05/2021 07:10 PM     Recent Results (from the past 24 hour(s))   POC EG7    Collection Time: 02/15/21  9:25 AM   Result Value Ref Range    Calcium, ionized (POC) 1.09 (L) 1.12 - 1.32 mmol/L    FIO2 (POC) 93 %    pH (POC) 7.42 7.35 - 7.45      pCO2 (POC) 33.7 (L) 35.0 - 45.0 MMHG    pO2 (POC) 55 (L) 80 - 100 MMHG    HCO3 (POC) 21.8 (L) 22 - 26 MMOL/L    Base deficit (POC) 3 mmol/L    sO2 (POC) 89 (L) 92 - 97 %    Site RIGHT RADIAL      Device: High Flow Nasal Cannula      Flow rate (POC) 50 L/M    Allens test (POC) NO      Specimen type (POC) ARTERIAL     LACTIC ACID    Collection Time: 02/15/21 10:56 AM   Result Value Ref Range    Lactic acid 1.0 0.4 - 2.0 MMOL/L   PROCALCITONIN    Collection Time: 02/15/21 10:56 AM   Result Value Ref Range    Procalcitonin 1.03 ng/mL   NT-PRO BNP    Collection Time: 02/15/21 10:56 AM   Result Value Ref Range    NT pro-BNP >35,000 (H) <125 PG/ML   GLUCOSE, POC    Collection Time: 02/15/21 11:56 AM   Result Value Ref Range    Glucose (POC) 178 (H) 65 - 100 mg/dL    Performed by Silvia Mcduffie    RENAL FUNCTION PANEL    Collection Time: 02/15/21  1:12 PM   Result Value Ref Range    Sodium 145 136 - 145 mmol/L    Potassium 3.8 3.5 - 5.1 mmol/L    Chloride 110 (H) 97 - 108 mmol/L    CO2 24 21 - 32 mmol/L    Anion gap 11 5 - 15 mmol/L    Glucose 137 (H) 65 - 100 mg/dL    BUN 99 (H) 6 - 20 MG/DL    Creatinine 5.39 (H) 0.70 - 1.30 MG/DL    BUN/Creatinine ratio 18 12 - 20      GFR est AA 13 (L) >60 ml/min/1.73m2    GFR est non-AA 11 (L) >60 ml/min/1.73m2    Calcium 7.6 (L) 8.5 - 10.1 MG/DL    Phosphorus 5.1 (H) 2.6 - 4.7 MG/DL    Albumin 1.5 (L) 3.5 - 5.0 g/dL   GLUCOSE, POC    Collection Time: 02/15/21  6:08 PM   Result Value Ref Range    Glucose (POC) 172 (H) 65 - 100 mg/dL    Performed by Tk Adames    GLUCOSE, POC    Collection Time: 02/16/21 12:38 AM   Result Value Ref Range    Glucose (POC) 95 65 - 100 mg/dL    Performed by [facility]    MAGNESIUM    Collection Time: 02/16/21  3:17 AM   Result Value Ref Range    Magnesium 2.2 1.6 - 2.4 mg/dL   PHOSPHORUS    Collection Time: 02/16/21  3:17 AM   Result Value Ref Range    Phosphorus 5.2 (H) 2.6 - 4.7 MG/DL   URIC ACID    Collection Time: 02/16/21  3:17 AM   Result Value Ref Range    Uric acid 5.2 3.5 - 7.2 MG/DL   CBC WITH AUTOMATED DIFF    Collection Time: 02/16/21  3:17 AM   Result Value Ref Range    WBC 14.0 (H) 4.1 - 11.1 K/uL    RBC 3.35 (L) 4.10 - 5.70 M/uL    HGB 8.8 (L) 12.1 - 17.0 g/dL    HCT 27.1 (L) 36.6 - 50.3 %    MCV 80.9 80.0 - 99.0 FL    MCH 26.3 26.0 - 34.0 PG    MCHC 32.5 30.0 - 36.5 g/dL    RDW 18.4 (H) 11.5 - 14.5 %    PLATELET 682 322 - 423 K/uL    MPV 11.8 8.9 - 12.9 FL    NRBC 0.0 0  WBC    ABSOLUTE NRBC 0.00 0.00 - 0.01 K/uL    NEUTROPHILS 80 (H) 32 - 75 %    LYMPHOCYTES 12 12 - 49 % MONOCYTES 7 5 - 13 %    EOSINOPHILS 0 0 - 7 %    BASOPHILS 0 0 - 1 %    IMMATURE GRANULOCYTES 1 (H) 0.0 - 0.5 %    ABS. NEUTROPHILS 11.2 (H) 1.8 - 8.0 K/UL    ABS. LYMPHOCYTES 1.7 0.8 - 3.5 K/UL    ABS. MONOCYTES 1.0 0.0 - 1.0 K/UL    ABS. EOSINOPHILS 0.0 0.0 - 0.4 K/UL    ABS. BASOPHILS 0.1 0.0 - 0.1 K/UL    ABS. IMM. GRANS. 0.1 (H) 0.00 - 0.04 K/UL    DF AUTOMATED     METABOLIC PANEL, COMPREHENSIVE    Collection Time: 02/16/21  3:17 AM   Result Value Ref Range    Sodium 145 136 - 145 mmol/L    Potassium 3.6 3.5 - 5.1 mmol/L    Chloride 114 (H) 97 - 108 mmol/L    CO2 18 (L) 21 - 32 mmol/L    Anion gap 13 5 - 15 mmol/L    Glucose 104 (H) 65 - 100 mg/dL     (H) 6 - 20 MG/DL    Creatinine 6.18 (H) 0.70 - 1.30 MG/DL    BUN/Creatinine ratio 16 12 - 20      GFR est AA 11 (L) >60 ml/min/1.73m2    GFR est non-AA 9 (L) >60 ml/min/1.73m2    Calcium 8.3 (L) 8.5 - 10.1 MG/DL    Bilirubin, total 0.4 0.2 - 1.0 MG/DL    ALT (SGPT) 60 12 - 78 U/L    AST (SGOT) 65 (H) 15 - 37 U/L    Alk.  phosphatase 127 (H) 45 - 117 U/L    Protein, total 6.2 (L) 6.4 - 8.2 g/dL    Albumin 1.5 (L) 3.5 - 5.0 g/dL    Globulin 4.7 (H) 2.0 - 4.0 g/dL    A-G Ratio 0.3 (L) 1.1 - 2.2     EKG, 12 LEAD, INITIAL    Collection Time: 02/16/21  4:12 AM   Result Value Ref Range    Ventricular Rate 108 BPM    Atrial Rate 108 BPM    QRS Duration 86 ms    Q-T Interval 410 ms    QTC Calculation (Bezet) 549 ms    Calculated R Axis 21 degrees    Calculated T Axis -93 degrees    Diagnosis       Atrial fibrillation with rapid ventricular response with premature   ventricular or aberrantly conducted complexes  Nonspecific T wave abnormality , probably digitalis effect  When compared with ECG of 10-FEB-2021 22:38,  Nonspecific T wave abnormality, worse in Anterior leads     BLOOD GAS, ARTERIAL    Collection Time: 02/16/21  6:25 AM   Result Value Ref Range    pH 7.50 (H) 7.35 - 7.45      PCO2 21 (L) 35 - 45 mmHg    PO2 85 80 - 100 mmHg    O2 SAT 97 92 - 97 % BICARBONATE 16 (L) 22 - 26 mmol/L    BASE DEFICIT 4.9 mmol/L    O2 METHOD BIPAP      FIO2 50 %    MODE BIPAP      SET RATE 4      SPONTANEOUS RATE 28      PEEP/CPAP 10.0      High PEEP 15      Sample source ARTERIAL      SITE DRAWN FROM ARTERIAL LINE      LANDEN'S TEST NOT APPLICABLE     GLUCOSE, POC    Collection Time: 02/16/21  6:30 AM   Result Value Ref Range    Glucose (POC) 113 (H) 65 - 100 mg/dL    Performed by [address] [place] [address] Avenue, MD  [address]  Phone - [phone]   Fax - [phone]  www. [facility]

## 2021-02-16 NOTE — PROGRESS NOTES
ID Progress Note  2021    Subjective:     On  High flow. Low grade fever earlier. Wife doesn't want to escalate care anymore. Objective:     Vitals:   Visit Vitals  BP (!) 138/94   Pulse (!) 108   Temp 98.3 °F (36.8 °C)   Resp 27   Ht 5' 7\" (1.702 m)   Wt 91.9 kg (202 lb 8 oz)   SpO2 98%   BMI 31.72 kg/m²        Tmax:  Temp (24hrs), Av.6 °F (37.6 °C), Min:98.3 °F (36.8 °C), Max:100.4 °F (38 °C)      PHYSICAL EXAM:  Tachycardic  On high flow       Labs:   Lab Results   Component Value Date/Time    WBC 14.0 (H) 2021 03:17 AM    HGB 8.8 (L) 2021 03:17 AM    HCT 27.1 (L) 2021 03:17 AM    PLATELET 266  03:17 AM    MCV 80.9 2021 03:17 AM     Lab Results   Component Value Date/Time    Sodium 145 2021 03:17 AM    Potassium 3.6 2021 03:17 AM    Chloride 114 (H) 2021 03:17 AM    CO2 18 (L) 2021 03:17 AM    Anion gap 13 2021 03:17 AM    Glucose 104 (H) 2021 03:17 AM     (H) 2021 03:17 AM    Creatinine 6.18 (H) 2021 03:17 AM    BUN/Creatinine ratio 16 2021 03:17 AM    GFR est AA 11 (L) 2021 03:17 AM    GFR est non-AA 9 (L) 2021 03:17 AM    Calcium 8.3 (L) 2021 03:17 AM    Bilirubin, total 0.4 2021 03:17 AM    Alk. phosphatase 127 (H) 2021 03:17 AM    Protein, total 6.2 (L) 2021 03:17 AM    Albumin 1.5 (L) 2021 03:17 AM    Globulin 4.7 (H) 2021 03:17 AM    A-G Ratio 0.3 (L) 2021 03:17 AM    ALT (SGPT) 60 2021 03:17 AM         Assessment and Plan   Acute respiratory failure secondary to COVID 19 PNA  mycoplasma and s. pneumoniae   - Mycoplasma IgG  1411, S Pneumo (+)    Legionella (-)    resp cx () candida albicans - oropharyngeal contamination     sputum cx () candida     Blood cx () no growth    Blood cx () no growth    Quantiferon (-)     continue vanc and merrem.  Family does not want to escalate care.            Mid posterior cervical wound Lázaro Camacho called just wanting to make sure pt had an antibiotic for her UTI.   Discussed with her that yes, pt is aware see TE from 11/15/17 (this TE) infection  - chronic wound; pt was waiting for skin graft according to the provider at [facility], Dr. Terrell Loaiza cx (1/31) LIGHT DIPHTHEROIDS     Wound measurement per our wound care team; 3.5 cm x 4 cm x 0.1 cm. NP Johnathan Iverson with the Managing provider at [facility], Dr. Jenaro Cobb, [address]. Wound care nurse, Ms. Linton Nain [phone].  According to the wound care nurse, the wound was created after the removal of infected cyst.    Pt completed IV Zosyn then Vancomycin for 30 days at the rehab center; Blood cx and wound cx grew MRSA      Pt should follow up with Dr Suraj Estrella at [place] [id number] upon discharge for his cervical wound     JUAN C Aquino MD

## 2021-02-16 NOTE — PROGRESS NOTES
ID Progress Note  2/15/2021    Subjective:     On  Bipap. Cant answer my questions. No stethoscope in room. Had to be transferred to icu for respiratory distress     Objective:     Vitals:   Visit Vitals  BP (!) 120/106   Pulse (!) 104   Temp 99.9 °F (37.7 °C)   Resp 28   Ht 5' 7\" (1.702 m)   Wt 93 kg (205 lb)   SpO2 100%   BMI 32.11 kg/m²        Tmax:  Temp (24hrs), Av.3 °F (37.9 °C), Min:99.9 °F (37.7 °C), Max:100.6 °F (38.1 °C)      PHYSICAL EXAM:  Not in distress  No cervical lymphdenopathy   Heart: tacgycard  Abdomen: soft nontender, no guarding or rebound  Knees not warm or tender      Labs:   Lab Results   Component Value Date/Time    WBC 17.0 (H) 02/15/2021 07:10 AM    HGB 8.6 (L) 02/15/2021 07:10 AM    HCT 27.6 (L) 02/15/2021 07:10 AM    PLATELET 850  07:10 AM    MCV 82.9 02/15/2021 07:10 AM     Lab Results   Component Value Date/Time    Sodium 145 02/15/2021 01:12 PM    Potassium 3.8 02/15/2021 01:12 PM    Chloride 110 (H) 02/15/2021 01:12 PM    CO2 24 02/15/2021 01:12 PM    Anion gap 11 02/15/2021 01:12 PM    Glucose 137 (H) 02/15/2021 01:12 PM    BUN 99 (H) 02/15/2021 01:12 PM    Creatinine 5.39 (H) 02/15/2021 01:12 PM    BUN/Creatinine ratio 18 02/15/2021 01:12 PM    GFR est AA 13 (L) 02/15/2021 01:12 PM    GFR est non-AA 11 (L) 02/15/2021 01:12 PM    Calcium 7.6 (L) 02/15/2021 01:12 PM    Bilirubin, total 0.2 2021 02:39 AM    Alk.  phosphatase 134 (H) 2021 02:39 AM    Protein, total 5.3 (L) 2021 02:39 AM    Albumin 1.5 (L) 02/15/2021 01:12 PM    Globulin 3.9 2021 02:39 AM    A-G Ratio 0.4 (L) 2021 02:39 AM    ALT (SGPT) 67 2021 02:39 AM   ABX hx;    Zyvox x1 on     vancomycin -    IV Flagyl -    IV doxy -      Assessment and Plan   Acute respiratory failure secondary to COVID 19 PNA  mycoplasma and s. pneumoniae   - Mycoplasma IgG  1411, S Pneumo (+)    Legionella (-)    resp cx () candida albicans - oropharyngeal contamination     sputum cx (2/12) candida     Blood cx (1/30) no growth    Blood cx (2/4) no growth    Quantiferon (-)    Agree with vanc. Change cefepime to merrem            Mid posterior cervical wound infection  - chronic wound; pt was waiting for skin graft according to the provider at [facility], Dr. Jordana Thurman    Wound cx (1/31) LIGHT DIPHTHEROIDS     Wound measurement per our wound care team; 3.5 cm x 4 cm x 0.1 cm. LUKASZ Gutierrez with the Managing provider at [facility], Dr. Jordana Thurman, [address]. Wound care nurse, Ms. Noe Columbia [phone].  According to the wound care nurse, the wound was created after the removal of infected cyst.    Pt completed IV Zosyn then Vancomycin for 30 days at the rehab center; Blood cx and wound cx grew MRSA      Pt should follow up with Dr Jef Sher at [place] [address] upon discharge for his cervical wound     JUAN C          Darby Doll MD

## 2021-02-16 NOTE — PROGRESS NOTES
Physician Progress Note      PATIENT:               Madelin Venegas  CSN #:                  [id number]  :                       1955  ADMIT DATE:       2021 12:48 AM  DISCH DATE:  RESPONDING  PROVIDER #:        Kunal Hirsch MD          QUERY TEXT:    Dr. Bernett Mortimer,    Patient admitted with sepsis and COVID-19. Noted documentation of mild hyponatremia in Hospitalist's note on 2/13-15 and Sodium level is 145-152. If possible, please document in progress notes and discharge summary if you are evaluating and /or treating any of the following: The medical record reflects the following:  Risk Factors: 64yo who was intubated for >96hours d/t COVID-19 and respiratory failure, has JUAN C and CKD per Nephrology, and an NG tube placed  Clinical Indicators:  - Na: 145-152  -  Neprho: Hypernatremia - improving some - FW flushes via NGT  Treatment: Water flush via NGT.   Daily labs    Thank you,  Katja Yoder  [phone]  Options provided:  -- Mild hypernatremia confirmed and mild hyponatremia ruled out  -- Mild hyponatremia confirmed and mild hypernatremia ruled out  -- Other - I will add my own diagnosis  -- Disagree - Not applicable / Not valid  -- Disagree - Clinically unable to determine / Unknown  -- Refer to Clinical Documentation Reviewer    PROVIDER RESPONSE TEXT:    Mild hypernatremia    Query created by: Sheron Potts on 2021 5:10 PM      Electronically signed by:  Kunal Hirsch MD 2021 5:58 PM

## 2021-02-16 NOTE — PROGRESS NOTES
Bedside and Verbal shift change report given to Leora De Paz and Bernice CHRISTIAN (oncoming nurse) by iKera Doll (offgoing nurse). Report included the following information SBAR, Kardex, Procedure Summary, Intake/Output, MAR, Recent Results and Med Rec Status. Primary Nurse Brina Taylor RN and ANAHI Queen performed a dual skin assessment on this patient Impairment noted- see wound doc flow sheet  Cruzito score is 11    0900: Verified Dobhoff placement with MD to begin use. Patient still requiring Heated HiFlow NC, with labored, abdominal breathing. Lungs sound course and crackly bilaterally. ICU multidisciplinary rounds lead by Ju Newsome MD (Intensivist): The following were reviewed and discussed: current labs,  recent imaging, lines/drains, review of body systems, nutrition, cultures, mobility, length of ICU stay. The plan of care for the day is as follows  Restart tube feed, wound care consult, discuss plan of care with family. (written note by RN)       [id number] [phone]: Tube feed restarted. See order for details. 1300: Wound care nurse in assessing patient. 1400: Patient code status now: DNR. See palliative note. 1500: Z flow pillow applied to R ear per wound care report. Bedside and Verbal shift change report given to Kiera Doll (oncoming nurse) by Monica Lane RN and Bernice CHRISTIAN (offgoing nurse). Report included the following information SBAR, Kardex, Intake/Output, MAR, Recent Results and Med Rec Status.

## 2021-02-16 NOTE — PROGRESS NOTES
Cardiology Progess Note      Patient Name: J Luis Ly  : 1955 MRN: [id number]  Date: 2021  Time: 11:42 AM    Subjective:  Remains in [place] isolation in ICU. On BIPAP, 60% FiO2. Reviewed CC team note. Wife does not want patient to be  Intubated, if further respiratory compromise occurs. DNI code status      Assessment and Plan     1. Cardiomyopathy   - systolic HF acute on chronic   - NYHA class IV  21   ECHO ADULT COMPLETE 2021    Narrative · LV: Estimated LVEF is 20 - 25%. Normal cavity size. Upper normal wall   thickness. Severely reduced systolic function. Severe (grade 3) left   ventricular diastolic dysfunction. · LA: Dilated left atrium. · MV: Mild mitral valve regurgitation is present. · TV: Mild tricuspid valve regurgitation is present. · PA: Moderate to severe pulmonary hypertension. · RV: Reduced systolic function. Signed by: Ousmane Benedict MD    - Coreg, Isordil, Norvasc - Consider stopping Norvasc       Consider adding Hydralazine for afterload reduction   - PTA Lisinopril, Coreg, Imdur and Norvasc  2. Acute Respiratory failure   - escalating oxygen requirements   - BIPAP at 60% FiO2   - PCXR 2/15 - Increasing bilateral pulmonary infiltrates and left pleural     effusion. 3. COVID Positive  4. Mycoplasma and strep PNA   - ID following  5. JUAN C   - Nephrology following   - Worsening Cr. 6.18   - suspect CRS - consider dobutamine  6. CAD   - STEMI 3/24/20   - PCI 4/3/20   - [place] [facility] [place]   - Brilinta, ASA, Coreg and Liptior  7. PHTN   - Mod to severe on echo   - Sildenafil PTA  8. H/o Heroine abuse   - Getting [place]adone at [facility]  9. Anemia   - No stool occult completed   - Hgb 6.4 yesterday   - per Primary team  10. HLD   - Liptior 80 mg  11. HTN   - Coreg, Norvasc, Lasix, Isordil   - Cardene gtt at 5  12. DM II   - SSI    Acute on chronic systolic HF. EF 20-25%.   ICM with h/o STEMI with PCI at [facility] last year. BP elevated, on Cardene gtt as well as PO meds. Consider stopping Norvasc and adding   Hydralazine for afterload reduction. HR elevated - sinus tach with PAC vs. Afib/AFL. Rate in low 100s. Overall worsening picture. Wife does not want intubation, should he deteriorate further. Appreciate Palliative Cares help          Patient Active Problem List   Diagnosis Code    Acute hypoxemic respiratory failure due to COVID-19 (HCC) U07.1, J96.01     No specialty comments available. Review of Systems:    [x] Patient unable to provide secondary to condition    [] All systems negative, except as checked below. Constitutional:    []Weight Change  []Fever   []Chills   []Night Sweats  []Fatigue  []Malaise  []____  ENT/Mouth:     []Hearing Changes  []Ear Pain  []Nasal Congestion   []Sinus Pain  []Hoarseness   []Sore throat  []Rhinorrhea  []Swallowing Difficulty  []____  Eyes:    []Eye Pain  []Swelling  []Redness  []Foreign Body  []Discharge  []Vision Changes  []____  Cardiovascular:    []Chest Pain  []SOB  []PND  []WEBB  []Orthopnea  []Claudication  []Edema   []Palpitations  []____  Respiratory:    []Cough  []Sputum  []Wheezing,  []SOB  []Hemoptysis  []____  Gastrointestinal:    []Nausea  []Vomiting  []Diarrhea  []Constipation  []Pain  []Heartburn  []Anorexia  []Dysphagia  []Hematochezia  []Melena,  []Jaundice  []____  Genitourinary:    []Dysuria  []Urinary Frequency  []Hematuria  []Urinary Incontinence  []Urgency  []Flank Pain  []Hesitancy  []____  Musculoskeletal:    []Arthralgias  []Myalgias  []Joint Swelling  []Joint Stiffness  []Back Pain  []Neck Pain  []____  Skin:    []Skin Lesions  []Pruritis  []Hair Changes  []Skin rashes  []____  Neuro:    []Weakness  []Numbness  []Paresthesias  []Loss of Consciousness  []Syncope   []Dizziness  []Headache  []Coordination Changes  []Recent Falls  []____  Psych:    []Anxiety/Depression  []Insomnia  []Memory Changes  []Violence/Abuse Hx. []____  Heme/Lymph:    []Bruising  []Bleeding  []Lymphadenopathy  []____  Endocrine:    []Polyuria  []Polydipsia  []Temperature Intolerance  []____         Previous treatment/evaluation includes   PCI with stents, Echocardiogram - TTE/SAMARA  Cardiac risk factors:   smoking/ tobacco exposure, family history, dyslipidemia, diabetes mellitus, male gender, hypertension. Past Medical History:   Diagnosis Date    Diabetes ([facility] [address]     Hypertension      No past surgical history on file.   Current Facility-Administered Medications   Medication Dose Route Frequency    niCARdipine (CARDENE) 50 mg in 0.9% sodium chloride 100 mL infusion  0-15 mg/hr IntraVENous TITRATE    albuterol (PROVENTIL HFA, VENTOLIN HFA, PROAIR HFA) inhaler 2 Puff  2 Puff Inhalation Q4H RT    heparin (porcine) injection 5,000 Units  5,000 Units SubCUTAneous Q8H    Vancomycin- Pharmacy Dosing   Other Rx Dosing/Monitoring    bumetanide (BUMEX) injection 2 mg  2 mg IntraVENous Q12H    meropenem (MERREM) 500 mg in 0.9% sodium chloride (MBP/ADV) 50 mL MBP  0.5 g IntraVENous Q12H    0.9% sodium chloride infusion 250 mL  250 mL IntraVENous PRN    insulin glargine (LANTUS) injection 20 Units  20 Units SubCUTAneous DAILY    pantoprazole (PROTONIX) 40 mg in 0.9% sodium chloride 10 mL injection  40 mg IntraVENous Q12H    labetaloL (NORMODYNE;TRANDATE) injection 20 mg  20 mg IntraVENous Q4H PRN    ticagrelor (BRILINTA) tablet 90 mg  90 mg Oral BID    carvediloL (COREG) tablet 25 mg  25 mg Per NG tube Q12H    isosorbide dinitrate (ISORDIL) tablet 20 mg  20 mg Per NG tube BID    amLODIPine (NORVASC) tablet 10 mg  10 mg Per NG tube DAILY    insulin lispro (HUMALOG) injection   SubCUTAneous Q6H    alteplase (CATHFLO) 1 mg in sterile water (preservative free) 1 mL injection  1 mg InterCATHeter PRN    ascorbic acid (vitamin C) (VITAMIN C) tablet 500 mg  500 mg Per NG tube BID    cholecalciferol (VITAMIN D3) (1000 Units /25 mcg) tablet 2,000 Units  2,000 Units Per NG tube DAILY    zinc sulfate (ZINCATE) 220 (50) mg capsule 1 Cap  1 Cap Per NG tube DAILY    HYDROmorphone (PF) (DILAUDID) injection 0.5 mg  0.5 mg IntraVENous Q4H PRN    ferrous sulfate 300 mg (60 mg iron)/5 mL oral syrup 300 mg  300 mg Per NG tube DAILY    metoprolol (LOPRESSOR) injection 5 mg  5 mg IntraVENous Q6H PRN    [Held by provider] allopurinoL (ZYLOPRIM) tablet 100 mg  100 mg Oral DAILY    sodium chloride (NS) flush 5-40 mL  5-40 mL IntraVENous Q8H    sodium chloride (NS) flush 5-40 mL  5-40 mL IntraVENous PRN    acetaminophen (TYLENOL) tablet 650 mg  650 mg Oral Q6H PRN    Or    acetaminophen (TYLENOL) suppository 650 mg  650 mg Rectal Q6H PRN    glucose chewable tablet 16 g  4 Tab Oral PRN    dextrose (D50W) injection syrg 12.5-25 g  12.5-25 g IntraVENous PRN    glucagon (GLUCAGEN) injection 1 mg  1 mg IntraMUSCular PRN    aspirin chewable tablet 81 mg  81 mg Oral DAILY    atorvastatin (LIPITOR) tablet 40 mg  40 mg Oral QHS       No Known Allergies   No family history on file.    Social History     Socioeconomic History    Marital status:      Spouse name: Not on file    Number of children: Not on file    Years of education: Not on file    Highest education level: Not on file   Tobacco Use    Smoking status: Former Smoker    Smokeless tobacco: Never Used   Substance and Sexual Activity    Alcohol use: Not Currently    Drug use: Not Currently     Types: Heroin       Objective:    Physical Exam    Vitals:   Vitals:    02/16/21 0500 02/16/21 0600 02/16/21 0625 02/16/21 0700   BP: (!) 157/104 (!) 156/98  (!) 168/94   Pulse: (!) 112 (!) 110  (!) 107   Resp: 29 27  26   Temp:       SpO2: 98% 99% 100% 100%   Weight:       Height:           Deferred 2/2 COVID PUI      Telemetry: normal sinus rhythm    ECG:   EKG Results     Procedure 720 Value Units Date/Time    EKG, 12 LEAD, INITIAL [[id number]] Collected: 02/16/21 0412    Order Status: Completed Updated: 02/16/21 0413 Ventricular Rate 108 BPM      Atrial Rate 108 BPM      QRS Duration 86 ms      Q-T Interval 410 ms      QTC Calculation (Bezet) 549 ms      Calculated R Axis 21 degrees      Calculated T Axis -93 degrees      Diagnosis --     Atrial fibrillation with rapid ventricular response with premature   ventricular or aberrantly conducted complexes  Nonspecific T wave abnormality , probably digitalis effect  When compared with ECG of 10-FEB-2021 22:38,  Nonspecific T wave abnormality, worse in Anterior leads      EKG, 12 LEAD, INITIAL [[id number]] Collected: 02/10/21 2238    Order Status: Completed Updated: 02/11/21 1509     Ventricular Rate 140 BPM      Atrial Rate 267 BPM      QRS Duration 86 ms      Q-T Interval 290 ms      QTC Calculation (Bezet) 442 ms      Calculated R Axis 19 degrees      Calculated T Axis 166 degrees      Diagnosis --     Atrial fibrillation with rapid ventricular response with premature   ventricular or aberrantly conducted complexes  ST & T wave abnormality, consider lateral ischemia or digitalis effect  When compared with ECG of 08-FEB-2021 12:57,  Atrial fibrillation has replaced Sinus rhythm  Vent.  rate has increased BY  65 BPM  T wave inversion no longer evident in Anterior leads  Confirmed by Hallie Turner MD, Mayela Castaneda ([id number]) on 2/11/2021 3:08:59 PM      EKG, 12 LEAD, INITIAL [[id number]] Collected: 02/08/21 1257    Order Status: Completed Updated: 02/08/21 1506     Ventricular Rate 75 BPM      Atrial Rate 75 BPM      P-R Interval 144 ms      QRS Duration 82 ms      Q-T Interval 398 ms      QTC Calculation (Bezet) 444 ms      Calculated P Axis 35 degrees      Calculated R Axis 9 degrees      Calculated T Axis 141 degrees      Diagnosis --     Normal sinus rhythm  T wave abnormality, consider anterolateral ischemia  When compared with ECG of 01-FEB-2021 12:48,  ST no longer depressed in Lateral leads  Confirmed by Hallie Turner MD, Mayela Castaneda ([id number]) on 2/8/2021 3:06:19 PM      EKG, 12 LEAD, INITIAL [[id number]] Collected: 02/01/21 1246    Order Status: Completed Updated: 02/01/21 1806     Ventricular Rate 89 BPM      Atrial Rate 89 BPM      P-R Interval 140 ms      QRS Duration 84 ms      Q-T Interval 394 ms      QTC Calculation (Bezet) 479 ms      Calculated P Axis 35 degrees      Calculated R Axis 34 degrees      Calculated T Axis 118 degrees      Diagnosis --     Normal sinus rhythm with sinus arrhythmia  ST & T wave abnormality, consider anterolateral ischemia  Prolonged QT  When compared with ECG of 01-FEB-2021 12:46,  premature atrial complexes are no longer present  Nonspecific T wave abnormality no longer evident in Inferior leads  QT has lengthened  Confirmed by Chelsea Gutiérrez MD, Leela Stone ([id number]) on 2/1/2021 6:05:53 PM      EKG, 12 LEAD, INITIAL [[id number]] Collected: 01/30/21 0053    Order Status: Completed Updated: 02/01/21 0548     Ventricular Rate 131 BPM      Atrial Rate 131 BPM      P-R Interval 152 ms      QRS Duration 84 ms      Q-T Interval 292 ms      QTC Calculation (Bezet) 431 ms      Calculated P Axis 44 degrees      Calculated R Axis 32 degrees      Calculated T Axis 90 degrees      Diagnosis --     Sinus tachycardia  Nonspecific ST abnormality    No previous ECGs available  Confirmed by Russell Haddad M.D., [facility] [place] ([id number]) on 2/1/2021 5:48:12 AM              Data Review:     Radiology:   XR Results (most recent):  Results from [place] encounter on 01/30/21   XR ABD (KUB)    Narrative History: Dobbhoff placement. A portable radiograph of the abdomen at 5:16 AM demonstrates a Dobbhoff tube tip  in the region of the stomach. The bowel gas pattern appears unremarkable. Impression The Dobbhoff tube tip is in the region of the stomach. No results for input(s): CPK, TROIQ in the last 72 hours.     No lab exists for component: CKQMB, CPKMB, BMPP  Recent Labs     02/16/21  0317 02/15/21  1312    145   K 3.6 3.8   * 110*   CO2 18* 24   * 99*   CREA 6.18* 5.39*   * 137* PHOS 5.2* 5.1*   CA 8.3* 7.6*     Recent Labs     02/16/21  0317 02/15/21  0710   WBC 14.0* 17.0*   HGB 8.8* 8.6*   HCT 27.1* 27.6*    254     Recent Labs     02/16/21 0317 02/14/21  0239   * 134*     No results for input(s): CHOL, LDLC in the last 72 hours. No lab exists for component: TGL, HDLC,  HBA1C  No results for input(s): CRP, TSH, TSHEXT, TSHEXT in the last 72 hours. No lab exists for component: ESR    Kelly Balderas. Irma Hahn MD         [facility] [address] [place] [address],[address]     [facility]     [phone]    CC: Candelaria, MD Jessi

## 2021-02-16 NOTE — PROGRESS NOTES
Spoke with wife regarding goals of care. She was informed that the patient has had a decrease in the amount of FiO2 and is now at 60%. I explained that with his mental status and being on Bipap there is concern for aspiration, and with COVID, he could continue to decompensate even though at the present moment he appears to be doing somewhat better. At this time the wife would like to maintain BiPap and see if he continues to improve, if there is any respiratory decompensation or changes in hemodynamics, she does not want to place him on the ventilator. She states if he gets worse she would rather make him comfortable. Will continue current treatments for now, and keep wife updated. Will change to DNI code status.        Elaine Adjutant [facility]     Critical Care Medicine  [facility]

## 2021-02-16 NOTE — WOUND CARE
Wound Care Note:     Follow-up visit for neck and gluteal cleft    Patient is on Droplet Plus Precautions for COVID-19 positive  PPE:  N95, face shield, gown and gloves    Chart shows:  Admitted for acute hypoxemic respiratory failure due to COVID-19 and acute on chronic systolic heart failure  Past Medical History:   Diagnosis Date    Diabetes ([facility] [address]     Hypertension      WBC = 14.0 on 2/16/21  Admitted from [facility]:   Patient is groggy, on high flow oxygen, incontinent with moderate assistance needed in repositioning. Bed: In Touch [place]  Patient has a Bourgeois. Diet: Tube feeding  Patient reports moaning today with repositioning. Bilateral buttocks and sacral skin intact and without erythema. Heels were not assessed. 1. POA posterior neck wound is improving, measures 2.5 cm x 3.5 cm x 0.1 cm, wound bed is pink, small serous drainage, wound edges are open, carlene-wound intact without erythema. Opticell AG and Optifoam applied. 2.  POA gluteal cleft fissure has resolved. 3.  Right ear with eschar measuring 1.5 cm x 1 cm, no drainage, carlene-wound intact. Venelex ointment and Z flow pillow to be used. Dr. Tomasa Castro in a conference call; wound care orders obtained from [facility], NP- notified of ear wound. Patient repositioned on right side. Recommendations:    Continue with current wound care. Posterior neck- Every other day cleanse with normal saline, wipe wound bed clean and dry, apply a piece of Opticell AG and cover with Optifoam Gentle (or other dressing that will remain in place).     Gluteal cleft- Every 12 hours and as needed apply Z guard paste (orange tube). Right ear, sacrum, bilateral heels and any other reddened bony prominence- Every 12 hours liberally apply Venelex ointment. Use Z flow pillow to offload right ear.     Skin Care & Pressure Prevention:  Minimize layers of linen/pads under patient to optimize support surface. Turn/reposition approximately every 2 hours and offload heels.   Manage incontinence / promote continence   Nourishing Skin Cream to dry skin, minimize use of briefs when able    Discussed above plan with patient & Bernice/ANAHI Peters    Transition of Care: Plan to follow as needed while admitted to hospital.    SONAM RoeN, RN, [facility], [facility].  office [phone]  pager [phone] or call  to page

## 2021-02-16 NOTE — PROGRESS NOTES
DIEGO  Patient admitted for respiratory failure r/t COVID. RUR 29%  Disposition TBD, Was at [facility] receiving IV abx in preparation for a skin graft to his neck. Patient remains in the ICU on hi flow, very weak. Wife does not wish to escalate care. Care management is continuing to follow.    Lloyd Plascencia RN,Care Management

## 2021-02-16 NOTE — PROGRESS NOTES
SLP Contact Note    SLP treatment attempted. Pt unfortunately still unstable respiratory-wise and not appropriate for SLP at this time.       Thank you,  BERYL TanEd, [id number] [facility]  Speech-Language Pathologist

## 2021-02-16 NOTE — PROCEDURES
[facility]      Procedure Note - Arterial Access:   Performed by Mickey Bernard NP. Immediately prior to the procedure, the patient was reevaluated and found suitable for the planned procedure and any planned medications. Immediately prior to the procedure a time out was called to verify the correct patient, procedure, equipment, staff, and marking as appropriate. Central line Bundle:  Full sterile barrier precautions used. 5 mL 1% Lidocaine placed at insertion site. Insertion Date: 2/16/2021 Time:2330   Procedure Location:  ICU. Condition: Emergency. Consent:  NO.     Method: Seldinger technique. Site Prep: ChloraPrep and Sterile draping. Procedure: Arterial Catheter Insertion in Left, Radial Artery   Catheter inserted into a new site. Number of Attempts:  1 Indication: Monitoring and Blood Drawing. There was bright red, pulsatile blood return. Femoral Site? no. If Yes, reason femoral site was chosen:   Catheter secured. Biopatch in place? yes. Sterile Bio-occlusive dressing placed. Complication None. The procedure was tolerated well.     Mickey Bernard NP   Critical Care Medicine  [facility]

## 2021-02-16 NOTE — PROGRESS NOTES
Clinical Pharmacy Note: Antibiotic Renal Dosing    Day #1 of Merrem  Indication:  Pneumonia  Current regimen:  New consult to dose received from ID. Estimated Creatinine Clearance: 15.3 mL/min (A) (based on SCr of 5.39 mg/dL (H)). Plan: Order Merrem 500 mg every 12 hours per [facility] P&T approved renal dosing protocol, for CrCl 10-25. Pharmacy will monitor daily and will make adjustments as necessary for changes in renal function.

## 2021-02-16 NOTE — PROGRESS NOTES
[facility]    ICU TEAM Progress Note    Name: Kenyetta Momin   : 1955   MRN: [id number]   Date: 2021        Subjective:   Progress Note: 2021      Reason for ICU Admission: Acute hypoxic respiratory failure    Interval history:  53-year-old man who presented from [facility] on  with acute onset shortness of breath, hypoxia, nausea, vomiting and diarrhea-shortness of breath acutely got worse after one of the vomiting episodes. Diagnosed with Covid about 2 weeks prior to presentation.  Patient placed on BiPAP on arrival-felt much better.  After getting fluids for resuscitation/elevated lactate level patient acutely decompensated-developed lethargy and drop in oxygen levels-intubated emergently requiring moderate to high vent support. His respiratory status improved but his renal function was declining suspecting acute kidney injury on top of chronic kidney disease, it was also difficult to control his agitation on the ventilator. However patient condition improved and he was successfully extubated on . Patient continued to do well and he was transferred to the intermediate care unit on . Over the last few days patient oxygen requirement was increasing and he is becoming more lethargic. This morning he was placed on high flow nasal cannula and later on on BiPAP. On BiPAP he seems to be much better with decreased work of breathing and good tidal volume and oxygenation. Reviewing his chest x-ray seems that he has progressive volume overload and perhaps a component of aspiration. He was giving 1 dose of Lasix with good urine output and ICU consulted. Of note this patient been with positive fluid balance progressively since admission, according to documentation since admission he is about 20 L positive, over the last 3 days he is positive about 5 L. He has good urine output despite worsening BUN and creatinine.   No fever, no hemoptysis no nausea no vomiting. Active Problem List:     Problem List  Never Reviewed          Codes Class    Acute on chronic systolic heart failure (HCC) ICD-10-CM: I50.23  ICD-9-CM: 428.23         Acute hypoxemic respiratory failure due to COVID-19 [facility]) ICD-10-CM: U07.1, J96.01  ICD-9-CM: 518.81, 079.89, 799.02               Past Medical History:      has a past medical history of Diabetes ([address] and Hypertension. Past Surgical History:      has no past surgical history on file. Home Medications:     Prior to Admission medications    Medication Sig Start Date End Date Taking? Authorizing Provider   aspirin 81 mg chewable tablet Take 81 mg by mouth daily. Yes Candelaria, MD Jessi   atorvastatin (LIPITOR) 80 mg tablet Take 80 mg by mouth daily. Yes Jessi Gaona MD   carvediloL (COREG) 12.5 mg tablet Take  by mouth two (2) times a day. Yes Jessi Gaona MD   docusate sodium (Colace) 100 mg capsule Take 100 mg by mouth two (2) times daily as needed for Constipation. Yes Candelaria, MD Jessi   doxycycline (ADOXA) 100 mg tablet Take 100 mg by mouth two (2) times a day. Yes Jessi Gaona MD   ferrous sulfate 325 mg (65 mg iron) tablet Take  by mouth every fourty-eight (48) hours. Yes Jessi Gaona MD   gabapentin (NEURONTIN) 400 mg capsule Take 400 mg by mouth three (3) times daily. Yes Candelaria, MD Jessi   insulin lispro (HUMALOG) 100 unit/mL injection 10 Units by SubCUTAneous route Before breakfast, lunch, and dinner. Yes Candelaria, MD Jessi   isosorbide mononitrate ER (IMDUR) 60 mg CR tablet Take 60 mg by mouth every morning. Yes Jessi Gaona MD   insulin glargine (Lantus U-100 Insulin) 100 unit/mL injection 20 Units by SubCUTAneous route nightly. Yes Jessi Gaona MD   lisinopriL (PRINIVIL, ZESTRIL) 20 mg tablet Take 20 mg by mouth daily. Yes Jessi Gaona MD   methocarbamoL (ROBAXIN) 500 mg tablet Take 500 mg by mouth three (3) times daily.    Yes Jessi Gaona MD   oxyCODONE IR (ROXICODONE) 5 mg immediate release tablet Take 5 mg by mouth every six (6) hours as needed for Pain. Yes Other, MD Jessi   pantoprazole (PROTONIX) 40 mg tablet Take 40 mg by mouth daily. Yes Other, MD Jessi   guaiFENesin-codeine (Guaiatussin AC) 100-10 mg/5 mL solution Take 5 mL by mouth four (4) times daily as needed for Cough. Yes Candelaria, MD Jessi   senna (Senna) 8.6 mg tablet Take 1 Tab by mouth daily. Yes Other, MD Jessi   ticagrelor (BRILINTA) 90 mg tablet Take 90 mg by mouth two (2) times a day. Yes Other, MD Jessi   ascorbic acid, vitamin C, (Vitamin C) 500 mg tablet Take 500 mg by mouth two (2) times a day. Yes Candelaria, MD Jessi   cholecalciferol (Vitamin D3) (1000 Units /25 mcg) tablet Take 1,000 Units by mouth daily. Yes Candelaria, MD Jessi   zinc sulfate (ZINCATE) 220 (50) mg capsule Take 220 mg by mouth daily. Yes Provider, Historical   therapeutic multivitamin (THERAGRAN) tablet Take 1 Tab by mouth daily. Yes Provider, Historical   acetaminophen (TYLENOL) 325 mg tablet Take 975 mg by mouth every six (6) hours as needed for Pain. Other, MD Jessi       Allergies/Social/Family History:     No Known Allergies   Social History     Tobacco Use    Smoking status: Former Smoker    Smokeless tobacco: Never Used   Substance Use Topics    Alcohol use: Not Currently      No family history on file.     Review of Systems:     Not able to obtain due to the acuity of condition    Objective:   Vital Signs:  Visit Vitals  /68 (BP 1 Location: Right upper arm, BP Patient Position: At rest)   Pulse 90   Temp 98.2 °F (36.8 °C)   Resp 27   Ht 5' 7\" (1.702 m)   Wt 91.9 kg (202 lb 8 oz)   SpO2 98%   BMI 31.72 kg/m²    O2 Flow Rate (L/min): 50 l/min O2 Device: Heated, Hi flow nasal cannula Temp (24hrs), Av.2 °F (37.3 °C), Min:98.2 °F (36.8 °C), Max:100.4 °F (38 °C)           Intake/Output:     Intake/Output Summary (Last 24 hours) at 2021 1232  Last data filed at 2021 1200  Gross per 24 hour   Intake 410.33 ml   Output 1685 ml   Net -1274.67 ml       Physical Exam:    General: Chronically ill looking gentleman in mild distress on BiPAP, alert   HEENT: NG tube in place, BiPAP mask in place, setting of 18/12 with FiO2 of 100%, tidal volume    around 500 mL  Neck: Supple, left subclavian triple-lumen lumen central line, dressing intact no erythema  Lung: Fair air entry bilaterally, coarse crepitation bilaterally more on the left side  Heart: Regular rhythm, normal heart sound. Could not appreciate murmur  Abdomen: Nontender, positive bowel sound  Extremities: +2 edema  Neuro: Patient is alert profoundly weak in 4 limbs but withdraw to painful stimuli maintain eye contact at times try to follow simple command    LABS AND  DATA: Personally reviewed  Recent Labs     02/16/21  0317 02/15/21  0710   WBC 14.0* 17.0*   HGB 8.8* 8.6*   HCT 27.1* 27.6*    254     Recent Labs     02/16/21  0317 02/15/21  1312 02/15/21  0710    145  --    K 3.6 3.8  --    * 110*  --    CO2 18* 24  --    * 99*  --    CREA 6.18* 5.39*  --    * 137*  --    CA 8.3* 7.6*  --    MG 2.2  --  2.4   PHOS 5.2* 5.1* 5.2*     Recent Labs     02/16/21  0317 02/15/21  1312 02/14/21  0239   *  --  134*   TP 6.2*  --  5.3*   ALB 1.5* 1.5* 1.4*   GLOB 4.7*  --  3.9     No results for input(s): INR, PTP, APTT, INREXT, INREXT in the last 72 hours. Recent Labs     02/15/21  0925   PHI 7.42   PCO2I 33.7*   PO2I 55*   FIO2I 93     No results for input(s): CPK, CKMB, TROIQ, BNPP in the last 72 hours. Hemodynamics:   PAP:   CO:     Wedge:   CI:     CVP:    SVR:       PVR:       Ventilator Settings:  Mode Rate Tidal Volume Pressure FiO2 PEEP   Spontaneous   450 ml  8 cm H2O 50 % 5 cm H20     Peak airway pressure: 13 cm H2O    Minute ventilation: 28.6 l/min        MEDS: Reviewed    Chest X-Ray:  CXR Results  (Last 48 hours)               02/15/21 1108  XR CHEST PORT Final result    Impression:  Increasing bilateral pulmonary infiltrates and left pleural   effusion. Narrative: Indication: Respiratory failure       Comparison to 2/14/2021. Portable exam obtained at [phone] demonstrates increasing   bilateral pulmonary infiltrates and left pleural effusion compared to prior   examination. 02/14/21 1743  XR CHEST PORT Final result    Impression:  1. Interval worsening of patchy bilateral airspace disease and interstitial   opacities, which may represent some combination of worsening infection and   edema. Suspected small bilateral pleural effusions. Narrative:  EXAM:  XR CHEST PORT       INDICATION:   f/u: covid pneumonia, respiratory failure       COMPARISON: Chest radiograph 2/11/2021. FINDINGS: AP radiograph of the chest was obtained. Interval removal of endotracheal tube. Stable positioning of left subclavian   central venous catheter. Enteric tube is again noted extending into the abdomen. There is been interval worsening in patchy bilateral airspace disease and   interstitial opacities. There appear to be small bilateral pleural effusions. No   pneumothorax. Stable cardiomediastinal silhouette with calcifications of the   thoracic aorta. ECHO: On February 1  · LV: Estimated LVEF is 20 - 25%. Normal cavity size. Upper normal wall thickness. Severely reduced systolic function. Severe (grade 3) left ventricular diastolic dysfunction. · LA: Dilated left atrium. · MV: Mild mitral valve regurgitation is present. · TV: Mild tricuspid valve regurgitation is present. · PA: Moderate to severe pulmonary hypertension. · RV: Reduced systolic function.        Assessment and Plan:   Acute hypoxic respiratory failure  COVID-19 PNA  Aspiration  Volume Overload  Acute on Chronic Systolic HF    Appreciate Neph  Appreciate Cards  Nieves Wyman  ASA  Ticagrelor  Carvedilol  Lipitor  Bumex IV BID    CRITICAL CARE CONSULTANT NOTE  I had a face to face encounter with the patient, reviewed and interpreted patient data including clinical events, labs, images, vital signs, I/O's, and examined patient. I have discussed the case and the plan and management of the patient's care with the consulting services, the bedside nurses and the respiratory therapist.      NOTE OF PERSONAL INVOLVEMENT IN CARE   This patient has a high probability of imminent, clinically significant deterioration, which requires the highest level of preparedness to intervene urgently. I participated in the decision-making and personally managed or directed the management of the following life and organ supporting interventions that required my frequent assessment to treat or prevent imminent deterioration. I personally spent 75 minutes of critical care time. This is time spent at this critically ill patient's bedside actively involved in patient care as well as the coordination of care and discussions with the patient's family. This does not include any procedural time which has been billed separately.     Hua Almanzar DO   Staff Intensivist/Anesthesiologist   [facility]  2/16/2021

## 2021-02-16 NOTE — CONSULTS
Palliative Medicine Consult  Daniel: [phone] ([phone])    Patient Name: Jovi Hurley  YOB: 1955    Date of Initial Consult: 2/16/21  Reason for Consult: Care decisions  Requesting Provider: Dr. Logan Funez  Primary Care Physician: Jessi Gaona MD     SUMMARY:   Jovi Hurley is a 63 y.o. male with a past history of anemia, neck wound, diabetes mellitus type 2, ischemic cardiomyopathy, EF 20-25%, mod-severe pulmonary hypertension, and former heroin use, who was admitted on 1/30/2021 from [facility] with a diagnosis of Covid-19 pneumonia and acute hypoxic respiratory failure. He presented to the ED with complaints of Covid-19 infection, shortness of breath, and hypoxia. He was placed on BiPAP and later intubated. He was extubated 2/11 and transferred to [facility]. On 2/15, his condition deteriorated and his oxygen needs increased. He was transferred back to ICU and is now alternating between BiPAP and HFNC. Current medical issues leading to Palliative Medicine involvement include: critically ill at high risk of decompensation, discuss care decisions. Social: He had been at [facility] receiving skilled nursing care for his neck wound. His wife Marshall Díaz and their children are his main support. He and wife Marshall Díaz are , but he recently came to live with her and she has been helping him with his medical problems. PALLIATIVE DIAGNOSES:   1. Goals of care  2. DNR discussion  3. Covid-19 pneumonia  4. Mycoplasma and strep pneumonia  5. Acute hypoxic respiratory failure  6. JUAN C  7. Ischemic cardiomyopathy  8. Chronic systolic heart failure       PLAN:   1. Call placed to wife Venkata Gilmore. We talked about the patient's current condition and a medical update was given. We discussed his acute respiratory failure and chronic heart failure. We reviewed the echocardiogram results as well. Marshall Díaz reports that he has not taken care of himself over the years and had even been homeless at one point.  He is a former heroin user. Although, they are , she took him in when his medical problems got worse and she has been helping him along with their children. She is hopeful that he can recover from this illness, but understands that his condition is serious and he may not survive. 2. We discussed goals for his care and the following decisions were made:  1. Continue current care for now, will see how he does over the next few days. If no improvement or if he worsens, will transition to comfort care. 2. Code status changed to DO NOT RESUSCITATE  3. Continue DO NOT INTUBATE status  4. Do not escalate care, do not initiate dialysis  3. Will continue to follow the patient closely and continue to reach out to the family for support and ongoing goals of care discussions. 4. Initial consult note routed to primary continuity provider and/or primary health care team members  5. Communicated plan of care with: Palliative Rohit DOWELL [facility] Team     GOALS OF CARE / TREATMENT PREFERENCES:     GOALS OF CARE:  Patient/Health Care Proxy Stated Goals: Other (comment)(see if he can recover, if he worsens will switch to comfort care)    TREATMENT PREFERENCES:   Code Status: DNR    Advance Care Planning:  [x] [facility] Interdisciplinary Team has updated the ACP Navigator with [place] and Patient Capacity      Primary Decision Maker: Josue Dears - [facility] - [phone]  No flowsheet data found. Medical Interventions: Limited additional interventions     Other Instructions: Other:    As far as possible, the palliative care team has discussed with patient / health care proxy about goals of care / treatment preferences for patient.      HISTORY:     History obtained from: chart, wife    CHIEF COMPLAINT: Shortness of breath    HPI/SUBJECTIVE:    The patient is:   [] Verbal and participatory  [x] Non-participatory due to: critically ill, altered mental status     Clinical Pain Assessment (nonverbal scale for severity on nonverbal patients):   Clinical Pain Assessment  Severity: 0     Activity (Movement): Lying quietly, normal position    Duration: for how long has pt been experiencing pain (e.g., 2 days, 1 month, years)  Frequency: how often pain is an issue (e.g., several times per day, once every few days, constant)     FUNCTIONAL ASSESSMENT:     Palliative Performance Scale (PPS):  PPS: 30       PSYCHOSOCIAL/SPIRITUAL SCREENING:     Palliative IDT has assessed this patient for cultural preferences / practices and a referral made as appropriate to needs (Cultural Services, Patient Advocacy, Ethics, etc.)    Any spiritual / Religion concerns:  [] Yes /  [x] No    Caregiver Burnout:  [] Yes /  [x] No /  [] No Caregiver Present      Anticipatory grief assessment:   [x] Normal  / [] Maladaptive       ESAS Anxiety:      ESAS Depression:          REVIEW OF SYSTEMS:     Positive and pertinent negative findings in ROS are noted above in HPI. The following systems were [] reviewed / [x] unable to be reviewed as noted in HPI  Other findings are noted below. Systems: constitutional, ears/nose/mouth/throat, respiratory, gastrointestinal, genitourinary, musculoskeletal, integumentary, neurologic, psychiatric, endocrine. Positive findings noted below. Modified ESAS Completed by: provider   Fatigue: 10 Drowsiness: 7     Pain: 0           Dyspnea: 2           Stool Occurrence(s): 1        PHYSICAL EXAM:     From RN flowsheet:  Wt Readings from Last 3 Encounters:   02/16/21 202 lb 8 oz (91.9 kg)   06/09/20 172 lb (78 kg)     Blood pressure 132/77, pulse 81, temperature 98.2 °F (36.8 °C), resp. rate 30, height 5' 7\" (1.702 m), weight 202 lb 8 oz (91.9 kg), SpO2 99 %.     Pain Scale 1: Adult Nonverbal Pain Scale  Pain Intensity 1: 0     Pain Location 1: Leg  Pain Orientation 1: Right, Left  Pain Description 1: Shooting, Sharp  Pain Intervention(s) 1: Medication (see MAR)  Last bowel movement, if known: Constitutional: acutely ill appearing on HFNC, awake  Eyes: pupils equal, anicteric  ENMT: no nasal discharge, dry mucous membranes  Cardiovascular: irregular rhythm  Respiratory: breathing mildly labored on HFNC, symmetric  Musculoskeletal: no deformity, no tenderness to palpation  Skin: warm, dry  Neurologic: not following commands, moving all extremities       HISTORY:     Active Problems:    Acute hypoxemic respiratory failure due to COVID-19 ([facility] (1/30/2021)      Acute on chronic systolic heart failure ([facility] (2/16/2021)      Past Medical History:   Diagnosis Date    Diabetes ([facility]     Hypertension       No past surgical history on file. No family history on file. History reviewed, no pertinent family history. Social History     Tobacco Use    Smoking status: Former Smoker    Smokeless tobacco: Never Used   Substance Use Topics    Alcohol use: Not Currently     No Known Allergies   Current Facility-Administered Medications   Medication Dose Route Frequency    niCARdipine (CARDENE) 50 mg in 0.9% sodium chloride 100 mL infusion  0-15 mg/hr IntraVENous TITRATE    Vancomycin 36-hr level due 2/16 @ 2300. Thanks!    Other ONCE    albuterol (PROVENTIL HFA, VENTOLIN HFA, PROAIR HFA) inhaler 2 Puff  2 Puff Inhalation Q4H RT    heparin (porcine) injection 5,000 Units  5,000 Units SubCUTAneous Q8H    Vancomycin- Pharmacy Dosing   Other Rx Dosing/Monitoring    bumetanide (BUMEX) injection 2 mg  2 mg IntraVENous Q12H    meropenem (MERREM) 500 mg in 0.9% sodium chloride (MBP/ADV) 50 mL MBP  0.5 g IntraVENous Q12H    0.9% sodium chloride infusion 250 mL  250 mL IntraVENous PRN    insulin glargine (LANTUS) injection 20 Units  20 Units SubCUTAneous DAILY    pantoprazole (PROTONIX) 40 mg in 0.9% sodium chloride 10 mL injection  40 mg IntraVENous Q12H    labetaloL (NORMODYNE;TRANDATE) injection 20 mg  20 mg IntraVENous Q4H PRN    ticagrelor (BRILINTA) tablet 90 mg  90 mg Oral BID    carvediloL (COREG) tablet 25 mg  25 mg Per NG tube Q12H    isosorbide dinitrate (ISORDIL) tablet 20 mg  20 mg Per NG tube BID    amLODIPine (NORVASC) tablet 10 mg  10 mg Per NG tube DAILY    insulin lispro (HUMALOG) injection   SubCUTAneous Q6H    alteplase (CATHFLO) 1 mg in sterile water (preservative free) 1 mL injection  1 mg InterCATHeter PRN    ascorbic acid (vitamin C) (VITAMIN C) tablet 500 mg  500 mg Per NG tube BID    cholecalciferol (VITAMIN D3) (1000 Units /25 mcg) tablet 2,000 Units  2,000 Units Per NG tube DAILY    zinc sulfate (ZINCATE) 220 (50) mg capsule 1 Cap  1 Cap Per NG tube DAILY    HYDROmorphone (PF) (DILAUDID) injection 0.5 mg  0.5 mg IntraVENous Q4H PRN    ferrous sulfate 300 mg (60 mg iron)/5 mL oral syrup 300 mg  300 mg Per NG tube DAILY    metoprolol (LOPRESSOR) injection 5 mg  5 mg IntraVENous Q6H PRN    [Held by provider] allopurinoL (ZYLOPRIM) tablet 100 mg  100 mg Oral DAILY    sodium chloride (NS) flush 5-40 mL  5-40 mL IntraVENous Q8H    sodium chloride (NS) flush 5-40 mL  5-40 mL IntraVENous PRN    acetaminophen (TYLENOL) tablet 650 mg  650 mg Oral Q6H PRN    Or    acetaminophen (TYLENOL) suppository 650 mg  650 mg Rectal Q6H PRN    glucose chewable tablet 16 g  4 Tab Oral PRN    dextrose (D50W) injection syrg 12.5-25 g  12.5-25 g IntraVENous PRN    glucagon (GLUCAGEN) injection 1 mg  1 mg IntraMUSCular PRN    aspirin chewable tablet 81 mg  81 mg Oral DAILY    atorvastatin (LIPITOR) tablet 40 mg  40 mg Oral QHS          LAB AND IMAGING FINDINGS:     Lab Results   Component Value Date/Time    WBC 14.0 (H) 02/16/2021 03:17 AM    HGB 8.8 (L) 02/16/2021 03:17 AM    PLATELET 770 56/84/6586 03:17 AM     Lab Results   Component Value Date/Time    Sodium 145 02/16/2021 03:17 AM    Potassium 3.6 02/16/2021 03:17 AM    Chloride 114 (H) 02/16/2021 03:17 AM    CO2 18 (L) 02/16/2021 03:17 AM     (H) 02/16/2021 03:17 AM    Creatinine 6.18 (H) 02/16/2021 03:17 AM    Calcium 8.3 (L) 02/16/2021 03:17 AM    Magnesium 2.2 02/16/2021 03:17 AM    Phosphorus 5.2 (H) 02/16/2021 03:17 AM      Lab Results   Component Value Date/Time    Alk. phosphatase 127 (H) 02/16/2021 03:17 AM    Protein, total 6.2 (L) 02/16/2021 03:17 AM    Albumin 1.5 (L) 02/16/2021 03:17 AM    Globulin 4.7 (H) 02/16/2021 03:17 AM     Lab Results   Component Value Date/Time    aPTT 24.7 02/08/2021 10:13 AM      Lab Results   Component Value Date/Time    Iron 23 (L) 02/14/2021 01:12 PM    TIBC 146 (L) 02/14/2021 01:12 PM    Iron % saturation 16 (L) 02/14/2021 01:12 PM      Lab Results   Component Value Date/Time    pH 7.50 (H) 02/16/2021 06:25 AM    PCO2 21 (L) 02/16/2021 06:25 AM    PO2 85 02/16/2021 06:25 AM     No components found for: [place]   Lab Results   Component Value Date/Time     01/30/2021 08:00 AM                Total time: 70 min  Counseling / coordination time, spent as noted above: 65 min  > 50% counseling / coordination?: yes    Prolonged service was provided for  []30 min   []75 min in face to face time in the presence of the patient, spent as noted above. Time Start:   Time End:   Note: this can only be billed with 00719 (initial) or 83948 (follow up). If multiple start / stop times, list each separately.

## 2021-02-17 LAB
ALBUMIN SERPL-MCNC: 1.4 G/DL (ref 3.5–5)
ALBUMIN/GLOB SERPL: 0.3 {RATIO} (ref 1.1–2.2)
ALP SERPL-CCNC: 112 U/L (ref 45–117)
ALT SERPL-CCNC: 54 U/L (ref 12–78)
ANION GAP SERPL CALC-SCNC: 11 MMOL/L (ref 5–15)
AST SERPL-CCNC: 59 U/L (ref 15–37)
BASOPHILS # BLD: 0 K/UL (ref 0–0.1)
BASOPHILS NFR BLD: 0 % (ref 0–1)
BILIRUB SERPL-MCNC: 0.3 MG/DL (ref 0.2–1)
BUN SERPL-MCNC: 112 MG/DL (ref 6–20)
BUN/CREAT SERPL: 16 (ref 12–20)
CALCIUM SERPL-MCNC: 8 MG/DL (ref 8.5–10.1)
CHLORIDE SERPL-SCNC: 113 MMOL/L (ref 97–108)
CO2 SERPL-SCNC: 20 MMOL/L (ref 21–32)
CREAT SERPL-MCNC: 7.07 MG/DL (ref 0.7–1.3)
DIFFERENTIAL METHOD BLD: ABNORMAL
EOSINOPHIL # BLD: 0.1 K/UL (ref 0–0.4)
EOSINOPHIL NFR BLD: 1 % (ref 0–7)
ERYTHROCYTE [DISTWIDTH] IN BLOOD BY AUTOMATED COUNT: 18.4 % (ref 11.5–14.5)
GLOBULIN SER CALC-MCNC: 4.2 G/DL (ref 2–4)
GLUCOSE BLD STRIP.AUTO-MCNC: 141 MG/DL (ref 65–100)
GLUCOSE BLD STRIP.AUTO-MCNC: 144 MG/DL (ref 65–100)
GLUCOSE BLD STRIP.AUTO-MCNC: 151 MG/DL (ref 65–100)
GLUCOSE BLD STRIP.AUTO-MCNC: 167 MG/DL (ref 65–100)
GLUCOSE SERPL-MCNC: 139 MG/DL (ref 65–100)
HCT VFR BLD AUTO: 23.1 % (ref 36.6–50.3)
HGB BLD-MCNC: 7.3 G/DL (ref 12.1–17)
IMM GRANULOCYTES # BLD AUTO: 0.1 K/UL (ref 0–0.04)
IMM GRANULOCYTES NFR BLD AUTO: 1 % (ref 0–0.5)
LYMPHOCYTES # BLD: 1.4 K/UL (ref 0.8–3.5)
LYMPHOCYTES NFR BLD: 14 % (ref 12–49)
MAGNESIUM SERPL-MCNC: 2.4 MG/DL (ref 1.6–2.4)
MCH RBC QN AUTO: 26.2 PG (ref 26–34)
MCHC RBC AUTO-ENTMCNC: 31.6 G/DL (ref 30–36.5)
MCV RBC AUTO: 82.8 FL (ref 80–99)
MONOCYTES # BLD: 0.9 K/UL (ref 0–1)
MONOCYTES NFR BLD: 9 % (ref 5–13)
NEUTS SEG # BLD: 7.5 K/UL (ref 1.8–8)
NEUTS SEG NFR BLD: 75 % (ref 32–75)
NRBC # BLD: 0 K/UL (ref 0–0.01)
NRBC BLD-RTO: 0 PER 100 WBC
PHOSPHATE SERPL-MCNC: 5.5 MG/DL (ref 2.6–4.7)
PLATELET # BLD AUTO: 193 K/UL (ref 150–400)
PMV BLD AUTO: 12.3 FL (ref 8.9–12.9)
POTASSIUM SERPL-SCNC: 3.3 MMOL/L (ref 3.5–5.1)
PROT SERPL-MCNC: 5.6 G/DL (ref 6.4–8.2)
RBC # BLD AUTO: 2.79 M/UL (ref 4.1–5.7)
SERVICE CMNT-IMP: ABNORMAL
SODIUM SERPL-SCNC: 144 MMOL/L (ref 136–145)
URATE SERPL-MCNC: 5.7 MG/DL (ref 3.5–7.2)
VANCOMYCIN SERPL-MCNC: 18.1 UG/ML
WBC # BLD AUTO: 10.1 K/UL (ref 4.1–11.1)

## 2021-02-17 PROCEDURE — 36415 COLL VENOUS BLD VENIPUNCTURE: CPT

## 2021-02-17 PROCEDURE — 74011250636 HC RX REV CODE- 250/636: Performed by: INTERNAL MEDICINE

## 2021-02-17 PROCEDURE — 74011250636 HC RX REV CODE- 250/636: Performed by: NURSE PRACTITIONER

## 2021-02-17 PROCEDURE — 65610000006 HC RM INTENSIVE CARE

## 2021-02-17 PROCEDURE — 74011000258 HC RX REV CODE- 258: Performed by: INTERNAL MEDICINE

## 2021-02-17 PROCEDURE — 77010033711 HC HIGH FLOW OXYGEN

## 2021-02-17 PROCEDURE — 74011636637 HC RX REV CODE- 636/637: Performed by: HOSPITALIST

## 2021-02-17 PROCEDURE — 74011250637 HC RX REV CODE- 250/637: Performed by: ANESTHESIOLOGY

## 2021-02-17 PROCEDURE — 94762 N-INVAS EAR/PLS OXIMTRY CONT: CPT

## 2021-02-17 PROCEDURE — 74011636637 HC RX REV CODE- 636/637: Performed by: NURSE PRACTITIONER

## 2021-02-17 PROCEDURE — 74011250637 HC RX REV CODE- 250/637: Performed by: EMERGENCY MEDICINE

## 2021-02-17 PROCEDURE — 94760 N-INVAS EAR/PLS OXIMETRY 1: CPT

## 2021-02-17 PROCEDURE — 80202 ASSAY OF VANCOMYCIN: CPT

## 2021-02-17 PROCEDURE — 74011250637 HC RX REV CODE- 250/637: Performed by: NURSE PRACTITIONER

## 2021-02-17 PROCEDURE — 94640 AIRWAY INHALATION TREATMENT: CPT

## 2021-02-17 PROCEDURE — 84550 ASSAY OF BLOOD/URIC ACID: CPT

## 2021-02-17 PROCEDURE — 74011250637 HC RX REV CODE- 250/637: Performed by: HOSPITALIST

## 2021-02-17 PROCEDURE — 77010033678 HC OXYGEN DAILY

## 2021-02-17 PROCEDURE — 99233 SBSQ HOSP IP/OBS HIGH 50: CPT | Performed by: PHYSICIAN ASSISTANT

## 2021-02-17 PROCEDURE — 83735 ASSAY OF MAGNESIUM: CPT

## 2021-02-17 PROCEDURE — C9113 INJ PANTOPRAZOLE SODIUM, VIA: HCPCS | Performed by: HOSPITALIST

## 2021-02-17 PROCEDURE — 97110 THERAPEUTIC EXERCISES: CPT

## 2021-02-17 PROCEDURE — 74011000250 HC RX REV CODE- 250: Performed by: HOSPITALIST

## 2021-02-17 PROCEDURE — 74011250636 HC RX REV CODE- 250/636: Performed by: ANESTHESIOLOGY

## 2021-02-17 PROCEDURE — 74011250636 HC RX REV CODE- 250/636: Performed by: HOSPITALIST

## 2021-02-17 PROCEDURE — 80053 COMPREHEN METABOLIC PANEL: CPT

## 2021-02-17 PROCEDURE — 97530 THERAPEUTIC ACTIVITIES: CPT

## 2021-02-17 PROCEDURE — 84100 ASSAY OF PHOSPHORUS: CPT

## 2021-02-17 PROCEDURE — 74011250637 HC RX REV CODE- 250/637: Performed by: INTERNAL MEDICINE

## 2021-02-17 PROCEDURE — 74011000250 HC RX REV CODE- 250: Performed by: INTERNAL MEDICINE

## 2021-02-17 PROCEDURE — 74011000250 HC RX REV CODE- 250: Performed by: ANESTHESIOLOGY

## 2021-02-17 PROCEDURE — 85025 COMPLETE CBC W/AUTO DIFF WBC: CPT

## 2021-02-17 PROCEDURE — 82962 GLUCOSE BLOOD TEST: CPT

## 2021-02-17 RX ORDER — ISOSORBIDE DINITRATE 10 MG/1
30 TABLET ORAL EVERY 8 HOURS
Status: DISCONTINUED | OUTPATIENT
Start: 2021-02-17 | End: 2021-03-03

## 2021-02-17 RX ORDER — HYDROMORPHONE HYDROCHLORIDE 1 MG/ML
1 INJECTION, SOLUTION INTRAMUSCULAR; INTRAVENOUS; SUBCUTANEOUS
Status: DISCONTINUED | OUTPATIENT
Start: 2021-02-17 | End: 2021-02-21

## 2021-02-17 RX ADMIN — ISOSORBIDE DINITRATE 30 MG: 20 TABLET ORAL at 22:40

## 2021-02-17 RX ADMIN — CARVEDILOL 25 MG: 12.5 TABLET, FILM COATED ORAL at 08:26

## 2021-02-17 RX ADMIN — BUMETANIDE 2 MG: 0.25 INJECTION INTRAMUSCULAR; INTRAVENOUS at 08:25

## 2021-02-17 RX ADMIN — INSULIN LISPRO 3 UNITS: 100 INJECTION, SOLUTION INTRAVENOUS; SUBCUTANEOUS at 12:00

## 2021-02-17 RX ADMIN — OXYCODONE HYDROCHLORIDE AND ACETAMINOPHEN 500 MG: 500 TABLET ORAL at 08:25

## 2021-02-17 RX ADMIN — HEPARIN SODIUM 5000 UNITS: 5000 INJECTION INTRAVENOUS; SUBCUTANEOUS at 04:00

## 2021-02-17 RX ADMIN — HEPARIN SODIUM 5000 UNITS: 5000 INJECTION INTRAVENOUS; SUBCUTANEOUS at 11:17

## 2021-02-17 RX ADMIN — MEROPENEM 500 MG: 500 INJECTION, POWDER, FOR SOLUTION INTRAVENOUS at 22:39

## 2021-02-17 RX ADMIN — CASTOR OIL AND BALSAM, PERU: 788; 87 OINTMENT TOPICAL at 08:26

## 2021-02-17 RX ADMIN — CASTOR OIL AND BALSAM, PERU: 788; 87 OINTMENT TOPICAL at 20:06

## 2021-02-17 RX ADMIN — ASPIRIN 81 MG: 81 TABLET, CHEWABLE ORAL at 08:26

## 2021-02-17 RX ADMIN — OXYCODONE HYDROCHLORIDE AND ACETAMINOPHEN 500 MG: 500 TABLET ORAL at 19:05

## 2021-02-17 RX ADMIN — HYDROMORPHONE HYDROCHLORIDE 0.5 MG: 1 INJECTION, SOLUTION INTRAMUSCULAR; INTRAVENOUS; SUBCUTANEOUS at 02:06

## 2021-02-17 RX ADMIN — ATORVASTATIN CALCIUM 40 MG: 40 TABLET, FILM COATED ORAL at 22:40

## 2021-02-17 RX ADMIN — Medication 1 CAPSULE: at 08:25

## 2021-02-17 RX ADMIN — INSULIN LISPRO 3 UNITS: 100 INJECTION, SOLUTION INTRAVENOUS; SUBCUTANEOUS at 06:41

## 2021-02-17 RX ADMIN — ALBUTEROL SULFATE 2 PUFF: 90 AEROSOL, METERED RESPIRATORY (INHALATION) at 13:11

## 2021-02-17 RX ADMIN — MEROPENEM 500 MG: 500 INJECTION, POWDER, FOR SOLUTION INTRAVENOUS at 11:14

## 2021-02-17 RX ADMIN — AMLODIPINE BESYLATE 10 MG: 5 TABLET ORAL at 08:26

## 2021-02-17 RX ADMIN — ISOSORBIDE DINITRATE 30 MG: 20 TABLET ORAL at 13:59

## 2021-02-17 RX ADMIN — CARVEDILOL 25 MG: 12.5 TABLET, FILM COATED ORAL at 20:06

## 2021-02-17 RX ADMIN — MINERAL SUPPLEMENT IRON 300 MG / 5 ML STRENGTH LIQUID 100 PER BOX UNFLAVORED 300 MG: at 09:46

## 2021-02-17 RX ADMIN — ALBUTEROL SULFATE 2 PUFF: 90 AEROSOL, METERED RESPIRATORY (INHALATION) at 16:07

## 2021-02-17 RX ADMIN — BUMETANIDE 2 MG: 0.25 INJECTION INTRAMUSCULAR; INTRAVENOUS at 20:06

## 2021-02-17 RX ADMIN — LABETALOL HYDROCHLORIDE 20 MG: 5 INJECTION INTRAVENOUS at 04:01

## 2021-02-17 RX ADMIN — TICAGRELOR 90 MG: 90 TABLET ORAL at 19:05

## 2021-02-17 RX ADMIN — CHOLECALCIFEROL (VITAMIN D3) 25 MCG (1,000 UNIT) TABLET 2000 UNITS: TABLET at 08:25

## 2021-02-17 RX ADMIN — Medication 10 ML: at 22:40

## 2021-02-17 RX ADMIN — SODIUM CHLORIDE 40 MG: 9 INJECTION INTRAMUSCULAR; INTRAVENOUS; SUBCUTANEOUS at 14:05

## 2021-02-17 RX ADMIN — HYDROMORPHONE HYDROCHLORIDE 1 MG: 1 INJECTION, SOLUTION INTRAMUSCULAR; INTRAVENOUS; SUBCUTANEOUS at 06:31

## 2021-02-17 RX ADMIN — Medication 10 ML: at 06:42

## 2021-02-17 RX ADMIN — INSULIN LISPRO 3 UNITS: 100 INJECTION, SOLUTION INTRAVENOUS; SUBCUTANEOUS at 00:18

## 2021-02-17 RX ADMIN — HEPARIN SODIUM 5000 UNITS: 5000 INJECTION INTRAVENOUS; SUBCUTANEOUS at 20:05

## 2021-02-17 RX ADMIN — POTASSIUM BICARBONATE 20 MEQ: 391 TABLET, EFFERVESCENT ORAL at 11:14

## 2021-02-17 RX ADMIN — LABETALOL HYDROCHLORIDE 20 MG: 5 INJECTION INTRAVENOUS at 16:16

## 2021-02-17 RX ADMIN — Medication 10 ML: at 13:59

## 2021-02-17 RX ADMIN — HYDROMORPHONE HYDROCHLORIDE 1 MG: 1 INJECTION, SOLUTION INTRAMUSCULAR; INTRAVENOUS; SUBCUTANEOUS at 11:44

## 2021-02-17 RX ADMIN — ALBUTEROL SULFATE 2 PUFF: 90 AEROSOL, METERED RESPIRATORY (INHALATION) at 20:15

## 2021-02-17 RX ADMIN — METOPROLOL TARTRATE 5 MG: 5 INJECTION INTRAVENOUS at 19:20

## 2021-02-17 RX ADMIN — METOPROLOL TARTRATE 5 MG: 5 INJECTION INTRAVENOUS at 07:20

## 2021-02-17 RX ADMIN — TICAGRELOR 90 MG: 90 TABLET ORAL at 08:25

## 2021-02-17 RX ADMIN — SODIUM CHLORIDE 40 MG: 9 INJECTION INTRAMUSCULAR; INTRAVENOUS; SUBCUTANEOUS at 02:04

## 2021-02-17 RX ADMIN — INSULIN LISPRO 3 UNITS: 100 INJECTION, SOLUTION INTRAVENOUS; SUBCUTANEOUS at 19:05

## 2021-02-17 RX ADMIN — INSULIN GLARGINE 20 UNITS: 100 INJECTION, SOLUTION SUBCUTANEOUS at 08:26

## 2021-02-17 RX ADMIN — ISOSORBIDE DINITRATE 20 MG: 20 TABLET ORAL at 08:25

## 2021-02-17 NOTE — PROGRESS NOTES
SLP Contact Note    Pt remains not appropriate for SLP from respiratory standpoint. Therefore, SLP to sign-off for now. Please reconsult if SLP can be of any assistance.       Thank you,  BERYL De La VegaEd, [id number] [facility]  Speech-Language Pathologist

## 2021-02-17 NOTE — PROGRESS NOTES
Bedside and Verbal shift change report given to [address] and Bernice RN (oncoming nurse) by Jenni Quintana RN (offgoing nurse). Report included the following information SBAR, Kardex, Intake/Output, MAR, Recent Results and Med Rec Status. ICU multidisciplinary rounds lead by Rito Bright MD (Intensivist): The following were reviewed and discussed: current labs,  recent imaging, lines/drains, review of body systems, nutrition, cultures, mobility, length of ICU stay. The plan of care for the day is as follows  Replace K+, control HTN, and involve PT/OT. (written note by RN)     1600: Patient with improved mentation, oriented to person place and situation, disoriented to time. Follows commands, Right side more interactive. Patient speech is cleared but delayed. Spoke with wife to update on patient condition and increase in awareness. Will do Zoom meeting at [address]: Bourgeois removed per MD. Condom cath placed for correct output     Bedside and Verbal shift change report given to Harry Turner (oncoming nurse) by [address] Syl[address] and Bernice RN (offgoing nurse). Report included the following information SBAR, Kardex, Procedure Summary, Intake/Output, MAR, Recent Results and Med Rec Status.

## 2021-02-17 NOTE — PROGRESS NOTES
Pharmacist Note - Vancomycin Dosing  Therapy day 3  Indication: worsening HAP; COVID 19+  Current regimen: 2g IV x1 dose (given 2/15 @ 11:16)    Recent Labs     02/16/21  0317 02/15/21  1312 02/15/21  0710 02/14/21  1312 02/14/21  0239   WBC 14.0*  --  17.0*  --  11.7*   CREA 6.18* 5.39*  --  4.59* 4.41*   * 99*  --  90* 88*       A Random Level resulted at 18.91 mcg/mL which was obtained ~48r hs post loading dose   Goal trough: 15 - 20 mcg/mL       Plan: Continue to hold / dose by levels at this time. Will re-dose when level <15 mcg/ml  . Pharmacy will continue to monitor this patient daily for changes in clinical status and renal function.

## 2021-02-17 NOTE — PROGRESS NOTES
1930: Bedside and Verbal shift change report given to [facility] (oncoming nurse) by Noemy Chatman RN and Bernice RN (offgoing nurse). Report included the following information SBAR, Kardex, ED Summary, Intake/Output, MAR, Recent Results, Cardiac Rhythm SR/afib and Alarm Parameters . 0730: Bedside and Verbal shift change report given to [address] [place] [address] Ajay Sylvester and Bernice RN (oncoming nurse) by Lisa Gaitan RN (offgoing nurse). Report included the following information SBAR, Kardex, ED Summary, Intake/Output, MAR, Recent Results, Cardiac Rhythm afib/SR and Alarm Parameters .

## 2021-02-17 NOTE — PROGRESS NOTES
Nephrology Progress Note  Мария Almaraz  Date of Admission : 1/30/2021    CC: Follow up for JUAN C        Assessment and Plan     JUAN C:  - unclear baseline but suspect some component of CKD  - Renal U/S on 1/30 confirms CKD  - family agreed not to pursue dialysis   - with continued decline in renal function, probably best to transition to com    Volume overload:  - diuresis as above      Acidosis : better      COVID-19 PNA:  - abx and steroids per CCM team     Resp failure:  - vent dependent     DM2:  - on insulin       Interval History:  Events noted   DNR/ DNI/ no dialysis - appreciate palliative care help     Current Medications: all current  Medications have been eviewed in EPIC  Review of Systems: Review of systems not obtained due to patient factors. Objective:  Vitals:    Vitals:    02/17/21 0300 02/17/21 0400 02/17/21 0500 02/17/21 0600   BP: (!) 148/79 (!) 166/101 (!) 163/86 (!) 170/90   Pulse: 81 84 85 91   Resp: 23 19 19 22   Temp:  98.4 °F (36.9 °C)     SpO2: 99% 100% 100% 100%   Weight:  91.1 kg (200 lb 12.8 oz)     Height:         Intake and Output:  No intake/output data recorded. 02/15 1901 - 02/17 0700  In: 1370.3 [I.V.:160.3]  Out: 2195 [Urine:1245; Drains:950]    Physical Examination:  Not examined in room due to COVID isolation     General: High flow , lethargic   Resp:  Symmetrical chest movements   CV:  Irregular   Neurologic:  Confused   abd : Non distended   Ext : +edema   Skin:  No Rash  :  Bourgeois in place    [x]    High complexity decision making was performed  [x]    Patient is at high-risk of decompensation with multiple organ involvement    Lab Data Personally Reviewed: I have reviewed all the pertinent labs, microbiology data and radiology studies during assessment.     Recent Labs     02/17/21  0416 02/16/21  0317 02/15/21  1312 02/15/21  0710    145 145  --    K 3.3* 3.6 3.8  --    * 114* 110*  --    CO2 20* 18* 24  --    * 104* 137*  --    * 101* 99*  -- CREA 7.07* 6.18* 5.39*  --    CA 8.0* 8.3* 7.6*  --    MG 2.4 2.2  --  2.4   PHOS 5.5* 5.2* 5.1* 5.2*   ALB 1.4* 1.5* 1.5*  --    ALT 54 60  --   --      Recent Labs     02/17/21  0416 02/16/21  0317 02/15/21  0710   WBC 10.1 14.0* 17.0*   HGB 7.3* 8.8* 8.6*   HCT 23.1* 27.1* 27.6*    234 254     Lab Results   Component Value Date/Time    Specimen Description: BLOOD 02/27/2010 07:00 PM     Lab Results   Component Value Date/Time    Culture result: NO GROWTH 2 DAYS 02/15/2021 10:58 AM    Culture result: LIGHT YEAST, (APPARENT CANDIDA ALBICANS) (A) 02/12/2021 04:53 PM    Culture result: LIGHT NORMAL RESPIRATORY CORI 02/12/2021 04:53 PM     Recent Results (from the past 24 hour(s))   GLUCOSE, POC    Collection Time: 02/16/21 12:08 PM   Result Value Ref Range    Glucose (POC) 116 (H) 65 - 100 mg/dL    Performed by Marley Rain RN    GLUCOSE, POC    Collection Time: 02/16/21  5:25 PM   Result Value Ref Range    Glucose (POC) 141 (H) 65 - 100 mg/dL    Performed by Luanne Gun RN Karyle Manly, RANDOM    Collection Time: 02/16/21 10:51 PM   Result Value Ref Range    Vancomycin, random 18.9 UG/ML   GLUCOSE, POC    Collection Time: 02/17/21 12:12 AM   Result Value Ref Range    Glucose (POC) 144 (H) 65 - 100 mg/dL    Performed by [facility]    MAGNESIUM    Collection Time: 02/17/21  4:16 AM   Result Value Ref Range    Magnesium 2.4 1.6 - 2.4 mg/dL   PHOSPHORUS    Collection Time: 02/17/21  4:16 AM   Result Value Ref Range    Phosphorus 5.5 (H) 2.6 - 4.7 MG/DL   URIC ACID    Collection Time: 02/17/21  4:16 AM   Result Value Ref Range    Uric acid 5.7 3.5 - 7.2 MG/DL   CBC WITH AUTOMATED DIFF    Collection Time: 02/17/21  4:16 AM   Result Value Ref Range    WBC 10.1 4.1 - 11.1 K/uL    RBC 2.79 (L) 4.10 - 5.70 M/uL    HGB 7.3 (L) 12.1 - 17.0 g/dL    HCT 23.1 (L) 36.6 - 50.3 %    MCV 82.8 80.0 - 99.0 FL    MCH 26.2 26.0 - 34.0 PG    MCHC 31.6 30.0 - 36.5 g/dL    RDW 18.4 (H) 11.5 - 14.5 %    PLATELET 894 133 - 400 K/uL    MPV 12.3 8.9 - 12.9 FL    NRBC 0.0 0  WBC    ABSOLUTE NRBC 0.00 0.00 - 0.01 K/uL    NEUTROPHILS 75 32 - 75 %    LYMPHOCYTES 14 12 - 49 %    MONOCYTES 9 5 - 13 %    EOSINOPHILS 1 0 - 7 %    BASOPHILS 0 0 - 1 %    IMMATURE GRANULOCYTES 1 (H) 0.0 - 0.5 %    ABS. NEUTROPHILS 7.5 1.8 - 8.0 K/UL    ABS. LYMPHOCYTES 1.4 0.8 - 3.5 K/UL    ABS. MONOCYTES 0.9 0.0 - 1.0 K/UL    ABS. EOSINOPHILS 0.1 0.0 - 0.4 K/UL    ABS. BASOPHILS 0.0 0.0 - 0.1 K/UL    ABS. IMM. GRANS. 0.1 (H) 0.00 - 0.04 K/UL    DF AUTOMATED     METABOLIC PANEL, COMPREHENSIVE    Collection Time: 02/17/21  4:16 AM   Result Value Ref Range    Sodium 144 136 - 145 mmol/L    Potassium 3.3 (L) 3.5 - 5.1 mmol/L    Chloride 113 (H) 97 - 108 mmol/L    CO2 20 (L) 21 - 32 mmol/L    Anion gap 11 5 - 15 mmol/L    Glucose 139 (H) 65 - 100 mg/dL     (H) 6 - 20 MG/DL    Creatinine 7.07 (H) 0.70 - 1.30 MG/DL    BUN/Creatinine ratio 16 12 - 20      GFR est AA 10 (L) >60 ml/min/1.73m2    GFR est non-AA 8 (L) >60 ml/min/1.73m2    Calcium 8.0 (L) 8.5 - 10.1 MG/DL    Bilirubin, total 0.3 0.2 - 1.0 MG/DL    ALT (SGPT) 54 12 - 78 U/L    AST (SGOT) 59 (H) 15 - 37 U/L    Alk. phosphatase 112 45 - 117 U/L    Protein, total 5.6 (L) 6.4 - 8.2 g/dL    Albumin 1.4 (L) 3.5 - 5.0 g/dL    Globulin 4.2 (H) 2.0 - 4.0 g/dL    A-G Ratio 0.3 (L) 1.1 - 2.2     GLUCOSE, POC    Collection Time: 02/17/21  6:36 AM   Result Value Ref Range    Glucose (POC) 167 (H) 65 - 100 mg/dL    Performed by Selvin Gardiner MD  [address]  Phone - [phone]   Fax - [phone]  [email or web address]

## 2021-02-17 NOTE — PROGRESS NOTES
Problem: Self Care Deficits Care Plan (Adult)  Goal: *Acute Goals and Plan of Care (Insert Text)  Description:   FUNCTIONAL STATUS PRIOR TO ADMISSION: Patient was independent and active without use of DME. Patient was independent for basic and instrumental ADLs at his true baseline, however admitted from [facility] for receiving abx in prep for skin graft. HOME SUPPORT: The patient lived with his wife but did not require assist.    Occupational Therapy Goals  Initiated 2/12/2021  1. Patient will perform self-feeding with moderate assistance & compensatory techniques PRN within 7 day(s). 2.  Patient will perform grooming with moderate assistance within 7 day(s). 3.  Patient will perform anterior neck to thigh bathing with moderate assistance within 7 day(s). 4.  Patient will perform rolling in supine toilet transfers with moderate assistance within 7 day(s). 5.  Patient will participate in upper extremity therapeutic exercise/activities with moderate assistance for 10 minutes within 7 day(s). 7.  Patient will utilize energy conservation techniques during functional activities with verbal cues within 7 day(s). Outcome: Progressing Towards Goal   OCCUPATIONAL THERAPY TREATMENT  Patient: Margie Guerrier (46 y.o. male)  Date: 2/17/2021  Diagnosis: Acute hypoxemic respiratory failure due [facility] [U07.1, J96.01] <principal problem not specified>      Precautions: Fall(droplet plus'''')  Chart, occupational therapy assessment, plan of care, and goals were reviewed. ASSESSMENT  Patient continues with skilled OT services and is progressing towards goals. Minimal activity participation today. ADLs limited by active ROM throughout, cognition, strength, edema, cardiopulmonary tolerance and needing to remain in bed for activity.       Current Level of Function Impacting Discharge (ADLs): max assistance upper body ADLs; total assistance lower body ADLs    Other factors to consider for discharge:          PLAN :  Patient continues to benefit from skilled intervention to address the above impairments. Continue treatment per established plan of care. to address goals. Recommend with staff: bed/chair position, completing ADLs as able    Recommend next OT session: upper body ADLs    Recommendation for discharge: (in order for the patient to meet his/her long term goals)  Therapy up to 5 days/week in SNF setting    This discharge recommendation:  Has not yet been discussed the attending provider and/or case management    IF patient discharges home will need the following DME: TBD       SUBJECTIVE:   Patient stated Im alright.     OBJECTIVE DATA SUMMARY:   Cognitive/Behavioral Status:  Neurologic State: Confused; Other (Comment); Eyes open spontaneously(JULIETTE)  Orientation Level: Unable to verbalize  Cognition: Follows commands;Decreased attention/concentration;Poor safety awareness      Call bell and tv max assistance attempted, nodding yes or no for shows increased time. Command following 1 step commands 50% increased time without tactile cues; the other 50% with tactile cue to initiate. Functional Mobility and Transfers for ADLs:  Bed Mobility:     Tolerated bed/chair position. Transfers:             Balance:       ADL Intervention:                                 socks: instruction knee flexion, demonstrated total assistance with stiffness in B LEs limiting all ROM. Therapeutic Exercises:   Nurse asking for minimal activity, PT defer.  Patient instructed on benefits and demonstrated shoulders - digits all planes 5 reps each with min A cues to continue and keep count; moderate assistance R UE to gain 90* flexion, L UE to gain 120* flexion     Pain:      Activity Tolerance:   Vitals:    02/17/21 1300 02/17/21 1311 02/17/21 1400 02/17/21 1535   BP: 137/70  (!) 143/76 124/86   BP 1 Location:       BP Patient Position:       Pulse: 77  88 77   Resp: 16  13 15   Temp:       SpO2: 99% 98% 99% 99%   Weight:       Height:         40L 50% FIO2 hi flow    After treatment patient left in no apparent distress:   Call bell within reach and Side rails x 3; bed in chair position    COMMUNICATION/COLLABORATION:   The patients plan of care was discussed with: Physical therapist and Registered nurse.      Riley Lester  Time Calculation: 24 mins

## 2021-02-17 NOTE — PROGRESS NOTES
Pharmacist Note - Vancomycin Dosing  Therapy day 3  Indication: worsening HAP; COVID 19+  Current regimen: 2g IV x1 dose (given 2/15 @ 11:16)    Recent Labs     02/16/21  0317 02/15/21  1312 02/15/21  0710 02/14/21  1312 02/14/21  0239   WBC 14.0*  --  17.0*  --  11.7*   CREA 6.18* 5.39*  --  4.59* 4.41*   * 99*  --  90* 88*       A Random Level resulted at 18.9 mcg/mL which was obtained ~36 hrs post-dose. The patient has only received a single dose so this does not reflect a steady state trough. Goal trough: 15 - 20 mcg/mL       Plan: Continue to hold / dose by levels at this time. Care team would like the level < or = 15mcg/mL prior to administering another dose secondary to the patient's very low ability to clear Vancomycin at this time due to significant renal insufficiency. Therefore, will order a repeat random level for 11am today, 2/17/2021 (~48 hours post dose) to allow additional dosing. Pharmacy will continue to monitor this patient daily for changes in clinical status and renal function.

## 2021-02-17 NOTE — PROGRESS NOTES
Cardiology Progess Note      Patient Name: Major Demarco  : 1955 MRN: [id number]  Date: 2021  Time: 11:42 AM    Subjective:  Remains in [place] isolation in ICU. Now on HiFlow NC.  100% FiO2. RF worsening        Assessment and Plan     1. Cardiomyopathy   - systolic HF acute on chronic   - NYHA class IV  21   ECHO ADULT COMPLETE 2021    Narrative · LV: Estimated LVEF is 20 - 25%. Normal cavity size. Upper normal wall   thickness. Severely reduced systolic function. Severe (grade 3) left   ventricular diastolic dysfunction. · LA: Dilated left atrium. · MV: Mild mitral valve regurgitation is present. · TV: Mild tricuspid valve regurgitation is present. · PA: Moderate to severe pulmonary hypertension. · RV: Reduced systolic function. Signed by: Ricardo Espino MD    - Coreg, Isordil, Norvasc    - PTA Lisinopril, Coreg, Imdur and Norvasc  2. Acute Respiratory failure   - HiFlow at 100% FiO2   - PCXR 2/15 - Increasing bilateral pulmonary infiltrates and left pleural     effusion. 3. COVID Positive  4. Mycoplasma and strep PNA   - ID following  5. JUAN C   - Nephrology following   - Worsening Cr. 7.07   - Per Palliative Note - patient DNR/DNI - family does NOT want to pursue dialysis  6. CAD   - STEMI 3/24/20   - PCI 4/3/20   - [place] at [facility] [place] stents [place]   - Brilinta, ASA, Coreg and Liptior  7. PHTN   - Mod to severe on echo   - Sildenafil PTA  8. H/o Heroine abuse   - Getting Methadone at [facility]  9. Anemia   - No stool occult completed   - Hgb 7.3y   - per Primary team  10. HLD   - Liptior 80 mg  11. HTN   - Coreg, Norvasc, Lasix, Isordil   - Cardene gtt at 5  12. DM II   - SSI    RF continues to decline. Patient DNR/DNI and family does not want to move to dialysis. Appropriate to move to comfort care. Needs no further cardiac testing.   Will follow from a distance until comfort measures are instituted, then will sign off. Patient Active Problem List   Diagnosis Code    [facility] hypox[facility] to [facility] U07.1, J96.01    Acute on chronic systolic heart failure (HCC) I50.23    Goals of care, counseling/discussion Z71.89    DNR (do not resuscitate) discussion Z71.89    JUAN C (acute kidney injury) ([facility] N17.9     No specialty comments available. Review of Systems:    [x] Patient unable to provide secondary to condition    [] All systems negative, except as checked below.   Constitutional:    []Weight Change  []Fever   []Chills   []Night Sweats  []Fatigue  []Malaise  []____  ENT/Mouth:     []Hearing Changes  []Ear Pain  []Nasal Congestion   []Sinus Pain  []Hoarseness   []Sore throat  []Rhinorrhea  []Swallowing Difficulty  []____  Eyes:    []Eye Pain  []Swelling  []Redness  []Foreign Body  []Discharge  []Vision Changes  []____  Cardiovascular:    []Chest Pain  []SOB  []PND  []WEBB  []Orthopnea  []Claudication  []Edema   []Palpitations  []____  Respiratory:    []Cough  []Sputum  []Wheezing,  []SOB  []Hemoptysis  []____  Gastrointestinal:    []Nausea  []Vomiting  []Diarrhea  []Constipation  []Pain  []Heartburn  []Anorexia  []Dysphagia  []Hematochezia  []Melena,  []Jaundice  []____  Genitourinary:    []Dysuria  []Urinary Frequency  []Hematuria  []Urinary Incontinence  []Urgency  []Flank Pain  []Hesitancy  []____  Musculoskeletal:    []Arthralgias  []Myalgias  []Joint Swelling  []Joint Stiffness  []Back Pain  []Neck Pain  []____  Skin:    []Skin Lesions  []Pruritis  []Hair Changes  []Skin rashes  []____  Neuro:    []Weakness  []Numbness  []Paresthesias  []Loss of Consciousness  []Syncope   []Dizziness  []Headache  []Coordination Changes  []Recent Falls  []____  Psych:    []Anxiety/Depression  []Insomnia  []Memory Changes  []Violence/Abuse Hx.  []____  Heme/Lymph:    []Bruising  []Bleeding  []Lymphadenopathy  []____  Endocrine:    []Polyuria  []Polydipsia  []Temperature Intolerance  []____ Previous treatment/evaluation includes   PCI with stents, Echocardiogram - TTE/SAMARA  Cardiac risk factors:   smoking/ tobacco exposure, family history, dyslipidemia, diabetes mellitus, male gender, hypertension. Past Medical History:   Diagnosis Date    Diabetes ([address]     Hypertension      No past surgical history on file.   Current Facility-Administered Medications   Medication Dose Route Frequency    Vancomycin:  Please draw a random level at 11am today, 2/17/2021   Other ONCE    HYDROmorphone (PF) (DILAUDID) injection 1 mg  1 mg IntraVENous Q3H PRN    niCARdipine (CARDENE) 50 mg in 0.9% sodium chloride 100 mL infusion  0-15 mg/hr IntraVENous TITRATE    balsam peru-castor oiL (VENELEX) ointment   Topical Q12H    albuterol (PROVENTIL HFA, VENTOLIN HFA, PROAIR HFA) inhaler 2 Puff  2 Puff Inhalation Q4H RT    heparin (porcine) injection 5,000 Units  5,000 Units SubCUTAneous Q8H    Vancomycin- Pharmacy Dosing   Other Rx Dosing/Monitoring    bumetanide (BUMEX) injection 2 mg  2 mg IntraVENous Q12H    meropenem (MERREM) 500 mg in 0.9% sodium chloride (MBP/ADV) 50 mL MBP  0.5 g IntraVENous Q12H    0.9% sodium chloride infusion 250 mL  250 mL IntraVENous PRN    insulin glargine (LANTUS) injection 20 Units  20 Units SubCUTAneous DAILY    pantoprazole (PROTONIX) 40 mg in 0.9% sodium chloride 10 mL injection  40 mg IntraVENous Q12H    labetaloL (NORMODYNE;TRANDATE) injection 20 mg  20 mg IntraVENous Q4H PRN    ticagrelor (BRILINTA) tablet 90 mg  90 mg Oral BID    carvediloL (COREG) tablet 25 mg  25 mg Per NG tube Q12H    isosorbide dinitrate (ISORDIL) tablet 20 mg  20 mg Per NG tube BID    amLODIPine (NORVASC) tablet 10 mg  10 mg Per NG tube DAILY    insulin lispro (HUMALOG) injection   SubCUTAneous Q6H    alteplase (CATHFLO) 1 mg in sterile water (preservative free) 1 mL injection  1 mg InterCATHeter PRN    ascorbic acid (vitamin C) (VITAMIN C) tablet 500 mg  500 mg Per NG tube BID    cholecalciferol (VITAMIN D3) (1000 Units /25 mcg) tablet 2,000 Units  2,000 Units Per NG tube DAILY    zinc sulfate (ZINCATE) 220 (50) mg capsule 1 Cap  1 Cap Per NG tube DAILY    ferrous sulfate 300 mg (60 mg iron)/5 mL oral syrup 300 mg  300 mg Per NG tube DAILY    metoprolol (LOPRESSOR) injection 5 mg  5 mg IntraVENous Q6H PRN    [Held by provider] allopurinoL (ZYLOPRIM) tablet 100 mg  100 mg Oral DAILY    sodium chloride (NS) flush 5-40 mL  5-40 mL IntraVENous Q8H    sodium chloride (NS) flush 5-40 mL  5-40 mL IntraVENous PRN    acetaminophen (TYLENOL) tablet 650 mg  650 mg Oral Q6H PRN    Or    acetaminophen (TYLENOL) suppository 650 mg  650 mg Rectal Q6H PRN    glucose chewable tablet 16 g  4 Tab Oral PRN    dextrose (D50W) injection syrg 12.5-25 g  12.5-25 g IntraVENous PRN    glucagon (GLUCAGEN) injection 1 mg  1 mg IntraMUSCular PRN    aspirin chewable tablet 81 mg  81 mg Oral DAILY    atorvastatin (LIPITOR) tablet 40 mg  40 mg Oral QHS       No Known Allergies   No family history on file.    Social History     Socioeconomic History    Marital status:      Spouse name: Not on file    Number of children: Not on file    Years of education: Not on file    Highest education level: Not on file   Tobacco Use    Smoking status: Former Smoker    Smokeless tobacco: Never Used   Substance and Sexual Activity    Alcohol use: Not Currently    Drug use: Not Currently     Types: Heroin       Objective:    Physical Exam    Vitals:   Vitals:    02/17/21 0500 02/17/21 0600 02/17/21 0700 02/17/21 0800   BP: (!) 163/86 (!) 170/90 (!) 161/78 (!) 167/81   Pulse: 85 91 80 77   Resp: 19 22 15 16   Temp:    98.8 °F (37.1 °C)   SpO2: 100% 100% 100% 100%   Weight:       Height:           Deferred 2/2 COVID PUI      Telemetry: normal sinus rhythm    ECG:   EKG Results     Procedure 720 Value Units Date/Time    EKG, 12 LEAD, INITIAL [[id number]] Collected: 02/16/21 0412    Order Status: Completed Updated: 02/16/21 1447     Ventricular Rate 108 BPM      Atrial Rate 108 BPM      QRS Duration 86 ms      Q-T Interval 410 ms      QTC Calculation (Bezet) 549 ms      Calculated R Axis 21 degrees      Calculated T Axis -93 degrees      Diagnosis --     Atrial fibrillation with rapid ventricular response with premature   ventricular or aberrantly conducted complexes  Nonspecific T wave abnormality , probably digitalis effect  When compared with ECG of 10-FEB-2021 22:38,  Nonspecific T wave abnormality, worse in Anterior leads  Confirmed by Manisha Giron M.D., Gabe Onofre ([id number]) on 2/16/2021 2:46:50 PM      EKG, 12 LEAD, INITIAL [[id number]] Collected: 02/10/21 2238    Order Status: Completed Updated: 02/11/21 1509     Ventricular Rate 140 BPM      Atrial Rate 267 BPM      QRS Duration 86 ms      Q-T Interval 290 ms      QTC Calculation (Bezet) 442 ms      Calculated R Axis 19 degrees      Calculated T Axis 166 degrees      Diagnosis --     Atrial fibrillation with rapid ventricular response with premature   ventricular or aberrantly conducted complexes  ST & T wave abnormality, consider lateral ischemia or digitalis effect  When compared with ECG of 08-FEB-2021 12:57,  Atrial fibrillation has replaced Sinus rhythm  Vent.  rate has increased BY  65 BPM  T wave inversion no longer evident in Anterior leads  Confirmed by Aiyana Fergusno MD, Rupal hCakraborty ([id number]) on 2/11/2021 3:08:59 PM      EKG, 12 LEAD, INITIAL [[id number]] Collected: 02/08/21 1257    Order Status: Completed Updated: 02/08/21 1506     Ventricular Rate 75 BPM      Atrial Rate 75 BPM      P-R Interval 144 ms      QRS Duration 82 ms      Q-T Interval 398 ms      QTC Calculation (Bezet) 444 ms      Calculated P Axis 35 degrees      Calculated R Axis 9 degrees      Calculated T Axis 141 degrees      Diagnosis --     Normal sinus rhythm  T wave abnormality, consider anterolateral ischemia  When compared with ECG of 01-FEB-2021 12:48,  ST no longer depressed in Lateral leads  Confirmed by Afsaneh Romo MD, [facility] ([id number]) on 2/8/2021 3:06:19 PM      EKG, 12 LEAD, INITIAL [[id number]] Collected: 02/01/21 1246    Order Status: Completed Updated: 02/01/21 1806     Ventricular Rate 89 BPM      Atrial Rate 89 BPM      P-R Interval 140 ms      QRS Duration 84 ms      Q-T Interval 394 ms      QTC Calculation (Bezet) 479 ms      Calculated P Axis 35 degrees      Calculated R Axis 34 degrees      Calculated T Axis 118 degrees      Diagnosis --     Normal sinus rhythm with sinus arrhythmia  ST & T wave abnormality, consider anterolateral ischemia  Prolonged QT  When compared with ECG of 01-FEB-2021 12:46,  premature atrial complexes are no longer present  Nonspecific T wave abnormality no longer evident in Inferior leads  QT has lengthened  Confirmed by [facility], MD, Buzz Patel ([id number]) on 2/1/2021 6:05:53 PM      EKG, 12 LEAD, INITIAL [[id number]] Collected: 01/30/21 0053    Order Status: Completed Updated: 02/01/21 0548     Ventricular Rate 131 BPM      Atrial Rate 131 BPM      P-R Interval 152 ms      QRS Duration 84 ms      Q-T Interval 292 ms      QTC Calculation (Bezet) 431 ms      Calculated P Axis 44 degrees      Calculated R Axis 32 degrees      Calculated T Axis 90 degrees      Diagnosis --     Sinus tachycardia  Nonspecific ST abnormality    No previous ECGs available  Confirmed by Jessica Villafuerte M.D., Sb Ayala ([id number]) on 2/1/2021 5:48:12 AM              Data Review:     Radiology:   XR Results (most recent):  Results from Hospital Encounter encounter on 01/30/21   XR ABD (KUB)    Narrative History: Dobbhoff placement. A portable radiograph of the abdomen at 5:16 AM demonstrates a Dobbhoff tube tip  in the region of the stomach. The bowel gas pattern appears unremarkable. Impression The Dobbhoff tube tip is in the region of the stomach. No results for input(s): CPK, TROIQ in the last 72 hours.     No lab exists for component: CKQMB, CPKMB, BMPP  Recent Labs     02/17/21  0416 02/16/21  0317    145 K 3.3* 3.6   * 114*   CO2 20* 18*   * 101*   CREA 7.07* 6.18*   * 104*   PHOS 5.5* 5.2*   CA 8.0* 8.3*     Recent Labs     02/17/21 0416 02/16/21 0317   WBC 10.1 14.0*   HGB 7.3* 8.8*   HCT 23.1* 27.1*    234     Recent Labs     02/17/21 0416 02/16/21 0317    127*     No results for input(s): CHOL, LDLC in the last 72 hours. No lab exists for component: TGL, HDLC,  HBA1C  No results for input(s): CRP, TSH, TSHEXT, TSHEXT in the last 72 hours. No lab exists for component: LEELA Meade. Christine Flores MD         [facility] [address] [place] [address] [address]     [facility]     [phone]    CC: Jessi Gaona MD

## 2021-02-17 NOTE — PROGRESS NOTES
ID Progress Note  2021    Subjective:     Alert and orient to self, place, and year. C/o generalized weakness and sob  Review of Systems:            Symptom Y/N Comments   Symptom Y/N Comments   Fever/Chills n      Chest Pain n       Poor Appetite       Edema        Cough y      Abdominal Pain        Sputum       Joint Pain        SOB/WEBB  y     Pruritis/Rash        Nausea/vomit  n     Tolerating PT/OT        Diarrhea       Tolerating Diet        Constipation       Other           Could NOT obtain due to:       Objective:     Vitals:   Visit Vitals  BP (!) 161/78   Pulse 80   Temp 98.4 °F (36.9 °C)   Resp 15   Ht 5' 7\" (1.702 m)   Wt 91.1 kg (200 lb 12.8 oz)   SpO2 100%   BMI 31.45 kg/m²        Tmax:  Temp (24hrs), Av.3 °F (36.8 °C), Min:98.1 °F (36.7 °C), Max:98.4 °F (36.9 °C)      PHYSICAL EXAM:  General: WD, WN. Alert, cooperative, no acute distress    EENT:  EOMI. Anicteric sclerae. MMM  Resp:  Clear in apex with decreased breath sounds at bases, no wheezing or rales. No accessory muscle use  CV:  Regular  rhythm,  No edema  GI:  Soft, Non distended, Non tender. +Bowel sounds  Neurologic:  Alert and oriented X self and place, speech soft, limited ROM   Psych:   Fair insight. Not anxious nor agitated  Skin:  No rashes.   No jaundice, posterior mid cervical dressing dry and intact    Labs:   Lab Results   Component Value Date/Time    WBC 10.1 2021 04:16 AM    HGB 7.3 (L) 2021 04:16 AM    HCT 23.1 (L) 2021 04:16 AM    PLATELET 664  04:16 AM    MCV 82.8 2021 04:16 AM     Lab Results   Component Value Date/Time    Sodium 144 2021 04:16 AM    Potassium 3.3 (L) 2021 04:16 AM    Chloride 113 (H) 2021 04:16 AM    CO2 20 (L) 2021 04:16 AM    Anion gap 11 2021 04:16 AM    Glucose 139 (H) 2021 04:16 AM     (H) 2021 04:16 AM    Creatinine 7.07 (H) 2021 04:16 AM    BUN/Creatinine ratio 16 2021 04:16 AM    GFR est AA 10 (L) 02/17/2021 04:16 AM    GFR est non-AA 8 (L) 02/17/2021 04:16 AM    Calcium 8.0 (L) 02/17/2021 04:16 AM    Bilirubin, total 0.3 02/17/2021 04:16 AM    Alk. phosphatase 112 02/17/2021 04:16 AM    Protein, total 5.6 (L) 02/17/2021 04:16 AM    Albumin 1.4 (L) 02/17/2021 04:16 AM    Globulin 4.2 (H) 02/17/2021 04:16 AM    A-G Ratio 0.3 (L) 02/17/2021 04:16 AM    ALT (SGPT) 54 02/17/2021 04:16 AM   ABX hx;    Zyvox x1 on 2/9    vancomycin 1/30-2/9    IV Flagyl 1/30-2/4    IV doxy 1/30-2/12     resp cx (2/5) candida albicans - oropharyngeal contamination     sputum cx (2/12) gram positive cocci in pairs     Blood cx (1/30) no growth    Blood cx (2/4) no growth so far    Quantiferon (-)    Assessment and Plan   Acute respiratory failure secondary to COVID 19 PNA  mycoplasma and s. pneumoniae  (completed the therapy)  - Mycoplasma IgG  1411, S Pneumo (+)    Legionella (-)    afebrile, normal WBC     CXR (2/15): Increasing bilateral pulmonary infiltrates and left pleural effusion.     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 Adri Burris    Wound cx (1/31) LIGHT DIPHTHEROIDS     Wound measurement per our wound care team; 3.5 cm x 4 cm x 0.1 cm. Spoke with the Managing provider at [facility], Dr. Adri Burris, [id number] Wound care nurse, Ms. Brooks Frieda [phone].  According to the wound care nurse, the wound was created after the removal of infected cyst.    Pt completed IV Zosyn then Vancomycin for 30 days at the rehab center; Blood cx and wound cx grew MRSA      Pt should follow up with Dr Vasyl Jimenes at [address] [place] [address] upon discharge for his cervical wound     JUAN C  - nephrology following        Geoffrey [place], NP

## 2021-02-17 NOTE — PROGRESS NOTES
[facility]    ICU TEAM Progress Note    Name: Saddie Apgar   : 1955   MRN: [id number]   Date: 2021        Subjective:   Progress Note: 2021      Reason for ICU Admission: Acute hypoxic respiratory failure    Interval history:  53-year-old man who presented from [facility] on  with acute onset shortness of breath, hypoxia, nausea, vomiting and diarrhea-shortness of breath acutely got worse after one of the vomiting episodes. Diagnosed with Covid about 2 weeks prior to presentation.  Patient placed on BiPAP on arrival-felt much better.  After getting fluids for resuscitation/elevated lactate level patient acutely decompensated-developed lethargy and drop in oxygen levels-intubated emergently requiring moderate to high vent support. His respiratory status improved but his renal function was declining suspecting acute kidney injury on top of chronic kidney disease, it was also difficult to control his agitation on the ventilator. However patient condition improved and he was successfully extubated on . Patient continued to do well and he was transferred to the intermediate care unit on . Over the last few days patient oxygen requirement was increasing and he is becoming more lethargic. This morning he was placed on high flow nasal cannula and later on on BiPAP. On BiPAP he seems to be much better with decreased work of breathing and good tidal volume and oxygenation. Reviewing his chest x-ray seems that he has progressive volume overload and perhaps a component of aspiration. He was giving 1 dose of Lasix with good urine output and ICU consulted. Of note this patient been with positive fluid balance progressively since admission, according to documentation since admission he is about 20 L positive, over the last 3 days he is positive about 5 L. He has good urine output despite worsening BUN and creatinine.   No fever, no hemoptysis no nausea no vomiting. Active Problem List:     Problem List  Never Reviewed          Codes Class    Acute on chronic systolic heart failure (HCC) ICD-10-CM: I50.23  ICD-9-CM: 428.23         Goals of care, counseling/discussion ICD-10-CM: Z71.89  ICD-9-CM: V65.49         DNR (do not resuscitate) discussion ICD-10-CM: Z71.89  ICD-9-CM: V65.49         JUAN C (acute kidney injury) ([facility] [address] ICD-10-CM: N17.9  ICD-9-CM: 658. 9         Acute hypoxemic respiratory failure due to COVID-19 [facility]) ICD-10-CM: U07.1, J96.01  ICD-9-CM: 518.81, 079.89, 799.02               Past Medical History:      has a past medical history of Diabetes ([facility] [address] and Hypertension. Past Surgical History:      has no past surgical history on file. Home Medications:     Prior to Admission medications    Medication Sig Start Date End Date Taking? Authorizing Provider   aspirin 81 mg chewable tablet Take 81 mg by mouth daily. Yes Candelaria, MD Jessi   atorvastatin (LIPITOR) 80 mg tablet Take 80 mg by mouth daily. Yes Jessi Gaona MD   carvediloL (COREG) 12.5 mg tablet Take  by mouth two (2) times a day. Yes Jessi Gaona MD   docusate sodium (Colace) 100 mg capsule Take 100 mg by mouth two (2) times daily as needed for Constipation. Yes Jessi Gaona MD   doxycycline (ADOXA) 100 mg tablet Take 100 mg by mouth two (2) times a day. Yes Jessi Gaona MD   ferrous sulfate 325 mg (65 mg iron) tablet Take  by mouth every fourty-eight (48) hours. Yes Jessi Gaona MD   gabapentin (NEURONTIN) 400 mg capsule Take 400 mg by mouth three (3) times daily. Yes Jessi Gaona MD   insulin lispro (HUMALOG) 100 unit/mL injection 10 Units by SubCUTAneous route Before breakfast, lunch, and dinner. Yes Jessi Gaona MD   isosorbide mononitrate ER (IMDUR) 60 mg CR tablet Take 60 mg by mouth every morning. Yes Jessi Gaona MD   insulin glargine (Lantus U-100 Insulin) 100 unit/mL injection 20 Units by SubCUTAneous route nightly.    Yes Other, MD Jessi   lisinopriL (Mago Labor) 20 mg tablet Take 20 mg by mouth daily. Yes Candelaria, MD Jessi   methocarbamoL (ROBAXIN) 500 mg tablet Take 500 mg by mouth three (3) times daily. Yes Jessi Gaona MD   oxyCODONE IR (ROXICODONE) 5 mg immediate release tablet Take 5 mg by mouth every six (6) hours as needed for Pain. Yes Candelaria, MD eJssi   pantoprazole (PROTONIX) 40 mg tablet Take 40 mg by mouth daily. Yes Candelaria, MD Jessi   guaiFENesin-codeine (Guaiatussin AC) 100-10 mg/5 mL solution Take 5 mL by mouth four (4) times daily as needed for Cough. Yes Candelaria, MD Jessi   senna (Senna) 8.6 mg tablet Take 1 Tab by mouth daily. Yes Jessi Gaona MD   ticagrelor (BRILINTA) 90 mg tablet Take 90 mg by mouth two (2) times a day. Yes Candelaria, MD Jessi   ascorbic acid, vitamin C, (Vitamin C) 500 mg tablet Take 500 mg by mouth two (2) times a day. Yes Candelaria, MD Jessi   cholecalciferol (Vitamin D3) (1000 Units /25 mcg) tablet Take 1,000 Units by mouth daily. Yes Candelaria, MD Jessi   zinc sulfate (ZINCATE) 220 (50) mg capsule Take 220 mg by mouth daily. Yes Provider, Historical   therapeutic multivitamin (THERAGRAN) tablet Take 1 Tab by mouth daily. Yes Provider, Historical   acetaminophen (TYLENOL) 325 mg tablet Take 975 mg by mouth every six (6) hours as needed for Pain. Other, MD Jessi       Allergies/Social/Family History:     No Known Allergies   Social History     Tobacco Use    Smoking status: Former Smoker    Smokeless tobacco: Never Used   Substance Use Topics    Alcohol use: Not Currently      No family history on file.     Review of Systems:     Not able to obtain due to the acuity of condition    Objective:   Vital Signs:  Visit Vitals  /67   Pulse 77   Temp 98.8 °F (37.1 °C)   Resp 18   Ht 5' 7\" (1.702 m)   Wt 91.1 kg (200 lb 12.8 oz)   SpO2 100%   BMI 31.45 kg/m²    O2 Flow Rate (L/min): 50 l/min O2 Device: Heated, Hi flow nasal cannula Temp (24hrs), Av.4 °F (36.9 °C), Min:98.1 °F (36.7 °C), Max:98.8 °F (37.1 °C) Intake/Output:     Intake/Output Summary (Last 24 hours) at 2/17/2021 1027  Last data filed at 2/17/2021 1000  Gross per 24 hour   Intake 2342.33 ml   Output 930 ml   Net 1412.33 ml       Physical Exam:    General: Chronically ill looking gentleman in mild distress on BiPAP, alert   HEENT: NG tube in place, BiPAP mask in place, setting of 18/12 with FiO2 of 100%, tidal volume    around 500 mL  Neck: Supple, left subclavian triple-lumen lumen central line, dressing intact no erythema  Lung: Fair air entry bilaterally, coarse crepitation bilaterally more on the left side  Heart: Regular rhythm, normal heart sound. Could not appreciate murmur  Abdomen: Nontender, positive bowel sound  Extremities: +2 edema  Neuro: Patient is alert profoundly weak in 4 limbs but withdraw to painful stimuli maintain eye contact at times try to follow simple command    LABS AND  DATA: Personally reviewed  Recent Labs     02/17/21 0416 02/16/21 0317   WBC 10.1 14.0*   HGB 7.3* 8.8*   HCT 23.1* 27.1*    234     Recent Labs     02/17/21 0416 02/16/21 0317    145   K 3.3* 3.6   * 114*   CO2 20* 18*   * 101*   CREA 7.07* 6.18*   * 104*   CA 8.0* 8.3*   MG 2.4 2.2   PHOS 5.5* 5.2*     Recent Labs     02/17/21 0416 02/16/21 0317    127*   TP 5.6* 6.2*   ALB 1.4* 1.5*   GLOB 4.2* 4.7*     No results for input(s): INR, PTP, APTT, INREXT, INREXT in the last 72 hours. Recent Labs     02/15/21  0925   PHI 7.42   PCO2I 33.7*   PO2I 55*   FIO2I 93     No results for input(s): CPK, CKMB, TROIQ, BNPP in the last 72 hours.     Hemodynamics:   PAP:   CO:     Wedge:   CI:     CVP:    SVR:       PVR:       Ventilator Settings:  Mode Rate Tidal Volume Pressure FiO2 PEEP   Spontaneous   450 ml  8 cm H2O 50 % 5 cm H20     Peak airway pressure: 13 cm H2O    Minute ventilation: 28.6 l/min        MEDS: Reviewed    Chest X-Ray:  CXR Results  (Last 48 hours)               02/15/21 1108  XR CHEST PORT Final result Impression:  Increasing bilateral pulmonary infiltrates and left pleural   effusion. Narrative: Indication: Respiratory failure       Comparison to 2/14/2021. Portable exam obtained at [id number] demonstrates increasing   bilateral pulmonary infiltrates and left pleural effusion compared to prior   examination. ECHO: On February 1  · LV: Estimated LVEF is 20 - 25%. Normal cavity size. Upper normal wall thickness. Severely reduced systolic function. Severe (grade 3) left ventricular diastolic dysfunction. · LA: Dilated left atrium. · MV: Mild mitral valve regurgitation is present. · TV: Mild tricuspid valve regurgitation is present. · PA: Moderate to severe pulmonary hypertension. · RV: Reduced systolic function. Assessment and Plan:   Acute hypoxic respiratory failure  COVID-19 PNA  Aspiration  Volume Overload  Acute on Chronic Systolic HF    Appreciate Neph  Appreciate Cards  No escalation of care  No dialysis  DNR/DNI  Nieves  Vanco  ASA  Ticagrelor  Carvedilol  Lipitor  Bumex IV BID  Dilaudid PRN pain    CRITICAL CARE CONSULTANT NOTE  I had a face to face encounter with the patient, reviewed and interpreted patient data including clinical events, labs, images, vital signs, I/O's, and examined patient. I have discussed the case and the plan and management of the patient's care with the consulting services, the bedside nurses and the respiratory therapist.      NOTE OF PERSONAL INVOLVEMENT IN CARE   This patient has a high probability of imminent, clinically significant deterioration, which requires the highest level of preparedness to intervene urgently. I participated in the decision-making and personally managed or directed the management of the following life and organ supporting interventions that required my frequent assessment to treat or prevent imminent deterioration. I personally spent 75 minutes of critical care time.   This is time spent at this critically ill patient's bedside actively involved in patient care as well as the coordination of care and discussions with the patient's family. This does not include any procedural time which has been billed separately.     Patricia Juarez DO   Staff Intensivist/Anesthesiologist   [facility]  2/17/2021

## 2021-02-17 NOTE — ADT AUTH CERT NOTES
Viral Illness, Acute - Care Day 18 (2/16/2021) by Mazin Erwin, ANAHI       Review Status Review Entered   Completed 2/17/2021 09:26      Criteria Review      Care Day: 18 Care Date: 2/16/2021 Level of Care: ICU    Guideline Day 2    Clinical Status    (X) * Hypotension absent    2/17/2021 09:24:40 EST by Arturo Faustin      /98    (X) * No requirement for mechanical ventilation    2/17/2021 09:24:40 EST by Arturo Faustin      ON HEATED HFNC 50 LPM    ( ) * Oxygenation at baseline or improved    ( ) * Mental status at baseline    Routes    ( ) * Oral hydration    (X) Oral or IV medications    2/17/2021 09:26:03 EST by Arturo Faustin      ON ORAL AND IV MEDICATIONS    (X) Usual diet    2/17/2021 09:24:40 EST by Arturo Faustin      TUBE FEEDING DIET    Interventions    (X) Possible isolation    (X) Pulse oximetry    2/17/2021 09:25:22 EST by Arturo Faustin      SPO2 99% ON HEATED HFNC 50 LPM    (X) Possible oxygen    2/17/2021 09:25:22 EST by Arturo Faustin      HEATED HFNC 50 LPM    Medications    (X) Possible antibiotic (eg, for bacterial coinfection or superinfection)    2/17/2021 09:26:03 EST by Arturo Faustin      ON IV ANTIBIOTICS    * Milestone   Additional Notes   CM NOTE:   Patient admitted for respiratory failure r/t COVID. RUR 29%   Disposition TBD, [place] [facility] preparation for a skin graft to his neck. Patient remains in the ICU on hi flow, very weak. Wife does not wish to escalate care. Care management is continuing to follow. Olaf Juárez RN,Care Management            PALLIATIVE NOTE:   PALLIATIVE DIAGNOSES:   1. Goals of care   2. DNR discussion   3. Covid-19 pneumonia   4. Mycoplasma and strep pneumonia   5. Acute hypoxic respiratory failure   6. JUAN C   7. Ischemic cardiomyopathy   8. Chronic systolic heart failure            PLAN:   1. Call placed to wife Hugo Shaefr. We talked about the patient's current condition and a medical update was given.  We discussed his acute respiratory failure and chronic heart failure. We reviewed the echocardiogram results as well. Garth Barrera reports that he has not taken care of himself over the years and had even been homeless at one point. He is a former heroin user. Although, they are , she took him in when his medical problems got worse and she has been helping him along with their children. She is hopeful that he can recover from this illness, but understands that his condition is serious and he may not survive. 2. We discussed goals for his care and the following decisions were made:   1. Continue current care for now, will see how he does over the next few days. If no improvement or if he worsens, will transition to comfort care. 2. Code status changed to DO NOT RESUSCITATE   3. Continue DO NOT INTUBATE status   4. Do not escalate care, do not initiate dialysis   3. Will continue to follow the patient closely and continue to reach out to the family for support and ongoing goals of care discussions. 4. Initial consult note routed to primary continuity provider and/or primary health care team members   5. Communicated plan of care with: Palliative IDT, [facility] Team       CRITICAL CARE:   Assessment and Plan:   Acute hypoxic respiratory failure   COVID-19 PNA   Aspiration   Volume Overload   Acute on Chronic Systolic HF       Appreciate Neph   Appreciate Cards   Nieves   Vanco   ASA   Ticagrelor   Carvedilol   Lipitor   Bumex IV BID         NEPHROLOGY:   Assessment and Plan       JUAN C:   - unclear baseline but suspect some component of CKD   - Renal U/S on 1/30 confirms CKD   - suspect JUAN C due to COVID-19 related illness    - UA from 2/5 with blood and protein, bird in place   - PCR 4g/g - suspect all due to DM - follow up SPEP/UPEP.  BRANDO +ve    - wife requested to hold off dialysis last night and I am unable to reach her this morning    - d/w ICU team and plan is not to escalate care any more    - continue IV bumex      Volume overload:   - diuresis as above        Acidosis : better        COVID-19 PNA:   - abx and steroids per CCM team       Resp failure:   - vent dependent       DM2:   - on insulin            CARDIOLOGY:     Assessment and Plan       1. Cardiomyopathy               - systolic HF acute on chronic               - NYHA class IV   01/30/21   ECHO ADULT COMPLETE 02/01/2021 2/1/2021     Narrative · LV: Estimated LVEF is 20 - 25%. Normal cavity size. Upper normal wall    thickness. Severely reduced systolic function. Severe (grade 3) left    ventricular diastolic dysfunction. · LA: Dilated left atrium. · MV: Mild mitral valve regurgitation is present. · TV: Mild tricuspid valve regurgitation is present. · PA: Moderate to severe pulmonary hypertension. · RV: Reduced systolic function.           Signed by: Cheryle Dago, MD               - Coreg, Isordil, Norvasc - Consider stopping Norvasc                   Consider adding Hydralazine for afterload reduction               - PTA Lisinopril, Coreg, Imdur and Norvasc   2. Acute Respiratory failure               - escalating oxygen requirements               - BIPAP at 60% FiO2               - PCXR 2/15 - Increasing bilateral pulmonary infiltrates and left pleural                 effusion. 3. COVID Positive   4. Mycoplasma and strep PNA               - ID following   5. JUAN C               - Nephrology following               - Worsening Cr. 6.18               - suspect CRS - consider dobutamine   6. CAD               - STEMI 3/24/20               - PCI 4/3/20               - [place] [facility] [place]               - Brilinta, ASA, Coreg and Liptior   7. PHTN               - Mod to severe on echo               - Sildenafil PTA   8. H/o Heroine abuse               - [place] [facility]   9. Anemia               - No stool occult completed               - Hgb 6.4 yesterday               - per Primary team   10.  HLD               - Liptior 80 mg 11. HTN               - Coreg, Norvasc, Lasix, Isordil               - Cardene gtt at 5   12. DM II               - SSI       Acute on chronic systolic HF.  EF 28-21%.  ICM with h/o STEMI with PCI at [facility] last year. BP elevated, on Cardene gtt as well as PO meds.  Consider stopping Norvasc and adding    Hydralazine for afterload reduction.  HR elevated - sinus tach with PAC vs. Afib/AFL. Rate in low 100s. Overall worsening picture.  Wife does not want intubation, should he deteriorate further. Appreciate Palliative Cares help         INFECTIOUS DISEASE:   Assessment and Plan   Acute respiratory failure secondary to COVID 19 PNA   mycoplasma and s. pneumoniae    - Mycoplasma IgG  1411, S Pneumo (+)     Legionella (-)     resp cx (2/5) candida albicans - oropharyngeal contamination      sputum cx (2/12) candida      Blood cx (1/30) no growth     Blood cx (2/4) no growth     Quantiferon (-)        continue vanc and merrem. Family does not want to escalate care.                 Mid posterior cervical wound infection   - chronic wound; pt was [place] for skin [place] [facility]hour[place]     Wound cx (1/31) LIGHT DIPHTHEROIDS      Wound measurement per our wound care team; 3.5 cm x 4 cm x 0.1 cm.       LUKASZ King with [place] [facility] Wilbur Khoury, [address].     Wound care nurse, Ms. Powers Abby [phone].  According to the wound care nurse, the wound was created after the removal of infected cyst.     Pt completed IV Zosyn then Vancomycin for 30 days at the rehab center; Blood cx and wound cx grew MRSA         Pt should follow up with Dr Aida Villa at [facility] upon discharge for his cervical wound       JUAN C           MEDS:   niCARdipine (CARDENE) 50 mg in 0.9% sodium chloride 100 mL infusion 0-15 mg/hr IntraVENous TITRATE   · albuterol (PROVENTIL HFA, VENTOLIN HFA, PROAIR HFA) inhaler 2 Puff 2 Puff Inhalation Q4H RT   · heparin (porcine) injection 5,000 Units 5,000 Units SubCUTAneous Q8H   · Vancomycin- Pharmacy Dosing Other Rx Dosing/Monitoring   · bumetanide (BUMEX) injection 2 mg 2 mg IntraVENous Q12H   · meropenem (MERREM) 500 mg in 0.9% sodium chloride (MBP/ADV) 50 mL MBP 0.5 g IntraVENous Q12H   · 0.9% sodium chloride infusion 250 mL 250 mL IntraVENous PRN   · insulin glargine (LANTUS) injection 20 Units 20 Units SubCUTAneous DAILY   · pantoprazole (PROTONIX) 40 mg in 0.9% sodium chloride 10 mL injection 40 mg IntraVENous Q12H   · labetaloL (NORMODYNE;TRANDATE) injection 20 mg 20 mg IntraVENous Q4H PRN   · ticagrelor (BRILINTA) tablet 90 mg 90 mg Oral BID   · carvediloL (COREG) tablet 25 mg 25 mg Per NG tube Q12H   · isosorbide dinitrate (ISORDIL) tablet 20 mg 20 mg Per NG tube BID   · amLODIPine (NORVASC) tablet 10 mg 10 mg Per NG tube DAILY   · insulin lispro (HUMALOG) injection SubCUTAneous Q6H   · alteplase (CATHFLO) 1 mg in sterile water (preservative free) 1 mL injection 1 mg InterCATHeter PRN   · ascorbic acid (vitamin C) (VITAMIN C) tablet 500 mg 500 mg Per NG tube BID   · cholecalciferol (VITAMIN D3) (1000 Units /25 mcg) tablet 2,000 Units 2,000 Units Per NG tube DAILY   · zinc sulfate (ZINCATE) 220 (50) mg capsule 1 Cap 1 Cap Per NG tube DAILY   · HYDROmorphone (PF) (DILAUDID) injection 0.5 mg 0.5 mg IntraVENous Q4H PRN   · ferrous sulfate 300 mg (60 mg iron)/5 mL oral syrup 300 mg 300 mg Per NG tube DAILY   · metoprolol (LOPRESSOR) injection 5 mg 5 mg IntraVENous Q6H PRN   · [Held by provider] allopurinoL (ZYLOPRIM) tablet 100 mg 100 mg Oral DAILY   · sodium chloride (NS) flush 5-40 mL 5-40 mL IntraVENous Q8H   · sodium chloride (NS) flush 5-40 mL 5-40 mL IntraVENous PRN   · acetaminophen (TYLENOL) tablet 650 mg 650 mg Oral Q6H PRN     Or   · acetaminophen (TYLENOL) suppository 650 mg 650 mg Rectal Q6H PRN   · glucose chewable tablet 16 g 4 Tab Oral PRN   · dextrose (D50W) injection syrg 12.5-25 g 12.5-25 g IntraVENous PRN · glucagon (GLUCAGEN) injection 1 mg 1 mg IntraMUSCular PRN   · aspirin chewable tablet 81 mg 81 mg Oral DAILY   · atorvastatin (LIPITOR) tablet 40 mg 40 mg Oral QHS           KUB: IMPRESSION   The Dobbhoff tube tip is in the region of the stomach. 2/16/2021 03:17   WBC: 14.0 (H)   RBC: 3.35 (L)   HGB: 8.8 (L)   HCT: 27.1 (L)   RDW: 18.4 (H)   NEUTROPHILS: 80 (H)   IMMATURE GRANULOCYTES: 1 (H)   ABS. NEUTROPHILS: 11.2 (H)   ABS. IMM. GRANS.: 0.1 (H)         2/16/2021 03:17   Chloride: 114 (H)   CO2: 18 (L)   Anion gap: 13   Glucose: 104 (H)   BUN: 101 (H)   Creatinine: 6.18 (H)   BUN/Creatinine ratio: 16   Calcium: 8.3 (L)   Phosphorus: 5.2 (H)   Magnesium: 2.2   GFR est non-AA: 9 (L)   GFR est AA: 11 (L)   Bilirubin, total: 0.4   Protein, total: 6.2 (L)   Albumin: 1.5 (L)   Globulin: 4.7 (H)   A-G Ratio: 0.3 (L)   AST: 65 (H)   Alk.  phosphatase: 127 (H)                  2/16/2021 06:25   pH: 7.50 (H)   PCO2: 21 (L)   PO2: 85   BICARBONATE: 16 (L)   O2 SAT: 97   BASE DEFICIT: 4.9   Sample source: ARTERIAL         Viral Illness, Acute - Care Day 14 (2/12/2021) by Dank Puga RN       Review Status Review Entered   Completed 2/12/2021 15:12      Criteria Review      Care Day: 14 Care Date: 2/12/2021 Level of Care: ICU    Guideline Day 2    Level Of Care    ( ) Floor    2/12/2021 15:11:11 EST by Elba Lyle ICU    Clinical Status    (X) * Hypotension absent    2/12/2021 15:11:11 EST by Lynne Duarte      /45    (X) * No requirement for mechanical ventilation    2/12/2021 15:11:11 EST by Lynne Duarte      ON 4 LPM VIA NC    (X) * Oxygenation at baseline or improved    2/12/2021 15:11:11 EST by Elba Lyle ON 4 LPMVIA NC    ( ) * Mental status at baseline    Activity    ( ) Advance activity as tolerated    2/12/2021 15:11:11 EST by Lynne Duarte      BEDREST WITH ALLOWED ACTIVITY    Routes    ( ) * Oral hydration    2/12/2021 15:12:16 EST by Lynne Duarte      0.9% sodium chloride infusion   Rate: 100 mL/hr Dose: 100 mL/hr  Freq: CONTINUOUS Route: IV    (X) Oral or IV medications    2/12/2021 15:12:26 EST by Caleb Dee      allopurinoL (ZYLOPRIM) tablet 100 mg   Dose: 100 mg  Freq: DAILY Route: PO    (X) Usual diet    2/12/2021 15:11:53 EST by Caleb Dee      TUBE FEEDING DIET    Interventions    (X) Possible isolation    2/12/2021 15:11:53 EST by Caleb Dee      DROPLET PLUS    (X) Pulse oximetry    2/12/2021 15:11:53 EST by Caleb Dee      SPO2 98% ON 4 LPM VIA NC    (X) Possible oxygen    2/12/2021 15:11:53 EST by Calbe Dee      4 LPM VIA NC    * Milestone   Additional Notes   NEPHROLOGY:   JUAN C:   - unclear baseline but suspect some component of CKD   - Renal U/S on 1/30 confirms CKD   - suspect JUAN C due to COVID-19 related illness vs possible volume depletion   - UA from 2/5 with blood and protein, bird in place   - PCR 4g/g - suspect all due to DM - will check SPEP/UPEP, BRANDO   - cont IVF - change to NS   - daily labs and I/Os   - no urgent need for dialysis at this time       Hypokalemia:   -  Repletion ordered       Hypernatremia:   - improving some   - FW flushes via NGT       Nongap acidosis:   - cont bicarb drip       COVID-19 PNA:   - abx and steroids per CCM team       Resp failure:   - vent dependent       DM2:   - on insulin                 CRITICAL CARE:   Assessment and Plan:   Acute hypoxic respiratory failure   Pulmonary edema   Covid pneumonia   Aspiration pneumonitis   Tachycardia secondary to above   Acute kidney injury       Neuro:  Patient improving, off Precedex and off fentanyl.  Still on Seroquel 50 3 times daily.  CT head negative on February 11.       Pulm:  Extubated on February 11.  Doing well.  Wean FiO2 to maintain saturation above 92%.  Continue incentive spirometry.        CV:    - Cont ASA, lipitor, on beta-blocker and added calcium channel blocker to control his hypertension       GI:    - Lansoprazole   - TFs, please advance to goal.      Renal:   - Continue bird for strict I/Os.     -Nephrology consulted secondary to increased BUN, Cr and metabolic acidosis .  I appreciate their input, on bicarb drip and is receiving free water via NG tube.  Good urine output       Endocrine:   - Lantus 15u daily,   - SSI       Heme:   - ASA   - Statin   - Lovenox 30mg q12h       ID: Appreciate infectious disease.    Currently on cefepime and doxycycline both last doses on February 12 per infectious disease order.       DISPOSITION   Stay in ICU            Current Facility-Administered Medications:    ·  0.9% sodium chloride infusion, 100 mL/hr, IntraVENous, CONTINUOUS, Rajesh Palomares MD, Last Rate: 100 mL/hr at 02/12/21 1214, 100 mL/hr at 02/12/21 1214   ·  potassium chloride 10 mEq in 50 ml IVPB, 10 mEq, IntraVENous, Q1H, Heather Danielle MD, Last Rate: 50 mL/hr at 02/12/21 1426, 10 mEq at 02/12/21 1426   ·  carvediloL (COREG) tablet 25 mg, 25 mg, Per NG tube, Q12H, Shahzad Barboza MD   ·  isosorbide dinitrate (ISORDIL) tablet 20 mg, 20 mg, Per NG tube, BID, Shahzad Barboza MD, 20 mg at 02/12/21 1206   ·  insulin glargine (LANTUS) injection 15 Units, 15 Units, SubCUTAneous, DAILY, Shahzad Barboza MD, 15 Units at 02/12/21 0834   ·  amLODIPine (NORVASC) tablet 10 mg, 10 mg, Per NG tube, DAILY, Shahzad Barboza MD, 10 mg at 02/12/21 0836   ·  enoxaparin (LOVENOX) injection 30 mg, 30 mg, SubCUTAneous, Q24H, Shahzad Barboza MD, 30 mg at 02/12/21 0835   ·  QUEtiapine (SEROquel) tablet 50 mg, 50 mg, Oral, TID, Yuliet Reeves NP, 50 mg at 02/12/21 0835   ·  insulin lispro (HUMALOG) injection, , SubCUTAneous, Q6H, Yuliet Reeves NP, 4 Units at 02/12/21 1256   ·  alteplase (CATHFLO) 1 mg in sterile water (preservative free) 1 mL injection, 1 mg, InterCATHeter, PRN, Deric Norwood NP, 1 mg at 02/11/21 9898   ·  ascorbic acid (vitamin C) (VITAMIN C) tablet 500 mg, 500 mg, Per NG tube, BID, Brooks Rojas MD, 500 mg at 02/12/21 0836   ·  cholecalciferol (VITAMIN D3) (1000 Units /25 mcg) tablet 2,000 Units, 2,000 Units, Per NG tube, DAILY, Tammy Moya MD, 2,000 Units at 02/12/21 0836   ·  docusate (COLACE) 50 mg/5 mL oral liquid 100 mg, 100 mg, Per NG tube, BID, Tammy Moya MD, Stopped at 02/12/21 0835   ·  zinc sulfate (ZINCATE) 220 (50) mg capsule 1 Cap, 1 Cap, Per NG tube, DAILY, Tammy Moya MD, 1 Cap at 02/12/21 0835   ·  HYDROmorphone (PF) (DILAUDID) injection 0.5 mg, 0.5 mg, IntraVENous, Q4H PRN, Tammy Moya MD, 0.5 mg at 02/10/21 2220   ·  gabapentin (NEURONTIN) 250 mg/5 mL solution 300 mg, 300 mg, Per NG tube, Q8H, Tammy Moya MD, 300 mg at 02/12/21 0835   ·  niCARdipine (CARDENE) 25 mg in 0.9% sodium chloride 250 mL infusion, 0-15 mg/hr, IntraVENous, TITRATE, Bronwyn BEAUCHAMP MD, Stopped at 02/12/21 1216   ·  chlorhexidine (ORAL CARE KIT) 0.12 % mouthwash 15 mL, 15 mL, Oral, Q12H, Simon Jimenez DO, 15 mL at 02/12/21 0837   ·  ferrous sulfate 300 mg (60 mg iron)/5 mL oral syrup 300 mg, 300 mg, Per NG tube, DAILY, Simon Jimenez DO, 300 mg at 02/12/21 0842   ·  metoprolol (LOPRESSOR) injection 5 mg, 5 mg, IntraVENous, Q6H PRN, Simon Jimenez DO, 5 mg at 02/10/21 2231   ·  [Held by provider] allopurinoL (ZYLOPRIM) tablet 100 mg, 100 mg, Oral, DAILY, Neo Luna MD, 100 mg at 02/12/21 0836   ·  sodium chloride (NS) flush 5-40 mL, 5-40 mL, IntraVENous, Q8H, Mary Bejarano MD, 10 mL at 02/12/21 1330   ·  sodium chloride (NS) flush 5-40 mL, 5-40 mL, IntraVENous, PRN, Neo BURGOS MD   ·  acetaminophen (TYLENOL) tablet 650 mg, 650 mg, Oral, Q6H PRN, 650 mg at 02/11/21 2113 **OR** acetaminophen (TYLENOL) suppository 650 mg, 650 mg, Rectal, Q6H PRN, Mary Bejarano MD   ·  glucose chewable tablet 16 g, 4 Tab, Oral, PRN, Mary Bejarano MD   ·  dextrose (D50W) injection syrg 12.5-25 g, 12.5-25 g, IntraVENous, PRN, Mray Bejarano MD, 12.5 g at 02/11/21 1825   ·  glucagon (GLUCAGEN) injection 1 mg, 1 mg, IntraMUSCular, PRN, Zachary BURGOS MD   ·  aspirin chewable tablet 81 mg, 81 mg, Oral, DAILY, Mary Bejarano MD, 81 mg at 02/12/21 0836   ·  atorvastatin (LIPITOR) tablet 40 mg, 40 mg, Oral, QHS, Mary Bejarano MD, 40 mg at 02/11/21 2106   ·  lansoprazole (PREVACID) 3 mg/mL oral suspension 30 mg, 30 mg, Oral, ACB, Mary Bejarano MD, 30 mg at 02/12/21 0836         /66 (BP 1 Location: Left upper arm)   Pulse (!) 105   Temp 99.2 °F (37.3 °C)   Resp 22   Ht 5' 7\" (1.702 m)   Wt 92.9 kg (204 lb 12.9 oz)   SpO2 98%   BMI 32.08 kg/m²    4 LPM VIA NC      Results for Seven Glad (MRN [id number]) as of 2/12/2021 15:14      2/12/2021 09:02   WBC: 12.8 (H)   NRBC: 0.0   RBC: 3.31 (L)   HGB: 8.4 (L)   HCT: 26.7 (L)   MCV: 80.7   MCH: 25.4 (L)   MCHC: 31.5   RDW: 19.0 (H)   PLATELET: 443   NEUTROPHILS: 74   LYMPHOCYTES: 14   MONOCYTES: 9   EOSINOPHILS: 2   BASOPHILS: 0   IMMATURE GRANULOCYTES: 1 (H)   DF: AUTOMATED   ABSOLUTE NRBC: 0.00   ABS. NEUTROPHILS: 9.5 (H)   ABS. IMM. GRANS.: 0.1 (H)   ABS. LYMPHOCYTES: 1.8   ABS.  MONOCYTES: 1.2 (H)      Results for Seven Glad (MRN [id number]) as of 2/12/2021 15:14      2/12/2021 05:20   Phosphorus: 4.0   Magnesium: 2.3   Uric acid: 4.3   Troponin-I, Qt.: 0.32 (H)      2/12/2021 09:02   Potassium: 2.8 (L)   Chloride: 119 (H)   CO2: 23   Anion gap: 6   Glucose: 176 (H)   BUN: 91 (H)   Creatinine: 3.37 (H)   BUN/Creatinine ratio: 27 (H)   Calcium: 7.7 (L)   GFR est non-AA: 19 (L)   GFR est AA: 23 (L)   Troponin-I, Qt.: 0.28 (H)

## 2021-02-18 LAB
ALBUMIN SERPL-MCNC: 1.4 G/DL (ref 3.5–5)
ALBUMIN/GLOB SERPL: 0.3 {RATIO} (ref 1.1–2.2)
ALP SERPL-CCNC: 142 U/L (ref 45–117)
ALT SERPL-CCNC: 90 U/L (ref 12–78)
ANION GAP SERPL CALC-SCNC: 10 MMOL/L (ref 5–15)
AST SERPL-CCNC: 131 U/L (ref 15–37)
BASOPHILS # BLD: 0 K/UL (ref 0–0.1)
BASOPHILS NFR BLD: 0 % (ref 0–1)
BILIRUB SERPL-MCNC: 0.3 MG/DL (ref 0.2–1)
BUN SERPL-MCNC: 122 MG/DL (ref 6–20)
BUN/CREAT SERPL: 15 (ref 12–20)
CALCIUM SERPL-MCNC: 7.7 MG/DL (ref 8.5–10.1)
CHLORIDE SERPL-SCNC: 112 MMOL/L (ref 97–108)
CO2 SERPL-SCNC: 21 MMOL/L (ref 21–32)
CREAT SERPL-MCNC: 8.13 MG/DL (ref 0.7–1.3)
DIFFERENTIAL METHOD BLD: ABNORMAL
EOSINOPHIL # BLD: 0.3 K/UL (ref 0–0.4)
EOSINOPHIL NFR BLD: 3 % (ref 0–7)
ERYTHROCYTE [DISTWIDTH] IN BLOOD BY AUTOMATED COUNT: 18.2 % (ref 11.5–14.5)
GLOBULIN SER CALC-MCNC: 4.2 G/DL (ref 2–4)
GLUCOSE BLD STRIP.AUTO-MCNC: 114 MG/DL (ref 65–100)
GLUCOSE BLD STRIP.AUTO-MCNC: 121 MG/DL (ref 65–100)
GLUCOSE BLD STRIP.AUTO-MCNC: 123 MG/DL (ref 65–100)
GLUCOSE BLD STRIP.AUTO-MCNC: 131 MG/DL (ref 65–100)
GLUCOSE BLD STRIP.AUTO-MCNC: 148 MG/DL (ref 65–100)
GLUCOSE SERPL-MCNC: 107 MG/DL (ref 65–100)
HCT VFR BLD AUTO: 22.6 % (ref 36.6–50.3)
HGB BLD-MCNC: 7.2 G/DL (ref 12.1–17)
IMM GRANULOCYTES # BLD AUTO: 0.1 K/UL (ref 0–0.04)
IMM GRANULOCYTES NFR BLD AUTO: 1 % (ref 0–0.5)
LYMPHOCYTES # BLD: 1.4 K/UL (ref 0.8–3.5)
LYMPHOCYTES NFR BLD: 16 % (ref 12–49)
MAGNESIUM SERPL-MCNC: 2.5 MG/DL (ref 1.6–2.4)
MCH RBC QN AUTO: 26 PG (ref 26–34)
MCHC RBC AUTO-ENTMCNC: 31.9 G/DL (ref 30–36.5)
MCV RBC AUTO: 81.6 FL (ref 80–99)
MONOCYTES # BLD: 0.8 K/UL (ref 0–1)
MONOCYTES NFR BLD: 9 % (ref 5–13)
NEUTS SEG # BLD: 6.4 K/UL (ref 1.8–8)
NEUTS SEG NFR BLD: 71 % (ref 32–75)
NRBC # BLD: 0 K/UL (ref 0–0.01)
NRBC BLD-RTO: 0 PER 100 WBC
PHOSPHATE SERPL-MCNC: 6 MG/DL (ref 2.6–4.7)
PLATELET # BLD AUTO: 231 K/UL (ref 150–400)
PMV BLD AUTO: 12.8 FL (ref 8.9–12.9)
POTASSIUM SERPL-SCNC: 3.5 MMOL/L (ref 3.5–5.1)
PROT SERPL-MCNC: 5.6 G/DL (ref 6.4–8.2)
RBC # BLD AUTO: 2.77 M/UL (ref 4.1–5.7)
RBC MORPH BLD: ABNORMAL
SERVICE CMNT-IMP: ABNORMAL
SODIUM SERPL-SCNC: 143 MMOL/L (ref 136–145)
URATE SERPL-MCNC: 6 MG/DL (ref 3.5–7.2)
VANCOMYCIN SERPL-MCNC: 3.2 UG/ML
WBC # BLD AUTO: 9 K/UL (ref 4.1–11.1)

## 2021-02-18 PROCEDURE — 74011250637 HC RX REV CODE- 250/637: Performed by: ANESTHESIOLOGY

## 2021-02-18 PROCEDURE — 94762 N-INVAS EAR/PLS OXIMTRY CONT: CPT

## 2021-02-18 PROCEDURE — 94760 N-INVAS EAR/PLS OXIMETRY 1: CPT

## 2021-02-18 PROCEDURE — 51798 US URINE CAPACITY MEASURE: CPT

## 2021-02-18 PROCEDURE — 74011000250 HC RX REV CODE- 250: Performed by: INTERNAL MEDICINE

## 2021-02-18 PROCEDURE — 74011636637 HC RX REV CODE- 636/637: Performed by: HOSPITALIST

## 2021-02-18 PROCEDURE — 83735 ASSAY OF MAGNESIUM: CPT

## 2021-02-18 PROCEDURE — 94640 AIRWAY INHALATION TREATMENT: CPT

## 2021-02-18 PROCEDURE — C9113 INJ PANTOPRAZOLE SODIUM, VIA: HCPCS | Performed by: HOSPITALIST

## 2021-02-18 PROCEDURE — 65610000006 HC RM INTENSIVE CARE

## 2021-02-18 PROCEDURE — 85025 COMPLETE CBC W/AUTO DIFF WBC: CPT

## 2021-02-18 PROCEDURE — 77030012794 HC KT NSL CANN/HGH TRAN -A

## 2021-02-18 PROCEDURE — 74011250636 HC RX REV CODE- 250/636: Performed by: HOSPITALIST

## 2021-02-18 PROCEDURE — 74011250637 HC RX REV CODE- 250/637: Performed by: HOSPITALIST

## 2021-02-18 PROCEDURE — 74011250636 HC RX REV CODE- 250/636: Performed by: ANESTHESIOLOGY

## 2021-02-18 PROCEDURE — 80202 ASSAY OF VANCOMYCIN: CPT

## 2021-02-18 PROCEDURE — 84100 ASSAY OF PHOSPHORUS: CPT

## 2021-02-18 PROCEDURE — 77010033711 HC HIGH FLOW OXYGEN

## 2021-02-18 PROCEDURE — 80053 COMPREHEN METABOLIC PANEL: CPT

## 2021-02-18 PROCEDURE — 74011250637 HC RX REV CODE- 250/637: Performed by: EMERGENCY MEDICINE

## 2021-02-18 PROCEDURE — 74011250637 HC RX REV CODE- 250/637: Performed by: NURSE PRACTITIONER

## 2021-02-18 PROCEDURE — 82962 GLUCOSE BLOOD TEST: CPT

## 2021-02-18 PROCEDURE — 74011250636 HC RX REV CODE- 250/636: Performed by: INTERNAL MEDICINE

## 2021-02-18 PROCEDURE — 74011000258 HC RX REV CODE- 258: Performed by: INTERNAL MEDICINE

## 2021-02-18 PROCEDURE — 99233 SBSQ HOSP IP/OBS HIGH 50: CPT | Performed by: NURSE PRACTITIONER

## 2021-02-18 PROCEDURE — 77010033678 HC OXYGEN DAILY

## 2021-02-18 PROCEDURE — 74011250637 HC RX REV CODE- 250/637: Performed by: INTERNAL MEDICINE

## 2021-02-18 PROCEDURE — 36415 COLL VENOUS BLD VENIPUNCTURE: CPT

## 2021-02-18 PROCEDURE — 74011636637 HC RX REV CODE- 636/637: Performed by: NURSE PRACTITIONER

## 2021-02-18 PROCEDURE — 74011000250 HC RX REV CODE- 250: Performed by: ANESTHESIOLOGY

## 2021-02-18 PROCEDURE — 77030021668 HC NEB PREFIL KT VYRM -A

## 2021-02-18 PROCEDURE — 84550 ASSAY OF BLOOD/URIC ACID: CPT

## 2021-02-18 PROCEDURE — 74011250636 HC RX REV CODE- 250/636: Performed by: NURSE PRACTITIONER

## 2021-02-18 PROCEDURE — 74011000250 HC RX REV CODE- 250: Performed by: HOSPITALIST

## 2021-02-18 RX ORDER — VANCOMYCIN HYDROCHLORIDE
1250 ONCE
Status: COMPLETED | OUTPATIENT
Start: 2021-02-18 | End: 2021-02-18

## 2021-02-18 RX ADMIN — MINERAL SUPPLEMENT IRON 300 MG / 5 ML STRENGTH LIQUID 100 PER BOX UNFLAVORED 300 MG: at 08:31

## 2021-02-18 RX ADMIN — INSULIN LISPRO 3 UNITS: 100 INJECTION, SOLUTION INTRAVENOUS; SUBCUTANEOUS at 00:54

## 2021-02-18 RX ADMIN — ALBUTEROL SULFATE 2 PUFF: 90 AEROSOL, METERED RESPIRATORY (INHALATION) at 12:51

## 2021-02-18 RX ADMIN — CASTOR OIL AND BALSAM, PERU: 788; 87 OINTMENT TOPICAL at 21:05

## 2021-02-18 RX ADMIN — BUMETANIDE 2 MG: 0.25 INJECTION INTRAMUSCULAR; INTRAVENOUS at 21:04

## 2021-02-18 RX ADMIN — ISOSORBIDE DINITRATE 30 MG: 20 TABLET ORAL at 21:04

## 2021-02-18 RX ADMIN — VANCOMYCIN HYDROCHLORIDE 1250 MG: 10 INJECTION, POWDER, LYOPHILIZED, FOR SOLUTION INTRAVENOUS at 15:06

## 2021-02-18 RX ADMIN — LABETALOL HYDROCHLORIDE 20 MG: 5 INJECTION INTRAVENOUS at 13:02

## 2021-02-18 RX ADMIN — CHOLECALCIFEROL (VITAMIN D3) 25 MCG (1,000 UNIT) TABLET 2000 UNITS: TABLET at 08:31

## 2021-02-18 RX ADMIN — Medication 10 ML: at 06:00

## 2021-02-18 RX ADMIN — HEPARIN SODIUM 5000 UNITS: 5000 INJECTION INTRAVENOUS; SUBCUTANEOUS at 11:21

## 2021-02-18 RX ADMIN — MEROPENEM 500 MG: 500 INJECTION, POWDER, FOR SOLUTION INTRAVENOUS at 11:21

## 2021-02-18 RX ADMIN — HYDROMORPHONE HYDROCHLORIDE 1 MG: 1 INJECTION, SOLUTION INTRAMUSCULAR; INTRAVENOUS; SUBCUTANEOUS at 21:14

## 2021-02-18 RX ADMIN — ALBUTEROL SULFATE 2 PUFF: 90 AEROSOL, METERED RESPIRATORY (INHALATION) at 17:48

## 2021-02-18 RX ADMIN — ALBUTEROL SULFATE 2 PUFF: 90 AEROSOL, METERED RESPIRATORY (INHALATION) at 08:40

## 2021-02-18 RX ADMIN — ISOSORBIDE DINITRATE 30 MG: 20 TABLET ORAL at 13:55

## 2021-02-18 RX ADMIN — ISOSORBIDE DINITRATE 30 MG: 20 TABLET ORAL at 05:57

## 2021-02-18 RX ADMIN — HEPARIN SODIUM 5000 UNITS: 5000 INJECTION INTRAVENOUS; SUBCUTANEOUS at 03:59

## 2021-02-18 RX ADMIN — Medication 10 ML: at 13:02

## 2021-02-18 RX ADMIN — LABETALOL HYDROCHLORIDE 20 MG: 5 INJECTION INTRAVENOUS at 00:54

## 2021-02-18 RX ADMIN — TICAGRELOR 90 MG: 90 TABLET ORAL at 08:31

## 2021-02-18 RX ADMIN — HEPARIN SODIUM 5000 UNITS: 5000 INJECTION INTRAVENOUS; SUBCUTANEOUS at 21:04

## 2021-02-18 RX ADMIN — ALBUTEROL SULFATE 2 PUFF: 90 AEROSOL, METERED RESPIRATORY (INHALATION) at 23:34

## 2021-02-18 RX ADMIN — CASTOR OIL AND BALSAM, PERU: 788; 87 OINTMENT TOPICAL at 08:43

## 2021-02-18 RX ADMIN — CARVEDILOL 25 MG: 12.5 TABLET, FILM COATED ORAL at 21:05

## 2021-02-18 RX ADMIN — CARVEDILOL 25 MG: 12.5 TABLET, FILM COATED ORAL at 08:31

## 2021-02-18 RX ADMIN — ATORVASTATIN CALCIUM 40 MG: 40 TABLET, FILM COATED ORAL at 21:04

## 2021-02-18 RX ADMIN — BUMETANIDE 2 MG: 0.25 INJECTION INTRAMUSCULAR; INTRAVENOUS at 08:31

## 2021-02-18 RX ADMIN — OXYCODONE HYDROCHLORIDE AND ACETAMINOPHEN 500 MG: 500 TABLET ORAL at 08:31

## 2021-02-18 RX ADMIN — ALBUTEROL SULFATE 2 PUFF: 90 AEROSOL, METERED RESPIRATORY (INHALATION) at 00:43

## 2021-02-18 RX ADMIN — SODIUM CHLORIDE 40 MG: 9 INJECTION INTRAMUSCULAR; INTRAVENOUS; SUBCUTANEOUS at 13:57

## 2021-02-18 RX ADMIN — AMLODIPINE BESYLATE 10 MG: 5 TABLET ORAL at 08:31

## 2021-02-18 RX ADMIN — Medication 10 ML: at 21:04

## 2021-02-18 RX ADMIN — INSULIN GLARGINE 20 UNITS: 100 INJECTION, SOLUTION SUBCUTANEOUS at 08:31

## 2021-02-18 RX ADMIN — SODIUM CHLORIDE 40 MG: 9 INJECTION INTRAMUSCULAR; INTRAVENOUS; SUBCUTANEOUS at 03:59

## 2021-02-18 RX ADMIN — MEROPENEM 500 MG: 500 INJECTION, POWDER, FOR SOLUTION INTRAVENOUS at 23:36

## 2021-02-18 RX ADMIN — TICAGRELOR 90 MG: 90 TABLET ORAL at 18:12

## 2021-02-18 RX ADMIN — METOPROLOL TARTRATE 5 MG: 5 INJECTION INTRAVENOUS at 03:59

## 2021-02-18 RX ADMIN — ASPIRIN 81 MG: 81 TABLET, CHEWABLE ORAL at 08:31

## 2021-02-18 RX ADMIN — OXYCODONE HYDROCHLORIDE AND ACETAMINOPHEN 500 MG: 500 TABLET ORAL at 18:12

## 2021-02-18 RX ADMIN — ALBUTEROL SULFATE 2 PUFF: 90 AEROSOL, METERED RESPIRATORY (INHALATION) at 20:07

## 2021-02-18 NOTE — PROGRESS NOTES
1930: Bedside and Verbal shift change report given to Curtis Resendez (oncoming nurse) by Darlyn Munguia RN and Bernice RN (offgoing nurse). Report included the following information SBAR, Kardex, ED Summary, Intake/Output, MAR, Recent Results, Cardiac Rhythm afib and Alarm Parameters . 2000: Pt oriented to self, place, situation, follows commands in all extremities, L side > R side, eyes open spontaneously. 0730: Bedside and Verbal shift change report given to Bernice CHRISTIAN (oncoming nurse) by Curtis Resendez (offgoing nurse). Report included the following information SBAR, Kardex, ED Summary, Intake/Output, MAR, Recent Results, Cardiac Rhythm afib/SR and Alarm Parameters .

## 2021-02-18 NOTE — PROGRESS NOTES
Pharmacist Note - Vancomycin Dosing  Therapy day #4  Indication: Worsening HAP; COVID-19+  Current regimen: Based on levels, last dose: 2 gm IV on 2/15 @ 1116    Recent Labs     02/18/21  0419 02/17/21  0416 02/16/21  0317   WBC 9.0 10.1 14.0*   CREA 8.13* 7.07* 6.18*   * 112* 101*       A Random Level resulted at 3.2 mcg/mL which was obtained ~72 hrs post-dose. Goal trough: 15 - 20 mcg/mL     Plan: Will give a one-time dose of vancomycin 1250 mg and continue to dose based on levels. Pharmacy will continue to monitor this patient daily for changes in clinical status and renal function.

## 2021-02-18 NOTE — PROGRESS NOTES
[facility]    ICU TEAM Progress Note    Name: Margie Guerrier   : 1955   MRN: [id number]   Date: 2021        Subjective:   Progress Note: 2021      Reason for ICU Admission: Acute hypoxic respiratory failure    Interval history:  44-year-old man who presented from [facility] on  with acute onset shortness of breath, hypoxia, nausea, vomiting and diarrhea-shortness of breath acutely got worse after one of the vomiting episodes. Diagnosed with Covid about 2 weeks prior to presentation.  Patient placed on BiPAP on arrival-felt much better.  After getting fluids for resuscitation/elevated lactate level patient acutely decompensated-developed lethargy and drop in oxygen levels-intubated emergently requiring moderate to high vent support. His respiratory status improved but his renal function was declining suspecting acute kidney injury on top of chronic kidney disease, it was also difficult to control his agitation on the ventilator. However patient condition improved and he was successfully extubated on . Patient continued to do well and he was transferred to the intermediate care unit on . Over the last few days patient oxygen requirement was increasing and he is becoming more lethargic. This morning he was placed on high flow nasal cannula and later on on BiPAP. On BiPAP he seems to be much better with decreased work of breathing and good tidal volume and oxygenation. Reviewing his chest x-ray seems that he has progressive volume overload and perhaps a component of aspiration. He was giving 1 dose of Lasix with good urine output and ICU consulted. Of note this patient been with positive fluid balance progressively since admission, according to documentation since admission he is about 20 L positive, over the last 3 days he is positive about 5 L. He has good urine output despite worsening BUN and creatinine.   No fever, no hemoptysis no nausea no vomiting. Active Problem List:     Problem List  Never Reviewed          Codes Class    Acute on chronic systolic heart failure (HCC) ICD-10-CM: I50.23  ICD-9-CM: 428.23         Goals of care, counseling/discussion ICD-10-CM: Z71.89  ICD-9-CM: V65.49         DNR (do not resuscitate) discussion ICD-10-CM: Z71.89  ICD-9-CM: V65.49         JUAN C (acute kidney injury) ([facility] [address] ICD-10-CM: N17.9  ICD-9-CM: 706. 9         Acute hypoxemic respiratory failure due to COVID-19 [facility]) ICD-10-CM: U07.1, J96.01  ICD-9-CM: 518.81, 079.89, 799.02               Past Medical History:      has a past medical history of Diabetes ([facility] [address] and Hypertension. Past Surgical History:      has no past surgical history on file. Home Medications:     Prior to Admission medications    Medication Sig Start Date End Date Taking? Authorizing Provider   aspirin 81 mg chewable tablet Take 81 mg by mouth daily. Yes Candelaria, MD Jessi   atorvastatin (LIPITOR) 80 mg tablet Take 80 mg by mouth daily. Yes Jessi Gaona MD   carvediloL (COREG) 12.5 mg tablet Take  by mouth two (2) times a day. Yes Jessi Gaona MD   docusate sodium (Colace) 100 mg capsule Take 100 mg by mouth two (2) times daily as needed for Constipation. Yes Jessi Gaona MD   doxycycline (ADOXA) 100 mg tablet Take 100 mg by mouth two (2) times a day. Yes Jessi Gaona MD   ferrous sulfate 325 mg (65 mg iron) tablet Take  by mouth every fourty-eight (48) hours. Yes Jessi Gaona MD   gabapentin (NEURONTIN) 400 mg capsule Take 400 mg by mouth three (3) times daily. Yes Jessi Gaona MD   insulin lispro (HUMALOG) 100 unit/mL injection 10 Units by SubCUTAneous route Before breakfast, lunch, and dinner. Yes Jessi Gaona MD   isosorbide mononitrate ER (IMDUR) 60 mg CR tablet Take 60 mg by mouth every morning. Yes Jessi Gaona MD   insulin glargine (Lantus U-100 Insulin) 100 unit/mL injection 20 Units by SubCUTAneous route nightly.    Yes Other, MD Jessi   lisinopriL (Shahab Perez) 20 mg tablet Take 20 mg by mouth daily. Yes Candelaria, MD Jessi   methocarbamoL (ROBAXIN) 500 mg tablet Take 500 mg by mouth three (3) times daily. Yes Jessi Gaona MD   oxyCODONE IR (ROXICODONE) 5 mg immediate release tablet Take 5 mg by mouth every six (6) hours as needed for Pain. Yes Candelaria, MD Jessi   pantoprazole (PROTONIX) 40 mg tablet Take 40 mg by mouth daily. Yes Candelaria, MD Jessi   guaiFENesin-codeine (Guaiatussin AC) 100-10 mg/5 mL solution Take 5 mL by mouth four (4) times daily as needed for Cough. Yes Candelaria, MD Jessi   senna (Senna) 8.6 mg tablet Take 1 Tab by mouth daily. Yes Jessi Gaona MD   ticagrelor (BRILINTA) 90 mg tablet Take 90 mg by mouth two (2) times a day. Yes Candelaria, MD Jessi   ascorbic acid, vitamin C, (Vitamin C) 500 mg tablet Take 500 mg by mouth two (2) times a day. Yes Candelaria, MD Jessi   cholecalciferol (Vitamin D3) (1000 Units /25 mcg) tablet Take 1,000 Units by mouth daily. Yes Candelaria, MD Jessi   zinc sulfate (ZINCATE) 220 (50) mg capsule Take 220 mg by mouth daily. Yes Provider, Historical   therapeutic multivitamin (THERAGRAN) tablet Take 1 Tab by mouth daily. Yes Provider, Historical   acetaminophen (TYLENOL) 325 mg tablet Take 975 mg by mouth every six (6) hours as needed for Pain. Other, MD Jessi       Allergies/Social/Family History:     No Known Allergies   Social History     Tobacco Use    Smoking status: Former Smoker    Smokeless tobacco: Never Used   Substance Use Topics    Alcohol use: Not Currently      No family history on file.     Review of Systems:     Not able to obtain due to the acuity of condition    Objective:   Vital Signs:  Visit Vitals  BP (!) 162/99   Pulse 77   Temp 97.9 °F (36.6 °C)   Resp 17   Ht 5' 7\" (1.702 m)   Wt 92.7 kg (204 lb 6.4 oz)   SpO2 100%   BMI 32.01 kg/m²    O2 Flow Rate (L/min): 30 l/min O2 Device: Hi flow nasal cannula Temp (24hrs), Av.1 °F (36.7 °C), Min:97.8 °F (36.6 °C), Max:98.8 °F (37.1 °C) Intake/Output:     Intake/Output Summary (Last 24 hours) at 2/18/2021 0755  Last data filed at 2/18/2021 0400  Gross per 24 hour   Intake 1905 ml   Output 660 ml   Net 1245 ml       Physical Exam:    General: Chronically ill looking gentleman in mild distress on HFNC, alert   HEENT: NG tube in place, HFNC  Neck: Supple, left subclavian triple-lumen lumen central line, dressing intact no erythema  Lung: Fair air entry bilaterally, rhonci bilaterally  Heart: Regular rhythm, normal heart sound. Could not appreciate murmur  Abdomen: Nontender, positive bowel sound  Extremities: +2 edema  Neuro: Patient is alert profoundly weak in 4 limbs but follows simple commands    LABS AND  DATA: Personally reviewed  Recent Labs     02/18/21 0419 02/17/21  0416   WBC 9.0 10.1   HGB 7.2* 7.3*   HCT 22.6* 23.1*    193     Recent Labs     02/18/21 0419 02/17/21  0416    144   K 3.5 3.3*   * 113*   CO2 21 20*   * 112*   CREA 8.13* 7.07*   * 139*   CA 7.7* 8.0*   MG 2.5* 2.4   PHOS 6.0* 5.5*     Recent Labs     02/18/21 0419 02/17/21 0416   * 112   TP 5.6* 5.6*   ALB 1.4* 1.4*   GLOB 4.2* 4.2*     No results for input(s): INR, PTP, APTT, INREXT, INREXT in the last 72 hours. Recent Labs     02/15/21  0925   PHI 7.42   PCO2I 33.7*   PO2I 55*   FIO2I 93     No results for input(s): CPK, CKMB, TROIQ, BNPP in the last 72 hours. Hemodynamics:   PAP:   CO:     Wedge:   CI:     CVP:    SVR:       PVR:       Ventilator Settings:  Mode Rate Tidal Volume Pressure FiO2 PEEP   Spontaneous   450 ml  8 cm H2O 50 % 5 cm H20     Peak airway pressure: 13 cm H2O    Minute ventilation: 28.6 l/min        MEDS: Reviewed    Chest X-Ray:  CXR Results  (Last 48 hours)    None          ECHO: On February 1  · LV: Estimated LVEF is 20 - 25%. Normal cavity size. Upper normal wall thickness. Severely reduced systolic function. Severe (grade 3) left ventricular diastolic dysfunction.   · LA: Dilated left atrium. · MV: Mild mitral valve regurgitation is present. · TV: Mild tricuspid valve regurgitation is present. · PA: Moderate to severe pulmonary hypertension. · RV: Reduced systolic function. Assessment and Plan:   Acute hypoxic respiratory failure  COVID-19 PNA  Aspiration  Volume Overload  Acute on Chronic Systolic HF    Appreciate Pall Med - ongoing GOC discussions  Appreciate Neph  Appreciate Cards  Appreciate ID  No escalation of care  No dialysis  DNR/DNI  Nieves  Vanco  ASA  Ticagrelor  Carvedilol  Lipitor  Bumex IV BID  Dilaudid PRN pain    CRITICAL CARE CONSULTANT NOTE  I had a face to face encounter with the patient, reviewed and interpreted patient data including clinical events, labs, images, vital signs, I/O's, and examined patient. I have discussed the case and the plan and management of the patient's care with the consulting services, the bedside nurses and the respiratory therapist.      NOTE OF PERSONAL INVOLVEMENT IN CARE   This patient has a high probability of imminent, clinically significant deterioration, which requires the highest level of preparedness to intervene urgently. I participated in the decision-making and personally managed or directed the management of the following life and organ supporting interventions that required my frequent assessment to treat or prevent imminent deterioration. I personally spent 75 minutes of critical care time. This is time spent at this critically ill patient's bedside actively involved in patient care as well as the coordination of care and discussions with the patient's family. This does not include any procedural time which has been billed separately.     Jimbo Berkowitz DO   Staff Intensivist/Anesthesiologist   [facility]  2/18/2021

## 2021-02-18 NOTE — PROGRESS NOTES
Nephrology Progress Note  Aimee Gaviria  Date of Admission : 1/30/2021    CC: Follow up for JUAN C        Assessment and Plan     JUAN C:  - unclear baseline but suspect some component of CKD  - Renal U/S on 1/30 confirms CKD  - progressive renal failure and NOT a dialysis candidate per family wishes   - transition to comfort care when appropriate   - Not much else to offer. Signing off     Volume overload:  Acidosis   COVID-19 PNA:  Resp failure:  DM2:         Interval History:   cc  Cr and BUN continue to worsen   No further escalation of care per family wishes     Current Medications: all current  Medications have been eviewed in EPIC  Review of Systems: Review of systems not obtained due to patient factors. Objective:  Vitals:    Vitals:    02/18/21 0300 02/18/21 0400 02/18/21 0500 02/18/21 0600   BP: (!) 160/73 (!) 153/91 (!) 158/83 (!) 162/99   Pulse: 81 75 82 77   Resp: 19 19 25 17   Temp:  97.9 °F (36.6 °C)     SpO2: 99% 100% 100% 100%   Weight:  92.7 kg (204 lb 6.4 oz)     Height:         Intake and Output:  No intake/output data recorded. 02/16 1901 - 02/18 0700  In: 3225 [I.V.:150]  Out: 200 [Urine:965; Drains:150]    Physical Examination:  Not examined in room due to COVID isolation       [x]    High complexity decision making was performed  [x]    Patient is at high-risk of decompensation with multiple organ involvement    Lab Data Personally Reviewed: I have reviewed all the pertinent labs, microbiology data and radiology studies during assessment.     Recent Labs     02/18/21  0419 02/17/21  0416 02/16/21  0317    144 145   K 3.5 3.3* 3.6   * 113* 114*   CO2 21 20* 18*   * 139* 104*   * 112* 101*   CREA 8.13* 7.07* 6.18*   CA 7.7* 8.0* 8.3*   MG 2.5* 2.4 2.2   PHOS 6.0* 5.5* 5.2*   ALB 1.4* 1.4* 1.5*   ALT 90* 54 60     Recent Labs     02/17/21  0416 02/16/21  0317   WBC 10.1 14.0*   HGB 7.3* 8.8*   HCT 23.1* 27.1*    234     Lab Results   Component Value Date/Time    Specimen Description: BLOOD 02/27/2010 07:00 PM     Lab Results   Component Value Date/Time    Culture result: NO GROWTH 3 DAYS 02/15/2021 10:58 AM    Culture result: LIGHT YEAST, (APPARENT CANDIDA ALBICANS) (A) 02/12/2021 04:53 PM    Culture result: LIGHT NORMAL RESPIRATORY CORI 02/12/2021 04:53 PM     Recent Results (from the past 24 hour(s))   GLUCOSE, POC    Collection Time: 02/17/21 11:11 AM   Result Value Ref Range    Glucose (POC) 141 (H) 65 - 100 mg/dL    Performed by Angélica Milner RN    [place] Josue, RANDOM    Collection Time: 02/17/21 11:12 AM   Result Value Ref Range    Vancomycin, random 18.1 UG/ML   GLUCOSE, POC    Collection Time: 02/17/21  7:00 PM   Result Value Ref Range    Glucose (POC) 151 (H) 65 - 100 mg/dL    Performed by Angélica Milner RN    GLUCOSE, POC    Collection Time: 02/18/21 12:44 AM   Result Value Ref Range    Glucose (POC) 148 (H) 65 - 100 mg/dL    Performed by [facility] HERMILA    MAGNESIUM    Collection Time: 02/18/21  4:19 AM   Result Value Ref Range    Magnesium 2.5 (H) 1.6 - 2.4 mg/dL   PHOSPHORUS    Collection Time: 02/18/21  4:19 AM   Result Value Ref Range    Phosphorus 6.0 (H) 2.6 - 4.7 MG/DL   URIC ACID    Collection Time: 02/18/21  4:19 AM   Result Value Ref Range    Uric acid 6.0 3.5 - 7.2 MG/DL   METABOLIC PANEL, COMPREHENSIVE    Collection Time: 02/18/21  4:19 AM   Result Value Ref Range    Sodium 143 136 - 145 mmol/L    Potassium 3.5 3.5 - 5.1 mmol/L    Chloride 112 (H) 97 - 108 mmol/L    CO2 21 21 - 32 mmol/L    Anion gap 10 5 - 15 mmol/L    Glucose 107 (H) 65 - 100 mg/dL     (H) 6 - 20 MG/DL    Creatinine 8.13 (H) 0.70 - 1.30 MG/DL    BUN/Creatinine ratio 15 12 - 20      GFR est AA 8 (L) >60 ml/min/1.73m2    GFR est non-AA 7 (L) >60 ml/min/1.73m2    Calcium 7.7 (L) 8.5 - 10.1 MG/DL    Bilirubin, total 0.3 0.2 - 1.0 MG/DL    ALT (SGPT) 90 (H) 12 - 78 U/L    AST (SGOT) 131 (H) 15 - 37 U/L    Alk.  phosphatase 142 (H) 45 - 117 U/L    Protein, total 5.6 (L) 6.4 - 8.2 g/dL    Albumin 1.4 (L) 3.5 - 5.0 g/dL    Globulin 4.2 (H) 2.0 - 4.0 g/dL    A-G Ratio 0.3 (L) 1.1 - 2.2     GLUCOSE, POC    Collection Time: 02/18/21  6:02 AM   Result Value Ref Range    Glucose (POC) 114 (H) 65 - 100 mg/dL    Performed by [address] [place] [address] Avenue, [place]  [address]  Phone - [phone]   Fax - [phone]  [email or web address]

## 2021-02-18 NOTE — PROGRESS NOTES
Palliative Medicine Consult  Sanchez: [phone] ([phone])    Patient Name: Matthew Marinelli  YOB: 1955    Date of Initial Consult: 2/16/21  Reason for Consult: Care decisions  Requesting Provider: Dr. Oh rOtiz  Primary Care Physician: Jessi Gaona MD     SUMMARY:   Matthew Marinelli is a 63 y.o. male with a past history of anemia, neck wound, diabetes mellitus type 2, ischemic cardiomyopathy, EF 20-25%, mod-severe pulmonary hypertension, and former heroin use, who was admitted on 1/30/2021 from [facility] with a diagnosis of Covid-19 pneumonia and acute hypoxic respiratory failure. He presented to the ED with complaints of Covid-19 infection, shortness of breath, and hypoxia. He was placed on BiPAP and later intubated. He was extubated 2/11 and transferred to [facility]. On 2/15, his condition deteriorated and his oxygen needs increased. He was transferred back to ICU and is now alternating between BiPAP and HFNC. Current medical issues leading to Palliative Medicine involvement include: critically ill at high risk of decompensation, discuss care decisions. Social: He had been at [facility] receiving skilled nursing care for his neck wound. His wife Gisele Singer and their children are his main support. He and wife Gisele Singer are , but he recently came to live with her and she has been helping him with his medical problems. PALLIATIVE DIAGNOSES:   1. Goals of care  2. DNR discussion  3. Covid-19 pneumonia  4. Mycoplasma and strep pneumonia  5. Acute hypoxic respiratory failure  6. JUAN C  7. Ischemic cardiomyopathy  8. Chronic systolic heart failure       PLAN:   Case discussed with Dr. Albin Wilkins. The patient's renal function continues to worsen, however, his oxygen needs have improved some. Patient Maribell Fallow seen at the bedside. He has improved over the last few days. He is awake and fairly alert on HFNC. He is able to hold a conversation. We talked about his worsening kidney failure.  I explained that when he was very sick, I had talked with his wife Gisele Singer and she had decided not to do dialysis on him. I then explained that his kidney function is now worse, but that he is more awake so I wanted to talk to him too. He does say that he does not want dialysis. I tried to make sure that he understood that without dialysis, we would expect his kidneys to continue to fail and he may not survive. He did not like hearing this and let me know that he is going to keep fighting. I encouraged him to do so, but let him know that we are very worried about his condition. He then started talking about how hungry he was and that he needs food to get stronger and better. I explained he was receiving tube feedings right now, but he continued to ask for food. I let him know that I would be calling wife Gisele Singer to update her. Call placed to wife Tate Anders. We talked about the patient's current condition and a medical update was given about the worsening renal failure and the improved oxygen requirements. I explained that at this point, the medical team needs to understand what direction the patient's care is going in. With the worsening renal function and previous decision to hold dialysis, we would expect him to continue to worsen and eventually die from the renal failure. If dialysis is not started, then we would recommend starting comfort care. Gisele Singer says that she is now feeling more conflicted because the patient's mental status has improved as well as his oxygen needs. She explains that she had a Zoom call with the patient last night and they talked at length \"just like the old Maribell Fallow was back\". Gisele Singer now feels that she must discuss care decisions with the patient before making such a big decision as to stop care and transition to comfort measures. She requests the opportunity to talk with the patient over a Zoom call this evening along with her daughter.  Plan made for them to Zoom at 6pm to discuss the current medical issues and Kolby's wishes. Tracey Bal does confirm that she wants to keep the DNR/DNI status. Nursing updated on the details of the conversation and plan made for the 6pm Zoom call. Hopefully after this call, the patient and family will have a better idea of what the goals for care are. Communicated plan of care with: Palliative Rohit DOWELL 192 Team     GOALS OF CARE / TREATMENT PREFERENCES:     GOALS OF CARE:  Patient/Health Care Proxy Stated Goals: Other (comment)(improve/recover from Covid)    TREATMENT PREFERENCES:   Code Status: DNR    Advance Care Planning:  [x] [facility] Interdisciplinary Team has updated the ACP Navigator with Health Care Decision Maker and Patient Capacity      Primary Decision Maker: Claudetta Polite - Spouse - [phone]  No flowsheet data found. Medical Interventions: Limited additional interventions     Other Instructions:   Artificially Administered Nutrition: Feeding tube for a defined trial period     Other:    As far as possible, the palliative care team has discussed with patient / health care proxy about goals of care / treatment preferences for patient. HISTORY:     History obtained from: chart, wife    CHIEF COMPLAINT: Shortness of breath    HPI/SUBJECTIVE:    The patient is:    [x] Verbal and participatory  [] Non-participatory due to:     He is more awake and alert today. He denies pain or nausea. He has some shortness of breath. He mostly complains of being hungry and wanting something to eat.     Clinical Pain Assessment (nonverbal scale for severity on nonverbal patients):   Clinical Pain Assessment  Severity: 0     Activity (Movement): Lying quietly, normal position    Duration: for how long has pt been experiencing pain (e.g., 2 days, 1 month, years)  Frequency: how often pain is an issue (e.g., several times per day, once every few days, constant)     FUNCTIONAL ASSESSMENT:     Palliative Performance Scale (PPS):  PPS: 40 PSYCHOSOCIAL/SPIRITUAL SCREENING:     Palliative IDT has assessed this patient for cultural preferences / practices and a referral made as appropriate to needs (Cultural Services, Patient Advocacy, Ethics, etc.)    Any spiritual / Anabaptism concerns:  [] Yes /  [x] No    Caregiver Burnout:  [] Yes /  [x] No /  [] No Caregiver Present      Anticipatory grief assessment:   [x] Normal  / [] Maladaptive       ESAS Anxiety: Anxiety: 0    ESAS Depression:          REVIEW OF SYSTEMS:     Positive and pertinent negative findings in ROS are noted above in HPI. The following systems were [x] reviewed / [] unable to be reviewed as noted in HPI  Other findings are noted below. Systems: constitutional, ears/nose/mouth/throat, respiratory, gastrointestinal, genitourinary, musculoskeletal, integumentary, neurologic, psychiatric, endocrine. Positive findings noted below. Modified ESAS Completed by: provider   Fatigue: 8 Drowsiness: 1     Pain: 0   Anxiety: 0 Nausea: 0   Anorexia: 0 Dyspnea: 2           Stool Occurrence(s): 1        PHYSICAL EXAM:     From RN flowsheet:  Wt Readings from Last 3 Encounters:   02/18/21 204 lb 6.4 oz (92.7 kg)   06/09/20 172 lb (78 kg)     Blood pressure (!) 170/120, pulse 81, temperature 97.7 °F (36.5 °C), resp. rate 19, height 5' 7\" (1.702 m), weight 204 lb 6.4 oz (92.7 kg), SpO2 100 %.     Pain Scale 1: Numeric (0 - 10)  Pain Intensity 1: 0     Pain Location 1: Leg  Pain Orientation 1: Right, Left  Pain Description 1: Shooting, Sharp  Pain Intervention(s) 1: Medication (see MAR)  Last bowel movement, if known:     Constitutional: alert, awake, frail, on HFNC  Eyes: pupils equal, anicteric  ENMT: no nasal discharge, dry mucous membranes  Cardiovascular: irregular rhythm  Respiratory: breathing non labored on HFNC, symmetric  Musculoskeletal: no deformity, no tenderness to palpation  Skin: warm, dry  Neurologic: following commands, moving all extremities  Psychiatric: calm       HISTORY:     Active Problems:    Acute hypoxemic respiratory failure due to COVID-19 [facility]) (1/30/2021)      Acute on chronic systolic heart failure ([facility] [address] (2/16/2021)      Goals of care, counseling/discussion ()      DNR (do not resuscitate) discussion ()      JUAN C (acute kidney injury) ([facility] [address] ()      Past Medical History:   Diagnosis Date    Diabetes ([facility] [address]     Hypertension       No past surgical history on file. No family history on file. History reviewed, no pertinent family history.   Social History     Tobacco Use    Smoking status: Former Smoker    Smokeless tobacco: Never Used   Substance Use Topics    Alcohol use: Not Currently     No Known Allergies   Current Facility-Administered Medications   Medication Dose Route Frequency    HYDROmorphone (PF) (DILAUDID) injection 1 mg  1 mg IntraVENous Q3H PRN    isosorbide dinitrate (ISORDIL) tablet 30 mg  30 mg Per NG tube Q8H    niCARdipine (CARDENE) 50 mg in 0.9% sodium chloride 100 mL infusion  0-15 mg/hr IntraVENous TITRATE    balsam peru-castor oiL (VENELEX) ointment   Topical Q12H    albuterol (PROVENTIL HFA, VENTOLIN HFA, PROAIR HFA) inhaler 2 Puff  2 Puff Inhalation Q4H RT    heparin (porcine) injection 5,000 Units  5,000 Units SubCUTAneous Q8H    Vancomycin- Pharmacy Dosing   Other Rx Dosing/Monitoring    bumetanide (BUMEX) injection 2 mg  2 mg IntraVENous Q12H    meropenem (MERREM) 500 mg in 0.9% sodium chloride (MBP/ADV) 50 mL MBP  0.5 g IntraVENous Q12H    0.9% sodium chloride infusion 250 mL  250 mL IntraVENous PRN    insulin glargine (LANTUS) injection 20 Units  20 Units SubCUTAneous DAILY    pantoprazole (PROTONIX) 40 mg in 0.9% sodium chloride 10 mL injection  40 mg IntraVENous Q12H    labetaloL (NORMODYNE;TRANDATE) injection 20 mg  20 mg IntraVENous Q4H PRN    ticagrelor (BRILINTA) tablet 90 mg  90 mg Oral BID    carvediloL (COREG) tablet 25 mg  25 mg Per NG tube Q12H    amLODIPine (NORVASC) tablet 10 mg  10 mg Per NG tube DAILY    insulin lispro (HUMALOG) injection   SubCUTAneous Q6H    alteplase (CATHFLO) 1 mg in sterile water (preservative free) 1 mL injection  1 mg InterCATHeter PRN    ascorbic acid (vitamin C) (VITAMIN C) tablet 500 mg  500 mg Per NG tube BID    cholecalciferol (VITAMIN D3) (1000 Units /25 mcg) tablet 2,000 Units  2,000 Units Per NG tube DAILY    metoprolol (LOPRESSOR) injection 5 mg  5 mg IntraVENous Q6H PRN    [Held by provider] allopurinoL (ZYLOPRIM) tablet 100 mg  100 mg Oral DAILY    sodium chloride (NS) flush 5-40 mL  5-40 mL IntraVENous Q8H    sodium chloride (NS) flush 5-40 mL  5-40 mL IntraVENous PRN    acetaminophen (TYLENOL) tablet 650 mg  650 mg Oral Q6H PRN    Or    acetaminophen (TYLENOL) suppository 650 mg  650 mg Rectal Q6H PRN    glucose chewable tablet 16 g  4 Tab Oral PRN    dextrose (D50W) injection syrg 12.5-25 g  12.5-25 g IntraVENous PRN    glucagon (GLUCAGEN) injection 1 mg  1 mg IntraMUSCular PRN    aspirin chewable tablet 81 mg  81 mg Oral DAILY    atorvastatin (LIPITOR) tablet 40 mg  40 mg Oral QHS          LAB AND IMAGING FINDINGS:     Lab Results   Component Value Date/Time    WBC 9.0 02/18/2021 04:19 AM    HGB 7.2 (L) 02/18/2021 04:19 AM    PLATELET 452 01/88/4892 04:19 AM     Lab Results   Component Value Date/Time    Sodium 143 02/18/2021 04:19 AM    Potassium 3.5 02/18/2021 04:19 AM    Chloride 112 (H) 02/18/2021 04:19 AM    CO2 21 02/18/2021 04:19 AM     (H) 02/18/2021 04:19 AM    Creatinine 8.13 (H) 02/18/2021 04:19 AM    Calcium 7.7 (L) 02/18/2021 04:19 AM    Magnesium 2.5 (H) 02/18/2021 04:19 AM    Phosphorus 6.0 (H) 02/18/2021 04:19 AM      Lab Results   Component Value Date/Time    Alk.  phosphatase 142 (H) 02/18/2021 04:19 AM    Protein, total 5.6 (L) 02/18/2021 04:19 AM    Albumin 1.4 (L) 02/18/2021 04:19 AM    Globulin 4.2 (H) 02/18/2021 04:19 AM     Lab Results   Component Value Date/Time    aPTT 24.7 02/08/2021 10:13 AM      Lab Results   Component Value Date/Time    Iron 23 (L) 02/14/2021 01:12 PM    TIBC 146 (L) 02/14/2021 01:12 PM    Iron % saturation 16 (L) 02/14/2021 01:12 PM      Lab Results   Component Value Date/Time    pH 7.50 (H) 02/16/2021 06:25 AM    PCO2 21 (L) 02/16/2021 06:25 AM    PO2 85 02/16/2021 06:25 AM     No components found for: [place]   Lab Results   Component Value Date/Time     01/30/2021 08:00 AM                Total time: 35 min  Counseling / coordination time, spent as noted above: 30 min  > 50% counseling / coordination?: yes    Prolonged service was provided for  []30 min   []75 min in face to face time in the presence of the patient, spent as noted above. Time Start:   Time End:   Note: this can only be billed with 64289 (initial) or 59725 (follow up). If multiple start / stop times, list each separately.

## 2021-02-18 NOTE — PROGRESS NOTES
Comprehensive Nutrition Assessment    Type and Reason for Visit: Reassess    Nutrition Recommendations/Plan:    1. Continue tube feeds with: Nepro @ 55 ml/hr with 250 ml water flush q 4 hr    2. For diarrhea:    - if able discontinue oral iron syrup (osmolality >3000 which may be contributing to diarrhea)   - add Risaquad/probiotic    3. Consider adding phosphate binders since trending up. Nutrition Assessment:    Patient admitted with respiratory failure d/t COVID 19. PMHx: DM, HTN, neck wound/infection-@ NH for IV antibiotics, plan for skin graft. Intubated in ED d/t pulmonary edema, COVID 19 PNA, Aspiration pneumonitis. Self- extubated 2/1; required reintubation but now off vent on hiflow. BUN, creatinine and phosphorus elevated d/t renal failure but no plans for HD. Family electing for no escalation of care but to continue with current measures for now. WOCN following for neck wound. Pt remains inappropriate for PO. Tolerating EN at goal rate to provide: 1210 ml, 2140 calories, 96 gm protein and 2370 ml free water (tube feeding/flush) per day. Meets 100% energy and protein needs. Will continue with current flushes since sodium stable WNL with current order. Tube feeding provides 850 mg Phosphorus per day. Consider adding phosphate binders since trending up. BG well controlled with current insulin regimen. Diarrhea noted with FMT in place. On oral iron syrup via NGT with very high osmolality, may be contributing to loose stools along with abx. Wt gain with significant edema present.    Last 3 Recorded Weights in this Encounter    02/16/21 0400 02/17/21 0400 02/18/21 0400   Weight: 91.9 kg (202 lb 8 oz) 91.1 kg (200 lb 12.8 oz) 92.7 kg (204 lb 6.4 oz)     Malnutrition Assessment:  Malnutrition Status:  Insufficient data-at risk d/t critical illnes  Context:  Acute illness       Nutritionally Significant Medications: Vit C, bumex, Vit D3, Colace, ferrous sulfate (oral syrup), Lantus (20 units), SSI, merrem, protonix, vanco, cardene drip    Estimated Daily Nutrient Needs:  Energy (kcal): 2200 (25 kcal/kg); Weight Used for Energy Requirements: Current(88 kg)  Protein (g): 88-97 (1.0-1.1g/kg or 1.3-1.4g/kg IBW); Weight Used for Protein Requirements: Current(88 kg)  Fluid (ml/day): 2200; Method Used for Fluid Requirements: 1 ml/kcal    Nutrition Related Findings:       BM: 2/18 (FMT placed on 2/14)  Edema: 3+ generalized, 2+BLUE, 3+BLUE  Wounds:  Open wounds       Current Nutrition Therapies:   Diet: NPO  Tube Feeding: Nepro @ 30 ml/hr with 2 packets Prosource daily and 250 ml water flush q 4 hr    Anthropometric Measures:  · Height:  5' 7\" (170.2 cm)  · Current Body Wt:  89.4 kg (197 lb 1.5 oz)   · Admission Body Wt:  186 lb 15.2 oz       · Ideal Body Wt: 148#  :  133.2 %     Wt Readings from Last 10 Encounters:   02/18/21 92.7 kg (204 lb 6.4 oz)   06/09/20 78 kg (172 lb)     Nutrition Diagnosis:   · Inadequate oral intake related to impaired respiratory function as evidenced by intubation, nutrition support-enteral nutrition    · Altered nutrition-related lab values related to renal dysfunction as evidenced by lab values(hyperkalemia, hyperphosphatemia)    · Altered GI function related to (?medications) as evidenced by diarrhea(liquid iron, abx)    Nutrition Interventions:   Food and/or Nutrient Delivery: Continue tube feeding  Nutrition Education and Counseling: No recommendations at this time  Coordination of Nutrition Care: Continue to monitor while inpatient    Goals:  EN meeting at least 90% need sin 5-7 days       Nutrition Monitoring and Evaluation:   Behavioral-Environmental Outcomes: None identified  Food/Nutrient Intake Outcomes: Enteral nutrition intake/tolerance  Physical Signs/Symptoms Outcomes: Biochemical data, Weight, Diarrhea    Discharge Planning:     Too soon to determine     Rupal Osuna RD  [address] , Pager #[phone] or via [employer]

## 2021-02-18 NOTE — PROGRESS NOTES
Physical Therapy 2/18/2021    Chart reviewed up to date. Per RN, pt with worsenign respiratory status and best to defer PT today. Pt planned for family meeting. Will follow-up as appropriate. Recommend with nursing patient to complete as able in order to maintain strength, endurance and independence: chair position in bed 3x/day as tolerated. Thank you.   Pj Diana, PT, DPT

## 2021-02-19 ENCOUNTER — APPOINTMENT (OUTPATIENT)
Dept: GENERAL RADIOLOGY | Age: 66
DRG: 720 | End: 2021-02-19
Attending: NURSE PRACTITIONER
Payer: MEDICAID

## 2021-02-19 LAB
ALBUMIN SERPL-MCNC: 1.5 G/DL (ref 3.5–5)
ALBUMIN/GLOB SERPL: 0.3 {RATIO} (ref 1.1–2.2)
ALP SERPL-CCNC: 182 U/L (ref 45–117)
ALT SERPL-CCNC: 111 U/L (ref 12–78)
ANION GAP SERPL CALC-SCNC: 10 MMOL/L (ref 5–15)
AST SERPL-CCNC: 152 U/L (ref 15–37)
BASOPHILS # BLD: 0 K/UL (ref 0–0.1)
BASOPHILS NFR BLD: 0 % (ref 0–1)
BILIRUB SERPL-MCNC: 0.2 MG/DL (ref 0.2–1)
BUN SERPL-MCNC: 131 MG/DL (ref 6–20)
BUN/CREAT SERPL: 15 (ref 12–20)
CALCIUM SERPL-MCNC: 7.8 MG/DL (ref 8.5–10.1)
CHLORIDE SERPL-SCNC: 110 MMOL/L (ref 97–108)
CO2 SERPL-SCNC: 22 MMOL/L (ref 21–32)
CREAT SERPL-MCNC: 8.85 MG/DL (ref 0.7–1.3)
DIFFERENTIAL METHOD BLD: ABNORMAL
EOSINOPHIL # BLD: 0.3 K/UL (ref 0–0.4)
EOSINOPHIL NFR BLD: 4 % (ref 0–7)
ERYTHROCYTE [DISTWIDTH] IN BLOOD BY AUTOMATED COUNT: 17.5 % (ref 11.5–14.5)
GLOBULIN SER CALC-MCNC: 4.3 G/DL (ref 2–4)
GLUCOSE BLD STRIP.AUTO-MCNC: 112 MG/DL (ref 65–100)
GLUCOSE BLD STRIP.AUTO-MCNC: 126 MG/DL (ref 65–100)
GLUCOSE BLD STRIP.AUTO-MCNC: 133 MG/DL (ref 65–100)
GLUCOSE BLD STRIP.AUTO-MCNC: 95 MG/DL (ref 65–100)
GLUCOSE SERPL-MCNC: 124 MG/DL (ref 65–100)
HCT VFR BLD AUTO: 23.6 % (ref 36.6–50.3)
HGB BLD-MCNC: 7.4 G/DL (ref 12.1–17)
IMM GRANULOCYTES # BLD AUTO: 0.1 K/UL (ref 0–0.04)
IMM GRANULOCYTES NFR BLD AUTO: 1 % (ref 0–0.5)
LYMPHOCYTES # BLD: 1.4 K/UL (ref 0.8–3.5)
LYMPHOCYTES NFR BLD: 16 % (ref 12–49)
MAGNESIUM SERPL-MCNC: 2.9 MG/DL (ref 1.6–2.4)
MCH RBC QN AUTO: 26.2 PG (ref 26–34)
MCHC RBC AUTO-ENTMCNC: 31.4 G/DL (ref 30–36.5)
MCV RBC AUTO: 83.7 FL (ref 80–99)
MONOCYTES # BLD: 0.7 K/UL (ref 0–1)
MONOCYTES NFR BLD: 8 % (ref 5–13)
NEUTS SEG # BLD: 6.2 K/UL (ref 1.8–8)
NEUTS SEG NFR BLD: 71 % (ref 32–75)
NRBC # BLD: 0 K/UL (ref 0–0.01)
NRBC BLD-RTO: 0 PER 100 WBC
PHOSPHATE SERPL-MCNC: 7.3 MG/DL (ref 2.6–4.7)
PLATELET # BLD AUTO: 230 K/UL (ref 150–400)
PMV BLD AUTO: 12.3 FL (ref 8.9–12.9)
POTASSIUM SERPL-SCNC: 3.8 MMOL/L (ref 3.5–5.1)
PROT SERPL-MCNC: 5.8 G/DL (ref 6.4–8.2)
RBC # BLD AUTO: 2.82 M/UL (ref 4.1–5.7)
SARS-COV-2, COV2: NORMAL
SERVICE CMNT-IMP: ABNORMAL
SERVICE CMNT-IMP: NORMAL
SODIUM SERPL-SCNC: 142 MMOL/L (ref 136–145)
URATE SERPL-MCNC: 6.4 MG/DL (ref 3.5–7.2)
WBC # BLD AUTO: 8.6 K/UL (ref 4.1–11.1)

## 2021-02-19 PROCEDURE — 77010033711 HC HIGH FLOW OXYGEN

## 2021-02-19 PROCEDURE — 74011636637 HC RX REV CODE- 636/637: Performed by: HOSPITALIST

## 2021-02-19 PROCEDURE — 84550 ASSAY OF BLOOD/URIC ACID: CPT

## 2021-02-19 PROCEDURE — 84100 ASSAY OF PHOSPHORUS: CPT

## 2021-02-19 PROCEDURE — 74011000258 HC RX REV CODE- 258: Performed by: INTERNAL MEDICINE

## 2021-02-19 PROCEDURE — 74011250637 HC RX REV CODE- 250/637: Performed by: NURSE PRACTITIONER

## 2021-02-19 PROCEDURE — 65660000001 HC RM ICU INTERMED STEPDOWN

## 2021-02-19 PROCEDURE — 74011250637 HC RX REV CODE- 250/637: Performed by: HOSPITALIST

## 2021-02-19 PROCEDURE — 74011250637 HC RX REV CODE- 250/637: Performed by: INTERNAL MEDICINE

## 2021-02-19 PROCEDURE — 74011000250 HC RX REV CODE- 250: Performed by: HOSPITALIST

## 2021-02-19 PROCEDURE — 85025 COMPLETE CBC W/AUTO DIFF WBC: CPT

## 2021-02-19 PROCEDURE — 94760 N-INVAS EAR/PLS OXIMETRY 1: CPT

## 2021-02-19 PROCEDURE — 36415 COLL VENOUS BLD VENIPUNCTURE: CPT

## 2021-02-19 PROCEDURE — 74011250636 HC RX REV CODE- 250/636: Performed by: NURSE PRACTITIONER

## 2021-02-19 PROCEDURE — 2709999900 HC NON-CHARGEABLE SUPPLY

## 2021-02-19 PROCEDURE — U0005 INFEC AGEN DETEC AMPLI PROBE: HCPCS

## 2021-02-19 PROCEDURE — 74011250636 HC RX REV CODE- 250/636: Performed by: HOSPITALIST

## 2021-02-19 PROCEDURE — 74011250637 HC RX REV CODE- 250/637: Performed by: ANESTHESIOLOGY

## 2021-02-19 PROCEDURE — 83735 ASSAY OF MAGNESIUM: CPT

## 2021-02-19 PROCEDURE — 77030041076 HC DRSG AG OPTICELL MDII -A

## 2021-02-19 PROCEDURE — 77010033678 HC OXYGEN DAILY

## 2021-02-19 PROCEDURE — 74011250637 HC RX REV CODE- 250/637: Performed by: EMERGENCY MEDICINE

## 2021-02-19 PROCEDURE — 80053 COMPREHEN METABOLIC PANEL: CPT

## 2021-02-19 PROCEDURE — 74011250636 HC RX REV CODE- 250/636: Performed by: INTERNAL MEDICINE

## 2021-02-19 PROCEDURE — C9113 INJ PANTOPRAZOLE SODIUM, VIA: HCPCS | Performed by: HOSPITALIST

## 2021-02-19 PROCEDURE — 65660000000 HC RM CCU STEPDOWN

## 2021-02-19 PROCEDURE — 94640 AIRWAY INHALATION TREATMENT: CPT

## 2021-02-19 PROCEDURE — 94762 N-INVAS EAR/PLS OXIMTRY CONT: CPT

## 2021-02-19 PROCEDURE — 71045 X-RAY EXAM CHEST 1 VIEW: CPT

## 2021-02-19 PROCEDURE — 82962 GLUCOSE BLOOD TEST: CPT

## 2021-02-19 PROCEDURE — 51798 US URINE CAPACITY MEASURE: CPT

## 2021-02-19 PROCEDURE — 74011000250 HC RX REV CODE- 250: Performed by: INTERNAL MEDICINE

## 2021-02-19 RX ADMIN — ISOSORBIDE DINITRATE 30 MG: 20 TABLET ORAL at 21:13

## 2021-02-19 RX ADMIN — ALBUTEROL SULFATE 2 PUFF: 90 AEROSOL, METERED RESPIRATORY (INHALATION) at 19:54

## 2021-02-19 RX ADMIN — OXYCODONE HYDROCHLORIDE AND ACETAMINOPHEN 500 MG: 500 TABLET ORAL at 09:43

## 2021-02-19 RX ADMIN — OXYCODONE HYDROCHLORIDE AND ACETAMINOPHEN 500 MG: 500 TABLET ORAL at 18:08

## 2021-02-19 RX ADMIN — CASTOR OIL AND BALSAM, PERU: 788; 87 OINTMENT TOPICAL at 23:15

## 2021-02-19 RX ADMIN — TICAGRELOR 90 MG: 90 TABLET ORAL at 18:07

## 2021-02-19 RX ADMIN — SODIUM CHLORIDE 40 MG: 9 INJECTION INTRAMUSCULAR; INTRAVENOUS; SUBCUTANEOUS at 04:55

## 2021-02-19 RX ADMIN — ISOSORBIDE DINITRATE 30 MG: 20 TABLET ORAL at 13:41

## 2021-02-19 RX ADMIN — CHOLECALCIFEROL (VITAMIN D3) 25 MCG (1,000 UNIT) TABLET 2000 UNITS: TABLET at 09:42

## 2021-02-19 RX ADMIN — ASPIRIN 81 MG: 81 TABLET, CHEWABLE ORAL at 09:43

## 2021-02-19 RX ADMIN — CASTOR OIL AND BALSAM, PERU: 788; 87 OINTMENT TOPICAL at 09:43

## 2021-02-19 RX ADMIN — Medication 10 ML: at 21:14

## 2021-02-19 RX ADMIN — Medication 10 ML: at 05:19

## 2021-02-19 RX ADMIN — HEPARIN SODIUM 5000 UNITS: 5000 INJECTION INTRAVENOUS; SUBCUTANEOUS at 21:12

## 2021-02-19 RX ADMIN — ALBUTEROL SULFATE 2 PUFF: 90 AEROSOL, METERED RESPIRATORY (INHALATION) at 03:37

## 2021-02-19 RX ADMIN — HEPARIN SODIUM 5000 UNITS: 5000 INJECTION INTRAVENOUS; SUBCUTANEOUS at 12:01

## 2021-02-19 RX ADMIN — ISOSORBIDE DINITRATE 30 MG: 20 TABLET ORAL at 04:55

## 2021-02-19 RX ADMIN — Medication 10 ML: at 16:01

## 2021-02-19 RX ADMIN — CARVEDILOL 25 MG: 12.5 TABLET, FILM COATED ORAL at 09:43

## 2021-02-19 RX ADMIN — TICAGRELOR 90 MG: 90 TABLET ORAL at 09:43

## 2021-02-19 RX ADMIN — INSULIN GLARGINE 20 UNITS: 100 INJECTION, SOLUTION SUBCUTANEOUS at 09:42

## 2021-02-19 RX ADMIN — HYDROMORPHONE HYDROCHLORIDE 1 MG: 1 INJECTION, SOLUTION INTRAMUSCULAR; INTRAVENOUS; SUBCUTANEOUS at 21:14

## 2021-02-19 RX ADMIN — ALBUTEROL SULFATE 2 PUFF: 90 AEROSOL, METERED RESPIRATORY (INHALATION) at 08:20

## 2021-02-19 RX ADMIN — HEPARIN SODIUM 5000 UNITS: 5000 INJECTION INTRAVENOUS; SUBCUTANEOUS at 04:55

## 2021-02-19 RX ADMIN — MEROPENEM 500 MG: 500 INJECTION, POWDER, FOR SOLUTION INTRAVENOUS at 23:16

## 2021-02-19 RX ADMIN — AMLODIPINE BESYLATE 10 MG: 5 TABLET ORAL at 09:43

## 2021-02-19 RX ADMIN — CARVEDILOL 25 MG: 12.5 TABLET, FILM COATED ORAL at 21:14

## 2021-02-19 RX ADMIN — BUMETANIDE 2 MG: 0.25 INJECTION INTRAMUSCULAR; INTRAVENOUS at 09:42

## 2021-02-19 RX ADMIN — ATORVASTATIN CALCIUM 40 MG: 40 TABLET, FILM COATED ORAL at 21:14

## 2021-02-19 RX ADMIN — MEROPENEM 500 MG: 500 INJECTION, POWDER, FOR SOLUTION INTRAVENOUS at 11:57

## 2021-02-19 RX ADMIN — BUMETANIDE 2 MG: 0.25 INJECTION INTRAMUSCULAR; INTRAVENOUS at 21:12

## 2021-02-19 RX ADMIN — ALBUTEROL SULFATE 2 PUFF: 90 AEROSOL, METERED RESPIRATORY (INHALATION) at 16:25

## 2021-02-19 RX ADMIN — SODIUM CHLORIDE 40 MG: 9 INJECTION INTRAMUSCULAR; INTRAVENOUS; SUBCUTANEOUS at 18:07

## 2021-02-19 RX ADMIN — ALBUTEROL SULFATE 2 PUFF: 90 AEROSOL, METERED RESPIRATORY (INHALATION) at 13:09

## 2021-02-19 NOTE — PROGRESS NOTES
1930: Bedside shift change report given to [facility] (oncoming nurse) by Pramod Jernigan (offgoing nurse). Report included the following information SBAR, Kardex, Intake/Output, MAR, Accordion, Recent Results, Med Rec Status and Cardiac Rhythm SR.     0550: Patient does not c/o of need to urinate, pt bladder scanned, 415mL on BS, straight cath performed with Tash Gale RN, 400mL out. 0730: Bedside shift change report given to Bernice (oncoming nurse) by [facility] (offgoing nurse).  Report included the following information SBAR, Kardex, Intake/Output, MAR, Accordion, Recent Results, Med Rec Status and Cardiac Rhythm SR.

## 2021-02-19 NOTE — PROGRESS NOTES
Dr. Maru Mahajan paged with the following:     Pt just seen by BRANDON Ulrich CNP. He told her he wanted to get all available care, \"wants to know what he can do to go home. \" Did not understand that hemodialysis was imperative. Brandy Bal, LUKASZ relays that he brought it all up to her and she did not initiate this discussion. She feels he needs to be reevaluated for DNR/full code status. Pt currently resting in NAD.

## 2021-02-19 NOTE — PROGRESS NOTES
Palliative Medicine Social Work    ADDENDUM 2:30    This SW spoke with patient wife. After most recent family meeting this morning, patient is still struggling with decision about HD. Wife understand that big picture prognosis is poor, but also wondering what kind of chance HD would give patient. Patient having hard time accepting prognosis. His insight is very poor. If he does not retain the gist of these conversations, wife may need to make decisions for him. They are planning to zoom with another dtr tonight at 5:30 (will need RN support to set up ipad). Wife also requesting repeat covid test - if patient is negative she would like to come visit. Miriam NICOLAS updated via [employer]      ADDENDUM:  11:45    This SW met with patient in room with wife on zoom. Patient awake and alert, though difficult to focus on conversation. Reviewed the conversations he has been having re: HD, comfort. He remembered meeting with Palliative NP yesterday. Acknowledged that he has said he does not want HD (because his daughter has been on HD and understands how difficult it is), but also stating he doesn't want to die. He said he does want to do HD so he doesn't die. We discussed his many medical issues including his very weak heart. Overall concern re: Big Picture of his care. Patient has very poor insight into his medical care. Wife is very realistic. She reported she will not make decisions for him as long as he is awake and talking. She just lost her mom to Covid and had to make decision to withdraw support. Wife has also said if patient does transition to comfort/hospice, she can't bring him home. Will follow up with wife this afternoon. 11:19  This SW spoke with wife. Palliative NP had long discussions with patient and wife yeateday re: HD/comfort. Family was to talk last night on zoom however no decision reached as patient was avoidant of the topic.     Patient aware he needs HD but he doesn't want it. He is aware time is short if he doesn't pursue HD. Wife believes he is angry/avoiding as he struggles with anticipatory grief. Will f/u with patient to hopefully proceed with comfort measures given no HD. Wife has stated that she can't bring patient home on hospice. Thank you for the opportunity to be involved in the care of Mr. Lazaro Pichardo and his family.     Tin Schmitz LMSW, Supervisee in Social Work  Palliative Medicine   [phone]    Start time: 10:30  End time: 11:21

## 2021-02-19 NOTE — PROGRESS NOTES
[facility] Adult  Hospitalist Group     ICU Transfer/Accept Summary     This patient is being [place] [address] ICU  DATE OF TRANSFER: 2/19/2021       PATIENT ID: Nika Jacobs  MRN: [id number]   YOB: 1955    PRIMARY CARE PROVIDER: Magdalene Rizo MD   DATE OF ADMISSION: 1/30/2021 12:48 AM    ATTENDING PHYSICIAN: Claudeen Jack, MD  CONSULTATIONS:   IP CONSULT TO CARDIAC SURGERY  IP CONSULT TO INFECTIOUS DISEASES  IP CONSULT TO NEPHROLOGY  IP CONSULT TO INTENSIVIST  IP CONSULT TO PALLIATIVE CARE - PROVIDER  IP CONSULT TO CARDIOLOGY  IP CONSULT TO HOSPITALIST    PROCEDURES/SURGERIES:   * No surgery found *    REASON FOR ADMISSION: <principal problem not specified>     HOSPITAL PROBLEM LIST:  Patient Active Problem List   Diagnosis Code    Acute hypoxemic respiratory failure due to COVID-19 ([facility] [address] U07.1, J96.01    Acute on chronic systolic heart failure (HCC) I50.23    Goals of care, counseling/discussion Z71.89    DNR (do not resuscitate) discussion Z71.89    JUAN C (acute kidney injury) ([facility] [address] N17.9         Brief HPI and Hospital Course:      65M has COVID. resp failure- extubated 2/11. Acute Hypoxic Respiratory Failure  COVID-19 PNA  Aspiration  Volume Overload  Acute on Chronic Systolic HF  Acute Renal Failure  Anemia: chronic, likely of chronic disease. Deranged LFTs.    Appreciate Pall Med - ongoing [address] discussions  Family is discussing comfort care measures but not decided- DNR/DNI- No escalation of care  Appreciate Neph- patient was refusing HD. Now states he will consider. Appreciate Cards - ASA, Ticagrelor, Carvedilol, Lipitor, Bumex IV BID, Dilaudid PRN pain  Appreciate ID- Abx, meropenem, aleena Taylor  PT/OT       PHYSICAL EXAMINATION:  Visit Vitals  BP (!) 159/86 (BP 1 Location: Right upper arm, BP Patient Position: At rest)   Pulse 67   Temp 97.9 °F (36.6 °C)   Resp 15   Ht 5' 7\" (1.702 m)   Wt (!) 176.5 kg (389 lb 1.8 oz)   SpO2 100%   BMI 60.94 kg/m²       General: Alert, cooperative, no distress, chronic sick looking BM  Lungs:             mild accessory muscle use. No Wheezing, few rhonchi b/l  Heart:              Regular  rhythm,  No  murmur   Abdomen:       Soft, non-tender. mild distended. Extremities:     No cyanosis. No clubbing  Psych:             Not anxious or agitated. Neurologic:      Alert, moves all extremities, oriented X 3. Labs:     Recent Labs     02/19/21 0515 02/18/21 0419   WBC 8.6 9.0   HGB 7.4* 7.2*   HCT 23.6* 22.6*    231     Recent Labs     02/19/21  0515 02/18/21 0419 02/17/21 0416    143 144   K 3.8 3.5 3.3*   * 112* 113*   CO2 22 21 20*   * 122* 112*   CREA 8.85* 8.13* 7.07*   * 107* 139*   CA 7.8* 7.7* 8.0*   MG 2.9* 2.5* 2.4   PHOS 7.3* 6.0* 5.5*   URICA 6.4 6.0 5.7     Recent Labs     02/19/21 0515 02/18/21 0419 02/17/21 0416   * 90* 54   * 142* 112   TBILI 0.2 0.3 0.3   TP 5.8* 5.6* 5.6*   ALB 1.5* 1.4* 1.4*   GLOB 4.3* 4.2* 4.2*     No results for input(s): INR, PTP, APTT, INREXT in the last 72 hours. No results for input(s): FE, TIBC, PSAT, FERR in the last 72 hours. No results found for: FOL, RBCF   No results for input(s): PH, PCO2, PO2 in the last 72 hours. No results for input(s): CPK, CKNDX, TROIQ in the last 72 hours.     No lab exists for component: CPKMB  Lab Results   Component Value Date/Time    Cholesterol, total 115 02/26/2010 05:20 PM    HDL Cholesterol 31 (L) 02/26/2010 05:20 PM    LDL, calculated 46.2 02/26/2010 05:20 PM    Triglyceride 189 02/26/2010 05:20 PM    CHOL/HDL Ratio 3.7 02/26/2010 05:20 PM     Lab Results   Component Value Date/Time    Glucose (POC) 95 02/19/2021 12:00 PM    Glucose (POC) 133 (H) 02/19/2021 05:15 AM    Glucose (POC) 123 (H) 02/18/2021 11:33 PM    Glucose (POC) 131 (H) 02/18/2021 06:19 PM    Glucose (POC) 121 (H) 02/18/2021 11:25 AM     Lab Results   Component Value Date/Time    Color YELLOW/STRAW 02/10/2021 01:26 PM    Appearance TURBID (A) 02/10/2021 01:26 PM    Specific gravity 1.014 02/10/2021 01:26 PM    Specific gravity 1.015 02/26/2010 12:32 PM    pH (UA) 5.0 02/10/2021 01:26 PM    Protein 100 (A) 02/10/2021 01:26 PM    Glucose Negative 02/10/2021 01:26 PM    Ketone Negative 02/10/2021 01:26 PM    Bilirubin Negative 02/10/2021 01:26 PM    Urobilinogen 0.2 02/10/2021 01:26 PM    Nitrites Negative 02/10/2021 01:26 PM    Leukocyte Esterase Negative 02/10/2021 01:26 PM    Epithelial cells FEW 02/10/2021 01:26 PM    Bacteria Negative 02/10/2021 01:26 PM    WBC 0-4 02/10/2021 01:26 PM    RBC  02/10/2021 01:26 PM         CODE STATUS:   Full Code    DNR    Partial    Comfort Care       Signed:   Zeke Mandel MD  Date of Service:  2/19/2021  1:53 PM

## 2021-02-19 NOTE — PROGRESS NOTES
Chart reviewed, patient discussing plan of care moving forward and being transferred to [facility]. Will f/u pending plan of care. In meantime recommend patient to continued bed/chair position, active ROM and ADLs as able 2* seems more cognitively aware.

## 2021-02-19 NOTE — PROGRESS NOTES
Bedside and Verbal shift change report given to Bernice (oncoming nurse) by [facility] (offgoing nurse). Report included the following information SBAR, Kardex, Intake/Output, MAR, Accordion and Recent Results. Bedside and Verbal shift change report given to [facility] (oncoming nurse) by Indra Moctezuma (offgoing nurse). Report included the following information SBAR, Kardex, Intake/Output, MAR, Accordion and Recent Results.

## 2021-02-19 NOTE — PROGRESS NOTES
[facility]    ICU TEAM Progress Note    Name: Jason Sams   : 1955   MRN: [id number]   Date: 2021        Subjective:   Progress Note: 2021      Reason for ICU Admission: Acute hypoxic respiratory failure    Interval history:  27-year-old man who presented from [facility] on  with acute onset shortness of breath, hypoxia, nausea, vomiting and diarrhea-shortness of breath acutely got worse after one of the vomiting episodes. Diagnosed with Covid about 2 weeks prior to presentation.  Patient placed on BiPAP on arrival-felt much better.  After getting fluids for resuscitation/elevated lactate level patient acutely decompensated-developed lethargy and drop in oxygen levels-intubated emergently requiring moderate to high vent support. His respiratory status improved but his renal function was declining suspecting acute kidney injury on top of chronic kidney disease, it was also difficult to control his agitation on the ventilator. However patient condition improved and he was successfully extubated on . Patient continued to do well and he was transferred to the intermediate care unit on . Over the last few days patient oxygen requirement was increasing and he is becoming more lethargic. This morning he was placed on high flow nasal cannula and later on on BiPAP. On BiPAP he seems to be much better with decreased work of breathing and good tidal volume and oxygenation. Reviewing his chest x-ray seems that he has progressive volume overload and perhaps a component of aspiration. He was giving 1 dose of Lasix with good urine output and ICU consulted. Of note this patient been with positive fluid balance progressively since admission, according to documentation since admission he is about 20 L positive, over the last 3 days he is positive about 5 L. He has good urine output despite worsening BUN and creatinine.  No fever, no hemoptysis no nausea no vomiting. Active Problem List:     Problem List  Never Reviewed          Codes Class    Acute on chronic systolic heart failure (HCC) ICD-10-CM: I50.23  ICD-9-CM: 428.23         Goals of care, counseling/discussion ICD-10-CM: Z71.89  ICD-9-CM: V65.49         DNR (do not resuscitate) discussion ICD-10-CM: Z71.89  ICD-9-CM: V65.49         JUAN C (acute kidney injury) ([facility] [address] ICD-10-CM: N17.9  ICD-9-CM: 551. 9         Acute hypoxemic respiratory failure due to COVID-19 [facility]) ICD-10-CM: U07.1, J96.01  ICD-9-CM: 518.81, 079.89, 799.02               Past Medical History:      has a past medical history of Diabetes ([facility] [address] and Hypertension. Past Surgical History:      has no past surgical history on file. Home Medications:     Prior to Admission medications    Medication Sig Start Date End Date Taking? Authorizing Provider   aspirin 81 mg chewable tablet Take 81 mg by mouth daily. Yes Candelaria, MD Jessi   atorvastatin (LIPITOR) 80 mg tablet Take 80 mg by mouth daily. Yes Jessi Gaona MD   carvediloL (COREG) 12.5 mg tablet Take  by mouth two (2) times a day. Yes Jessi Gaona MD   docusate sodium (Colace) 100 mg capsule Take 100 mg by mouth two (2) times daily as needed for Constipation. Yes Jessi Gaona MD   doxycycline (ADOXA) 100 mg tablet Take 100 mg by mouth two (2) times a day. Yes Jessi Gaona MD   ferrous sulfate 325 mg (65 mg iron) tablet Take  by mouth every fourty-eight (48) hours. Yes Jessi Gaona MD   gabapentin (NEURONTIN) 400 mg capsule Take 400 mg by mouth three (3) times daily. Yes Jessi Gaona MD   insulin lispro (HUMALOG) 100 unit/mL injection 10 Units by SubCUTAneous route Before breakfast, lunch, and dinner. Yes Jessi Gaona MD   isosorbide mononitrate ER (IMDUR) 60 mg CR tablet Take 60 mg by mouth every morning. Yes Jessi Gaona MD   insulin glargine (Lantus U-100 Insulin) 100 unit/mL injection 20 Units by SubCUTAneous route nightly.    Yes Other, MD Jessi   lisinopriL (Elvira Langston) 20 mg tablet Take 20 mg by mouth daily. Yes Candelaria, MD Jessi   methocarbamoL (ROBAXIN) 500 mg tablet Take 500 mg by mouth three (3) times daily. Yes Jessi Gaona MD   oxyCODONE IR (ROXICODONE) 5 mg immediate release tablet Take 5 mg by mouth every six (6) hours as needed for Pain. Yes Candelaria, MD Jessi   pantoprazole (PROTONIX) 40 mg tablet Take 40 mg by mouth daily. Yes Candelaria, MD Jessi   guaiFENesin-codeine (Guaiatussin AC) 100-10 mg/5 mL solution Take 5 mL by mouth four (4) times daily as needed for Cough. Yes Candelaria, MD Jessi   senna (Senna) 8.6 mg tablet Take 1 Tab by mouth daily. Yes Jessi Gaona MD   ticagrelor (BRILINTA) 90 mg tablet Take 90 mg by mouth two (2) times a day. Yes Candelaria, MD Jessi   ascorbic acid, vitamin C, (Vitamin C) 500 mg tablet Take 500 mg by mouth two (2) times a day. Yes Candelaria, MD Jessi   cholecalciferol (Vitamin D3) (1000 Units /25 mcg) tablet Take 1,000 Units by mouth daily. Yes Candelaria, MD Jessi   zinc sulfate (ZINCATE) 220 (50) mg capsule Take 220 mg by mouth daily. Yes Provider, Historical   therapeutic multivitamin (THERAGRAN) tablet Take 1 Tab by mouth daily. Yes Provider, Historical   acetaminophen (TYLENOL) 325 mg tablet Take 975 mg by mouth every six (6) hours as needed for Pain. Other, MD Jessi       Allergies/Social/Family History:     No Known Allergies   Social History     Tobacco Use    Smoking status: Former Smoker    Smokeless tobacco: Never Used   Substance Use Topics    Alcohol use: Not Currently      No family history on file.     Review of Systems:     Not able to obtain due to the acuity of condition    Objective:   Vital Signs:  Visit Vitals  BP (!) 162/81   Pulse 70   Temp 97.8 °F (36.6 °C)   Resp 12   Ht 5' 7\" (1.702 m)   Wt (!) 176.5 kg (389 lb 1.8 oz)   SpO2 100%   BMI 60.94 kg/m²    O2 Flow Rate (L/min): 15 l/min(weaned to 14) O2 Device: Hi flow nasal cannula Temp (24hrs), Av.1 °F (36.7 °C), Min:97.6 °F (36.4 °C), Max:98.9 °F (37.2 °C)           Intake/Output:     Intake/Output Summary (Last 24 hours) at 2/19/2021 3792  Last data filed at 2/19/2021 8964  Gross per 24 hour   Intake 2565 ml   Output 1095 ml   Net 1470 ml       Physical Exam:    General: Chronically ill looking gentleman in mild distress on mid flow, alert   HEENT: NG tube in place, HFNC  Neck: Supple, left subclavian triple-lumen lumen central line, dressing intact no erythema  Lung: Fair air entry bilaterally, rhonci bilaterally  Heart: Regular rhythm, normal heart sound. Could not appreciate murmur  Abdomen: Nontender, positive bowel sound  Extremities: +2 edema  Neuro: Patient is alert profoundly weak in 4 limbs but follows simple commands    LABS AND  DATA: Personally reviewed  Recent Labs     02/19/21 0515 02/18/21 0419   WBC 8.6 9.0   HGB 7.4* 7.2*   HCT 23.6* 22.6*    231     Recent Labs     02/19/21  0515 02/18/21 0419    143   K 3.8 3.5   * 112*   CO2 22 21   * 122*   CREA 8.85* 8.13*   * 107*   CA 7.8* 7.7*   MG 2.9* 2.5*   PHOS 7.3* 6.0*     Recent Labs     02/19/21 0515 02/18/21 0419   * 142*   TP 5.8* 5.6*   ALB 1.5* 1.4*   GLOB 4.3* 4.2*     No results for input(s): INR, PTP, APTT, INREXT, INREXT in the last 72 hours. No results for input(s): PHI, PCO2I, PO2I, FIO2I in the last 72 hours. No results for input(s): CPK, CKMB, TROIQ, BNPP in the last 72 hours. Hemodynamics:   PAP:   CO:     Wedge:   CI:     CVP:    SVR:       PVR:       Ventilator Settings:  Mode Rate Tidal Volume Pressure FiO2 PEEP   Spontaneous   450 ml  8 cm H2O 50 % 5 cm H20     Peak airway pressure: 13 cm H2O    Minute ventilation: 28.6 l/min        MEDS: Reviewed    Chest X-Ray:  CXR Results  (Last 48 hours)    None          ECHO: On February 1  · LV: Estimated LVEF is 20 - 25%. Normal cavity size. Upper normal wall thickness. Severely reduced systolic function. Severe (grade 3) left ventricular diastolic dysfunction.   · LA: Dilated left atrium. · MV: Mild mitral valve regurgitation is present. · TV: Mild tricuspid valve regurgitation is present. · PA: Moderate to severe pulmonary hypertension. · RV: Reduced systolic function.        Assessment and Plan:   Acute Hypoxic Respiratory Failure  COVID-19 PNA  Aspiration  Volume Overload  Acute on Chronic Systolic HF  Acute Renal Failure    Appreciate Pall Med - ongoing GOC discussions  Family is discussing comfort care measures but not decided  DNR/DNI  Appreciate Neph  Appreciate Cards  Appreciate ID  No escalation of care  No dialysis  Nieves  Vanco  ASA  Ticagrelor  Carvedilol  Lipitor  Bumex IV BID  Dilaudid PRN pain    NOTE OF PERSONAL INVOLVEMENT IN CARE   V3  To IMCU today    Ese Bojorquez, DO   Staff Intensivist/Anesthesiologist   [facility]  2/19/2021

## 2021-02-19 NOTE — PROGRESS NOTES
ID Progress Note  2021    Subjective:     Pt expressed that he is feeling so much better, asked when he would be getting out of the hospital. I asked him how much he understands about illness, he responded as \"I was in rehab for my neck infection, was going to get skin graft, but couldn't, b/c I now have corona. \"     Below are the actual conversation between pt and the provider\"    Patient: \"what other things going on with me? \"   I informed the pt that he was on abx b/c we were concerned about possible lung infection, and he has been followed by kidney doctor for his worsening kidney function. Patient: Eva Chapman can I get better from it? \"  I explained that sometimes pt needs to get dialysis which is process machine is acting like pt's kidney temporarily. Asked whether he is wishing to go through the process  Patient: \"I want everything to be done, so I can get better. \"    Pt is alert and orient to self, place, and year. I d/w the bedside nurse who will communicate this with the primary provider. Review of Systems:            Symptom Y/N Comments   Symptom Y/N Comments   Fever/Chills n      Chest Pain n       Poor Appetite       Edema        Cough n      Abdominal Pain        Sputum       Joint Pain        SOB/WEBB  y  \"getting better\"   Pruritis/Rash        Nausea/vomit  n     Tolerating PT/OT        Diarrhea       Tolerating Diet        Constipation       Other           Could NOT obtain due to:       Objective:     Vitals:   Visit Vitals  BP (!) 155/81 (BP 1 Location: Right upper arm, BP Patient Position: At rest)   Pulse 75   Temp (!) 96.3 °F (35.7 °C)   Resp 17   Ht 5' 7\" (1.702 m)   Wt (!) 176.5 kg (389 lb 1.8 oz)   SpO2 100% Comment: Simultaneous filing. User may not have seen previous data. BMI 60.94 kg/m²        Tmax:  Temp (24hrs), Av.5 °F (36.4 °C), Min:96.3 °F (35.7 °C), Max:97.9 °F (36.6 °C)      PHYSICAL EXAM:  General: Chronically ill appearing, WD, WN.  Alert, cooperative, no acute distress    EENT:  EOMI. Anicteric sclerae. MMM  Resp:  Clear in apex with decreased breath sounds at bases, no wheezing or rales. No accessory muscle use  CV:  Regular  rhythm,  No edema  GI:  Soft, Non distended, Non tender. +Bowel sounds  Neurologic:  Alert and oriented X self, place, and time  Psych:   good insight. Not anxious nor agitated  Skin:  No rashes. No jaundice, posterior mid cervical dressing dry and intact    Labs:   Lab Results   Component Value Date/Time    WBC 8.6 02/19/2021 05:15 AM    HGB 7.4 (L) 02/19/2021 05:15 AM    HCT 23.6 (L) 02/19/2021 05:15 AM    PLATELET 001 46/58/7927 05:15 AM    MCV 83.7 02/19/2021 05:15 AM     Lab Results   Component Value Date/Time    Sodium 142 02/19/2021 05:15 AM    Potassium 3.8 02/19/2021 05:15 AM    Chloride 110 (H) 02/19/2021 05:15 AM    CO2 22 02/19/2021 05:15 AM    Anion gap 10 02/19/2021 05:15 AM    Glucose 124 (H) 02/19/2021 05:15 AM     (H) 02/19/2021 05:15 AM    Creatinine 8.85 (H) 02/19/2021 05:15 AM    BUN/Creatinine ratio 15 02/19/2021 05:15 AM    GFR est AA 7 (L) 02/19/2021 05:15 AM    GFR est non-AA 6 (L) 02/19/2021 05:15 AM    Calcium 7.8 (L) 02/19/2021 05:15 AM    Bilirubin, total 0.2 02/19/2021 05:15 AM    Alk.  phosphatase 182 (H) 02/19/2021 05:15 AM    Protein, total 5.8 (L) 02/19/2021 05:15 AM    Albumin 1.5 (L) 02/19/2021 05:15 AM    Globulin 4.3 (H) 02/19/2021 05:15 AM    A-G Ratio 0.3 (L) 02/19/2021 05:15 AM    ALT (SGPT) 111 (H) 02/19/2021 05:15 AM   ABX hx;    Zyvox x1 on 2/9    vancomycin 1/30-2/9    IV Flagyl 1/30-2/4    IV doxy 1/30-2/12     resp cx (2/5) candida albicans - oropharyngeal contamination     sputum cx (2/12) gram positive cocci in pairs     Blood cx (1/30) no growth    Blood cx (2/4) no growth so far    Quantiferon (-)    Assessment and Plan   Acute respiratory failure secondary to COVID 19 PNA  mycoplasma and s. pneumoniae  (completed the therapy)  - Mycoplasma IgG  1411, S Pneumo (+)    Legionella (-) afebrile, normal WBC     CXR (2/15): Increasing bilateral pulmonary infiltrates and left pleural effusion.     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 Macy Beltran    Wound cx (1/31) LIGHT DIPHTHEROIDS     Wound measurement per our wound care team; 3.5 cm x 4 cm x 0.1 cm. Spoke with the Managing provider at [address] [place] [address], Dr. Macy Beltran, [address]. Wound care nurse, Ms. Darlyn Fernandez [phone]. According to the wound care nurse, the wound was created after the removal of infected cyst.    Pt completed IV Zosyn then Vancomycin for 30 days at the rehab center; Blood cx and wound cx grew MRSA      Pt should follow up with Dr Elvira Corbett at [place] [address] upon discharge for his cervical wound     JUAN C  - nephrology following    Pt initiated the conversation with addressing his prognosis of illness. He expressed that he would like to get everything to be done to stay well. Communicated with Dr. Joanna Amin. Primary team also aware.     Geoffrey Rowan NP

## 2021-02-19 NOTE — PROGRESS NOTES
Verbal shift change report given to Bernice (oncoming nurse) by Brooks Rothman (offgoing nurse). Report included the following information SBAR, Kardex, Intake/Output, MAR, Accordion and Recent Results. Orders placed for transfer. 1200 pt on zoom call with his wife and with the  at bedside. Pt is still uncertain what he wants to do regarding dialysis. Report given to [facility]. Will place bird before pt leaves unit. 1410 pt transferred to [facility] room 408.

## 2021-02-19 NOTE — WOUND CARE
Wound Care Note:     Follow-up visit for right ear     Patient is on Droplet Plus Precautions for COVID-19 positive  PPE:  N95, face shield, gown and gloves    Chart shows:  Admitted for acute hypoxemic respiratory failure due to COVID-19, acute on chronic systolic heart failure, counseling/discussion goals of care, DNR discussion and JUAN C   Past Medical History:   Diagnosis Date    Diabetes ([facility] [address]     Hypertension      WBC = 8.6 on 2/19/21  Admitted from [facility] 31:   Patient is alert and talking, very concerned where his phone and wallet is,  incontinent with some assistance needed in repositioning. Bed: [facility]  Patient has a bird. Diet: NPO- Tube feeding  Patient reports no pain. Bilateral heels skin intact and without erythema. Buttocks and sacral skin was not assessed today. 1. POA neck dressing was changed last night and was not assessed. 2.  Right ear eschar is loosening up, epithealized tissue underneath. Will continue to monitor. Venelex ordered. Patient repositioned supine. Heels offloaded on pillow. NOTE:  CALLED [facility], spoke with , she hung up prior to obtaining her name, advised they have his phone and wallet locked up. Notified patient. Recommendations:    Continue with current wound care.     Posterior neck- Every other day cleanse with normal saline, wipe wound bed clean and dry, apply a piece of Opticell AG and cover with Optifoam Gentle (or other dressing that will remain in place).     Gluteal cleft- Every 12 hours and as needed apply Z guard paste (orange tube).     Right ear, sacrum, bilateral heels and any other reddened bony prominence- Every 12 hours liberally apply Venelex ointment. Use Z flow pillow to offload right ear. Skin Care & Pressure Prevention:  Minimize layers of linen/pads under patient to optimize support surface.     Turn/reposition approximately every 2 hours and offload heels.   Manage incontinence / promote continence   Nourishing Skin Cream to dry skin, minimize use of briefs when able    Discussed above plan with patient & Vince Alex RN    Transition of Care: Plan to follow as needed while admitted to hospital.    ZOEY Swain, RN, [facility], [facility].  office [phone]  pager [phone] or call  to page

## 2021-02-19 NOTE — PROGRESS NOTES
DIEGO  Patient admitted with acute respiratory failure from [facility] where he was receiving abx for upcoming surgery. RUR: 30%  Disposition TBD    Patient remains in the ICU on hi flow, following simple commands. Patient able to have a Zoom meeting with his wife and patient has reconsidered and per notes does not want to die and would do dialysis. Nephrology notified. Care management is continuing to follow.   Kylah Chamberlain RN,Care Management

## 2021-02-19 NOTE — PROGRESS NOTES
Physical Therapy - defer  Chart reviewed in prep for therapy session. Noted discussions w/ palliative re: plan of care moving forward. RN reports patient is currently being transferred to [facility]. Will defer and follow up later today as able. For the weekend, recommend nursing assist patient sitting up in the bed in modified chair position. Thank you for completing as able in order to maintain patient strength and endurance.

## 2021-02-20 ENCOUNTER — APPOINTMENT (OUTPATIENT)
Dept: GENERAL RADIOLOGY | Age: 66
DRG: 720 | End: 2021-02-20
Attending: INTERNAL MEDICINE
Payer: MEDICAID

## 2021-02-20 LAB
ALBUMIN SERPL-MCNC: 1.4 G/DL (ref 3.5–5)
ALBUMIN/GLOB SERPL: 0.4 {RATIO} (ref 1.1–2.2)
ALP SERPL-CCNC: 195 U/L (ref 45–117)
ALT SERPL-CCNC: 103 U/L (ref 12–78)
ANION GAP SERPL CALC-SCNC: 14 MMOL/L (ref 5–15)
AST SERPL-CCNC: 129 U/L (ref 15–37)
BACTERIA SPEC CULT: NORMAL
BASOPHILS # BLD: 0 K/UL (ref 0–0.1)
BASOPHILS NFR BLD: 0 % (ref 0–1)
BILIRUB SERPL-MCNC: 0.2 MG/DL (ref 0.2–1)
BUN SERPL-MCNC: 138 MG/DL (ref 6–20)
BUN/CREAT SERPL: 15 (ref 12–20)
CALCIUM SERPL-MCNC: 7.6 MG/DL (ref 8.5–10.1)
CHLORIDE SERPL-SCNC: 110 MMOL/L (ref 97–108)
CO2 SERPL-SCNC: 20 MMOL/L (ref 21–32)
CREAT SERPL-MCNC: 9.49 MG/DL (ref 0.7–1.3)
DIFFERENTIAL METHOD BLD: ABNORMAL
EOSINOPHIL # BLD: 0.3 K/UL (ref 0–0.4)
EOSINOPHIL NFR BLD: 4 % (ref 0–7)
ERYTHROCYTE [DISTWIDTH] IN BLOOD BY AUTOMATED COUNT: 17.2 % (ref 11.5–14.5)
EST. AVERAGE GLUCOSE BLD GHB EST-MCNC: 166 MG/DL
GLOBULIN SER CALC-MCNC: 4 G/DL (ref 2–4)
GLUCOSE BLD STRIP.AUTO-MCNC: 126 MG/DL (ref 65–100)
GLUCOSE BLD STRIP.AUTO-MCNC: 126 MG/DL (ref 65–100)
GLUCOSE BLD STRIP.AUTO-MCNC: 139 MG/DL (ref 65–100)
GLUCOSE BLD STRIP.AUTO-MCNC: 146 MG/DL (ref 65–100)
GLUCOSE SERPL-MCNC: 84 MG/DL (ref 65–100)
HBA1C MFR BLD: 7.4 % (ref 4–5.6)
HCT VFR BLD AUTO: 22.3 % (ref 36.6–50.3)
HGB BLD-MCNC: 7 G/DL (ref 12.1–17)
IMM GRANULOCYTES # BLD AUTO: 0.1 K/UL (ref 0–0.04)
IMM GRANULOCYTES NFR BLD AUTO: 1 % (ref 0–0.5)
LYMPHOCYTES # BLD: 1.4 K/UL (ref 0.8–3.5)
LYMPHOCYTES NFR BLD: 18 % (ref 12–49)
MAGNESIUM SERPL-MCNC: 2.9 MG/DL (ref 1.6–2.4)
MCH RBC QN AUTO: 25.7 PG (ref 26–34)
MCHC RBC AUTO-ENTMCNC: 31.4 G/DL (ref 30–36.5)
MCV RBC AUTO: 82 FL (ref 80–99)
MONOCYTES # BLD: 0.6 K/UL (ref 0–1)
MONOCYTES NFR BLD: 8 % (ref 5–13)
NEUTS SEG # BLD: 5.2 K/UL (ref 1.8–8)
NEUTS SEG NFR BLD: 69 % (ref 32–75)
NRBC # BLD: 0 K/UL (ref 0–0.01)
NRBC BLD-RTO: 0 PER 100 WBC
PHOSPHATE SERPL-MCNC: 7.4 MG/DL (ref 2.6–4.7)
PLATELET # BLD AUTO: 237 K/UL (ref 150–400)
PMV BLD AUTO: 12.5 FL (ref 8.9–12.9)
POTASSIUM SERPL-SCNC: 3.5 MMOL/L (ref 3.5–5.1)
PROT SERPL-MCNC: 5.4 G/DL (ref 6.4–8.2)
RBC # BLD AUTO: 2.72 M/UL (ref 4.1–5.7)
SARS-COV-2, COV2: NOT DETECTED
SERVICE CMNT-IMP: ABNORMAL
SERVICE CMNT-IMP: NORMAL
SODIUM SERPL-SCNC: 144 MMOL/L (ref 136–145)
SPECIMEN SOURCE, FCOV2M: NORMAL
URATE SERPL-MCNC: 7.5 MG/DL (ref 3.5–7.2)
WBC # BLD AUTO: 7.6 K/UL (ref 4.1–11.1)

## 2021-02-20 PROCEDURE — 74011636637 HC RX REV CODE- 636/637: Performed by: NURSE PRACTITIONER

## 2021-02-20 PROCEDURE — 65660000001 HC RM ICU INTERMED STEPDOWN

## 2021-02-20 PROCEDURE — 74018 RADEX ABDOMEN 1 VIEW: CPT

## 2021-02-20 PROCEDURE — 74011250637 HC RX REV CODE- 250/637: Performed by: ANESTHESIOLOGY

## 2021-02-20 PROCEDURE — 84550 ASSAY OF BLOOD/URIC ACID: CPT

## 2021-02-20 PROCEDURE — 74011000258 HC RX REV CODE- 258: Performed by: INTERNAL MEDICINE

## 2021-02-20 PROCEDURE — 80053 COMPREHEN METABOLIC PANEL: CPT

## 2021-02-20 PROCEDURE — 74011250636 HC RX REV CODE- 250/636: Performed by: INTERNAL MEDICINE

## 2021-02-20 PROCEDURE — 94640 AIRWAY INHALATION TREATMENT: CPT

## 2021-02-20 PROCEDURE — 74011250637 HC RX REV CODE- 250/637: Performed by: EMERGENCY MEDICINE

## 2021-02-20 PROCEDURE — 83036 HEMOGLOBIN GLYCOSYLATED A1C: CPT

## 2021-02-20 PROCEDURE — 74011250637 HC RX REV CODE- 250/637: Performed by: HOSPITALIST

## 2021-02-20 PROCEDURE — 74011000250 HC RX REV CODE- 250: Performed by: HOSPITALIST

## 2021-02-20 PROCEDURE — 36415 COLL VENOUS BLD VENIPUNCTURE: CPT

## 2021-02-20 PROCEDURE — 74011250636 HC RX REV CODE- 250/636: Performed by: NURSE PRACTITIONER

## 2021-02-20 PROCEDURE — 85025 COMPLETE CBC W/AUTO DIFF WBC: CPT

## 2021-02-20 PROCEDURE — 74011250637 HC RX REV CODE- 250/637: Performed by: NURSE PRACTITIONER

## 2021-02-20 PROCEDURE — 74011250637 HC RX REV CODE- 250/637: Performed by: INTERNAL MEDICINE

## 2021-02-20 PROCEDURE — 74011636637 HC RX REV CODE- 636/637: Performed by: HOSPITALIST

## 2021-02-20 PROCEDURE — 84100 ASSAY OF PHOSPHORUS: CPT

## 2021-02-20 PROCEDURE — 74011250636 HC RX REV CODE- 250/636: Performed by: HOSPITALIST

## 2021-02-20 PROCEDURE — 82962 GLUCOSE BLOOD TEST: CPT

## 2021-02-20 PROCEDURE — C9113 INJ PANTOPRAZOLE SODIUM, VIA: HCPCS | Performed by: HOSPITALIST

## 2021-02-20 PROCEDURE — 74011000250 HC RX REV CODE- 250: Performed by: INTERNAL MEDICINE

## 2021-02-20 PROCEDURE — 83735 ASSAY OF MAGNESIUM: CPT

## 2021-02-20 RX ADMIN — HEPARIN SODIUM 5000 UNITS: 5000 INJECTION INTRAVENOUS; SUBCUTANEOUS at 03:24

## 2021-02-20 RX ADMIN — ALBUTEROL SULFATE 2 PUFF: 90 AEROSOL, METERED RESPIRATORY (INHALATION) at 12:00

## 2021-02-20 RX ADMIN — ALBUTEROL SULFATE 2 PUFF: 90 AEROSOL, METERED RESPIRATORY (INHALATION) at 00:00

## 2021-02-20 RX ADMIN — ALBUTEROL SULFATE 2 PUFF: 90 AEROSOL, METERED RESPIRATORY (INHALATION) at 20:00

## 2021-02-20 RX ADMIN — Medication 10 ML: at 06:30

## 2021-02-20 RX ADMIN — ISOSORBIDE DINITRATE 30 MG: 20 TABLET ORAL at 15:43

## 2021-02-20 RX ADMIN — INSULIN LISPRO 3 UNITS: 100 INJECTION, SOLUTION INTRAVENOUS; SUBCUTANEOUS at 06:57

## 2021-02-20 RX ADMIN — HYDROMORPHONE HYDROCHLORIDE 1 MG: 1 INJECTION, SOLUTION INTRAMUSCULAR; INTRAVENOUS; SUBCUTANEOUS at 15:43

## 2021-02-20 RX ADMIN — HEPARIN SODIUM 5000 UNITS: 5000 INJECTION INTRAVENOUS; SUBCUTANEOUS at 12:19

## 2021-02-20 RX ADMIN — ALBUTEROL SULFATE 2 PUFF: 90 AEROSOL, METERED RESPIRATORY (INHALATION) at 04:00

## 2021-02-20 RX ADMIN — CASTOR OIL AND BALSAM, PERU: 788; 87 OINTMENT TOPICAL at 09:38

## 2021-02-20 RX ADMIN — CARVEDILOL 25 MG: 12.5 TABLET, FILM COATED ORAL at 21:31

## 2021-02-20 RX ADMIN — MEROPENEM 500 MG: 500 INJECTION, POWDER, FOR SOLUTION INTRAVENOUS at 23:48

## 2021-02-20 RX ADMIN — BUMETANIDE 2 MG: 0.25 INJECTION INTRAMUSCULAR; INTRAVENOUS at 09:35

## 2021-02-20 RX ADMIN — HYDROMORPHONE HYDROCHLORIDE 1 MG: 1 INJECTION, SOLUTION INTRAMUSCULAR; INTRAVENOUS; SUBCUTANEOUS at 12:31

## 2021-02-20 RX ADMIN — BUMETANIDE 2 MG: 0.25 INJECTION INTRAMUSCULAR; INTRAVENOUS at 21:30

## 2021-02-20 RX ADMIN — SODIUM CHLORIDE 40 MG: 9 INJECTION INTRAMUSCULAR; INTRAVENOUS; SUBCUTANEOUS at 06:29

## 2021-02-20 RX ADMIN — HEPARIN SODIUM 5000 UNITS: 5000 INJECTION INTRAVENOUS; SUBCUTANEOUS at 21:30

## 2021-02-20 RX ADMIN — Medication 10 ML: at 15:44

## 2021-02-20 RX ADMIN — HYDROMORPHONE HYDROCHLORIDE 1 MG: 1 INJECTION, SOLUTION INTRAMUSCULAR; INTRAVENOUS; SUBCUTANEOUS at 02:17

## 2021-02-20 RX ADMIN — TICAGRELOR 90 MG: 90 TABLET ORAL at 09:35

## 2021-02-20 RX ADMIN — Medication 10 ML: at 21:31

## 2021-02-20 RX ADMIN — CHOLECALCIFEROL (VITAMIN D3) 25 MCG (1,000 UNIT) TABLET 2000 UNITS: TABLET at 09:34

## 2021-02-20 RX ADMIN — ISOSORBIDE DINITRATE 30 MG: 20 TABLET ORAL at 21:31

## 2021-02-20 RX ADMIN — OXYCODONE HYDROCHLORIDE AND ACETAMINOPHEN 500 MG: 500 TABLET ORAL at 09:35

## 2021-02-20 RX ADMIN — CARVEDILOL 25 MG: 12.5 TABLET, FILM COATED ORAL at 09:35

## 2021-02-20 RX ADMIN — AMLODIPINE BESYLATE 10 MG: 5 TABLET ORAL at 09:34

## 2021-02-20 RX ADMIN — ISOSORBIDE DINITRATE 30 MG: 20 TABLET ORAL at 06:29

## 2021-02-20 RX ADMIN — SODIUM CHLORIDE 40 MG: 9 INJECTION INTRAMUSCULAR; INTRAVENOUS; SUBCUTANEOUS at 18:52

## 2021-02-20 RX ADMIN — OXYCODONE HYDROCHLORIDE AND ACETAMINOPHEN 500 MG: 500 TABLET ORAL at 18:52

## 2021-02-20 RX ADMIN — TICAGRELOR 90 MG: 90 TABLET ORAL at 18:52

## 2021-02-20 RX ADMIN — ATORVASTATIN CALCIUM 40 MG: 40 TABLET, FILM COATED ORAL at 21:30

## 2021-02-20 RX ADMIN — ASPIRIN 81 MG: 81 TABLET, CHEWABLE ORAL at 09:34

## 2021-02-20 RX ADMIN — ALBUTEROL SULFATE 2 PUFF: 90 AEROSOL, METERED RESPIRATORY (INHALATION) at 16:00

## 2021-02-20 RX ADMIN — HYDROMORPHONE HYDROCHLORIDE 1 MG: 1 INJECTION, SOLUTION INTRAMUSCULAR; INTRAVENOUS; SUBCUTANEOUS at 21:30

## 2021-02-20 RX ADMIN — INSULIN GLARGINE 20 UNITS: 100 INJECTION, SOLUTION SUBCUTANEOUS at 09:35

## 2021-02-20 RX ADMIN — CASTOR OIL AND BALSAM, PERU: 788; 87 OINTMENT TOPICAL at 21:31

## 2021-02-20 RX ADMIN — ALBUTEROL SULFATE 2 PUFF: 90 AEROSOL, METERED RESPIRATORY (INHALATION) at 07:38

## 2021-02-20 NOTE — PROGRESS NOTES
Pt resting in bed. Zinc paste and venelex applied to affected areas as ordered. Pt turned side to side. Scrotum continues to be edematous and is elevated on towels. Pt c/o pain to left leg and scrotum and was medicated with dilaudid x2. Pt verbalized effectiveness. Pt has brief periods of disorientation but is easily redirected. Pt pulled dobhoff out during shift. Another tube placed and was confirmed with abd xray. Tube feeding resumed. Pt tolerating well. Pt continues with generalized edema. Pt transitioned to room air and is tolerating well, O2 sats >93%. Pt is dyspneic on exertion and has intermittent audible wheezing. NSR on tele. BP elevated but scheduled meds are effective at managing without PRN meds. Call light within reach.

## 2021-02-20 NOTE — PROGRESS NOTES
Hospitalist Progress Note  Patrick Hess MD  Answering service: [phone] from in house phone      Date of Service:  2021  NAME:  Мария Almaraz  :  1955  MRN:  [id number]    Admission Summary:   65M p/w SOB- downgraded from ICU after lengthy stay. Extubated . Interval history / Subjective:   Patient seen and examined at bedside, feels ok, on 5L NC, sats in 90s. No signs of distress. Seems alert, oriented. Was able to hold a conversation. I called her spouse- no answer     Assessment & Plan:     #. COVID-19 PNA  #. Aspiration PNA  #. Mycoplasma and S. Pneumoniae PNA. #. Cervical wound infection: chronic, a/w skin graft 'per [facility] care staff'. #. Sepsis: ID- Abx, meropenem, vanc. PT/OT  #. Acute metabolic Encephalopathy: related to above. Improved with improving oxygenation. #. Volume Overload  #. Acute on Chronic Systolic HF: Cards - ASA, Ticagrelor, Carvedilol, Lipitor, Bumex IV BID  #. Acute Hypoxic Respiratory Failure: extubated . On HFNC  - TTE: ef 20%, mod- severe Pulm HTN. #. ARF: Nephro evaluated, pt/family are in conversation with palliative care re- HD or no. #. Anemia: chronic, likely of chronic disease. Iron low, supplements. #. Deranged LFTs: likely related to CHF/congestion. Monitor.   #. DM2: A1c 7.4, SSI, AccuChecks, monitor     Palliative care following- Family discussing comfort measures, not decided- DNR/DNI- No escalation of care    Code status: DNR  DVT prophylaxis: heparin  Care Plan discussed with: Patient/Family and Nurse  Disposition: TBD >2days     Hospital Problems  Never Reviewed          Codes Class Noted POA    Acute on chronic systolic heart failure (HCC) ICD-10-CM: G94.73  ICD-9-CM: 428.23  2021 Unknown        Goals of care, counseling/discussion ICD-10-CM: Z71.89  ICD-9-CM: V65.49  Unknown Unknown        DNR (do not resuscitate) discussion ICD-10-CM: Z71.89  ICD-9-CM: V65.49 Unknown Unknown        JUAN C (acute kidney injury) ([facility] [address] ICD-10-CM: N17.9  ICD-9-CM: 443. 9  Unknown Unknown        Acute hypoxemic respiratory failure due to COVID-19 [facility]) ICD-10-CM: U07.1, J96.01  ICD-9-CM: 518.81, 079.89, 799.02  1/30/2021 Unknown            Review of Systems:   Pertinent items are mentioned in interval history. Vital Signs:    Last 24hrs VS reviewed since prior progress note. Most recent are:  Visit Vitals  BP (!) 174/76 (BP 1 Location: Right upper arm, BP Patient Position: At rest)   Pulse 84   Temp 97.1 °F (36.2 °C)   Resp 20   Ht 5' 7\" (1.702 m)   Wt 96.1 kg (211 lb 12.8 oz)   SpO2 95%   BMI 33.17 kg/m²         Intake/Output Summary (Last 24 hours) at 2/20/2021 0843  Last data filed at 2/20/2021 0645  Gross per 24 hour   Intake 1650 ml   Output 1025 ml   Net 625 ml        Physical Examination:   Evaluated face to face and examined 02/20/21    General:  Alert, oriented, No acute distress. BM. Looks older than his age. NGT in situ  Card:  S1, S2 without murmur, warm peripheries  Resp:  No accessory muscle use, no wheezes, rhonchi +  Abd:  Soft, non-tender, non-distended, BS+  Extremities:  No cyanosis or clubbing, no significant edema  Neuro:  Grossly normal, slow in speaking, no focal neuro deficits, follows commands   Psych:  fair insight, not agitated.     Data Review:    Review and/or order of clinical lab test  Review and/or order of tests in the radiology section of CPT  Review and/or order of tests in the medicine section of CPT  Labs:     Recent Labs     02/20/21 0338 02/19/21 0515   WBC 7.6 8.6   HGB 7.0* 7.4*   HCT 22.3* 23.6*    230     Recent Labs     02/20/21  0338 02/19/21  0515 02/18/21  0419    142 143   K 3.5 3.8 3.5   * 110* 112*   CO2 20* 22 21   * 131* 122*   CREA 9.49* 8.85* 8.13*   GLU 84 124* 107*   CA 7.6* 7.8* 7.7*   MG 2.9* 2.9* 2.5*   PHOS 7.4* 7.3* 6.0*   URICA 7.5* 6.4 6.0     Recent Labs     02/20/21  0338 02/19/21  0515 02/18/21  0419 * 111* 90*   * 182* 142*   TBILI 0.2 0.2 0.3   TP 5.4* 5.8* 5.6*   ALB 1.4* 1.5* 1.4*   GLOB 4.0 4.3* 4.2*     No results for input(s): INR, PTP, APTT, INREXT in the last 72 hours. No results for input(s): FE, TIBC, PSAT, FERR in the last 72 hours. No results found for: FOL, RBCF   No results for input(s): PH, PCO2, PO2 in the last 72 hours. No results for input(s): CPK, CKNDX, TROIQ in the last 72 hours.     No lab exists for component: CPKMB  Lab Results   Component Value Date/Time    Cholesterol, total 115 02/26/2010 05:20 PM    HDL Cholesterol 31 (L) 02/26/2010 05:20 PM    LDL, calculated 46.2 02/26/2010 05:20 PM    Triglyceride 189 02/26/2010 05:20 PM    CHOL/HDL Ratio 3.7 02/26/2010 05:20 PM     Lab Results   Component Value Date/Time    Glucose (POC) 146 (H) 02/20/2021 06:42 AM    Glucose (POC) 112 (H) 02/19/2021 11:22 PM    Glucose (POC) 126 (H) 02/19/2021 05:58 PM    Glucose (POC) 95 02/19/2021 12:00 PM    Glucose (POC) 133 (H) 02/19/2021 05:15 AM     Lab Results   Component Value Date/Time    Color YELLOW/STRAW 02/10/2021 01:26 PM    Appearance TURBID (A) 02/10/2021 01:26 PM    Specific gravity 1.014 02/10/2021 01:26 PM    Specific gravity 1.015 02/26/2010 12:32 PM    pH (UA) 5.0 02/10/2021 01:26 PM    Protein 100 (A) 02/10/2021 01:26 PM    Glucose Negative 02/10/2021 01:26 PM    Ketone Negative 02/10/2021 01:26 PM    Bilirubin Negative 02/10/2021 01:26 PM    Urobilinogen 0.2 02/10/2021 01:26 PM    Nitrites Negative 02/10/2021 01:26 PM    Leukocyte Esterase Negative 02/10/2021 01:26 PM    Epithelial cells FEW 02/10/2021 01:26 PM    Bacteria Negative 02/10/2021 01:26 PM    WBC 0-4 02/10/2021 01:26 PM    RBC  02/10/2021 01:26 PM     Medications Reviewed:     Current Facility-Administered Medications   Medication Dose Route Frequency    meropenem (MERREM) 500 mg in 0.9% sodium chloride (MBP/ADV) 50 mL MBP  0.5 g IntraVENous Q24H    HYDROmorphone (PF) (DILAUDID) injection 1 mg  1 mg IntraVENous Q3H PRN    isosorbide dinitrate (ISORDIL) tablet 30 mg  30 mg Per NG tube Q8H    niCARdipine (CARDENE) 50 mg in 0.9% sodium chloride 100 mL infusion  0-15 mg/hr IntraVENous TITRATE    balsam peru-castor oiL (VENELEX) ointment   Topical Q12H    albuterol (PROVENTIL HFA, VENTOLIN HFA, PROAIR HFA) inhaler 2 Puff  2 Puff Inhalation Q4H RT    heparin (porcine) injection 5,000 Units  5,000 Units SubCUTAneous Q8H    bumetanide (BUMEX) injection 2 mg  2 mg IntraVENous Q12H    0.9% sodium chloride infusion 250 mL  250 mL IntraVENous PRN    insulin glargine (LANTUS) injection 20 Units  20 Units SubCUTAneous DAILY    pantoprazole (PROTONIX) 40 mg in 0.9% sodium chloride 10 mL injection  40 mg IntraVENous Q12H    labetaloL (NORMODYNE;TRANDATE) injection 20 mg  20 mg IntraVENous Q4H PRN    ticagrelor (BRILINTA) tablet 90 mg  90 mg Oral BID    carvediloL (COREG) tablet 25 mg  25 mg Per NG tube Q12H    amLODIPine (NORVASC) tablet 10 mg  10 mg Per NG tube DAILY    insulin lispro (HUMALOG) injection   SubCUTAneous Q6H    alteplase (CATHFLO) 1 mg in sterile water (preservative free) 1 mL injection  1 mg InterCATHeter PRN    ascorbic acid (vitamin C) (VITAMIN C) tablet 500 mg  500 mg Per NG tube BID    cholecalciferol (VITAMIN D3) (1000 Units /25 mcg) tablet 2,000 Units  2,000 Units Per NG tube DAILY    metoprolol (LOPRESSOR) injection 5 mg  5 mg IntraVENous Q6H PRN    [Held by provider] allopurinoL (ZYLOPRIM) tablet 100 mg  100 mg Oral DAILY    sodium chloride (NS) flush 5-40 mL  5-40 mL IntraVENous Q8H    sodium chloride (NS) flush 5-40 mL  5-40 mL IntraVENous PRN    acetaminophen (TYLENOL) tablet 650 mg  650 mg Oral Q6H PRN    Or    acetaminophen (TYLENOL) suppository 650 mg  650 mg Rectal Q6H PRN    glucose chewable tablet 16 g  4 Tab Oral PRN    dextrose (D50W) injection syrg 12.5-25 g  12.5-25 g IntraVENous PRN    glucagon (GLUCAGEN) injection 1 mg  1 mg IntraMUSCular PRN    aspirin chewable tablet 81 mg  81 mg Oral DAILY    atorvastatin (LIPITOR) tablet 40 mg  40 mg Oral QHS   ______________________________________________________________________  EXPECTED LENGTH OF STAY: 12d 7h  ACTUAL LENGTH OF STAY:          21               Patrick Hess MD

## 2021-02-20 NOTE — PROGRESS NOTES
Renal Monitoring of Antibiotics  Indication:  sepsis 2/2 ? HAP  Current regimen:  meropenem 500 mg q12h    Recent Labs     21  0338 21  0515 21  0419   WBC 7.6 8.6 9.0   CREA 9.49* 8.85* 8.13*   * 131* 122*     Temp (24hrs), Av.4 °F (36.3 °C), Min:96.3 °F (35.7 °C), Max:97.9 °F (36.6 °C)    Est CrCl: < 10 ml/min     Plan: Change to meropenem 500 mg q24h given reduced renal function.

## 2021-02-20 NOTE — PROGRESS NOTES
Bedside shift change report given to ANAHI Browne (oncoming nurse) by Mario Love RN (offgoing nurse). Report included the following information SBAR, Kardex, ED Summary, Recent Results and Cardiac Rhythm NSR.

## 2021-02-20 NOTE — CONSULTS
Psychiatric Consultation      DATE OF EVALUATION:  2/20/2021    ATTENDING PHYSICIAN: Dr. Ghulam Clement:    Psychiatric consultation requested for Bony Laurent to evaluate patient for mental capacity to make medical decisions. Ashly Fuentes HISTORY OF PRESENT ILLNESS:  The patient, Bony Laurent, is a 63 y.o.,   male who is currently admitted to the [facility] after recent transfer from ICU where he has been being treated for COvid-19, that has led to further organ dysfunction and failure. PAST MEDICAL HISTORY  Patient Active Problem List    Diagnosis Date Noted    Acute on chronic systolic heart failure ([facility] [address] 02/16/2021    Goals of care, counseling/discussion     DNR (do not resuscitate) discussion     JUAN C (acute kidney injury) ([facility] [address]     Acute hypoxemic respiratory failure due to COVID-19 ([facility] [address] 01/30/2021     Past Medical History:   Diagnosis Date    Diabetes ([facility] [address]     Hypertension         MEDICATION PRIOR TO ADMISSION:  Prior to Admission medications    Medication Sig Start Date End Date Taking? Authorizing Provider   aspirin 81 mg chewable tablet Take 81 mg by mouth daily. Yes Candelaria, MD Jessi   atorvastatin (LIPITOR) 80 mg tablet Take 80 mg by mouth daily. Yes Jessi Gaona MD   carvediloL (COREG) 12.5 mg tablet Take  by mouth two (2) times a day. Yes Jessi Gaona MD   docusate sodium (Colace) 100 mg capsule Take 100 mg by mouth two (2) times daily as needed for Constipation. Yes Jessi Gaona MD   doxycycline (ADOXA) 100 mg tablet Take 100 mg by mouth two (2) times a day. Yes Jessi Gaona MD   ferrous sulfate 325 mg (65 mg iron) tablet Take  by mouth every fourty-eight (48) hours. Yes Jessi Gaona MD   gabapentin (NEURONTIN) 400 mg capsule Take 400 mg by mouth three (3) times daily. Yes Jessi Gaona MD   insulin lispro (HUMALOG) 100 unit/mL injection 10 Units by SubCUTAneous route Before breakfast, lunch, and dinner.    Yes Jessi Gaona MD   isosorbide mononitrate ER (IMDUR) 60 mg CR tablet Take 60 mg by mouth every morning. Yes Candelaria, MD Jessi   insulin glargine (Lantus U-100 Insulin) 100 unit/mL injection 20 Units by SubCUTAneous route nightly. Yes Jessi Gaona MD   lisinopriL (PRINIVIL, ZESTRIL) 20 mg tablet Take 20 mg by mouth daily. Yes Candelaria, MD Jessi   methocarbamoL (ROBAXIN) 500 mg tablet Take 500 mg by mouth three (3) times daily. Yes Jessi Gaona MD   oxyCODONE IR (ROXICODONE) 5 mg immediate release tablet Take 5 mg by mouth every six (6) hours as needed for Pain. Yes Jessi Gaona MD   pantoprazole (PROTONIX) 40 mg tablet Take 40 mg by mouth daily. Yes Jessi Gaona MD   guaiFENesin-codeine (Guaiatussin AC) 100-10 mg/5 mL solution Take 5 mL by mouth four (4) times daily as needed for Cough. Yes Jessi Gaona MD   senna (Senna) 8.6 mg tablet Take 1 Tab by mouth daily. Yes Jessi Gaona MD   ticagrelor (BRILINTA) 90 mg tablet Take 90 mg by mouth two (2) times a day. Yes Jessi Gaona MD   ascorbic acid, vitamin C, (Vitamin C) 500 mg tablet Take 500 mg by mouth two (2) times a day. Yes Jessi Gaona MD   cholecalciferol (Vitamin D3) (1000 Units /25 mcg) tablet Take 1,000 Units by mouth daily. Yes Candelaria, MD Jessi   zinc sulfate (ZINCATE) 220 (50) mg capsule Take 220 mg by mouth daily. Yes Provider, Historical   therapeutic multivitamin (THERAGRAN) tablet Take 1 Tab by mouth daily. Yes Provider, Historical   acetaminophen (TYLENOL) 325 mg tablet Take 975 mg by mouth every six (6) hours as needed for Pain. Other, MD Jessi       ALLERGIES:  No Known Allergies     SOCIAL HISTORY:  Social History     Tobacco Use    Smoking status: Former Smoker    Smokeless tobacco: Never Used   Substance Use Topics    Alcohol use: Not Currently       FAMILY HISTORY:  No family history on file.      REVIEW OF SYSTEMS:  The patient denies complaints of roma, depression, anxiety, delusions,   A/V hallucinations, suicidal ideation, homicidal ideation. Medical review of systems mainly considered negative. MENTAL STATUS EXAM:  Sensorium  oriented to time, place and person   Orientation person, place and situation   Relations cooperative   Eye Contact appropriate   Appearance:  Ill appearing   Motor Behavior:  within normal limits   Speech:  soft   Vocabulary average   Thought Process: logical   Thought Content not internally preoccupied    Suicidal ideations none   Homicidal ideations none   Mood:  within normal limits   Affect:  mood-congruent   Memory recent  adequate   Memory remote:  adequate   Concentration:  adequate   Abstraction:  abstract   Insight:  good   Reliability good   Judgment:  good     ASSESSEMENT:  The patient is a 63 y.o.   male who is currently admitted to the inpatient medical unit with Covid-19 and multisystem organ failure. He was recently transferred to this unit after being in the ICU, where the nursing staff reported to myself that he asked to be a DNR. He recently requested that he wanted all medical care necessary taken to which I was consulted to determine if the patient has mental capacity to make his own medical decisions. The patient is alert and oriented, was able to tell me his name and location and why he is admitted to the hospital.  He told me that he is currently retired from [employer] as a [employer]  and that he lives at home with his wife. He reports that he has been admitted to the hospital for Covid-19 and reports to me that he now has liver and kidney failure.       I asked him \"Have you told the staff that you would like them to do everything possible for you, to save your life\"  He states, \"yes I would\"  I asked him \" do you understand what this means\"  He states \"yes, I do, I want them to do everything, I am feeling better\"  I explained that if his heart were to stop they would do CPR, meaning they would have to do compressions on your chest and put a tube in our throat to breath for you\"  He verbalizes and he states \"yes\". I asked him \"if you have to be on a breathing machine and you are unable to live without it, who will have to make the decision to remove you off of the machine? \"    His response \" My wife will make that decision if she has to\"  I asked him if she is aware that she would have to do this   He states that she is and that she understand this. PLAN:  It is my opinion that the Patient is currently able to demonstrate the  mental capacity in which he shows that he is capable of continuing to make his own medical decisions. At this time he is able to verbalize and explain his current medical conditions and the possible prognosis he may be facing.                      SIGNED:    Shelly Del Castillo NP  2/20/2021

## 2021-02-21 LAB
BASOPHILS # BLD: 0 K/UL (ref 0–0.1)
BASOPHILS NFR BLD: 0 % (ref 0–1)
DIFFERENTIAL METHOD BLD: ABNORMAL
EOSINOPHIL # BLD: 0.3 K/UL (ref 0–0.4)
EOSINOPHIL NFR BLD: 3 % (ref 0–7)
ERYTHROCYTE [DISTWIDTH] IN BLOOD BY AUTOMATED COUNT: 17.2 % (ref 11.5–14.5)
GLUCOSE BLD STRIP.AUTO-MCNC: 104 MG/DL (ref 65–100)
GLUCOSE BLD STRIP.AUTO-MCNC: 149 MG/DL (ref 65–100)
GLUCOSE BLD STRIP.AUTO-MCNC: 152 MG/DL (ref 65–100)
GLUCOSE BLD STRIP.AUTO-MCNC: 179 MG/DL (ref 65–100)
GLUCOSE BLD STRIP.AUTO-MCNC: 93 MG/DL (ref 65–100)
HCT VFR BLD AUTO: 24.8 % (ref 36.6–50.3)
HGB BLD-MCNC: 7.8 G/DL (ref 12.1–17)
IMM GRANULOCYTES # BLD AUTO: 0.1 K/UL (ref 0–0.04)
IMM GRANULOCYTES NFR BLD AUTO: 1 % (ref 0–0.5)
LYMPHOCYTES # BLD: 1.6 K/UL (ref 0.8–3.5)
LYMPHOCYTES NFR BLD: 20 % (ref 12–49)
MCH RBC QN AUTO: 25.3 PG (ref 26–34)
MCHC RBC AUTO-ENTMCNC: 31.5 G/DL (ref 30–36.5)
MCV RBC AUTO: 80.5 FL (ref 80–99)
MONOCYTES # BLD: 0.5 K/UL (ref 0–1)
MONOCYTES NFR BLD: 6 % (ref 5–13)
NEUTS SEG # BLD: 5.5 K/UL (ref 1.8–8)
NEUTS SEG NFR BLD: 70 % (ref 32–75)
NRBC # BLD: 0 K/UL (ref 0–0.01)
NRBC BLD-RTO: 0 PER 100 WBC
PHOSPHATE SERPL-MCNC: 7.9 MG/DL (ref 2.6–4.7)
PLATELET # BLD AUTO: 252 K/UL (ref 150–400)
PMV BLD AUTO: 12.8 FL (ref 8.9–12.9)
RBC # BLD AUTO: 3.08 M/UL (ref 4.1–5.7)
SERVICE CMNT-IMP: ABNORMAL
SERVICE CMNT-IMP: NORMAL
URATE SERPL-MCNC: 7.6 MG/DL (ref 3.5–7.2)
WBC # BLD AUTO: 8 K/UL (ref 4.1–11.1)

## 2021-02-21 PROCEDURE — 74011000258 HC RX REV CODE- 258: Performed by: INTERNAL MEDICINE

## 2021-02-21 PROCEDURE — 84550 ASSAY OF BLOOD/URIC ACID: CPT

## 2021-02-21 PROCEDURE — 36415 COLL VENOUS BLD VENIPUNCTURE: CPT

## 2021-02-21 PROCEDURE — 74011000250 HC RX REV CODE- 250: Performed by: HOSPITALIST

## 2021-02-21 PROCEDURE — 82962 GLUCOSE BLOOD TEST: CPT

## 2021-02-21 PROCEDURE — 74011250637 HC RX REV CODE- 250/637: Performed by: EMERGENCY MEDICINE

## 2021-02-21 PROCEDURE — 74011250636 HC RX REV CODE- 250/636: Performed by: HOSPITALIST

## 2021-02-21 PROCEDURE — 74011636637 HC RX REV CODE- 636/637: Performed by: NURSE PRACTITIONER

## 2021-02-21 PROCEDURE — 94640 AIRWAY INHALATION TREATMENT: CPT

## 2021-02-21 PROCEDURE — 74011250636 HC RX REV CODE- 250/636: Performed by: INTERNAL MEDICINE

## 2021-02-21 PROCEDURE — 74011250637 HC RX REV CODE- 250/637: Performed by: INTERNAL MEDICINE

## 2021-02-21 PROCEDURE — 74011250637 HC RX REV CODE- 250/637: Performed by: ANESTHESIOLOGY

## 2021-02-21 PROCEDURE — 85025 COMPLETE CBC W/AUTO DIFF WBC: CPT

## 2021-02-21 PROCEDURE — C9113 INJ PANTOPRAZOLE SODIUM, VIA: HCPCS | Performed by: HOSPITALIST

## 2021-02-21 PROCEDURE — 74011250637 HC RX REV CODE- 250/637: Performed by: NURSE PRACTITIONER

## 2021-02-21 PROCEDURE — 84100 ASSAY OF PHOSPHORUS: CPT

## 2021-02-21 PROCEDURE — 94760 N-INVAS EAR/PLS OXIMETRY 1: CPT

## 2021-02-21 PROCEDURE — 74011636637 HC RX REV CODE- 636/637: Performed by: HOSPITALIST

## 2021-02-21 PROCEDURE — 65270000029 HC RM PRIVATE

## 2021-02-21 PROCEDURE — 74011250636 HC RX REV CODE- 250/636: Performed by: NURSE PRACTITIONER

## 2021-02-21 PROCEDURE — 74011000250 HC RX REV CODE- 250: Performed by: INTERNAL MEDICINE

## 2021-02-21 PROCEDURE — 74011250637 HC RX REV CODE- 250/637: Performed by: HOSPITALIST

## 2021-02-21 RX ORDER — INSULIN LISPRO 100 [IU]/ML
INJECTION, SOLUTION INTRAVENOUS; SUBCUTANEOUS
Status: DISCONTINUED | OUTPATIENT
Start: 2021-02-21 | End: 2021-03-05 | Stop reason: HOSPADM

## 2021-02-21 RX ORDER — HYDROMORPHONE HYDROCHLORIDE 1 MG/ML
1 INJECTION, SOLUTION INTRAMUSCULAR; INTRAVENOUS; SUBCUTANEOUS
Status: DISCONTINUED | OUTPATIENT
Start: 2021-02-21 | End: 2021-03-02 | Stop reason: SDUPTHER

## 2021-02-21 RX ADMIN — ALBUTEROL SULFATE 2 PUFF: 90 AEROSOL, METERED RESPIRATORY (INHALATION) at 11:34

## 2021-02-21 RX ADMIN — HEPARIN SODIUM 5000 UNITS: 5000 INJECTION INTRAVENOUS; SUBCUTANEOUS at 13:07

## 2021-02-21 RX ADMIN — ALBUTEROL SULFATE 2 PUFF: 90 AEROSOL, METERED RESPIRATORY (INHALATION) at 19:53

## 2021-02-21 RX ADMIN — CASTOR OIL AND BALSAM, PERU: 788; 87 OINTMENT TOPICAL at 21:36

## 2021-02-21 RX ADMIN — ISOSORBIDE DINITRATE 30 MG: 20 TABLET ORAL at 21:35

## 2021-02-21 RX ADMIN — INSULIN LISPRO 3 UNITS: 100 INJECTION, SOLUTION INTRAVENOUS; SUBCUTANEOUS at 05:42

## 2021-02-21 RX ADMIN — INSULIN GLARGINE 20 UNITS: 100 INJECTION, SOLUTION SUBCUTANEOUS at 10:53

## 2021-02-21 RX ADMIN — OXYCODONE HYDROCHLORIDE AND ACETAMINOPHEN 500 MG: 500 TABLET ORAL at 10:02

## 2021-02-21 RX ADMIN — MEROPENEM 500 MG: 500 INJECTION, POWDER, FOR SOLUTION INTRAVENOUS at 23:09

## 2021-02-21 RX ADMIN — HYDROMORPHONE HYDROCHLORIDE 1 MG: 1 INJECTION, SOLUTION INTRAMUSCULAR; INTRAVENOUS; SUBCUTANEOUS at 15:40

## 2021-02-21 RX ADMIN — AMLODIPINE BESYLATE 10 MG: 5 TABLET ORAL at 10:02

## 2021-02-21 RX ADMIN — HEPARIN SODIUM 5000 UNITS: 5000 INJECTION INTRAVENOUS; SUBCUTANEOUS at 21:35

## 2021-02-21 RX ADMIN — ALBUTEROL SULFATE 2 PUFF: 90 AEROSOL, METERED RESPIRATORY (INHALATION) at 00:00

## 2021-02-21 RX ADMIN — Medication 10 ML: at 13:28

## 2021-02-21 RX ADMIN — BUMETANIDE 2 MG: 0.25 INJECTION INTRAMUSCULAR; INTRAVENOUS at 10:02

## 2021-02-21 RX ADMIN — OXYCODONE HYDROCHLORIDE AND ACETAMINOPHEN 500 MG: 500 TABLET ORAL at 18:10

## 2021-02-21 RX ADMIN — ALBUTEROL SULFATE 2 PUFF: 90 AEROSOL, METERED RESPIRATORY (INHALATION) at 07:29

## 2021-02-21 RX ADMIN — CARVEDILOL 25 MG: 12.5 TABLET, FILM COATED ORAL at 21:36

## 2021-02-21 RX ADMIN — SODIUM CHLORIDE 40 MG: 9 INJECTION INTRAMUSCULAR; INTRAVENOUS; SUBCUTANEOUS at 18:10

## 2021-02-21 RX ADMIN — CASTOR OIL AND BALSAM, PERU: 788; 87 OINTMENT TOPICAL at 10:03

## 2021-02-21 RX ADMIN — ATORVASTATIN CALCIUM 40 MG: 40 TABLET, FILM COATED ORAL at 21:35

## 2021-02-21 RX ADMIN — BUMETANIDE 2 MG: 0.25 INJECTION INTRAMUSCULAR; INTRAVENOUS at 21:35

## 2021-02-21 RX ADMIN — HYDROMORPHONE HYDROCHLORIDE 1 MG: 1 INJECTION, SOLUTION INTRAMUSCULAR; INTRAVENOUS; SUBCUTANEOUS at 18:10

## 2021-02-21 RX ADMIN — ISOSORBIDE DINITRATE 30 MG: 20 TABLET ORAL at 05:22

## 2021-02-21 RX ADMIN — CHOLECALCIFEROL (VITAMIN D3) 25 MCG (1,000 UNIT) TABLET 2000 UNITS: TABLET at 10:03

## 2021-02-21 RX ADMIN — SODIUM CHLORIDE 40 MG: 9 INJECTION INTRAMUSCULAR; INTRAVENOUS; SUBCUTANEOUS at 05:23

## 2021-02-21 RX ADMIN — HEPARIN SODIUM 5000 UNITS: 5000 INJECTION INTRAVENOUS; SUBCUTANEOUS at 05:22

## 2021-02-21 RX ADMIN — HYDROMORPHONE HYDROCHLORIDE 1 MG: 1 INJECTION, SOLUTION INTRAMUSCULAR; INTRAVENOUS; SUBCUTANEOUS at 13:25

## 2021-02-21 RX ADMIN — CARVEDILOL 25 MG: 12.5 TABLET, FILM COATED ORAL at 10:02

## 2021-02-21 RX ADMIN — HYDROMORPHONE HYDROCHLORIDE 1 MG: 1 INJECTION, SOLUTION INTRAMUSCULAR; INTRAVENOUS; SUBCUTANEOUS at 10:53

## 2021-02-21 RX ADMIN — Medication 10 ML: at 05:24

## 2021-02-21 RX ADMIN — ALBUTEROL SULFATE 2 PUFF: 90 AEROSOL, METERED RESPIRATORY (INHALATION) at 15:47

## 2021-02-21 RX ADMIN — INSULIN LISPRO 3 UNITS: 100 INJECTION, SOLUTION INTRAVENOUS; SUBCUTANEOUS at 13:25

## 2021-02-21 RX ADMIN — SODIUM CHLORIDE 250 ML: 9 INJECTION, SOLUTION INTRAVENOUS at 23:10

## 2021-02-21 RX ADMIN — ALBUTEROL SULFATE 2 PUFF: 90 AEROSOL, METERED RESPIRATORY (INHALATION) at 04:10

## 2021-02-21 RX ADMIN — HYDROMORPHONE HYDROCHLORIDE 1 MG: 1 INJECTION, SOLUTION INTRAMUSCULAR; INTRAVENOUS; SUBCUTANEOUS at 23:09

## 2021-02-21 RX ADMIN — HYDROMORPHONE HYDROCHLORIDE 1 MG: 1 INJECTION, SOLUTION INTRAMUSCULAR; INTRAVENOUS; SUBCUTANEOUS at 02:39

## 2021-02-21 RX ADMIN — TICAGRELOR 90 MG: 90 TABLET ORAL at 10:02

## 2021-02-21 RX ADMIN — ASPIRIN 81 MG: 81 TABLET, CHEWABLE ORAL at 10:03

## 2021-02-21 RX ADMIN — TICAGRELOR 90 MG: 90 TABLET ORAL at 18:10

## 2021-02-21 RX ADMIN — Medication 10 ML: at 21:36

## 2021-02-21 NOTE — PROGRESS NOTES
Bedside shift change report given to Faviola Ramos (oncoming nurse) by Jennifer Rouse (offgoing nurse). Report included the following information SBAR, Kardex, MAR and Recent Results.

## 2021-02-21 NOTE — PROGRESS NOTES
Pt found with NG tube pulled out with tube feed running. MD notified. Called wife to give update. She states that when she last talked to pt on phone he told her he was hungry. She is okay with him getting comfort foods and is aware of aspiration risk. MD ordered clear liquids at first to see if tolerated.

## 2021-02-21 NOTE — PROGRESS NOTES
Hospitalist Progress Note  Vitaliy Whittaker MD  Answering service: [phone] from in house phone      Date of Service:  2021  NAME:  Heather Garber  :  1955  MRN:  [id number]    Admission Summary:   65M p/w SOB- downgraded from ICU after lengthy stay. Extubated . Interval history / Subjective:   Patient seen and examined at bedside, feels tired this morning, not much conversational, appears pretty comfortable though. Off oxygen now, transferred to . Spoke to wife, talked about all aspects of his care. No escalation of care, keep things as they are now, follow up with palliative care tomorrow and switch to comfort care then. In meanwhile if there is deterioration of condition- NO ESCALATION and switch to comfort based care. NO DIALYSIS. Assessment & Plan:     #. COVID-19 PNA  #. Aspiration PNA  #. Mycoplasma and S. Pneumoniae PNA. #. Cervical wound infection: chronic, a/w skin graft 'per [facility] care staff'. #. Sepsis: ID- Abx, meropenem, vanc. PT/OT  #. Acute metabolic Encephalopathy: related to above. Improved with improving oxygenation. #. Volume Overload  #. Acute on Chronic Systolic CHF: Cards - ASA, Ticagrelor, Carvedilol, Lipitor, Bumex IV BID  #. Acute Hypoxic Respiratory Failure: extubated . On HFNC  - TTE: ef 20%, mod- severe Pulm HTN. #. ARF: Nephro evaluated, pt/family are in conversation with palliative care re- HD or no. #. Anemia: chronic, likely of chronic disease. Iron low, supplements. #. Deranged LFTs: likely related to CHF/congestion. Monitor.   #. DM2: A1c 7.4, SSI, AccuChecks, monitor     Palliative care following- Family discussing comfort measures, not decided- DNR/DNI- No escalation of care    Code status: DNR  DVT prophylaxis: heparin  Care Plan discussed with: Patient/Family and Nurse  Disposition: TBD >2days     Hospital Problems  Never Reviewed          Codes Class Noted POA Acute on chronic systolic heart failure [facility]) ICD-10-CM: I50.23  ICD-9-CM: 428.23  2/16/2021 Unknown        Goals of care, counseling/discussion ICD-10-CM: Z71.89  ICD-9-CM: V65.49  Unknown Unknown        DNR (do not resuscitate) discussion ICD-10-CM: Z71.89  ICD-9-CM: V65.49  Unknown Unknown        JUAN C (acute kidney injury) ([facility] [address] ICD-10-CM: N17.9  ICD-9-CM: 756. 9  Unknown Unknown        Acute hypoxemic respiratory failure due to COVID-19 [facility]) ICD-10-CM: U07.1, J96.01  ICD-9-CM: 518.81, 079.89, 799.02  1/30/2021 Unknown            Review of Systems:   Pertinent items are mentioned in interval history. Vital Signs:    Last 24hrs VS reviewed since prior progress note. Most recent are:  Visit Vitals  BP (!) 156/77 (BP 1 Location: Right upper arm)   Pulse 69   Temp 97.6 °F (36.4 °C)   Resp 16   Ht 5' 7\" (1.702 m)   Wt 96.1 kg (211 lb 12.8 oz)   SpO2 98%   BMI 33.17 kg/m²         Intake/Output Summary (Last 24 hours) at 2/21/2021 0756  Last data filed at 2/21/2021 0249  Gross per 24 hour   Intake 2755 ml   Output 800 ml   Net 1955 ml        Physical Examination:   Evaluated face to face and examined 02/21/21    General:  Alert, sleepy/drowsy. No acute distress. BM. Looks older than his age. NGT in situ  Resp:  No accessory muscle use, no wheezes, rhonchi +  Abd:  Soft, non-tender, non-distended,  Extremities:  No cyanosis or clubbing, no significant edema  Neuro:  sleepy. slow in speaking, no focal neuro deficits, follows commands   Psych:   not agitated.     Data Review:    Review and/or order of clinical lab test  Review and/or order of tests in the radiology section of CPT  Review and/or order of tests in the medicine section of CPT  Labs:     Recent Labs     02/21/21  0403 02/20/21  0338   WBC 8.0 7.6   HGB 7.8* 7.0*   HCT 24.8* 22.3*    237     Recent Labs     02/21/21  0403 02/20/21  0338 02/19/21  0515   NA  --  144 142   K  --  3.5 3.8   CL  --  110* 110*   CO2  --  20* 22   BUN  --  138* 131*   CREA --  9.49* 8.85*   GLU  --  84 124*   CA  --  7.6* 7.8*   MG  --  2.9* 2.9*   PHOS 7.9* 7.4* 7.3*   URICA 7.6* 7.5* 6.4     Recent Labs     02/20/21  0338 02/19/21  0515   * 111*   * 182*   TBILI 0.2 0.2   TP 5.4* 5.8*   ALB 1.4* 1.5*   GLOB 4.0 4.3*     No results for input(s): INR, PTP, APTT, INREXT, INREXT in the last 72 hours.   No results for input(s): FE, TIBC, PSAT, FERR in the last 72 hours.   No results found for: FOL, RBCF   No results for input(s): PH, PCO2, PO2 in the last 72 hours.  No results for input(s): CPK, CKNDX, TROIQ in the last 72 hours.    No lab exists for component: CPKMB  Lab Results   Component Value Date/Time    Cholesterol, total 115 02/26/2010 05:20 PM    HDL Cholesterol 31 (L) 02/26/2010 05:20 PM    LDL, calculated 46.2 02/26/2010 05:20 PM    Triglyceride 189 02/26/2010 05:20 PM    CHOL/HDL Ratio 3.7 02/26/2010 05:20 PM     Lab Results   Component Value Date/Time    Glucose (POC) 179 (H) 02/21/2021 05:36 AM    Glucose (POC) 152 (H) 02/21/2021 12:10 AM    Glucose (POC) 139 (H) 02/20/2021 11:42 PM    Glucose (POC) 126 (H) 02/20/2021 06:50 PM    Glucose (POC) 126 (H) 02/20/2021 11:28 AM     Lab Results   Component Value Date/Time    Color YELLOW/STRAW 02/10/2021 01:26 PM    Appearance TURBID (A) 02/10/2021 01:26 PM    Specific gravity 1.014 02/10/2021 01:26 PM    Specific gravity 1.015 02/26/2010 12:32 PM    pH (UA) 5.0 02/10/2021 01:26 PM    Protein 100 (A) 02/10/2021 01:26 PM    Glucose Negative 02/10/2021 01:26 PM    Ketone Negative 02/10/2021 01:26 PM    Bilirubin Negative 02/10/2021 01:26 PM    Urobilinogen 0.2 02/10/2021 01:26 PM    Nitrites Negative 02/10/2021 01:26 PM    Leukocyte Esterase Negative 02/10/2021 01:26 PM    Epithelial cells FEW 02/10/2021 01:26 PM    Bacteria Negative 02/10/2021 01:26 PM    WBC 0-4 02/10/2021 01:26 PM    RBC  02/10/2021 01:26 PM     Medications Reviewed:     Current Facility-Administered Medications   Medication Dose Route Frequency   meropenem (MERREM) 500 mg in 0.9% sodium chloride (MBP/ADV) 50 mL MBP  0.5 g IntraVENous Q24H    HYDROmorphone (PF) (DILAUDID) injection 1 mg  1 mg IntraVENous Q3H PRN    isosorbide dinitrate (ISORDIL) tablet 30 mg  30 mg Per NG tube Q8H    balsam peru-castor oiL (VENELEX) ointment   Topical Q12H    albuterol (PROVENTIL HFA, VENTOLIN HFA, PROAIR HFA) inhaler 2 Puff  2 Puff Inhalation Q4H RT    heparin (porcine) injection 5,000 Units  5,000 Units SubCUTAneous Q8H    bumetanide (BUMEX) injection 2 mg  2 mg IntraVENous Q12H    0.9% sodium chloride infusion 250 mL  250 mL IntraVENous PRN    insulin glargine (LANTUS) injection 20 Units  20 Units SubCUTAneous DAILY    pantoprazole (PROTONIX) 40 mg in 0.9% sodium chloride 10 mL injection  40 mg IntraVENous Q12H    labetaloL (NORMODYNE;TRANDATE) injection 20 mg  20 mg IntraVENous Q4H PRN    ticagrelor (BRILINTA) tablet 90 mg  90 mg Oral BID    carvediloL (COREG) tablet 25 mg  25 mg Per NG tube Q12H    amLODIPine (NORVASC) tablet 10 mg  10 mg Per NG tube DAILY    insulin lispro (HUMALOG) injection   SubCUTAneous Q6H    alteplase (CATHFLO) 1 mg in sterile water (preservative free) 1 mL injection  1 mg InterCATHeter PRN    ascorbic acid (vitamin C) (VITAMIN C) tablet 500 mg  500 mg Per NG tube BID    cholecalciferol (VITAMIN D3) (1000 Units /25 mcg) tablet 2,000 Units  2,000 Units Per NG tube DAILY    metoprolol (LOPRESSOR) injection 5 mg  5 mg IntraVENous Q6H PRN    [Held by provider] allopurinoL (ZYLOPRIM) tablet 100 mg  100 mg Oral DAILY    sodium chloride (NS) flush 5-40 mL  5-40 mL IntraVENous Q8H    sodium chloride (NS) flush 5-40 mL  5-40 mL IntraVENous PRN    acetaminophen (TYLENOL) tablet 650 mg  650 mg Oral Q6H PRN    Or    acetaminophen (TYLENOL) suppository 650 mg  650 mg Rectal Q6H PRN    glucose chewable tablet 16 g  4 Tab Oral PRN    dextrose (D50W) injection syrg 12.5-25 g  12.5-25 g IntraVENous PRN    glucagon (GLUCAGEN) injection 1 mg  1 mg IntraMUSCular PRN    aspirin chewable tablet 81 mg  81 mg Oral DAILY    atorvastatin (LIPITOR) tablet 40 mg  40 mg Oral QHS   ______________________________________________________________________  EXPECTED LENGTH OF STAY: 12d 7h  ACTUAL LENGTH OF STAY:          25               Karolina Solares MD

## 2021-02-21 NOTE — PROGRESS NOTES
TRANSFER - IN REPORT:    Verbal report received from Hollie(name) on Aimee Gaviria  being received from [facility]) for routine progression of care      Report consisted of patients Situation, Background, Assessment and   Recommendations(SBAR). Information from the following report(s) SBAR, Kardex and MAR was reviewed with the receiving nurse. Opportunity for questions and clarification was provided. Assessment completed upon patients arrival to unit and care assumed.

## 2021-02-21 NOTE — PROGRESS NOTES
Bedside shift change report given to Hollie RN (oncoming nurse) by ANAHI Espitia (offgoing nurse). Report included the following information SBAR, Kardex, ED Summary, Recent Results and Cardiac Rhythm NSR. Problem: Pressure Injury - Risk of  Goal: *Prevention of pressure injury  Description: Document Cruzito Scale and appropriate interventions in the flowsheet. Outcome: Progressing Towards Goal  Note: Pressure Injury Interventions:  Sensory Interventions: Keep linens dry and wrinkle-free    Moisture Interventions: Absorbent underpads, Apply protective barrier, creams and emollients    Activity Interventions: PT/OT evaluation    Mobility Interventions: HOB 30 degrees or less, Turn and reposition approx.  every two hours(pillow and wedges)    Nutrition Interventions: Document food/fluid/supplement intake    Friction and Shear Interventions: Apply protective barrier, creams and emollients, Transferring/repositioning devices

## 2021-02-21 NOTE — ACP (ADVANCE CARE PLANNING)
Advance Care Planning Note    Name: Zakiya Cantor  YOB: 1955  MRN: [id number]  Admission Date: 1/30/2021 12:48 AM    Date of discussion: 2/21/2021    Active Diagnoses:    Hospital Problems  Never Reviewed          Codes Class Noted POA    Acute on chronic systolic heart failure [facility]) ICD-10-CM: I50.23  ICD-9-CM: 428.23  2/16/2021 Unknown        Goals of care, counseling/discussion ICD-10-CM: Z71.89  ICD-9-CM: V65.49  Unknown Unknown        DNR (do not resuscitate) discussion ICD-10-CM: Z71.89  ICD-9-CM: V65.49  Unknown Unknown        JUAN C (acute kidney injury) ([facility] [address] ICD-10-CM: N17.9  ICD-9-CM: 548. 9  Unknown Unknown        Acute hypoxemic respiratory failure due to COVID-19 [facility]) ICD-10-CM: U07.1, J96.01  ICD-9-CM: 518.81, 079.89, 799.02  1/30/2021 Unknown               These active diagnoses are of sufficient risk that focused discussion on advance care planning is indicated in order to allow the patient to thoughtfully consider personal goals of care, and if situations arise that prevent the ability to personally give input, to ensure appropriate representation of their personal desires for different levels and aggressiveness of care. Discussion:     Persons present and participating in discussion: Zakiya Cantor, Lennox Mckeon MD, wife    Discussion: discussed current condition, short and long term prognosis, discussed options of treatment going forward, lengthy conversation, wife agrees on no escalation of care. Will follow along with palliative conversations tomorrow and likely switch to COMFORT care. Meanwhile if any deterioration will switch to comfort sooner. Time Spent:     Total time spent face-to-face in education and discussion: 24 minutes.      Lennox Mckeon MD  2/21/2021  9:12 AM

## 2021-02-21 NOTE — PROGRESS NOTES
Report given to Shanice Valdez on 2N. All belongings sent with patient. Pt informed of transfer and verbalized agreement. All belongings sent with patient. Pt alert and oriented x3. Vitals assessed prior to transfer and were stable. Tolerating room air at this time.

## 2021-02-22 PROBLEM — I50.22 CHRONIC SYSTOLIC CONGESTIVE HEART FAILURE (HCC): Status: ACTIVE | Noted: 2021-02-16

## 2021-02-22 LAB
ANION GAP SERPL CALC-SCNC: 13 MMOL/L (ref 5–15)
BASOPHILS # BLD: 0 K/UL (ref 0–0.1)
BASOPHILS NFR BLD: 0 % (ref 0–1)
BUN SERPL-MCNC: 150 MG/DL (ref 6–20)
BUN/CREAT SERPL: 14 (ref 12–20)
CALCIUM SERPL-MCNC: 7.7 MG/DL (ref 8.5–10.1)
CHLORIDE SERPL-SCNC: 108 MMOL/L (ref 97–108)
CO2 SERPL-SCNC: 21 MMOL/L (ref 21–32)
CREAT SERPL-MCNC: 10.7 MG/DL (ref 0.7–1.3)
DIFFERENTIAL METHOD BLD: ABNORMAL
EOSINOPHIL # BLD: 0.3 K/UL (ref 0–0.4)
EOSINOPHIL NFR BLD: 3 % (ref 0–7)
ERYTHROCYTE [DISTWIDTH] IN BLOOD BY AUTOMATED COUNT: 17 % (ref 11.5–14.5)
GLUCOSE BLD STRIP.AUTO-MCNC: 103 MG/DL (ref 65–100)
GLUCOSE BLD STRIP.AUTO-MCNC: 108 MG/DL (ref 65–100)
GLUCOSE BLD STRIP.AUTO-MCNC: 114 MG/DL (ref 65–100)
GLUCOSE BLD STRIP.AUTO-MCNC: 97 MG/DL (ref 65–100)
GLUCOSE SERPL-MCNC: 79 MG/DL (ref 65–100)
HCT VFR BLD AUTO: 20.8 % (ref 36.6–50.3)
HGB BLD-MCNC: 6.7 G/DL (ref 12.1–17)
HISTORY CHECKED?,CKHIST: NORMAL
IMM GRANULOCYTES # BLD AUTO: 0.1 K/UL (ref 0–0.04)
IMM GRANULOCYTES NFR BLD AUTO: 1 % (ref 0–0.5)
LYMPHOCYTES # BLD: 2 K/UL (ref 0.8–3.5)
LYMPHOCYTES NFR BLD: 22 % (ref 12–49)
MAGNESIUM SERPL-MCNC: 3 MG/DL (ref 1.6–2.4)
MCH RBC QN AUTO: 25.9 PG (ref 26–34)
MCHC RBC AUTO-ENTMCNC: 32.2 G/DL (ref 30–36.5)
MCV RBC AUTO: 80.3 FL (ref 80–99)
MONOCYTES # BLD: 0.7 K/UL (ref 0–1)
MONOCYTES NFR BLD: 8 % (ref 5–13)
NEUTS SEG # BLD: 6.1 K/UL (ref 1.8–8)
NEUTS SEG NFR BLD: 66 % (ref 32–75)
NRBC # BLD: 0 K/UL (ref 0–0.01)
NRBC BLD-RTO: 0 PER 100 WBC
PHOSPHATE SERPL-MCNC: 8.5 MG/DL (ref 2.6–4.7)
PLATELET # BLD AUTO: 270 K/UL (ref 150–400)
PMV BLD AUTO: 12.4 FL (ref 8.9–12.9)
POTASSIUM SERPL-SCNC: 3.6 MMOL/L (ref 3.5–5.1)
RBC # BLD AUTO: 2.59 M/UL (ref 4.1–5.7)
RBC MORPH BLD: ABNORMAL
SERVICE CMNT-IMP: ABNORMAL
SERVICE CMNT-IMP: NORMAL
SODIUM SERPL-SCNC: 142 MMOL/L (ref 136–145)
URATE SERPL-MCNC: 7.6 MG/DL (ref 3.5–7.2)
WBC # BLD AUTO: 9.2 K/UL (ref 4.1–11.1)

## 2021-02-22 PROCEDURE — 84550 ASSAY OF BLOOD/URIC ACID: CPT

## 2021-02-22 PROCEDURE — C9113 INJ PANTOPRAZOLE SODIUM, VIA: HCPCS | Performed by: HOSPITALIST

## 2021-02-22 PROCEDURE — 80048 BASIC METABOLIC PNL TOTAL CA: CPT

## 2021-02-22 PROCEDURE — 74011250636 HC RX REV CODE- 250/636: Performed by: NURSE PRACTITIONER

## 2021-02-22 PROCEDURE — 99233 SBSQ HOSP IP/OBS HIGH 50: CPT | Performed by: NURSE PRACTITIONER

## 2021-02-22 PROCEDURE — 94640 AIRWAY INHALATION TREATMENT: CPT

## 2021-02-22 PROCEDURE — 85025 COMPLETE CBC W/AUTO DIFF WBC: CPT

## 2021-02-22 PROCEDURE — 77010033678 HC OXYGEN DAILY

## 2021-02-22 PROCEDURE — 94760 N-INVAS EAR/PLS OXIMETRY 1: CPT

## 2021-02-22 PROCEDURE — 74011250636 HC RX REV CODE- 250/636: Performed by: HOSPITALIST

## 2021-02-22 PROCEDURE — 74011250636 HC RX REV CODE- 250/636: Performed by: INTERNAL MEDICINE

## 2021-02-22 PROCEDURE — 74011250637 HC RX REV CODE- 250/637: Performed by: INTERNAL MEDICINE

## 2021-02-22 PROCEDURE — 74011250637 HC RX REV CODE- 250/637: Performed by: HOSPITALIST

## 2021-02-22 PROCEDURE — 86923 COMPATIBILITY TEST ELECTRIC: CPT

## 2021-02-22 PROCEDURE — 74011000250 HC RX REV CODE- 250: Performed by: HOSPITALIST

## 2021-02-22 PROCEDURE — 74011000250 HC RX REV CODE- 250: Performed by: INTERNAL MEDICINE

## 2021-02-22 PROCEDURE — P9016 RBC LEUKOCYTES REDUCED: HCPCS

## 2021-02-22 PROCEDURE — 65660000000 HC RM CCU STEPDOWN

## 2021-02-22 PROCEDURE — 74011000250 HC RX REV CODE- 250: Performed by: NURSE PRACTITIONER

## 2021-02-22 PROCEDURE — 36415 COLL VENOUS BLD VENIPUNCTURE: CPT

## 2021-02-22 PROCEDURE — 74011250637 HC RX REV CODE- 250/637: Performed by: NURSE PRACTITIONER

## 2021-02-22 PROCEDURE — 74011636637 HC RX REV CODE- 636/637: Performed by: HOSPITALIST

## 2021-02-22 PROCEDURE — 86901 BLOOD TYPING SEROLOGIC RH(D): CPT

## 2021-02-22 PROCEDURE — 94664 DEMO&/EVAL PT USE INHALER: CPT

## 2021-02-22 PROCEDURE — 74011250637 HC RX REV CODE- 250/637: Performed by: ANESTHESIOLOGY

## 2021-02-22 PROCEDURE — 74011250637 HC RX REV CODE- 250/637: Performed by: EMERGENCY MEDICINE

## 2021-02-22 PROCEDURE — 36430 TRANSFUSION BLD/BLD COMPNT: CPT

## 2021-02-22 PROCEDURE — 84100 ASSAY OF PHOSPHORUS: CPT

## 2021-02-22 PROCEDURE — 82962 GLUCOSE BLOOD TEST: CPT

## 2021-02-22 PROCEDURE — 2709999900 HC NON-CHARGEABLE SUPPLY

## 2021-02-22 PROCEDURE — 83735 ASSAY OF MAGNESIUM: CPT

## 2021-02-22 RX ORDER — IPRATROPIUM BROMIDE AND ALBUTEROL SULFATE 2.5; .5 MG/3ML; MG/3ML
3 SOLUTION RESPIRATORY (INHALATION)
Status: DISCONTINUED | OUTPATIENT
Start: 2021-02-22 | End: 2021-03-05 | Stop reason: HOSPADM

## 2021-02-22 RX ORDER — SODIUM CHLORIDE 9 MG/ML
250 INJECTION, SOLUTION INTRAVENOUS AS NEEDED
Status: DISCONTINUED | OUTPATIENT
Start: 2021-02-22 | End: 2021-03-05 | Stop reason: HOSPADM

## 2021-02-22 RX ADMIN — HYDROMORPHONE HYDROCHLORIDE 1 MG: 1 INJECTION, SOLUTION INTRAMUSCULAR; INTRAVENOUS; SUBCUTANEOUS at 03:39

## 2021-02-22 RX ADMIN — INSULIN GLARGINE 20 UNITS: 100 INJECTION, SOLUTION SUBCUTANEOUS at 09:22

## 2021-02-22 RX ADMIN — CARVEDILOL 25 MG: 12.5 TABLET, FILM COATED ORAL at 09:22

## 2021-02-22 RX ADMIN — ASPIRIN 81 MG: 81 TABLET, CHEWABLE ORAL at 09:22

## 2021-02-22 RX ADMIN — ALBUTEROL SULFATE 2 PUFF: 90 AEROSOL, METERED RESPIRATORY (INHALATION) at 03:29

## 2021-02-22 RX ADMIN — Medication 10 ML: at 21:53

## 2021-02-22 RX ADMIN — HYDROMORPHONE HYDROCHLORIDE 1 MG: 1 INJECTION, SOLUTION INTRAMUSCULAR; INTRAVENOUS; SUBCUTANEOUS at 11:48

## 2021-02-22 RX ADMIN — BUMETANIDE 2 MG: 0.25 INJECTION INTRAMUSCULAR; INTRAVENOUS at 09:22

## 2021-02-22 RX ADMIN — TICAGRELOR 90 MG: 90 TABLET ORAL at 17:56

## 2021-02-22 RX ADMIN — HYDROMORPHONE HYDROCHLORIDE 1 MG: 1 INJECTION, SOLUTION INTRAMUSCULAR; INTRAVENOUS; SUBCUTANEOUS at 15:29

## 2021-02-22 RX ADMIN — ISOSORBIDE DINITRATE 30 MG: 20 TABLET ORAL at 13:29

## 2021-02-22 RX ADMIN — CARVEDILOL 25 MG: 12.5 TABLET, FILM COATED ORAL at 20:34

## 2021-02-22 RX ADMIN — HYDROMORPHONE HYDROCHLORIDE 1 MG: 1 INJECTION, SOLUTION INTRAMUSCULAR; INTRAVENOUS; SUBCUTANEOUS at 18:15

## 2021-02-22 RX ADMIN — OXYCODONE HYDROCHLORIDE AND ACETAMINOPHEN 500 MG: 500 TABLET ORAL at 17:56

## 2021-02-22 RX ADMIN — BUMETANIDE 2 MG: 0.25 INJECTION INTRAMUSCULAR; INTRAVENOUS at 20:34

## 2021-02-22 RX ADMIN — AMLODIPINE BESYLATE 10 MG: 5 TABLET ORAL at 09:22

## 2021-02-22 RX ADMIN — ALTEPLASE 1 MG: 2.2 INJECTION, POWDER, LYOPHILIZED, FOR SOLUTION INTRAVENOUS at 13:29

## 2021-02-22 RX ADMIN — HYDROMORPHONE HYDROCHLORIDE 1 MG: 1 INJECTION, SOLUTION INTRAMUSCULAR; INTRAVENOUS; SUBCUTANEOUS at 06:41

## 2021-02-22 RX ADMIN — CASTOR OIL AND BALSAM, PERU: 788; 87 OINTMENT TOPICAL at 09:23

## 2021-02-22 RX ADMIN — Medication 10 ML: at 13:29

## 2021-02-22 RX ADMIN — CASTOR OIL AND BALSAM, PERU: 788; 87 OINTMENT TOPICAL at 20:34

## 2021-02-22 RX ADMIN — TICAGRELOR 90 MG: 90 TABLET ORAL at 09:22

## 2021-02-22 RX ADMIN — HYDROMORPHONE HYDROCHLORIDE 1 MG: 1 INJECTION, SOLUTION INTRAMUSCULAR; INTRAVENOUS; SUBCUTANEOUS at 22:50

## 2021-02-22 RX ADMIN — SODIUM CHLORIDE 40 MG: 9 INJECTION INTRAMUSCULAR; INTRAVENOUS; SUBCUTANEOUS at 06:41

## 2021-02-22 RX ADMIN — SODIUM CHLORIDE 40 MG: 9 INJECTION INTRAMUSCULAR; INTRAVENOUS; SUBCUTANEOUS at 17:55

## 2021-02-22 RX ADMIN — CHOLECALCIFEROL (VITAMIN D3) 25 MCG (1,000 UNIT) TABLET 2000 UNITS: TABLET at 09:22

## 2021-02-22 RX ADMIN — ATORVASTATIN CALCIUM 40 MG: 40 TABLET, FILM COATED ORAL at 22:00

## 2021-02-22 RX ADMIN — ALBUTEROL SULFATE 2 PUFF: 90 AEROSOL, METERED RESPIRATORY (INHALATION) at 00:10

## 2021-02-22 RX ADMIN — OXYCODONE HYDROCHLORIDE AND ACETAMINOPHEN 500 MG: 500 TABLET ORAL at 09:22

## 2021-02-22 RX ADMIN — ALBUTEROL SULFATE 2 PUFF: 90 AEROSOL, METERED RESPIRATORY (INHALATION) at 22:07

## 2021-02-22 RX ADMIN — ALBUTEROL SULFATE 2 PUFF: 90 AEROSOL, METERED RESPIRATORY (INHALATION) at 15:28

## 2021-02-22 RX ADMIN — ALBUTEROL SULFATE 2 PUFF: 90 AEROSOL, METERED RESPIRATORY (INHALATION) at 07:13

## 2021-02-22 RX ADMIN — HYDROMORPHONE HYDROCHLORIDE 1 MG: 1 INJECTION, SOLUTION INTRAMUSCULAR; INTRAVENOUS; SUBCUTANEOUS at 09:20

## 2021-02-22 RX ADMIN — HEPARIN SODIUM 5000 UNITS: 5000 INJECTION INTRAVENOUS; SUBCUTANEOUS at 03:39

## 2021-02-22 RX ADMIN — HYDROMORPHONE HYDROCHLORIDE 1 MG: 1 INJECTION, SOLUTION INTRAMUSCULAR; INTRAVENOUS; SUBCUTANEOUS at 20:33

## 2021-02-22 RX ADMIN — Medication 10 ML: at 06:56

## 2021-02-22 RX ADMIN — ISOSORBIDE DINITRATE 30 MG: 20 TABLET ORAL at 06:41

## 2021-02-22 RX ADMIN — ALBUTEROL SULFATE 2 PUFF: 90 AEROSOL, METERED RESPIRATORY (INHALATION) at 11:22

## 2021-02-22 NOTE — PROGRESS NOTES
Bedside shift change report given to Logan Nixjasmina [address] (oncoming nurse) by Yoana Montenegro (offgoing nurse). Report included the following information SBAR, Kardex, Intake/Output and MAR. Spoke with MD--he is aware of pt's HGB level and wants to defer transfusion until family and pt speaks with palliative. And to hold heparin today.

## 2021-02-22 NOTE — PROGRESS NOTES
Problem: Falls - Risk of  Goal: *Absence of Falls  Description: Document Vy Minium Fall Risk and appropriate interventions in the flowsheet. 2/21/2021 1915 by Gala Garcia RN  Outcome: Progressing Towards Goal  Note: Fall Risk Interventions:       Mentation Interventions: Bed/chair exit alarm    Medication Interventions: Bed/chair exit alarm    Elimination Interventions: Call light in reach, Bed/chair exit alarm           2/21/2021 1913 by Gala Garcia RN  Outcome: Progressing Towards Goal  Note: Fall Risk Interventions:       Mentation Interventions: Bed/chair exit alarm    Medication Interventions: Bed/chair exit alarm    Elimination Interventions: Call light in reach, Bed/chair exit alarm              Problem: Pressure Injury - Risk of  Goal: *Prevention of pressure injury  Description: Document Cruzito Scale and appropriate interventions in the flowsheet.   2/21/2021 1915 by Gala Garcia RN  Outcome: Progressing Towards Goal  Note: Pressure Injury Interventions:  Sensory Interventions: Keep linens dry and wrinkle-free, Float heels    Moisture Interventions: Apply protective barrier, creams and emollients, Absorbent underpads    Activity Interventions: Pressure redistribution bed/mattress(bed type)    Mobility Interventions: HOB 30 degrees or less    Nutrition Interventions: Document food/fluid/supplement intake    Friction and Shear Interventions: Apply protective barrier, creams and emollients        2/21/2021 1913 by Gala Garcia RN  Outcome: Progressing Towards Goal  Note: Pressure Injury Interventions:  Sensory Interventions: Keep linens dry and wrinkle-free, Float heels    Moisture Interventions: Apply protective barrier, creams and emollients, Absorbent underpads    Activity Interventions: Pressure redistribution bed/mattress(bed type)    Mobility Interventions: HOB 30 degrees or less    Nutrition Interventions: Document food/fluid/supplement intake    Friction and Shear Interventions: Apply protective barrier, creams and emollients                Problem: Nutrition Deficit  Goal: *Optimize nutritional status  2/21/2021 1915 by Saw Portillo RN  Outcome: Progressing Towards Goal  2/21/2021 1913 by Saw Portillo RN  Outcome: Progressing Towards Goal     Problem: Breathing Pattern - Ineffective  Goal: *Absence of hypoxia  2/21/2021 1915 by Saw Portillo RN  Outcome: Progressing Towards Goal  2/21/2021 1913 by Saw Portillo RN  Outcome: Progressing Towards Goal  Goal: *Use of effective breathing techniques  2/21/2021 1915 by Saw Portillo RN  Outcome: Progressing Towards Goal  2/21/2021 1913 by Saw Portillo RN  Outcome: Progressing Towards Goal  Goal: *PALLIATIVE CARE:  Alleviation of Dyspnea  2/21/2021 1915 by Saw Portillo RN  Outcome: Progressing Towards Goal  2/21/2021 1913 by Saw Portillo RN  Outcome: Progressing Towards Goal     Problem: Airway Clearance - Ineffective  Goal: Achieve or maintain patent airway  2/21/2021 1915 by Saw Portillo RN  Outcome: Progressing Towards Goal  2/21/2021 1913 by Saw Portillo RN  Outcome: Progressing Towards Goal     Problem: Pain  Goal: *Control of Pain  2/21/2021 1915 by Saw Portillo RN  Outcome: Progressing Towards Goal  2/21/2021 1912 by Saw Portillo RN  Outcome: Progressing Towards Goal  2/21/2021 1912 by Saw Portillo RN  Outcome: Progressing Towards Goal  Goal: *PALLIATIVE CARE:  Alleviation of Pain  2/21/2021 1915 by Saw Portillo RN  Outcome: Progressing Towards Goal  2/21/2021 1912 by Saw Portillo RN  Outcome: Progressing Towards Goal  2/21/2021 1912 by Saw Portillo RN  Outcome: Progressing Towards Goal

## 2021-02-22 NOTE — PROGRESS NOTES
Spoke to pt's daughter about his health status. She was very tearful and upset about a doctor telling him that he was \"going to die and there was nothing left to be done. We couldn't help him. \" RN apologized and let her know that we're doing what we can to support her and pt.

## 2021-02-22 NOTE — PROGRESS NOTES
Problem: Falls - Risk of  Goal: *Absence of Falls  Description: Document Argueta Reason Fall Risk and appropriate interventions in the flowsheet.   Outcome: Progressing Towards Goal  Note: Fall Risk Interventions:       Mentation Interventions: Bed/chair exit alarm, Door open when patient unattended, Reorient patient, Room close to nurse's station    Medication Interventions: Bed/chair exit alarm, Evaluate medications/consider consulting pharmacy    Elimination Interventions: Bed/chair exit alarm, Call light in reach, Stay With Me (per policy), Toilet paper/wipes in reach, Toileting schedule/hourly rounds              Problem: Patient Education: Go to Patient Education Activity  Goal: Patient/Family Education  Outcome: Progressing Towards Goal     Problem: Pain  Goal: *Control of Pain  Outcome: Progressing Towards Goal

## 2021-02-22 NOTE — PROGRESS NOTES
DIEGO:  1. RUR-31%  2. Patient admitted from [facility] and plans to return on discharge. 3. Palliative consult, PT/POT following        CM noted patient is from [facility], he was transferred from ICU. CM will update [facility].       Javier Sifuentes, [facility]

## 2021-02-22 NOTE — PROGRESS NOTES
Spoke with pt's wife about health status. She said that she would like to talk to palliative again to figure out what direction to go with his plan of care. She said she hasnt spoken to palliative since he's been transferred down to  so she is wanting an update of what steps to take next.

## 2021-02-22 NOTE — PROGRESS NOTES
ID Progress Note  2021    Subjective:     No complaints    Review of Systems:            Symptom Y/N Comments   Symptom Y/N Comments   Fever/Chills n      Chest Pain n       Poor Appetite       Edema        Cough n      Abdominal Pain        Sputum       Joint Pain        SOB/WEBB  n     Pruritis/Rash        Nausea/vomit  n     Tolerating PT/OT        Diarrhea       Tolerating Diet        Constipation       Other           Could NOT obtain due to:       Objective:     Vitals:   Visit Vitals  BP (!) 158/75 (BP 1 Location: Right upper arm, BP Patient Position: Lying right side)   Pulse 75   Temp 97.5 °F (36.4 °C)   Resp 24   Ht 5' 7\" (1.702 m)   Wt 96.3 kg (212 lb 3.2 oz)   SpO2 98%   BMI 33.24 kg/m²        Tmax:  Temp (24hrs), Av.7 °F (36.5 °C), Min:97.5 °F (36.4 °C), Max:97.8 °F (36.6 °C)      PHYSICAL EXAM:  General: Chronically ill appearing, WD, WN. Alert, cooperative, no acute distress    EENT:  EOMI. Anicteric sclerae. MMM  Resp:  Clear in apex with decreased breath sounds at bases, no wheezing or rales. No accessory muscle use  CV:  Regular  rhythm,  No edema  GI:  Soft, Non distended, Non tender. +Bowel sounds  Neurologic:  Alert and oriented X self, place, and time  Psych:   good insight. Not anxious nor agitated  Skin:  No rashes.   No jaundice, posterior mid cervical dressing dry and intact    Labs:   Lab Results   Component Value Date/Time    WBC 9.2 2021 01:34 AM    HGB 6.7 (L) 2021 01:34 AM    HCT 20.8 (L) 2021 01:34 AM    PLATELET 054  01:34 AM    MCV 80.3 2021 01:34 AM     Lab Results   Component Value Date/Time    Sodium 142 2021 01:34 AM    Potassium 3.6 2021 01:34 AM    Chloride 108 2021 01:34 AM    CO2 21 2021 01:34 AM    Anion gap 13 2021 01:34 AM    Glucose 79 2021 01:34 AM     (H) 2021 01:34 AM    Creatinine 10.70 (H) 2021 01:34 AM    BUN/Creatinine ratio 14 2021 01:34 AM    GFR est AA 6 (L) 02/22/2021 01:34 AM    GFR est non-AA 5 (L) 02/22/2021 01:34 AM    Calcium 7.7 (L) 02/22/2021 01:34 AM    Bilirubin, total 0.2 02/20/2021 03:38 AM    Alk. phosphatase 195 (H) 02/20/2021 03:38 AM    Protein, total 5.4 (L) 02/20/2021 03:38 AM    Albumin 1.4 (L) 02/20/2021 03:38 AM    Globulin 4.0 02/20/2021 03:38 AM    A-G Ratio 0.4 (L) 02/20/2021 03:38 AM    ALT (SGPT) 103 (H) 02/20/2021 03:38 AM   ABX hx;    Zyvox x1 on 2/9    vancomycin 1/30-2/9    IV Flagyl 1/30-2/4    IV doxy 1/30-2/12     resp cx (2/5) candida albicans - oropharyngeal contamination     sputum cx (2/12) gram positive cocci in pairs     Blood cx (1/30) no growth    Blood cx (2/4) no growth so far    Quantiferon (-)    Assessment and Plan   Acute respiratory failure secondary to COVID 19 PNA  mycoplasma and s. pneumoniae  (completed the therapy)  - Mycoplasma IgG  1411, S Pneumo (+)    Legionella (-)    afebrile, normal WBC     CXR (2/19): Improved expansion and aeration of the lungs. Continued interstitial prominence likely due to the patient's history of viral pneumonia     D/c IV merrem (2/15 - 2/22)     RA O2 sat >95%        Supportive care for COVID 19     fever work up every 24 hours if temp >= 100.4     Mid posterior cervical wound infection  - chronic wound; pt was waiting for skin graft [place] [facility] Sowmya Tobin cx (1/31) LIGHT DIPHTHEROIDS     Wound is healing; follow wound care team's recommendation       Spoke with the Managing [place] [facility] Yvon Oshea, [address] Wound care nurse, Ms. Luís Christina [phone]. According to the wound care nurse, the wound was created after the removal of infected cyst.    Pt completed IV Zosyn then Vancomycin for 30 days at the rehab center; Blood cx and wound cx grew MRSA      Pt should follow up with Dr Douglas Rinaldi at [address] [place] [address] upon discharge for his cervical wound       ID team signing off.  Please contact us with any questions.     Geoffrey Palomino, NP

## 2021-02-22 NOTE — PROGRESS NOTES
Hospitalist Progress Note  Jim Severance, MD  Answering service: [phone] from in house phone      Date of Service:  2021  NAME:  Charleen Kocher  :  1955  MRN:  [id number]    Admission Summary:   65M p/w SOB- downgraded from ICU after lengthy stay. Extubated . Interval history / Subjective:   Patient seen and examined at bedside, feels weak, fatigued. Not speaking much, breakfast tray in front of him will try to eat something. On family request yesterday evening we started on him on compassionate feeding, seems to be doing ok, he had pulled off his NGT. His Hb is low today- defer transfusion now, will wait for palliative conversation with wife to finalize likely comfort care plan. Assessment & Plan:     #. COVID-19 PNA  #. Aspiration PNA  #. Mycoplasma and S. Pneumoniae PNA. #. Cervical wound infection: chronic, a/w skin graft 'per [facility] staff'. #. Sepsis: ID- Abx, meropenem, vanc. PT/OT  #. Acute metabolic Encephalopathy: related to above. Improved with improving oxygenation. #. Volume Overload  #. Acute on Chronic Systolic CHF: Cards - ASA, Ticagrelor, Carvedilol, Lipitor, Bumex IV BID  #. Acute Hypoxic Respiratory Failure: extubated . On HFNC  - TTE: ef 20%, mod- severe Pulm HTN. #. ARF: Nephro evaluated, pt/family are in conversation with palliative care re- HD or no. #. Anemia: chronic, likely of chronic disease. Iron low, supplements. #. Deranged LFTs: likely related to CHF/congestion. Monitor.   #. DM2: A1c 7.4, SSI, AccuChecks, monitor     Palliative care following- Family discussing comfort measures, not decided- DNR/DNI- No escalation of care    Code status: DNR  DVT prophylaxis: heparin  Care Plan discussed with: Patient/Family and Nurse  Disposition: TBD >2days     Hospital Problems  Never Reviewed          Codes Class Noted POA    Acute on chronic systolic heart failure ([facility] [address] ICD-10-CM: I50.23  ICD-9-CM: 428.23  2/16/2021 Unknown        Goals of care, counseling/discussion ICD-10-CM: Z71.89  ICD-9-CM: V65.49  Unknown Unknown        DNR (do not resuscitate) discussion ICD-10-CM: Z71.89  ICD-9-CM: V65.49  Unknown Unknown        JUAN C (acute kidney injury) ([facility] [address] ICD-10-CM: N17.9  ICD-9-CM: 656. 9  Unknown Unknown        Acute hypoxemic respiratory failure due to COVID-19 [facility]) ICD-10-CM: U07.1, J96.01  ICD-9-CM: 518.81, 079.89, 799.02  1/30/2021 Unknown            Review of Systems:   Pertinent items are mentioned in interval history. Vital Signs:    Last 24hrs VS reviewed since prior progress note. Most recent are:  Visit Vitals  BP (!) 158/75 (BP 1 Location: Right upper arm, BP Patient Position: Lying right side)   Pulse 75   Temp 97.5 °F (36.4 °C)   Resp 24   Ht 5' 7\" (1.702 m)   Wt 96.3 kg (212 lb 3.2 oz)   SpO2 98%   BMI 33.24 kg/m²         Intake/Output Summary (Last 24 hours) at 2/22/2021 0827  Last data filed at 2/21/2021 1142  Gross per 24 hour   Intake --   Output 500 ml   Net -500 ml        Physical Examination:   Evaluated face to face and examined 02/22/21    General:  Alert, sleepy/drowsy. No acute distress. BM. Looks older than his age. NGT in situ  Resp:  No accessory muscle use, no wheezes, rhonchi +  Abd:  Soft, non-tender, non-distended,  Extremities:  No cyanosis or clubbing, no significant edema  Neuro:  sleepy. slow in speaking, no focal neuro deficits, follows commands   Psych:   not agitated.     Data Review:    Review and/or order of clinical lab test  Review and/or order of tests in the radiology section of CPT  Review and/or order of tests in the medicine section of CPT  Labs:     Recent Labs     02/22/21  0134 02/21/21  0403   WBC 9.2 8.0   HGB 6.7* 7.8*   HCT 20.8* 24.8*    252     Recent Labs     02/22/21  0134 02/21/21  0403 02/20/21  0338     --  144   K 3.6  --  3.5     --  110*   CO2 21  --  20*   *  --  138*   CREA 10.70*  --  9.49*   GLU 79 --  84   CA 7.7*  --  7.6*   MG 3.0*  --  2.9*   PHOS 8.5* 7.9* 7.4*   URICA 7.6* 7.6* 7.5*     Recent Labs     02/20/21  0338   *   *   TBILI 0.2   TP 5.4*   ALB 1.4*   GLOB 4.0     No results for input(s): INR, PTP, APTT, INREXT, INREXT in the last 72 hours. No results for input(s): FE, TIBC, PSAT, FERR in the last 72 hours. No results found for: FOL, RBCF   No results for input(s): PH, PCO2, PO2 in the last 72 hours. No results for input(s): CPK, CKNDX, TROIQ in the last 72 hours.     No lab exists for component: CPKMB  Lab Results   Component Value Date/Time    Cholesterol, total 115 02/26/2010 05:20 PM    HDL Cholesterol 31 (L) 02/26/2010 05:20 PM    LDL, calculated 46.2 02/26/2010 05:20 PM    Triglyceride 189 02/26/2010 05:20 PM    CHOL/HDL Ratio 3.7 02/26/2010 05:20 PM     Lab Results   Component Value Date/Time    Glucose (POC) 103 (H) 02/22/2021 06:32 AM    Glucose (POC) 93 02/21/2021 08:54 PM    Glucose (POC) 104 (H) 02/21/2021 05:27 PM    Glucose (POC) 149 (H) 02/21/2021 01:16 PM    Glucose (POC) 179 (H) 02/21/2021 05:36 AM     Lab Results   Component Value Date/Time    Color YELLOW/STRAW 02/10/2021 01:26 PM    Appearance TURBID (A) 02/10/2021 01:26 PM    Specific gravity 1.014 02/10/2021 01:26 PM    Specific gravity 1.015 02/26/2010 12:32 PM    pH (UA) 5.0 02/10/2021 01:26 PM    Protein 100 (A) 02/10/2021 01:26 PM    Glucose Negative 02/10/2021 01:26 PM    Ketone Negative 02/10/2021 01:26 PM    Bilirubin Negative 02/10/2021 01:26 PM    Urobilinogen 0.2 02/10/2021 01:26 PM    Nitrites Negative 02/10/2021 01:26 PM    Leukocyte Esterase Negative 02/10/2021 01:26 PM    Epithelial cells FEW 02/10/2021 01:26 PM    Bacteria Negative 02/10/2021 01:26 PM    WBC 0-4 02/10/2021 01:26 PM    RBC  02/10/2021 01:26 PM     Medications Reviewed:     Current Facility-Administered Medications   Medication Dose Route Frequency    HYDROmorphone (PF) (DILAUDID) injection 1 mg  1 mg IntraVENous Q2H PRN  insulin lispro (HUMALOG) injection   SubCUTAneous AC&HS    meropenem (MERREM) 500 mg in 0.9% sodium chloride (MBP/ADV) 50 mL MBP  0.5 g IntraVENous Q24H    isosorbide dinitrate (ISORDIL) tablet 30 mg  30 mg Per NG tube Q8H    balsam peru-castor oiL (VENELEX) ointment   Topical Q12H    albuterol (PROVENTIL HFA, VENTOLIN HFA, PROAIR HFA) inhaler 2 Puff  2 Puff Inhalation Q4H RT    heparin (porcine) injection 5,000 Units  5,000 Units SubCUTAneous Q8H    bumetanide (BUMEX) injection 2 mg  2 mg IntraVENous Q12H    0.9% sodium chloride infusion 250 mL  250 mL IntraVENous PRN    insulin glargine (LANTUS) injection 20 Units  20 Units SubCUTAneous DAILY    pantoprazole (PROTONIX) 40 mg in 0.9% sodium chloride 10 mL injection  40 mg IntraVENous Q12H    labetaloL (NORMODYNE;TRANDATE) injection 20 mg  20 mg IntraVENous Q4H PRN    ticagrelor (BRILINTA) tablet 90 mg  90 mg Oral BID    carvediloL (COREG) tablet 25 mg  25 mg Per NG tube Q12H    amLODIPine (NORVASC) tablet 10 mg  10 mg Per NG tube DAILY    alteplase (CATHFLO) 1 mg in sterile water (preservative free) 1 mL injection  1 mg InterCATHeter PRN    ascorbic acid (vitamin C) (VITAMIN C) tablet 500 mg  500 mg Per NG tube BID    cholecalciferol (VITAMIN D3) (1000 Units /25 mcg) tablet 2,000 Units  2,000 Units Per NG tube DAILY    metoprolol (LOPRESSOR) injection 5 mg  5 mg IntraVENous Q6H PRN    [Held by provider] allopurinoL (ZYLOPRIM) tablet 100 mg  100 mg Oral DAILY    sodium chloride (NS) flush 5-40 mL  5-40 mL IntraVENous Q8H    sodium chloride (NS) flush 5-40 mL  5-40 mL IntraVENous PRN    acetaminophen (TYLENOL) tablet 650 mg  650 mg Oral Q6H PRN    Or    acetaminophen (TYLENOL) suppository 650 mg  650 mg Rectal Q6H PRN    glucose chewable tablet 16 g  4 Tab Oral PRN    dextrose (D50W) injection syrg 12.5-25 g  12.5-25 g IntraVENous PRN    glucagon (GLUCAGEN) injection 1 mg  1 mg IntraMUSCular PRN    aspirin chewable tablet 81 mg  81 mg Oral DAILY    atorvastatin (LIPITOR) tablet 40 mg  40 mg Oral QHS   ______________________________________________________________________  EXPECTED LENGTH OF STAY: 12d 7h  ACTUAL LENGTH OF STAY:          Susana Baca MD

## 2021-02-22 NOTE — PROGRESS NOTES
Palliative Medicine Social Work    This SW along with Raquel Kay NP spoke with patient wife on phone. Wife reports she would like to \"exhaust all possibilities\" before patient transitions to comfort. This includes doing HD. Wife believes patient would want to exhaust HD as a possibility to live longer and this is aligned with his wishes stated last week. Patient had initially said he would never do HD, but when he understood that that meant time would be short, he changed his mind. \"I don't want to die. \"     Patient wife would like him evaluated for HD. Will continue to follow for support. Thank you for the opportunity to be involved in the care of Mr. Lionel Kinsey and his family.     Tami Ace LMSW, Supervisee in Social Work  Palliative Medicine   [phone]    Start time: 16:00  End time: 16:32

## 2021-02-22 NOTE — PROGRESS NOTES
Palliative Medicine Consult  [place]: [phone] ([phone])    Patient Name: Saddie Apgar  YOB: 1955    Date of Initial Consult: 21  Reason for Consult: Care decisions  Requesting Provider: Dr. Ariella Astorga  Primary Care Physician: Candelaria, MD Jessi     SUMMARY:   Saddie Apgar is a 63 y.o. male with a past history of anemia, neck wound, diabetes mellitus type 2, ischemic cardiomyopathy, EF 20-25%, mod-severe pulmonary hypertension, and former heroin use, who was admitted on 2021 [place] [facility] with a diagnosis of Covid-19 pneumonia and acute hypoxic respiratory failure. He presented to the ED with complaints of Covid-19 infection, shortness of breath, and hypoxia. He was placed on BiPAP and later intubated. He was extubated  and transferred to [facility]. On 2/15, his condition deteriorated and his oxygen needs increased. He was transferred back to ICU and is now alternating between BiPAP and HFNC. Current medical issues leading to Palliative Medicine involvement include: critically ill at high risk of decompensation, discuss care decisions. Social: He had been at [facility] [place] skilled nursing care for his neck wound. His wife Rafael Padilla and their children are his main support. He and wife Rafael Padilla are , but he recently came to live with her and she has been helping him with his medical problems. PALLIATIVE DIAGNOSES:   1. Goals of care  2. DNR discussion  3. Covid-19 pneumonia, resolved  4. Mycoplasma and strep pneumonia  5. Acute hypoxic respiratory failure, resolved  6. JUAN C  7. Ischemic cardiomyopathy  8. Chronic systolic heart failure  9. Fatigue       PLAN:   Patient Lulu Alas seen at the bedside. He has improved more over the last few days. He is awake and now on room air with sats %. His repeat Covid test from  is negative. He is drowsy today and minimally interactive when I see him, however, nursing reports that he has been awake and alert.     Call placed to wife Yola Wray along with Bryan Gannon SW. We discussed the improvement in his respiratory status and negative Covid test. Miguel A Quispe did talk to the patient a couple of times over the weekend and feels that he is getting back to his baseline mental status. She does feel that he is open to dialysis if this can save his life and potentially give him a second chance, especially now that he has improved in other ways. The family would like to move forward with evaluation for starting hemodialysis. Wife Miguel A Quispe is committed to helping care for the patient if he starts dialysis. She feels that the medical team needs to try all possible interventions to help him and if these do not work, or if he gets worse, or it is determined that dialysis is contraindicated, then she will be open to transitioning to comfort care. Communicated plan of care with: Palliative [facility] DOWELL [facility] Team     GOALS OF CARE / TREATMENT PREFERENCES:     GOALS OF CARE:  Patient/Health Care Proxy Stated Goals: Other (comment)(start dialysis)    TREATMENT PREFERENCES:   Code Status: DNR    Advance Care Planning:  [x] The [facility] Interdisciplinary Team has updated the ACP Navigator with Health Care Decision Maker and Patient Capacity      Primary Decision Maker: Arnold Edwards - Spouse - [phone]  No flowsheet data found. Medical Interventions: Limited additional interventions     Other Instructions:   Artificially Administered Nutrition: Feeding tube for a defined trial period     Other:    As far as possible, the palliative care team has discussed with patient / health care proxy about goals of care / treatment preferences for patient. HISTORY:     History obtained from: chart, wife    CHIEF COMPLAINT: Shortness of breath    HPI/SUBJECTIVE:    The patient is:    [x] Verbal and participatory  [] Non-participatory due to:     He is more awake and alert today. He denies pain or nausea. He has some shortness of breath.  He mostly complains of being hungry and wanting something to eat. Clinical Pain Assessment (nonverbal scale for severity on nonverbal patients):   Clinical Pain Assessment  Severity: 0     Activity (Movement): Lying quietly, normal position    Duration: for how long has pt been experiencing pain (e.g., 2 days, 1 month, years)  Frequency: how often pain is an issue (e.g., several times per day, once every few days, constant)     FUNCTIONAL ASSESSMENT:     Palliative Performance Scale (PPS):  PPS: 40       PSYCHOSOCIAL/SPIRITUAL SCREENING:     Palliative IDT has assessed this patient for cultural preferences / practices and a referral made as appropriate to needs (Cultural Services, Patient Advocacy, Ethics, etc.)    Any spiritual / Faith concerns:  [] Yes /  [x] No    Caregiver Burnout:  [] Yes /  [x] No /  [] No Caregiver Present      Anticipatory grief assessment:   [x] Normal  / [] Maladaptive       ESAS Anxiety: Anxiety: 0    ESAS Depression:          REVIEW OF SYSTEMS:     Positive and pertinent negative findings in ROS are noted above in HPI. The following systems were [x] reviewed / [] unable to be reviewed as noted in HPI  Other findings are noted below. Systems: constitutional, ears/nose/mouth/throat, respiratory, gastrointestinal, genitourinary, musculoskeletal, integumentary, neurologic, psychiatric, endocrine. Positive findings noted below. Modified ESAS Completed by: provider   Fatigue: 7 Drowsiness: 4     Pain: 0   Anxiety: 0 Nausea: 0   Anorexia: 0 Dyspnea: 0           Stool Occurrence(s): 1        PHYSICAL EXAM:     From RN flowsheet:  Wt Readings from Last 3 Encounters:   02/22/21 214 lb 14.4 oz (97.5 kg)   06/09/20 172 lb (78 kg)     Blood pressure (!) 142/66, pulse 74, temperature 97.7 °F (36.5 °C), resp. rate 20, height 5' 7\" (1.702 m), weight 214 lb 14.4 oz (97.5 kg), SpO2 97 %.     Pain Scale 1: Numeric (0 - 10)  Pain Intensity 1: 7  Pain Onset 1: acute  Pain Location 1: Leg  Pain Orientation 1: Left, Right  Pain Description 1: Stabbing, Shooting  Pain Intervention(s) 1: Medication (see MAR)  Last bowel movement, if known:     Constitutional: drowsy, resting quietly, no acute distress  Eyes: pupils equal, anicteric  ENMT: no nasal discharge, dry mucous membranes  Cardiovascular: irregular rhythm  Respiratory: breathing non labored on room air, symmetric  Musculoskeletal: no deformity, no tenderness to palpation  Skin: warm, dry  Neurologic: following commands, moving all extremities  Psychiatric: calm       HISTORY:     Active Problems:    Acute hypoxemic respiratory failure due to COVID-19 ([facility] [address] (1/30/2021)      Acute on chronic systolic heart failure ([facility] [address] (2/16/2021)      Goals of care, counseling/discussion ()      DNR (do not resuscitate) discussion ()      [facility] ()      Past Medical History:   Diagnosis Date    Diabetes ([facility]     Hypertension       No past surgical history on file. No family history on file. History reviewed, no pertinent family history.   Social History     Tobacco Use    Smoking status: Former Smoker    Smokeless tobacco: Never Used   Substance Use Topics    Alcohol use: Not Currently     No Known Allergies   Current Facility-Administered Medications   Medication Dose Route Frequency    0.9% sodium chloride infusion 250 mL  250 mL IntraVENous PRN    HYDROmorphone (PF) (DILAUDID) injection 1 mg  1 mg IntraVENous Q2H PRN    insulin lispro (HUMALOG) injection   SubCUTAneous AC&HS    meropenem (MERREM) 500 mg in 0.9% sodium chloride (MBP/ADV) 50 mL MBP  0.5 g IntraVENous Q24H    isosorbide dinitrate (ISORDIL) tablet 30 mg  30 mg Per NG tube Q8H    balsam peru-castor oiL (VENELEX) ointment   Topical Q12H    albuterol (PROVENTIL HFA, VENTOLIN HFA, PROAIR HFA) inhaler 2 Puff  2 Puff Inhalation Q4H RT    bumetanide (BUMEX) injection 2 mg  2 mg IntraVENous Q12H    0.9% sodium chloride infusion 250 mL  250 mL IntraVENous PRN    insulin glargine (LANTUS) injection 20 Units  20 Units SubCUTAneous DAILY    pantoprazole (PROTONIX) 40 mg in 0.9% sodium chloride 10 mL injection  40 mg IntraVENous Q12H    labetaloL (NORMODYNE;TRANDATE) injection 20 mg  20 mg IntraVENous Q4H PRN    ticagrelor (BRILINTA) tablet 90 mg  90 mg Oral BID    carvediloL (COREG) tablet 25 mg  25 mg Per NG tube Q12H    amLODIPine (NORVASC) tablet 10 mg  10 mg Per NG tube DAILY    alteplase (CATHFLO) 1 mg in sterile water (preservative free) 1 mL injection  1 mg InterCATHeter PRN    ascorbic acid (vitamin C) (VITAMIN C) tablet 500 mg  500 mg Per NG tube BID    cholecalciferol (VITAMIN D3) (1000 Units /25 mcg) tablet 2,000 Units  2,000 Units Per NG tube DAILY    metoprolol (LOPRESSOR) injection 5 mg  5 mg IntraVENous Q6H PRN    [Held by provider] allopurinoL (ZYLOPRIM) tablet 100 mg  100 mg Oral DAILY    sodium chloride (NS) flush 5-40 mL  5-40 mL IntraVENous Q8H    sodium chloride (NS) flush 5-40 mL  5-40 mL IntraVENous PRN    acetaminophen (TYLENOL) tablet 650 mg  650 mg Oral Q6H PRN    Or    acetaminophen (TYLENOL) suppository 650 mg  650 mg Rectal Q6H PRN    glucose chewable tablet 16 g  4 Tab Oral PRN    dextrose (D50W) injection syrg 12.5-25 g  12.5-25 g IntraVENous PRN    glucagon (GLUCAGEN) injection 1 mg  1 mg IntraMUSCular PRN    aspirin chewable tablet 81 mg  81 mg Oral DAILY    atorvastatin (LIPITOR) tablet 40 mg  40 mg Oral QHS          LAB AND IMAGING FINDINGS:     Lab Results   Component Value Date/Time    WBC 9.2 02/22/2021 01:34 AM    HGB 6.7 (L) 02/22/2021 01:34 AM    PLATELET 781 11/21/0941 01:34 AM     Lab Results   Component Value Date/Time    Sodium 142 02/22/2021 01:34 AM    Potassium 3.6 02/22/2021 01:34 AM    Chloride 108 02/22/2021 01:34 AM    CO2 21 02/22/2021 01:34 AM     (H) 02/22/2021 01:34 AM    Creatinine 10.70 (H) 02/22/2021 01:34 AM    Calcium 7.7 (L) 02/22/2021 01:34 AM    Magnesium 3.0 (H) 02/22/2021 01:34 AM Phosphorus 8.5 (H) 02/22/2021 01:34 AM      Lab Results   Component Value Date/Time    Alk. phosphatase 195 (H) 02/20/2021 03:38 AM    Protein, total 5.4 (L) 02/20/2021 03:38 AM    Albumin 1.4 (L) 02/20/2021 03:38 AM    Globulin 4.0 02/20/2021 03:38 AM     Lab Results   Component Value Date/Time    aPTT 24.7 02/08/2021 10:13 AM      Lab Results   Component Value Date/Time    Iron 23 (L) 02/14/2021 01:12 PM    TIBC 146 (L) 02/14/2021 01:12 PM    Iron % saturation 16 (L) 02/14/2021 01:12 PM      Lab Results   Component Value Date/Time    pH 7.50 (H) 02/16/2021 06:25 AM    PCO2 21 (L) 02/16/2021 06:25 AM    PO2 85 02/16/2021 06:25 AM     No components found for: [place]   Lab Results   Component Value Date/Time     01/30/2021 08:00 AM                Total time: 35 min  Counseling / coordination time, spent as noted above: 30 min  > 50% counseling / coordination?: yes    Prolonged service was provided for  []30 min   []75 min in face to face time in the presence of the patient, spent as noted above. Time Start:   Time End:   Note: this can only be billed with 18440 (initial) or 76129 (follow up). If multiple start / stop times, list each separately.

## 2021-02-22 NOTE — PROGRESS NOTES
Physical Therapy    Note plans for likely comfort measures and Hgb 6.7 today. We will sign off PT at this time. Please reconsult if goals of care or patient status change and we will be happy to resume therapy efforts.     Rsusell Vo, PT

## 2021-02-22 NOTE — PROGRESS NOTES
Bedside shift change report given to Ernie Wilkerson (oncoming nurse) by Jarret Chandler (offgoing nurse). Report included the following information SBAR, Kardex and MAR.

## 2021-02-22 NOTE — PROGRESS NOTES
Bedside shift change report given to Jose A Fernández (oncoming nurse) by Ras (offgoing nurse). Report included the following information SBAR, Kardex, MAR and Recent Results.

## 2021-02-22 NOTE — PROGRESS NOTES
Chart reviewed, patient discussing plan of care moving forward. Hgb steadily dropping, today 6.7. Will f/u pending plan of care. In meantime recommend patient to continued bed/chair position, active ROM and ADLs as able 2* seems more cognitively aware. Thank you.

## 2021-02-23 ENCOUNTER — APPOINTMENT (OUTPATIENT)
Dept: GENERAL RADIOLOGY | Age: 66
DRG: 720 | End: 2021-02-23
Attending: STUDENT IN AN ORGANIZED HEALTH CARE EDUCATION/TRAINING PROGRAM
Payer: MEDICAID

## 2021-02-23 ENCOUNTER — APPOINTMENT (OUTPATIENT)
Dept: INTERVENTIONAL RADIOLOGY/VASCULAR | Age: 66
DRG: 720 | End: 2021-02-23
Attending: INTERNAL MEDICINE
Payer: MEDICAID

## 2021-02-23 LAB
ABO + RH BLD: NORMAL
ANION GAP SERPL CALC-SCNC: 15 MMOL/L (ref 5–15)
BASOPHILS # BLD: 0 K/UL (ref 0–0.1)
BASOPHILS NFR BLD: 0 % (ref 0–1)
BLD PROD TYP BPU: NORMAL
BLOOD GROUP ANTIBODIES SERPL: NORMAL
BPU ID: NORMAL
BUN SERPL-MCNC: 149 MG/DL (ref 6–20)
BUN/CREAT SERPL: 13 (ref 12–20)
CALCIUM SERPL-MCNC: 7.7 MG/DL (ref 8.5–10.1)
CHLORIDE SERPL-SCNC: 109 MMOL/L (ref 97–108)
CO2 SERPL-SCNC: 19 MMOL/L (ref 21–32)
CREAT SERPL-MCNC: 11.2 MG/DL (ref 0.7–1.3)
CROSSMATCH RESULT,%XM: NORMAL
DIFFERENTIAL METHOD BLD: ABNORMAL
EOSINOPHIL # BLD: 0.4 K/UL (ref 0–0.4)
EOSINOPHIL NFR BLD: 5 % (ref 0–7)
ERYTHROCYTE [DISTWIDTH] IN BLOOD BY AUTOMATED COUNT: 16.6 % (ref 11.5–14.5)
FERRITIN SERPL-MCNC: 661 NG/ML (ref 26–388)
FOLATE SERPL-MCNC: 17.5 NG/ML (ref 5–21)
GLUCOSE BLD STRIP.AUTO-MCNC: 101 MG/DL (ref 65–100)
GLUCOSE BLD STRIP.AUTO-MCNC: 84 MG/DL (ref 65–100)
GLUCOSE BLD STRIP.AUTO-MCNC: 92 MG/DL (ref 65–100)
GLUCOSE BLD STRIP.AUTO-MCNC: 98 MG/DL (ref 65–100)
GLUCOSE SERPL-MCNC: 69 MG/DL (ref 65–100)
HBV SURFACE AB SER QL: REACTIVE
HBV SURFACE AB SER-ACNC: 466.7 MIU/ML
HBV SURFACE AG SER QL: <0.1 INDEX
HBV SURFACE AG SER QL: NEGATIVE
HCT VFR BLD AUTO: 23.5 % (ref 36.6–50.3)
HGB BLD-MCNC: 7.6 G/DL (ref 12.1–17)
IMM GRANULOCYTES # BLD AUTO: 0.1 K/UL (ref 0–0.04)
IMM GRANULOCYTES NFR BLD AUTO: 1 % (ref 0–0.5)
LYMPHOCYTES # BLD: 1.7 K/UL (ref 0.8–3.5)
LYMPHOCYTES NFR BLD: 20 % (ref 12–49)
MAGNESIUM SERPL-MCNC: 2.8 MG/DL (ref 1.6–2.4)
MCH RBC QN AUTO: 26.7 PG (ref 26–34)
MCHC RBC AUTO-ENTMCNC: 32.3 G/DL (ref 30–36.5)
MCV RBC AUTO: 82.5 FL (ref 80–99)
MONOCYTES # BLD: 0.7 K/UL (ref 0–1)
MONOCYTES NFR BLD: 8 % (ref 5–13)
NEUTS SEG # BLD: 5.7 K/UL (ref 1.8–8)
NEUTS SEG NFR BLD: 66 % (ref 32–75)
NRBC # BLD: 0 K/UL (ref 0–0.01)
NRBC BLD-RTO: 0 PER 100 WBC
PHOSPHATE SERPL-MCNC: 8.7 MG/DL (ref 2.6–4.7)
PLATELET # BLD AUTO: 280 K/UL (ref 150–400)
PMV BLD AUTO: 12.1 FL (ref 8.9–12.9)
POTASSIUM SERPL-SCNC: 4.1 MMOL/L (ref 3.5–5.1)
RBC # BLD AUTO: 2.85 M/UL (ref 4.1–5.7)
SERVICE CMNT-IMP: ABNORMAL
SERVICE CMNT-IMP: NORMAL
SODIUM SERPL-SCNC: 143 MMOL/L (ref 136–145)
SPECIMEN EXP DATE BLD: NORMAL
STATUS OF UNIT,%ST: NORMAL
UNIT DIVISION, %UDIV: 0
VIT B12 SERPL-MCNC: 1932 PG/ML (ref 193–986)
WBC # BLD AUTO: 8.6 K/UL (ref 4.1–11.1)

## 2021-02-23 PROCEDURE — 36415 COLL VENOUS BLD VENIPUNCTURE: CPT

## 2021-02-23 PROCEDURE — 84100 ASSAY OF PHOSPHORUS: CPT

## 2021-02-23 PROCEDURE — 82962 GLUCOSE BLOOD TEST: CPT

## 2021-02-23 PROCEDURE — 74011000250 HC RX REV CODE- 250: Performed by: INTERNAL MEDICINE

## 2021-02-23 PROCEDURE — 86706 HEP B SURFACE ANTIBODY: CPT

## 2021-02-23 PROCEDURE — 80048 BASIC METABOLIC PNL TOTAL CA: CPT

## 2021-02-23 PROCEDURE — 82746 ASSAY OF FOLIC ACID SERUM: CPT

## 2021-02-23 PROCEDURE — C9113 INJ PANTOPRAZOLE SODIUM, VIA: HCPCS | Performed by: HOSPITALIST

## 2021-02-23 PROCEDURE — 85025 COMPLETE CBC W/AUTO DIFF WBC: CPT

## 2021-02-23 PROCEDURE — 74011250637 HC RX REV CODE- 250/637: Performed by: INTERNAL MEDICINE

## 2021-02-23 PROCEDURE — 83735 ASSAY OF MAGNESIUM: CPT

## 2021-02-23 PROCEDURE — 74011250637 HC RX REV CODE- 250/637: Performed by: EMERGENCY MEDICINE

## 2021-02-23 PROCEDURE — 82607 VITAMIN B-12: CPT

## 2021-02-23 PROCEDURE — C1894 INTRO/SHEATH, NON-LASER: HCPCS

## 2021-02-23 PROCEDURE — 74011000250 HC RX REV CODE- 250: Performed by: STUDENT IN AN ORGANIZED HEALTH CARE EDUCATION/TRAINING PROGRAM

## 2021-02-23 PROCEDURE — 94760 N-INVAS EAR/PLS OXIMETRY 1: CPT

## 2021-02-23 PROCEDURE — 74011250636 HC RX REV CODE- 250/636: Performed by: HOSPITALIST

## 2021-02-23 PROCEDURE — 82728 ASSAY OF FERRITIN: CPT

## 2021-02-23 PROCEDURE — 74011000250 HC RX REV CODE- 250: Performed by: HOSPITALIST

## 2021-02-23 PROCEDURE — 92610 EVALUATE SWALLOWING FUNCTION: CPT | Performed by: SPEECH-LANGUAGE PATHOLOGIST

## 2021-02-23 PROCEDURE — 2709999900 HC NON-CHARGEABLE SUPPLY

## 2021-02-23 PROCEDURE — 77030002996 HC SUT SLK J&J -A

## 2021-02-23 PROCEDURE — 74011250637 HC RX REV CODE- 250/637: Performed by: ANESTHESIOLOGY

## 2021-02-23 PROCEDURE — 74011250637 HC RX REV CODE- 250/637: Performed by: HOSPITALIST

## 2021-02-23 PROCEDURE — 74011250636 HC RX REV CODE- 250/636

## 2021-02-23 PROCEDURE — 74011250636 HC RX REV CODE- 250/636: Performed by: INTERNAL MEDICINE

## 2021-02-23 PROCEDURE — C1752 CATH,HEMODIALYSIS,SHORT-TERM: HCPCS

## 2021-02-23 PROCEDURE — 76937 US GUIDE VASCULAR ACCESS: CPT

## 2021-02-23 PROCEDURE — 71045 X-RAY EXAM CHEST 1 VIEW: CPT

## 2021-02-23 PROCEDURE — 87340 HEPATITIS B SURFACE AG IA: CPT

## 2021-02-23 PROCEDURE — 90935 HEMODIALYSIS ONE EVALUATION: CPT

## 2021-02-23 PROCEDURE — 65660000000 HC RM CCU STEPDOWN

## 2021-02-23 PROCEDURE — 02HV33Z INSERTION OF INFUSION DEVICE INTO SUPERIOR VENA CAVA, PERCUTANEOUS APPROACH: ICD-10-PCS | Performed by: STUDENT IN AN ORGANIZED HEALTH CARE EDUCATION/TRAINING PROGRAM

## 2021-02-23 RX ORDER — HEPARIN SODIUM 1000 [USP'U]/ML
2500 INJECTION, SOLUTION INTRAVENOUS; SUBCUTANEOUS
Status: DISCONTINUED | OUTPATIENT
Start: 2021-02-23 | End: 2021-03-02 | Stop reason: DRUGHIGH

## 2021-02-23 RX ORDER — HEPARIN SODIUM 1000 [USP'U]/ML
10000 INJECTION, SOLUTION INTRAVENOUS; SUBCUTANEOUS
Status: COMPLETED | OUTPATIENT
Start: 2021-02-23 | End: 2021-02-23

## 2021-02-23 RX ORDER — HEPARIN SODIUM 1000 [USP'U]/ML
INJECTION, SOLUTION INTRAVENOUS; SUBCUTANEOUS
Status: COMPLETED
Start: 2021-02-23 | End: 2021-02-23

## 2021-02-23 RX ORDER — LIDOCAINE HYDROCHLORIDE 20 MG/ML
20 INJECTION, SOLUTION INFILTRATION; PERINEURAL
Status: COMPLETED | OUTPATIENT
Start: 2021-02-23 | End: 2021-02-23

## 2021-02-23 RX ADMIN — LIDOCAINE HYDROCHLORIDE 10 ML: 20 INJECTION, SOLUTION INFILTRATION; PERINEURAL at 11:27

## 2021-02-23 RX ADMIN — HYDROMORPHONE HYDROCHLORIDE 1 MG: 1 INJECTION, SOLUTION INTRAMUSCULAR; INTRAVENOUS; SUBCUTANEOUS at 01:13

## 2021-02-23 RX ADMIN — HYDROMORPHONE HYDROCHLORIDE 1 MG: 1 INJECTION, SOLUTION INTRAMUSCULAR; INTRAVENOUS; SUBCUTANEOUS at 20:58

## 2021-02-23 RX ADMIN — BUMETANIDE 2 MG: 0.25 INJECTION INTRAMUSCULAR; INTRAVENOUS at 09:02

## 2021-02-23 RX ADMIN — AMLODIPINE BESYLATE 10 MG: 5 TABLET ORAL at 09:04

## 2021-02-23 RX ADMIN — HEPARIN SODIUM 2500 UNITS: 1000 INJECTION, SOLUTION INTRAVENOUS; SUBCUTANEOUS at 11:31

## 2021-02-23 RX ADMIN — HEPARIN SODIUM 2500 UNITS: 1000 INJECTION INTRAVENOUS; SUBCUTANEOUS at 11:31

## 2021-02-23 RX ADMIN — CHOLECALCIFEROL (VITAMIN D3) 25 MCG (1,000 UNIT) TABLET 2000 UNITS: TABLET at 09:04

## 2021-02-23 RX ADMIN — HYDROMORPHONE HYDROCHLORIDE 1 MG: 1 INJECTION, SOLUTION INTRAMUSCULAR; INTRAVENOUS; SUBCUTANEOUS at 09:02

## 2021-02-23 RX ADMIN — ISOSORBIDE DINITRATE 30 MG: 20 TABLET ORAL at 07:26

## 2021-02-23 RX ADMIN — SODIUM CHLORIDE 40 MG: 9 INJECTION INTRAMUSCULAR; INTRAVENOUS; SUBCUTANEOUS at 05:19

## 2021-02-23 RX ADMIN — BUMETANIDE 2 MG: 0.25 INJECTION INTRAMUSCULAR; INTRAVENOUS at 20:56

## 2021-02-23 RX ADMIN — CASTOR OIL AND BALSAM, PERU: 788; 87 OINTMENT TOPICAL at 21:16

## 2021-02-23 RX ADMIN — Medication 10 ML: at 14:00

## 2021-02-23 RX ADMIN — ASPIRIN 81 MG: 81 TABLET, CHEWABLE ORAL at 09:04

## 2021-02-23 RX ADMIN — ISOSORBIDE DINITRATE 30 MG: 20 TABLET ORAL at 21:25

## 2021-02-23 RX ADMIN — CARVEDILOL 25 MG: 12.5 TABLET, FILM COATED ORAL at 21:04

## 2021-02-23 RX ADMIN — ATORVASTATIN CALCIUM 40 MG: 40 TABLET, FILM COATED ORAL at 22:00

## 2021-02-23 RX ADMIN — CARVEDILOL 25 MG: 12.5 TABLET, FILM COATED ORAL at 09:03

## 2021-02-23 RX ADMIN — CASTOR OIL AND BALSAM, PERU: 788; 87 OINTMENT TOPICAL at 09:05

## 2021-02-23 RX ADMIN — HEPARIN SODIUM 2500 UNITS: 1000 INJECTION INTRAVENOUS; SUBCUTANEOUS at 18:58

## 2021-02-23 RX ADMIN — HYDROMORPHONE HYDROCHLORIDE 1 MG: 1 INJECTION, SOLUTION INTRAMUSCULAR; INTRAVENOUS; SUBCUTANEOUS at 13:21

## 2021-02-23 RX ADMIN — OXYCODONE HYDROCHLORIDE AND ACETAMINOPHEN 500 MG: 500 TABLET ORAL at 09:03

## 2021-02-23 RX ADMIN — Medication 20 ML: at 21:14

## 2021-02-23 RX ADMIN — ISOSORBIDE DINITRATE 30 MG: 20 TABLET ORAL at 13:21

## 2021-02-23 RX ADMIN — HYDROMORPHONE HYDROCHLORIDE 1 MG: 1 INJECTION, SOLUTION INTRAMUSCULAR; INTRAVENOUS; SUBCUTANEOUS at 05:19

## 2021-02-23 RX ADMIN — TICAGRELOR 90 MG: 90 TABLET ORAL at 09:03

## 2021-02-23 RX ADMIN — Medication 10 ML: at 05:19

## 2021-02-23 NOTE — PROGRESS NOTES
Problem: Falls - Risk of  Goal: *Absence of Falls  Description: Document Argueta Reason Fall Risk and appropriate interventions in the flowsheet. Outcome: Progressing Towards Goal  Note: Fall Risk Interventions:       Mentation Interventions: Adequate sleep, hydration, pain control, Bed/chair exit alarm    Medication Interventions: Bed/chair exit alarm    Elimination Interventions: Bed/chair exit alarm, Call light in reach              Problem: Pressure Injury - Risk of  Goal: *Prevention of pressure injury  Description: Document Cruzito Scale and appropriate interventions in the flowsheet.   Outcome: Progressing Towards Goal  Note: Pressure Injury Interventions:  Sensory Interventions: Assess changes in LOC, Assess need for specialty bed    Moisture Interventions: Absorbent underpads, Apply protective barrier, creams and emollients    Activity Interventions: Increase time out of bed, Pressure redistribution bed/mattress(bed type)    Mobility Interventions: Pressure redistribution bed/mattress (bed type)    Nutrition Interventions: Document food/fluid/supplement intake    Friction and Shear Interventions: HOB 30 degrees or less, Lift sheet, Minimize layers                Problem: Pain  Goal: *Control of Pain  Outcome: Progressing Towards Goal

## 2021-02-23 NOTE — CONSULTS
Nephrology Progress Note  Paulo Waters  Date of Admission : 1/30/2021    CC: Follow up for JUAN C on CKD       Assessment and Plan     JUAN C:  - unclear baseline but suspect some component of CKD  - Renal U/S on 1/30 confirms CKD  - will initiate HD today  - IR to place line  - [employer] aware  - wife consented over the phone  - explained that this would not be a good long-term option, but we can attempt HD now to see if he clinically improves, she is agreeable     Volume overload:  - HD today    Encephalopathy    HFrEF:  - EF 20-25% on 2/1     COVID-19 PNA:  - now COVID neg on 2/19     DM2:  - on insulin    Discussed with the patient's wife. Explained that he is not a good long term candidate for hemodialysis. We can try dialysis in the short term to see if he will tolerate it and if his encephalopathy improves. If he fails dialysis, I suggested we make him comfort and withdraw care at that time. She was in agreement with this plan. Interval History:  Mr. Catalina White was admitted on 1/30 for COVID-19 PNA. He developed resp failure, renal failure and new CMP during his hospitalization. We were following him initially and signed off as the family wanted to make him comfort care and not to pursue dialysis. He has come off the ventilator and is awake. He wanted to remain full code and have dialysis if needed. Psych deemed him competant to make decisions on 2/20. We were reconsulted for dialysis. His Cr has risen and is now 6 with a BUN of 150. He is week and confused. No cp or sob. On NC O2 now. Current Medications: all current  Medications have been eviewed in EPIC  Review of Systems: Review of systems not obtained due to patient factors.     Objective:  Vitals:    Vitals:    02/23/21 0223 02/23/21 0519 02/23/21 0646 02/23/21 0840   BP: (!) 147/83  (!) 144/69 (!) 149/72   Pulse: 62  60 73   Resp: 10 16  16   Temp: 97.5 °F (36.4 °C)   98 °F (36.7 °C)   SpO2: 95%   99%   Weight: 98.7 kg (217 lb 8 oz) Height:         Intake and Output:  No intake/output data recorded. 02/21 1901 - 02/23 0700  In: 457.1 [P.O.:120]  Out: 56 [Urine:1300]    Physical Examination:  Pt intubated    No  General: Confused, frail  Neck:  Supple, no mass  Resp:  Lungs Clear anteriorly  CV:  RRR,  no murmur or rub, 2-3 + LE edema  GI:  Soft, NT, + Bowel sounds, no hepatosplenomegaly  Neurologic:  confused  Psych:             Unable to assess  Skin:  No Rash  :  No bird    []    High complexity decision making was performed  []    Patient is at high-risk of decompensation with multiple organ involvement    Lab Data Personally Reviewed: I have reviewed all the pertinent labs, microbiology data and radiology studies during assessment. Recent Labs     02/23/21 0359 02/22/21 0134 02/21/21  0403    142  --    K 4.1 3.6  --    * 108  --    CO2 19* 21  --    GLU 69 79  --    * 150*  --    CREA 11.20* 10.70*  --    CA 7.7* 7.7*  --    MG 2.8* 3.0*  --    PHOS 8.7* 8.5* 7.9*     Recent Labs     02/23/21 0359 02/22/21 0134 02/21/21  0403   WBC 8.6 9.2 8.0   HGB 7.6* 6.7* 7.8*   HCT 23.5* 20.8* 24.8*    270 252     Lab Results   Component Value Date/Time    Specimen Description: BLOOD 02/27/2010 07:00 PM     Lab Results   Component Value Date/Time    Culture result: NO GROWTH 5 DAYS 02/15/2021 10:58 AM    Culture result: LIGHT YEAST, (APPARENT CANDIDA ALBICANS) (A) 02/12/2021 04:53 PM    Culture result: LIGHT NORMAL RESPIRATORY CORI 02/12/2021 04:53 PM     Recent Results (from the past 24 hour(s))   GLUCOSE, POC    Collection Time: 02/22/21 11:17 AM   Result Value Ref Range    Glucose (POC) 108 (H) 65 - 100 mg/dL    Performed by Pamella Aquino, [employer]OCATE    Collection Time: 02/22/21  4:45 PM   Result Value Ref Range    HISTORY CHECKED?  Historical check performed    TYPE & SCREEN    Collection Time: 02/22/21  5:09 PM   Result Value Ref Range    Crossmatch Expiration 02/25/2021,2359     ABO/Rh(D) A POSITIVE Antibody screen NEG     Unit number [id number]     Blood component type  LR     Unit division 00     Status of unit TRANSFUSED     Crossmatch result Compatible    GLUCOSE, POC    Collection Time: 02/22/21  5:29 PM   Result Value Ref Range    Glucose (POC) 114 (H) 65 - 100 mg/dL    Performed by Marcelo Trejo POC    Collection Time: 02/22/21 10:02 PM   Result Value Ref Range    Glucose (POC) 97 65 - 100 mg/dL    Performed by Gilmer Campoverde    MAGNESIUM    Collection Time: 02/23/21  3:59 AM   Result Value Ref Range    Magnesium 2.8 (H) 1.6 - 2.4 mg/dL   PHOSPHORUS    Collection Time: 02/23/21  3:59 AM   Result Value Ref Range    Phosphorus 8.7 (H) 2.6 - 4.7 MG/DL   CBC WITH AUTOMATED DIFF    Collection Time: 02/23/21  3:59 AM   Result Value Ref Range    WBC 8.6 4.1 - 11.1 K/uL    RBC 2.85 (L) 4.10 - 5.70 M/uL    HGB 7.6 (L) 12.1 - 17.0 g/dL    HCT 23.5 (L) 36.6 - 50.3 %    MCV 82.5 80.0 - 99.0 FL    MCH 26.7 26.0 - 34.0 PG    MCHC 32.3 30.0 - 36.5 g/dL    RDW 16.6 (H) 11.5 - 14.5 %    PLATELET 678 405 - 453 K/uL    MPV 12.1 8.9 - 12.9 FL    NRBC 0.0 0  WBC    ABSOLUTE NRBC 0.00 0.00 - 0.01 K/uL    NEUTROPHILS 66 32 - 75 %    LYMPHOCYTES 20 12 - 49 %    MONOCYTES 8 5 - 13 %    EOSINOPHILS 5 0 - 7 %    BASOPHILS 0 0 - 1 %    IMMATURE GRANULOCYTES 1 (H) 0.0 - 0.5 %    ABS. NEUTROPHILS 5.7 1.8 - 8.0 K/UL    ABS. LYMPHOCYTES 1.7 0.8 - 3.5 K/UL    ABS. MONOCYTES 0.7 0.0 - 1.0 K/UL    ABS. EOSINOPHILS 0.4 0.0 - 0.4 K/UL    ABS. BASOPHILS 0.0 0.0 - 0.1 K/UL    ABS. IMM.  GRANS. 0.1 (H) 0.00 - 0.04 K/UL    DF AUTOMATED     METABOLIC PANEL, BASIC    Collection Time: 02/23/21  3:59 AM   Result Value Ref Range    Sodium 143 136 - 145 mmol/L    Potassium 4.1 3.5 - 5.1 mmol/L    Chloride 109 (H) 97 - 108 mmol/L    CO2 19 (L) 21 - 32 mmol/L    Anion gap 15 5 - 15 mmol/L    Glucose 69 65 - 100 mg/dL     (H) 6 - 20 MG/DL    Creatinine 11.20 (H) 0.70 - 1.30 MG/DL    BUN/Creatinine ratio 13 12 - 20 GFR est AA 6 (L) >60 ml/min/1.73m2    GFR est non-AA 5 (L) >60 ml/min/1.73m2    Calcium 7.7 (L) 8.5 - 10.1 MG/DL   FOLATE    Collection Time: 02/23/21  3:59 AM   Result Value Ref Range    Folate 17.5 5.0 - 21.0 ng/mL   VITAMIN B12    Collection Time: 02/23/21  3:59 AM   Result Value Ref Range    Vitamin B12 1,932 (H) 193 - 986 pg/mL   FERRITIN    Collection Time: 02/23/21  3:59 AM   Result Value Ref Range    Ferritin 661 (H) 26 - 388 NG/ML   GLUCOSE, POC    Collection Time: 02/23/21  6:25 AM   Result Value Ref Range    Glucose (POC) 84 65 - 100 mg/dL    Performed by Jen Ling MD  [address] [place] [address]  Phone - [phone]   Fax - [phone]  www. [facility]

## 2021-02-23 NOTE — WOUND CARE
Wound Care Note:     Follow-up visit for neck and right ear    Patient is now Covid-19 negative    Chart shows:  Admitted for acute hypoxemic respiratory failure due to COVID-19, acute on chronic systolic heart failure, counseling/discussion goals of care, DNR discussion and JUAN C    Past Medical History:   Diagnosis Date    Diabetes ([facility] [address]     Hypertension      WBC = 8.6 on 2/23/32  Admitted from [facility]:   Patient is alert, not as verbal today, he understands what is being said and smiled and/or nodded head, incontinent with moderate assistance needed in repositioning. Bed: [place]  Patient has a Bourgeois. Diet: Clear liquid 3 honey/3 moderately thick; no conc. sweets  Patient grimaces with repositioning of any kind; no number given    Bilateral heels, buttocks, and sacral skin intact and without erythema. 1. POA neck wound continues to improve, approximately 2.3 cm x 2.5 cm x 0.1 cm, wound bed is pink, small sero/sang drainage, wound edges are open, carlene-wound intact. Opticell AG and Optifoam applied. 2.  Right ear with eschar, crusted area is thinning, width is approximately 0.4 cm, no drainage, carlene-wound intact. Venelex ointment applied. Will obtain another Z flow pillow    3. Patient very edematous, scrotum is very large, dry, proximal scrotum with small wound approximately 0.3 cm x 0.4 cm x 0.1 cm, scant sero/sang drainage, wound edges are open, carlene-wound edematous and dry. Z guard paste applied to open wound and Nourishing Skin Cream applied to scrotum. Elevated on towel. Spoke with Dr. Carissa Sargent, wound care orders obtained. Patient repositioned on left side. Heels offloaded on pillow/blanket     Recommendations:    Continue with current wound care except Z guard paste and Nourishing Skin cream will be applied to scrotum; Z guard paste no longer needed in gluteal cleft.     Posterior neck- Every other day cleanse with normal saline, wipe wound bed clean and dry, apply a piece of Opticell AG and cover with Optifoam Gentle (or other dressing that will remain in place).     Right ear, sacrum, bilateral heels and any other reddened bony prominence- Every 12 hours liberally apply Venelex ointment. Use Z flow pillow to offload right ear. Scrotum- Every 12 hours, proximal scrotum apply Z guard paste to open wound, apply Nourishing Skin Cream to intact skin and elevate on a towel. Skin Care & Pressure Prevention:  Minimize layers of linen/pads under patient to optimize support surface. Turn/reposition approximately every 2 hours and offload heels.   Manage incontinence / promote continence   Nourishing Skin Cream to dry skin, minimize use of briefs when able    Discussed above plan with patient & ANAHI Mckeon    Transition of Care: Plan to follow as needed while admitted to hospital.    ZOEY Funez, RN, [facility], [facility].  office [phone]  pager [phone] or call  to page

## 2021-02-23 NOTE — PROCEDURES
Interventional Radiology  Procedure Note        2/23/2021 12:07 PM    Patient: Phylicia Cooper     Informed consent obtained    Diagnosis: JUAN C    Procedure(s): Nontunneled hemodialysis catheter    Specimens removed:  none    Complications: None    Primary Physician: Sangeetha Silva MD    Recommendations: OK to use after catheter position confirmed with CXR    Discharge Disposition: stable; discharge to home    Full dictated report to follow    Sangeetha Silva MD  Interventional Radiology  [facility]  12:07 PM, 2/23/2021

## 2021-02-23 NOTE — PROGRESS NOTES
Hospitalist Progress Note  Katie Castillo MD  Answering service: [phone] from in house phone      Date of Service:  2021  NAME:  Saddie Apgar  :  1955  MRN:  [id number]    Admission Summary:   65M p/w SOB- downgraded from ICU after lengthy stay. Extubated . Interval history / Subjective:   Patient seen and examined at bedside, feels extremely weak, no acute events overnight. Nephrology evaluating today, will speak to wife and start dialysis slowly. Assessment & Plan:     #. COVID-19 PNA- repeat COVID test -ve on . #. Aspiration PNA  #. Mycoplasma and S. Pneumoniae PNA. #. Sepsis: ID- Abx, meropenem, vanc. PT/OT  #. Acute metabolic Encephalopathy: related to above. Improved with improving oxygenation. #. Volume Overload  #. Acute on Chronic Systolic CHF: Cards - ASA, Ticagrelor, Carvedilol, Lipitor, Bumex IV BID  #. Acute Hypoxic Respiratory Failure: extubated - resolved. On room air  - TTE: ef 20%, mod- severe Pulm HTN.  - Cardiology had signed off as pt was heading for comfort. Now change of plan, if improving with HD will re-consult cardiology. #. ARF: Nephro evaluated, pt/family had declined dialysis,   - however as patient's respiratory status improved they are now wanting HD. Nephrology re-consulted. #. Anemia: chronic, likely of chronic disease. Iron low, supplements. S/p transfusion.   - iron panel low, ferritin high, folate wnl, vit B12 high. Hemoccult pending. #. Deranged LFTs: likely related to CHF/congestion. Monitor.   #. DM2: A1c 7.4, SSI, AccuChecks, monitor    Code status: DNR  DVT prophylaxis: heparin  Care Plan discussed with: Patient/Family and Nurse  Disposition: TBD >2days     Hospital Problems  Never Reviewed          Codes Class Noted POA    Fatigue, unspecified type ICD-10-CM: R53.83  ICD-9-CM: 780.79  Unknown Unknown        Chronic systolic congestive heart failure ([facility] [address] ICD-10-CM: I50.22  ICD-9-CM: 428.22, 428.0  2/16/2021 Unknown        Goals of care, counseling/discussion ICD-10-CM: Z71.89  ICD-9-CM: V65.49  Unknown Unknown        DNR (do not resuscitate) discussion ICD-10-CM: Z71.89  ICD-9-CM: V65.49  Unknown Unknown        JUAN C (acute kidney injury) ([facility] [address] ICD-10-CM: N17.9  ICD-9-CM: 535. 9  Unknown Unknown        Acute hypoxemic respiratory failure due to COVID-19 [facility]) ICD-10-CM: U07.1, J96.01  ICD-9-CM: 518.81, 079.89, 799.02  1/30/2021 Unknown            Review of Systems:   Pertinent items are mentioned in interval history. Vital Signs:    Last 24hrs VS reviewed since prior progress note. Most recent are:  Visit Vitals  BP (!) 144/69   Pulse 60   Temp 97.5 °F (36.4 °C)   Resp 16   Ht 5' 7\" (1.702 m)   Wt 98.7 kg (217 lb 8 oz)   SpO2 95%   BMI 34.07 kg/m²         Intake/Output Summary (Last 24 hours) at 2/23/2021 0755  Last data filed at 2/22/2021 2305  Gross per 24 hour   Intake 457.1 ml   Output 1300 ml   Net -842.9 ml        Physical Examination:   Evaluated face to face and examined 02/23/21    General:  Alert, fatigued, slow in speaking. No acute distress. Looks chronically sick. BM. Resp:  No accessory muscle use, no wheezes, rhonchi +  Abd:  Soft, non-tender, non-distended,  Extremities:  No cyanosis or clubbing, significant edema LEs  Neuro:  sleepy. slow in speaking, no focal neuro deficits, follows commands   Psych:   not agitated.     Data Review:    Review and/or order of clinical lab test  Review and/or order of tests in the radiology section of CPT  Review and/or order of tests in the medicine section of CPT  Labs:     Recent Labs     02/23/21  0359 02/22/21  0134   WBC 8.6 9.2   HGB 7.6* 6.7*   HCT 23.5* 20.8*    270     Recent Labs     02/23/21  0359 02/22/21  0134 02/21/21  0403    142  --    K 4.1 3.6  --    * 108  --    CO2 19* 21  --    * 150*  --    CREA 11.20* 10.70*  --    GLU 69 79  --    CA 7.7* 7.7*  --    MG 2.8* 3.0*  -- PHOS 8.7* 8.5* 7.9*   URICA  --  7.6* 7.6*     No results for input(s): ALT, AP, TBIL, TBILI, TP, ALB, GLOB, GGT, AML, LPSE in the last 72 hours. No lab exists for component: SGOT, GPT, AMYP, HLPSE  No results for input(s): INR, PTP, APTT, INREXT, INREXT in the last 72 hours. Recent Labs     02/23/21  0359   FERR 661*      Lab Results   Component Value Date/Time    Folate 17.5 02/23/2021 03:59 AM      No results for input(s): PH, PCO2, PO2 in the last 72 hours. No results for input(s): CPK, CKNDX, TROIQ in the last 72 hours.     No lab exists for component: CPKMB  Lab Results   Component Value Date/Time    Cholesterol, total 115 02/26/2010 05:20 PM    HDL Cholesterol 31 (L) 02/26/2010 05:20 PM    LDL, calculated 46.2 02/26/2010 05:20 PM    Triglyceride 189 02/26/2010 05:20 PM    CHOL/HDL Ratio 3.7 02/26/2010 05:20 PM     Lab Results   Component Value Date/Time    Glucose (POC) 84 02/23/2021 06:25 AM    Glucose (POC) 97 02/22/2021 10:02 PM    Glucose (POC) 114 (H) 02/22/2021 05:29 PM    Glucose (POC) 108 (H) 02/22/2021 11:17 AM    Glucose (POC) 103 (H) 02/22/2021 06:32 AM     Lab Results   Component Value Date/Time    Color YELLOW/STRAW 02/10/2021 01:26 PM    Appearance TURBID (A) 02/10/2021 01:26 PM    Specific gravity 1.014 02/10/2021 01:26 PM    Specific gravity 1.015 02/26/2010 12:32 PM    pH (UA) 5.0 02/10/2021 01:26 PM    Protein 100 (A) 02/10/2021 01:26 PM    Glucose Negative 02/10/2021 01:26 PM    Ketone Negative 02/10/2021 01:26 PM    Bilirubin Negative 02/10/2021 01:26 PM    Urobilinogen 0.2 02/10/2021 01:26 PM    Nitrites Negative 02/10/2021 01:26 PM    Leukocyte Esterase Negative 02/10/2021 01:26 PM    Epithelial cells FEW 02/10/2021 01:26 PM    Bacteria Negative 02/10/2021 01:26 PM    WBC 0-4 02/10/2021 01:26 PM    RBC  02/10/2021 01:26 PM     Medications Reviewed:     Current Facility-Administered Medications   Medication Dose Route Frequency    0.9% sodium chloride infusion 250 mL  250 mL IntraVENous PRN    albuterol-ipratropium (DUO-NEB) 2.5 MG-0.5 MG/3 ML  3 mL Nebulization Q4H PRN    HYDROmorphone (PF) (DILAUDID) injection 1 mg  1 mg IntraVENous Q2H PRN    insulin lispro (HUMALOG) injection   SubCUTAneous AC&HS    isosorbide dinitrate (ISORDIL) tablet 30 mg  30 mg Per NG tube Q8H    balsam peru-castor oiL (VENELEX) ointment   Topical Q12H    bumetanide (BUMEX) injection 2 mg  2 mg IntraVENous Q12H    0.9% sodium chloride infusion 250 mL  250 mL IntraVENous PRN    insulin glargine (LANTUS) injection 20 Units  20 Units SubCUTAneous DAILY    pantoprazole (PROTONIX) 40 mg in 0.9% sodium chloride 10 mL injection  40 mg IntraVENous Q12H    labetaloL (NORMODYNE;TRANDATE) injection 20 mg  20 mg IntraVENous Q4H PRN    ticagrelor (BRILINTA) tablet 90 mg  90 mg Oral BID    carvediloL (COREG) tablet 25 mg  25 mg Per NG tube Q12H    amLODIPine (NORVASC) tablet 10 mg  10 mg Per NG tube DAILY    alteplase (CATHFLO) 1 mg in sterile water (preservative free) 1 mL injection  1 mg InterCATHeter PRN    ascorbic acid (vitamin C) (VITAMIN C) tablet 500 mg  500 mg Per NG tube BID    cholecalciferol (VITAMIN D3) (1000 Units /25 mcg) tablet 2,000 Units  2,000 Units Per NG tube DAILY    metoprolol (LOPRESSOR) injection 5 mg  5 mg IntraVENous Q6H PRN    [Held by provider] allopurinoL (ZYLOPRIM) tablet 100 mg  100 mg Oral DAILY    sodium chloride (NS) flush 5-40 mL  5-40 mL IntraVENous Q8H    sodium chloride (NS) flush 5-40 mL  5-40 mL IntraVENous PRN    acetaminophen (TYLENOL) tablet 650 mg  650 mg Oral Q6H PRN    Or    acetaminophen (TYLENOL) suppository 650 mg  650 mg Rectal Q6H PRN    glucose chewable tablet 16 g  4 Tab Oral PRN    dextrose (D50W) injection syrg 12.5-25 g  12.5-25 g IntraVENous PRN    glucagon (GLUCAGEN) injection 1 mg  1 mg IntraMUSCular PRN    aspirin chewable tablet 81 mg  81 mg Oral DAILY    atorvastatin (LIPITOR) tablet 40 mg  40 mg Oral QHS ______________________________________________________________________  EXPECTED LENGTH OF STAY: 12d 7h  ACTUAL LENGTH OF STAY:          Zigmund Favre, MD

## 2021-02-23 NOTE — PROGRESS NOTES
Bedside shift change report given to [facility], RN (oncoming nurse) by Santosh Benites RN (offgoing nurse). Report included the following information SBAR and Kardex.

## 2021-02-23 NOTE — DIALYSIS
Each catheter limb disinfected per p&p, caps removed, hubs disinfected per p&p. [facility] Team [facility]  [phone]    Vitals   Pre   Post   Assessment   Pre   Post     Temp  Temp: 97.8 °F (36.6 °C) (02/23/21 1745)  98.0 LOC  A+Ox1 same   HR   Pulse (Heart Rate): 79 (02/23/21 1745) 76 Lungs   Diminished/crackles  same   B/P   BP: (!) 152/68 (02/23/21 1745) 149/72 Cardiac   RRR  same   Resp   Resp Rate: 14 (02/23/21 1745) 16 Skin   Warm/dry  same   Pain level  Pain Intensity 1: 9 (02/23/21 1322) 0 Edema  +3 on all extremeties     same   Orders:    Duration:   Start:    0015 End:    1915 Total:   1.5   Dialyzer:   Dialyzer/Set Up Inspection: Manual Manus (02/23/21 1745)   K Bath:   Dialysate K (mEq/L): 2 (02/23/21 1745)   Ca Bath:   Dialysate CA (mEq/L): 2.5 (02/23/21 1745)   Na/Bicarb:   Dialysate NA (mEq/L): 140 (02/23/21 1745)   Target Fluid Removal:   Goal/Amount of Fluid to Remove (mL): 0 mL (02/23/21 1745)   Access     Type & Location:   R-IJ-Dressing dry and intact, w/ some dried blood.  W/ (+) flush/aspiration, [email or web address]    Labs     Obtained/Reviewed   Critical Results Called   Date when labs were drawn-  Hgb-    HGB   Date Value Ref Range Status   02/23/2021 7.6 (L) 12.1 - 17.0 g/dL Final     K-    Potassium   Date Value Ref Range Status   02/23/2021 4.1 3.5 - 5.1 mmol/L Final     Ca-   Calcium   Date Value Ref Range Status   02/23/2021 7.7 (L) 8.5 - 10.1 MG/DL Final     Bun-   BUN   Date Value Ref Range Status   02/23/2021 149 (H) 6 - 20 MG/DL Final     Creat-   Creatinine   Date Value Ref Range Status   02/23/2021 11.20 (H) 0.70 - 1.30 MG/DL Final        Medications/ Blood Products Given     Name   Dose   Route and Time                     Blood Volume Processed (BVP):    19.1 Net Fluid   Removed:  0ml   Comments   Time Out Done: 7646  Primary Nurse Rpt Pre:Linda  Primary Nurse Rpt Post:Linda  Pt Education: Infection control  Care Plan:Continue Tx as ordered  Tx Summary: Pt tolerated Tx well.  All possible blood returned via NS rinseback. Lines flushed w/ NS, locked w/ heparin, then capped  Admiting Diagnosis:CKD/JUAN C  Pt's previous clinic-none  Consent signed - Informed Consent Verified: Yes (02/23/21 4200)  aMster Consent - Signed  Hepatitis Status- Drawn  Machine #- Machine Number: [id number] (02/23/21 1745)  Telemetry status-Signed  Pre-dialysis wt. - Pre-Dialysis Weight: 98.7 kg (217 lb 9.5 oz) (02/23/21 1745)     Each dialysis catheter limb disinfected per p&p, blood returned per p&p, each dialysis hub disinfected per p&p, post dialysis catheter dwell instilled per order, and caps applied.

## 2021-02-23 NOTE — PROGRESS NOTES
Spiritual Care Assessment/Progress Note  [facility]      NAME: Jovi Hurley      MRN: [id number]  AGE: 63 y.o.  SEX: male  Scientology Affiliation: Other   Language: English     2/23/2021     Total Time (in minutes): 10     Spiritual Assessment begun in [facility] through conversation with:         [x]Patient        [] Family    [] Friend(s)        Reason for Consult: Initial/Spiritual assessment, patient floor     Spiritual beliefs: (Please include comment if needed)     [] Identifies with a andrez tradition:         [] Supported by a andrez community:            [] Claims no spiritual orientation:           [] Seeking spiritual identity:                [] Adheres to an individual form of spirituality:           [x] Not able to assess:                           Identified resources for coping:      [] Prayer                               [] Music                  [] Guided Imagery     [x] Family/friends                 [] Pet visits     [] Devotional reading                         [] Unknown     [] Other:                                               Interventions offered during this visit: (See comments for more details)    Patient Interventions: Affirmation of emotions/emotional suffering, Catharsis/review of pertinent events in supportive environment, Coping skills reviewed/reinforced, Iconic (affirming the presence of God/Higher Power)           Plan of Care:     [] Support spiritual and/or cultural needs    [] Support AMD and/or advance care planning process      [] Support grieving process   [] Coordinate Rites and/or Rituals    [] Coordination with community clergy   [] No spiritual needs identified at this time   [] Detailed Plan of Care below (See Comments)  [] Make referral to Music Therapy  [] Make referral to Pet Therapy     [] Make referral to Addiction services  [] Make referral to [facility]  [] Make referral to Spiritual Care Partner  [] No future visits requested        [x] Follow up upon further referrals     Comments:  visit for initial spiritual assessment.  Patient reclining in bed, resting.  Awakens to knock at door and verbal greeting.  Appears drowsy and falls asleep for short periods throughout visit.  Provided spiritual presence, listening, and support.  Patient has some difficulty speaking.  Was able to communicate understanding of this conversation, said he was uncomfortable due to pain and that the nurses are aware.  Provided words of hope and comfort.  Patient was not able to express his andrez background or journey and did not reply when asked about andrez affiliation.  This visit was cut short by staff who arrived to transport patient for care.  He appeared comforted as a result of this visit. Informed patient of availability of  and spiritual care services.    Rev. Mathew Morris MDiv, [facility], [facility]   paging service: [phone])

## 2021-02-23 NOTE — PROGRESS NOTES
Problem: Dysphagia (Adult)  Goal: *Acute Goals and Plan of Care (Insert Text)  Description: Speech therapy goals  Initiated 2/12/2021 Discontinued  Re-initiated 2/23/2021    1. Patient will participate in re-evaluation of swallow function within 7 days   Outcome: Not Met   [address] EVALUATION  Patient: Aimee Gaviria (66 y.o. male)  Date: 2/23/2021  Primary Diagnosis: Acute hypoxemic respiratory failure due [facility] ([facility] [U07.1, J96.01]        Precautions:     Fall(droplet plus'''')    ASSESSMENT :  Based on the objective data described below, the patient presents with suspected moderate to severe pharyngeal dysphagia based on clinical examination. This is consistent with what was seen post extubation, earlier this admission. He has wet vocal quality prior to PO, though this worsens after each teaspoon sip. He attempts to clear his throat, though effort is minimal and does not help to clear his wet vocal quality. . This does not improve with increased viscosity. Additionally, there appears to be increased work of breathing after each sip. He tends to let his head fall to the side in between PO trials, possibly due to general weakness. He appears to be at high risk for sequelae of aspiration given his recent medical history. Of note, patient currently has honey thickened clear liquid diet ordered for compassionate feeding. When the risk of aspiration was explained to him, he denied that this was happening to him. Suspect he does not fully understand the risk. Suspect honey thick liquids are no more comfortable for him than thins and he is showing s/s of aspiration with both. His family was not present. Recommend they fully discuss these risks with MD. Note palliative has been on board and the patient has recently agreed to re-start dialysis. Depending upon goals of care, recommendations will be different.      Patient will benefit from skilled intervention to address the above impairments. Patients rehabilitation potential is considered to be Guarded     PLAN :  Recommendations and Planned Interventions:  Pending goals of care  If for aggressive care, consider NPO. If for comfort measures, consider changing honey thick liquid diet to thin, as this is likely more pleasurable and more easily cleared from the lungs. Frequency/Duration: Patient will be followed by speech-language pathology 3 times a week to address goals. Discharge Recommendations: None     SUBJECTIVE:   Patient stated What for? . OBJECTIVE:     Past Medical History:   Diagnosis Date    Diabetes ([facility]     Hypertension    No past surgical history on file. Prior Level of Function/Home Situation:    Home Situation  Home Environment: Private residence  # Steps to Enter: 4  Rails to Enter: Yes  Hand Rails : Bilateral  One/Two Story Residence: One story  Living Alone: No  Support Systems: Spouse/Significant Other/Partner  Current DME Used/Available at Home: None  Tub or Shower Type: Tub/Shower combination  Diet prior to admission: suspect regular with thins  Current Diet:  clear liquid honey thick    Cognitive and Communication Status:  Neurologic State: Alert  Orientation Level: Oriented to person  Cognition: Decreased command following  Perception: Cues to maintain midline in sitting, Cues to attend right visual field(L cervical & trunk lean preference)  Perseveration: No perseveration noted  Safety/Judgement: Decreased awareness of environment  Oral Assessment:  Oral Assessment  Dentition: Limited;Natural  Oral Hygiene: moist, clean  Lingual: No impairment  Mandible: No impairment  P.O. Trials:  Patient Position: semi upright in bed  Vocal quality prior to P.O.: Low volume  Consistency Presented: Ice chips; Thin liquid; Nectar thick liquid  How Presented: SLP-fed/presented;Spoon     Bolus Acceptance: No impairment  Bolus Formation/Control: No impairment        Oral Residue: None        Aspiration Signs/Symptoms: Clear throat;Weak cough; Change vocal quality                Oral Phase Severity: No impairment  Pharyngeal Phase Severity : Moderate-severe    NOMS:   The NOMS functional outcome measure was used to quantify this patient's level of swallowing impairment. Based on the NOMS, the patient was determined to be at level 2 for swallow function       NOMS Swallowing Levels:  Level 1 (CN): NPO  Level 2 (CM): NPO but takes consistency in therapy  Level 3 (CL): Takes less than 50% of nutrition p.o. and continues with nonoral feedings; and/or safe with mod cues; and/or max diet restriction  Level 4 (CK): Safe swallow but needs mod cues; and/or mod diet restriction; and/or still requires some nonoral feeding/supplements  Level 5 (CJ): Safe swallow with min diet restriction; and/or needs min cues  Level 6 (CI): Independent with p.o.; rare cues; usually self cues; may need to avoid some foods or needs extra time  Level 7 ([address] [place] [address]): Independent for all p.o.  ABISAI. (2003). National Outcomes Measurement System (NOMS): Adult Speech-Language Pathology User's Guide. Pain:  Pain Scale 1: Numeric (0 - 10)  Pain Intensity 1: 7       After treatment:   Patient left in no apparent distress in bed, Call bell within reach, and Nursing notified    COMMUNICATION/EDUCATION:   Patient was educated regarding his risk of aspiration. He denied this risk. The patient's plan of care including recommendations, planned interventions, and recommended diet changes were discussed with: Registered nurse. Patient is unable to participate in goal setting and plan of care.     Thank you for this referral.  TAYLOR Richardson  Time Calculation: 20 mins

## 2021-02-24 LAB
ANION GAP SERPL CALC-SCNC: 15 MMOL/L (ref 5–15)
BASOPHILS # BLD: 0 K/UL (ref 0–0.1)
BASOPHILS NFR BLD: 0 % (ref 0–1)
BUN SERPL-MCNC: 117 MG/DL (ref 6–20)
BUN/CREAT SERPL: 13 (ref 12–20)
CALCIUM SERPL-MCNC: 8 MG/DL (ref 8.5–10.1)
CHLORIDE SERPL-SCNC: 108 MMOL/L (ref 97–108)
CO2 SERPL-SCNC: 19 MMOL/L (ref 21–32)
CREAT SERPL-MCNC: 9.2 MG/DL (ref 0.7–1.3)
DIFFERENTIAL METHOD BLD: ABNORMAL
EOSINOPHIL # BLD: 0.4 K/UL (ref 0–0.4)
EOSINOPHIL NFR BLD: 4 % (ref 0–7)
ERYTHROCYTE [DISTWIDTH] IN BLOOD BY AUTOMATED COUNT: 16.5 % (ref 11.5–14.5)
GLUCOSE BLD STRIP.AUTO-MCNC: 109 MG/DL (ref 65–100)
GLUCOSE BLD STRIP.AUTO-MCNC: 117 MG/DL (ref 65–100)
GLUCOSE BLD STRIP.AUTO-MCNC: 149 MG/DL (ref 65–100)
GLUCOSE BLD STRIP.AUTO-MCNC: 85 MG/DL (ref 65–100)
GLUCOSE SERPL-MCNC: 89 MG/DL (ref 65–100)
HCT VFR BLD AUTO: 23.4 % (ref 36.6–50.3)
HGB BLD-MCNC: 7.6 G/DL (ref 12.1–17)
IMM GRANULOCYTES # BLD AUTO: 0.1 K/UL (ref 0–0.04)
IMM GRANULOCYTES NFR BLD AUTO: 1 % (ref 0–0.5)
LYMPHOCYTES # BLD: 1.7 K/UL (ref 0.8–3.5)
LYMPHOCYTES NFR BLD: 19 % (ref 12–49)
MCH RBC QN AUTO: 26.3 PG (ref 26–34)
MCHC RBC AUTO-ENTMCNC: 32.5 G/DL (ref 30–36.5)
MCV RBC AUTO: 81 FL (ref 80–99)
MONOCYTES # BLD: 0.8 K/UL (ref 0–1)
MONOCYTES NFR BLD: 9 % (ref 5–13)
NEUTS SEG # BLD: 6 K/UL (ref 1.8–8)
NEUTS SEG NFR BLD: 67 % (ref 32–75)
NRBC # BLD: 0 K/UL (ref 0–0.01)
NRBC BLD-RTO: 0 PER 100 WBC
PLATELET # BLD AUTO: 283 K/UL (ref 150–400)
PMV BLD AUTO: 11.6 FL (ref 8.9–12.9)
POTASSIUM SERPL-SCNC: 3.7 MMOL/L (ref 3.5–5.1)
RBC # BLD AUTO: 2.89 M/UL (ref 4.1–5.7)
SERVICE CMNT-IMP: ABNORMAL
SERVICE CMNT-IMP: NORMAL
SODIUM SERPL-SCNC: 142 MMOL/L (ref 136–145)
WBC # BLD AUTO: 9 K/UL (ref 4.1–11.1)

## 2021-02-24 PROCEDURE — 74011636637 HC RX REV CODE- 636/637: Performed by: HOSPITALIST

## 2021-02-24 PROCEDURE — 74011000250 HC RX REV CODE- 250: Performed by: HOSPITALIST

## 2021-02-24 PROCEDURE — 74011250637 HC RX REV CODE- 250/637: Performed by: ANESTHESIOLOGY

## 2021-02-24 PROCEDURE — 74011250637 HC RX REV CODE- 250/637: Performed by: INTERNAL MEDICINE

## 2021-02-24 PROCEDURE — 74011250637 HC RX REV CODE- 250/637: Performed by: HOSPITALIST

## 2021-02-24 PROCEDURE — 97110 THERAPEUTIC EXERCISES: CPT

## 2021-02-24 PROCEDURE — C9113 INJ PANTOPRAZOLE SODIUM, VIA: HCPCS | Performed by: HOSPITALIST

## 2021-02-24 PROCEDURE — 65660000000 HC RM CCU STEPDOWN

## 2021-02-24 PROCEDURE — 74011250637 HC RX REV CODE- 250/637: Performed by: EMERGENCY MEDICINE

## 2021-02-24 PROCEDURE — 74011250636 HC RX REV CODE- 250/636: Performed by: HOSPITALIST

## 2021-02-24 PROCEDURE — 90935 HEMODIALYSIS ONE EVALUATION: CPT

## 2021-02-24 PROCEDURE — 85025 COMPLETE CBC W/AUTO DIFF WBC: CPT

## 2021-02-24 PROCEDURE — 74011636637 HC RX REV CODE- 636/637: Performed by: INTERNAL MEDICINE

## 2021-02-24 PROCEDURE — 36415 COLL VENOUS BLD VENIPUNCTURE: CPT

## 2021-02-24 PROCEDURE — 94760 N-INVAS EAR/PLS OXIMETRY 1: CPT

## 2021-02-24 PROCEDURE — 82962 GLUCOSE BLOOD TEST: CPT

## 2021-02-24 PROCEDURE — 74011000250 HC RX REV CODE- 250: Performed by: INTERNAL MEDICINE

## 2021-02-24 PROCEDURE — 74011250636 HC RX REV CODE- 250/636: Performed by: INTERNAL MEDICINE

## 2021-02-24 PROCEDURE — 80048 BASIC METABOLIC PNL TOTAL CA: CPT

## 2021-02-24 PROCEDURE — 5A1D70Z PERFORMANCE OF URINARY FILTRATION, INTERMITTENT, LESS THAN 6 HOURS PER DAY: ICD-10-PCS | Performed by: INTERNAL MEDICINE

## 2021-02-24 PROCEDURE — 99232 SBSQ HOSP IP/OBS MODERATE 35: CPT | Performed by: NURSE PRACTITIONER

## 2021-02-24 RX ORDER — DIPHENHYDRAMINE HCL 25 MG
25 CAPSULE ORAL
Status: DISCONTINUED | OUTPATIENT
Start: 2021-02-24 | End: 2021-03-05 | Stop reason: HOSPADM

## 2021-02-24 RX ADMIN — Medication 10 ML: at 05:36

## 2021-02-24 RX ADMIN — CASTOR OIL AND BALSAM, PERU: 788; 87 OINTMENT TOPICAL at 10:05

## 2021-02-24 RX ADMIN — ISOSORBIDE DINITRATE 30 MG: 20 TABLET ORAL at 14:43

## 2021-02-24 RX ADMIN — TICAGRELOR 90 MG: 90 TABLET ORAL at 10:04

## 2021-02-24 RX ADMIN — HYDROMORPHONE HYDROCHLORIDE 1 MG: 1 INJECTION, SOLUTION INTRAMUSCULAR; INTRAVENOUS; SUBCUTANEOUS at 17:38

## 2021-02-24 RX ADMIN — BUMETANIDE 2 MG: 0.25 INJECTION INTRAMUSCULAR; INTRAVENOUS at 10:04

## 2021-02-24 RX ADMIN — INSULIN LISPRO 3 UNITS: 100 INJECTION, SOLUTION INTRAVENOUS; SUBCUTANEOUS at 11:53

## 2021-02-24 RX ADMIN — ASPIRIN 81 MG: 81 TABLET, CHEWABLE ORAL at 10:04

## 2021-02-24 RX ADMIN — CASTOR OIL AND BALSAM, PERU: 788; 87 OINTMENT TOPICAL at 22:01

## 2021-02-24 RX ADMIN — HYDROMORPHONE HYDROCHLORIDE 1 MG: 1 INJECTION, SOLUTION INTRAMUSCULAR; INTRAVENOUS; SUBCUTANEOUS at 01:16

## 2021-02-24 RX ADMIN — CHOLECALCIFEROL (VITAMIN D3) 25 MCG (1,000 UNIT) TABLET 2000 UNITS: TABLET at 10:04

## 2021-02-24 RX ADMIN — OXYCODONE HYDROCHLORIDE AND ACETAMINOPHEN 500 MG: 500 TABLET ORAL at 10:04

## 2021-02-24 RX ADMIN — ISOSORBIDE DINITRATE 30 MG: 20 TABLET ORAL at 05:20

## 2021-02-24 RX ADMIN — HYDROMORPHONE HYDROCHLORIDE 1 MG: 1 INJECTION, SOLUTION INTRAMUSCULAR; INTRAVENOUS; SUBCUTANEOUS at 03:28

## 2021-02-24 RX ADMIN — HYDROMORPHONE HYDROCHLORIDE 1 MG: 1 INJECTION, SOLUTION INTRAMUSCULAR; INTRAVENOUS; SUBCUTANEOUS at 14:43

## 2021-02-24 RX ADMIN — Medication 10 ML: at 22:03

## 2021-02-24 RX ADMIN — SODIUM CHLORIDE 40 MG: 9 INJECTION INTRAMUSCULAR; INTRAVENOUS; SUBCUTANEOUS at 05:20

## 2021-02-24 RX ADMIN — HYDROMORPHONE HYDROCHLORIDE 1 MG: 1 INJECTION, SOLUTION INTRAMUSCULAR; INTRAVENOUS; SUBCUTANEOUS at 05:35

## 2021-02-24 RX ADMIN — AMLODIPINE BESYLATE 10 MG: 5 TABLET ORAL at 10:04

## 2021-02-24 RX ADMIN — HYDROMORPHONE HYDROCHLORIDE 1 MG: 1 INJECTION, SOLUTION INTRAMUSCULAR; INTRAVENOUS; SUBCUTANEOUS at 10:04

## 2021-02-24 RX ADMIN — INSULIN GLARGINE 20 UNITS: 100 INJECTION, SOLUTION SUBCUTANEOUS at 10:04

## 2021-02-24 RX ADMIN — SODIUM CHLORIDE 40 MG: 9 INJECTION INTRAMUSCULAR; INTRAVENOUS; SUBCUTANEOUS at 17:38

## 2021-02-24 RX ADMIN — CARVEDILOL 25 MG: 12.5 TABLET, FILM COATED ORAL at 10:04

## 2021-02-24 RX ADMIN — Medication 10 ML: at 14:43

## 2021-02-24 NOTE — PROGRESS NOTES
Bedside shift change report given to [address] (oncoming nurse) by Clara Diego (offgoing nurse). Report included the following information SBAR, Kardex, MAR and Recent Results.

## 2021-02-24 NOTE — PROGRESS NOTES
Nephrology Progress Note  Carroll Lara  Date of Admission : 1/30/2021    CC: Follow up for JUAN C on CKD       Assessment and Plan     JUAN C:  - unclear baseline but suspect some component of CKD  - Renal U/S on 1/30 confirms CKD  - HD initiated 2/23  - HD #2 today     Volume overload:  - HD today w/ UF    Encephalopathy    HFrEF:  - EF 20-25% on 2/1     COVID-19 PNA:  - now COVID neg on 2/19     DM2:  - on insulin    Discussed with the patient's daughter at bedside. Interval History:  Seen and examined. Tolerated HD yesterday. For treatment #2 today. Weak, confused, unable to obtain ROS. Current Medications: all current  Medications have been eviewed in EPIC  Review of Systems: Review of systems not obtained due to patient factors. Objective:  Vitals:    Vitals:    02/24/21 0153 02/24/21 0430 02/24/21 0520 02/24/21 0827   BP: (!) 146/67  (!) 166/65 (!) 169/84   Pulse: 71  83 71   Resp: 16   21   Temp: 98.1 °F (36.7 °C)   97.6 °F (36.4 °C)   SpO2: 98% 97%  98%   Weight:       Height:         Intake and Output:  02/24 0701 - 02/24 1900  In: 240 [P.O.:240]  Out: -   02/22 1901 - 02/24 0700  In: 657.1 [P.O.:320]  Out: 7481 [Urine:1550]    Physical Examination:  Pt intubated    No  General: Confused, frail  Neck:  Supple, no mass  Resp:  Lungs Clear anteriorly  CV:  RRR,  no murmur or rub, 2-3 + LE edema  GI:  Soft, NT, + Bowel sounds, no hepatosplenomegaly  Neurologic:  confused  Psych:             Unable to assess  Skin:  No Rash  :  No bird    []    High complexity decision making was performed  []    Patient is at high-risk of decompensation with multiple organ involvement    Lab Data Personally Reviewed: I have reviewed all the pertinent labs, microbiology data and radiology studies during assessment.     Recent Labs     02/24/21  0128 02/23/21  0359 02/22/21  0134    143 142   K 3.7 4.1 3.6    109* 108   CO2 19* 19* 21   GLU 89 69 79   * 149* 150*   CREA 9.20* 11.20* 10.70*   CA 8. 0* 7.7* 7.7*   MG  --  2.8* 3.0*   PHOS  --  8.7* 8.5*     Recent Labs     02/24/21  0128 02/23/21  0359 02/22/21  0134   WBC 9.0 8.6 9.2   HGB 7.6* 7.6* 6.7*   HCT 23.4* 23.5* 20.8*    280 270     Lab Results   Component Value Date/Time    Specimen Description: BLOOD 02/27/2010 07:00 PM     Lab Results   Component Value Date/Time    Culture result: NO GROWTH 5 DAYS 02/15/2021 10:58 AM    Culture result: LIGHT YEAST, (APPARENT CANDIDA ALBICANS) (A) 02/12/2021 04:53 PM    Culture result: LIGHT NORMAL RESPIRATORY CORI 02/12/2021 04:53 PM     Recent Results (from the past 24 hour(s))   GLUCOSE, POC    Collection Time: 02/23/21 11:15 AM   Result Value Ref Range    Glucose (POC) 101 (H) 65 - 100 mg/dL    Performed by Mack Ambrocio    GLUCOSE, POC    Collection Time: 02/23/21  5:15 PM   Result Value Ref Range    Glucose (POC) 98 65 - 100 mg/dL    Performed by Lujean Skiff    HEP B SURFACE AG    Collection Time: 02/23/21  5:56 PM   Result Value Ref Range    Hepatitis B surface Ag <0.10 Index    Hep B surface Ag Interp. Negative NEG     HEP B SURFACE AB    Collection Time: 02/23/21  5:56 PM   Result Value Ref Range    Hepatitis B surface Ab 466.70 mIU/mL    Hep B surface Ab Interp.  REACTIVE (A) NR     GLUCOSE, POC    Collection Time: 02/23/21  9:30 PM   Result Value Ref Range    Glucose (POC) 92 65 - 100 mg/dL    Performed by Benny Runner    CBC WITH AUTOMATED DIFF    Collection Time: 02/24/21  1:28 AM   Result Value Ref Range    WBC 9.0 4.1 - 11.1 K/uL    RBC 2.89 (L) 4.10 - 5.70 M/uL    HGB 7.6 (L) 12.1 - 17.0 g/dL    HCT 23.4 (L) 36.6 - 50.3 %    MCV 81.0 80.0 - 99.0 FL    MCH 26.3 26.0 - 34.0 PG    MCHC 32.5 30.0 - 36.5 g/dL    RDW 16.5 (H) 11.5 - 14.5 %    PLATELET 706 253 - 783 K/uL    MPV 11.6 8.9 - 12.9 FL    NRBC 0.0 0  WBC    ABSOLUTE NRBC 0.00 0.00 - 0.01 K/uL    NEUTROPHILS 67 32 - 75 %    LYMPHOCYTES 19 12 - 49 %    MONOCYTES 9 5 - 13 %    EOSINOPHILS 4 0 - 7 %    BASOPHILS 0 0 - 1 % IMMATURE GRANULOCYTES 1 (H) 0.0 - 0.5 %    ABS. NEUTROPHILS 6.0 1.8 - 8.0 K/UL    ABS. LYMPHOCYTES 1.7 0.8 - 3.5 K/UL    ABS. MONOCYTES 0.8 0.0 - 1.0 K/UL    ABS. EOSINOPHILS 0.4 0.0 - 0.4 K/UL    ABS. BASOPHILS 0.0 0.0 - 0.1 K/UL    ABS. IMM. GRANS. 0.1 (H) 0.00 - 0.04 K/UL    DF AUTOMATED     METABOLIC PANEL, BASIC    Collection Time: 02/24/21  1:28 AM   Result Value Ref Range    Sodium 142 136 - 145 mmol/L    Potassium 3.7 3.5 - 5.1 mmol/L    Chloride 108 97 - 108 mmol/L    CO2 19 (L) 21 - 32 mmol/L    Anion gap 15 5 - 15 mmol/L    Glucose 89 65 - 100 mg/dL     (H) 6 - 20 MG/DL    Creatinine 9.20 (H) 0.70 - 1.30 MG/DL    BUN/Creatinine ratio 13 12 - 20      GFR est AA 7 (L) >60 ml/min/1.73m2    GFR est non-AA 6 (L) >60 ml/min/1.73m2    Calcium 8.0 (L) 8.5 - 10.1 MG/DL   GLUCOSE, POC    Collection Time: 02/24/21  6:22 AM   Result Value Ref Range    Glucose (POC) 109 (H) 65 - 100 mg/dL    Performed by Darwin Alfonso MD  [address]  Phone - [phone]   Fax - [phone]  [email or web address]

## 2021-02-24 NOTE — ADT AUTH CERT NOTES
Viral Illness, Acute - Care Day 21 (2/19/2021) by Rafaela Iyer RN       Review Status Review Entered   Completed 2/23/2021 11:03      Criteria Review      Care Day: 21 Care Date: 2/19/2021 Level of Care: Intermediate Care    Guideline Day 3    Clinical Status    ( ) * Hemodynamic stability    ( ) * Afebrile or temperature acceptable for next level of care    ( ) * Tachypnea absent    ( ) * Hypoxemia absent    2/23/2021 11:03:44 EST by Tyrone Reza      15 LPM VIA HFNC    ( ) * Mental status at baseline    ( ) * Renal function at baseline, or stable and acceptable for next level of care    ( ) * Discharge plans and education understood    Activity    ( ) * Ambulatory or acceptable for next level of care    Routes    ( ) * Oral hydration [L]    (X) * Oral medications or regimen acceptable for next level of care    2/23/2021 11:02:56 EST by [facility]one [facility]      meropenem (MERREM) 500 mg in 0.9% sodium chloride (MBP/ADV) 50 mL MBP   Dose: 0.5 g  Freq: EVERY 12 HOURS Route: IV    2/23/2021 11:02:41 EST by [employer]      HYDROmorphone (PF) (DILAUDID) injection 1 mg   Dose: 1 mg  Freq: EVERY 3 HOURS AS NEEDED Route: IVX1    2/23/2021 11:02:30 EST by [employer]      heparin (porcine) injection 5,000 Units   Dose: 5,000 Units  Freq: EVERY 8 HOURS Route: SC    2/23/2021 11:02:18 EST by [employer]      albuterol (PROVENTIL HFA, VENTOLIN HFA, PROAIR HFA) inhaler 2 Puff   Dose: 2 Puff  Freq: EVERY 4 HOURS RESP Route: IN    2/23/2021 11:02:03 EST by [employer]      ticagrelor (BRILINTA) tablet 90 mg   Dose: 90 mg  Freq: 2 TIMES DAILY Route: PO    2/23/2021 11:01:56 EST by [employer]      pantoprazole (PROTONIX) 40 mg in 0.9% sodium chloride 10 mL injection   Dose: 40 mg  Freq: EVERY 12 HOURS Route: IV    2/23/2021 11:01:46 EST by [employer]      isosorbide dinitrate (ISORDIL) tablet 30 mg   Dose: 30 mg  Freq: EVERY 8 HOURS Route: PER NG TUBE    2/23/2021 11:01:37 EST by Tyrone Reza      insulin glargine (LANTUS) injection 20 Units   Dose: 20 Units  Freq: DAILY Route: SC    2021 11:01:27 EST by [facility]      cholecalciferol (VITAMIN D3) (1000 Units /25 mcg) tablet 2,000 Units   Dose: 2,000 Units  Freq: DAILY Route: PER NG TUBE    2021 11:01:19 EST by [employer]      carvediloL (COREG) tablet 25 mg   Dose: 25 mg  Freq: EVERY 12 HOURS Route: PER NG TUBE    2021 11:01:10 EST by [employer]      bumetanide (BUMEX) injection 2 mg   Dose: 2 mg  Freq: EVERY 12 HOURS Route: IV    2021 11:00:59 EST by [employer]      atorvastatin (LIPITOR) tablet 40 mg   Dose: 40 mg  Freq: EVERY BEDTIME Route: PO    2021 11:00:52 EST by [employer]      aspirin chewable tablet 81 mg   Dose: 81 mg  Freq: DAILY Route: PO    2021 11:00:44 EST by [employer]      ascorbic acid (vitamin C) (VITAMIN C) tablet 500 mg   Dose: 500 mg  Freq: 2 TIMES DAILY Route: PER NG TUBE    2021 11:00:31 EST by [employer]      amLODIPine (NORVASC) tablet 10 mg   Dose: 10 mg  Freq: DAILY Route: PER NG TUBE    ( ) * Oral diet or acceptable for next level of care    Interventions    ( ) * Isolation not indicated, or is performable at next level of care [G] [H]    Medications    ( ) * Antimicrobial medication absent or regimen established for next level of care    * Milestone   Additional Notes   21      Visit Vitals   BP (!) 155/81    Pulse 75   Temp (!) 96.3 °F (35.7 °C)   Resp 17   Ht 5' 7\" (1.702 m)   Wt (!) 176.5 kg (389 lb 1.8 oz)   SpO2 100% 12 LPM   BMI 60.94 kg/m²           Tmax:  Temp (24hrs), Av.5 °F (36.4 °C), Min:96.3 °F (35.7 °C), Max:97.9 °F (36.6 °C)         PHYSICAL EXAM:   General:          Chronically ill appearing, WD, WN. Alert, cooperative, no acute distress     EENT:              EOMI. Anicteric sclerae.  MMM   Resp:               Clear in apex with decreased breath sounds at bases, no wheezing or rales.  No accessory muscle use   CV:                  Regular  rhythm,  No edema   GI:                   Soft, Non distended, Non tender.  +Bowel sounds   Neurologic:       Alert and oriented X self, place, and time   Psych:   good insight. Not anxious nor agitated   Skin:                No rashes.  No jaundice, posterior mid cervical dressing dry and intact         INTERNAL MEDICINE:   Assessment and Plan   Acute respiratory failure secondary to COVID 19 PNA   mycoplasma and s. pneumoniae  (completed the therapy)   - Mycoplasma IgG  1411, S Pneumo (+)     Legionella (-)     afebrile, normal WBC      CXR (2/15): Increasing bilateral pulmonary infiltrates and left pleural effusion.      IV merrem and vancomycin were started on 2/15 due to worsening of resp status      Repeat CXR now; will deescalate ABX after evaluation          Supportive care for COVID 19      fever work up every 24 hours if temp >= 100. 4           posterior mid cervical wound; healing, appreciate wound care team's input       Mid posterior cervical wound infection   - chronic wound; pt was waiting [place] skin graft [place] [facility] [place]     Wound cx (1/31) LIGHT DIPHTHEROIDS      Wound measurement per our wound care team; 3.5 cm x 4 cm x 0.1 cm.        [place] [facility] [place]. Stalin Lopez, [address]     Wound care nurse, MsCaron Elizabeth Taylorires [phone]. According to the wound care nurse, the wound was created after the removal of infected cyst.     Pt completed IV Zosyn then Vancomycin for 30 days at the rehab center; Blood cx and wound cx grew MRSA         Pt should follow up with Dr Angélica Vincent at . [place] [address] upon discharge for his cervical wound       JUAN C   - nephrology following       Pt initiated the conversation with addressing his prognosis of illness. He expressed that he would like to get everything to be done to stay well. Communicated with Dr. Charu Parr.  Primary team also aware.       Geoffrey Ulrich NP           CRITICAL CARE:   Assessment and Plan:   Acute Hypoxic Respiratory Failure   COVID-19 PNA   Aspiration   Volume Overload   Acute on Chronic Systolic HF   Acute Renal Failure       Appreciate Pall Med - ongoing [address] discussions   Family is discussing comfort care measures but not decided   DNR/DNI   Appreciate Neph   Appreciate Cards   Appreciate ID   No escalation of care   No dialysis   Nieves   Vanco   ASA   Ticagrelor   Carvedilol   Lipitor   Bumex IV BID   Dilaudid PRN pain       NOTE OF PERSONAL INVOLVEMENT IN CARE    V3   To IMCU today       Melissa Pérez DO    Staff Intensivist/Anesthesiologist    [facility]   2/19/2021 2/19/2021 05:15   RBC: 2.82 (L)   HGB: 7.4 (L)   HCT: 23.6 (L)   RDW: 17.5 (H)   IMMATURE GRANULOCYTES: 1 (H)   ABS. IMM. GRANS.: 0.1 (H)            2/19/2021 05:15   Chloride: 110 (H)   CO2: 22   Anion gap: 10   Glucose: 124 (H)   BUN: 131 (H)   Creatinine: 8.85 (H)   BUN/Creatinine ratio: 15   Calcium: 7.8 (L)   Phosphorus: 7.3 (H)   Magnesium: 2.9 (H)   GFR est non-AA: 6 (L)   GFR est AA: 7 (L)   Bilirubin, total: 0.2   Protein, total: 5.8 (L)   Albumin: 1.5 (L)   Globulin: 4.3 (H)   A-G Ratio: 0.3 (L)   ALT: 111 (H)   AST: 152 (H)   Alk. phosphatase: 182 (H)         CXR: Improved expansion and aeration of the lungs. Continued interstitial prominence   likely due to the patient's history of viral pneumonia      CM NOTE:   Patient admitted with acute respiratory failure from [facility] where he was receiving abx for upcoming surgery. RUR: 30%   Disposition TBD       Patient remains in the ICU on hi flow, following simple commands. Patient able to have a Zoom meeting with his wife and patient has reconsidered and per notes does not want to die and would do dialysis. Nephrology notified. Care management is continuing to follow.    Cheryl Epps RN,Care Management                Viral Illness, Acute - Care Day 19 (2/17/2021) by Emelie Hodgkins, RN       Review Status Review Entered   Completed 2/18/2021 14:52    Criteria Review      Care Day: 19 Care Date: 2/17/2021 Level of Care: ICU    Guideline Day 2    Clinical Status    ( ) * Hypotension absent    (X) * No requirement for mechanical ventilation    2/18/2021 14:51:46 EST by Guillermina Oshea      ON HFNC 30-40 LPM    ( ) * Oxygenation at baseline or improved    2/18/2021 14:51:46 EST by Guillermina Oshea      ON HFNC 30-40 LPM    ( ) * Mental status at baseline    Routes    ( ) * Oral hydration    (X) Oral or IV medications    (X) Usual diet    2/18/2021 14:51:47 EST by Guillermina Oshea      TUBE FEEDING    Interventions    (X) Possible isolation    2/18/2021 14:52:19 EST by Julio César Matute    (X) Pulse oximetry    2/18/2021 14:52:19 EST by Guillermina Oshea      SPO2 100% ON 30 LPM VIA HFNC    (X) Possible oxygen    2/18/2021 14:51:47 EST by Guillermina Oshea      ON HFNC 30-40 LPM    Medications    (X) Possible antibiotic (eg, for bacterial coinfection or superinfection)    * Milestone   Additional Notes   INTERNAL MEDICINE:   Assessment and Plan   Acute respiratory failure secondary to COVID 19 PNA   mycoplasma and s. pneumoniae  (completed the therapy)   - Mycoplasma IgG  1411, S Pneumo (+)     Legionella (-)     afebrile, normal WBC      CXR (2/15): Increasing bilateral pulmonary infiltrates and left pleural effusion.      IV merrem and vancomycin were started on 2/15 due to worsening of resp status      Supportive care for COVID 19           fever work up every 24 hours if temp >= 100. 4          posterior mid cervical wound; appreciate wound care team's input       Mid posterior cervical wound infection   - chronic wound; pt was waiting for skin graft according to [place] [facility] Celeste Ware     Wound cx (1/31) LIGHT DIPHTHEROIDS      Wound measurement per our wound care team; 3.5 cm x 4 cm x 0.1 cm.        Spoke with the Managing provider at [facility], Dr. Celeste Ware, [address].     Wound care nurse, Ms. Hall Valeriano [phone].  According to the wound care nurse, the wound was created after the removal of infected cyst.     Pt completed IV Zosyn then Vancomycin for 30 days at the rehab center; Blood cx and wound cx grew MRSA         Pt should follow up with Dr Angélica Vincent at [address] [place] [address] upon discharge for his cervical wound       JUAN C   - nephrology following         Visit Vitals   /67   Pulse 77   Temp 98.8 °F (37.1 °C)   Resp 18   Ht 5' 7\" (1.702 m)   Wt 91.1 kg (200 lb 12.8 oz)   SpO2 100%   BMI 31.45 kg/m²    O2 Flow Rate (L/min): 50 l/min O2 Device: Heated, Hi flow nasal cannula Temp (24hrs), Av.4 °F (36.9 °C), Min:98.1 °F (36.7 °C), Max:98.8 °F (37.1 °C)                CRITICAL CARE:   Assessment and Plan:   Acute hypoxic respiratory failure   COVID-19 PNA   Aspiration   Volume Overload   Acute on Chronic Systolic HF       Appreciate Neph   Appreciate Cards   No escalation of care   No dialysis   DNR/DNI   Nieves   Vanco   ASA   Ticagrelor   Carvedilol   Lipitor   Bumex IV BID   Dilaudid PRN pain         NEPHROLOGY:   Assessment and Plan       JUAN C:   - unclear baseline but suspect some component of CKD   - Renal U/S on  confirms CKD   - family agreed not to pursue dialysis    - with continued decline in renal function, probably best to transition to com       Volume overload:   - diuresis as above        Acidosis : better        COVID-19 PNA:   - abx and steroids per CCM team       Resp failure:   - vent dependent       DM2:   - on insulin            CARDIOLOGY:            - NYHA class IV   21   ECHO ADULT COMPLETE 2021     Narrative · LV: Estimated LVEF is 20 - 25%. Normal cavity size. Upper normal wall    thickness. Severely reduced systolic function. Severe (grade 3) left    ventricular diastolic dysfunction. · LA: Dilated left atrium. · MV: Mild mitral valve regurgitation is present. · TV: Mild tricuspid valve regurgitation is present. · PA: Moderate to severe pulmonary hypertension.    · RV: Reduced systolic function.           Signed by: Rod Rodriguez MD               - Coreg, Isordil, Norvasc                - PTA Lisinopril, Coreg, Imdur and Norvasc   2. Acute Respiratory failure               - HiFlow at 100% FiO2               - PCXR 2/15 - Increasing bilateral pulmonary infiltrates and left pleural                 effusion. 3. COVID Positive   4. Mycoplasma and strep PNA               - ID following   5. JUAN C               - Nephrology following               - Worsening Cr. 7.07               - Per Palliative Note - patient DNR/DNI - family does NOT want to pursue dialysis   6. CAD               - STEMI 3/24/20               - PCI 4/3/20               - PCI at [facility] with stents placed               - Brilinta, ASA, Coreg and Liptior   7. PHTN               - Mod to severe on echo               - Sildenafil PTA   8. H/o Heroine abuse               - Getting Methadone at [facility]   9. Anemia               - No stool occult completed               - Hgb 7. 3y               - per Primary team   10. HLD               - Liptior 80 mg   11. HTN               - Coreg, Norvasc, Lasix, Isordil               - Cardene gtt at 5   12. DM II               - SSI       RF continues to decline.  Patient DNR/DNI and family does not want to move to dialysis. Appropriate to move to comfort care.  Needs no further cardiac testing.    Will follow from a distance until comfort measures are instituted, then will sign off.           MEDS:   · Vancomycin:  Please draw a random level at 11am today, 2/17/2021 Other ONCE   · HYDROmorphone (PF) (DILAUDID) injection 1 mg 1 mg IntraVENous Q3H PRN   · niCARdipine (CARDENE) 50 mg in 0.9% sodium chloride 100 mL infusion 0-15 mg/hr IntraVENous TITRATE   · balsam peru-castor oiL (VENELEX) ointment Topical Q12H   · albuterol (PROVENTIL HFA, VENTOLIN HFA, PROAIR HFA) inhaler 2 Puff 2 Puff Inhalation Q4H RT   · heparin (porcine) injection 5,000 Units 5,000 Units SubCUTAneous Q8H   · Vancomycin- Pharmacy Dosing Other Rx Dosing/Monitoring   · bumetanide (BUMEX) injection 2 mg 2 mg IntraVENous Q12H   · meropenem (MERREM) 500 mg in 0.9% sodium chloride (MBP/ADV) 50 mL MBP 0.5 g IntraVENous Q12H   · 0.9% sodium chloride infusion 250 mL 250 mL IntraVENous PRN   · insulin glargine (LANTUS) injection 20 Units 20 Units SubCUTAneous DAILY   · pantoprazole (PROTONIX) 40 mg in 0.9% sodium chloride 10 mL injection 40 mg IntraVENous Q12H   · labetaloL (NORMODYNE;TRANDATE) injection 20 mg 20 mg IntraVENous Q4H PRN   · ticagrelor (BRILINTA) tablet 90 mg 90 mg Oral BID   · carvediloL (COREG) tablet 25 mg 25 mg Per NG tube Q12H   · isosorbide dinitrate (ISORDIL) tablet 20 mg 20 mg Per NG tube BID   · amLODIPine (NORVASC) tablet 10 mg 10 mg Per NG tube DAILY   · insulin lispro (HUMALOG) injection SubCUTAneous Q6H   · alteplase (CATHFLO) 1 mg in sterile water (preservative free) 1 mL injection 1 mg InterCATHeter PRN   · ascorbic acid (vitamin C) (VITAMIN C) tablet 500 mg 500 mg Per NG tube BID   · cholecalciferol (VITAMIN D3) (1000 Units /25 mcg) tablet 2,000 Units 2,000 Units Per NG tube DAILY   · zinc sulfate (ZINCATE) 220 (50) mg capsule 1 Cap 1 Cap Per NG tube DAILY   · ferrous sulfate 300 mg (60 mg iron)/5 mL oral syrup 300 mg 300 mg Per NG tube DAILY   · metoprolol (LOPRESSOR) injection 5 mg 5 mg IntraVENous Q6H PRN   · [Held by provider] allopurinoL (ZYLOPRIM) tablet 100 mg 100 mg Oral DAILY   · sodium chloride (NS) flush 5-40 mL 5-40 mL IntraVENous Q8H   · sodium chloride (NS) flush 5-40 mL 5-40 mL IntraVENous PRN   · acetaminophen (TYLENOL) tablet 650 mg 650 mg Oral Q6H PRN     Or   · acetaminophen (TYLENOL) suppository 650 mg 650 mg Rectal Q6H PRN   · glucose chewable tablet 16 g 4 Tab Oral PRN   · dextrose (D50W) injection syrg 12.5-25 g 12.5-25 g IntraVENous PRN   · glucagon (GLUCAGEN) injection 1 mg 1 mg IntraMUSCular PRN   · aspirin chewable tablet 81 mg 81 mg Oral DAILY   · atorvastatin (LIPITOR) tablet 40 mg 40 mg Oral QHS              2/17/2021 04:16   RBC: 2.79 (L)   HGB: 7.3 (L)   HCT: 23.1 (L)   RDW: 18.4 (H)   IMMATURE GRANULOCYTES: 1 (H)   DF: AUTOMATED   ABSOLUTE NRBC: 0.00   ABS. NEUTROPHILS: 7.5   ABS. IMM.  GRANS.: 0.1 (H)               2/17/2021 04:16   Potassium: 3.3 (L)   Chloride: 113 (H)   CO2: 20 (L)   Anion gap: 11   Glucose: 139 (H)   BUN: 112 (H)   Creatinine: 7.07 (H)   BUN/Creatinine ratio: 16   Calcium: 8.0 (L)   Phosphorus: 5.5 (H)   Magnesium: 2.4   GFR est non-AA: 8 (L)   GFR est AA: 10 (L)   Bilirubin, total: 0.3   Protein, total: 5.6 (L)   Albumin: 1.4 (L)   Globulin: 4.2 (H)   A-G Ratio: 0.3 (L)   ALT: 54   AST: 59 (H)

## 2021-02-24 NOTE — PROGRESS NOTES
Hospitalist Progress Note  Elian Paniagua MD  Answering service: [phone] from in house phone      Date of Service:  2021  NAME:  Ruth Cameron  :  1955  MRN:  [id number]    Admission Summary:   65M p/w SOB- downgraded from ICU after lengthy stay. Extubated . Interval history / Subjective:   Patient seen and examined at bedside, feels      Assessment & Plan:     #. Volume Overload  #. ARF: Nephro evaluated  - pt/family had declined dialysis, then changed their mind as pt improved mentally.  - Nephrology re-consulted- started HD , daily first few days    #. Acute on Chronic Systolic CHF: Cards - ASA, Ticagrelor, Carvedilol, Lipitor  - TTE: ef 20%- Cardiology had signed off as pt was heading towards possible comfort. Now change of plan, if improving with HD will re-consult cardiology, and recheck TTE to evaluate his heart. #. Anemia: chronic, likely of chronic disease. Iron low, supplements. S/p transfusion.   - iron panel low, ferritin high, folate wnl, vit B12 high. Hemoccult pending. #. Pulm HTN: mod- severe. as seen on TTE. #. Jose Drought: extubated - resolved. #. COVID-19 PNA- repeat COVID test -ve on . #. Aspiration PNA- treated. #. Mycoplasma and S. Pneumoniae PNA. ID- finished Abx course. Sepsis- resolved. #. Acute metabolic Encephalopathy: related to above. Improved. #. Deranged LFTs: likely related to CHF/congestion. Monitor. #. DM2: A1c 7.4, SSI, AccuChecks, monitor  #. Debility: 2nd to multiple medical co-morbidities and advanced age.  PT/OT    Code status: DNR  DVT prophylaxis: heparin  Care Plan discussed with: Patient/Family and Nurse  Disposition: TBD >2days     Hospital Problems  Never Reviewed          Codes Class Noted POA    Fatigue, unspecified type ICD-10-CM: R53.83  ICD-9-CM: 780.79  Unknown Unknown        Chronic systolic congestive heart failure ([facility] [address] ICD-10-CM: I50.22  ICD-9-CM: 428.22, 428.0  2/16/2021 Unknown        Goals of care, counseling/discussion ICD-10-CM: Z71.89  ICD-9-CM: V65.49  Unknown Unknown        DNR (do not resuscitate) discussion ICD-10-CM: Z71.89  ICD-9-CM: V65.49  Unknown Unknown        JUAN C (acute kidney injury) ([facility] [address] ICD-10-CM: N17.9  ICD-9-CM: 000. 9  Unknown Unknown        Acute hypoxemic respiratory failure due to COVID-19 [facility]) ICD-10-CM: U07.1, J96.01  ICD-9-CM: 518.81, 079.89, 799.02  1/30/2021 Unknown            Review of Systems:   Pertinent items are mentioned in interval history. Vital Signs:    Last 24hrs VS reviewed since prior progress note. Most recent are:  Visit Vitals  BP (!) 166/65   Pulse 83   Temp 98.1 °F (36.7 °C)   Resp 16   Ht 5' 7\" (1.702 m)   Wt 98.7 kg (217 lb 8 oz)   SpO2 97%   BMI 34.07 kg/m²         Intake/Output Summary (Last 24 hours) at 2/24/2021 6383  Last data filed at 2/23/2021 5543  Gross per 24 hour   Intake 320 ml   Output 400 ml   Net -80 ml        Physical Examination:   Evaluated face to face and examined 02/24/21    General:  Alert, fatigued, slow in speaking. No acute distress. Looks chronically sick. BM. Resp:  No accessory muscle use, no wheezes, rhonchi + 'too weak to fully cough out phlegem'. Abd:  Soft, non-tender, non-distended,  Extremities:  No cyanosis or clubbing, significant edema LEs  Neuro:  sleepy. slow in speaking, no focal neuro deficits, follows commands   Psych:   not agitated.     Data Review:    Review and/or order of clinical lab test  Review and/or order of tests in the radiology section of CPT  Review and/or order of tests in the medicine section of CPT  Labs:     Recent Labs     02/24/21  0128 02/23/21  0359   WBC 9.0 8.6   HGB 7.6* 7.6*   HCT 23.4* 23.5*    280     Recent Labs     02/24/21  0128 02/23/21  0359 02/22/21  0134    143 142   K 3.7 4.1 3.6    109* 108   CO2 19* 19* 21   * 149* 150*   CREA 9.20* 11.20* 10.70*   GLU 89 69 79   CA 8.0* 7. 7* 7.7*   MG  --  2.8* 3.0*   PHOS  --  8.7* 8.5*   URICA  --   --  7.6*     No results for input(s): ALT, AP, TBIL, TBILI, TP, ALB, GLOB, GGT, AML, LPSE in the last 72 hours. No lab exists for component: SGOT, GPT, AMYP, HLPSE  No results for input(s): INR, PTP, APTT, INREXT, INREXT in the last 72 hours. Recent Labs     02/23/21  0359   FERR 661*      Lab Results   Component Value Date/Time    Folate 17.5 02/23/2021 03:59 AM      No results for input(s): PH, PCO2, PO2 in the last 72 hours. No results for input(s): CPK, CKNDX, TROIQ in the last 72 hours.     No lab exists for component: CPKMB  Lab Results   Component Value Date/Time    Cholesterol, total 115 02/26/2010 05:20 PM    HDL Cholesterol 31 (L) 02/26/2010 05:20 PM    LDL, calculated 46.2 02/26/2010 05:20 PM    Triglyceride 189 02/26/2010 05:20 PM    CHOL/HDL Ratio 3.7 02/26/2010 05:20 PM     Lab Results   Component Value Date/Time    Glucose (POC) 109 (H) 02/24/2021 06:22 AM    Glucose (POC) 92 02/23/2021 09:30 PM    Glucose (POC) 98 02/23/2021 05:15 PM    Glucose (POC) 101 (H) 02/23/2021 11:15 AM    Glucose (POC) 84 02/23/2021 06:25 AM     Lab Results   Component Value Date/Time    Color YELLOW/STRAW 02/10/2021 01:26 PM    Appearance TURBID (A) 02/10/2021 01:26 PM    Specific gravity 1.014 02/10/2021 01:26 PM    Specific gravity 1.015 02/26/2010 12:32 PM    pH (UA) 5.0 02/10/2021 01:26 PM    Protein 100 (A) 02/10/2021 01:26 PM    Glucose Negative 02/10/2021 01:26 PM    Ketone Negative 02/10/2021 01:26 PM    Bilirubin Negative 02/10/2021 01:26 PM    Urobilinogen 0.2 02/10/2021 01:26 PM    Nitrites Negative 02/10/2021 01:26 PM    Leukocyte Esterase Negative 02/10/2021 01:26 PM    Epithelial cells FEW 02/10/2021 01:26 PM    Bacteria Negative 02/10/2021 01:26 PM    WBC 0-4 02/10/2021 01:26 PM    RBC  02/10/2021 01:26 PM     Medications Reviewed:     Current Facility-Administered Medications   Medication Dose Route Frequency    diphenhydrAMINE (BENADRYL) capsule 25 mg  25 mg Oral Q6H PRN    heparin (porcine) 1,000 unit/mL injection 2,500 Units  2,500 Units Injection DIALYSIS PRN    0.9% sodium chloride infusion 250 mL  250 mL IntraVENous PRN    albuterol-ipratropium (DUO-NEB) 2.5 MG-0.5 MG/3 ML  3 mL Nebulization Q4H PRN    HYDROmorphone (PF) (DILAUDID) injection 1 mg  1 mg IntraVENous Q2H PRN    insulin lispro (HUMALOG) injection   SubCUTAneous AC&HS    isosorbide dinitrate (ISORDIL) tablet 30 mg  30 mg Per NG tube Q8H    balsam peru-castor oiL (VENELEX) ointment   Topical Q12H    bumetanide (BUMEX) injection 2 mg  2 mg IntraVENous Q12H    0.9% sodium chloride infusion 250 mL  250 mL IntraVENous PRN    insulin glargine (LANTUS) injection 20 Units  20 Units SubCUTAneous DAILY    pantoprazole (PROTONIX) 40 mg in 0.9% sodium chloride 10 mL injection  40 mg IntraVENous Q12H    labetaloL (NORMODYNE;TRANDATE) injection 20 mg  20 mg IntraVENous Q4H PRN    ticagrelor (BRILINTA) tablet 90 mg  90 mg Oral BID    carvediloL (COREG) tablet 25 mg  25 mg Per NG tube Q12H    amLODIPine (NORVASC) tablet 10 mg  10 mg Per NG tube DAILY    alteplase (CATHFLO) 1 mg in sterile water (preservative free) 1 mL injection  1 mg InterCATHeter PRN    ascorbic acid (vitamin C) (VITAMIN C) tablet 500 mg  500 mg Per NG tube BID    cholecalciferol (VITAMIN D3) (1000 Units /25 mcg) tablet 2,000 Units  2,000 Units Per NG tube DAILY    metoprolol (LOPRESSOR) injection 5 mg  5 mg IntraVENous Q6H PRN    [Held by provider] allopurinoL (ZYLOPRIM) tablet 100 mg  100 mg Oral DAILY    sodium chloride (NS) flush 5-40 mL  5-40 mL IntraVENous Q8H    sodium chloride (NS) flush 5-40 mL  5-40 mL IntraVENous PRN    acetaminophen (TYLENOL) tablet 650 mg  650 mg Oral Q6H PRN    Or    acetaminophen (TYLENOL) suppository 650 mg  650 mg Rectal Q6H PRN    glucose chewable tablet 16 g  4 Tab Oral PRN    dextrose (D50W) injection syrg 12.5-25 g  12.5-25 g IntraVENous PRN    glucagon (GLUCAGEN) injection 1 mg  1 mg IntraMUSCular PRN    aspirin chewable tablet 81 mg  81 mg Oral DAILY    atorvastatin (LIPITOR) tablet 40 mg  40 mg Oral QHS   ______________________________________________________________________  EXPECTED LENGTH OF STAY: 12d 7h  ACTUAL LENGTH OF STAY:          Aakash Lance MD

## 2021-02-24 NOTE — PROGRESS NOTES
Palliative Medicine Consult  Daniel: [phone] ([phone])    Patient Name: Matthew Marinelli  YOB: 1955    Date of Initial Consult: 2/16/21  Reason for Consult: Care decisions  Requesting Provider: Dr. Oh Ortiz  Primary Care Physician: Candelaria, MD Jessi     SUMMARY:   Matthew Marinelli is a 63 y.o. male with a past history of anemia, neck wound, diabetes mellitus type 2, ischemic cardiomyopathy, EF 20-25%, mod-severe pulmonary hypertension, and former heroin use, who was [place] on 1/30/2021 [place] [facility] with a diagnosis of Covid-19 pneumonia and acute hypoxic respiratory failure. He presented to the ED with complaints of Covid-19 infection, shortness of breath, and hypoxia. He was placed on BiPAP and later intubated. He was extubated 2/11 and transferred to [facility]. On 2/15, his condition deteriorated and his oxygen needs increased. He was transferred back to ICU and is now alternating between BiPAP and HFNC. Current medical issues leading to Palliative Medicine involvement include: critically ill at high risk of decompensation, discuss care decisions. Social: He had been at [facility] [place] skilled nursing care for his neck wound. His wife Gisele Singer and their children are his main support. He and wife Gisele Singer are , but he recently came to live with her and she has been helping him with his medical problems. PALLIATIVE DIAGNOSES:   1. Goals of care  2. DNR discussion  3. Covid-19 pneumonia, resolved  4. Mycoplasma and strep pneumonia  5. Acute hypoxic respiratory failure, resolved  6. JUAN C  7. Ischemic cardiomyopathy  8. Chronic systolic heart failure  9. Fatigue  10. Dysphagia       PLAN:   Patient Maribell Greene seen at the bedside with his daughter Echo present. He has started dialysis and tolerated the first session well per Echo. He is awake and alert, but in the process of eating applesauce so he does not speak to me. Daughter Echo asked to speak to me outside of the room.  We talked at length about the patient's hospital course and condition prior to admit. Mukesh Ma reports that he was doing well despite a recent brown recluse spider bite to his neck. He received care at [facility] for this and then chose to go to [facility] for skilled nursing care. Mukesh Ma reports that he walked into [facility] to receive care and had been mobile at the SNF. She is very upset about the medical team trying to \"push us into pulling the plug\" last week. I explained that his condition was significantly worse last week and we were all concerned that he was not going to survive, but he has now recovered much more than we were expecting. Mukesh Ma says that the family does not want him to go back to [facility] and plan to take care of him at home. We talked about him starting dialysis and Mukesh Ma shares that she was on HD for many years, then received a transplant, but is now back on PD. She feels that she will be a good advocate and support for her father for his ESRD and HD management. She hopes that he can go to a dialysis center and receive overnight 8 hour long dialysis sessions that will be more gentle on his heart. We also talked about the dysphagia that the speech therapist has identified. I explained that we are concerned about him aspirating and that we do not want him to develop another pneumonia. Mukesh Ma hopes that this will improve along with his overall condition with more time. Communicated plan of care with: Palliative IDT     GOALS OF CARE / TREATMENT PREFERENCES:     GOALS OF CARE:  Patient/Health Care Proxy Stated Goals: Rehabilitation    TREATMENT PREFERENCES:   Code Status: DNR    Advance Care Planning:  [x] The [facility] Interdisciplinary Team has updated the ACP Navigator with Health Care Decision Maker and Patient Capacity      Primary Decision Maker: Bernadine Rainey - Spouse - [phone]  No flowsheet data found.     Medical Interventions: Limited additional interventions     Other Instructions:   Artificially Administered Nutrition: Feeding tube for a defined trial period     Other:    As far as possible, the palliative care team has discussed with patient / health care proxy about goals of care / treatment preferences for patient. HISTORY:     History obtained from: chart, wife    CHIEF COMPLAINT: Shortness of breath    HPI/SUBJECTIVE:    The patient is:    [x] Verbal and participatory  [] Non-participatory due to:     He is more awake and alert today. He denies pain or nausea. He has some shortness of breath. He mostly complains of being hungry and wanting something to eat. Clinical Pain Assessment (nonverbal scale for severity on nonverbal patients):   Clinical Pain Assessment  Severity: 0     Activity (Movement): Lying quietly, normal position    Duration: for how long has pt been experiencing pain (e.g., 2 days, 1 month, years)  Frequency: how often pain is an issue (e.g., several times per day, once every few days, constant)     FUNCTIONAL ASSESSMENT:     Palliative Performance Scale (PPS):  PPS: 40       PSYCHOSOCIAL/SPIRITUAL SCREENING:     Palliative IDT has assessed this patient for cultural preferences / practices and a referral made as appropriate to needs (Cultural Services, Patient Advocacy, Ethics, etc.)    Any spiritual / Jewish concerns:  [] Yes /  [x] No    Caregiver Burnout:  [] Yes /  [x] No /  [] No Caregiver Present      Anticipatory grief assessment:   [x] Normal  / [] Maladaptive       ESAS Anxiety: Anxiety: 0    ESAS Depression:          REVIEW OF SYSTEMS:     Positive and pertinent negative findings in ROS are noted above in HPI. The following systems were [] reviewed / [x] unable to be reviewed as noted in HPI  Other findings are noted below. Systems: constitutional, ears/nose/mouth/throat, respiratory, gastrointestinal, genitourinary, musculoskeletal, integumentary, neurologic, psychiatric, endocrine. Positive findings noted below.   Modified ESAS Completed by: provider   Fatigue: 8 Drowsiness: 0     Pain: 0   Anxiety: 0 Nausea: 0   Anorexia: 0 Dyspnea: 0           Stool Occurrence(s): 1        PHYSICAL EXAM:     From RN flowsheet:  Wt Readings from Last 3 Encounters:   02/24/21 210 lb (95.3 kg)   06/09/20 172 lb (78 kg)     Blood pressure (!) 144/87, pulse 81, temperature 97.9 °F (36.6 °C), resp. rate 20, height 5' 7\" (1.702 m), weight 210 lb (95.3 kg), SpO2 96 %. Pain Scale 1: Numeric (0 - 10)  Pain Intensity 1: 7  Pain Onset 1: acute  Pain Location 1: Leg  Pain Orientation 1: Medial  Pain Description 1: Constant  Pain Intervention(s) 1: Medication (see MAR)  Last bowel movement, if known:     Constitutional: awake, alert, sitting up in bed  Eyes: pupils equal, anicteric  ENMT: no nasal discharge, dry mucous membranes  Cardiovascular: irregular rhythm  Respiratory: breathing non labored on room air, symmetric  Musculoskeletal: no deformity, no tenderness to palpation  Skin: warm, dry  Neurologic: following commands, moving all extremities  Psychiatric: calm       HISTORY:     Active Problems:    Acute hypoxemic respiratory failure due to COVID-19 ([facility]) (1/30/2021)      Chronic systolic congestive heart failure ([facility] [address] (2/16/2021)      Goals of care, counseling/discussion ()      DNR (do not resuscitate) discussion ()      JUAN C (acute kidney injury) ([facility] [address] ()      Fatigue, unspecified type ()      Past Medical History:   Diagnosis Date    Diabetes ([facility] [address]     Hypertension       Past Surgical History:   Procedure Laterality Date    IR INSERT NON TUNL CVC OVER 5 YRS  2/23/2021      No family history on file. History reviewed, no pertinent family history.   Social History     Tobacco Use    Smoking status: Former Smoker    Smokeless tobacco: Never Used   Substance Use Topics    Alcohol use: Not Currently     No Known Allergies   Current Facility-Administered Medications   Medication Dose Route Frequency    diphenhydrAMINE (BENADRYL) capsule 25 mg  25 mg Oral Q6H PRN    heparin (porcine) 1,000 unit/mL injection 2,500 Units  2,500 Units Injection DIALYSIS PRN    0.9% sodium chloride infusion 250 mL  250 mL IntraVENous PRN    albuterol-ipratropium (DUO-NEB) 2.5 MG-0.5 MG/3 ML  3 mL Nebulization Q4H PRN    HYDROmorphone (PF) (DILAUDID) injection 1 mg  1 mg IntraVENous Q2H PRN    insulin lispro (HUMALOG) injection   SubCUTAneous AC&HS    isosorbide dinitrate (ISORDIL) tablet 30 mg  30 mg Per NG tube Q8H    balsam peru-castor oiL (VENELEX) ointment   Topical Q12H    bumetanide (BUMEX) injection 2 mg  2 mg IntraVENous Q12H    0.9% sodium chloride infusion 250 mL  250 mL IntraVENous PRN    insulin glargine (LANTUS) injection 20 Units  20 Units SubCUTAneous DAILY    pantoprazole (PROTONIX) 40 mg in 0.9% sodium chloride 10 mL injection  40 mg IntraVENous Q12H    labetaloL (NORMODYNE;TRANDATE) injection 20 mg  20 mg IntraVENous Q4H PRN    ticagrelor (BRILINTA) tablet 90 mg  90 mg Oral BID    carvediloL (COREG) tablet 25 mg  25 mg Per NG tube Q12H    amLODIPine (NORVASC) tablet 10 mg  10 mg Per NG tube DAILY    alteplase (CATHFLO) 1 mg in sterile water (preservative free) 1 mL injection  1 mg InterCATHeter PRN    ascorbic acid (vitamin C) (VITAMIN C) tablet 500 mg  500 mg Per NG tube BID    cholecalciferol (VITAMIN D3) (1000 Units /25 mcg) tablet 2,000 Units  2,000 Units Per NG tube DAILY    metoprolol (LOPRESSOR) injection 5 mg  5 mg IntraVENous Q6H PRN    [Held by provider] allopurinoL (ZYLOPRIM) tablet 100 mg  100 mg Oral DAILY    sodium chloride (NS) flush 5-40 mL  5-40 mL IntraVENous Q8H    sodium chloride (NS) flush 5-40 mL  5-40 mL IntraVENous PRN    acetaminophen (TYLENOL) tablet 650 mg  650 mg Oral Q6H PRN    Or    acetaminophen (TYLENOL) suppository 650 mg  650 mg Rectal Q6H PRN    glucose chewable tablet 16 g  4 Tab Oral PRN    dextrose (D50W) injection syrg 12.5-25 g  12.5-25 g IntraVENous PRN    glucagon (GLUCAGEN) injection 1 mg  1 mg IntraMUSCular PRN    aspirin chewable tablet 81 mg  81 mg Oral DAILY    atorvastatin (LIPITOR) tablet 40 mg  40 mg Oral QHS          LAB AND IMAGING FINDINGS:     Lab Results   Component Value Date/Time    WBC 9.0 02/24/2021 01:28 AM    HGB 7.6 (L) 02/24/2021 01:28 AM    PLATELET 288 00/92/9933 01:28 AM     Lab Results   Component Value Date/Time    Sodium 142 02/24/2021 01:28 AM    Potassium 3.7 02/24/2021 01:28 AM    Chloride 108 02/24/2021 01:28 AM    CO2 19 (L) 02/24/2021 01:28 AM     (H) 02/24/2021 01:28 AM    Creatinine 9.20 (H) 02/24/2021 01:28 AM    Calcium 8.0 (L) 02/24/2021 01:28 AM    Magnesium 2.8 (H) 02/23/2021 03:59 AM    Phosphorus 8.7 (H) 02/23/2021 03:59 AM      Lab Results   Component Value Date/Time    Alk. phosphatase 195 (H) 02/20/2021 03:38 AM    Protein, total 5.4 (L) 02/20/2021 03:38 AM    Albumin 1.4 (L) 02/20/2021 03:38 AM    Globulin 4.0 02/20/2021 03:38 AM     Lab Results   Component Value Date/Time    aPTT 24.7 02/08/2021 10:13 AM      Lab Results   Component Value Date/Time    Iron 23 (L) 02/14/2021 01:12 PM    TIBC 146 (L) 02/14/2021 01:12 PM    Iron % saturation 16 (L) 02/14/2021 01:12 PM    Ferritin 661 (H) 02/23/2021 03:59 AM      Lab Results   Component Value Date/Time    pH 7.50 (H) 02/16/2021 06:25 AM    PCO2 21 (L) 02/16/2021 06:25 AM    PO2 85 02/16/2021 06:25 AM     No components found for: [place]   Lab Results   Component Value Date/Time     01/30/2021 08:00 AM                Total time: 25 min  Counseling / coordination time, spent as noted above: 20 min  > 50% counseling / coordination?: yes    Prolonged service was provided for  []30 min   []75 min in face to face time in the presence of the patient, spent as noted above. Time Start:   Time End:   Note: this can only be billed with 05215 (initial) or 14544 (follow up). If multiple start / stop times, list each separately.

## 2021-02-24 NOTE — PROGRESS NOTES
Problem: Patient Education:  Go to Education Activity  Goal: Patient/Family Education  Outcome: Progressing Towards Goal     Problem: Pressure Injury - Risk of  Goal: *Prevention of pressure injury  Description: Document Cruzito Scale and appropriate interventions in the flowsheet. Outcome: Progressing Towards Goal  Note: Pressure Injury Interventions:  Sensory Interventions: Assess changes in LOC, Avoid rigorous massage over bony prominences, Check visual cues for pain, Minimize linen layers, Turn and reposition approx.  every two hours (pillows and wedges if needed)    Moisture Interventions: Absorbent underpads, Apply protective barrier, creams and emollients, Check for incontinence Q2 hours and as needed, Internal/External urinary devices, Maintain skin hydration (lotion/cream)    Activity Interventions: Pressure redistribution bed/mattress(bed type)    Mobility Interventions: HOB 30 degrees or less, Pressure redistribution bed/mattress (bed type)    Nutrition Interventions: Document food/fluid/supplement intake    Friction and Shear Interventions: Apply protective barrier, creams and emollients, Feet elevated on foot rest, HOB 30 degrees or less, Minimize layers                Problem: Nutrition Deficit  Goal: *Optimize nutritional status  Outcome: Progressing Towards Goal

## 2021-02-24 NOTE — PROGRESS NOTES
SLP Contact Note    Awaiting goals of care conversation. Per SLP yesterday, the following recommendations are provided:    Pending goals of care  If for aggressive care, consider NPO. If for comfort measures, consider changing honey thick liquid diet to thin, as this is likely more pleasurable and more easily cleared from the lungs.        Thank you,  BERYL PruittEd, [id number] [facility]  Speech-Language Pathologist

## 2021-02-24 NOTE — PROGRESS NOTES
DIEGO:  1. RUR-32%  2. Patient admitted from [facility], family wants to take him home. 3. Nephrology following- received HD yesterday. 4. PT/OT following. CM spoke with patient and his daughter at bedside. The daughter explained that he is not going to return to [facility] they will take him home. CM informed [facility] of not returning, and will continue to follow for patient care and discharge needs.     Alessandra Aj, [facility]

## 2021-02-24 NOTE — PROGRESS NOTES
Problem: Self Care Deficits Care Plan (Adult)  Goal: *Acute Goals and Plan of Care (Insert Text)  Description:   FUNCTIONAL STATUS PRIOR TO ADMISSION: Patient was independent and active without use of DME. Patient was independent for basic and instrumental ADLs at his true baseline, however admitted from [facility] for receiving abx in prep for skin graft. HOME SUPPORT: The patient lived with his wife but did not require assist.    Occupational Therapy Goals  Initiated 2/12/2021; Goals reviewed 2/24/2021, all goals remain appropriate. 1.  Patient will perform self-feeding with moderate assistance & compensatory techniques PRN within 7 day(s). 2.  Patient will perform grooming with moderate assistance within 7 day(s). 3.  Patient will perform anterior neck to thigh bathing with moderate assistance within 7 day(s). 4.  Patient will perform rolling in supine toilet transfers with moderate assistance within 7 day(s). 5.  Patient will participate in upper extremity therapeutic exercise/activities with moderate assistance for 10 minutes within 7 day(s). 7.  Patient will utilize energy conservation techniques during functional activities with verbal cues within 7 day(s). Outcome: Progressing Towards Goal     OCCUPATIONAL THERAPY TREATMENT/WEEKLY RE-ASSESSMENT  Patient: Margie Guerrier (88 y.o. male)  Date: 2/24/2021  Diagnosis: Acute hypoxemic respiratory failure due to COVID-19 ([facility] [U07.1, J96.01] <principal problem not specified>       Precautions: Fall  Chart, occupational therapy assessment, plan of care, and goals were reviewed. ASSESSMENT  Patient continues with skilled OT services and is slowly progressing towards goals. Patient continues to be most limited by fatigue, lethargy, global weakness, impaired balance, poor activity tolerance, impaired coordination, and decreased endurance.  Progress has been additionally limited participation with therapy due to medical status and ongoing discussions regarding [address]. This session, pt is able to bring L UE to face with min A but requires up to mod A to perform functional facial hygiene task. Pt is weaker in R UE and requires up to max A for AAROM. Pt with minimal tolerance of chair position this session and tolerates 5 reps of AAROM/PROM to all extremities this date. He is less talkative than on initial evaluation and is visibly fatigued at end of session. All goals remain appropriate at this time but POC downgraded to 3x/week to reflect pt's tolerance levels. Continue to recommend SNF-level rehab. Current Level of Function Impacting Discharge (ADLs): up to max/total A for ADLs    Other factors to consider for discharge: ongoing [address] discussions; global weakness; total A for mobility; supportive family         PLAN :  Goals have been updated based on progression since last assessment. Patient continues to benefit from skilled intervention to address the above impairments. Continue to follow patient 3 times a week to address goals. Recommend with staff: bed in chair position 3x/day; ROM as able to prevent contractures; Encourage participation in [address] ADLs as able    Recommend next OT session: continue with simple grooming/UB ADLs as able; UE strengthening    Recommendation for discharge: (in order for the patient to meet his/her long term goals)  Therapy up to 5 days/week in SNF setting    This discharge recommendation:  Has been made in collaboration with the attending provider and/or case management    IF patient discharges home will need the following DME: hospital bed       SUBJECTIVE:   Patient stated I feel weak.     OBJECTIVE DATA SUMMARY:   Cognitive/Behavioral Status:  Neurologic State: Alert  Orientation Level: Oriented to person  Cognition: Decreased attention/concentration;Decreased command following  Perception: Appears intact  Perseveration: No perseveration noted    Functional Mobility and Transfers for ADLs:  Bed Mobility:  Supine to Sit: Bed Modified; Total assistance(use of bed controls to transition to chair position)    Balance:  Sitting: Impaired; With support    ADL Intervention:  Grooming  Grooming Assistance: Moderate assistance(to use L UE; max to total with R UE due to weakness)  Position Performed: Other (comment)(semi-supine/modified chair position)  Washing Face: Moderate assistance(with L hand)    Therapeutic Exercises:   Pt with fair to poor tolerance of PROM/AAROM to all extremities in modified chair position this session. Pt performed 5 reps of the following:  Biceps curls, grasp and release, shoulder forward flexion, ankle pumps, quad sets, SAQ with bolster, and shoulder shrugs    Functional Outcome Measure:  Barthel Index:    Bathin  Bladder: 0  Bowels: 0  Groomin  Dressin  Feedin  Mobility: 0  Stairs: 0  Toilet Use: 0  Transfer (Bed to Chair and Back): 0  Total: 0/100        The Barthel ADL Index: Guidelines  1. The index should be used as a record of what a patient does, not as a record of what a patient could do. 2. The main aim is to establish degree of independence from any help, physical or verbal, however minor and for whatever reason. 3. The need for supervision renders the patient not independent. 4. A patient's performance should be established using the best available evidence. Asking the patient, friends/relatives and nurses are the usual sources, but direct observation and common sense are also important. However direct testing is not needed. 5. Usually the patient's performance over the preceding 24-48 hours is important, but occasionally longer periods will be relevant. 6. Middle categories imply that the patient supplies over 50 per cent of the effort. 7. Use of aids to be independent is allowed. Na Zuniga., Barthel, D.W. (3865). Functional evaluation: the Barthel Index. [address] [facility] (14)2. Miranda Hardy, TL, [place] Raya.Jennifer., [place], [address] [place] [address] ().  Measuring the change indisability after inpatient rehabilitation; comparison of the responsiveness of the Barthel Index and Functional [place] Measure. Journal of Neurology, Neurosurgery, and Psychiatry, 66(4), 365-989. PIEDAD Henderson, TOMI Andrew, & Stacey Garnder M.A. (2004.) Assessment of post-stroke quality of life in cost-effectiveness studies: The usefulness of the Barthel Index and the EuroQoL-5D. Quality of Life Research, 13, 427-43     Pain:  Pt reporting pain in LEs during AAROM/PROM    Activity Tolerance:   Fair and Poor    After treatment patient left in no apparent distress:   Supine in bed, Call bell within reach, Bed / chair alarm activated and Side rails x 3    COMMUNICATION/COLLABORATION:   The patients plan of care was discussed with: Physical therapist and Registered nurse.      Eve Lind OT  Time Calculation: 16 mins

## 2021-02-25 ENCOUNTER — APPOINTMENT (OUTPATIENT)
Dept: NON INVASIVE DIAGNOSTICS | Age: 66
DRG: 720 | End: 2021-02-25
Attending: INTERNAL MEDICINE
Payer: MEDICAID

## 2021-02-25 LAB
ANION GAP SERPL CALC-SCNC: 10 MMOL/L (ref 5–15)
BASOPHILS # BLD: 0 K/UL (ref 0–0.1)
BASOPHILS NFR BLD: 0 % (ref 0–1)
BUN SERPL-MCNC: 70 MG/DL (ref 6–20)
BUN/CREAT SERPL: 10 (ref 12–20)
CALCIUM SERPL-MCNC: 8 MG/DL (ref 8.5–10.1)
CHLORIDE SERPL-SCNC: 106 MMOL/L (ref 97–108)
CO2 SERPL-SCNC: 25 MMOL/L (ref 21–32)
CREAT SERPL-MCNC: 6.85 MG/DL (ref 0.7–1.3)
DIFFERENTIAL METHOD BLD: ABNORMAL
EOSINOPHIL # BLD: 0.3 K/UL (ref 0–0.4)
EOSINOPHIL NFR BLD: 3 % (ref 0–7)
ERYTHROCYTE [DISTWIDTH] IN BLOOD BY AUTOMATED COUNT: 16.8 % (ref 11.5–14.5)
GLUCOSE BLD STRIP.AUTO-MCNC: 135 MG/DL (ref 65–100)
GLUCOSE BLD STRIP.AUTO-MCNC: 144 MG/DL (ref 65–100)
GLUCOSE BLD STRIP.AUTO-MCNC: 48 MG/DL (ref 65–100)
GLUCOSE BLD STRIP.AUTO-MCNC: 50 MG/DL (ref 65–100)
GLUCOSE BLD STRIP.AUTO-MCNC: 52 MG/DL (ref 65–100)
GLUCOSE BLD STRIP.AUTO-MCNC: 76 MG/DL (ref 65–100)
GLUCOSE BLD STRIP.AUTO-MCNC: 87 MG/DL (ref 65–100)
GLUCOSE SERPL-MCNC: 66 MG/DL (ref 65–100)
HCT VFR BLD AUTO: 25.1 % (ref 36.6–50.3)
HGB BLD-MCNC: 8.4 G/DL (ref 12.1–17)
IMM GRANULOCYTES # BLD AUTO: 0.1 K/UL (ref 0–0.04)
IMM GRANULOCYTES NFR BLD AUTO: 1 % (ref 0–0.5)
LYMPHOCYTES # BLD: 1.3 K/UL (ref 0.8–3.5)
LYMPHOCYTES NFR BLD: 14 % (ref 12–49)
MAGNESIUM SERPL-MCNC: 2.2 MG/DL (ref 1.6–2.4)
MCH RBC QN AUTO: 27.3 PG (ref 26–34)
MCHC RBC AUTO-ENTMCNC: 33.5 G/DL (ref 30–36.5)
MCV RBC AUTO: 81.5 FL (ref 80–99)
MONOCYTES # BLD: 1 K/UL (ref 0–1)
MONOCYTES NFR BLD: 11 % (ref 5–13)
NEUTS SEG # BLD: 6.5 K/UL (ref 1.8–8)
NEUTS SEG NFR BLD: 71 % (ref 32–75)
NRBC # BLD: 0 K/UL (ref 0–0.01)
NRBC BLD-RTO: 0 PER 100 WBC
PHOSPHATE SERPL-MCNC: 5.6 MG/DL (ref 2.6–4.7)
PLATELET # BLD AUTO: 306 K/UL (ref 150–400)
PMV BLD AUTO: 10.8 FL (ref 8.9–12.9)
POTASSIUM SERPL-SCNC: 3.6 MMOL/L (ref 3.5–5.1)
RBC # BLD AUTO: 3.08 M/UL (ref 4.1–5.7)
SERVICE CMNT-IMP: ABNORMAL
SERVICE CMNT-IMP: NORMAL
SERVICE CMNT-IMP: NORMAL
SODIUM SERPL-SCNC: 141 MMOL/L (ref 136–145)
WBC # BLD AUTO: 9.2 K/UL (ref 4.1–11.1)

## 2021-02-25 PROCEDURE — 93306 TTE W/DOPPLER COMPLETE: CPT

## 2021-02-25 PROCEDURE — 84100 ASSAY OF PHOSPHORUS: CPT

## 2021-02-25 PROCEDURE — 2709999900 HC NON-CHARGEABLE SUPPLY

## 2021-02-25 PROCEDURE — 74011250636 HC RX REV CODE- 250/636: Performed by: INTERNAL MEDICINE

## 2021-02-25 PROCEDURE — 65660000000 HC RM CCU STEPDOWN

## 2021-02-25 PROCEDURE — 74011250636 HC RX REV CODE- 250/636: Performed by: NURSE PRACTITIONER

## 2021-02-25 PROCEDURE — 74011250637 HC RX REV CODE- 250/637: Performed by: EMERGENCY MEDICINE

## 2021-02-25 PROCEDURE — C9113 INJ PANTOPRAZOLE SODIUM, VIA: HCPCS | Performed by: HOSPITALIST

## 2021-02-25 PROCEDURE — 74011000250 HC RX REV CODE- 250: Performed by: NURSE PRACTITIONER

## 2021-02-25 PROCEDURE — 74011000250 HC RX REV CODE- 250: Performed by: EMERGENCY MEDICINE

## 2021-02-25 PROCEDURE — 36415 COLL VENOUS BLD VENIPUNCTURE: CPT

## 2021-02-25 PROCEDURE — 74011636637 HC RX REV CODE- 636/637: Performed by: INTERNAL MEDICINE

## 2021-02-25 PROCEDURE — 74011250637 HC RX REV CODE- 250/637: Performed by: ANESTHESIOLOGY

## 2021-02-25 PROCEDURE — 74011000250 HC RX REV CODE- 250: Performed by: INTERNAL MEDICINE

## 2021-02-25 PROCEDURE — 83735 ASSAY OF MAGNESIUM: CPT

## 2021-02-25 PROCEDURE — 85025 COMPLETE CBC W/AUTO DIFF WBC: CPT

## 2021-02-25 PROCEDURE — 74011250637 HC RX REV CODE- 250/637: Performed by: INTERNAL MEDICINE

## 2021-02-25 PROCEDURE — 82962 GLUCOSE BLOOD TEST: CPT

## 2021-02-25 PROCEDURE — 80048 BASIC METABOLIC PNL TOTAL CA: CPT

## 2021-02-25 PROCEDURE — 93308 TTE F-UP OR LMTD: CPT

## 2021-02-25 PROCEDURE — 74011000250 HC RX REV CODE- 250: Performed by: HOSPITALIST

## 2021-02-25 PROCEDURE — 74011250636 HC RX REV CODE- 250/636: Performed by: HOSPITALIST

## 2021-02-25 PROCEDURE — 74011250637 HC RX REV CODE- 250/637: Performed by: HOSPITALIST

## 2021-02-25 PROCEDURE — 90935 HEMODIALYSIS ONE EVALUATION: CPT

## 2021-02-25 RX ORDER — HYDRALAZINE HYDROCHLORIDE 25 MG/1
25 TABLET, FILM COATED ORAL 3 TIMES DAILY
Status: DISCONTINUED | OUTPATIENT
Start: 2021-02-25 | End: 2021-03-05 | Stop reason: HOSPADM

## 2021-02-25 RX ORDER — HEPARIN SODIUM 1000 [USP'U]/ML
1400 INJECTION, SOLUTION INTRAVENOUS; SUBCUTANEOUS
Status: DISCONTINUED | OUTPATIENT
Start: 2021-02-25 | End: 2021-02-25

## 2021-02-25 RX ORDER — INSULIN GLARGINE 100 [IU]/ML
18 INJECTION, SOLUTION SUBCUTANEOUS DAILY
Status: DISCONTINUED | OUTPATIENT
Start: 2021-02-25 | End: 2021-02-25

## 2021-02-25 RX ORDER — INSULIN GLARGINE 100 [IU]/ML
14 INJECTION, SOLUTION SUBCUTANEOUS DAILY
Status: DISCONTINUED | OUTPATIENT
Start: 2021-02-26 | End: 2021-02-26

## 2021-02-25 RX ORDER — HEPARIN SODIUM 1000 [USP'U]/ML
1100 INJECTION, SOLUTION INTRAVENOUS; SUBCUTANEOUS
Status: DISCONTINUED | OUTPATIENT
Start: 2021-02-25 | End: 2021-02-25

## 2021-02-25 RX ADMIN — HYDROMORPHONE HYDROCHLORIDE 1 MG: 1 INJECTION, SOLUTION INTRAMUSCULAR; INTRAVENOUS; SUBCUTANEOUS at 14:41

## 2021-02-25 RX ADMIN — HYDRALAZINE HYDROCHLORIDE 25 MG: 25 TABLET, FILM COATED ORAL at 21:25

## 2021-02-25 RX ADMIN — SODIUM CHLORIDE 40 MG: 9 INJECTION INTRAMUSCULAR; INTRAVENOUS; SUBCUTANEOUS at 05:55

## 2021-02-25 RX ADMIN — HYDROMORPHONE HYDROCHLORIDE 1 MG: 1 INJECTION, SOLUTION INTRAMUSCULAR; INTRAVENOUS; SUBCUTANEOUS at 21:11

## 2021-02-25 RX ADMIN — TICAGRELOR 90 MG: 90 TABLET ORAL at 09:53

## 2021-02-25 RX ADMIN — HYDRALAZINE HYDROCHLORIDE 25 MG: 25 TABLET, FILM COATED ORAL at 09:53

## 2021-02-25 RX ADMIN — DEXTROSE MONOHYDRATE 25 G: 25 INJECTION, SOLUTION INTRAVENOUS at 21:29

## 2021-02-25 RX ADMIN — HEPARIN SODIUM 2500 UNITS: 1000 INJECTION INTRAVENOUS; SUBCUTANEOUS at 12:56

## 2021-02-25 RX ADMIN — INSULIN GLARGINE 18 UNITS: 100 INJECTION, SOLUTION SUBCUTANEOUS at 09:54

## 2021-02-25 RX ADMIN — HYDROMORPHONE HYDROCHLORIDE 1 MG: 1 INJECTION, SOLUTION INTRAMUSCULAR; INTRAVENOUS; SUBCUTANEOUS at 09:53

## 2021-02-25 RX ADMIN — SODIUM CHLORIDE 40 MG: 9 INJECTION INTRAMUSCULAR; INTRAVENOUS; SUBCUTANEOUS at 16:39

## 2021-02-25 RX ADMIN — CARVEDILOL 25 MG: 12.5 TABLET, FILM COATED ORAL at 21:25

## 2021-02-25 RX ADMIN — ISOSORBIDE DINITRATE 30 MG: 20 TABLET ORAL at 05:55

## 2021-02-25 RX ADMIN — CASTOR OIL AND BALSAM, PERU: 788; 87 OINTMENT TOPICAL at 09:55

## 2021-02-25 RX ADMIN — ATORVASTATIN CALCIUM 40 MG: 40 TABLET, FILM COATED ORAL at 21:25

## 2021-02-25 RX ADMIN — ISOSORBIDE DINITRATE 30 MG: 20 TABLET ORAL at 14:41

## 2021-02-25 RX ADMIN — ALTEPLASE 1 MG: 2.2 INJECTION, POWDER, LYOPHILIZED, FOR SOLUTION INTRAVENOUS at 14:34

## 2021-02-25 RX ADMIN — DEXTROSE MONOHYDRATE 25 G: 25 INJECTION, SOLUTION INTRAVENOUS at 17:09

## 2021-02-25 RX ADMIN — OXYCODONE HYDROCHLORIDE AND ACETAMINOPHEN 500 MG: 500 TABLET ORAL at 09:53

## 2021-02-25 RX ADMIN — BUMETANIDE 2 MG: 0.25 INJECTION INTRAMUSCULAR; INTRAVENOUS at 21:48

## 2021-02-25 RX ADMIN — CHOLECALCIFEROL (VITAMIN D3) 25 MCG (1,000 UNIT) TABLET 2000 UNITS: TABLET at 09:53

## 2021-02-25 RX ADMIN — Medication 10 ML: at 07:07

## 2021-02-25 RX ADMIN — CASTOR OIL AND BALSAM, PERU: 788; 87 OINTMENT TOPICAL at 21:55

## 2021-02-25 RX ADMIN — Medication 10 ML: at 21:26

## 2021-02-25 RX ADMIN — AMLODIPINE BESYLATE 10 MG: 5 TABLET ORAL at 09:53

## 2021-02-25 RX ADMIN — OXYCODONE HYDROCHLORIDE AND ACETAMINOPHEN 500 MG: 500 TABLET ORAL at 17:09

## 2021-02-25 RX ADMIN — BUMETANIDE 2 MG: 0.25 INJECTION INTRAMUSCULAR; INTRAVENOUS at 09:53

## 2021-02-25 RX ADMIN — ASPIRIN 81 MG: 81 TABLET, CHEWABLE ORAL at 09:53

## 2021-02-25 RX ADMIN — ISOSORBIDE DINITRATE 30 MG: 20 TABLET ORAL at 21:25

## 2021-02-25 RX ADMIN — HYDRALAZINE HYDROCHLORIDE 25 MG: 25 TABLET, FILM COATED ORAL at 16:40

## 2021-02-25 RX ADMIN — CARVEDILOL 25 MG: 12.5 TABLET, FILM COATED ORAL at 09:53

## 2021-02-25 RX ADMIN — HYDROMORPHONE HYDROCHLORIDE 1 MG: 1 INJECTION, SOLUTION INTRAMUSCULAR; INTRAVENOUS; SUBCUTANEOUS at 03:14

## 2021-02-25 RX ADMIN — Medication 10 ML: at 13:05

## 2021-02-25 RX ADMIN — TICAGRELOR 90 MG: 90 TABLET ORAL at 17:09

## 2021-02-25 NOTE — PROCEDURES
Master Dialysis Team [facility]  [phone]    Vitals   Pre   Post   Assessment   Pre   Post     Temp  Temp: 98.6 °F (37 °C) (02/25/21 0930)  98.1 LOC  Alert and oriented x 1 same   HR   Pulse (Heart Rate): 84 (02/25/21 0930) 74 Lungs   Coarse sound, on room air same   B/P   BP: (!) 163/80 (02/25/21 0930) 159/81 Cardiac   RRR same   Resp   Resp Rate: 20 (02/25/21 0930) 20 Skin   Dry and intact  same   Pain level  Pain Intensity 1: 9 (02/25/21 0314) 9 Edema  generalized     same   Orders:    Duration:   Start:    0930 End:    1230 Total:   3 hrs   Dialyzer:   Dialyzer/Set Up Inspection: Maggie Yost (02/25/21 0930)   K Bath:   Dialysate K (mEq/L): 3 (02/25/21 0930)   Ca Bath:   Dialysate CA (mEq/L): 2.5 (02/25/21 0930)   Na/Bicarb:   Dialysate NA (mEq/L): 140 (02/25/21 0930)   Target Fluid Removal:   Goal/Amount of Fluid to Remove (mL): 1000 mL (02/25/21 0930)   Access     Type & Location:   RIJ non tunneled cvc, dressing soiled and changed, cvc flushed and aspirated well, no s/s of infection, Each catheter limb disinfected per p&p, caps removed, hubs disinfected per p&p.        Labs     Obtained/Reviewed   Critical Results Called   Date when labs were drawn-  Hgb-    HGB   Date Value Ref Range Status   02/25/2021 8.4 (L) 12.1 - 17.0 g/dL Final     K-    Potassium   Date Value Ref Range Status   02/25/2021 3.6 3.5 - 5.1 mmol/L Final     Ca-   Calcium   Date Value Ref Range Status   02/25/2021 8.0 (L) 8.5 - 10.1 MG/DL Final     Bun-   BUN   Date Value Ref Range Status   02/25/2021 70 (H) 6 - 20 MG/DL Final     Comment:     INVESTIGATED PER DELTA CHECK PROTOCOL     Creat-   Creatinine   Date Value Ref Range Status   02/25/2021 6.85 (H) 0.70 - 1.30 MG/DL Final     Comment:     INVESTIGATED PER DELTA CHECK PROTOCOL        Medications/ Blood Products Given     Name   Dose   Route and Time     Heparin 2500 units  cvc dwell             Blood Volume Processed (BVP):    60.9 Net Fluid   Removed:  1000ml Comments   Time Out Done: 3939  Primary Nurse Rpt Pre: Maren Lesch, RN  Primary Nurse Rpt Post: Maren Lesch, RN  Pt [address] 89: Continue Nephrology plan of care  Tx Summary:    0930-Labs, orders and medications were reveiwed. SBAR given by primary nurse. HD started using RIJ cvc  1000- Dr Tosha Rinaldi at bedside assessing the pt  1230-HD was completed and pt tolerated it well. All possible blood was returned. Each dialysis catheter limb disinfected per p&p, blood returned per p&p, each dialysis hub disinfected per p&p, post dialysis catheter dwell instilled per order, and caps applied. The primary nurse was given a report. Pt remained stable during my departure. Bed in the lowest position and call bell in reach. Admiting Diagnosis: Hypoxia/ ESRD  Consent signed - Informed Consent Verified: Yes (02/25/21 0930)  Hepatitis Status- Neg/Imm 2/23/21  Machine #- Machine Number: [id number] (02/25/21 0930)  Telemetry status- None  Pre-dialysis wt. - Pre-Dialysis Weight: 95.3 kg (210 lb 1.6 oz) (02/24/21 2030)

## 2021-02-25 NOTE — PROGRESS NOTES
HYPOGLYCEMIC EPISODE DOCUMENTATION    Patient with hypoglycemic episode(s) at [phone] (time) on 2/25/21 (date). BG value(s) pre-treatment 46    Was patient symptomatic?  [] yes, [x] no  Patient was treated with the following rescue medications/treatments: [x] D50                [] Glucose tablets                [] Glucagon                [] 4oz juice                [] 6oz reg soda                [] 8oz low fat milk  BG value post-treatment: 135  Name of MD notified: Lillian Del Cid  The following orders were received: daily lantus decreased

## 2021-02-25 NOTE — PROGRESS NOTES
DIEGO:  1. RUR-33%  2. HD currently, TTS. 3. PT/OT following. CM following for discharge needs, therapy recommendations are SNF. Family would like to take him home.       Renetta Perez [facility]

## 2021-02-25 NOTE — WOUND CARE
Wound Care Note:     Follow-up visit for neck and ear    Chart shows:  Admitted for acute hypoxemic respiratory failure due to COVID-19, acute on chronic systolic heart failure, counseling/discussion goals of care, DNR discussion and JUAN C     Past Medical History:   Diagnosis Date    Diabetes ([address]     Hypertension      WBC = 9.2 on 2/25/21  Admitted from [facility] care    Assessment:   Patient is groggy, minimal talking, incontinent with moderate assistance needed in repositioning. Bed: [place]  Patient has a Bourgeois. Diet: clear liquid 3 honey/3 moderately thick; no conc. sweets  Patient did not respond to pain question but moaned with repositioning. Bilateral heels, buttocks, and sacral skin intact and without erythema. 1. POA posterior neck wound continues to improve, measures 2 cm x 2.3 cm x 0.1 cm, wound bed is pink, moist, scant sero/sang drainage, wound edges are open, carlene-wound intact. Opticell AG and Optifoam Gentle applied. 2.  Right ear eschar continues to come off, eschar measures 0.8 cm x 0.3 cm, proximal end with small partial thickness area, no drainage, wound edges are open, carlene-wound intact. Venelex and Z flow pillow placed. 3.  Distal gluteal cleft with fissure, approximately 3 cm x 0.1 cm x 0.1 cm, small scattered area on posterior scrotum also, wound beds are pink, no drainage, wound edges are open, carlene-wound intact. Z guard paste applied. 4.  Scrotum is still very edematous, skin is not as dry and previous wound near penis has resolved. Patient repositioned on right side. Heels offloaded on pillows.      Recommendations:    Continue with current wound care except Z guard paste and Nourishing Skin cream will be applied to scrotum; Z guard paste needed in distal gluteal cleft.     Posterior neck- Every other day cleanse with normal saline, wipe wound bed clean and dry, apply a piece of Opticell AG and cover with Optifoam Gentle (or other dressing that will remain in place).     Right ear, sacrum, bilateral heels and any other reddened bony prominence- Every 12 hours liberally apply Venelex ointment.  Use Z flow pillow to offload right ear.     Scrotum and distal gluteal cleft- Every 12 hours, proximal scrotum apply Z guard paste to open wound, apply Nourishing Skin Cream to intact skin on scrotum and elevate scrotum on a towel. Skin Care & Pressure Prevention:  Minimize layers of linen/pads under patient to optimize support surface. Turn/reposition approximately every 2 hours and offload heels.   Manage incontinence / promote continence   Nourishing Skin Cream to dry skin, minimize use of briefs when able    Discussed above plan with patient & Sharron Martines RN    Transition of Care: Plan to follow as needed while admitted to hospital.    ZOEY Correa, RN, [facility], [facility].  office [phone]  pager [phone] or call  to page

## 2021-02-25 NOTE — PROGRESS NOTES
Bedside shift change report given to [facility] (oncoming nurse) by Eden Judd (offgoing nurse). Report included the following information SBAR, Kardex, Intake/Output and Recent Results.

## 2021-02-25 NOTE — PROGRESS NOTES
Bedside shift change report given to Jamar FERNANDEZ RN (oncoming nurse) by Margi RN (offgoing nurse). Report included the following information SBAR, Kardex and MAR.

## 2021-02-25 NOTE — PROGRESS NOTES
Hospitalist Progress Note  Georgie Chase MD  Answering service: [phone] from in house phone      Date of Service:  2021  NAME:  Margie Guerrier  :  1955  MRN:  [id number]    Admission Summary:   65M p/w SOB- downgraded from ICU after lengthy stay. Extubated . Interval history / Subjective:   Patient seen and examined at bedside, feels well, more alert, eyes open, responds quicker to questions. Urea down to 70 today. Will repeat his TTE     Assessment & Plan:     #. Volume Overload- removing with HD. #. ARF:  - pt/family had declined dialysis, then changed their mind as pt improved mentally.  - Nephrology re-consulted- started HD . #. Acute on Chronic Systolic CHF: Cards - ASA, Ticagrelor, Carvedilol, Lipitor  - TTE: ef 20%- Cardiology had signed off as pt was heading towards possible comfort. Now change of plan, if improving with HD will re-consult cardiology, and recheck TTE to evaluate his heart. #. Anemia: chronic, likely of chronic disease. Iron low, supplements. S/p transfusion.   - iron panel low, ferritin high, folate wnl, vit B12 high. Hemoccult pending. #. Pulm HTN: mod- severe. as seen on TTE. #. Ebb Coke: extubated - resolved. #. COVID-19 PNA- repeat COVID test -ve on . #. Aspiration PNA- treated. #. Mycoplasma and S. Pneumoniae PNA. ID- finished Abx course. Sepsis- resolved. #. Acute metabolic Encephalopathy: related to above. Improved. #. Deranged LFTs: likely related to CHF/congestion. Monitor. #. DM2: A1c 7.4, SSI, AccuChecks, monitor  #. Debility: 2nd to multiple medical co-morbidities and advanced age. PT/OT  #. HTN: chronic, PRN BP meds.  Monitor    Code status: DNR  DVT prophylaxis: heparin  Care Plan discussed with: Patient/Family and Nurse  Disposition: TBD >2days     Hospital Problems  Never Reviewed          Codes Class Noted POA    Dysphagia, unspecified type ICD-10-CM: R13.10  ICD-9-CM: 787.20  Unknown Unknown        Fatigue, unspecified type ICD-10-CM: R53.83  ICD-9-CM: 780.79  Unknown Unknown        Chronic systolic congestive heart failure ([facility] ICD-10-CM: I50.22  ICD-9-CM: 428.22, 428.0  2/16/2021 Unknown        Goals of care, counseling/discussion ICD-10-CM: Z71.89  ICD-9-CM: V65.49  Unknown Unknown        DNR (do not resuscitate) discussion ICD-10-CM: Z71.89  ICD-9-CM: V65.49  Unknown Unknown        JUAN C (acute kidney injury) ([facility] ICD-10-CM: N17.9  ICD-9-CM: 365. 9  Unknown Unknown        Acute hypoxemic respiratory failure due to COVID-19 [facility]) ICD-10-CM: U07.1, J96.01  ICD-9-CM: 518.81, 079.89, 799.02  1/30/2021 Unknown            Review of Systems:   Pertinent items are mentioned in interval history. Vital Signs:    Last 24hrs VS reviewed since prior progress note. Most recent are:  Visit Vitals  BP (!) 156/80   Pulse 86   Temp 98 °F (36.7 °C)   Resp 16   Ht 5' 7\" (1.702 m)   Wt 91.2 kg (201 lb 1.6 oz)   SpO2 95%   BMI 31.50 kg/m²         Intake/Output Summary (Last 24 hours) at 2/25/2021 0744  Last data filed at 2/25/2021 8933  Gross per 24 hour   Intake 240 ml   Output 2925 ml   Net -2685 ml        Physical Examination:   Evaluated face to face and examined 02/25/21    General:  Alert, fatigued, No acute distress. Looks chronically sick. BM. Resp:  No accessory muscle use, no wheezes, rhonchi +   Abd:  Soft, non-tender, non-distended,  Extremities:  No cyanosis or clubbing, significant edema LEs  Neuro:  sleepy. slow in speaking, no focal neuro deficits, follows commands   Psych:   not agitated.     Data Review:    Review and/or order of clinical lab test  Review and/or order of tests in the radiology section of CPT  Review and/or order of tests in the medicine section of CPT  Labs:     Recent Labs     02/25/21  0254 02/24/21  0128   WBC 9.2 9.0   HGB 8.4* 7.6*   HCT 25.1* 23.4*    283     Recent Labs     02/25/21 0254 02/24/21  0128 02/23/21  0359    142 143   K 3.6 3.7 4.1    108 109*   CO2 25 19* 19*   BUN 70* 117* 149*   CREA 6.85* 9.20* 11.20*   GLU 66 89 69   CA 8.0* 8.0* 7.7*   MG 2.2  --  2.8*   PHOS 5.6*  --  8.7*     No results for input(s): ALT, AP, TBIL, TBILI, TP, ALB, GLOB, GGT, AML, LPSE in the last 72 hours.    No lab exists for component: SGOT, GPT, AMYP, HLPSE  No results for input(s): INR, PTP, APTT, INREXT, INREXT in the last 72 hours.   Recent Labs     02/23/21  0359   FERR 661*      Lab Results   Component Value Date/Time    Folate 17.5 02/23/2021 03:59 AM      No results for input(s): PH, PCO2, PO2 in the last 72 hours.  No results for input(s): CPK, CKNDX, TROIQ in the last 72 hours.    No lab exists for component: CPKMB  Lab Results   Component Value Date/Time    Cholesterol, total 115 02/26/2010 05:20 PM    HDL Cholesterol 31 (L) 02/26/2010 05:20 PM    LDL, calculated 46.2 02/26/2010 05:20 PM    Triglyceride 189 02/26/2010 05:20 PM    CHOL/HDL Ratio 3.7 02/26/2010 05:20 PM     Lab Results   Component Value Date/Time    Glucose (POC) 87 02/25/2021 06:32 AM    Glucose (POC) 85 02/24/2021 09:30 PM    Glucose (POC) 117 (H) 02/24/2021 04:40 PM    Glucose (POC) 149 (H) 02/24/2021 11:19 AM    Glucose (POC) 109 (H) 02/24/2021 06:22 AM     Lab Results   Component Value Date/Time    Color YELLOW/STRAW 02/10/2021 01:26 PM    Appearance TURBID (A) 02/10/2021 01:26 PM    Specific gravity 1.014 02/10/2021 01:26 PM    Specific gravity 1.015 02/26/2010 12:32 PM    pH (UA) 5.0 02/10/2021 01:26 PM    Protein 100 (A) 02/10/2021 01:26 PM    Glucose Negative 02/10/2021 01:26 PM    Ketone Negative 02/10/2021 01:26 PM    Bilirubin Negative 02/10/2021 01:26 PM    Urobilinogen 0.2 02/10/2021 01:26 PM    Nitrites Negative 02/10/2021 01:26 PM    Leukocyte Esterase Negative 02/10/2021 01:26 PM    Epithelial cells FEW 02/10/2021 01:26 PM    Bacteria Negative 02/10/2021 01:26 PM    WBC 0-4 02/10/2021 01:26 PM    RBC   02/10/2021 01:26 PM     Medications Reviewed:     Current Facility-Administered Medications   Medication Dose Route Frequency    diphenhydrAMINE (BENADRYL) capsule 25 mg  25 mg Oral Q6H PRN    heparin (porcine) 1,000 unit/mL injection 2,500 Units  2,500 Units Injection DIALYSIS PRN    0.9% sodium chloride infusion 250 mL  250 mL IntraVENous PRN    albuterol-ipratropium (DUO-NEB) 2.5 MG-0.5 MG/3 ML  3 mL Nebulization Q4H PRN    HYDROmorphone (PF) (DILAUDID) injection 1 mg  1 mg IntraVENous Q2H PRN    insulin lispro (HUMALOG) injection   SubCUTAneous AC&HS    isosorbide dinitrate (ISORDIL) tablet 30 mg  30 mg Per NG tube Q8H    balsam peru-castor oiL (VENELEX) ointment   Topical Q12H    bumetanide (BUMEX) injection 2 mg  2 mg IntraVENous Q12H    0.9% sodium chloride infusion 250 mL  250 mL IntraVENous PRN    insulin glargine (LANTUS) injection 20 Units  20 Units SubCUTAneous DAILY    pantoprazole (PROTONIX) 40 mg in 0.9% sodium chloride 10 mL injection  40 mg IntraVENous Q12H    labetaloL (NORMODYNE;TRANDATE) injection 20 mg  20 mg IntraVENous Q4H PRN    ticagrelor (BRILINTA) tablet 90 mg  90 mg Oral BID    carvediloL (COREG) tablet 25 mg  25 mg Per NG tube Q12H    amLODIPine (NORVASC) tablet 10 mg  10 mg Per NG tube DAILY    alteplase (CATHFLO) 1 mg in sterile water (preservative free) 1 mL injection  1 mg InterCATHeter PRN    ascorbic acid (vitamin C) (VITAMIN C) tablet 500 mg  500 mg Per NG tube BID    cholecalciferol (VITAMIN D3) (1000 Units /25 mcg) tablet 2,000 Units  2,000 Units Per NG tube DAILY    metoprolol (LOPRESSOR) injection 5 mg  5 mg IntraVENous Q6H PRN    [Held by provider] allopurinoL (ZYLOPRIM) tablet 100 mg  100 mg Oral DAILY    sodium chloride (NS) flush 5-40 mL  5-40 mL IntraVENous Q8H    sodium chloride (NS) flush 5-40 mL  5-40 mL IntraVENous PRN    acetaminophen (TYLENOL) tablet 650 mg  650 mg Oral Q6H PRN    Or    acetaminophen (TYLENOL) suppository 650 mg  650 mg Rectal Q6H PRN    glucose chewable tablet 16 g  4 Tab Oral PRN    dextrose (D50W) injection syrg 12.5-25 g  12.5-25 g IntraVENous PRN    glucagon (GLUCAGEN) injection 1 mg  1 mg IntraMUSCular PRN    aspirin chewable tablet 81 mg  81 mg Oral DAILY    atorvastatin (LIPITOR) tablet 40 mg  40 mg Oral QHS   ______________________________________________________________________  EXPECTED LENGTH OF STAY: 12d 7h  ACTUAL LENGTH OF STAY:          26               Kevin Hernandes MD

## 2021-02-25 NOTE — PROGRESS NOTES
Comprehensive Nutrition Assessment    Type and Reason for Visit: Reassess    Nutrition Recommendations/Plan:    1. Place DHT (with nasal bridle if concerns for pt pulling) with start of enteral feeds with:    - Nepro @ 55 ml/hr + 1 pkt prosource + 100ml water flush q4hr  2. For diarrhea: add Risaquad/probiotic  3. Consider adding phosphate binders if remains elevated with tube feeds. Nutrition Assessment:    Patient admitted with respiratory failure d/t COVID 19. PMHx: DM, HTN, neck wound/infection (from bite from [employer]). Was a nursing home  [facility]) for IV antibiotics, plan for skin graft. Pt intubated in ED d/t pulmonary edema, COVID 19 PNA, Aspiration pneumonitis. Extubated on 2/11. JUAN C on CKD with family electing to initiation HD on 2/23. Palliative following with family seeming to want to continue aggressive care for now with large improvement from initial admission status. WOCN following for neck wound. Pt on EN earlier but removed NGT on 2/21. Seen by SLP on 2/23 [facility] recommendations for: \"If for aggressive care, consider NPO. If for comfort measures, consider changing honey thick liquid diet to thin, as this is likely more pleasurable and more easily cleared from the lungs. \" No changes to diet order noted despite this recommendation and now has been on clear liquids for 4 days. Per Palliative notes from yesterday daughter open to feeding tube or defined trial period. Recommend NGT placement with start of feeds with: Nepro @ 55 ml/hr + 1 pkt prosource + 100ml water flush q4hr. Will provide: 1210ml, 2200 calories, 111g protein, 879ml free fluid + 600ml flush + 90ml w/ prosource = 1569ml fluid. Meets 100% energy and protein needs. Increase flush per renal pending fluid status to 220ml flush q4hr. Phos remains elevated but improved since starting HD. Some improvement in fluid status since HD as well. Previous diarrhea with FMT. Oral iron syrup discontinued.  Still with some soft/loose stools but FMT removed on 2/21. Wt gain with significant edema present. Last 3 Recorded Weights in this Encounter    02/23/21 0223 02/24/21 1444 02/25/21 0208   Weight: 98.7 kg (217 lb 8 oz) 95.3 kg (210 lb) 91.2 kg (201 lb 1.6 oz)     Malnutrition Assessment:  Malnutrition Status:  Insufficient data-at risk d/t critical illnes  Context:  Acute illness       Nutritionally Significant Medications: Vit C, bumex, Vit D3, lantus (18 units), SSI, protonix,     Estimated Daily Nutrient Needs:  Energy (kcal): 2200 (25 kcal/kg); Weight Used for Energy Requirements: Current(88 kg)  Protein (g): 106-123g (1.2-1.4g/kg on HD);  Weight Used for Protein Requirements: Current(88 kg)  Fluid (ml/day): 2200 - or per renal; Method Used for Fluid Requirements: 1 ml/kcal    Nutrition Related Findings:       BM: 2/25 - loose (FMT placed on 2/14- 2/21)  Edema: 2+ generalized, 1+ genital, 1+BLUE, 2+BLUE  Wounds:  Open wounds       Current Nutrition Therapies:   Diet: clear liquids, honey thick, no-concentrated sweets  Meal intake:   Patient Vitals for the past 168 hrs:   % Diet Eaten   02/24/21 0952 100 %   02/23/21 1320 10 %   02/23/21 0903 20 %     Anthropometric Measures:  · Height:  5' 7\" (170.2 cm)  · Current Body Wt:  89.4 kg (197 lb 1.5 oz)   · Admission Body Wt:  186 lb 15.2 oz       · Ideal Body Wt: 148#  :  133.2 %     Last 3 Recorded Weights in this Encounter    02/23/21 0223 02/24/21 1444 02/25/21 0208   Weight: 98.7 kg (217 lb 8 oz) 95.3 kg (210 lb) 91.2 kg (201 lb 1.6 oz)     Wt Readings from Last 10 Encounters:   02/25/21 91.2 kg (201 lb 1.6 oz)   06/09/20 78 kg (172 lb)     Nutrition Diagnosis:   · Inadequate oral intake related to swallowing difficulty as evidenced by NPO or clear liquid status due to medical condition(thickened liquids)    · Altered nutrition-related lab values, Increased nutrient needs related to renal dysfunction as evidenced by lab values, dialysis(hyperphosphatemia)    · Altered GI function(improving) related to (?medications) as evidenced by diarrhea(soft stools, abx, FMT removed)    Nutrition Interventions:   Food and/or Nutrient Delivery: Start tube feeding, Continue current diet  Nutrition Education and Counseling: No recommendations at this time  Coordination of Nutrition Care: Continue to monitor while inpatient, Swallow evaluation, Interdisciplinary rounds    Goals:  EN meeting at least 90% needs in 5-7 days       Nutrition Monitoring and Evaluation:   Behavioral-Environmental Outcomes: None identified  Food/Nutrient Intake Outcomes: Enteral nutrition intake/tolerance, Diet advancement/tolerance  Physical Signs/Symptoms Outcomes: Biochemical data, Weight, Fluid status or edema, Diarrhea, Chewing or swallowing    Discharge Planning:     Too soon to determine     Yuni Connors, RD  [address] [facility] [address], Pager #[phone] or via [employer]

## 2021-02-25 NOTE — PROCEDURES
[facility] Dialysis Team [facility]  [phone]    Vitals   Pre   Post   Assessment   Pre   Post     Temp  Temp: 97.9 °F (36.6 °C) (02/24/21 1402)   LOC  Alert/ oriented to name No change   HR   Pulse (Heart Rate): 71 (02/24/21 2030) 76   Lungs   RA/Coarse No change    B/P   BP: (!) 147/74 (02/24/21 2030) 158/82 Cardiac   Not monitored No change    Resp   Resp Rate: 16 (02/24/21 2030) 18 Skin   Warm/dry No change    Pain level  Pain Intensity 1: 8 (02/24/21 1735) 0 Edema  generalized     No change   Orders:    Duration:   Start:    2030 End:    2330 Total:   3 hrs   Dialyzer:   Dialyzer/Set Up Inspection: Mikael Sandhu (02/24/21 2030)   K Bath:   Dialysate K (mEq/L): 3 (02/24/21 2030)   Ca Bath:   Dialysate CA (mEq/L): 2.5 (02/24/21 2030)   Na/Bicarb:   Dialysate NA (mEq/L): 140 (02/24/21 2030)   Target Fluid Removal:   Goal/Amount of Fluid to Remove (mL): 1000 mL (02/24/21 2030)   Access     Type & Location:   RIJ, +asp/flush, patent at 300 BFR. Pressure dressing to site. Labs     Obtained/Reviewed   Critical Results Called   Date when labs were drawn-  Hgb-    HGB   Date Value Ref Range Status   02/24/2021 7.6 (L) 12.1 - 17.0 g/dL Final     K-    Potassium   Date Value Ref Range Status   02/24/2021 3.7 3.5 - 5.1 mmol/L Final     Ca-   Calcium   Date Value Ref Range Status   02/24/2021 8.0 (L) 8.5 - 10.1 MG/DL Final     Bun-   BUN   Date Value Ref Range Status   02/24/2021 117 (H) 6 - 20 MG/DL Final     Creat-   Creatinine   Date Value Ref Range Status   02/24/2021 9.20 (H) 0.70 - 1.30 MG/DL Final        Medications/ Blood Products Given     Name   Dose   Route and Time     None                Blood Volume Processed (BVP):    42.2 Net Fluid   Removed:  1000ml   Comments   Time Out Done: 2015  Primary Nurse Rpt Pre: Floresita Castillo RN  Primary Nurse Rpt Post: Floresita Castillo RN  Pt Education: Hospital dialysis procedures  Care Plan: Continue tx as ordered  Tx Summary: Patient tolerated tx well.  All possible blood returned with NS rinse back. CVC ports locked with NS and capped. Admiting Diagnosis: Hypoxia via [address]  Consent signed - Informed Consent Verified: Yes (02/24/21 2030)  Hepatitis Status- Neg/Imm 2/23/21  Machine #- Machine Number: [id number] (02/24/21 2030)  Telemetry status- None  Pre-dialysis wt. - Pre-Dialysis Weight: 95.3 kg (210 lb 1.6 oz) (02/24/21 2030)

## 2021-02-25 NOTE — PROGRESS NOTES
Nephrology Progress Note  Yumiko King  Date of Admission : 1/30/2021    CC: Follow up for JUAN C on CKD       Assessment and Plan     JUAN C:  - unclear baseline but suspect some component of CKD  - Renal U/S on 1/30 confirms CKD  - HD initiated 2/23  - HD #3 today then TTS     Volume overload:  - HD today w/ UF    Encephalopathy    HFrEF:  - EF 20-25% on 2/1     COVID-19 PNA:  - now COVID neg on 2/19     DM2:  - on insulin       Interval History:  Seen and examined on dialysis. BP stable. UF goal 1kg. No new issues. Unable to obtain ROS. Current Medications: all current  Medications have been eviewed in EPIC  Review of Systems: Review of systems not obtained due to patient factors. Objective:  Vitals:    Vitals:    02/25/21 0945 02/25/21 1000 02/25/21 1015 02/25/21 1030   BP: (!) 172/86 (!) 153/75 (!) 150/66 (!) 159/78   Pulse: 77 85 77 76   Resp: 20 20 20 20   Temp:       SpO2:       Weight:       Height:         Intake and Output:  No intake/output data recorded. 02/23 1901 - 02/25 0700  In: 240 [P.O.:240]  Out: 3675 [Urine:2675]    Physical Examination:  Pt intubated    No  General: Confused, frail  Neck:  Supple, no mass  Resp:  Lungs Clear anteriorly  CV:  RRR,  no murmur or rub, 2-3 + LE edema  GI:  Soft, NT, + Bowel sounds, no hepatosplenomegaly  Neurologic:  confused  Psych:             Unable to assess  Skin:  No Rash  :  No bird    []    High complexity decision making was performed  []    Patient is at high-risk of decompensation with multiple organ involvement    Lab Data Personally Reviewed: I have reviewed all the pertinent labs, microbiology data and radiology studies during assessment.     Recent Labs     02/25/21  0254 02/24/21  0128 02/23/21  0359    142 143   K 3.6 3.7 4.1    108 109*   CO2 25 19* 19*   GLU 66 89 69   BUN 70* 117* 149*   CREA 6.85* 9.20* 11.20*   CA 8.0* 8.0* 7.7*   MG 2.2  --  2.8*   PHOS 5.6*  --  8.7*     Recent Labs     02/25/21  0254 02/24/21  0128 02/23/21  0359   WBC 9.2 9.0 8.6   HGB 8.4* 7.6* 7.6*   HCT 25.1* 23.4* 23.5*    283 280     Lab Results   Component Value Date/Time    Specimen Description: BLOOD 02/27/2010 07:00 PM     Lab Results   Component Value Date/Time    Culture result: NO GROWTH 5 DAYS 02/15/2021 10:58 AM    Culture result: LIGHT YEAST, (APPARENT CANDIDA ALBICANS) (A) 02/12/2021 04:53 PM    Culture result: LIGHT NORMAL RESPIRATORY CORI 02/12/2021 04:53 PM     Recent Results (from the past 24 hour(s))   GLUCOSE, POC    Collection Time: 02/24/21 11:19 AM   Result Value Ref Range    Glucose (POC) 149 (H) 65 - 100 mg/dL    Performed by [address], POC    Collection Time: 02/24/21  4:40 PM   Result Value Ref Range    Glucose (POC) 117 (H) 65 - 100 mg/dL    Performed by [address], POC    Collection Time: 02/24/21  9:30 PM   Result Value Ref Range    Glucose (POC) 85 65 - 100 mg/dL    Performed by Damion Okeefe    MAGNESIUM    Collection Time: 02/25/21  2:54 AM   Result Value Ref Range    Magnesium 2.2 1.6 - 2.4 mg/dL   PHOSPHORUS    Collection Time: 02/25/21  2:54 AM   Result Value Ref Range    Phosphorus 5.6 (H) 2.6 - 4.7 MG/DL   CBC WITH AUTOMATED DIFF    Collection Time: 02/25/21  2:54 AM   Result Value Ref Range    WBC 9.2 4.1 - 11.1 K/uL    RBC 3.08 (L) 4.10 - 5.70 M/uL    HGB 8.4 (L) 12.1 - 17.0 g/dL    HCT 25.1 (L) 36.6 - 50.3 %    MCV 81.5 80.0 - 99.0 FL    MCH 27.3 26.0 - 34.0 PG    MCHC 33.5 30.0 - 36.5 g/dL    RDW 16.8 (H) 11.5 - 14.5 %    PLATELET 170 896 - 090 K/uL    MPV 10.8 8.9 - 12.9 FL    NRBC 0.0 0  WBC    ABSOLUTE NRBC 0.00 0.00 - 0.01 K/uL    NEUTROPHILS 71 32 - 75 %    LYMPHOCYTES 14 12 - 49 %    MONOCYTES 11 5 - 13 %    EOSINOPHILS 3 0 - 7 %    BASOPHILS 0 0 - 1 %    IMMATURE GRANULOCYTES 1 (H) 0.0 - 0.5 %    ABS. NEUTROPHILS 6.5 1.8 - 8.0 K/UL    ABS. LYMPHOCYTES 1.3 0.8 - 3.5 K/UL    ABS. MONOCYTES 1.0 0.0 - 1.0 K/UL    ABS. EOSINOPHILS 0.3 0.0 - 0.4 K/UL    ABS.  BASOPHILS 0.0 0.0 - 0.1 K/UL    ABS. IMM. GRANS. 0.1 (H) 0.00 - 0.04 K/UL    DF AUTOMATED     METABOLIC PANEL, BASIC    Collection Time: 02/25/21  2:54 AM   Result Value Ref Range    Sodium 141 136 - 145 mmol/L    Potassium 3.6 3.5 - 5.1 mmol/L    Chloride 106 97 - 108 mmol/L    CO2 25 21 - 32 mmol/L    Anion gap 10 5 - 15 mmol/L    Glucose 66 65 - 100 mg/dL    BUN 70 (H) 6 - 20 MG/DL    Creatinine 6.85 (H) 0.70 - 1.30 MG/DL    BUN/Creatinine ratio 10 (L) 12 - 20      GFR est AA 10 (L) >60 ml/min/1.73m2    GFR est non-AA 8 (L) >60 ml/min/1.73m2    Calcium 8.0 (L) 8.5 - 10.1 MG/DL   GLUCOSE, POC    Collection Time: 02/25/21  6:32 AM   Result Value Ref Range    Glucose (POC) 87 65 - 100 mg/dL    Performed by Juan Thompson MD  [address] [place] [address]  Phone - [phone]   Fax - [phone]  www. [employer]

## 2021-02-25 NOTE — PROGRESS NOTES
Occupational Therapy:  02/25/21    Chart reviewed in prep for OT tx. Pt currently receiving HD at bedside with a little over an hour left. Will defer and continue to follow.      Thank you,  Ragini Torres OTR/L

## 2021-02-25 NOTE — PROGRESS NOTES
Speech Therapy    Chart reviewed and spoke with RN, briefly spoke with patient. Note patient continues with audible congestion, concerning for poor secretion management. He continues with honey thickened liquids for comfort. Would not recommend continued honey thick liquids for comfort without imaging to support honey being safer than thin, as the risk of sequelae of aspiration of thicker liquids may be higher than aspiration of thin liquids, and there is increased risk of dehydration and decreased PO intake. Goals of care appear unclear with respect to PO intake and aspiration risk. Will follow pending goals of care. Imani Paris M.S., CCC-SLP

## 2021-02-25 NOTE — PROGRESS NOTES
Bedside shift change report given to Christina Omalley RN (oncoming nurse) by Olena Hashimoto, RN (offgoing nurse). Report included the following information SBAR and Kardex.

## 2021-02-25 NOTE — PROGRESS NOTES
Physical Therapy    Re-consulted to assess for ability of family to care for him at home. Unfortunately, at this time, he is being dialyzed. He should be complete after 12:30 and we will attempt follow up later this afternoon. Discussed with UMA Booker, PT

## 2021-02-25 NOTE — PROGRESS NOTES
Problem: Falls - Risk of  Goal: *Absence of Falls  Description: Document Clydene Bone Fall Risk and appropriate interventions in the flowsheet.   Outcome: Progressing Towards Goal  Note: Fall Risk Interventions:       Mentation Interventions: Adequate sleep, hydration, pain control    Medication Interventions: Bed/chair exit alarm    Elimination Interventions: Bed/chair exit alarm

## 2021-02-26 LAB
ANION GAP SERPL CALC-SCNC: 7 MMOL/L (ref 5–15)
BASOPHILS # BLD: 0 K/UL (ref 0–0.1)
BASOPHILS NFR BLD: 0 % (ref 0–1)
BUN SERPL-MCNC: 42 MG/DL (ref 6–20)
BUN/CREAT SERPL: 8 (ref 12–20)
CALCIUM SERPL-MCNC: 7.8 MG/DL (ref 8.5–10.1)
CHLORIDE SERPL-SCNC: 107 MMOL/L (ref 97–108)
CO2 SERPL-SCNC: 28 MMOL/L (ref 21–32)
CREAT SERPL-MCNC: 5.3 MG/DL (ref 0.7–1.3)
DIFFERENTIAL METHOD BLD: ABNORMAL
ECHO LV EDV A2C: 195.16 ML
ECHO LV EDV A4C: 169.86 ML
ECHO LV EDV BP: 182.46 ML (ref 67–155)
ECHO LV EDV INDEX A4C: 83.8 ML/M2
ECHO LV EDV INDEX BP: 90 ML/M2
ECHO LV EDV NDEX A2C: 96.3 ML/M2
ECHO LV EJECTION FRACTION A2C: 38 PERCENT
ECHO LV EJECTION FRACTION A4C: 30 PERCENT
ECHO LV EJECTION FRACTION BIPLANE: 33.8 PERCENT (ref 55–100)
ECHO LV ESV A2C: 121.09 ML
ECHO LV ESV A4C: 119.51 ML
ECHO LV ESV BP: 120.82 ML (ref 22–58)
ECHO LV ESV INDEX A2C: 59.8 ML/M2
ECHO LV ESV INDEX A4C: 59 ML/M2
ECHO LV ESV INDEX BP: 59.6 ML/M2
ECHO LV INTERNAL DIMENSION DIASTOLIC: 5.63 CM (ref 4.2–5.9)
ECHO LV INTERNAL DIMENSION SYSTOLIC: 4.46 CM
EOSINOPHIL # BLD: 0.2 K/UL (ref 0–0.4)
EOSINOPHIL NFR BLD: 3 % (ref 0–7)
ERYTHROCYTE [DISTWIDTH] IN BLOOD BY AUTOMATED COUNT: 17.5 % (ref 11.5–14.5)
GLUCOSE BLD STRIP.AUTO-MCNC: 103 MG/DL (ref 65–100)
GLUCOSE BLD STRIP.AUTO-MCNC: 105 MG/DL (ref 65–100)
GLUCOSE BLD STRIP.AUTO-MCNC: 77 MG/DL (ref 65–100)
GLUCOSE BLD STRIP.AUTO-MCNC: 95 MG/DL (ref 65–100)
GLUCOSE SERPL-MCNC: 61 MG/DL (ref 65–100)
HCT VFR BLD AUTO: 24.8 % (ref 36.6–50.3)
HGB BLD-MCNC: 8 G/DL (ref 12.1–17)
IMM GRANULOCYTES # BLD AUTO: 0.1 K/UL (ref 0–0.04)
IMM GRANULOCYTES NFR BLD AUTO: 1 % (ref 0–0.5)
LYMPHOCYTES # BLD: 1.7 K/UL (ref 0.8–3.5)
LYMPHOCYTES NFR BLD: 21 % (ref 12–49)
MAGNESIUM SERPL-MCNC: 2 MG/DL (ref 1.6–2.4)
MCH RBC QN AUTO: 26.7 PG (ref 26–34)
MCHC RBC AUTO-ENTMCNC: 32.3 G/DL (ref 30–36.5)
MCV RBC AUTO: 82.7 FL (ref 80–99)
MONOCYTES # BLD: 1.1 K/UL (ref 0–1)
MONOCYTES NFR BLD: 13 % (ref 5–13)
NEUTS SEG # BLD: 5.1 K/UL (ref 1.8–8)
NEUTS SEG NFR BLD: 62 % (ref 32–75)
NRBC # BLD: 0 K/UL (ref 0–0.01)
NRBC BLD-RTO: 0 PER 100 WBC
PHOSPHATE SERPL-MCNC: 5.3 MG/DL (ref 2.6–4.7)
PLATELET # BLD AUTO: 323 K/UL (ref 150–400)
PMV BLD AUTO: 11.2 FL (ref 8.9–12.9)
POTASSIUM SERPL-SCNC: 3.4 MMOL/L (ref 3.5–5.1)
RBC # BLD AUTO: 3 M/UL (ref 4.1–5.7)
SERVICE CMNT-IMP: ABNORMAL
SERVICE CMNT-IMP: ABNORMAL
SERVICE CMNT-IMP: NORMAL
SERVICE CMNT-IMP: NORMAL
SODIUM SERPL-SCNC: 142 MMOL/L (ref 136–145)
WBC # BLD AUTO: 8.1 K/UL (ref 4.1–11.1)

## 2021-02-26 PROCEDURE — 74011250637 HC RX REV CODE- 250/637: Performed by: INTERNAL MEDICINE

## 2021-02-26 PROCEDURE — 74011250637 HC RX REV CODE- 250/637: Performed by: HOSPITALIST

## 2021-02-26 PROCEDURE — 97161 PT EVAL LOW COMPLEX 20 MIN: CPT

## 2021-02-26 PROCEDURE — 74011000250 HC RX REV CODE- 250: Performed by: INTERNAL MEDICINE

## 2021-02-26 PROCEDURE — C9113 INJ PANTOPRAZOLE SODIUM, VIA: HCPCS | Performed by: HOSPITALIST

## 2021-02-26 PROCEDURE — 74011250637 HC RX REV CODE- 250/637: Performed by: EMERGENCY MEDICINE

## 2021-02-26 PROCEDURE — 84100 ASSAY OF PHOSPHORUS: CPT

## 2021-02-26 PROCEDURE — 83735 ASSAY OF MAGNESIUM: CPT

## 2021-02-26 PROCEDURE — 97530 THERAPEUTIC ACTIVITIES: CPT

## 2021-02-26 PROCEDURE — 74011636637 HC RX REV CODE- 636/637: Performed by: INTERNAL MEDICINE

## 2021-02-26 PROCEDURE — 36415 COLL VENOUS BLD VENIPUNCTURE: CPT

## 2021-02-26 PROCEDURE — 97164 PT RE-EVAL EST PLAN CARE: CPT

## 2021-02-26 PROCEDURE — 74011250636 HC RX REV CODE- 250/636: Performed by: HOSPITALIST

## 2021-02-26 PROCEDURE — 80048 BASIC METABOLIC PNL TOTAL CA: CPT

## 2021-02-26 PROCEDURE — 74011250637 HC RX REV CODE- 250/637: Performed by: ANESTHESIOLOGY

## 2021-02-26 PROCEDURE — 65270000029 HC RM PRIVATE

## 2021-02-26 PROCEDURE — 74011000250 HC RX REV CODE- 250: Performed by: HOSPITALIST

## 2021-02-26 PROCEDURE — 74011250636 HC RX REV CODE- 250/636: Performed by: INTERNAL MEDICINE

## 2021-02-26 PROCEDURE — 85025 COMPLETE CBC W/AUTO DIFF WBC: CPT

## 2021-02-26 PROCEDURE — 92526 ORAL FUNCTION THERAPY: CPT | Performed by: SPEECH-LANGUAGE PATHOLOGIST

## 2021-02-26 PROCEDURE — 82962 GLUCOSE BLOOD TEST: CPT

## 2021-02-26 RX ORDER — INSULIN GLARGINE 100 [IU]/ML
10 INJECTION, SOLUTION SUBCUTANEOUS DAILY
Status: DISCONTINUED | OUTPATIENT
Start: 2021-02-26 | End: 2021-03-05 | Stop reason: HOSPADM

## 2021-02-26 RX ORDER — DEXTROSE, SODIUM CHLORIDE, AND POTASSIUM CHLORIDE 5; .9; .15 G/100ML; G/100ML; G/100ML
50 INJECTION INTRAVENOUS CONTINUOUS
Status: DISPENSED | OUTPATIENT
Start: 2021-02-26 | End: 2021-02-27

## 2021-02-26 RX ADMIN — Medication 10 ML: at 15:17

## 2021-02-26 RX ADMIN — BUMETANIDE 2 MG: 0.25 INJECTION INTRAMUSCULAR; INTRAVENOUS at 08:39

## 2021-02-26 RX ADMIN — HYDROMORPHONE HYDROCHLORIDE 1 MG: 1 INJECTION, SOLUTION INTRAMUSCULAR; INTRAVENOUS; SUBCUTANEOUS at 11:47

## 2021-02-26 RX ADMIN — HYDROMORPHONE HYDROCHLORIDE 1 MG: 1 INJECTION, SOLUTION INTRAMUSCULAR; INTRAVENOUS; SUBCUTANEOUS at 21:09

## 2021-02-26 RX ADMIN — TICAGRELOR 90 MG: 90 TABLET ORAL at 08:39

## 2021-02-26 RX ADMIN — AMLODIPINE BESYLATE 10 MG: 5 TABLET ORAL at 08:39

## 2021-02-26 RX ADMIN — HYDROMORPHONE HYDROCHLORIDE 1 MG: 1 INJECTION, SOLUTION INTRAMUSCULAR; INTRAVENOUS; SUBCUTANEOUS at 03:07

## 2021-02-26 RX ADMIN — HYDRALAZINE HYDROCHLORIDE 25 MG: 25 TABLET, FILM COATED ORAL at 21:09

## 2021-02-26 RX ADMIN — HYDROMORPHONE HYDROCHLORIDE 1 MG: 1 INJECTION, SOLUTION INTRAMUSCULAR; INTRAVENOUS; SUBCUTANEOUS at 18:13

## 2021-02-26 RX ADMIN — POTASSIUM CHLORIDE, DEXTROSE MONOHYDRATE AND SODIUM CHLORIDE 50 ML/HR: 150; 5; 900 INJECTION, SOLUTION INTRAVENOUS at 10:05

## 2021-02-26 RX ADMIN — OXYCODONE HYDROCHLORIDE AND ACETAMINOPHEN 500 MG: 500 TABLET ORAL at 08:39

## 2021-02-26 RX ADMIN — SODIUM CHLORIDE 40 MG: 9 INJECTION INTRAMUSCULAR; INTRAVENOUS; SUBCUTANEOUS at 06:34

## 2021-02-26 RX ADMIN — ISOSORBIDE DINITRATE 30 MG: 20 TABLET ORAL at 06:34

## 2021-02-26 RX ADMIN — INSULIN GLARGINE 10 UNITS: 100 INJECTION, SOLUTION SUBCUTANEOUS at 10:04

## 2021-02-26 RX ADMIN — ISOSORBIDE DINITRATE 30 MG: 20 TABLET ORAL at 21:09

## 2021-02-26 RX ADMIN — ASPIRIN 81 MG: 81 TABLET, CHEWABLE ORAL at 08:39

## 2021-02-26 RX ADMIN — Medication 10 ML: at 21:16

## 2021-02-26 RX ADMIN — CASTOR OIL AND BALSAM, PERU: 788; 87 OINTMENT TOPICAL at 08:52

## 2021-02-26 RX ADMIN — CASTOR OIL AND BALSAM, PERU: 788; 87 OINTMENT TOPICAL at 21:16

## 2021-02-26 RX ADMIN — HYDRALAZINE HYDROCHLORIDE 25 MG: 25 TABLET, FILM COATED ORAL at 08:39

## 2021-02-26 RX ADMIN — CARVEDILOL 25 MG: 12.5 TABLET, FILM COATED ORAL at 21:09

## 2021-02-26 RX ADMIN — SODIUM CHLORIDE 40 MG: 9 INJECTION INTRAMUSCULAR; INTRAVENOUS; SUBCUTANEOUS at 18:14

## 2021-02-26 RX ADMIN — ATORVASTATIN CALCIUM 40 MG: 40 TABLET, FILM COATED ORAL at 21:09

## 2021-02-26 RX ADMIN — CARVEDILOL 25 MG: 12.5 TABLET, FILM COATED ORAL at 08:39

## 2021-02-26 RX ADMIN — CHOLECALCIFEROL (VITAMIN D3) 25 MCG (1,000 UNIT) TABLET 2000 UNITS: TABLET at 08:39

## 2021-02-26 RX ADMIN — HYDROMORPHONE HYDROCHLORIDE 1 MG: 1 INJECTION, SOLUTION INTRAMUSCULAR; INTRAVENOUS; SUBCUTANEOUS at 08:39

## 2021-02-26 RX ADMIN — BUMETANIDE 2 MG: 0.25 INJECTION INTRAMUSCULAR; INTRAVENOUS at 21:09

## 2021-02-26 RX ADMIN — Medication 10 ML: at 06:37

## 2021-02-26 NOTE — PROGRESS NOTES
Bedside shift change report given to Radha Bernard RN (oncoming nurse) by Tate Land (offgoing nurse). Report included the following information SBAR, Kardex and MAR.

## 2021-02-26 NOTE — PROGRESS NOTES
Problem: Mobility Impaired (Adult and Pediatric)  Goal: *Acute Goals and Plan of Care (Insert Text)  Description: FUNCTIONAL STATUS PRIOR TO ADMISSION: Patient was independent and active without use of DME.    HOME SUPPORT PRIOR TO ADMISSION: The patient lived with wife but did not require assist.    Physical Therapy Goals  Initiated with re-eval 2/26/2021  1. Patient will move from supine to sit and sit to supine  and roll side to side in bed with maximal assistance within 7 day(s). 2.  Patient will transfer from bed to chair and chair to bed with maximal assistance x 2 using the least restrictive device within 7 day(s). 3.  Patient will perform sit to stand with maximal assistance x 2 within 7 day(s). 4.  Patient will tolerate EOB x 5 min with min A in prep for functional transfers within 7 days. Physical Therapy Goals  Initiated 2/12/2021  1. Patient will move from supine to sit and sit to supine , scoot up and down, and roll side to side in bed with minimal assistance/contact guard assist within 7 day(s). 2.  Patient will transfer from bed to chair and chair to bed with minimal assistance/contact guard assist using the least restrictive device within 7 day(s). 3.  Patient will perform sit to stand with minimal assistance/contact guard assist within 7 day(s). 4.  Patient will ambulate with minimal assistance/contact guard assist for 10 feet with the least restrictive device within 7 day(s). 5.  Patient will ascend/descend 2 stairs with one handrail(s) with minimal assistance/contact guard assist within 7 day(s).       Outcome: Not Met   PHYSICAL THERAPY REEVALUATION  Patient: Yumiko King (87 y.o. male)  Date: 2/26/2021  Primary Diagnosis: [facility] hypoxemic respiratory failure due [facility] [U07.1, J96.01]        Precautions:   Fall      ASSESSMENT  Based on the objective data described below, the patient presents with global weakness, poor activity tolerance, bilat LE pain with all movement, decreased function with mobility, increased risk for fall. Pt presents for PT re-eval following extended admission with respiratory failure due to COVID-19. Pt initially evaluated by PT on 2/12; services discon't 2/22 due to likely plan for comfort care. Pt now on hemodialysis. PT re-consulted as family desires to take pt home if possible. Pt received in supine; lethargic, O x 4 with cue for year. Per chart review, pt fell last night while trying to get OOB unassisted. Pt agreed to attempt EOB this date. Required max A to move LEs toward EOB using small increments of movement and frequent rest breaks due to pt moaning in pain. Activity discontinued before reaching sitting position per pt request. RN informed of pt c/o pain. O2 stable on RA with mobility efforts. Pt remains significantly debilitated at this time. Will benefit from progressive strengthening and mobility progression with acute PT as tolerated. Recommend SNF rehab at d/c to facilitate increased function with mobility/ decreased dependence on caregiver assist. If pt is to return home at d/, will require 24/7 care, hospital bed, chika lift, w/c and  PT.     Current Level of Function Impacting Discharge (mobility/balance): scooting in supine total A. Other factors to consider for discharge: dependent LOF with mobility     Patient will benefit from skilled therapy intervention to address the above noted impairments. PLAN :  Recommendations and Planned Interventions: bed mobility training, transfer training, gait training, therapeutic exercises, patient and family training/education and therapeutic activities      Frequency/Duration: Patient will be followed by physical therapy:  3 times a week to address goals.     Recommendation for discharge: (in order for the patient to meet his/her long term goals)  Therapy up to 5 days/week in SNF setting    This discharge recommendation:  Has not yet been discussed the attending provider and/or case management    Equipment recommendations for successful discharge (if) home: hospital bed, mechanical lift and wheelchair         SUBJECTIVE:   Patient stated We've got to stop    OBJECTIVE DATA SUMMARY:   HISTORY:    Past Medical History:   Diagnosis Date    Diabetes ([address]     Hypertension      Past Surgical History:   Procedure Laterality Date    IR INSERT NON TUNL CVC OVER 5 YRS  2/23/2021     Hospital course since last seen and reason for reevaluation: pt now on hemodialysis and RA    Personal factors and/or comorbidities impacting plan of care: ongoing pain issues, global weakness    Home Situation  Home Environment: Private residence  # Steps to Enter: 4  Rails to Enter: Yes  Hand Rails : Bilateral  One/Two Story Residence: One story  Living Alone: No  Support Systems: Spouse/Significant Other/Partner  Current DME Used/Available at Home: None  Tub or Shower Type: Tub/Shower combination    EXAMINATION/PRESENTATION/DECISION MAKING:   Critical Behavior:  Neurologic State: Alert  Orientation Level: Oriented X4  Cognition: Follows commands  Safety/Judgement: Decreased awareness of environment  Range Of Motion:  AROM: Grossly decreased, non-functional(LEs ltd by weakness and pain this session)           PROM: Grossly decreased, non-functional(LEs)           Strength:    Strength: Grossly decreased, non-functional(LEs)                    Tone & Sensation:   Tone: Normal              Sensation: Intact               Coordination:  Coordination: Grossly decreased, non-functional  Vision:      Functional Mobility:  Bed Mobility:     Supine to Sit: (attempted EOB, pt unable to maria luz due to pain with all mvmt)     Scooting:  Total assistance           Therapeutic Exercises:    Attempted AAROM ex bilat LEs; pt able to wiggle toes and initiate ankle DF, however unable to tolerate further movement due to pain bilat LEs    Pain Rating:  Pt crying out in pain with all movement; notes LE pain worst at hips bilat; RN aware    Activity Tolerance:   Poor, SpO2 stable on RA, requires rest breaks, and observed SOB with activity    After treatment patient left in no apparent distress:   Supine in bed, Heels elevated for pressure relief, Call bell within reach, Bed / chair alarm activated, and Side rails x 3    COMMUNICATION/EDUCATION:   The patients plan of care was discussed with: Registered nurse. Fall prevention education was provided and the patient/caregiver indicated understanding., Patient/family have participated as able in goal setting and plan of care. , and Patient/family agree to work toward stated goals and plan of care.     Thank you for this referral.  Denny Love, PT   Time Calculation: 20 mins

## 2021-02-26 NOTE — PROGRESS NOTES
Bedside and Verbal shift change report given to Dorina Hamman (oncoming nurse) by Gareth Irving (offgoing nurse). Report included the following information SBAR, Kardex, MAR and Recent Results.

## 2021-02-26 NOTE — PROGRESS NOTES
Hospitalist Progress Note  Susie Martini MD  Answering service: [phone] from in house phone      Date of Service:  2021  NAME:  Dmitry Olivas  :  1955  MRN:  [id number]    Admission Summary:   65M p/w SOB- downgraded from ICU after lengthy stay. Extubated . Interval history / Subjective:   Patient seen and examined at bedside, feels ok, tried to get to bathroom last night, was little confused and impulsive and had a fall, no apparent injuries. Oral intake improving slowly, if not improving enough after weekend will need NGT for feeding     Assessment & Plan:     #. Volume Overload- removing with HD. #. ARF:- pt/family declined dialysis, then changed their mind as pt improved mentally.  - Nephrology re-consulted- started HD . #. Acute on Chronic Systolic CHF: Cards - ASA, Ticagrelor, Carvedilol, Lipitor  - TTE: ef 20%- Cardiology signed off for possible comfort- change of goals of care now,  - repeat TTE pending if still has systolic CHF will re-consult cardiology    #. Anemia: chronic, likely of chronic disease. Iron low, supplements. S/p transfusion.   - iron panel low, ferritin high, folate wnl, vit B12 high. Hemoccult pending. #. Pulm HTN: mod- severe. as seen on TTE. #. Lata White: extubated - resolved. #. COVID-19 PNA- repeat COVID test -ve on . #. Aspiration PNA- treated. #. Mycoplasma and S. Pneumoniae PNA. ID- finished Abx course. Sepsis- resolved. #. Acute metabolic Encephalopathy: related to above. Improved. #. Deranged LFTs: likely related to CHF/congestion. Monitor. #. DM2: A1c 7.4, SSI, AccuChecks, monitor  #. Debility: 2nd to multiple medical co-morbidities and advanced age. PT/OT  #. HTN: chronic, PRN BP meds.  Monitor    Code status: DNR  DVT prophylaxis: heparin  Care Plan discussed with: Patient/Family and Nurse  Disposition: [facility] Problems  Never Reviewed          Codes Class Noted POA    Dysphagia, unspecified type ICD-10-CM: R13.10  ICD-9-CM: 787.20  Unknown Unknown        Fatigue, unspecified type ICD-10-CM: R53.83  ICD-9-CM: 780.79  Unknown Unknown        Chronic systolic congestive heart failure ([facility] ICD-10-CM: I50.22  ICD-9-CM: 428.22, 428.0  2/16/2021 Unknown        Goals of care, counseling/discussion ICD-10-CM: Z71.89  ICD-9-CM: V65.49  Unknown Unknown        DNR (do not resuscitate) discussion ICD-10-CM: Z71.89  ICD-9-CM: V65.49  Unknown Unknown        JUAN C (acute kidney injury) ([facility] ICD-10-CM: N17.9  ICD-9-CM: 088. 9  Unknown Unknown        Acute hypoxemic respiratory failure due to COVID-19 [facility]) ICD-10-CM: U07.1, J96.01  ICD-9-CM: 518.81, 079.89, 799.02  1/30/2021 Unknown            Review of Systems:   Pertinent items are mentioned in interval history. Vital Signs:    Last 24hrs VS reviewed since prior progress note. Most recent are:  Visit Vitals  BP (!) 157/81 (BP 1 Location: Right upper arm, BP Patient Position: Supine)   Pulse 80   Temp 98.5 °F (36.9 °C)   Resp 20   Ht 5' 7\" (1.702 m)   Wt 90.4 kg (199 lb 4.8 oz)   SpO2 99%   BMI 31.21 kg/m²         Intake/Output Summary (Last 24 hours) at 2/26/2021 1936  Last data filed at 2/25/2021 1527  Gross per 24 hour   Intake --   Output 1400 ml   Net -1400 ml        Physical Examination:   Evaluated face to face and examined 02/26/21    General:  Alert, fatigued, No acute distress. Looks chronically sick. BM. Resp:  No accessory muscle use, no wheezes, rhonchi +   Abd:  Soft, non-tender, non-distended,  Extremities:  No cyanosis or clubbing, significant edema LEs  Neuro:  sleepy. slow in speaking, no focal neuro deficits, follows commands   Psych:   not agitated.     Data Review:    Review and/or order of clinical lab test  Review and/or order of tests in the radiology section of CPT  Review and/or order of tests in the medicine section of CPT  Labs:     Recent Labs 02/26/21  0313 02/25/21  0254   WBC 8.1 9.2   HGB 8.0* 8.4*   HCT 24.8* 25.1*    306     Recent Labs     02/26/21  0313 02/25/21  0254 02/24/21  0128    141 142   K 3.4* 3.6 3.7    106 108   CO2 28 25 19*   BUN 42* 70* 117*   CREA 5.30* 6.85* 9.20*   GLU 61* 66 89   CA 7.8* 8.0* 8.0*   MG 2.0 2.2  --    PHOS 5.3* 5.6*  --      No results for input(s): ALT, AP, TBIL, TBILI, TP, ALB, GLOB, GGT, AML, LPSE in the last 72 hours. No lab exists for component: SGOT, GPT, AMYP, HLPSE  No results for input(s): INR, PTP, APTT, INREXT, INREXT in the last 72 hours. No results for input(s): FE, TIBC, PSAT, FERR in the last 72 hours. Lab Results   Component Value Date/Time    Folate 17.5 02/23/2021 03:59 AM      No results for input(s): PH, PCO2, PO2 in the last 72 hours. No results for input(s): CPK, CKNDX, TROIQ in the last 72 hours.     No lab exists for component: CPKMB  Lab Results   Component Value Date/Time    Cholesterol, total 115 02/26/2010 05:20 PM    HDL Cholesterol 31 (L) 02/26/2010 05:20 PM    LDL, calculated 46.2 02/26/2010 05:20 PM    Triglyceride 189 02/26/2010 05:20 PM    CHOL/HDL Ratio 3.7 02/26/2010 05:20 PM     Lab Results   Component Value Date/Time    Glucose (POC) 77 02/26/2021 06:37 AM    Glucose (POC) 144 (H) 02/25/2021 09:51 PM    Glucose (POC) 48 (LL) 02/25/2021 09:33 PM    Glucose (POC) 50 (L) 02/25/2021 09:22 PM    Glucose (POC) 135 (H) 02/25/2021 05:27 PM     Lab Results   Component Value Date/Time    Color YELLOW/STRAW 02/10/2021 01:26 PM    Appearance TURBID (A) 02/10/2021 01:26 PM    Specific gravity 1.014 02/10/2021 01:26 PM    Specific gravity 1.015 02/26/2010 12:32 PM    pH (UA) 5.0 02/10/2021 01:26 PM    Protein 100 (A) 02/10/2021 01:26 PM    Glucose Negative 02/10/2021 01:26 PM    Ketone Negative 02/10/2021 01:26 PM    Bilirubin Negative 02/10/2021 01:26 PM    Urobilinogen 0.2 02/10/2021 01:26 PM    Nitrites Negative 02/10/2021 01:26 PM    Leukocyte Esterase Negative 02/10/2021 01:26 PM    Epithelial cells FEW 02/10/2021 01:26 PM    Bacteria Negative 02/10/2021 01:26 PM    WBC 0-4 02/10/2021 01:26 PM    RBC  02/10/2021 01:26 PM     Medications Reviewed:     Current Facility-Administered Medications   Medication Dose Route Frequency    hydrALAZINE (APRESOLINE) tablet 25 mg  25 mg Oral TID    insulin glargine (LANTUS) injection 14 Units  14 Units SubCUTAneous DAILY    diphenhydrAMINE (BENADRYL) capsule 25 mg  25 mg Oral Q6H PRN    heparin (porcine) 1,000 unit/mL injection 2,500 Units  2,500 Units Injection DIALYSIS PRN    0.9% sodium chloride infusion 250 mL  250 mL IntraVENous PRN    albuterol-ipratropium (DUO-NEB) 2.5 MG-0.5 MG/3 ML  3 mL Nebulization Q4H PRN    HYDROmorphone (PF) (DILAUDID) injection 1 mg  1 mg IntraVENous Q2H PRN    insulin lispro (HUMALOG) injection   SubCUTAneous AC&HS    isosorbide dinitrate (ISORDIL) tablet 30 mg  30 mg Per NG tube Q8H    balsam peru-castor oiL (VENELEX) ointment   Topical Q12H    bumetanide (BUMEX) injection 2 mg  2 mg IntraVENous Q12H    0.9% sodium chloride infusion 250 mL  250 mL IntraVENous PRN    pantoprazole (PROTONIX) 40 mg in 0.9% sodium chloride 10 mL injection  40 mg IntraVENous Q12H    labetaloL (NORMODYNE;TRANDATE) injection 20 mg  20 mg IntraVENous Q4H PRN    ticagrelor (BRILINTA) tablet 90 mg  90 mg Oral BID    carvediloL (COREG) tablet 25 mg  25 mg Per NG tube Q12H    amLODIPine (NORVASC) tablet 10 mg  10 mg Per NG tube DAILY    alteplase (CATHFLO) 1 mg in sterile water (preservative free) 1 mL injection  1 mg InterCATHeter PRN    ascorbic acid (vitamin C) (VITAMIN C) tablet 500 mg  500 mg Per NG tube BID    cholecalciferol (VITAMIN D3) (1000 Units /25 mcg) tablet 2,000 Units  2,000 Units Per NG tube DAILY    metoprolol (LOPRESSOR) injection 5 mg  5 mg IntraVENous Q6H PRN    [Held by provider] allopurinoL (ZYLOPRIM) tablet 100 mg  100 mg Oral DAILY    sodium chloride (NS) flush 5-40 mL  5-40 mL IntraVENous Q8H    sodium chloride (NS) flush 5-40 mL  5-40 mL IntraVENous PRN    acetaminophen (TYLENOL) tablet 650 mg  650 mg Oral Q6H PRN    Or    acetaminophen (TYLENOL) suppository 650 mg  650 mg Rectal Q6H PRN    glucose chewable tablet 16 g  4 Tab Oral PRN    dextrose (D50W) injection syrg 12.5-25 g  12.5-25 g IntraVENous PRN    glucagon (GLUCAGEN) injection 1 mg  1 mg IntraMUSCular PRN    aspirin chewable tablet 81 mg  81 mg Oral DAILY    atorvastatin (LIPITOR) tablet 40 mg  40 mg Oral QHS   ______________________________________________________________________  EXPECTED LENGTH OF STAY: 12d 7h  ACTUAL LENGTH OF STAY:          27               Kevin Hernandes MD

## 2021-02-26 NOTE — PROGRESS NOTES
Occupational Therapy  Patient sleeping soundly, arousablel but declines OT. Will retry Monday.  Logan Pompa OTR/L

## 2021-02-26 NOTE — PROGRESS NOTES
Post Fall Documentation    Candida Cordova unwitnessed fall occurred on 2/25 (Date) at [address] [facility] (Time). The answers to the following questions summarize the fall: In the patient's own words:  · What were you attempting to do when you fell? \"Trying to use the bathroom\"  · Do you know why you fell? \"no\"  · Do you have any pain/discomfort or any other complaints? \"My legs always hurt\"  · Which part of your body made contact with the floor or other object? Pt pointed to front of head  Nurse:  [facility] Was this an assisted fall? no   Was fall witnessed? No   If witnessed, what part of the body made contact with the floor or other object? Not witnessed   Patients mental status after the fall/when found: Confused   Any apparent injury:  Abrasion(s)   Immediate interventions for injury/suspected injury? No interventions needed   Patient assisted back to bed? Assist x 4   Name of provider notified and time, any comments? Yonathan Hedrick NP; he will come see patient as soon as possible and evaluate        Name of family member notified and time: Oscar Merino, 2005    Document Immediate VS and physical assessment in flowsheets. Document Neuro assessment every hour x 4 (for potential head injury or unwitnessed fall) in flowsheets.         Franklyn Ba RN

## 2021-02-26 NOTE — PROGRESS NOTES
Brief Note to Document Page    Name: Anderson Mcdonald  YOB: 1955  MRN: [id number]  Admission Date: 1/30/2021 12:48 AM      Date of service: 2/25/2021    Active Diagnoses:    Hospital Problems  Never Reviewed          Codes Class Noted POA    Dysphagia, unspecified type ICD-10-CM: R13.10  ICD-9-CM: 787.20  Unknown Unknown        Fatigue, unspecified type ICD-10-CM: R53.83  ICD-9-CM: 780.79  Unknown Unknown        Chronic systolic congestive heart failure ([facility] ICD-10-CM: I50.22  ICD-9-CM: 428.22, 428.0  2/16/2021 Unknown        Goals of care, counseling/discussion ICD-10-CM: Z71.89  ICD-9-CM: V65.49  Unknown Unknown        DNR (do not resuscitate) discussion ICD-10-CM: Z71.89  ICD-9-CM: V65.49  Unknown Unknown        JUAN C (acute kidney injury) ([facility] ICD-10-CM: N17.9  ICD-9-CM: 748. 9  Unknown Unknown        Acute hypoxemic respiratory failure due to COVID-19 [facility]) ICD-10-CM: U07.1, J96.01  ICD-9-CM: 518.81, 079.89, 799.02  1/30/2021 Unknown              Chief Complaint:    Fall. Complains of chronic leg pain. Denies injury. Denies headache, dizziness, diplopia or neck pain      Clinical Presentation:    79-year-old male admitted 1/30/2021 with prolonged  hospitalization due to multiple factors. Tonight, patient admits he tried to get out of bed without assistance to use the bathroom, lost his footing and fell to the floor next to the bed. He states he struck his forehead on the floor but did not lose consciousness. He denies other injuries. Staff assisted patient back to bed    Physical Exam:     · Alert, oriented to person and place. · PERRLA, EOMI  · Head NCAT, tongue midline, face symmetrical  · Chest clear, lungs clear  · Abdomen soft, nontender not distended  · Extremities without edema. No deformity. Pulses present    Data Reviewed: All diagnostic labs and studies have been reviewed.     Patient Vitals for the past 8 hrs:   Temp Pulse Resp BP SpO2   02/25/21 1949 97.6 °F (36.4 °C) 88 24 (!) 153/84 97 %   02/25/21 1610 -- -- -- (!) 165/75 --   02/25/21 1527 98.7 °F (37.1 °C) 81 20 (!) 165/75 97 %         Recent Results (from the past 8 hour(s))   GLUCOSE, POC    Collection Time: 02/25/21  5:04 PM   Result Value Ref Range    Glucose (POC) 52 (L) 65 - 100 mg/dL    Performed by [address] [place], POC    Collection Time: 02/25/21  5:27 PM   Result Value Ref Range    Glucose (POC) 135 (H) 65 - 100 mg/dL    Performed by João Segura and Plan:    Fall, no apparent injuries  · Continue current plan of care, safe interventions      This patient was stable at the time of my exam.  Please do not hesitate to call me again if his status does not improve or worsens.         Elaine Butler, MSN, RN, NP-C  [phone] or via [employer]    2/25/2021

## 2021-02-26 NOTE — PROGRESS NOTES
ROBERT spoke with Lenny Silva in admissions at [facility]. He said that this pt does not have a skilled benefit, but they will review this pt for a LTC bed. CM will follow. Curry Garcias. ACM-SW

## 2021-02-26 NOTE — PROGRESS NOTES
CM reviewed chart and called pt's wife, Sylvie Haro ([phone]). CM informed her that the therapists are still recommending SNF rehab for this pt. Facilities discussed. Per pt's wife, pt was sent to [facility] from [facility], but she does not like [facility]. She would like referrals to be sent to [facility], [facility]. and [facility], as she does want this pt to go to rehab. CM sent referrals to these 3 facilities. ROBERT sent a [employer] message to Dr. Radha Talbot earlier today asking if he would like us to set pt up for a community dialysis chair. He agreed to this and asked that [facility] be contacted. After some research, it appears that the [facility] facility on [address] is the closest to pt's home. ROBERT sent a referral to [address] via [employer].  Stormy Gaines

## 2021-02-26 NOTE — PROGRESS NOTES
Problem: Falls - Risk of  Goal: *Absence of Falls  Description: Document Jenni Corona Fall Risk and appropriate interventions in the flowsheet.   Outcome: Progressing Towards Goal  Note: Fall Risk Interventions:       Mentation Interventions: Adequate sleep, hydration, pain control    Medication Interventions: Bed/chair exit alarm    Elimination Interventions: Bed/chair exit alarm    History of Falls Interventions: Evaluate medications/consider consulting pharmacy, Investigate reason for fall, Room close to nurse's station, Bed/chair exit alarm

## 2021-02-26 NOTE — PROGRESS NOTES
Bedside shift change report given to [facility] (oncoming nurse) by Veena Ac (offgoing nurse). Report included the following information SBAR, Kardex, MAR and Recent Results.

## 2021-02-26 NOTE — PROGRESS NOTES
Patient blood sugar 50, repeated 48. Patient awake and alert,on thickened liquids, swallowing precautions     2129 gave 50ml  D50 per protocol standing order . Patient remain awake and alert. 2151 repeat blood sugar 141. Will continue to monitor.

## 2021-02-26 NOTE — PROGRESS NOTES
Problem: Dysphagia (Adult)  Goal: *Acute Goals and Plan of Care (Insert Text)  Description: Speech therapy goals  Initiated 2/12/2021 Discontinued  Re-initiated 2/23/2021    1. Patient will participate in re-evaluation of swallow function within 7 days   Outcome: [address] TRE[address]  Patient: Donovan Sims (80 y.o. male)  Date: 2/26/2021  Diagnosis: Acute hypoxemic respiratory failure due to COVID-19 ([facility] [U07.1, J96.01] <principal problem not specified>       Precautions: Aspiration Fall    ASSESSMENT:  Patient with wet vocal quality and weak cough prior to PO trials. Patient presents with moderate - severe oropharyngeal dysphagia characterized by slow bolus formation and transit, pharyngeal swallow delay and reduced laryngeal elevation via palpation. Patient demonstrated weak cough after all honey thickened liquid trials. With pureed trials patient demonstrated weak cough 1 of 3 trials however he required near constant verbal cues to initiate each swallow. Risk of aspiration remains high and NPO is safest course. Given goal for restorative care recommend NPO with alternate means of nutrition. Suspect swallow skills will improve as overall strength improves. PLAN:  Recommendations and Planned Interventions:  NPO including medication  Consider alternate means for nutrition/hydration/medication  SLP to continue to follow  Patient continues to benefit from skilled intervention to address the above impairments. Continue treatment per established plan of care. Discharge Recommendations: To Be Determined     SUBJECTIVE:   Patient stated You scared . .. me.. Patient drowsy and required cues to remain alert throughout session. Per MD, goals of care include restorative measures.     OBJECTIVE:   Cognitive and Communication Status:  Neurologic State: Alert  Orientation Level: Oriented X4  Cognition: Follows commands  Perception: Appears intact  Perseveration: No perseveration noted  Safety/Judgement: Decreased awareness of environment  Dysphagia Treatment:  Oral Assessment:     P.O. Trials:  Patient Position: Upright in bed  Vocal quality prior to P.O.: Low volume  Consistency Presented: Honey thick liquid;Puree  How Presented: SLP-fed/presented;Cup/sip;Spoon     Bolus Acceptance: No impairment  Bolus Formation/Control: Impaired  Type of Impairment: Delayed  Propulsion: Delayed (# of seconds)  Oral Residue: None  Initiation of Swallow: Delayed (# of seconds)  Laryngeal Elevation: Decreased;Weak  Aspiration Signs/Symptoms: Weak cough;Clear throat                Oral Phase Severity: Moderate-severe  Pharyngeal Phase Severity : Moderate-severe    After treatment:   Patient left in no apparent distress in bed and Call bell within reach    COMMUNICATION/EDUCATION:   Patient was educated regarding role of SLP and POC. Patient closed his eyes. The patient's plan of care including recommendations, planned interventions, and recommended diet changes were discussed with: Registered nurse.      TAYLOR Harp  Time Calculation: 15 mins

## 2021-02-26 NOTE — PROGRESS NOTES
Nephrology Progress Note  Kendall Anand  Date of Admission : 1/30/2021    CC: Follow up for JUAN C on CKD       Assessment and Plan     JUAN C:  - unclear baseline but suspect some component of CKD  - Renal U/S on 1/30 confirms CKD  - HD initiated 2/23  - treated 2/23. 2/24 and 2/25  - HD now TTS  - daily labs and I/Os to watch for recovery    Volume overload:  - improving w/ HD    Encephalopathy    HFrEF:  - EF 20-25% on 2/1     COVID-19 PNA:  - now COVID neg on 2/19     DM2:  - on insulin       Interval History:  Seen and examined. Unchanged clinically. Still lethargic. Will open eyes and answer simple questions. HD yestereday, 1 L UF. No issues reported. Unable to obtain ROS. Current Medications: all current  Medications have been eviewed in EPIC  Review of Systems: Review of systems not obtained due to patient factors. Objective:  Vitals:    Vitals:    02/25/21 1949 02/25/21 2125 02/26/21 0222 02/26/21 0819   BP: (!) 153/84 124/76 (!) 157/81 (!) 162/82   Pulse: 88 78 80 71   Resp: 24  20 20   Temp: 97.6 °F (36.4 °C)  98.5 °F (36.9 °C) 98.7 °F (37.1 °C)   SpO2: 97%  99% 98%   Weight:   90.4 kg (199 lb 4.8 oz)    Height:         Intake and Output:  No intake/output data recorded. 02/24 1901 - 02/26 0700  In: -   Out: 3100 [Urine:1100]    Physical Examination:  Pt intubated    No  General: Confused, frail  Neck:  Supple, no mass  Resp:  Lungs Clear anteriorly  CV:  RRR,  no murmur or rub, 2-3 + LE edema  GI:  Soft, NT, + Bowel sounds, no hepatosplenomegaly  Neurologic:  confused  Psych:             Unable to assess  Skin:  No Rash  :  No bird    []    High complexity decision making was performed  []    Patient is at high-risk of decompensation with multiple organ involvement    Lab Data Personally Reviewed: I have reviewed all the pertinent labs, microbiology data and radiology studies during assessment.     Recent Labs     02/26/21  0313 02/25/21  0254 02/24/21  0128    141 142   K 3.4* 3.6 3.7    106 108   CO2 28 25 19*   GLU 61* 66 89   BUN 42* 70* 117*   CREA 5.30* 6.85* 9.20*   CA 7.8* 8.0* 8.0*   MG 2.0 2.2  --    PHOS 5.3* 5.6*  --      Recent Labs     02/26/21  0313 02/25/21  0254 02/24/21  0128   WBC 8.1 9.2 9.0   HGB 8.0* 8.4* 7.6*   HCT 24.8* 25.1* 23.4*    306 283     Lab Results   Component Value Date/Time    Specimen Description: BLOOD 02/27/2010 07:00 PM     Lab Results   Component Value Date/Time    Culture result: NO GROWTH 5 DAYS 02/15/2021 10:58 AM    Culture result: LIGHT YEAST, (APPARENT CANDIDA ALBICANS) (A) 02/12/2021 04:53 PM    Culture result: LIGHT NORMAL RESPIRATORY CORI 02/12/2021 04:53 PM     Recent Results (from the past 24 hour(s))   GLUCOSE, POC    Collection Time: 02/25/21 12:02 PM   Result Value Ref Range    Glucose (POC) 76 65 - 100 mg/dL    Performed by [address], POC    Collection Time: 02/25/21  5:04 PM   Result Value Ref Range    Glucose (POC) 52 (L) 65 - 100 mg/dL    Performed by [facility], POC    Collection Time: 02/25/21  5:27 PM   Result Value Ref Range    Glucose (POC) 135 (H) 65 - 100 mg/dL    Performed by Joe Meade    GLUCOSE, POC    Collection Time: 02/25/21  9:22 PM   Result Value Ref Range    Glucose (POC) 50 (L) 65 - 100 mg/dL    Performed by Donta Kohler    GLUCOSE, POC    Collection Time: 02/25/21  9:33 PM   Result Value Ref Range    Glucose (POC) 48 (LL) 65 - 100 mg/dL    Performed by Donta Kohler    GLUCOSE, POC    Collection Time: 02/25/21  9:51 PM   Result Value Ref Range    Glucose (POC) 144 (H) 65 - 100 mg/dL    Performed by [place]    CBC WITH AUTOMATED DIFF    Collection Time: 02/26/21  3:13 AM   Result Value Ref Range    WBC 8.1 4.1 - 11.1 K/uL    RBC 3.00 (L) 4.10 - 5.70 M/uL    HGB 8.0 (L) 12.1 - 17.0 g/dL    HCT 24.8 (L) 36.6 - 50.3 %    MCV 82.7 80.0 - 99.0 FL    MCH 26.7 26.0 - 34.0 PG    MCHC 32.3 30.0 - 36.5 g/dL    RDW 17.5 (H) 11.5 - 14.5 %    PLATELET 746 510 - 259 K/uL MPV 11.2 8.9 - 12.9 FL    NRBC 0.0 0  WBC    ABSOLUTE NRBC 0.00 0.00 - 0.01 K/uL    NEUTROPHILS 62 32 - 75 %    LYMPHOCYTES 21 12 - 49 %    MONOCYTES 13 5 - 13 %    EOSINOPHILS 3 0 - 7 %    BASOPHILS 0 0 - 1 %    IMMATURE GRANULOCYTES 1 (H) 0.0 - 0.5 %    ABS. NEUTROPHILS 5.1 1.8 - 8.0 K/UL    ABS. LYMPHOCYTES 1.7 0.8 - 3.5 K/UL    ABS. MONOCYTES 1.1 (H) 0.0 - 1.0 K/UL    ABS. EOSINOPHILS 0.2 0.0 - 0.4 K/UL    ABS. BASOPHILS 0.0 0.0 - 0.1 K/UL    ABS. IMM. GRANS. 0.1 (H) 0.00 - 0.04 K/UL    DF AUTOMATED     METABOLIC PANEL, BASIC    Collection Time: 02/26/21  3:13 AM   Result Value Ref Range    Sodium 142 136 - 145 mmol/L    Potassium 3.4 (L) 3.5 - 5.1 mmol/L    Chloride 107 97 - 108 mmol/L    CO2 28 21 - 32 mmol/L    Anion gap 7 5 - 15 mmol/L    Glucose 61 (L) 65 - 100 mg/dL    BUN 42 (H) 6 - 20 MG/DL    Creatinine 5.30 (H) 0.70 - 1.30 MG/DL    BUN/Creatinine ratio 8 (L) 12 - 20      GFR est AA 13 (L) >60 ml/min/1.73m2    GFR est non-AA 11 (L) >60 ml/min/1.73m2    Calcium 7.8 (L) 8.5 - 10.1 MG/DL   MAGNESIUM    Collection Time: 02/26/21  3:13 AM   Result Value Ref Range    Magnesium 2.0 1.6 - 2.4 mg/dL   PHOSPHORUS    Collection Time: 02/26/21  3:13 AM   Result Value Ref Range    Phosphorus 5.3 (H) 2.6 - 4.7 MG/DL   GLUCOSE, POC    Collection Time: 02/26/21  6:37 AM   Result Value Ref Range    Glucose (POC) 77 65 - 100 mg/dL    Performed by [facility] [address] [facility] [place], POC    Collection Time: 02/26/21 11:06 AM   Result Value Ref Range    Glucose (POC) 95 65 - 100 mg/dL    Performed by Sailaja Durham MD  [address] [place], [place] [address]  Phone - [phone]   Fax - [phone]  [email or web address]

## 2021-02-27 LAB
ANION GAP SERPL CALC-SCNC: 11 MMOL/L (ref 5–15)
BASOPHILS # BLD: 0 K/UL (ref 0–0.1)
BASOPHILS NFR BLD: 1 % (ref 0–1)
BUN SERPL-MCNC: 45 MG/DL (ref 6–20)
BUN/CREAT SERPL: 7 (ref 12–20)
CALCIUM SERPL-MCNC: 7.8 MG/DL (ref 8.5–10.1)
CHLORIDE SERPL-SCNC: 108 MMOL/L (ref 97–108)
CO2 SERPL-SCNC: 24 MMOL/L (ref 21–32)
CREAT SERPL-MCNC: 6.09 MG/DL (ref 0.7–1.3)
DIFFERENTIAL METHOD BLD: ABNORMAL
EOSINOPHIL # BLD: 0.3 K/UL (ref 0–0.4)
EOSINOPHIL NFR BLD: 4 % (ref 0–7)
ERYTHROCYTE [DISTWIDTH] IN BLOOD BY AUTOMATED COUNT: 17.3 % (ref 11.5–14.5)
GLUCOSE BLD STRIP.AUTO-MCNC: 103 MG/DL (ref 65–100)
GLUCOSE BLD STRIP.AUTO-MCNC: 107 MG/DL (ref 65–100)
GLUCOSE BLD STRIP.AUTO-MCNC: 118 MG/DL (ref 65–100)
GLUCOSE SERPL-MCNC: 91 MG/DL (ref 65–100)
HCT VFR BLD AUTO: 32.2 % (ref 36.6–50.3)
HGB BLD-MCNC: 10 G/DL (ref 12.1–17)
IMM GRANULOCYTES # BLD AUTO: 0 K/UL (ref 0–0.04)
IMM GRANULOCYTES NFR BLD AUTO: 1 % (ref 0–0.5)
LYMPHOCYTES # BLD: 1.3 K/UL (ref 0.8–3.5)
LYMPHOCYTES NFR BLD: 21 % (ref 12–49)
MAGNESIUM SERPL-MCNC: 2.3 MG/DL (ref 1.6–2.4)
MCH RBC QN AUTO: 25.8 PG (ref 26–34)
MCHC RBC AUTO-ENTMCNC: 31.1 G/DL (ref 30–36.5)
MCV RBC AUTO: 83.2 FL (ref 80–99)
MONOCYTES # BLD: 0.7 K/UL (ref 0–1)
MONOCYTES NFR BLD: 11 % (ref 5–13)
NEUTS SEG # BLD: 4.1 K/UL (ref 1.8–8)
NEUTS SEG NFR BLD: 62 % (ref 32–75)
NRBC # BLD: 0 K/UL (ref 0–0.01)
NRBC BLD-RTO: 0 PER 100 WBC
PHOSPHATE SERPL-MCNC: 6.2 MG/DL (ref 2.6–4.7)
PLATELET # BLD AUTO: 297 K/UL (ref 150–400)
PMV BLD AUTO: 11.1 FL (ref 8.9–12.9)
POTASSIUM SERPL-SCNC: 3.8 MMOL/L (ref 3.5–5.1)
RBC # BLD AUTO: 3.87 M/UL (ref 4.1–5.7)
SERVICE CMNT-IMP: ABNORMAL
SODIUM SERPL-SCNC: 143 MMOL/L (ref 136–145)
WBC # BLD AUTO: 6.4 K/UL (ref 4.1–11.1)

## 2021-02-27 PROCEDURE — C9113 INJ PANTOPRAZOLE SODIUM, VIA: HCPCS | Performed by: HOSPITALIST

## 2021-02-27 PROCEDURE — 74011250636 HC RX REV CODE- 250/636: Performed by: INTERNAL MEDICINE

## 2021-02-27 PROCEDURE — 74011250637 HC RX REV CODE- 250/637: Performed by: ANESTHESIOLOGY

## 2021-02-27 PROCEDURE — 74011250637 HC RX REV CODE- 250/637: Performed by: EMERGENCY MEDICINE

## 2021-02-27 PROCEDURE — 74011250637 HC RX REV CODE- 250/637: Performed by: INTERNAL MEDICINE

## 2021-02-27 PROCEDURE — 83735 ASSAY OF MAGNESIUM: CPT

## 2021-02-27 PROCEDURE — 74011250637 HC RX REV CODE- 250/637: Performed by: HOSPITALIST

## 2021-02-27 PROCEDURE — 36415 COLL VENOUS BLD VENIPUNCTURE: CPT

## 2021-02-27 PROCEDURE — 65270000029 HC RM PRIVATE

## 2021-02-27 PROCEDURE — 85025 COMPLETE CBC W/AUTO DIFF WBC: CPT

## 2021-02-27 PROCEDURE — 82962 GLUCOSE BLOOD TEST: CPT

## 2021-02-27 PROCEDURE — 80048 BASIC METABOLIC PNL TOTAL CA: CPT

## 2021-02-27 PROCEDURE — 94760 N-INVAS EAR/PLS OXIMETRY 1: CPT

## 2021-02-27 PROCEDURE — 74011000250 HC RX REV CODE- 250: Performed by: INTERNAL MEDICINE

## 2021-02-27 PROCEDURE — 74011250636 HC RX REV CODE- 250/636: Performed by: HOSPITALIST

## 2021-02-27 PROCEDURE — 84100 ASSAY OF PHOSPHORUS: CPT

## 2021-02-27 PROCEDURE — 90935 HEMODIALYSIS ONE EVALUATION: CPT

## 2021-02-27 PROCEDURE — 74011000250 HC RX REV CODE- 250: Performed by: HOSPITALIST

## 2021-02-27 RX ORDER — CLONIDINE 0.1 MG/24H
1 PATCH, EXTENDED RELEASE TRANSDERMAL
Status: DISCONTINUED | OUTPATIENT
Start: 2021-02-27 | End: 2021-02-27

## 2021-02-27 RX ADMIN — AMLODIPINE BESYLATE 10 MG: 5 TABLET ORAL at 15:12

## 2021-02-27 RX ADMIN — SODIUM CHLORIDE 40 MG: 9 INJECTION INTRAMUSCULAR; INTRAVENOUS; SUBCUTANEOUS at 05:56

## 2021-02-27 RX ADMIN — CASTOR OIL AND BALSAM, PERU: 788; 87 OINTMENT TOPICAL at 21:05

## 2021-02-27 RX ADMIN — OXYCODONE HYDROCHLORIDE AND ACETAMINOPHEN 500 MG: 500 TABLET ORAL at 17:41

## 2021-02-27 RX ADMIN — HYDROMORPHONE HYDROCHLORIDE 1 MG: 1 INJECTION, SOLUTION INTRAMUSCULAR; INTRAVENOUS; SUBCUTANEOUS at 17:41

## 2021-02-27 RX ADMIN — SODIUM CHLORIDE 40 MG: 9 INJECTION INTRAMUSCULAR; INTRAVENOUS; SUBCUTANEOUS at 17:41

## 2021-02-27 RX ADMIN — HYDRALAZINE HYDROCHLORIDE 25 MG: 25 TABLET, FILM COATED ORAL at 15:13

## 2021-02-27 RX ADMIN — ISOSORBIDE DINITRATE 30 MG: 20 TABLET ORAL at 21:02

## 2021-02-27 RX ADMIN — Medication 10 ML: at 14:40

## 2021-02-27 RX ADMIN — TICAGRELOR 90 MG: 90 TABLET ORAL at 15:13

## 2021-02-27 RX ADMIN — TICAGRELOR 90 MG: 90 TABLET ORAL at 22:59

## 2021-02-27 RX ADMIN — Medication 10 ML: at 05:57

## 2021-02-27 RX ADMIN — CARVEDILOL 25 MG: 12.5 TABLET, FILM COATED ORAL at 21:03

## 2021-02-27 RX ADMIN — BUMETANIDE 2 MG: 0.25 INJECTION INTRAMUSCULAR; INTRAVENOUS at 21:03

## 2021-02-27 RX ADMIN — ISOSORBIDE DINITRATE 30 MG: 20 TABLET ORAL at 15:13

## 2021-02-27 RX ADMIN — CHOLECALCIFEROL (VITAMIN D3) 25 MCG (1,000 UNIT) TABLET 2000 UNITS: TABLET at 15:12

## 2021-02-27 RX ADMIN — Medication 10 ML: at 21:03

## 2021-02-27 RX ADMIN — CASTOR OIL AND BALSAM, PERU: 788; 87 OINTMENT TOPICAL at 15:13

## 2021-02-27 RX ADMIN — HYDROMORPHONE HYDROCHLORIDE 1 MG: 1 INJECTION, SOLUTION INTRAMUSCULAR; INTRAVENOUS; SUBCUTANEOUS at 05:56

## 2021-02-27 RX ADMIN — HYDROMORPHONE HYDROCHLORIDE 1 MG: 1 INJECTION, SOLUTION INTRAMUSCULAR; INTRAVENOUS; SUBCUTANEOUS at 21:03

## 2021-02-27 RX ADMIN — ATORVASTATIN CALCIUM 40 MG: 40 TABLET, FILM COATED ORAL at 21:03

## 2021-02-27 RX ADMIN — HYDRALAZINE HYDROCHLORIDE 25 MG: 25 TABLET, FILM COATED ORAL at 21:03

## 2021-02-27 RX ADMIN — HYDROMORPHONE HYDROCHLORIDE 1 MG: 1 INJECTION, SOLUTION INTRAMUSCULAR; INTRAVENOUS; SUBCUTANEOUS at 14:40

## 2021-02-27 RX ADMIN — ISOSORBIDE DINITRATE 30 MG: 20 TABLET ORAL at 05:56

## 2021-02-27 RX ADMIN — Medication 10 ML: at 15:13

## 2021-02-27 RX ADMIN — HYDROMORPHONE HYDROCHLORIDE 1 MG: 1 INJECTION, SOLUTION INTRAMUSCULAR; INTRAVENOUS; SUBCUTANEOUS at 01:40

## 2021-02-27 RX ADMIN — ASPIRIN 81 MG: 81 TABLET, CHEWABLE ORAL at 15:13

## 2021-02-27 NOTE — PROGRESS NOTES
[address] [facility] Adult  Hospitalist Group                                                                                          Hospitalist Progress Note  Powell Opitz, MD  Answering service: [phone] from in house phone        Date of Service:  2021  NAME:  Paulo Waters  :  1955  MRN:  [id number]      Admission Summary:     A 72-year-old man who presented from [facility] on  with acute onset shortness of breath, hypoxia, nausea, vomiting and diarrhea-shortness of breath acutely got worse after one of the vomiting episodes. Diagnosed with Covid about 2 weeks prior to presentation.  Patient placed on BiPAP on arrival-felt much better.  After getting fluids for resuscitation/elevated lactate level patient acutely decompensated-developed lethargy and drop in oxygen levels-intubated emergently requiring moderate to high vent support. His respiratory status improved but his renal function was declining suspecting acute kidney injury on top of chronic kidney disease, it was also difficult to control his agitation on the ventilator. However patient condition improved and he was successfully extubated on . Patient continued to do well and he was transferred to the intermediate care unit on . Over the last few days patient oxygen requirement was increasing and he is becoming more lethargic. This morning he was placed on high flow nasal cannula and later on on BiPAP. On BiPAP he seems to be much better with decreased work of breathing and good tidal volume and oxygenation. Reviewing his chest x-ray seems that he has progressive volume overload and perhaps a component of aspiration. He was giving 1 dose of Lasix with good urine output and ICU consulted.   Of note this patient been with positive fluid balance progressively since admission, according to documentation since admission he is about 20 L positive, over the last 3 days he is positive about 5 L.  He has good urine output despite worsening BUN and creatinine. No fever, no hemoptysis no nausea no vomiting. Intubated on 1/31 and extubated 2/11,   Transferred out of ICU on 2/13  Readmitted to ICU 2/15, placed on BiPAP, transferred out of the ICU on 2/19      Interval history / Subjective:     He said he feels better, no chest pain, denied swallowing difficulties      Assessment & Plan:     Acute hypoxic respiratory failure secondary to COVID 19 pneumonia  Mycoplasma and S Pneumonia and possible aspiration pneumonia with sepsis POA  -intubated on 1/31 and extubated 2/11, transferred out of ICU on 2/13  -readmitted to ICU 2/15, placed on BiPAP, transferred out of the ICU on 2/19   -completed iv doxycycline for mycoplasma on 2/12  -iv cefepime 1/30-2/12, flagyl 1/30-2/4 and iv vancomycin 1/30-2/9  -Received vanco and merrem  -COVID 19 test on 1/30 and 2/12 was positive, repeated COVID 19 test on 2/19 negative  -off antibiotics now  -received IV decadron   -blood cx no growth  -afebrile, no leukocytosis  -chest x ray on 2/19 improved expansion and aeration of the lungs.  Continued interstitial prominence likely due to the patient's history of viral pneumonia  -SpO2 96 on RA  -off droplet plus isolation    ARF with volume over load  -received iv bicarb gtt  -renal function improving after dialysis  -HD started on 2/23  -Nephrology on board    Acute metabolic encephalopathy  -improved  -patient is conscious and alert, oriented to place and person  -continue supportive care    Acute on chronic systolic HF NYHA Class IV   -LV EF 20-25%  -continue on coreg, isordil, hydralazine, bumex, HD  -monitor I/O and daily weight  -cardiologist on board    HTN  -BP not at goal, on norvasc, coreg, hydralazine and isordil, monitor BP    Anemia of chronic disease   -transfused on 2/14 one unit for Hgb of 6.4, also one unit of pRBC on 2/22 for Hgb of 6.7  -Hgb 10.0  -no sign and symptoms of active bleeding  -monitor H/H    T2DM with hyperglycemia  -A1c 7.4  -patient with dysphagia, monitor finger stick glucose on sliding sclae    Hx of CAD s/p stent  -no chest pain  -continue aspirin, ticagrelor, lipitor, coreg, and isordil    Deranged liver enzyme   -improving  -repeat liver enzyme    Dysphagia   -doesn't want NGT  -seen by speech and recommended NPO  -will consult GI    Mild posterior cervical wound infection  -improving  -continue wound care  -wound care nurse on board    Debility  -PT/OT      Code status: DNR  DVT prophylaxis: SCD    Care Plan discussed with: Patient/Family and Nurse  Anticipated Disposition: From [facility]  Anticipated Discharge: Greater than 48 hours     Hospital Problems  Never Reviewed          Codes Class Noted POA    Dysphagia, unspecified type ICD-10-CM: R13.10  ICD-9-CM: 787.20  Unknown Unknown        Fatigue, unspecified type ICD-10-CM: R53.83  ICD-9-CM: 780.79  Unknown Unknown        Chronic systolic congestive heart failure ([facility] ICD-10-CM: I50.22  ICD-9-CM: 428.22, 428.0  2/16/2021 Unknown        Goals of care, counseling/discussion ICD-10-CM: Z71.89  ICD-9-CM: V65.49  Unknown Unknown        DNR (do not resuscitate) discussion ICD-10-CM: Z71.89  ICD-9-CM: V65.49  Unknown Unknown        JUAN C (acute kidney injury) ([facility] ICD-10-CM: N17.9  ICD-9-CM: 461. 9  Unknown Unknown        Acute hypoxemic respiratory failure due to COVID-19 [facility]) ICD-10-CM: U07.1, J96.01  ICD-9-CM: 518.81, 079.89, 799.02  1/30/2021 Unknown                 Vital Signs:    Last 24hrs VS reviewed since prior progress note.  Most recent are:  Visit Vitals  BP (!) 174/86   Pulse 75   Temp 98.9 °F (37.2 °C)   Resp 18   Ht 5' 7\" (1.702 m)   Wt 87.5 kg (192 lb 12.8 oz)   SpO2 98%   BMI 30.20 kg/m²         Intake/Output Summary (Last 24 hours) at 2/27/2021 1450  Last data filed at 2/27/2021 1325  Gross per 24 hour   Intake --   Output 3050 ml   Net -3050 ml        Physical Examination:     I had a face to face encounter with this patient and independently examined them on 2/27/2021 as outlined below:          Constitutional:  No acute distress, cooperative, pleasant    ENT:  Oral mucosa moist, oropharynx benign.    Resp:  Decrease bronchial breath sound bilaterally. No wheezing/rhonchi/rales. No accessory muscle use   CV:  Regular rhythm, normal rate, no murmurs, gallops, rubs    GI:  Soft, non distended, non tender. normoactive bowel sounds, no hepatosplenomegaly     Musculoskeletal:  No edema     Neurologic:  Conscious and alert, oriented to place, person and time, motor UE 5/5, LE 4/5     Skin:  Posterior neck chronic wound       Data Review:    Review and/or order of clinical lab test  Review and/or order of tests in the radiology section of CPT  Review and/or order of tests in the medicine section of CPT      Labs:     Recent Labs     02/27/21  0601 02/26/21  0313   WBC 6.4 8.1   HGB 10.0* 8.0*   HCT 32.2* 24.8*    323     Recent Labs     02/27/21  0601 02/26/21  0313 02/25/21  0254    142 141   K 3.8 3.4* 3.6    107 106   CO2 24 28 25   BUN 45* 42* 70*   CREA 6.09* 5.30* 6.85*   GLU 91 61* 66   CA 7.8* 7.8* 8.0*   MG 2.3 2.0 2.2   PHOS 6.2* 5.3* 5.6*     No results for input(s): ALT, AP, TBIL, TBILI, TP, ALB, GLOB, GGT, AML, LPSE in the last 72 hours.    No lab exists for component: SGOT, GPT, AMYP, HLPSE  No results for input(s): INR, PTP, APTT, INREXT in the last 72 hours.   No results for input(s): FE, TIBC, PSAT, FERR in the last 72 hours.   Lab Results   Component Value Date/Time    Folate 17.5 02/23/2021 03:59 AM      No results for input(s): PH, PCO2, PO2 in the last 72 hours.  No results for input(s): CPK, CKNDX, TROIQ in the last 72 hours.    No lab exists for component: CPKMB  Lab Results   Component Value Date/Time    Cholesterol, total 115 02/26/2010 05:20 PM    HDL Cholesterol 31 (L) 02/26/2010 05:20 PM    LDL, calculated 46.2 02/26/2010 05:20 PM    Triglyceride 189 02/26/2010 05:20 PM    CHOL/HDL Ratio 3.7  02/26/2010 05:20 PM     Lab Results   Component Value Date/Time    Glucose (POC) 107 (H) 02/27/2021 06:38 AM    Glucose (POC) 103 (H) 02/26/2021 09:40 PM    Glucose (POC) 105 (H) 02/26/2021 04:42 PM    Glucose (POC) 95 02/26/2021 11:06 AM    Glucose (POC) 77 02/26/2021 06:37 AM     Lab Results   Component Value Date/Time    Color YELLOW/STRAW 02/10/2021 01:26 PM    Appearance TURBID (A) 02/10/2021 01:26 PM    Specific gravity 1.014 02/10/2021 01:26 PM    Specific gravity 1.015 02/26/2010 12:32 PM    pH (UA) 5.0 02/10/2021 01:26 PM    Protein 100 (A) 02/10/2021 01:26 PM    Glucose Negative 02/10/2021 01:26 PM    Ketone Negative 02/10/2021 01:26 PM    Bilirubin Negative 02/10/2021 01:26 PM    Urobilinogen 0.2 02/10/2021 01:26 PM    Nitrites Negative 02/10/2021 01:26 PM    Leukocyte Esterase Negative 02/10/2021 01:26 PM    Epithelial cells FEW 02/10/2021 01:26 PM    Bacteria Negative 02/10/2021 01:26 PM    WBC 0-4 02/10/2021 01:26 PM    RBC  02/10/2021 01:26 PM         Medications Reviewed:     Current Facility-Administered Medications   Medication Dose Route Frequency    insulin glargine (LANTUS) injection 10 Units  10 Units SubCUTAneous DAILY    hydrALAZINE (APRESOLINE) tablet 25 mg  25 mg Oral TID    diphenhydrAMINE (BENADRYL) capsule 25 mg  25 mg Oral Q6H PRN    heparin (porcine) 1,000 unit/mL injection 2,500 Units  2,500 Units Injection DIALYSIS PRN    0.9% sodium chloride infusion 250 mL  250 mL IntraVENous PRN    albuterol-ipratropium (DUO-NEB) 2.5 MG-0.5 MG/3 ML  3 mL Nebulization Q4H PRN    HYDROmorphone (PF) (DILAUDID) injection 1 mg  1 mg IntraVENous Q2H PRN    insulin lispro (HUMALOG) injection   SubCUTAneous AC&HS    isosorbide dinitrate (ISORDIL) tablet 30 mg  30 mg Per NG tube Q8H    balsam peru-castor oiL (VENELEX) ointment   Topical Q12H    bumetanide (BUMEX) injection 2 mg  2 mg IntraVENous Q12H    0.9% sodium chloride infusion 250 mL  250 mL IntraVENous PRN    pantoprazole (PROTONIX) 40 mg in 0.9% sodium chloride 10 mL injection  40 mg IntraVENous Q12H    labetaloL (NORMODYNE;TRANDATE) injection 20 mg  20 mg IntraVENous Q4H PRN    ticagrelor (BRILINTA) tablet 90 mg  90 mg Oral BID    carvediloL (COREG) tablet 25 mg  25 mg Per NG tube Q12H    amLODIPine (NORVASC) tablet 10 mg  10 mg Per NG tube DAILY    alteplase (CATHFLO) 1 mg in sterile water (preservative free) 1 mL injection  1 mg InterCATHeter PRN    ascorbic acid (vitamin C) (VITAMIN C) tablet 500 mg  500 mg Per NG tube BID    cholecalciferol (VITAMIN D3) (1000 Units /25 mcg) tablet 2,000 Units  2,000 Units Per NG tube DAILY    metoprolol (LOPRESSOR) injection 5 mg  5 mg IntraVENous Q6H PRN    [Held by provider] allopurinoL (ZYLOPRIM) tablet 100 mg  100 mg Oral DAILY    sodium chloride (NS) flush 5-40 mL  5-40 mL IntraVENous Q8H    sodium chloride (NS) flush 5-40 mL  5-40 mL IntraVENous PRN    acetaminophen (TYLENOL) tablet 650 mg  650 mg Oral Q6H PRN    Or    acetaminophen (TYLENOL) suppository 650 mg  650 mg Rectal Q6H PRN    glucose chewable tablet 16 g  4 Tab Oral PRN    dextrose (D50W) injection syrg 12.5-25 g  12.5-25 g IntraVENous PRN    glucagon (GLUCAGEN) injection 1 mg  1 mg IntraMUSCular PRN    aspirin chewable tablet 81 mg  81 mg Oral DAILY    atorvastatin (LIPITOR) tablet 40 mg  40 mg Oral QHS     ______________________________________________________________________  EXPECTED LENGTH OF STAY: 12d 7h  ACTUAL LENGTH OF STAY:          28                 Mayela Schaefer MD

## 2021-02-27 NOTE — PROGRESS NOTES
Bedside shift change report given to Jeniffer Leos (oncoming nurse) by Delmar Rai RN (offgoing nurse). Report included the following information SBAR, Kardex and MAR.

## 2021-02-27 NOTE — PROCEDURES
Master Dialysis Team [facility]  [phone]    Vitals   Pre   Post   Assessment   Pre   Post     Temp  Temp: 98.2 °F (36.8 °C) (02/27/21 0955)  98.9 LOC  Alert and oriented x 2 same   HR   Pulse (Heart Rate): 78 (02/27/21 0955) 75 Lungs   Coarse sound, on room air same   B/P   BP: (!) 172/82 (02/27/21 0955) 174/86 Cardiac   RRR same   Resp   Resp Rate: 18 (02/27/21 0955) 18 Skin   Dry and intact  same   Pain level  Pain Intensity 1: 9 (02/27/21 0140) 0 Edema  generalized     same   Orders:    Duration:   Start:    9906 End:    1325 Total:   3.5 hrs   Dialyzer:   Dialyzer/Set Up Inspection: Yusuf Worrell (02/27/21 0955)   Ayanna [place]:   Dialysate K (mEq/L): 3 (02/27/21 0955)   Ca Bath:   Dialysate CA (mEq/L): 2.5 (02/27/21 0955)   Na/Bicarb:   Dialysate NA (mEq/L): 140 (02/27/21 0955)   Target Fluid Removal:   Goal/Amount of Fluid to Remove (mL): 3000 mL (02/27/21 0955)   Access     Type & Location:   RIJ non tunneled cvc, dressing soiled and changed, cvc flushed and aspirated well, no s/s of infection, Each catheter limb disinfected per p&p, caps removed, hubs disinfected per p&p.        Labs     Obtained/Reviewed   Critical Results Called   Date when labs were drawn-  Hgb-    HGB   Date Value Ref Range Status   02/27/2021 10.0 (L) 12.1 - 17.0 g/dL Final     K-    Potassium   Date Value Ref Range Status   02/27/2021 3.8 3.5 - 5.1 mmol/L Final     Ca-   Calcium   Date Value Ref Range Status   02/27/2021 7.8 (L) 8.5 - 10.1 MG/DL Final     Bun-   BUN   Date Value Ref Range Status   02/27/2021 45 (H) 6 - 20 MG/DL Final     Creat-   Creatinine   Date Value Ref Range Status   02/27/2021 6.09 (H) 0.70 - 1.30 MG/DL Final        Medications/ Blood Products Given     Name   Dose   Route and Time     Heparin 2500 units  cvc dwell             Blood Volume Processed (BVP):    79.3 Net Fluid   Removed:  3000ml   Comments   Time Out Done: 0930  Primary Nurse Rpt Pre:ANAHI Boyd  Primary Nurse Rpt Post:Deb RN  Pt Education:Procedural  Care Plan: Continue Nephrology plan of care  Tx Summary:    0955-Labs, orders and medications were reveiwed. SBAR given by primary nurse. HD started using [facility] cvc    1325-HD was completed and pt tolerated it well. All possible blood was returned. Each dialysis catheter limb disinfected per p&p, blood returned per p&p, each dialysis hub disinfected per p&p, post dialysis catheter dwell instilled per order, and caps applied. The primary nurse was given a report. Pt remained stable during my departure. Bed in the lowest position and call bell in reach. Admiting Diagnosis: Hypoxia/ ESRD  Consent signed - Informed Consent Verified: Yes (02/27/21 0955)  Hepatitis Status- Neg/Imm 2/23/21  Machine #- Machine Number: [id number] (02/27/21 8040)  Telemetry status- None  Pre-dialysis wt. - Pre-Dialysis Weight: 95.3 kg (210 lb 1.6 oz) (02/24/21 2030)

## 2021-02-28 LAB
ALBUMIN SERPL-MCNC: 1.8 G/DL (ref 3.5–5)
ALBUMIN/GLOB SERPL: 0.5 {RATIO} (ref 1.1–2.2)
ALP SERPL-CCNC: 290 U/L (ref 45–117)
ALT SERPL-CCNC: 56 U/L (ref 12–78)
ANION GAP SERPL CALC-SCNC: 9 MMOL/L (ref 5–15)
AST SERPL-CCNC: 54 U/L (ref 15–37)
BASOPHILS # BLD: 0 K/UL (ref 0–0.1)
BASOPHILS NFR BLD: 0 % (ref 0–1)
BILIRUB SERPL-MCNC: 0.4 MG/DL (ref 0.2–1)
BUN SERPL-MCNC: 21 MG/DL (ref 6–20)
BUN/CREAT SERPL: 5 (ref 12–20)
CALCIUM SERPL-MCNC: 7.5 MG/DL (ref 8.5–10.1)
CHLORIDE SERPL-SCNC: 106 MMOL/L (ref 97–108)
CO2 SERPL-SCNC: 27 MMOL/L (ref 21–32)
COMMENT, HOLDF: NORMAL
CREAT SERPL-MCNC: 4.13 MG/DL (ref 0.7–1.3)
DIFFERENTIAL METHOD BLD: ABNORMAL
EOSINOPHIL # BLD: 0.3 K/UL (ref 0–0.4)
EOSINOPHIL NFR BLD: 4 % (ref 0–7)
ERYTHROCYTE [DISTWIDTH] IN BLOOD BY AUTOMATED COUNT: 16.8 % (ref 11.5–14.5)
GLOBULIN SER CALC-MCNC: 3.6 G/DL (ref 2–4)
GLUCOSE BLD STRIP.AUTO-MCNC: 104 MG/DL (ref 65–100)
GLUCOSE BLD STRIP.AUTO-MCNC: 108 MG/DL (ref 65–100)
GLUCOSE BLD STRIP.AUTO-MCNC: 115 MG/DL (ref 65–100)
GLUCOSE BLD STRIP.AUTO-MCNC: 118 MG/DL (ref 65–100)
GLUCOSE SERPL-MCNC: 94 MG/DL (ref 65–100)
HCT VFR BLD AUTO: 25.8 % (ref 36.6–50.3)
HGB BLD-MCNC: 8 G/DL (ref 12.1–17)
IMM GRANULOCYTES # BLD AUTO: 0 K/UL (ref 0–0.04)
IMM GRANULOCYTES NFR BLD AUTO: 0 % (ref 0–0.5)
LYMPHOCYTES # BLD: 1.6 K/UL (ref 0.8–3.5)
LYMPHOCYTES NFR BLD: 18 % (ref 12–49)
MAGNESIUM SERPL-MCNC: 1.8 MG/DL (ref 1.6–2.4)
MCH RBC QN AUTO: 26.1 PG (ref 26–34)
MCHC RBC AUTO-ENTMCNC: 31 G/DL (ref 30–36.5)
MCV RBC AUTO: 84 FL (ref 80–99)
MONOCYTES # BLD: 1 K/UL (ref 0–1)
MONOCYTES NFR BLD: 12 % (ref 5–13)
NEUTS SEG # BLD: 5.5 K/UL (ref 1.8–8)
NEUTS SEG NFR BLD: 66 % (ref 32–75)
NRBC # BLD: 0 K/UL (ref 0–0.01)
NRBC BLD-RTO: 0 PER 100 WBC
PHOSPHATE SERPL-MCNC: 4.4 MG/DL (ref 2.6–4.7)
PLATELET # BLD AUTO: 296 K/UL (ref 150–400)
PMV BLD AUTO: 10.4 FL (ref 8.9–12.9)
POTASSIUM SERPL-SCNC: 3.6 MMOL/L (ref 3.5–5.1)
PROT SERPL-MCNC: 5.4 G/DL (ref 6.4–8.2)
RBC # BLD AUTO: 3.07 M/UL (ref 4.1–5.7)
SAMPLES BEING HELD,HOLD: NORMAL
SERVICE CMNT-IMP: ABNORMAL
SODIUM SERPL-SCNC: 142 MMOL/L (ref 136–145)
WBC # BLD AUTO: 8.4 K/UL (ref 4.1–11.1)

## 2021-02-28 PROCEDURE — 84100 ASSAY OF PHOSPHORUS: CPT

## 2021-02-28 PROCEDURE — C9113 INJ PANTOPRAZOLE SODIUM, VIA: HCPCS | Performed by: HOSPITALIST

## 2021-02-28 PROCEDURE — 74011250637 HC RX REV CODE- 250/637: Performed by: ANESTHESIOLOGY

## 2021-02-28 PROCEDURE — 74011000250 HC RX REV CODE- 250: Performed by: HOSPITALIST

## 2021-02-28 PROCEDURE — 85025 COMPLETE CBC W/AUTO DIFF WBC: CPT

## 2021-02-28 PROCEDURE — 74011250637 HC RX REV CODE- 250/637: Performed by: INTERNAL MEDICINE

## 2021-02-28 PROCEDURE — 65270000029 HC RM PRIVATE

## 2021-02-28 PROCEDURE — 94760 N-INVAS EAR/PLS OXIMETRY 1: CPT

## 2021-02-28 PROCEDURE — 74011250636 HC RX REV CODE- 250/636: Performed by: INTERNAL MEDICINE

## 2021-02-28 PROCEDURE — 36415 COLL VENOUS BLD VENIPUNCTURE: CPT

## 2021-02-28 PROCEDURE — 74011250637 HC RX REV CODE- 250/637: Performed by: EMERGENCY MEDICINE

## 2021-02-28 PROCEDURE — 74011250637 HC RX REV CODE- 250/637: Performed by: HOSPITALIST

## 2021-02-28 PROCEDURE — 74011250636 HC RX REV CODE- 250/636: Performed by: HOSPITALIST

## 2021-02-28 PROCEDURE — 80053 COMPREHEN METABOLIC PANEL: CPT

## 2021-02-28 PROCEDURE — 74011000250 HC RX REV CODE- 250: Performed by: INTERNAL MEDICINE

## 2021-02-28 PROCEDURE — 82962 GLUCOSE BLOOD TEST: CPT

## 2021-02-28 PROCEDURE — 83735 ASSAY OF MAGNESIUM: CPT

## 2021-02-28 RX ADMIN — CHOLECALCIFEROL (VITAMIN D3) 25 MCG (1,000 UNIT) TABLET 2000 UNITS: TABLET at 12:14

## 2021-02-28 RX ADMIN — CARVEDILOL 25 MG: 12.5 TABLET, FILM COATED ORAL at 21:43

## 2021-02-28 RX ADMIN — HYDROMORPHONE HYDROCHLORIDE 1 MG: 1 INJECTION, SOLUTION INTRAMUSCULAR; INTRAVENOUS; SUBCUTANEOUS at 16:16

## 2021-02-28 RX ADMIN — TICAGRELOR 90 MG: 90 TABLET ORAL at 12:14

## 2021-02-28 RX ADMIN — ISOSORBIDE DINITRATE 30 MG: 20 TABLET ORAL at 16:17

## 2021-02-28 RX ADMIN — Medication 10 ML: at 21:46

## 2021-02-28 RX ADMIN — BUMETANIDE 2 MG: 0.25 INJECTION INTRAMUSCULAR; INTRAVENOUS at 12:14

## 2021-02-28 RX ADMIN — ISOSORBIDE DINITRATE 30 MG: 20 TABLET ORAL at 06:29

## 2021-02-28 RX ADMIN — TICAGRELOR 90 MG: 90 TABLET ORAL at 21:44

## 2021-02-28 RX ADMIN — CASTOR OIL AND BALSAM, PERU: 788; 87 OINTMENT TOPICAL at 21:45

## 2021-02-28 RX ADMIN — HYDRALAZINE HYDROCHLORIDE 25 MG: 25 TABLET, FILM COATED ORAL at 12:14

## 2021-02-28 RX ADMIN — HYDROMORPHONE HYDROCHLORIDE 1 MG: 1 INJECTION, SOLUTION INTRAMUSCULAR; INTRAVENOUS; SUBCUTANEOUS at 02:48

## 2021-02-28 RX ADMIN — HYDRALAZINE HYDROCHLORIDE 25 MG: 25 TABLET, FILM COATED ORAL at 21:51

## 2021-02-28 RX ADMIN — BUMETANIDE 2 MG: 0.25 INJECTION INTRAMUSCULAR; INTRAVENOUS at 21:43

## 2021-02-28 RX ADMIN — OXYCODONE HYDROCHLORIDE AND ACETAMINOPHEN 500 MG: 500 TABLET ORAL at 12:14

## 2021-02-28 RX ADMIN — HYDROMORPHONE HYDROCHLORIDE 1 MG: 1 INJECTION, SOLUTION INTRAMUSCULAR; INTRAVENOUS; SUBCUTANEOUS at 06:34

## 2021-02-28 RX ADMIN — CARVEDILOL 25 MG: 12.5 TABLET, FILM COATED ORAL at 12:14

## 2021-02-28 RX ADMIN — HYDROMORPHONE HYDROCHLORIDE 1 MG: 1 INJECTION, SOLUTION INTRAMUSCULAR; INTRAVENOUS; SUBCUTANEOUS at 21:43

## 2021-02-28 RX ADMIN — SODIUM CHLORIDE 40 MG: 9 INJECTION INTRAMUSCULAR; INTRAVENOUS; SUBCUTANEOUS at 06:29

## 2021-02-28 RX ADMIN — ATORVASTATIN CALCIUM 40 MG: 40 TABLET, FILM COATED ORAL at 21:44

## 2021-02-28 RX ADMIN — AMLODIPINE BESYLATE 10 MG: 5 TABLET ORAL at 12:14

## 2021-02-28 RX ADMIN — Medication 10 ML: at 06:29

## 2021-02-28 RX ADMIN — CASTOR OIL AND BALSAM, PERU: 788; 87 OINTMENT TOPICAL at 12:15

## 2021-02-28 RX ADMIN — ISOSORBIDE DINITRATE 30 MG: 20 TABLET ORAL at 21:44

## 2021-02-28 RX ADMIN — HYDRALAZINE HYDROCHLORIDE 25 MG: 25 TABLET, FILM COATED ORAL at 17:33

## 2021-02-28 RX ADMIN — ASPIRIN 81 MG: 81 TABLET, CHEWABLE ORAL at 12:14

## 2021-02-28 RX ADMIN — OXYCODONE HYDROCHLORIDE AND ACETAMINOPHEN 500 MG: 500 TABLET ORAL at 18:32

## 2021-02-28 RX ADMIN — SODIUM CHLORIDE 40 MG: 9 INJECTION INTRAMUSCULAR; INTRAVENOUS; SUBCUTANEOUS at 18:32

## 2021-02-28 RX ADMIN — Medication 10 ML: at 16:16

## 2021-02-28 NOTE — PROGRESS NOTES
Bedside shift change report given to Jeniffer Leos (oncoming nurse) by Margie Villegas (offgoing nurse). Report included the following information SBAR, Kardex and MAR.

## 2021-02-28 NOTE — PROGRESS NOTES
[address] [facility] Adult  Hospitalist Group                                                                                          Hospitalist Progress Note  Raad Wilkins MD  Answering service: [phone] from in house phone        Date of Service:  2021  NAME:  Candelario Cranker  :  1955  MRN:  [id number]      Admission Summary:     A 62-year-old man who presented from [facility] on  with acute onset shortness of breath, hypoxia, nausea, vomiting and diarrhea-shortness of breath acutely got worse after one of the vomiting episodes. Diagnosed with Covid about 2 weeks prior to presentation.  Patient placed on BiPAP on arrival-felt much better.  After getting fluids for resuscitation/elevated lactate level patient acutely decompensated-developed lethargy and drop in oxygen levels-intubated emergently requiring moderate to high vent support. His respiratory status improved but his renal function was declining suspecting acute kidney injury on top of chronic kidney disease, it was also difficult to control his agitation on the ventilator. However patient condition improved and he was successfully extubated on . Patient continued to do well and he was transferred to the intermediate care unit on . Over the last few days patient oxygen requirement was increasing and he is becoming more lethargic. This morning he was placed on high flow nasal cannula and later on on BiPAP. On BiPAP he seems to be much better with decreased work of breathing and good tidal volume and oxygenation. Reviewing his chest x-ray seems that he has progressive volume overload and perhaps a component of aspiration. He was giving 1 dose of Lasix with good urine output and ICU consulted.   Of note this patient been with positive fluid balance progressively since admission, according to documentation since admission he is about 20 L positive, over the last 3 days he is positive about 5 L.  He has good urine output despite worsening BUN and creatinine. No fever, no hemoptysis no nausea no vomiting. Intubated on 1/31 and extubated 2/11,   Transferred out of ICU on 2/13  Readmitted to ICU 2/15, placed on BiPAP, transferred out of the ICU on 2/19      Interval history / Subjective:     He said he feels better, no chest pain, denied swallowing difficulties, he said he doesn't want NGT or PEG tube feeding     Assessment & Plan:     Acute hypoxic respiratory failure secondary to COVID 19 pneumonia  Mycoplasma and S Pneumonia and possible aspiration pneumonia with sepsis POA  -intubated on 1/31 and extubated 2/11, transferred out of ICU on 2/13  -readmitted to ICU 2/15, placed on BiPAP, transferred out of the ICU on 2/19   -completed iv doxycycline for mycoplasma on 2/12  -iv cefepime 1/30-2/12, flagyl 1/30-2/4 and iv vancomycin 1/30-2/9  -Received vanco and merrem  -COVID 19 test on 1/30 and 2/12 was positive, repeated COVID 19 test on 2/19 negative  -off antibiotics now  -received IV decadron   -blood cx no growth  -afebrile, no leukocytosis  -chest x ray on 2/19 improved expansion and aeration of the lungs.  Continued interstitial prominence likely due to the patient's history of viral pneumonia  -SpO2 96 on RA  -off droplet plus isolation    ARF with volume over load  -received iv bicarb gtt  -renal function improving after dialysis  -HD started on 2/23  -Nephrology on board    Acute metabolic encephalopathy  -improved  -patient is conscious and alert, oriented to place and person  -continue supportive care    Acute on chronic systolic HF NYHA Class IV   -LV EF 20-25%  -continue on coreg, isordil, hydralazine, bumex, HD  -monitor I/O and daily weight  -cardiologist on board    HTN  -BP not at goal, on norvasc, coreg, hydralazine and isordil, monitor BP    Anemia of chronic disease   -transfused on 2/14 one unit for Hgb of 6.4, also one unit of pRBC on 2/22 for Hgb of 6.7  -Hgb 8.0  -no sign and symptoms of active bleeding  -monitor H/H    T2DM with hyperglycemia  -A1c 7.4  -patient with dysphagia, monitor finger stick glucose on sliding sclae    Hx of CAD s/p stent  -no chest pain  -continue aspirin, ticagrelor, lipitor, coreg, and isordil    Deranged liver enzyme   -improving  -repeat liver enzyme    Dysphagia   -doesn't want NGT or PEG tube  -seen by speech and recommended NPO  -will check again with speech therapist  -will consider GI consult    Mild posterior cervical wound infection  -improving  -continue wound care  -wound care nurse on board    Debility  -PT/OT      Code status: DNR  DVT prophylaxis: SCD    Care Plan discussed with: Patient/Family and Nurse  [place] [facility] [place]ticipated Discharge: Greater than 48 hours     Hospital Problems  Never Reviewed          Codes Class Noted POA    Dysphagia, unspecified type ICD-10-CM: R13.10  ICD-9-CM: 787.20  Unknown Unknown        Fatigue, unspecified type ICD-10-CM: R53.83  ICD-9-CM: 780.79  Unknown Unknown        Chronic systolic [facility] ([facility] ICD-10-CM: I50.22  ICD-9-CM: 428.22, 428.0  2/16/2021 Unknown        Goals of care, counseling/discussion ICD-10-CM: Z71.89  ICD-9-CM: V65.49  Unknown Unknown        DNR (do not resuscitate) discussion ICD-10-CM: Z71.89  ICD-9-CM: V65.49  Unknown Unknown        JUAN C (acute kidney injury) ([facility] ICD-10-CM: N17.9  ICD-9-CM: 592. 9  Unknown Unknown        Acute hypoxemic respiratory failure due to COVID-19 [facility]) ICD-10-CM: U07.1, J96.01  ICD-9-CM: 518.81, 079.89, 799.02  1/30/2021 Unknown                 Vital Signs:    Last 24hrs VS reviewed since prior progress note.  Most recent are:  Visit Vitals  BP (!) 155/86 (BP 1 Location: Right upper arm, BP Patient Position: At rest)   Pulse 73   Temp 98.7 °F (37.1 °C)   Resp 16   Ht 5' 7\" (1.702 m)   Wt 79.6 kg (175 lb 6.4 oz)   SpO2 99%   BMI 27.47 kg/m²       No intake or output data in the 24 hours ending 02/28/21 1358     Physical Examination:     I had a face to face encounter with this patient and independently examined them on 2/28/2021 as outlined below:          Constitutional:  No acute distress, cooperative, pleasant    ENT:  Oral mucosa moist, oropharynx benign. Resp:  Decrease bronchial breath sound bilaterally. No wheezing/rhonchi/rales. No accessory muscle use   CV:  Regular rhythm, normal rate, no murmurs, gallops, rubs    GI:  Soft, non distended, non tender. normoactive bowel sounds, no hepatosplenomegaly     Musculoskeletal:  No edema     Neurologic:  Conscious and alert, oriented to place, person and time, motor UE 5/5, LE 4/5     Skin:  Posterior neck chronic wound       Data Review:    Review and/or order of clinical lab test  Review and/or order of tests in the radiology section of CPT  Review and/or order of tests in the medicine section of CPT      Labs:     Recent Labs     02/28/21  0256 02/27/21  0601   WBC 8.4 6.4   HGB 8.0* 10.0*   HCT 25.8* 32.2*    297     Recent Labs     02/28/21  0256 02/27/21  0601 02/26/21  0313    143 142   K 3.6 3.8 3.4*    108 107   CO2 27 24 28   BUN 21* 45* 42*   CREA 4.13* 6.09* 5.30*   GLU 94 91 61*   CA 7.5* 7.8* 7.8*   MG 1.8 2.3 2.0   PHOS 4.4 6.2* 5.3*     Recent Labs     02/28/21 0256   ALT 56   *   TBILI 0.4   TP 5.4*   ALB 1.8*   GLOB 3.6     No results for input(s): INR, PTP, APTT, INREXT, INREXT in the last 72 hours. No results for input(s): FE, TIBC, PSAT, FERR in the last 72 hours. Lab Results   Component Value Date/Time    Folate 17.5 02/23/2021 03:59 AM      No results for input(s): PH, PCO2, PO2 in the last 72 hours. No results for input(s): CPK, CKNDX, TROIQ in the last 72 hours.     No lab exists for component: CPKMB  Lab Results   Component Value Date/Time    Cholesterol, total 115 02/26/2010 05:20 PM    HDL Cholesterol 31 (L) 02/26/2010 05:20 PM    LDL, calculated 46.2 02/26/2010 05:20 PM    Triglyceride 189 02/26/2010 05:20 PM CHOL/HDL Ratio 3.7 02/26/2010 05:20 PM     Lab Results   Component Value Date/Time    Glucose (POC) 108 (H) 02/28/2021 11:19 AM    Glucose (POC) 118 (H) 02/28/2021 07:17 AM    Glucose (POC) 103 (H) 02/27/2021 10:02 PM    Glucose (POC) 118 (H) 02/27/2021 06:02 PM    Glucose (POC) 107 (H) 02/27/2021 06:38 AM     Lab Results   Component Value Date/Time    Color YELLOW/STRAW 02/10/2021 01:26 PM    Appearance TURBID (A) 02/10/2021 01:26 PM    Specific gravity 1.014 02/10/2021 01:26 PM    Specific gravity 1.015 02/26/2010 12:32 PM    pH (UA) 5.0 02/10/2021 01:26 PM    Protein 100 (A) 02/10/2021 01:26 PM    Glucose Negative 02/10/2021 01:26 PM    Ketone Negative 02/10/2021 01:26 PM    Bilirubin Negative 02/10/2021 01:26 PM    Urobilinogen 0.2 02/10/2021 01:26 PM    Nitrites Negative 02/10/2021 01:26 PM    Leukocyte Esterase Negative 02/10/2021 01:26 PM    Epithelial cells FEW 02/10/2021 01:26 PM    Bacteria Negative 02/10/2021 01:26 PM    WBC 0-4 02/10/2021 01:26 PM    RBC  02/10/2021 01:26 PM         Medications Reviewed:     Current Facility-Administered Medications   Medication Dose Route Frequency    insulin glargine (LANTUS) injection 10 Units  10 Units SubCUTAneous DAILY    hydrALAZINE (APRESOLINE) tablet 25 mg  25 mg Oral TID    diphenhydrAMINE (BENADRYL) capsule 25 mg  25 mg Oral Q6H PRN    heparin (porcine) 1,000 unit/mL injection 2,500 Units  2,500 Units Injection DIALYSIS PRN    0.9% sodium chloride infusion 250 mL  250 mL IntraVENous PRN    albuterol-ipratropium (DUO-NEB) 2.5 MG-0.5 MG/3 ML  3 mL Nebulization Q4H PRN    HYDROmorphone (PF) (DILAUDID) injection 1 mg  1 mg IntraVENous Q2H PRN    insulin lispro (HUMALOG) injection   SubCUTAneous AC&HS    isosorbide dinitrate (ISORDIL) tablet 30 mg  30 mg Per NG tube Q8H    balsam peru-castor oiL (VENELEX) ointment   Topical Q12H    bumetanide (BUMEX) injection 2 mg  2 mg IntraVENous Q12H    0.9% sodium chloride infusion 250 mL  250 mL IntraVENous PRN    pantoprazole (PROTONIX) 40 mg in 0.9% sodium chloride 10 mL injection  40 mg IntraVENous Q12H    labetaloL (NORMODYNE;TRANDATE) injection 20 mg  20 mg IntraVENous Q4H PRN    ticagrelor (BRILINTA) tablet 90 mg  90 mg Oral BID    carvediloL (COREG) tablet 25 mg  25 mg Per NG tube Q12H    amLODIPine (NORVASC) tablet 10 mg  10 mg Per NG tube DAILY    alteplase (CATHFLO) 1 mg in sterile water (preservative free) 1 mL injection  1 mg InterCATHeter PRN    ascorbic acid (vitamin C) (VITAMIN C) tablet 500 mg  500 mg Per NG tube BID    cholecalciferol (VITAMIN D3) (1000 Units /25 mcg) tablet 2,000 Units  2,000 Units Per NG tube DAILY    metoprolol (LOPRESSOR) injection 5 mg  5 mg IntraVENous Q6H PRN    [Held by provider] allopurinoL (ZYLOPRIM) tablet 100 mg  100 mg Oral DAILY    sodium chloride (NS) flush 5-40 mL  5-40 mL IntraVENous Q8H    sodium chloride (NS) flush 5-40 mL  5-40 mL IntraVENous PRN    acetaminophen (TYLENOL) tablet 650 mg  650 mg Oral Q6H PRN    Or    acetaminophen (TYLENOL) suppository 650 mg  650 mg Rectal Q6H PRN    glucose chewable tablet 16 g  4 Tab Oral PRN    dextrose (D50W) injection syrg 12.5-25 g  12.5-25 g IntraVENous PRN    glucagon (GLUCAGEN) injection 1 mg  1 mg IntraMUSCular PRN    aspirin chewable tablet 81 mg  81 mg Oral DAILY    atorvastatin (LIPITOR) tablet 40 mg  40 mg Oral QHS     ______________________________________________________________________  EXPECTED LENGTH OF STAY: 12d 7h  ACTUAL LENGTH OF STAY:          29                 Mayela Schaefer MD

## 2021-03-01 LAB
ANION GAP SERPL CALC-SCNC: 7 MMOL/L (ref 5–15)
BASOPHILS # BLD: 0 K/UL (ref 0–0.1)
BASOPHILS NFR BLD: 0 % (ref 0–1)
BUN SERPL-MCNC: 22 MG/DL (ref 6–20)
BUN/CREAT SERPL: 4 (ref 12–20)
CALCIUM SERPL-MCNC: 7.7 MG/DL (ref 8.5–10.1)
CHLORIDE SERPL-SCNC: 108 MMOL/L (ref 97–108)
CO2 SERPL-SCNC: 28 MMOL/L (ref 21–32)
CREAT SERPL-MCNC: 5.23 MG/DL (ref 0.7–1.3)
DIFFERENTIAL METHOD BLD: ABNORMAL
EOSINOPHIL # BLD: 0.4 K/UL (ref 0–0.4)
EOSINOPHIL NFR BLD: 4 % (ref 0–7)
ERYTHROCYTE [DISTWIDTH] IN BLOOD BY AUTOMATED COUNT: 16.4 % (ref 11.5–14.5)
GLUCOSE BLD STRIP.AUTO-MCNC: 124 MG/DL (ref 65–100)
GLUCOSE BLD STRIP.AUTO-MCNC: 87 MG/DL (ref 65–100)
GLUCOSE BLD STRIP.AUTO-MCNC: 91 MG/DL (ref 65–100)
GLUCOSE BLD STRIP.AUTO-MCNC: 97 MG/DL (ref 65–100)
GLUCOSE SERPL-MCNC: 88 MG/DL (ref 65–100)
HCT VFR BLD AUTO: 23.9 % (ref 36.6–50.3)
HGB BLD-MCNC: 7.7 G/DL (ref 12.1–17)
IMM GRANULOCYTES # BLD AUTO: 0 K/UL (ref 0–0.04)
IMM GRANULOCYTES NFR BLD AUTO: 0 % (ref 0–0.5)
LYMPHOCYTES # BLD: 1.7 K/UL (ref 0.8–3.5)
LYMPHOCYTES NFR BLD: 20 % (ref 12–49)
MAGNESIUM SERPL-MCNC: 1.8 MG/DL (ref 1.6–2.4)
MCH RBC QN AUTO: 26.4 PG (ref 26–34)
MCHC RBC AUTO-ENTMCNC: 32.2 G/DL (ref 30–36.5)
MCV RBC AUTO: 81.8 FL (ref 80–99)
MONOCYTES # BLD: 1 K/UL (ref 0–1)
MONOCYTES NFR BLD: 11 % (ref 5–13)
NEUTS SEG # BLD: 5.6 K/UL (ref 1.8–8)
NEUTS SEG NFR BLD: 65 % (ref 32–75)
NRBC # BLD: 0 K/UL (ref 0–0.01)
NRBC BLD-RTO: 0 PER 100 WBC
PHOSPHATE SERPL-MCNC: 5.1 MG/DL (ref 2.6–4.7)
PLATELET # BLD AUTO: 304 K/UL (ref 150–400)
PMV BLD AUTO: 10.9 FL (ref 8.9–12.9)
POTASSIUM SERPL-SCNC: 3.2 MMOL/L (ref 3.5–5.1)
RBC # BLD AUTO: 2.92 M/UL (ref 4.1–5.7)
SERVICE CMNT-IMP: ABNORMAL
SERVICE CMNT-IMP: NORMAL
SODIUM SERPL-SCNC: 143 MMOL/L (ref 136–145)
WBC # BLD AUTO: 8.7 K/UL (ref 4.1–11.1)

## 2021-03-01 PROCEDURE — 80048 BASIC METABOLIC PNL TOTAL CA: CPT

## 2021-03-01 PROCEDURE — 92526 ORAL FUNCTION THERAPY: CPT | Performed by: SPEECH-LANGUAGE PATHOLOGIST

## 2021-03-01 PROCEDURE — 74011636637 HC RX REV CODE- 636/637: Performed by: INTERNAL MEDICINE

## 2021-03-01 PROCEDURE — 36415 COLL VENOUS BLD VENIPUNCTURE: CPT

## 2021-03-01 PROCEDURE — 74011250637 HC RX REV CODE- 250/637: Performed by: INTERNAL MEDICINE

## 2021-03-01 PROCEDURE — 83735 ASSAY OF MAGNESIUM: CPT

## 2021-03-01 PROCEDURE — 97110 THERAPEUTIC EXERCISES: CPT

## 2021-03-01 PROCEDURE — 74011000250 HC RX REV CODE- 250: Performed by: HOSPITALIST

## 2021-03-01 PROCEDURE — 85025 COMPLETE CBC W/AUTO DIFF WBC: CPT

## 2021-03-01 PROCEDURE — 74011250637 HC RX REV CODE- 250/637: Performed by: ANESTHESIOLOGY

## 2021-03-01 PROCEDURE — 74011250637 HC RX REV CODE- 250/637: Performed by: EMERGENCY MEDICINE

## 2021-03-01 PROCEDURE — 84100 ASSAY OF PHOSPHORUS: CPT

## 2021-03-01 PROCEDURE — 74011250636 HC RX REV CODE- 250/636: Performed by: INTERNAL MEDICINE

## 2021-03-01 PROCEDURE — 94760 N-INVAS EAR/PLS OXIMETRY 1: CPT

## 2021-03-01 PROCEDURE — 74011250636 HC RX REV CODE- 250/636: Performed by: HOSPITALIST

## 2021-03-01 PROCEDURE — 74011250637 HC RX REV CODE- 250/637: Performed by: NURSE PRACTITIONER

## 2021-03-01 PROCEDURE — C9113 INJ PANTOPRAZOLE SODIUM, VIA: HCPCS | Performed by: HOSPITALIST

## 2021-03-01 PROCEDURE — 65270000029 HC RM PRIVATE

## 2021-03-01 PROCEDURE — 74011250637 HC RX REV CODE- 250/637: Performed by: HOSPITALIST

## 2021-03-01 PROCEDURE — 74011000250 HC RX REV CODE- 250: Performed by: INTERNAL MEDICINE

## 2021-03-01 PROCEDURE — 82962 GLUCOSE BLOOD TEST: CPT

## 2021-03-01 PROCEDURE — 97530 THERAPEUTIC ACTIVITIES: CPT

## 2021-03-01 RX ORDER — HYDROMORPHONE HYDROCHLORIDE 2 MG/ML
2 INJECTION, SOLUTION INTRAMUSCULAR; INTRAVENOUS; SUBCUTANEOUS
Status: DISCONTINUED | OUTPATIENT
Start: 2021-03-01 | End: 2021-03-01 | Stop reason: SDUPTHER

## 2021-03-01 RX ADMIN — HYDROMORPHONE HYDROCHLORIDE 1 MG: 1 INJECTION, SOLUTION INTRAMUSCULAR; INTRAVENOUS; SUBCUTANEOUS at 14:13

## 2021-03-01 RX ADMIN — BUMETANIDE 2 MG: 0.25 INJECTION INTRAMUSCULAR; INTRAVENOUS at 09:49

## 2021-03-01 RX ADMIN — HYDRALAZINE HYDROCHLORIDE 25 MG: 25 TABLET, FILM COATED ORAL at 17:58

## 2021-03-01 RX ADMIN — Medication 10 ML: at 22:20

## 2021-03-01 RX ADMIN — HYDROMORPHONE HYDROCHLORIDE 1 MG: 1 INJECTION, SOLUTION INTRAMUSCULAR; INTRAVENOUS; SUBCUTANEOUS at 18:43

## 2021-03-01 RX ADMIN — Medication 10 ML: at 07:22

## 2021-03-01 RX ADMIN — CARVEDILOL 25 MG: 12.5 TABLET, FILM COATED ORAL at 22:23

## 2021-03-01 RX ADMIN — CASTOR OIL AND BALSAM, PERU: 788; 87 OINTMENT TOPICAL at 09:51

## 2021-03-01 RX ADMIN — BUMETANIDE 2 MG: 0.25 INJECTION INTRAMUSCULAR; INTRAVENOUS at 22:22

## 2021-03-01 RX ADMIN — CASTOR OIL AND BALSAM, PERU: 788; 87 OINTMENT TOPICAL at 22:23

## 2021-03-01 RX ADMIN — HYDROMORPHONE HYDROCHLORIDE 1 MG: 1 INJECTION, SOLUTION INTRAMUSCULAR; INTRAVENOUS; SUBCUTANEOUS at 01:57

## 2021-03-01 RX ADMIN — CHOLECALCIFEROL (VITAMIN D3) 25 MCG (1,000 UNIT) TABLET 2000 UNITS: TABLET at 09:50

## 2021-03-01 RX ADMIN — OXYCODONE HYDROCHLORIDE AND ACETAMINOPHEN 500 MG: 500 TABLET ORAL at 09:50

## 2021-03-01 RX ADMIN — ISOSORBIDE DINITRATE 30 MG: 20 TABLET ORAL at 06:32

## 2021-03-01 RX ADMIN — HYDROMORPHONE HYDROCHLORIDE 1 MG: 1 INJECTION, SOLUTION INTRAMUSCULAR; INTRAVENOUS; SUBCUTANEOUS at 16:38

## 2021-03-01 RX ADMIN — AMLODIPINE BESYLATE 10 MG: 5 TABLET ORAL at 09:50

## 2021-03-01 RX ADMIN — INSULIN GLARGINE 10 UNITS: 100 INJECTION, SOLUTION SUBCUTANEOUS at 09:49

## 2021-03-01 RX ADMIN — HYDROMORPHONE HYDROCHLORIDE 1 MG: 1 INJECTION, SOLUTION INTRAMUSCULAR; INTRAVENOUS; SUBCUTANEOUS at 11:32

## 2021-03-01 RX ADMIN — HYDRALAZINE HYDROCHLORIDE 25 MG: 25 TABLET, FILM COATED ORAL at 22:22

## 2021-03-01 RX ADMIN — TICAGRELOR 90 MG: 90 TABLET ORAL at 22:22

## 2021-03-01 RX ADMIN — ATORVASTATIN CALCIUM 40 MG: 40 TABLET, FILM COATED ORAL at 22:22

## 2021-03-01 RX ADMIN — TICAGRELOR 90 MG: 90 TABLET ORAL at 09:50

## 2021-03-01 RX ADMIN — SODIUM CHLORIDE 40 MG: 9 INJECTION INTRAMUSCULAR; INTRAVENOUS; SUBCUTANEOUS at 06:32

## 2021-03-01 RX ADMIN — ASPIRIN 81 MG: 81 TABLET, CHEWABLE ORAL at 09:50

## 2021-03-01 RX ADMIN — ISOSORBIDE DINITRATE 30 MG: 20 TABLET ORAL at 22:22

## 2021-03-01 RX ADMIN — HYDROMORPHONE HYDROCHLORIDE 1 MG: 1 INJECTION, SOLUTION INTRAMUSCULAR; INTRAVENOUS; SUBCUTANEOUS at 22:18

## 2021-03-01 RX ADMIN — ISOSORBIDE DINITRATE 30 MG: 20 TABLET ORAL at 14:03

## 2021-03-01 RX ADMIN — Medication 10 ML: at 14:04

## 2021-03-01 RX ADMIN — HYDRALAZINE HYDROCHLORIDE 25 MG: 25 TABLET, FILM COATED ORAL at 09:50

## 2021-03-01 RX ADMIN — HYDROMORPHONE HYDROCHLORIDE 1 MG: 1 INJECTION, SOLUTION INTRAMUSCULAR; INTRAVENOUS; SUBCUTANEOUS at 06:33

## 2021-03-01 RX ADMIN — SODIUM CHLORIDE 40 MG: 9 INJECTION INTRAMUSCULAR; INTRAVENOUS; SUBCUTANEOUS at 17:58

## 2021-03-01 RX ADMIN — OXYCODONE HYDROCHLORIDE AND ACETAMINOPHEN 500 MG: 500 TABLET ORAL at 17:58

## 2021-03-01 RX ADMIN — CARVEDILOL 25 MG: 12.5 TABLET, FILM COATED ORAL at 09:49

## 2021-03-01 NOTE — PROGRESS NOTES
Problem: Dysphagia (Adult)  Goal: *Acute Goals and Plan of Care (Insert Text)  Description: Speech therapy goals  Initiated 2/12/2021 Discontinued  Re-initiated 2/23/2021; re-evaluated 3/1    1. Patient will participate in re-evaluation of swallow function within 7 days MET 3/1  2. Added 3/1: Patient will tolerate mech soft diet/thin liquids without overt s/s of aspiration within 7 days   3/1/2021 1456 by Cinthya Juárez SLP  Outcome: Progressing Towards Goal  SPEECH LANGUAGE PATHOLOGY DYSPHAGIA TREATMENT: WEEKLY REASSESSMENT  Patient: Kristyn Cordova (12 y.o. male)  Date: 3/1/2021  Diagnosis: Acute hypoxemic respiratory failure due to COVID-19 ([facility] [U07.1, J96.01] <principal problem not specified>       Precautions:  Fall, DNR, Aspiration, Bed Alarm    ASSESSMENT:  Patient with significant change in swallow function from previous assessment last week. Patient presents with mild oral dysphagia characterized by prolonged mastication (missing dentition) and min oral residue with solids which clears with liquid rinse. Pharyngeal swallow appears intact with no overt s/s of aspiration with any consistencies. Audible pharyngeal secretions from last week resolved. On RA. Patient's progression toward goals since last assessment: Excellent. Appears to be appropriate to initiate a PO diet. If tolerating mech soft/thin liquids overnight, will upgrade to regular consistencies tomorrow. PLAN:  Goals have been updated based on progression since last assessment. Patient continues to benefit from skilled intervention to address the above impairments. Continue to follow the patient 2 times a week to address goals. Recommendations and Planned Interventions:  Mech soft diet/thin liquids  Straws OK  Safe swallowing strategies (upright for all PO, small bites/sips, slow rate)    Discharge Recommendations: To Be Determined     SUBJECTIVE:   Patient stated I feel so much better. Like I came back from the dead. \" on RA, eager to eat. No complaints. OBJECTIVE:   Cognitive and Communication Status:  Neurologic State: Alert  Orientation Level: Oriented to person  Cognition: Follows commands  Perception: Appears intact  Perseveration: No perseveration noted  Safety/Judgement: Decreased awareness of environment  Dysphagia Treatment:  Oral Assessment:  Oral Assessment  Labial: No impairment  Dentition: Upper dentures;Limited;Natural  Oral Hygiene: moist, clean  Lingual: No impairment  Velum: Unable to visualize  Mandible: No impairment  P.O. Trials:  Patient Position: Upright in bed  Vocal quality prior to P.O.: No impairment  Consistency Presented: All consistencies; Thin liquid; Solid;Puree; Ice chips;Honey thick liquid  How Presented: Self-fed/presented;SLP-fed/presented;Cup/sip; Successive swallows;Straw;Spoon     Bolus Acceptance: No impairment  Bolus Formation/Control: Impaired  Type of Impairment: Delayed;Mastication  Propulsion: No impairment  Oral Residue: Left;Less than 10% of bolus  Initiation of Swallow: No impairment  Laryngeal Elevation: Functional  Aspiration Signs/Symptoms: None  Pharyngeal Phase Characteristics: No impairment, issues, or problems   Effective Modifications: Small sips and bites  Cues for Modifications: Minimal     Oral Phase Severity: Mild  Pharyngeal Phase Severity : No impairment    Pain:  Pain Scale 1: Numeric (0 - 10)  Pain Intensity 1: 8  Pain Location 1: Leg    After treatment patient left in no apparent distress:   Patient left in no apparent distress in bed, Call bell within reach, Nursing notified, and Safety precautions posted at beside. COMMUNICATION/EDUCATION:     The patients plan of care including recommendations, planned interventions, and recommended diet changes were discussed with: Registered nurse. Tiffanie Parra MS, CCC-SLP, BCS-S, CNT  Time Calculation: 20 mins

## 2021-03-01 NOTE — PROGRESS NOTES
[address] [facility] Adult  Hospitalist Group                                                                                          Hospitalist Progress Note  Dex Rollins MD  Answering service: [phone] from in house phone        Date of Service:  3/1/2021  NAME:  Adrianne Leach  :  1955  MRN:  [id number]      Admission Summary:     A 82-year-old man who presented from [facility] on  with acute onset shortness of breath, hypoxia, nausea, vomiting and diarrhea-shortness of breath acutely got worse after one of the vomiting episodes. Diagnosed with Covid about 2 weeks prior to presentation.  Patient placed on BiPAP on arrival-felt much better.  After getting fluids for resuscitation/elevated lactate level patient acutely decompensated-developed lethargy and drop in oxygen levels-intubated emergently requiring moderate to high vent support. His respiratory status improved but his renal function was declining suspecting acute kidney injury on top of chronic kidney disease, it was also difficult to control his agitation on the ventilator. However patient condition improved and he was successfully extubated on . Patient continued to do well and he was transferred to the intermediate care unit on . Over the last few days patient oxygen requirement was increasing and he is becoming more lethargic. This morning he was placed on high flow nasal cannula and later on on BiPAP. On BiPAP he seems to be much better with decreased work of breathing and good tidal volume and oxygenation. Reviewing his chest x-ray seems that he has progressive volume overload and perhaps a component of aspiration. He was giving 1 dose of Lasix with good urine output and ICU consulted.   Of note this patient been with positive fluid balance progressively since admission, according to documentation since admission he is about 20 L positive, over the last 3 days he is positive about 5 L.  He has good urine output despite worsening BUN and creatinine. No fever, no hemoptysis no nausea no vomiting. Intubated on 1/31 and extubated 2/11,   Transferred out of ICU on 2/13  Readmitted to ICU 2/15, placed on BiPAP, transferred out of the ICU on 2/19      Interval history / Subjective:     He said he feels the same, no chest pain, denied swallowing difficulties, he said he doesn't want NGT or PEG tube feeding     Assessment & Plan:     Acute hypoxic respiratory failure secondary to COVID 19 pneumonia  Mycoplasma and S Pneumonia and possible aspiration pneumonia with sepsis POA  -intubated on 1/31 and extubated 2/11, transferred out of ICU on 2/13  -readmitted to ICU on 2/15, placed on BiPAP, transferred out of the ICU on 2/19   -completed iv doxycycline for mycoplasma on 2/12  -iv cefepime 1/30-2/12, flagyl 1/30-2/4 and iv vancomycin 1/30-2/9  -Received vanco and merrem  -COVID 19 test on 1/30 and 2/12 was positive, repeated COVID 19 test on 2/19 negative  -off antibiotics now  -received IV decadron   -blood cx no growth  -afebrile, no leukocytosis  -chest x ray on 2/19 improved expansion and aeration of the lungs.  Continued interstitial prominence likely due to the patient's history of viral pneumonia  -SpO2 96 on RA  -off droplet plus isolation    ARF with volume over load  -received iv bicarb gtt  -renal function improving after dialysis  -HD started on 2/23  -Nephrology on board    Acute metabolic encephalopathy  -improved  -patient is conscious and alert, oriented to place and person  -continue supportive care    Acute on chronic systolic HF NYHA Class IV   -LV EF 20-25%  -continue on coreg, isordil, hydralazine, bumex, HD  -monitor I/O and daily weight  -cardiologist on board    HTN  -BP not at goal, continue on norvasc, coreg, hydralazine and isordil, monitor BP    Anemia of chronic disease   -transfused on 2/14 one unit for Hgb of 6.4, also one unit of pRBC on 2/22 for Hgb of 6.7  -Hgb trending down to 7.7  -no sign and symptoms of active bleeding  -monitor H/H, to transfused for Hgb <7.0    T2DM with hyperglycemia  -A1c 7.4  -patient with dysphagia, monitor finger stick glucose on sliding sclae    Hx of CAD s/p stent  -no chest pain  -continue aspirin, ticagrelor, lipitor, coreg, and isordil    Deranged liver enzyme   -improving  -repeat liver enzyme    Dysphagia   -doesn't want NGT or PEG tube  -seen by speech and recommended NPO  -will check again with speech therapist  -will consider GI consult    Mild posterior cervical wound infection  -improving  -continue wound care  -wound care nurse on board    Debility  -PT/OT      Code status: DNR  DVT prophylaxis: SCD    Care Plan discussed with: Patient/Family and Nurse  Anticipated Disposition: From [facility]  Anticipated Discharge: Greater than 48 hours     Hospital Problems  Never Reviewed          Codes Class Noted POA    Dysphagia, unspecified type ICD-10-CM: R13.10  ICD-9-CM: 787.20  Unknown Unknown        Fatigue, unspecified type ICD-10-CM: R53.83  ICD-9-CM: 780.79  Unknown Unknown        Chronic systolic congestive heart failure ([facility] ICD-10-CM: I50.22  ICD-9-CM: 428.22, 428.0  2/16/2021 Unknown        Goals of care, counseling/discussion ICD-10-CM: Z71.89  ICD-9-CM: V65.49  Unknown Unknown        DNR (do not resuscitate) discussion ICD-10-CM: Z71.89  ICD-9-CM: V65.49  Unknown Unknown        JUAN C (acute kidney injury) ([facility] ICD-10-CM: N17.9  ICD-9-CM: 966. 9  Unknown Unknown        Acute hypoxemic respiratory failure due to COVID-19 [facility]) ICD-10-CM: U07.1, J96.01  ICD-9-CM: 518.81, 079.89, 799.02  1/30/2021 Unknown                 Vital Signs:    Last 24hrs VS reviewed since prior progress note.  Most recent are:  Visit Vitals  BP (!) 160/66 (BP 1 Location: Left lower arm, BP Patient Position: At rest)   Pulse 64   Temp 98.5 °F (36.9 °C)   Resp 16   Ht 5' 7\" (1.702 m)   Wt 76.3 kg (168 lb 4.8 oz)   SpO2 96%   BMI 26.36 kg/m²         Intake/Output Summary (Last 24 hours) at 3/1/2021 0946  Last data filed at 2/28/2021 1856  Gross per 24 hour   Intake --   Output 200 ml   Net -200 ml        Physical Examination:     I had a face to face encounter with this patient and independently examined them on 3/1/2021 as outlined below:          Constitutional:  No acute distress, cooperative, pleasant    ENT:  Oral mucosa moist, oropharynx benign. Resp:  Decrease bronchial breath sound bilaterally. No wheezing/rhonchi/rales. No accessory muscle use   CV:  Regular rhythm, normal rate, no murmurs, gallops, rubs    GI:  Soft, non distended, non tender. normoactive bowel sounds, no hepatosplenomegaly     Musculoskeletal:  No edema     Neurologic:  Conscious and alert, oriented to place, person and time, motor UE 5/5, LE 4/5     Skin:  Posterior neck chronic wound       Data Review:    Review and/or order of clinical lab test  Review and/or order of tests in the radiology section of CPT  Review and/or order of tests in the medicine section of CPT      Labs:     Recent Labs     03/01/21 0514 02/28/21 0256   WBC 8.7 8.4   HGB 7.7* 8.0*   HCT 23.9* 25.8*    296     Recent Labs     03/01/21 0514 02/28/21 0256 02/27/21  0601    142 143   K 3.2* 3.6 3.8    106 108   CO2 28 27 24   BUN 22* 21* 45*   CREA 5.23* 4.13* 6.09*   GLU 88 94 91   CA 7.7* 7.5* 7.8*   MG 1.8 1.8 2.3   PHOS 5.1* 4.4 6.2*     Recent Labs     02/28/21 0256   ALT 56   *   TBILI 0.4   TP 5.4*   ALB 1.8*   GLOB 3.6     No results for input(s): INR, PTP, APTT, INREXT, INREXT in the last 72 hours. No results for input(s): FE, TIBC, PSAT, FERR in the last 72 hours. Lab Results   Component Value Date/Time    Folate 17.5 02/23/2021 03:59 AM      No results for input(s): PH, PCO2, PO2 in the last 72 hours. No results for input(s): CPK, CKNDX, TROIQ in the last 72 hours.     No lab exists for component: CPKMB  Lab Results   Component Value Date/Time    Cholesterol, total 115 02/26/2010 05:20 PM    HDL Cholesterol 31 (L) 02/26/2010 05:20 PM    LDL, calculated 46.2 02/26/2010 05:20 PM    Triglyceride 189 02/26/2010 05:20 PM    CHOL/HDL Ratio 3.7 02/26/2010 05:20 PM     Lab Results   Component Value Date/Time    Glucose (POC) 97 03/01/2021 06:35 AM    Glucose (POC) 104 (H) 02/28/2021 09:23 PM    Glucose (POC) 115 (H) 02/28/2021 06:30 PM    Glucose (POC) 108 (H) 02/28/2021 11:19 AM    Glucose (POC) 118 (H) 02/28/2021 07:17 AM     Lab Results   Component Value Date/Time    Color YELLOW/STRAW 02/10/2021 01:26 PM    Appearance TURBID (A) 02/10/2021 01:26 PM    Specific gravity 1.014 02/10/2021 01:26 PM    Specific gravity 1.015 02/26/2010 12:32 PM    pH (UA) 5.0 02/10/2021 01:26 PM    Protein 100 (A) 02/10/2021 01:26 PM    Glucose Negative 02/10/2021 01:26 PM    Ketone Negative 02/10/2021 01:26 PM    Bilirubin Negative 02/10/2021 01:26 PM    Urobilinogen 0.2 02/10/2021 01:26 PM    Nitrites Negative 02/10/2021 01:26 PM    Leukocyte Esterase Negative 02/10/2021 01:26 PM    Epithelial cells FEW 02/10/2021 01:26 PM    Bacteria Negative 02/10/2021 01:26 PM    WBC 0-4 02/10/2021 01:26 PM    RBC  02/10/2021 01:26 PM         Medications Reviewed:     Current Facility-Administered Medications   Medication Dose Route Frequency    insulin glargine (LANTUS) injection 10 Units  10 Units SubCUTAneous DAILY    hydrALAZINE (APRESOLINE) tablet 25 mg  25 mg Oral TID    diphenhydrAMINE (BENADRYL) capsule 25 mg  25 mg Oral Q6H PRN    heparin (porcine) 1,000 unit/mL injection 2,500 Units  2,500 Units Injection DIALYSIS PRN    0.9% sodium chloride infusion 250 mL  250 mL IntraVENous PRN    albuterol-ipratropium (DUO-NEB) 2.5 MG-0.5 MG/3 ML  3 mL Nebulization Q4H PRN    HYDROmorphone (PF) (DILAUDID) injection 1 mg  1 mg IntraVENous Q2H PRN    insulin lispro (HUMALOG) injection   SubCUTAneous AC&HS    isosorbide dinitrate (ISORDIL) tablet 30 mg  30 mg Per NG tube Q8H    balsam peru-castor oiL (VENELEX) ointment Topical Q12H    bumetanide (BUMEX) injection 2 mg  2 mg IntraVENous Q12H    0.9% sodium chloride infusion 250 mL  250 mL IntraVENous PRN    pantoprazole (PROTONIX) 40 mg in 0.9% sodium chloride 10 mL injection  40 mg IntraVENous Q12H    labetaloL (NORMODYNE;TRANDATE) injection 20 mg  20 mg IntraVENous Q4H PRN    ticagrelor (BRILINTA) tablet 90 mg  90 mg Oral BID    carvediloL (COREG) tablet 25 mg  25 mg Per NG tube Q12H    amLODIPine (NORVASC) tablet 10 mg  10 mg Per NG tube DAILY    alteplase (CATHFLO) 1 mg in sterile water (preservative free) 1 mL injection  1 mg InterCATHeter PRN    ascorbic acid (vitamin C) (VITAMIN C) tablet 500 mg  500 mg Per NG tube BID    cholecalciferol (VITAMIN D3) (1000 Units /25 mcg) tablet 2,000 Units  2,000 Units Per NG tube DAILY    metoprolol (LOPRESSOR) injection 5 mg  5 mg IntraVENous Q6H PRN    [Held by provider] allopurinoL (ZYLOPRIM) tablet 100 mg  100 mg Oral DAILY    sodium chloride (NS) flush 5-40 mL  5-40 mL IntraVENous Q8H    sodium chloride (NS) flush 5-40 mL  5-40 mL IntraVENous PRN    acetaminophen (TYLENOL) tablet 650 mg  650 mg Oral Q6H PRN    Or    acetaminophen (TYLENOL) suppository 650 mg  650 mg Rectal Q6H PRN    glucose chewable tablet 16 g  4 Tab Oral PRN    dextrose (D50W) injection syrg 12.5-25 g  12.5-25 g IntraVENous PRN    glucagon (GLUCAGEN) injection 1 mg  1 mg IntraMUSCular PRN    aspirin chewable tablet 81 mg  81 mg Oral DAILY    atorvastatin (LIPITOR) tablet 40 mg  40 mg Oral QHS     ______________________________________________________________________  EXPECTED LENGTH OF STAY: 12d 7h  ACTUAL LENGTH OF STAY:          30                 Darrel Grossman MD

## 2021-03-01 NOTE — CONSULTS
[address] [facility].  [address] [address] [address], [address]  [phone]                     GI CONSULTATION NOTE      NAME:  Saddie Apgar   :   1955   MRN:   [id number]     Consult Date: 3/1/2021     Chief Complaint: Dysphagia     History of Present Illness:  Patient is a 63 y.o. who is seen in consultation at the request of Dr. Melissa Torres for the above problem. Patient was admitted in 2021 for COVID PNA, requiring intubation, with extubation on  and a negative COVID PCR on . He was initially evaluated by SLP on 21 with finding of \"mild oral and suspected mod/severe pharyngeal dysphagia which is not unexpected given prolonged intubation with COVID-19, just extubated yesterday. Patient with suspected delayed swallow initiation, weak hyolaryngeal elevation/excursion via palpation and 6-8 swallows per 1/2 tsp puree bolus suggestive of pharyngeal residue. Intermittent weak cough with both puree and ice chips. Given tenuous respiratory status from COVID-19 with intubation, patient likely with low tolerance of any degree of aspiration and he is at high risk for aspiration at this time. \"  He had been in the ICU with an NGT for TFs at that time. He was able to transition out of the ICU on , but due to respiratory complications was returned on 21. He was again transferred out of ICU on . On  the hospitalist noted that patient had removed his NGT and was trying to eat \"honey thickened clear liquid diet ordered for compassionate feeding. \" (It seems that he is still taking this, as he has multiple containers at bedside, some opened.)   SLP again assessed him on  and determined that he was still at significant risk of aspiration. Throughout all of this, he had developed renal failure and was started on hemodialysis on .    SLP eval on  showed \"moderate - severe oropharyngeal dysphagia characterized by slow bolus formation and transit, pharyngeal swallow delay and reduced laryngeal elevation via palpation. Patient demonstrated weak cough after all honey thickened liquid trials. With pureed trials patient demonstrated weak cough 1 of 3 trials however he required near constant verbal cues to initiate each swallow. Risk of aspiration remains high and NPO is safest course. Given goal for restorative care recommend NPO with alternate means of nutrition. Suspect swallow skills will improve as overall strength improves. \"  Thus we have been asked to see this patient for consideration of PEG placement. Patient denies any difficulty swallowing. He specifically denies feeling that foods or liquids will not freely move from his lips to his throat, choking or coughing with swallowing, feeling ingested materials getting stuck or moving slower down the esophagus, having chest pain or discomfort with swallowing, having abdominal pain or nausea. He does admit to losing about 65 lbs unintentionally, but he cannot give a time frame for this loss. When feeding tube is mentioned as an option to bypass any possible swallowing difficulties, he states that he does not want that. He states that others have mentioned replacing the NGT or having a PEG placed and he doesn't wish either of those. He seems to understand that he may not be safe and/or able to take all of his necessary calories and nutrition PO at this time, but still does not want TFs. On 2/20 he underwent a Psych eval with the determination that \"Patient is currently able to demonstrate the  mental capacity in which he shows that he is capable of continuing to make his own medical decisions. \"     PMH:  Past Medical History:   Diagnosis Date    Diabetes ([address]     Hypertension        PSH:  Past Surgical History:   Procedure Laterality Date    IR INSERT NON TUNL CVC OVER 5 YRS  2/23/2021       Allergies:  No Known Allergies    Home Medications:  Prior to Admission Medications   Prescriptions Last Dose Informant Patient Reported? Taking?   acetaminophen (TYLENOL) 325 mg tablet   Yes No   Sig: Take 975 mg by mouth every six (6) hours as needed for Pain. ascorbic acid, vitamin C, (Vitamin C) 500 mg tablet   Yes Yes   Sig: Take 500 mg by mouth two (2) times a day. aspirin 81 mg chewable tablet   Yes Yes   Sig: Take 81 mg by mouth daily. atorvastatin (LIPITOR) 80 mg tablet   Yes Yes   Sig: Take 80 mg by mouth daily. carvediloL (COREG) 12.5 mg tablet   Yes Yes   Sig: Take  by mouth two (2) times a day. cholecalciferol (Vitamin D3) (1000 Units /25 mcg) tablet   Yes Yes   Sig: Take 1,000 Units by mouth daily. docusate sodium (Colace) 100 mg capsule   Yes Yes   Sig: Take 100 mg by mouth two (2) times daily as needed for Constipation. doxycycline (ADOXA) 100 mg tablet   Yes Yes   Sig: Take 100 mg by mouth two (2) times a day. ferrous sulfate 325 mg (65 mg iron) tablet   Yes Yes   Sig: Take  by mouth every fourty-eight (48) hours. gabapentin (NEURONTIN) 400 mg capsule   Yes Yes   Sig: Take 400 mg by mouth three (3) times daily. guaiFENesin-codeine (Guaiatussin AC) 100-10 mg/5 mL solution   Yes Yes   Sig: Take 5 mL by mouth four (4) times daily as needed for Cough. insulin glargine (Lantus U-100 Insulin) 100 unit/mL injection   Yes Yes   Si Units by SubCUTAneous route nightly. insulin lispro (HUMALOG) 100 unit/mL injection   Yes Yes   Sig: 10 Units by SubCUTAneous route Before breakfast, lunch, and dinner. isosorbide mononitrate ER (IMDUR) 60 mg CR tablet   Yes Yes   Sig: Take 60 mg by mouth every morning. lisinopriL (PRINIVIL, ZESTRIL) 20 mg tablet   Yes Yes   Sig: Take 20 mg by mouth daily. methocarbamoL (ROBAXIN) 500 mg tablet   Yes Yes   Sig: Take 500 mg by mouth three (3) times daily.    oxyCODONE IR (ROXICODONE) 5 mg immediate release tablet   Yes Yes   Sig: Take 5 mg by mouth every six (6) hours as needed for Pain.   pantoprazole (PROTONIX) 40 mg tablet   Yes Yes   Sig: Take 40 mg by mouth daily. senna (Senna) 8.6 mg tablet   Yes Yes   Sig: Take 1 Tab by mouth daily. therapeutic multivitamin [facility]) tablet   Yes Yes   Sig: Take 1 Tab by mouth daily. ticagrelor (BRILINTA) 90 mg tablet   Yes Yes   Sig: Take 90 mg by mouth two (2) times a day. zinc sulfate (ZINCATE) 220 (50) mg capsule   Yes Yes   Sig: Take 220 mg by mouth daily.       Facility-Administered Medications: None       Hospital Medications:  Current Facility-Administered Medications   Medication Dose Route Frequency    insulin glargine (LANTUS) injection 10 Units  10 Units SubCUTAneous DAILY    hydrALAZINE (APRESOLINE) tablet 25 mg  25 mg Oral TID    diphenhydrAMINE (BENADRYL) capsule 25 mg  25 mg Oral Q6H PRN    heparin (porcine) 1,000 unit/mL injection 2,500 Units  2,500 Units Injection DIALYSIS PRN    0.9% sodium chloride infusion 250 mL  250 mL IntraVENous PRN    albuterol-ipratropium (DUO-NEB) 2.5 MG-0.5 MG/3 ML  3 mL Nebulization Q4H PRN    HYDROmorphone (PF) (DILAUDID) injection 1 mg  1 mg IntraVENous Q2H PRN    insulin lispro (HUMALOG) injection   SubCUTAneous AC&HS    isosorbide dinitrate (ISORDIL) tablet 30 mg  30 mg Per NG tube Q8H    balsam peru-castor oiL (VENELEX) ointment   Topical Q12H    bumetanide (BUMEX) injection 2 mg  2 mg IntraVENous Q12H    0.9% sodium chloride infusion 250 mL  250 mL IntraVENous PRN    pantoprazole (PROTONIX) 40 mg in 0.9% sodium chloride 10 mL injection  40 mg IntraVENous Q12H    labetaloL (NORMODYNE;TRANDATE) injection 20 mg  20 mg IntraVENous Q4H PRN    ticagrelor (BRILINTA) tablet 90 mg  90 mg Oral BID    carvediloL (COREG) tablet 25 mg  25 mg Per NG tube Q12H    amLODIPine (NORVASC) tablet 10 mg  10 mg Per NG tube DAILY    alteplase (CATHFLO) 1 mg in sterile water (preservative free) 1 mL injection  1 mg InterCATHeter PRN    ascorbic acid (vitamin C) (VITAMIN C) tablet 500 mg  500 mg Per NG tube BID    cholecalciferol (VITAMIN D3) (1000 Units /25 mcg) tablet 2,000 Units  2,000 Units Per NG tube DAILY    metoprolol (LOPRESSOR) injection 5 mg  5 mg IntraVENous Q6H PRN    [Held by provider] allopurinoL (ZYLOPRIM) tablet 100 mg  100 mg Oral DAILY    sodium chloride (NS) flush 5-40 mL  5-40 mL IntraVENous Q8H    sodium chloride (NS) flush 5-40 mL  5-40 mL IntraVENous PRN    acetaminophen (TYLENOL) tablet 650 mg  650 mg Oral Q6H PRN    Or    acetaminophen (TYLENOL) suppository 650 mg  650 mg Rectal Q6H PRN    glucose chewable tablet 16 g  4 Tab Oral PRN    dextrose (D50W) injection syrg 12.5-25 g  12.5-25 g IntraVENous PRN    glucagon (GLUCAGEN) injection 1 mg  1 mg IntraMUSCular PRN    [Held by provider] aspirin chewable tablet 81 mg  81 mg Oral DAILY    atorvastatin (LIPITOR) tablet 40 mg  40 mg Oral QHS       Social History:  Social History     Tobacco Use    Smoking status: Former Smoker    Smokeless tobacco: Never Used   Substance Use Topics    Alcohol use: Not Currently       Family History:  No family history on file. Review of Systems:    Constitutional: negative fever, negative chills, positive weight loss  Eyes:   negative visual changes  ENT:   negative sore throat, tongue or lip swelling  Respiratory:  negative cough, negative dyspnea  Cards:  negative for chest pain, palpitations, lower extremity edema  GI:   See HPI  :  negative for frequency, dysuria  Positive kidney injury/dialysis  Integument:  negative for rash and pruritus  Heme:  negative for easy bruising and gum/nose bleeding  Musculoskel: negative for myalgias,  back pain and muscle weakness  Neuro: negative for headaches, dizziness, vertigo  Psych:  negative for feelings of anxiety, depression      Objective:     Patient Vitals for the past 8 hrs:   BP Temp Pulse Resp SpO2 Weight   03/01/21 0848 (!) 160/66 98.5 °F (36.9 °C) 64 16 96 % --   03/01/21 0656 -- -- -- -- -- 76.3 kg (168 lb 4.8 oz)     No intake/output data recorded.   02/27 1901 - 03/01 0700  In: -   Out: 200 [Urine:200]      PHYSICAL EXAM:  General appearance: cooperative, no distress, appears stated age, thin  Skin: Extremities and face reveal no rashes, pallor or jaundice  HEENT: Sclerae anicteric. Extra-occular muscles are intact. Cardiovascular: Regular rate and rhythm. Respiratory: Clear breath sounds   GI: Abdomen nondistended, soft, and nontender. Active bowel sounds. No enlargement of the liver or spleen. Rectal: Deferred   Neurological: Groggy, dozes off repeatedly during interview  Psychiatric: Mood appears appropriate. No anxiety or agitation. Data Review     Recent Labs     03/01/21 0514 02/28/21  0256   WBC 8.7 8.4   HGB 7.7* 8.0*   HCT 23.9* 25.8*    296     Recent Labs     03/01/21 0514 02/28/21  0256    142   K 3.2* 3.6    106   CO2 28 27   BUN 22* 21*   CREA 5.23* 4.13*   GLU 88 94   PHOS 5.1* 4.4   CA 7.7* 7.5*     Recent Labs     02/28/21 0256   *   TP 5.4*   ALB 1.8*   GLOB 3.6     No results for input(s): INR, PTP, APTT, INREXT in the last 72 hours. Imaging studies reviewed    Assessment / Plan :     Dysphagia, severe oropharyngeal per SLP, with increased risk of aspiration   - Reviewed multiple SLP evals and recommendations for NPO with alternative feeding method, but patient denies dysphagia and declines even the thought of NGT or PEG.  - Reviewed Dietician evals and recs as well  - Unfortunately, we cannot force patient to agree with TFs, and he had previously been deemed by psych to be able to make his own medical decisions. - We will follow for now to see if his decision changes. If so, PEG can be done but off Brilinta.      CAD - reviewed cardiology notes  - had MI and stents placed at [facility] last summer  - On Brilinta 90mg BID     ARF with volume overload  - Nephrology following  - HD started 2/23/21 - if he is to continue needs an avenue of nutrition    Anemia, normocytic, chronic disease?  - No s/s GIB     Patient Active Hospital Problem List: Active Problems:    Acute hypoxemic respiratory failure due to COVID-19 [facility]) (1/30/2021)      Chronic systolic congestive heart failure ([facility] (2/16/2021)      Goals of care, counseling/discussion ()      DNR (do not resuscitate) discussion ()      JUAN C (acute kidney injury) ([facility] ()      Fatigue, unspecified type ()      Dysphagia, unspecified type ()

## 2021-03-01 NOTE — PROGRESS NOTES
NUTRITION brief  Recommendations:   1. Diet per SLP  2. Continued discussions regarding patient's goals of care   - if to continue HD will need to have a nutrition source    - likely that comfort feeds would not be appropriate while on HD    **See RD note from 2/25 for tube feed goal if enteral feeds started. **     Diet: NPO    Pt seen for plan of care. Pt has been NPO/clear liquids for 6 days. Seen by SLP with recommendations for NPO status with high risk for aspiration. Per discussion with MD and documentation pt refusing NGT and PEG last week. Plans for SLP to reevaluate today along with Palliative visit. Pt visited today to discuss feeding tube. Discussed in more detail what feeding tube options would look like and how they would be used (NGT vs PEG). Questions answered so pt can make educated decision. Reinforced how if pt wishes to continue with HD he will need nutrition. Encouraged pt to discuss with wife as well. No other questions at this time. See full RD assessment from 2/25 for additional details, goals, and monitoring/evaluation. Will follow for final decision regarding goals of care and feeding tube vs diet.    Estimated Nutrition Needs:   Energy: 2200 (25 kcal/kg)  Wt used: Current(88 kg)  Protein: 106-123g (1.2-1.4g/kg on HD)  Wt used: Current(88 kg)   Fluid: 2200 - or per renal     Ramiroleif Pike, RD [address] , Pager #[phone] or via [employer]

## 2021-03-01 NOTE — PROGRESS NOTES
Problem: Mobility Impaired (Adult and Pediatric)  Goal: *Acute Goals and Plan of Care (Insert Text)  Description: FUNCTIONAL STATUS PRIOR TO ADMISSION: Patient was independent and active without use of DME.    HOME SUPPORT PRIOR TO ADMISSION: The patient lived with wife but did not require assist.    Physical Therapy Goals  Initiated with re-eval 2/26/2021  1. Patient will move from supine to sit and sit to supine  and roll side to side in bed with maximal assistance within 7 day(s). 2.  Patient will transfer from bed to chair and chair to bed with maximal assistance x 2 using the least restrictive device within 7 day(s). 3.  Patient will perform sit to stand with maximal assistance x 2 within 7 day(s). 4.  Patient will tolerate EOB x 5 min with min A in prep for functional transfers within 7 days. Physical Therapy Goals  Initiated 2/12/2021  1. Patient will move from supine to sit and sit to supine , scoot up and down, and roll side to side in bed with minimal assistance/contact guard assist within 7 day(s). 2.  Patient will transfer from bed to chair and chair to bed with minimal assistance/contact guard assist using the least restrictive device within 7 day(s). 3.  Patient will perform sit to stand with minimal assistance/contact guard assist within 7 day(s). 4.  Patient will ambulate with minimal assistance/contact guard assist for 10 feet with the least restrictive device within 7 day(s). 5.  Patient will ascend/descend 2 stairs with one handrail(s) with minimal assistance/contact guard assist within 7 day(s).   Outcome: Progressing Towards Goal     PHYSICAL THERAPY TREATMENT  Patient: Cassie Alonzo (84 y.o. male)  Date: 3/1/2021  Diagnosis: Acute hypoxemic respiratory failure due to COVID-19 ([facility] [U07.1, J96.01] <principal problem not specified>       Precautions: Fall, DNR, Aspiration, Bed Alarm  Chart, physical therapy assessment, plan of care and goals were reviewed. ASSESSMENT  Patient continues with skilled PT services and is progressing towards goals. Patient is awake and doing well today. He is answering all questions and commands appropriately and able to use UEs and bedrails to assist with bed mobility. Tolerated sitting EOB x 6 minutes, intermittently needing min assist to correct as Patient's core strength fatigues. With this therapist standing in front of Patient, sit to stand x 3 with max assist. Difficulty engaging gluts to straighten posture. However, suspect Patient could squat pivot to a bedside recliner with another person to assist. Patient is agreeable to attempting next treatment session as appropriate. Patient participating in bed exercises, glut sets, quad sets, and APs and able to perform heel slides with assist.     Current Level of Function Impacting Discharge (mobility/balance): max assist for mobility    Other factors to consider for discharge: Patient family wants to get him strong enough to go home with assist         PLAN :  Patient continues to benefit from skilled intervention to address the above impairments. Continue treatment per established plan of care. to address goals. Recommendation for discharge: (in order for the patient to meet his/her long term goals)  To be determined: SNF rehab, however if pain is managed in LEs, Patient may be able to progress to tolerate 3 hours/day in inpatient rehab    This discharge recommendation:  Has been made in collaboration with the attending provider and/or case management    IF patient discharges home will need the following DME: bedside commode, gait belt, hospital bed, mechanical lift, rolling walker, and wheelchair       SUBJECTIVE:   Patient stated Moshe Kick is my speech still slurred? I really would like to stand.     OBJECTIVE DATA SUMMARY:   Critical Behavior:  Neurologic State: Confused, Drowsy  Orientation Level: Oriented to person  Cognition: Decreased attention/concentration, Decreased command following  Safety/Judgement: Decreased awareness of environment  Functional Mobility Training:  Bed Mobility:  Rolling: Moderate assistance;Assist x1  Supine to Sit: Maximum assistance;Assist x1    Transfers:   Sit to stand: max assist x 1    Balance:  Sitting: Impaired  Sitting - Static: Fair (occasional)  Sitting - Dynamic: Fair (occasional)  Standing: Impaired  Standing - Static: Poor  Standing - Dynamic : Poor      Therapeutic Exercises:   Quad sets, glut sets, and APs, active assisted heel slides    Pain Ratin/10 pain bilat quads and knee joint    Activity Tolerance:   Fair, SpO2 stable on RA, requires rest breaks, and observed SOB with activity    After treatment patient left in no apparent distress:   Supine in bed, Call bell within reach, and Side rails x 3    COMMUNICATION/COLLABORATION:   The patients plan of care was discussed with: Registered nurse.      Tala Redding PT, DPT  Geriatric Clinical Specialist     Time Calculation: 25 mins

## 2021-03-01 NOTE — PROGRESS NOTES
Bedside and Verbal shift change report given to Isadora Gunn RN (oncoming nurse) by Jatin Smith RN (offgoing nurse). Report included the following information SBAR, Kardex, Intake/Output, MAR, Recent Results and Med Rec Status.

## 2021-03-01 NOTE — PROGRESS NOTES
Nephrology Progress Note  Major Demarco  Date of Admission : 1/30/2021    CC: Follow up for JAUN C on CKD       Assessment and Plan     JUAN C:  - 2/2 ATN + COVID-19 infection  - unclear baseline but suspect some component of CKD  - Renal U/S on 1/30 shows CKD  - HD TTS  - NPO at MN   - PC tomorrow    Volume overload:  - improving w/ HD    Encephalopathy    HFrEF:  - EF 20-25% on 2/1     COVID-19 PNA:  - now COVID neg on 2/19     DM2:  - on insulin       Interval History:  Seen and examined. More awake, increased strength today. Speech eval. Still having issues swallowing. Last HD was 2/27, no reported issues. Current Medications: all current  Medications have been eviewed in EPIC  Review of Systems: Review of systems not obtained due to patient factors. Objective:  Vitals:    Vitals:    02/28/21 2039 03/01/21 0302 03/01/21 0656 03/01/21 0848   BP: (!) 155/76 (!) 190/75  (!) 160/66   Pulse: 81 75  64   Resp: 16 16  16   Temp: 98.7 °F (37.1 °C) 98.6 °F (37 °C)  98.5 °F (36.9 °C)   SpO2: 99% 99%  96%   Weight:   76.3 kg (168 lb 4.8 oz)    Height:         Intake and Output:  No intake/output data recorded. 02/27 1901 - 03/01 0700  In: -   Out: 200 [Urine:200]    Physical Examination:  Pt intubated    No  General: Confused  Neck:  Supple, no mass  Resp:  Lungs Clear anteriorly  CV:  RRR,  no murmur or rub, 2-3 + LE edema  GI:  Soft, NT, + Bowel sounds, no hepatosplenomegaly  Neurologic:  confused  Psych:             Unable to assess  Skin:  No Rash  :  No bird    []    High complexity decision making was performed  []    Patient is at high-risk of decompensation with multiple organ involvement    Lab Data Personally Reviewed: I have reviewed all the pertinent labs, microbiology data and radiology studies during assessment.     Recent Labs     03/01/21  0514 02/28/21  0256 02/27/21  0601    142 143   K 3.2* 3.6 3.8    106 108   CO2 28 27 24   GLU 88 94 91   BUN 22* 21* 45*   CREA 5.23* 4.13* 6.09* CA 7.7* 7.5* 7.8*   MG 1.8 1.8 2.3   PHOS 5.1* 4.4 6.2*   ALB  --  1.8*  --    ALT  --  56  --      Recent Labs     03/01/21  0514 02/28/21  0256 02/27/21  0601   WBC 8.7 8.4 6.4   HGB 7.7* 8.0* 10.0*   HCT 23.9* 25.8* 32.2*    296 297     Lab Results   Component Value Date/Time    Specimen Description: BLOOD 02/27/2010 07:00 PM     Lab Results   Component Value Date/Time    Culture result: NO GROWTH 5 DAYS 02/15/2021 10:58 AM    Culture result: LIGHT YEAST, (APPARENT CANDIDA ALBICANS) (A) 02/12/2021 04:53 PM    Culture result: LIGHT NORMAL RESPIRATORY CORI 02/12/2021 04:53 PM     Recent Results (from the past 24 hour(s))   GLUCOSE, POC    Collection Time: 02/28/21 11:19 AM   Result Value Ref Range    Glucose (POC) 108 (H) 65 - 100 mg/dL    Performed by Spencer Holt    GLUCOSE, POC    Collection Time: 02/28/21  6:30 PM   Result Value Ref Range    Glucose (POC) 115 (H) 65 - 100 mg/dL    Performed by Marika Sesay    GLUCOSE, POC    Collection Time: 02/28/21  9:23 PM   Result Value Ref Range    Glucose (POC) 104 (H) 65 - 100 mg/dL    Performed by Riri Byrne  PCT    CBC WITH AUTOMATED DIFF    Collection Time: 03/01/21  5:14 AM   Result Value Ref Range    WBC 8.7 4.1 - 11.1 K/uL    RBC 2.92 (L) 4.10 - 5.70 M/uL    HGB 7.7 (L) 12.1 - 17.0 g/dL    HCT 23.9 (L) 36.6 - 50.3 %    MCV 81.8 80.0 - 99.0 FL    MCH 26.4 26.0 - 34.0 PG    MCHC 32.2 30.0 - 36.5 g/dL    RDW 16.4 (H) 11.5 - 14.5 %    PLATELET 353 669 - 881 K/uL    MPV 10.9 8.9 - 12.9 FL    NRBC 0.0 0  WBC    ABSOLUTE NRBC 0.00 0.00 - 0.01 K/uL    NEUTROPHILS 65 32 - 75 %    LYMPHOCYTES 20 12 - 49 %    MONOCYTES 11 5 - 13 %    EOSINOPHILS 4 0 - 7 %    BASOPHILS 0 0 - 1 %    IMMATURE GRANULOCYTES 0 0.0 - 0.5 %    ABS. NEUTROPHILS 5.6 1.8 - 8.0 K/UL    ABS. LYMPHOCYTES 1.7 0.8 - 3.5 K/UL    ABS. MONOCYTES 1.0 0.0 - 1.0 K/UL    ABS. EOSINOPHILS 0.4 0.0 - 0.4 K/UL    ABS. BASOPHILS 0.0 0.0 - 0.1 K/UL    ABS. IMM.  GRANS. 0.0 0.00 - 0.04 K/UL DF AUTOMATED     METABOLIC PANEL, BASIC    Collection Time: 03/01/21  5:14 AM   Result Value Ref Range    Sodium 143 136 - 145 mmol/L    Potassium 3.2 (L) 3.5 - 5.1 mmol/L    Chloride 108 97 - 108 mmol/L    CO2 28 21 - 32 mmol/L    Anion gap 7 5 - 15 mmol/L    Glucose 88 65 - 100 mg/dL    BUN 22 (H) 6 - 20 MG/DL    Creatinine 5.23 (H) 0.70 - 1.30 MG/DL    BUN/Creatinine ratio 4 (L) 12 - 20      GFR est AA 14 (L) >60 ml/min/1.73m2    GFR est non-AA 11 (L) >60 ml/min/1.73m2    Calcium 7.7 (L) 8.5 - 10.1 MG/DL   MAGNESIUM    Collection Time: 03/01/21  5:14 AM   Result Value Ref Range    Magnesium 1.8 1.6 - 2.4 mg/dL   PHOSPHORUS    Collection Time: 03/01/21  5:14 AM   Result Value Ref Range    Phosphorus 5.1 (H) 2.6 - 4.7 MG/DL   GLUCOSE, POC    Collection Time: 03/01/21  6:35 AM   Result Value Ref Range    Glucose (POC) 97 65 - 100 mg/dL    Performed by Elisabeth Jonas  PCT              Suzie Reyna MD  [address]  Phone - [phone]   Fax - [phone]  www. [facility]

## 2021-03-02 ENCOUNTER — APPOINTMENT (OUTPATIENT)
Dept: INTERVENTIONAL RADIOLOGY/VASCULAR | Age: 66
DRG: 720 | End: 2021-03-02
Attending: INTERNAL MEDICINE
Payer: MEDICAID

## 2021-03-02 LAB
ANION GAP SERPL CALC-SCNC: 8 MMOL/L (ref 5–15)
BASOPHILS # BLD: 0 K/UL (ref 0–0.1)
BASOPHILS NFR BLD: 0 % (ref 0–1)
BUN SERPL-MCNC: 25 MG/DL (ref 6–20)
BUN/CREAT SERPL: 4 (ref 12–20)
CALCIUM SERPL-MCNC: 7.3 MG/DL (ref 8.5–10.1)
CHLORIDE SERPL-SCNC: 109 MMOL/L (ref 97–108)
CO2 SERPL-SCNC: 26 MMOL/L (ref 21–32)
CREAT SERPL-MCNC: 5.73 MG/DL (ref 0.7–1.3)
DIFFERENTIAL METHOD BLD: ABNORMAL
EOSINOPHIL # BLD: 0.4 K/UL (ref 0–0.4)
EOSINOPHIL NFR BLD: 4 % (ref 0–7)
ERYTHROCYTE [DISTWIDTH] IN BLOOD BY AUTOMATED COUNT: 16.2 % (ref 11.5–14.5)
GLUCOSE BLD STRIP.AUTO-MCNC: 108 MG/DL (ref 65–100)
GLUCOSE BLD STRIP.AUTO-MCNC: 136 MG/DL (ref 65–100)
GLUCOSE BLD STRIP.AUTO-MCNC: 215 MG/DL (ref 65–100)
GLUCOSE BLD STRIP.AUTO-MCNC: 73 MG/DL (ref 65–100)
GLUCOSE SERPL-MCNC: 75 MG/DL (ref 65–100)
HCT VFR BLD AUTO: 24.1 % (ref 36.6–50.3)
HGB BLD-MCNC: 7.4 G/DL (ref 12.1–17)
IMM GRANULOCYTES # BLD AUTO: 0 K/UL (ref 0–0.04)
IMM GRANULOCYTES NFR BLD AUTO: 1 % (ref 0–0.5)
INR PPP: 1.2 (ref 0.9–1.1)
LYMPHOCYTES # BLD: 1.6 K/UL (ref 0.8–3.5)
LYMPHOCYTES NFR BLD: 19 % (ref 12–49)
MAGNESIUM SERPL-MCNC: 2 MG/DL (ref 1.6–2.4)
MCH RBC QN AUTO: 26.2 PG (ref 26–34)
MCHC RBC AUTO-ENTMCNC: 30.7 G/DL (ref 30–36.5)
MCV RBC AUTO: 85.5 FL (ref 80–99)
MONOCYTES # BLD: 1 K/UL (ref 0–1)
MONOCYTES NFR BLD: 13 % (ref 5–13)
NEUTS SEG # BLD: 5.2 K/UL (ref 1.8–8)
NEUTS SEG NFR BLD: 63 % (ref 32–75)
NRBC # BLD: 0 K/UL (ref 0–0.01)
NRBC BLD-RTO: 0 PER 100 WBC
PHOSPHATE SERPL-MCNC: 5.8 MG/DL (ref 2.6–4.7)
PLATELET # BLD AUTO: 298 K/UL (ref 150–400)
PMV BLD AUTO: 10.9 FL (ref 8.9–12.9)
POTASSIUM SERPL-SCNC: 3.2 MMOL/L (ref 3.5–5.1)
PROTHROMBIN TIME: 12.4 SEC (ref 9–11.1)
RBC # BLD AUTO: 2.82 M/UL (ref 4.1–5.7)
SERVICE CMNT-IMP: ABNORMAL
SERVICE CMNT-IMP: NORMAL
SODIUM SERPL-SCNC: 143 MMOL/L (ref 136–145)
WBC # BLD AUTO: 8.2 K/UL (ref 4.1–11.1)

## 2021-03-02 PROCEDURE — 74011250637 HC RX REV CODE- 250/637: Performed by: INTERNAL MEDICINE

## 2021-03-02 PROCEDURE — C9113 INJ PANTOPRAZOLE SODIUM, VIA: HCPCS | Performed by: HOSPITALIST

## 2021-03-02 PROCEDURE — 74011000250 HC RX REV CODE- 250: Performed by: HOSPITALIST

## 2021-03-02 PROCEDURE — 36415 COLL VENOUS BLD VENIPUNCTURE: CPT

## 2021-03-02 PROCEDURE — 74011636637 HC RX REV CODE- 636/637: Performed by: INTERNAL MEDICINE

## 2021-03-02 PROCEDURE — 65270000032 HC RM SEMIPRIVATE

## 2021-03-02 PROCEDURE — 74011000250 HC RX REV CODE- 250: Performed by: INTERNAL MEDICINE

## 2021-03-02 PROCEDURE — 74011250637 HC RX REV CODE- 250/637: Performed by: EMERGENCY MEDICINE

## 2021-03-02 PROCEDURE — 84100 ASSAY OF PHOSPHORUS: CPT

## 2021-03-02 PROCEDURE — 74011250636 HC RX REV CODE- 250/636: Performed by: HOSPITALIST

## 2021-03-02 PROCEDURE — 74011250637 HC RX REV CODE- 250/637: Performed by: HOSPITALIST

## 2021-03-02 PROCEDURE — 0JH60XZ INSERTION OF TUNNELED VASCULAR ACCESS DEVICE INTO CHEST SUBCUTANEOUS TISSUE AND FASCIA, OPEN APPROACH: ICD-10-PCS | Performed by: RADIOLOGY

## 2021-03-02 PROCEDURE — 77030010507 HC ADH SKN DERMBND J&J -B

## 2021-03-02 PROCEDURE — 83735 ASSAY OF MAGNESIUM: CPT

## 2021-03-02 PROCEDURE — 74011250636 HC RX REV CODE- 250/636: Performed by: INTERNAL MEDICINE

## 2021-03-02 PROCEDURE — 77030002996 HC SUT SLK J&J -A

## 2021-03-02 PROCEDURE — 90935 HEMODIALYSIS ONE EVALUATION: CPT

## 2021-03-02 PROCEDURE — 74011250636 HC RX REV CODE- 250/636: Performed by: RADIOLOGY

## 2021-03-02 PROCEDURE — 94760 N-INVAS EAR/PLS OXIMETRY 1: CPT

## 2021-03-02 PROCEDURE — 2709999900 HC NON-CHARGEABLE SUPPLY

## 2021-03-02 PROCEDURE — 77030031139 HC SUT VCRL2 J&J -A

## 2021-03-02 PROCEDURE — 02HV33Z INSERTION OF INFUSION DEVICE INTO SUPERIOR VENA CAVA, PERCUTANEOUS APPROACH: ICD-10-PCS | Performed by: RADIOLOGY

## 2021-03-02 PROCEDURE — C1750 CATH, HEMODIALYSIS,LONG-TERM: HCPCS

## 2021-03-02 PROCEDURE — 85025 COMPLETE CBC W/AUTO DIFF WBC: CPT

## 2021-03-02 PROCEDURE — 85610 PROTHROMBIN TIME: CPT

## 2021-03-02 PROCEDURE — 82962 GLUCOSE BLOOD TEST: CPT

## 2021-03-02 PROCEDURE — 99152 MOD SED SAME PHYS/QHP 5/>YRS: CPT

## 2021-03-02 PROCEDURE — 74011250637 HC RX REV CODE- 250/637: Performed by: ANESTHESIOLOGY

## 2021-03-02 PROCEDURE — 80048 BASIC METABOLIC PNL TOTAL CA: CPT

## 2021-03-02 PROCEDURE — 74011000250 HC RX REV CODE- 250: Performed by: RADIOLOGY

## 2021-03-02 RX ORDER — SODIUM CHLORIDE 9 MG/ML
25 INJECTION, SOLUTION INTRAVENOUS CONTINUOUS
Status: DISCONTINUED | OUTPATIENT
Start: 2021-03-02 | End: 2021-03-02

## 2021-03-02 RX ORDER — HYDROMORPHONE HYDROCHLORIDE 1 MG/ML
1 INJECTION, SOLUTION INTRAMUSCULAR; INTRAVENOUS; SUBCUTANEOUS
Status: DISCONTINUED | OUTPATIENT
Start: 2021-03-02 | End: 2021-03-02 | Stop reason: CLARIF

## 2021-03-02 RX ORDER — HEPARIN SODIUM 1000 [USP'U]/ML
10000 INJECTION, SOLUTION INTRAVENOUS; SUBCUTANEOUS
Status: COMPLETED | OUTPATIENT
Start: 2021-03-02 | End: 2021-03-02

## 2021-03-02 RX ORDER — HYDROMORPHONE HYDROCHLORIDE 1 MG/ML
1 INJECTION, SOLUTION INTRAMUSCULAR; INTRAVENOUS; SUBCUTANEOUS
Status: DISCONTINUED | OUTPATIENT
Start: 2021-03-02 | End: 2021-03-02

## 2021-03-02 RX ORDER — HYDROMORPHONE HYDROCHLORIDE 2 MG/ML
1 INJECTION, SOLUTION INTRAMUSCULAR; INTRAVENOUS; SUBCUTANEOUS
Status: DISCONTINUED | OUTPATIENT
Start: 2021-03-02 | End: 2021-03-04

## 2021-03-02 RX ORDER — HEPARIN SODIUM 1000 [USP'U]/ML
3800 INJECTION, SOLUTION INTRAVENOUS; SUBCUTANEOUS
Status: DISCONTINUED | OUTPATIENT
Start: 2021-03-02 | End: 2021-03-04 | Stop reason: SDUPTHER

## 2021-03-02 RX ORDER — FENTANYL CITRATE 50 UG/ML
100 INJECTION, SOLUTION INTRAMUSCULAR; INTRAVENOUS
Status: DISCONTINUED | OUTPATIENT
Start: 2021-03-02 | End: 2021-03-02

## 2021-03-02 RX ORDER — CEFAZOLIN SODIUM 1 G/3ML
2 INJECTION, POWDER, FOR SOLUTION INTRAMUSCULAR; INTRAVENOUS
Status: DISCONTINUED | OUTPATIENT
Start: 2021-03-02 | End: 2021-03-02 | Stop reason: SDUPTHER

## 2021-03-02 RX ORDER — LANOLIN ALCOHOL/MO/W.PET/CERES
100 CREAM (GRAM) TOPICAL DAILY
Status: DISCONTINUED | OUTPATIENT
Start: 2021-03-02 | End: 2021-03-05 | Stop reason: HOSPADM

## 2021-03-02 RX ORDER — LIDOCAINE HYDROCHLORIDE 20 MG/ML
20 INJECTION, SOLUTION INFILTRATION; PERINEURAL
Status: COMPLETED | OUTPATIENT
Start: 2021-03-02 | End: 2021-03-02

## 2021-03-02 RX ORDER — MIDAZOLAM HYDROCHLORIDE 1 MG/ML
5 INJECTION INTRAMUSCULAR; INTRAVENOUS
Status: DISCONTINUED | OUTPATIENT
Start: 2021-03-02 | End: 2021-03-02

## 2021-03-02 RX ADMIN — FENTANYL CITRATE 25 MCG: 50 INJECTION, SOLUTION INTRAMUSCULAR; INTRAVENOUS at 08:31

## 2021-03-02 RX ADMIN — HYDROMORPHONE HYDROCHLORIDE 1 MG: 1 INJECTION, SOLUTION INTRAMUSCULAR; INTRAVENOUS; SUBCUTANEOUS at 18:14

## 2021-03-02 RX ADMIN — BUMETANIDE 2 MG: 0.25 INJECTION INTRAMUSCULAR; INTRAVENOUS at 21:36

## 2021-03-02 RX ADMIN — HYDROMORPHONE HYDROCHLORIDE 1 MG: 1 INJECTION, SOLUTION INTRAMUSCULAR; INTRAVENOUS; SUBCUTANEOUS at 04:35

## 2021-03-02 RX ADMIN — HYDROMORPHONE HYDROCHLORIDE 1 MG: 2 INJECTION INTRAMUSCULAR; INTRAVENOUS; SUBCUTANEOUS at 20:47

## 2021-03-02 RX ADMIN — TICAGRELOR 90 MG: 90 TABLET ORAL at 21:36

## 2021-03-02 RX ADMIN — MIDAZOLAM HYDROCHLORIDE 1 MG: 1 INJECTION, SOLUTION INTRAMUSCULAR; INTRAVENOUS at 08:22

## 2021-03-02 RX ADMIN — ATORVASTATIN CALCIUM 40 MG: 40 TABLET, FILM COATED ORAL at 21:36

## 2021-03-02 RX ADMIN — CARVEDILOL 25 MG: 12.5 TABLET, FILM COATED ORAL at 21:36

## 2021-03-02 RX ADMIN — SODIUM CHLORIDE 40 MG: 9 INJECTION INTRAMUSCULAR; INTRAVENOUS; SUBCUTANEOUS at 04:35

## 2021-03-02 RX ADMIN — FENTANYL CITRATE 25 MCG: 50 INJECTION, SOLUTION INTRAMUSCULAR; INTRAVENOUS at 08:24

## 2021-03-02 RX ADMIN — CASTOR OIL AND BALSAM, PERU: 788; 87 OINTMENT TOPICAL at 21:37

## 2021-03-02 RX ADMIN — Medication 100 MG: at 14:35

## 2021-03-02 RX ADMIN — MIDAZOLAM HYDROCHLORIDE 1 MG: 1 INJECTION, SOLUTION INTRAMUSCULAR; INTRAVENOUS at 08:32

## 2021-03-02 RX ADMIN — SODIUM CHLORIDE 25 ML/HR: 9 INJECTION, SOLUTION INTRAVENOUS at 08:00

## 2021-03-02 RX ADMIN — HEPARIN SODIUM 3800 UNITS: 1000 INJECTION INTRAVENOUS; SUBCUTANEOUS at 08:44

## 2021-03-02 RX ADMIN — HYDROMORPHONE HYDROCHLORIDE 1 MG: 1 INJECTION, SOLUTION INTRAMUSCULAR; INTRAVENOUS; SUBCUTANEOUS at 01:30

## 2021-03-02 RX ADMIN — OXYCODONE HYDROCHLORIDE AND ACETAMINOPHEN 500 MG: 500 TABLET ORAL at 14:35

## 2021-03-02 RX ADMIN — Medication 10 ML: at 21:36

## 2021-03-02 RX ADMIN — HYDRALAZINE HYDROCHLORIDE 25 MG: 25 TABLET, FILM COATED ORAL at 14:35

## 2021-03-02 RX ADMIN — SODIUM CHLORIDE 40 MG: 9 INJECTION INTRAMUSCULAR; INTRAVENOUS; SUBCUTANEOUS at 17:36

## 2021-03-02 RX ADMIN — TICAGRELOR 90 MG: 90 TABLET ORAL at 14:46

## 2021-03-02 RX ADMIN — FENTANYL CITRATE 25 MCG: 50 INJECTION, SOLUTION INTRAMUSCULAR; INTRAVENOUS at 08:34

## 2021-03-02 RX ADMIN — CASTOR OIL AND BALSAM, PERU: 788; 87 OINTMENT TOPICAL at 14:36

## 2021-03-02 RX ADMIN — ISOSORBIDE DINITRATE 30 MG: 20 TABLET ORAL at 14:34

## 2021-03-02 RX ADMIN — Medication 10 ML: at 14:37

## 2021-03-02 RX ADMIN — Medication 10 ML: at 06:38

## 2021-03-02 RX ADMIN — MIDAZOLAM HYDROCHLORIDE 1 MG: 1 INJECTION, SOLUTION INTRAMUSCULAR; INTRAVENOUS at 08:35

## 2021-03-02 RX ADMIN — BUMETANIDE 2 MG: 0.25 INJECTION INTRAMUSCULAR; INTRAVENOUS at 14:34

## 2021-03-02 RX ADMIN — CHOLECALCIFEROL (VITAMIN D3) 25 MCG (1,000 UNIT) TABLET 2000 UNITS: TABLET at 14:34

## 2021-03-02 RX ADMIN — CEFAZOLIN SODIUM 2 G: 1 INJECTION, POWDER, FOR SOLUTION INTRAMUSCULAR; INTRAVENOUS at 08:01

## 2021-03-02 RX ADMIN — INSULIN GLARGINE 10 UNITS: 100 INJECTION, SOLUTION SUBCUTANEOUS at 14:34

## 2021-03-02 RX ADMIN — INSULIN LISPRO 4 UNITS: 100 INJECTION, SOLUTION INTRAVENOUS; SUBCUTANEOUS at 17:36

## 2021-03-02 RX ADMIN — LIDOCAINE HYDROCHLORIDE 14 ML: 20 INJECTION, SOLUTION INFILTRATION; PERINEURAL at 08:29

## 2021-03-02 RX ADMIN — AMLODIPINE BESYLATE 10 MG: 5 TABLET ORAL at 14:34

## 2021-03-02 RX ADMIN — HYDROMORPHONE HYDROCHLORIDE 1 MG: 1 INJECTION, SOLUTION INTRAMUSCULAR; INTRAVENOUS; SUBCUTANEOUS at 14:46

## 2021-03-02 RX ADMIN — HYDRALAZINE HYDROCHLORIDE 25 MG: 25 TABLET, FILM COATED ORAL at 21:36

## 2021-03-02 RX ADMIN — ISOSORBIDE DINITRATE 30 MG: 20 TABLET ORAL at 21:35

## 2021-03-02 RX ADMIN — CARVEDILOL 25 MG: 12.5 TABLET, FILM COATED ORAL at 14:35

## 2021-03-02 NOTE — PROGRESS NOTES
Physical Therapy 3/2/2021    Chart reviewed up to date. Pt ARSENIO for angio procedure. Will follow-up as appropriate. Recommend with nursing patient to complete as able in order to maintain strength, endurance and independence: chair position in bed 3x/day for 30-60 min as tolerated. Thank you.   Bhanu Morales, PT, DPT

## 2021-03-02 NOTE — PROGRESS NOTES
Problem: Falls - Risk of  Goal: *Absence of Falls  Description: Document Lencho Wood Fall Risk and appropriate interventions in the flowsheet. Outcome: Progressing Towards Goal  Note: Fall Risk Interventions:  Mobility Interventions: Bed/chair exit alarm    Mentation Interventions: Bed/chair exit alarm    Medication Interventions: Bed/chair exit alarm, Patient to call before getting OOB    Elimination Interventions: Bed/chair exit alarm    History of Falls Interventions: Bed/chair exit alarm, Door open when patient unattended         Problem: Pressure Injury - Risk of  Goal: *Prevention of pressure injury  Description: Document Cruzito Scale and appropriate interventions in the flowsheet.   Outcome: Progressing Towards Goal  Note: Pressure Injury Interventions:  Sensory Interventions: Assess changes in LOC    Moisture Interventions: Absorbent underpads, Apply protective barrier, creams and emollients, Check for incontinence Q2 hours and as needed, Internal/External urinary devices    Activity Interventions: Increase time out of bed, Pressure redistribution bed/mattress(bed type)    Mobility Interventions: HOB 30 degrees or less, Pressure redistribution bed/mattress (bed type)    Nutrition Interventions: Document food/fluid/supplement intake    Friction and Shear Interventions: Apply protective barrier, creams and emollients, HOB 30 degrees or less                Problem: Nutrition Deficit  Goal: *Optimize nutritional status  Outcome: Progressing Towards Goal

## 2021-03-02 NOTE — WOUND CARE
Wound Care Note:     Follow-up visit for posterior neck and right ear    Chart shows:  Admitted for acute hypoxemic respiratory failure due to COVID-19, acute on chronic systolic heart failure, counseling/discussion goals of care, DNR discussion and JUAN C   Past Medical History:   Diagnosis Date    Diabetes ([facility]     Hypertension      WBC = 8.2 on 3/2/21  Admitted from [facility]:   Patient is A&O x 4, communicative, continent with no assistance needed in repositioning. Bed: [place]  Patient wearing briefs for incontinence. Diet: Mechanical soft with nutritional supplements  Patient reports no pain. Bilateral heels, buttocks, and sacral skin intact and without erythema. 1. POA posterior neck wound continues to improve, wound measures 2 cm x 1.5 cm x 0.1 cm, wound bed is pink, small serous drainage, wound edges are open, carlene-wound intact. Opticell AG and Optifoam gentle applied. 2.  Right ear with thin layer of eschar, measures 0.8 cm x 0.3 cm, no drainage, no erythema. Venelex ointment is being applied. 3.  Distal gluteal cleft fissure is almost resolved, approximately 3 cm x 0.1 cm x 0.1 cm, wound bed is pink, no drainage, carlene-wound intact. Z guard paste applied. 4.  Scrotum edema is almost resolved, no open area noted. Left open to air. 5.  Left posterior thigh with small partial thickness wound measures 0.6 cm x 0.7 cm x 0.1 cm, wound bed is pink, no drainage, wound edges are open, carlene-wound intact. Hydrocolloid applied. Patient repositioned supine. Heels offloaded on blankets.        Recommendations:    Posterior neck- Every other day cleanse with normal saline, wipe wound bed clean and dry, apply a piece of Opticell AG and cover with Optifoam Gentle (or other dressing that will remain in place).     Right ear, sacrum, bilateral heels and any other reddened bony prominence- Every 12 hours liberally apply Venelex ointment.  Use Z flow pillow to offload right ear.     Scrotum and distal gluteal cleft- Every 12 hours, proximal scrotum apply Z guard paste to open wound, apply Nourishing Skin Cream to intact skin on scrotum and elevate scrotum on a towel. Left posterior thigh- Please maintain Exuderm Hydrocolloid up to one week or change as needed with soiling or rolled edges. Please use adhesive remover wipes when changing.     Skin Care & Pressure Prevention:  Minimize layers of linen/pads under patient to optimize support surface. Turn/reposition approximately every 2 hours and offload heels.   Manage incontinence / promote continence   Nourishing Skin Cream to dry skin, minimize use of briefs when able    Discussed above plan with patient & Siria Herrera RN    Transition of Care: Plan to follow as needed while admitted to hospital.    ZOEY Bolanos, RN, [facility], [facility].  office [phone]  pager [phone] or call  to page

## 2021-03-02 NOTE — PROGRESS NOTES
Bedside shift change report given to [address] [place] [address] (oncoming nurse) by Magalys Maldonado (offgoing nurse). Report included the following information SBAR, Kardex, MAR and Recent Results.

## 2021-03-02 NOTE — PROGRESS NOTES
DIEGO:  1. RUR-39%  2. SNF referrals pending. 3. HD setup schedule pending. 4. BLS transport on discharge. [address] SNF are reviewing, and will follow up with decision this afternoon. CM working on HD unit pending on disposition.         Mary Jo Martin, [facility]

## 2021-03-02 NOTE — PROCEDURES
Master Dialysis Team [facility] Acutes  [phone]    Vitals   Pre   Post   Assessment   Pre   Post     Temp  Temp: 98.2 °F (36.8 °C) (03/02/21 0945)  98.6 LOC  Asleep, arousable      HR   Pulse (Heart Rate): 65 (03/02/21 0945) 71 Lungs   diminished  diminished   B/P   BP: (!) 153/72 (03/02/21 0945) 126/74 Cardiac   HRR  HRR   Resp   Resp Rate: 16 (03/02/21 0945) 16 Skin   Warm and dry  warm and dry   Pain level  Pain Intensity 1: 0 (03/02/21 0900) 1:0 Edema    none   none   Orders:    Duration:   Start:   0940 End:   1310 Total:   3.5 hours   Dialyzer:   Dialyzer/Set Up Inspection: Dharmesh Aldana (03/02/21 0945)   K Bath:   Dialysate K (mEq/L): 3.5 (03/02/21 0945)   Ca Bath:   Dialysate CA (mEq/L): 2.5 (03/02/21 0945)   Na/Bicarb:   Dialysate NA (mEq/L): 138 (03/02/21 0945)   Target Fluid Removal:   Goal/Amount of Fluid to Remove (mL): 3000 mL (03/02/21 0945)   [facility]     [facility] Each [facility] limb disinfected per p&p, caps removed, hubs disinfected per p&p. Each dialysis catheter limb disinfected per p&p, blood returned per p&p, each dialysis hub disinfected per p&p, post dialysis catheter dwell instilled per order, and caps applied.    Labs     Obtained/Reviewed   Critical Results Called   Date when labs were drawn-  Hgb-    HGB   Date Value Ref Range Status   03/02/2021 7.4 (L) 12.1 - 17.0 g/dL Final     K-    Potassium   Date Value Ref Range Status   03/02/2021 3.2 (L) 3.5 - 5.1 mmol/L Final     Ca-   Calcium   Date Value Ref Range Status   03/02/2021 7.3 (L) 8.5 - 10.1 MG/DL Final     Bun-   BUN   Date Value Ref Range Status   03/02/2021 25 (H) 6 - 20 MG/DL Final     Creat-   Creatinine   Date Value Ref Range Status   03/02/2021 5.73 (H) 0.70 - 1.30 MG/DL Final        Medications/ Blood Products Given     Name   Dose   Route and Time     none                Blood Volume Processed (BVP):    76.6 liters Net Fluid   Removed:  3000 ml   Comments Tolerated tx well, no complaints during or after. Did not receive heparin for cath block from pharmacy, blocked with saline. Report given to [facility] RN using SBAR. Time Out Done: yes, 0940  Primary Nurse Rpt Pre: Jaymie Larios RN  Primary Nurse Rpt Post:ZHAO Meneses RN  Pt Education: procedural  Care Plan: routine HD  Tx Summary: 3.5 hours on 3.5 K 2.5 Ca 140/35 removed 3000 ml net  Admiting Diagnosis:  Pt's previous clinic-n/a  Consent signed - Informed Consent Verified: Yes (03/02/21 0945)  Javierita Consent -   Hepatitis Status-antigen negative, immune 2-23-21   Machine #- Machine Number: [id number] (03/02/21 0945)  Telemetry status-no  Pre-dialysis wt. - Pre-Dialysis Weight: 95.3 kg (210 lb 1.6 oz) (02/24/21 2030)

## 2021-03-02 NOTE — PROGRESS NOTES
Pt arrives via stretcher to angio department accompanied by self for Angio procedure. All assessments completed and consent was reviewed.  Education given was regarding procedure, conscious sedation, post-procedure care and  management/follow-up. Opportunity for questions was provided and all questions and concerns were addressed. Phone consent obtained with wife by 2 RN's & Dr. Lucille Mahlotra for permanent tunneled catheter exchange (Q-P) with conscious sedation.

## 2021-03-02 NOTE — PROGRESS NOTES
Nephrology Progress Note  Kenyetta Momin  Date of Admission : 1/30/2021    CC: Follow up for JUAN C on CKD       Assessment and Plan     JUAN C:  - 2/2 ATN + COVID-19 infection  - unclear baseline but suspect some component of CKD  - Renal U/S on 1/30 shows CKD  - PC placed 3/2  - HD today and TTS while here  - CM working on outpt unit    Volume overload:  - improving w/ HD    Encephalopathy    HFrEF:  - EF 20-25% on 2/1     COVID-19 PNA:  - now COVID neg on 2/19     DM2:  - on insulin       Interval History:  Seen and examined on HD. PC placed this AM.  BP stable. UF goal 2.5kg. No complaints per pt, resting comfortably on dialysis. Current Medications: all current  Medications have been eviewed in EPIC  Review of Systems: Review of systems not obtained due to patient factors. Objective:  Vitals:    Vitals:    03/02/21 0850 03/02/21 0900 03/02/21 0945 03/02/21 1004   BP: (!) 144/72 (!) 145/78 (!) 153/72 132/80   Pulse: 74 70 65 65   Resp: 16 16 16 16   Temp:   98.2 °F (36.8 °C)    TempSrc:   Axillary    SpO2: 100% 100%     Weight:       Height:         Intake and Output:  No intake/output data recorded. 02/28 1901 - 03/02 0700  In: -   Out: 900 [Urine:900]    Physical Examination:  Pt intubated    No  General: Awake, alert  Neck:  Supple, no mass  Resp:  Lungs Clear anteriorly  CV:  RRR,  no murmur or rub, 2 + LE edema  GI:  Soft, NT, + Bowel sounds, no hepatosplenomegaly  Neurologic:  Awake, alert  Skin:  No Rash  Access:           [facility]    []    High complexity decision making was performed  []    Patient is at high-risk of decompensation with multiple organ involvement    Lab Data Personally Reviewed: I have reviewed all the pertinent labs, microbiology data and radiology studies during assessment.     Recent Labs     03/02/21  0138 03/01/21  0514 02/28/21  0256    143 142   K 3.2* 3.2* 3.6   * 108 106   CO2 26 28 27   GLU 75 88 94   BUN 25* 22* 21*   CREA 5.73* 5.23* 4.13*   CA 7.3* 7.7* 7.5* MG 2.0 1.8 1.8   PHOS 5.8* 5.1* 4.4   ALB  --   --  1.8*   ALT  --   --  56   INR 1.2*  --   --      Recent Labs     03/02/21  0138 03/01/21  0514 02/28/21  0256   WBC 8.2 8.7 8.4   HGB 7.4* 7.7* 8.0*   HCT 24.1* 23.9* 25.8*    304 296     Lab Results   Component Value Date/Time    Specimen Description: BLOOD 02/27/2010 07:00 PM     Lab Results   Component Value Date/Time    Culture result: NO GROWTH 5 DAYS 02/15/2021 10:58 AM    Culture result: LIGHT YEAST, (APPARENT CANDIDA ALBICANS) (A) 02/12/2021 04:53 PM    Culture result: LIGHT NORMAL RESPIRATORY CORI 02/12/2021 04:53 PM     Recent Results (from the past 24 hour(s))   GLUCOSE, POC    Collection Time: 03/01/21 12:15 PM   Result Value Ref Range    Glucose (POC) 91 65 - 100 mg/dL    Performed by [id number], POC    Collection Time: 03/01/21  5:19 PM   Result Value Ref Range    Glucose (POC) 87 65 - 100 mg/dL    Performed by Padmini Krishnan    GLUCOSE, POC    Collection Time: 03/01/21 10:33 PM   Result Value Ref Range    Glucose (POC) 124 (H) 65 - 100 mg/dL    Performed by Odalis Moore    MAGNESIUM    Collection Time: 03/02/21  1:38 AM   Result Value Ref Range    Magnesium 2.0 1.6 - 2.4 mg/dL   PHOSPHORUS    Collection Time: 03/02/21  1:38 AM   Result Value Ref Range    Phosphorus 5.8 (H) 2.6 - 4.7 MG/DL   CBC WITH AUTOMATED DIFF    Collection Time: 03/02/21  1:38 AM   Result Value Ref Range    WBC 8.2 4.1 - 11.1 K/uL    RBC 2.82 (L) 4.10 - 5.70 M/uL    HGB 7.4 (L) 12.1 - 17.0 g/dL    HCT 24.1 (L) 36.6 - 50.3 %    MCV 85.5 80.0 - 99.0 FL    MCH 26.2 26.0 - 34.0 PG    MCHC 30.7 30.0 - 36.5 g/dL    RDW 16.2 (H) 11.5 - 14.5 %    PLATELET 191 574 - 175 K/uL    MPV 10.9 8.9 - 12.9 FL    NRBC 0.0 0  WBC    ABSOLUTE NRBC 0.00 0.00 - 0.01 K/uL    NEUTROPHILS 63 32 - 75 %    LYMPHOCYTES 19 12 - 49 %    MONOCYTES 13 5 - 13 %    EOSINOPHILS 4 0 - 7 %    BASOPHILS 0 0 - 1 %    IMMATURE GRANULOCYTES 1 (H) 0.0 - 0.5 %    ABS.  NEUTROPHILS 5.2 1.8 - 8.0 K/UL    ABS. LYMPHOCYTES 1.6 0.8 - 3.5 K/UL    ABS. MONOCYTES 1.0 0.0 - 1.0 K/UL    ABS. EOSINOPHILS 0.4 0.0 - 0.4 K/UL    ABS. BASOPHILS 0.0 0.0 - 0.1 K/UL    ABS. IMM. GRANS. 0.0 0.00 - 0.04 K/UL    DF AUTOMATED     METABOLIC PANEL, BASIC    Collection Time: 03/02/21  1:38 AM   Result Value Ref Range    Sodium 143 136 - 145 mmol/L    Potassium 3.2 (L) 3.5 - 5.1 mmol/L    Chloride 109 (H) 97 - 108 mmol/L    CO2 26 21 - 32 mmol/L    Anion gap 8 5 - 15 mmol/L    Glucose 75 65 - 100 mg/dL    BUN 25 (H) 6 - 20 MG/DL    Creatinine 5.73 (H) 0.70 - 1.30 MG/DL    BUN/Creatinine ratio 4 (L) 12 - 20      GFR est AA 12 (L) >60 ml/min/1.73m2    GFR est non-AA 10 (L) >60 ml/min/1.73m2    Calcium 7.3 (L) 8.5 - 10.1 MG/DL   PROTHROMBIN TIME + INR    Collection Time: 03/02/21  1:38 AM   Result Value Ref Range    INR 1.2 (H) 0.9 - 1.1      Prothrombin time 12.4 (H) 9.0 - 11.1 sec   GLUCOSE, POC    Collection Time: 03/02/21  6:06 AM   Result Value Ref Range    Glucose (POC) 108 (H) 65 - 100 mg/dL    Performed by Mojgan Carlson MD  [address]  Phone - [phone]   Fax - [phone]  www. [facility]

## 2021-03-02 NOTE — PROGRESS NOTES
Palliative Medicine Social Work    Patient doing well on HD, working with SLP/GI on nutrition. CM working on SNF referrals. Palliative will sign off for now. Please re-consult if needed. Thank you for the opportunity to be involved in the care of Mr. Nicola Wilcox.      Anne Still LMSW, Supervisee in Social Work  Palliative Medicine   [phone]

## 2021-03-02 NOTE — PROGRESS NOTES
TRANSFER - OUT REPORT:    Verbal report given to ANAHI Mi(name) on Moshe Ramírez  being transferred to [facility](unit) for routine progression of care       Report consisted of patients Situation, Background, Assessment and   Recommendations(SBAR). Information from the following report(s) Procedure Summary and MAR was reviewed with the receiving nurse. Lines:   Quad Lumen 01/30/21 Left Subclavian (Active)   Central Line Being Utilized Yes 03/01/21 2230   Criteria for Appropriate Use Long term IV/antibiotic administration 03/01/21 2230   Site Assessment Clean, dry, & intact 03/01/21 2230   Infiltration Assessment 0 03/01/21 2230   Affected Extremity/Extremities Color distal to insertion site pink (or appropriate for race) 03/01/21 1001   Date of Last Dressing Change 02/25/21 02/28/21 2028   Dressing Status Clean, dry, & intact 03/01/21 2230   Dressing Type Disk with Chlorhexadine gluconate (CHG); Transparent 03/01/21 2230   Action Taken Open ports on tubing capped 03/01/21 2230   Proximal Hub Color/Line Status White;Capped;Flushed 03/01/21 2230   Positive Blood Return (Medial Site) No 03/01/21 2230   Medial 1 Hub Color/Line Status Gray;Capped;Flushed 03/01/21 2230   Positive Blood Return (Lateral Site) Yes 03/01/21 2230   Medial 2 Hub Color/Line Status Blue;Capped;Flushed 03/01/21 2230   Positive Blood Return (Site #3) Yes 03/01/21 2230   Distal Hub Color/Line Status Brown;Capped;Flushed 03/01/21 2230   Positive Blood Return (Site #4) Yes 03/01/21 2230   Alcohol Cap Used Yes 03/01/21 2230      Monitor Perm cath dressing for any oozing or bleeding. Perm cath is in good position and may be used per Dr. Kaykay Black. Keep HOB elevated 30 degrees x 2 hours. Monitor VS post sedation. Opportunity for questions and clarification was provided.

## 2021-03-02 NOTE — PROGRESS NOTES
SLP Contact Note    Noted pt off the floor for angio procedure. Will hold SLP for now.       Thank you,  BERYL GarciaEd, [id number] [facility]  Speech-Language Pathologist

## 2021-03-02 NOTE — PROGRESS NOTES
[facility]  Hospitalist Group                                                                                          Hospitalist Progress Note  Citlaly Dubose MD  Answering service: [phone] from in house phone        Date of Service:  3/2/2021  NAME:  Gillian Trujillo  :  1955  MRN:  [id number]      Admission Summary:     A 53-year-old man who presented from [facility] on  with acute onset shortness of breath, hypoxia, nausea, vomiting and diarrhea-shortness of breath acutely got worse after one of the vomiting episodes. Diagnosed with Covid about 2 weeks prior to presentation.  Patient placed on BiPAP on arrival-felt much better.  After getting fluids for resuscitation/elevated lactate level patient acutely decompensated-developed lethargy and drop in oxygen levels-intubated emergently requiring moderate to high vent support. His respiratory status improved but his renal function was declining suspecting acute kidney injury on top of chronic kidney disease, it was also difficult to control his agitation on the ventilator. However patient condition improved and he was successfully extubated on . Patient continued to do well and he was transferred to the intermediate care unit on . Over the last few days patient oxygen requirement was increasing and he is becoming more lethargic. This morning he was placed on high flow nasal cannula and later on on BiPAP. On BiPAP he seems to be much better with decreased work of breathing and good tidal volume and oxygenation. Reviewing his chest x-ray seems that he has progressive volume overload and perhaps a component of aspiration. He was giving 1 dose of Lasix with good urine output and ICU consulted.   Of note this patient been with positive fluid balance progressively since admission, according to documentation since admission he is about 20 L positive, over the last 3 days he is positive about 5 L.  He has good urine output despite worsening BUN and creatinine. No fever, no hemoptysis no nausea no vomiting. Intubated on 1/31 and extubated 2/11,   Transferred out of ICU on 2/13  Readmitted to ICU 2/15, placed on BiPAP, transferred out of the ICU on 2/19      Interval history / Subjective:     He said he feels better, no difficulty of swallowing, no chest pain     Assessment & Plan:     Acute hypoxic respiratory failure secondary to COVID 19 pneumonia  Mycoplasma and S Pneumonia and possible aspiration pneumonia with sepsis POA  -intubated on 1/31 and extubated 2/11, transferred out of ICU on 2/13  -readmitted to ICU on 2/15, placed on BiPAP, transferred out of the ICU on 2/19   -completed iv doxycycline for mycoplasma on 2/12  -iv cefepime 1/30-2/12, flagyl 1/30-2/4 and iv vancomycin 1/30-2/9  -Received vanco and merrem  -COVID 19 test on 1/30 and 2/12 was positive, repeated COVID 19 test on 2/19 negative  -off antibiotics now  -received IV decadron   -blood cx no growth  -afebrile, no leukocytosis  -chest x ray on 2/19 improved expansion and aeration of the lungs.  Continued interstitial prominence likely due to the patient's history of viral pneumonia  -SpO2 % on RA  -off droplet plus isolation    ARF with volume over load  -received iv bicarb gtt  -renal function improving after dialysis  -perm cath placed on 3/2  -HD started on 2/23  -Nephrology on board    Acute metabolic encephalopathy  -improved  -patient is conscious and alert, oriented to place and person  -continue supportive care    Acute on chronic systolic HF NYHA Class IV   -LV EF 20-25%  -continue on coreg, isordil, hydralazine, bumex, HD  -monitor I/O and daily weight  -cardiologist on board    HTN  -BP not at goal, continue on norvasc, coreg, hydralazine and isordil, monitor BP    Anemia of chronic disease   -transfused on 2/14 one unit for Hgb of 6.4, also one unit of pRBC on 2/22 for Hgb of 6.7  -Hgb trending down to 7.7  -no sign and symptoms of active bleeding  -monitor H/H, to transfused for Hgb <7.0    T2DM with hyperglycemia  -A1c 7.4  -patient with dysphagia, lantus 10 units, monitor finger stick glucose on sliding sclae    Hx of CAD s/p stent  -no chest pain  -continue aspirin, ticagrelor, lipitor, coreg, and isordil    Deranged liver enzyme   -improving  -repeat liver enzyme    Dysphagia   -doesn't want NGT or PEG tube  -reevaluated by speech therapist, doing well, placed on mechanical soft diet  -GI on board    Mild posterior cervical wound infection  -improving  -continue wound care  -wound care nurse on board    Debility  -PT/OT      Code status: DNR  DVT prophylaxis: SCD    Care Plan discussed with: Patient/Family and Nurse  Anticipated Disposition: From [facility]  Anticipated Discharge: Greater than 48 hours     Hospital Problems  Never Reviewed          Codes Class Noted POA    Dysphagia, unspecified type ICD-10-CM: R13.10  ICD-9-CM: 787.20  Unknown Unknown        Fatigue, unspecified type ICD-10-CM: R53.83  ICD-9-CM: 780.79  Unknown Unknown        Chronic systolic [facility] ([facility] ICD-10-CM: I50.22  ICD-9-CM: 428.22, 428.0  2/16/2021 Unknown        Goals of care, counseling/discussion ICD-10-CM: Z71.89  ICD-9-CM: V65.49  Unknown Unknown        DNR (do not resuscitate) discussion ICD-10-CM: Z71.89  ICD-9-CM: V65.49  Unknown Unknown        JUAN C (acute kidney injury) ([facility] ICD-10-CM: N17.9  ICD-9-CM: 133. 9  Unknown Unknown        Acute hypoxemic respiratory failure due to COVID-19 [facility]) ICD-10-CM: U07.1, J96.01  ICD-9-CM: 518.81, 079.89, 799.02  1/30/2021 Unknown                 Vital Signs:    Last 24hrs VS reviewed since prior progress note.  Most recent are:  Visit Vitals  BP (!) 156/74 (BP 1 Location: Left upper arm, BP Patient Position: At rest)   Pulse 84   Temp 98.5 °F (36.9 °C)   Resp 18   Ht 5' 7\" (1.702 m)   Wt 76.3 kg (168 lb 4.8 oz)   SpO2 98%   BMI 26.36 kg/m²         Intake/Output Summary (Last 24 hours) at 3/2/2021 1422  Last data filed at 3/2/2021 1310  Gross per 24 hour   Intake --   Output 3900 ml   Net -3900 ml        Physical Examination:     I had a face to face encounter with this patient and independently examined them on 3/2/2021 as outlined below:          Constitutional:  No acute distress, cooperative, pleasant    ENT:  Oral mucosa moist, oropharynx benign. Resp:  Decrease bronchial breath sound bilaterally. No wheezing/rhonchi/rales. No accessory muscle use   CV:  Regular rhythm, normal rate, no murmurs, gallops, rubs    GI:  Soft, non distended, non tender. normoactive bowel sounds, no hepatosplenomegaly     Musculoskeletal:  No edema     Neurologic:  Conscious and alert, oriented to place, person and time, motor UE 5/5, LE 4/5     Skin:  Posterior neck chronic wound       Data Review:    Review and/or order of clinical lab test  Review and/or order of tests in the radiology section of CPT  Review and/or order of tests in the medicine section of CPT      Labs:     Recent Labs     03/02/21 0138 03/01/21 0514   WBC 8.2 8.7   HGB 7.4* 7.7*   HCT 24.1* 23.9*    304     Recent Labs     03/02/21 0138 03/01/21  0514 02/28/21  0256    143 142   K 3.2* 3.2* 3.6   * 108 106   CO2 26 28 27   BUN 25* 22* 21*   CREA 5.73* 5.23* 4.13*   GLU 75 88 94   CA 7.3* 7.7* 7.5*   MG 2.0 1.8 1.8   PHOS 5.8* 5.1* 4.4     Recent Labs     02/28/21  0256   ALT 56   *   TBILI 0.4   TP 5.4*   ALB 1.8*   GLOB 3.6     Recent Labs     03/02/21 0138   INR 1.2*   PTP 12.4*      No results for input(s): FE, TIBC, PSAT, FERR in the last 72 hours. Lab Results   Component Value Date/Time    Folate 17.5 02/23/2021 03:59 AM      No results for input(s): PH, PCO2, PO2 in the last 72 hours. No results for input(s): CPK, CKNDX, TROIQ in the last 72 hours.     No lab exists for component: CPKMB  Lab Results   Component Value Date/Time    Cholesterol, total 115 02/26/2010 05:20 PM    HDL Cholesterol 31 (L) 02/26/2010 05:20 PM    LDL, calculated 46.2 02/26/2010 05:20 PM    Triglyceride 189 02/26/2010 05:20 PM    CHOL/HDL Ratio 3.7 02/26/2010 05:20 PM     Lab Results   Component Value Date/Time    Glucose (POC) 136 (H) 03/02/2021 02:09 PM    Glucose (POC) 108 (H) 03/02/2021 06:06 AM    Glucose (POC) 124 (H) 03/01/2021 10:33 PM    Glucose (POC) 87 03/01/2021 05:19 PM    Glucose (POC) 91 03/01/2021 12:15 PM     Lab Results   Component Value Date/Time    Color YELLOW/STRAW 02/10/2021 01:26 PM    Appearance TURBID (A) 02/10/2021 01:26 PM    Specific gravity 1.014 02/10/2021 01:26 PM    Specific gravity 1.015 02/26/2010 12:32 PM    pH (UA) 5.0 02/10/2021 01:26 PM    Protein 100 (A) 02/10/2021 01:26 PM    Glucose Negative 02/10/2021 01:26 PM    Ketone Negative 02/10/2021 01:26 PM    Bilirubin Negative 02/10/2021 01:26 PM    Urobilinogen 0.2 02/10/2021 01:26 PM    Nitrites Negative 02/10/2021 01:26 PM    Leukocyte Esterase Negative 02/10/2021 01:26 PM    Epithelial cells FEW 02/10/2021 01:26 PM    Bacteria Negative 02/10/2021 01:26 PM    WBC 0-4 02/10/2021 01:26 PM    RBC  02/10/2021 01:26 PM         Medications Reviewed:     Current Facility-Administered Medications   Medication Dose Route Frequency    thiamine HCL (B-1) tablet 100 mg  100 mg Oral DAILY    heparin (porcine) 1,000 unit/mL injection 3,800 Units  3,800 Units Hemodialysis DIALYSIS PRN    insulin glargine (LANTUS) injection 10 Units  10 Units SubCUTAneous DAILY    hydrALAZINE (APRESOLINE) tablet 25 mg  25 mg Oral TID    diphenhydrAMINE (BENADRYL) capsule 25 mg  25 mg Oral Q6H PRN    0.9% sodium chloride infusion 250 mL  250 mL IntraVENous PRN    albuterol-ipratropium (DUO-NEB) 2.5 MG-0.5 MG/3 ML  3 mL Nebulization Q4H PRN    HYDROmorphone (PF) (DILAUDID) injection 1 mg  1 mg IntraVENous Q2H PRN    insulin lispro (HUMALOG) injection   SubCUTAneous AC&HS    isosorbide dinitrate (ISORDIL) tablet 30 mg  30 mg Per NG tube Q8H    balsam peru-castor oiL (VENELEX) ointment   Topical Q12H    bumetanide (BUMEX) injection 2 mg  2 mg IntraVENous Q12H    0.9% sodium chloride infusion 250 mL  250 mL IntraVENous PRN    pantoprazole (PROTONIX) 40 mg in 0.9% sodium chloride 10 mL injection  40 mg IntraVENous Q12H    labetaloL (NORMODYNE;TRANDATE) injection 20 mg  20 mg IntraVENous Q4H PRN    ticagrelor (BRILINTA) tablet 90 mg  90 mg Oral BID    carvediloL (COREG) tablet 25 mg  25 mg Per NG tube Q12H    amLODIPine (NORVASC) tablet 10 mg  10 mg Per NG tube DAILY    alteplase (CATHFLO) 1 mg in sterile water (preservative free) 1 mL injection  1 mg InterCATHeter PRN    ascorbic acid (vitamin C) (VITAMIN C) tablet 500 mg  500 mg Per NG tube BID    cholecalciferol (VITAMIN D3) (1000 Units /25 mcg) tablet 2,000 Units  2,000 Units Per NG tube DAILY    metoprolol (LOPRESSOR) injection 5 mg  5 mg IntraVENous Q6H PRN    [Held by provider] allopurinoL (ZYLOPRIM) tablet 100 mg  100 mg Oral DAILY    sodium chloride (NS) flush 5-40 mL  5-40 mL IntraVENous Q8H    sodium chloride (NS) flush 5-40 mL  5-40 mL IntraVENous PRN    acetaminophen (TYLENOL) tablet 650 mg  650 mg Oral Q6H PRN    Or    acetaminophen (TYLENOL) suppository 650 mg  650 mg Rectal Q6H PRN    glucose chewable tablet 16 g  4 Tab Oral PRN    dextrose (D50W) injection syrg 12.5-25 g  12.5-25 g IntraVENous PRN    glucagon (GLUCAGEN) injection 1 mg  1 mg IntraMUSCular PRN    [Held by provider] aspirin chewable tablet 81 mg  81 mg Oral DAILY    atorvastatin (LIPITOR) tablet 40 mg  40 mg Oral QHS     ______________________________________________________________________  EXPECTED LENGTH OF STAY: 12d 7h  ACTUAL LENGTH OF STAY:          31                 Denise Stephenson MD

## 2021-03-02 NOTE — H&P
Interventional and Vascular Radiology History and Physical    Patient: Paulo Waters 63 y.o. male       Chief Complaint: Respiratory Distress      History of Present Illness: dialysis     History:    Past Medical History:   Diagnosis Date    Diabetes ([address]     Hypertension      No family history on file.   Social History     Socioeconomic History    Marital status:      Spouse name: Not on file    Number of children: Not on file    Years of education: Not on file    Highest education level: Not on file   Occupational History    Not on file   Social Needs    Financial resource strain: Not on file    Food insecurity     Worry: Not on file     Inability: Not on file    Transportation needs     Medical: Not on file     Non-medical: Not on file   Tobacco Use    Smoking status: Former Smoker    Smokeless tobacco: Never Used   Substance and Sexual Activity    Alcohol use: Not Currently    Drug use: Not Currently     Types: Heroin    Sexual activity: Not on file   Lifestyle    Physical activity     Days per week: Not on file     Minutes per session: Not on file    Stress: Not on file   Relationships    Social connections     Talks on phone: Not on file     Gets together: Not on file     Attends Congregation service: Not on file     Active member of club or organization: Not on file     Attends meetings of clubs or organizations: Not on file     Relationship status: Not on file    Intimate partner violence     Fear of current or ex partner: Not on file     Emotionally abused: Not on file     Physically abused: Not on file     Forced sexual activity: Not on file   Other Topics Concern    Not on file   Social History Narrative    Not on file       Allergies: No Known Allergies    Current Medications:  Current Facility-Administered Medications   Medication Dose Route Frequency    thiamine HCL (B-1) tablet 100 mg  100 mg Oral DAILY    heparin (porcine) 1,000 unit/mL injection 3,800 Units  3,800 Units Hemodialysis DIALYSIS PRN    insulin glargine (LANTUS) injection 10 Units  10 Units SubCUTAneous DAILY    hydrALAZINE (APRESOLINE) tablet 25 mg  25 mg Oral TID    diphenhydrAMINE (BENADRYL) capsule 25 mg  25 mg Oral Q6H PRN    0.9% sodium chloride infusion 250 mL  250 mL IntraVENous PRN    albuterol-ipratropium (DUO-NEB) 2.5 MG-0.5 MG/3 ML  3 mL Nebulization Q4H PRN    HYDROmorphone (PF) (DILAUDID) injection 1 mg  1 mg IntraVENous Q2H PRN    insulin lispro (HUMALOG) injection   SubCUTAneous AC&HS    isosorbide dinitrate (ISORDIL) tablet 30 mg  30 mg Per NG tube Q8H    balsam peru-castor oiL (VENELEX) ointment   Topical Q12H    bumetanide (BUMEX) injection 2 mg  2 mg IntraVENous Q12H    0.9% sodium chloride infusion 250 mL  250 mL IntraVENous PRN    pantoprazole (PROTONIX) 40 mg in 0.9% sodium chloride 10 mL injection  40 mg IntraVENous Q12H    labetaloL (NORMODYNE;TRANDATE) injection 20 mg  20 mg IntraVENous Q4H PRN    ticagrelor (BRILINTA) tablet 90 mg  90 mg Oral BID    carvediloL (COREG) tablet 25 mg  25 mg Per NG tube Q12H    amLODIPine (NORVASC) tablet 10 mg  10 mg Per NG tube DAILY    alteplase (CATHFLO) 1 mg in sterile water (preservative free) 1 mL injection  1 mg InterCATHeter PRN    ascorbic acid (vitamin C) (VITAMIN C) tablet 500 mg  500 mg Per NG tube BID    cholecalciferol (VITAMIN D3) (1000 Units /25 mcg) tablet 2,000 Units  2,000 Units Per NG tube DAILY    metoprolol (LOPRESSOR) injection 5 mg  5 mg IntraVENous Q6H PRN    [Held by provider] allopurinoL (ZYLOPRIM) tablet 100 mg  100 mg Oral DAILY    sodium chloride (NS) flush 5-40 mL  5-40 mL IntraVENous Q8H    sodium chloride (NS) flush 5-40 mL  5-40 mL IntraVENous PRN    acetaminophen (TYLENOL) tablet 650 mg  650 mg Oral Q6H PRN    Or    acetaminophen (TYLENOL) suppository 650 mg  650 mg Rectal Q6H PRN    glucose chewable tablet 16 g  4 Tab Oral PRN    dextrose (D50W) injection syrg 12.5-25 g  12.5-25 g IntraVENous PRN    glucagon (GLUCAGEN) injection 1 mg  1 mg IntraMUSCular PRN    [Held by provider] aspirin chewable tablet 81 mg  81 mg Oral DAILY    atorvastatin (LIPITOR) tablet 40 mg  40 mg Oral QHS        Physical Exam:  Blood pressure (!) 156/74, pulse 84, temperature 98.5 °F (36.9 °C), resp. rate 18, height 5' 7\" (1.702 m), weight 76.3 kg (168 lb 4.8 oz), SpO2 98 %. LUNG: clear to auscultation bilaterally, HEART: regular rate and rhythm, S1, S2 normal, no murmur, click, rub or gallop      Alerts:    Hospital Problems  Never Reviewed          Codes Class Noted POA    Dysphagia, unspecified type ICD-10-CM: R13.10  ICD-9-CM: 787.20  Unknown Unknown        Fatigue, unspecified type ICD-10-CM: R53.83  ICD-9-CM: 780.79  Unknown Unknown        Chronic systolic congestive heart failure ([facility] ICD-10-CM: I50.22  ICD-9-CM: 428.22, 428.0  2/16/2021 Unknown        Goals of care, counseling/discussion ICD-10-CM: Z71.89  ICD-9-CM: V65.49  Unknown Unknown        DNR (do not resuscitate) discussion ICD-10-CM: Z71.89  ICD-9-CM: V65.49  Unknown Unknown        JUAN C (acute kidney injury) ([facility] ICD-10-CM: N17.9  ICD-9-CM: 245. 9  Unknown Unknown        Acute hypoxemic respiratory failure due to COVID-19 [facility]) ICD-10-CM: U07.1, J96.01  ICD-9-CM: 518.81, 079.89, 799.02  1/30/2021 Unknown              Laboratory:      Recent Labs     03/02/21  0138   HGB 7.4*   HCT 24.1*   WBC 8.2      INR 1.2*   BUN 25*   CREA 5.73*   K 3.2*         Plan of Care/Planned Procedure:  Risks, benefits, and alternatives reviewed with patient and he agrees to proceed with the procedure. Conscious sedation will be performed with IV fentanyl and versed.  Plan is for permcath placement       Seth Castellanos MD

## 2021-03-03 LAB
ANION GAP SERPL CALC-SCNC: 6 MMOL/L (ref 5–15)
BASOPHILS # BLD: 0 K/UL (ref 0–0.1)
BASOPHILS NFR BLD: 0 % (ref 0–1)
BUN SERPL-MCNC: 18 MG/DL (ref 6–20)
BUN/CREAT SERPL: 5 (ref 12–20)
CALCIUM SERPL-MCNC: 7.8 MG/DL (ref 8.5–10.1)
CHLORIDE SERPL-SCNC: 106 MMOL/L (ref 97–108)
CO2 SERPL-SCNC: 27 MMOL/L (ref 21–32)
CREAT SERPL-MCNC: 3.83 MG/DL (ref 0.7–1.3)
DIFFERENTIAL METHOD BLD: ABNORMAL
EOSINOPHIL # BLD: 0.2 K/UL (ref 0–0.4)
EOSINOPHIL NFR BLD: 3 % (ref 0–7)
ERYTHROCYTE [DISTWIDTH] IN BLOOD BY AUTOMATED COUNT: 16.3 % (ref 11.5–14.5)
GLUCOSE BLD STRIP.AUTO-MCNC: 117 MG/DL (ref 65–100)
GLUCOSE BLD STRIP.AUTO-MCNC: 117 MG/DL (ref 65–100)
GLUCOSE BLD STRIP.AUTO-MCNC: 142 MG/DL (ref 65–100)
GLUCOSE BLD STRIP.AUTO-MCNC: 142 MG/DL (ref 65–100)
GLUCOSE SERPL-MCNC: 85 MG/DL (ref 65–100)
HCT VFR BLD AUTO: 25.5 % (ref 36.6–50.3)
HGB BLD-MCNC: 7.8 G/DL (ref 12.1–17)
IMM GRANULOCYTES # BLD AUTO: 0.1 K/UL (ref 0–0.04)
IMM GRANULOCYTES NFR BLD AUTO: 1 % (ref 0–0.5)
LYMPHOCYTES # BLD: 1.9 K/UL (ref 0.8–3.5)
LYMPHOCYTES NFR BLD: 23 % (ref 12–49)
MAGNESIUM SERPL-MCNC: 2 MG/DL (ref 1.6–2.4)
MCH RBC QN AUTO: 26.1 PG (ref 26–34)
MCHC RBC AUTO-ENTMCNC: 30.6 G/DL (ref 30–36.5)
MCV RBC AUTO: 85.3 FL (ref 80–99)
MONOCYTES # BLD: 1.1 K/UL (ref 0–1)
MONOCYTES NFR BLD: 13 % (ref 5–13)
NEUTS SEG # BLD: 4.9 K/UL (ref 1.8–8)
NEUTS SEG NFR BLD: 60 % (ref 32–75)
NRBC # BLD: 0 K/UL (ref 0–0.01)
NRBC BLD-RTO: 0 PER 100 WBC
PHOSPHATE SERPL-MCNC: 3.7 MG/DL (ref 2.6–4.7)
PLATELET # BLD AUTO: 249 K/UL (ref 150–400)
PMV BLD AUTO: 10.8 FL (ref 8.9–12.9)
POTASSIUM SERPL-SCNC: 3.6 MMOL/L (ref 3.5–5.1)
RBC # BLD AUTO: 2.99 M/UL (ref 4.1–5.7)
SERVICE CMNT-IMP: ABNORMAL
SODIUM SERPL-SCNC: 139 MMOL/L (ref 136–145)
WBC # BLD AUTO: 8.2 K/UL (ref 4.1–11.1)

## 2021-03-03 PROCEDURE — 82962 GLUCOSE BLOOD TEST: CPT

## 2021-03-03 PROCEDURE — 74011250637 HC RX REV CODE- 250/637: Performed by: ANESTHESIOLOGY

## 2021-03-03 PROCEDURE — 92526 ORAL FUNCTION THERAPY: CPT

## 2021-03-03 PROCEDURE — 36415 COLL VENOUS BLD VENIPUNCTURE: CPT

## 2021-03-03 PROCEDURE — 94760 N-INVAS EAR/PLS OXIMETRY 1: CPT

## 2021-03-03 PROCEDURE — 94761 N-INVAS EAR/PLS OXIMETRY MLT: CPT

## 2021-03-03 PROCEDURE — 74011250637 HC RX REV CODE- 250/637: Performed by: NURSE PRACTITIONER

## 2021-03-03 PROCEDURE — 74011250637 HC RX REV CODE- 250/637: Performed by: INTERNAL MEDICINE

## 2021-03-03 PROCEDURE — C9113 INJ PANTOPRAZOLE SODIUM, VIA: HCPCS | Performed by: HOSPITALIST

## 2021-03-03 PROCEDURE — 85025 COMPLETE CBC W/AUTO DIFF WBC: CPT

## 2021-03-03 PROCEDURE — 84100 ASSAY OF PHOSPHORUS: CPT

## 2021-03-03 PROCEDURE — 83735 ASSAY OF MAGNESIUM: CPT

## 2021-03-03 PROCEDURE — 97110 THERAPEUTIC EXERCISES: CPT

## 2021-03-03 PROCEDURE — 74011250637 HC RX REV CODE- 250/637: Performed by: HOSPITALIST

## 2021-03-03 PROCEDURE — 74011000250 HC RX REV CODE- 250: Performed by: HOSPITALIST

## 2021-03-03 PROCEDURE — 74011000250 HC RX REV CODE- 250: Performed by: INTERNAL MEDICINE

## 2021-03-03 PROCEDURE — 97530 THERAPEUTIC ACTIVITIES: CPT

## 2021-03-03 PROCEDURE — 80048 BASIC METABOLIC PNL TOTAL CA: CPT

## 2021-03-03 PROCEDURE — 74011250636 HC RX REV CODE- 250/636: Performed by: HOSPITALIST

## 2021-03-03 PROCEDURE — 65270000032 HC RM SEMIPRIVATE

## 2021-03-03 PROCEDURE — 97535 SELF CARE MNGMENT TRAINING: CPT

## 2021-03-03 PROCEDURE — 74011636637 HC RX REV CODE- 636/637: Performed by: INTERNAL MEDICINE

## 2021-03-03 PROCEDURE — 74011250636 HC RX REV CODE- 250/636: Performed by: INTERNAL MEDICINE

## 2021-03-03 PROCEDURE — 74011250637 HC RX REV CODE- 250/637: Performed by: EMERGENCY MEDICINE

## 2021-03-03 RX ORDER — ISOSORBIDE DINITRATE 10 MG/1
30 TABLET ORAL EVERY 8 HOURS
Status: DISCONTINUED | OUTPATIENT
Start: 2021-03-03 | End: 2021-03-05 | Stop reason: HOSPADM

## 2021-03-03 RX ORDER — CARVEDILOL 12.5 MG/1
25 TABLET ORAL EVERY 12 HOURS
Status: DISCONTINUED | OUTPATIENT
Start: 2021-03-03 | End: 2021-03-05 | Stop reason: HOSPADM

## 2021-03-03 RX ORDER — AMLODIPINE BESYLATE 5 MG/1
10 TABLET ORAL DAILY
Status: DISCONTINUED | OUTPATIENT
Start: 2021-03-03 | End: 2021-03-05 | Stop reason: HOSPADM

## 2021-03-03 RX ORDER — MELATONIN
2000 DAILY
Status: DISCONTINUED | OUTPATIENT
Start: 2021-03-03 | End: 2021-03-05 | Stop reason: HOSPADM

## 2021-03-03 RX ORDER — ASCORBIC ACID 500 MG
500 TABLET ORAL 2 TIMES DAILY
Status: DISCONTINUED | OUTPATIENT
Start: 2021-03-03 | End: 2021-03-05 | Stop reason: HOSPADM

## 2021-03-03 RX ADMIN — TICAGRELOR 90 MG: 90 TABLET ORAL at 08:43

## 2021-03-03 RX ADMIN — BUMETANIDE 2 MG: 0.25 INJECTION INTRAMUSCULAR; INTRAVENOUS at 21:13

## 2021-03-03 RX ADMIN — HYDROMORPHONE HYDROCHLORIDE 1 MG: 2 INJECTION INTRAMUSCULAR; INTRAVENOUS; SUBCUTANEOUS at 17:19

## 2021-03-03 RX ADMIN — HYDROMORPHONE HYDROCHLORIDE 1 MG: 2 INJECTION INTRAMUSCULAR; INTRAVENOUS; SUBCUTANEOUS at 01:16

## 2021-03-03 RX ADMIN — HYDRALAZINE HYDROCHLORIDE 25 MG: 25 TABLET, FILM COATED ORAL at 08:44

## 2021-03-03 RX ADMIN — CHOLECALCIFEROL (VITAMIN D3) 25 MCG (1,000 UNIT) TABLET 2000 UNITS: TABLET at 08:43

## 2021-03-03 RX ADMIN — CASTOR OIL AND BALSAM, PERU: 788; 87 OINTMENT TOPICAL at 08:44

## 2021-03-03 RX ADMIN — HYDROMORPHONE HYDROCHLORIDE 1 MG: 2 INJECTION INTRAMUSCULAR; INTRAVENOUS; SUBCUTANEOUS at 08:42

## 2021-03-03 RX ADMIN — CARVEDILOL 25 MG: 12.5 TABLET, FILM COATED ORAL at 21:12

## 2021-03-03 RX ADMIN — OXYCODONE HYDROCHLORIDE AND ACETAMINOPHEN 500 MG: 500 TABLET ORAL at 08:44

## 2021-03-03 RX ADMIN — HYDROMORPHONE HYDROCHLORIDE 1 MG: 2 INJECTION INTRAMUSCULAR; INTRAVENOUS; SUBCUTANEOUS at 03:28

## 2021-03-03 RX ADMIN — HYDROMORPHONE HYDROCHLORIDE 1 MG: 2 INJECTION INTRAMUSCULAR; INTRAVENOUS; SUBCUTANEOUS at 13:09

## 2021-03-03 RX ADMIN — Medication 20 ML: at 06:16

## 2021-03-03 RX ADMIN — CARVEDILOL 25 MG: 12.5 TABLET, FILM COATED ORAL at 08:43

## 2021-03-03 RX ADMIN — HYDROMORPHONE HYDROCHLORIDE 1 MG: 2 INJECTION INTRAMUSCULAR; INTRAVENOUS; SUBCUTANEOUS at 11:02

## 2021-03-03 RX ADMIN — HYDRALAZINE HYDROCHLORIDE 25 MG: 25 TABLET, FILM COATED ORAL at 21:13

## 2021-03-03 RX ADMIN — HYDRALAZINE HYDROCHLORIDE 25 MG: 25 TABLET, FILM COATED ORAL at 15:24

## 2021-03-03 RX ADMIN — INSULIN LISPRO 3 UNITS: 100 INJECTION, SOLUTION INTRAVENOUS; SUBCUTANEOUS at 12:35

## 2021-03-03 RX ADMIN — ISOSORBIDE DINITRATE 30 MG: 10 TABLET ORAL at 14:26

## 2021-03-03 RX ADMIN — Medication 10 ML: at 15:26

## 2021-03-03 RX ADMIN — OXYCODONE HYDROCHLORIDE AND ACETAMINOPHEN 500 MG: 500 TABLET ORAL at 17:11

## 2021-03-03 RX ADMIN — SODIUM CHLORIDE 40 MG: 9 INJECTION INTRAMUSCULAR; INTRAVENOUS; SUBCUTANEOUS at 06:08

## 2021-03-03 RX ADMIN — Medication 10 ML: at 21:21

## 2021-03-03 RX ADMIN — ISOSORBIDE DINITRATE 30 MG: 10 TABLET ORAL at 21:12

## 2021-03-03 RX ADMIN — HYDROMORPHONE HYDROCHLORIDE 1 MG: 2 INJECTION INTRAMUSCULAR; INTRAVENOUS; SUBCUTANEOUS at 21:13

## 2021-03-03 RX ADMIN — ISOSORBIDE DINITRATE 30 MG: 20 TABLET ORAL at 06:08

## 2021-03-03 RX ADMIN — INSULIN GLARGINE 10 UNITS: 100 INJECTION, SOLUTION SUBCUTANEOUS at 08:43

## 2021-03-03 RX ADMIN — HYDROMORPHONE HYDROCHLORIDE 1 MG: 2 INJECTION INTRAMUSCULAR; INTRAVENOUS; SUBCUTANEOUS at 15:24

## 2021-03-03 RX ADMIN — ATORVASTATIN CALCIUM 40 MG: 40 TABLET, FILM COATED ORAL at 21:13

## 2021-03-03 RX ADMIN — SODIUM CHLORIDE 40 MG: 9 INJECTION INTRAMUSCULAR; INTRAVENOUS; SUBCUTANEOUS at 17:11

## 2021-03-03 RX ADMIN — BUMETANIDE 2 MG: 0.25 INJECTION INTRAMUSCULAR; INTRAVENOUS at 08:43

## 2021-03-03 RX ADMIN — AMLODIPINE BESYLATE 10 MG: 5 TABLET ORAL at 08:44

## 2021-03-03 RX ADMIN — CASTOR OIL AND BALSAM, PERU: 788; 87 OINTMENT TOPICAL at 21:21

## 2021-03-03 RX ADMIN — Medication 100 MG: at 08:43

## 2021-03-03 RX ADMIN — TICAGRELOR 90 MG: 90 TABLET ORAL at 21:13

## 2021-03-03 RX ADMIN — INSULIN LISPRO 3 UNITS: 100 INJECTION, SOLUTION INTRAVENOUS; SUBCUTANEOUS at 17:11

## 2021-03-03 NOTE — PROGRESS NOTES
Problem: Patient Education:  Go to Education Activity  Goal: Patient/Family Education  Outcome: Progressing Towards Goal     Problem: Falls - Risk of  Goal: *Absence of Falls  Description: Document Jose A Sol Fall Risk and appropriate interventions in the flowsheet. Outcome: Progressing Towards Goal  Note: Fall Risk Interventions:  Mobility Interventions: PT Consult for mobility concerns    Mentation Interventions: Adequate sleep, hydration, pain control    Medication Interventions: Bed/chair exit alarm    Elimination Interventions: Patient to call for help with toileting needs    History of Falls Interventions: Bed/chair exit alarm         Problem: Patient Education: Go to Patient Education Activity  Goal: Patient/Family Education  Outcome: Progressing Towards Goal     Problem: Pressure Injury - Risk of  Goal: *Prevention of pressure injury  Description: Document Cruzito Scale and appropriate interventions in the flowsheet.   Outcome: Progressing Towards Goal  Note: Pressure Injury Interventions:  Sensory Interventions: Assess changes in LOC    Moisture Interventions: Internal/External urinary devices    Activity Interventions: PT/OT evaluation    Mobility Interventions: PT/OT evaluation    Nutrition Interventions: Document food/fluid/supplement intake    Friction and Shear Interventions: Apply protective barrier, creams and emollients                Problem: Patient Education: Go to Patient Education Activity  Goal: Patient/Family Education  Outcome: Progressing Towards Goal     Problem: Nutrition Deficit  Goal: *Optimize nutritional status  Outcome: Progressing Towards Goal     Problem: Breathing Pattern - Ineffective  Goal: *Absence of hypoxia  Outcome: Progressing Towards Goal  Goal: *Use of effective breathing techniques  Outcome: Progressing Towards Goal  Goal: *PALLIATIVE CARE:  Alleviation of Dyspnea  Outcome: Progressing Towards Goal     Problem: Patient Education: Go to Patient Education Activity  Goal: Patient/Family Education  Outcome: Progressing Towards Goal     Problem: Patient Education: Go to Patient Education Activity  Goal: Patient/Family Education  Outcome: Progressing Towards Goal     Problem: Patient Education: Go to Patient Education Activity  Goal: Patient/Family Education  Outcome: Progressing Towards Goal     Problem: Patient Education: Go to Patient Education Activity  Goal: Patient/Family Education  Outcome: Progressing Towards Goal     Problem: Airway Clearance - Ineffective  Goal: Achieve or maintain patent airway  Outcome: Progressing Towards Goal     Problem: Gas Exchange - Impaired  Goal: Absence of hypoxia  Outcome: Progressing Towards Goal  Goal: Promote optimal lung function  Outcome: Progressing Towards Goal     Problem: Breathing Pattern - Ineffective  Goal: Ability to achieve and maintain a regular respiratory rate  Outcome: Progressing Towards Goal     Problem:  Body Temperature -  Risk of, Imbalanced  Goal: Ability to maintain a body temperature within defined limits  Outcome: Progressing Towards Goal  Goal: Will regain or maintain usual level of consciousness  Outcome: Progressing Towards Goal  Goal: Complications related to the disease process, condition or treatment will be avoided or minimized  Outcome: Progressing Towards Goal     Problem: Nutrition Deficits  Goal: Optimize nutrtional status  Outcome: Progressing Towards Goal     Problem: Loneliness or Risk for Loneliness  Goal: Demonstrate positive use of time alone when socialization is not possible  Outcome: Progressing Towards Goal     Problem: Fatigue  Goal: Verbalize increase energy and improved vitality  Outcome: Progressing Towards Goal     Problem: Patient Education: Go to Patient Education Activity  Goal: Patient/Family Education  Outcome: Progressing Towards Goal     Problem: Diabetes Self-Management  Goal: *Disease process and treatment process  Description: Define diabetes and identify own type of diabetes; list 3 options for treating diabetes. Outcome: Progressing Towards Goal  Goal: *Incorporating nutritional management into lifestyle  Description: Describe effect of type, amount and timing of food on blood glucose; list 3 methods for planning meals. Outcome: Progressing Towards Goal  Goal: *Incorporating physical activity into lifestyle  Description: State effect of exercise on blood glucose levels. Outcome: Progressing Towards Goal  Goal: *Developing strategies to promote health/change behavior  Description: Define the ABC's of diabetes; identify appropriate screenings, schedule and personal plan for screenings. Outcome: Progressing Towards Goal  Goal: *Using medications safely  Description: State effect of diabetes medications on diabetes; name diabetes medication taking, action and side effects. Outcome: Progressing Towards Goal  Goal: *Monitoring blood glucose, interpreting and using results  Description: Identify recommended blood glucose targets  and personal targets. Outcome: Progressing Towards Goal  Goal: *Prevention, detection, treatment of acute complications  Description: List symptoms of hyper- and hypoglycemia; describe how to treat low blood sugar and actions for lowering  high blood glucose level. Outcome: Progressing Towards Goal  Goal: *Prevention, detection and treatment of chronic complications  Description: Define the natural course of diabetes and describe the relationship of blood glucose levels to long term complications of diabetes.   Outcome: Progressing Towards Goal  Goal: *Developing strategies to address psychosocial issues  Description: Describe feelings about living with diabetes; identify support needed and support network  Outcome: Progressing Towards Goal  Goal: *Insulin pump training  Outcome: Progressing Towards Goal  Goal: *Sick day guidelines  Outcome: Progressing Towards Goal  Goal: *Patient Specific Goal (EDIT GOAL, INSERT TEXT)  Outcome: Progressing Towards Goal     Problem: Patient Education: Go to Patient Education Activity  Goal: Patient/Family Education  Outcome: Progressing Towards Goal     Problem: Pain  Goal: *Control of Pain  Outcome: Progressing Towards Goal  Goal: *PALLIATIVE CARE:  Alleviation of Pain  Outcome: Progressing Towards Goal     Problem: Hypertension  Goal: *Blood pressure within specified parameters  Outcome: Progressing Towards Goal  Goal: *Fluid volume balance  Outcome: Progressing Towards Goal  Goal: *Labs within defined limits  Outcome: Progressing Towards Goal     Problem: Patient Education: Go to Patient Education Activity  Goal: Patient/Family Education  Outcome: Progressing Towards Goal     Problem: Patient Education: Go to Patient Education Activity  Goal: Patient/Family Education  Outcome: Progressing Towards Goal     Problem: Nutrition Deficit  Goal: *Optimize nutritional status  Outcome: Progressing Towards Goal

## 2021-03-03 NOTE — PROGRESS NOTES
Nephrology Progress Note  German Ye  Date of Admission : 1/30/2021    CC: Follow up for JUAN C on CKD       Assessment and Plan     JUAN C:  - 2/2 ATN + COVID-19 infection  - unclear baseline but suspect some component of CKD  - Renal U/S on 1/30 shows CKD  - PC placed 3/2  - HD TTS while here  - CM working on outpt unit    Volume overload:  - improving w/ HD    Encephalopathy    Anemia of CKD:  - BRYANNA TTS    HFrEF:  - EF 20-25% on 2/1     COVID-19 PNA:  - now COVID neg on 2/19     DM2:  - on insulin       Interval History:  Seen and examined. Tolerated HD, UF 3kg yesterday. Awaiting SNF and HD unit placement at this time. More alert. Feeling better. No cp or sob reported. Current Medications: all current  Medications have been eviewed in EPIC  Review of Systems: Review of systems not obtained due to patient factors. Objective:  Vitals:    Vitals:    03/02/21 2050 03/03/21 0318 03/03/21 0608 03/03/21 0835   BP: 122/72 (!) 143/69 (!) 148/73 131/69   Pulse: 85 70 66    Resp: 16 16  16   Temp: 98.9 °F (37.2 °C) 98.8 °F (37.1 °C)  98.5 °F (36.9 °C)   TempSrc:       SpO2: 98% 96%     Weight: 73.4 kg (161 lb 14.4 oz)      Height:         Intake and Output:  03/03 0701 - 03/03 1900  In: 240 [P.O.:240]  Out: -   03/01 1901 - 03/03 0700  In: 360 [P.O.:360]  Out: 3900 [Urine:900]    Physical Examination:  Pt intubated    No  General: Awake, alert  Neck:  Supple, no mass  Resp:  Lungs Clear anteriorly  CV:  RRR,  no murmur or rub, 2 + LE edema  GI:  Soft, NT, + Bowel sounds, no hepatosplenomegaly  Neurologic:  Awake, alert  Skin:  No Rash  Access:           [facility] PC    []    High complexity decision making was performed  []    Patient is at high-risk of decompensation with multiple organ involvement    Lab Data Personally Reviewed: I have reviewed all the pertinent labs, microbiology data and radiology studies during assessment.     Recent Labs     03/03/21  0125 03/02/21  0138 03/01/21  0514    143 143   K 3.6 3. 2* 3.2*    109* 108   CO2 27 26 28   GLU 85 75 88   BUN 18 25* 22*   CREA 3.83* 5.73* 5.23*   CA 7.8* 7.3* 7.7*   MG 2.0 2.0 1.8   PHOS 3.7 5.8* 5.1*   INR  --  1.2*  --      Recent Labs     03/03/21  0125 03/02/21  0138 03/01/21  0514   WBC 8.2 8.2 8.7   HGB 7.8* 7.4* 7.7*   HCT 25.5* 24.1* 23.9*    298 304     Lab Results   Component Value Date/Time    Specimen Description: BLOOD 02/27/2010 07:00 PM     Lab Results   Component Value Date/Time    Culture result: NO GROWTH 5 DAYS 02/15/2021 10:58 AM    Culture result: LIGHT YEAST, (APPARENT CANDIDA ALBICANS) (A) 02/12/2021 04:53 PM    Culture result: LIGHT NORMAL RESPIRATORY CORI 02/12/2021 04:53 PM     Recent Results (from the past 24 hour(s))   GLUCOSE, POC    Collection Time: 03/02/21  2:09 PM   Result Value Ref Range    Glucose (POC) 136 (H) 65 - 100 mg/dL    Performed by Bal Lau    GLUCOSE, POC    Collection Time: 03/02/21  4:59 PM   Result Value Ref Range    Glucose (POC) 215 (H) 65 - 100 mg/dL    Performed by Bal Lau    GLUCOSE, POC    Collection Time: 03/02/21  9:47 PM   Result Value Ref Range    Glucose (POC) 73 65 - 100 mg/dL    Performed by Flor Temple    MAGNESIUM    Collection Time: 03/03/21  1:25 AM   Result Value Ref Range    Magnesium 2.0 1.6 - 2.4 mg/dL   PHOSPHORUS    Collection Time: 03/03/21  1:25 AM   Result Value Ref Range    Phosphorus 3.7 2.6 - 4.7 MG/DL   CBC WITH AUTOMATED DIFF    Collection Time: 03/03/21  1:25 AM   Result Value Ref Range    WBC 8.2 4.1 - 11.1 K/uL    RBC 2.99 (L) 4.10 - 5.70 M/uL    HGB 7.8 (L) 12.1 - 17.0 g/dL    HCT 25.5 (L) 36.6 - 50.3 %    MCV 85.3 80.0 - 99.0 FL    MCH 26.1 26.0 - 34.0 PG    MCHC 30.6 30.0 - 36.5 g/dL    RDW 16.3 (H) 11.5 - 14.5 %    PLATELET 701 564 - 076 K/uL    MPV 10.8 8.9 - 12.9 FL    NRBC 0.0 0  WBC    ABSOLUTE NRBC 0.00 0.00 - 0.01 K/uL    NEUTROPHILS 60 32 - 75 %    LYMPHOCYTES 23 12 - 49 %    MONOCYTES 13 5 - 13 %    EOSINOPHILS 3 0 - 7 %    BASOPHILS 0 0 - 1 %    IMMATURE GRANULOCYTES 1 (H) 0.0 - 0.5 %    ABS. NEUTROPHILS 4.9 1.8 - 8.0 K/UL    ABS. LYMPHOCYTES 1.9 0.8 - 3.5 K/UL    ABS. MONOCYTES 1.1 (H) 0.0 - 1.0 K/UL    ABS. EOSINOPHILS 0.2 0.0 - 0.4 K/UL    ABS. BASOPHILS 0.0 0.0 - 0.1 K/UL    ABS. IMM. GRANS. 0.1 (H) 0.00 - 0.04 K/UL    DF AUTOMATED     METABOLIC PANEL, BASIC    Collection Time: 03/03/21  1:25 AM   Result Value Ref Range    Sodium 139 136 - 145 mmol/L    Potassium 3.6 3.5 - 5.1 mmol/L    Chloride 106 97 - 108 mmol/L    CO2 27 21 - 32 mmol/L    Anion gap 6 5 - 15 mmol/L    Glucose 85 65 - 100 mg/dL    BUN 18 6 - 20 MG/DL    Creatinine 3.83 (H) 0.70 - 1.30 MG/DL    BUN/Creatinine ratio 5 (L) 12 - 20      GFR est AA 19 (L) >60 ml/min/1.73m2    GFR est non-AA 16 (L) >60 ml/min/1.73m2    Calcium 7.8 (L) 8.5 - 10.1 MG/DL   GLUCOSE, POC    Collection Time: 03/03/21  6:33 AM   Result Value Ref Range    Glucose (POC) 117 (H) 65 - 100 mg/dL    Performed by Raad Joseph MD  [address]  Phone - [phone]   Fax - [phone]  www. [facility]

## 2021-03-03 NOTE — PROGRESS NOTES
5:39 PM  CM contacted to follow-up on patient transport arranged. CM contacted [phone] and received updates [facility] able to accept and accommodate patient for transport today at 7:45 pm.  RN notified of updates.     AMBER Bernard/CRM  Care Management

## 2021-03-03 NOTE — PROGRESS NOTES
Problem: Falls - Risk of  Goal: *Absence of Falls  Description: Document Lenin Donnelly Fall Risk and appropriate interventions in the flowsheet. Outcome: Progressing Towards Goal  Note: Fall Risk Interventions:  Mobility Interventions: Bed/chair exit alarm    Mentation Interventions: Adequate sleep, hydration, pain control, Bed/chair exit alarm, Door open when patient unattended    Medication Interventions: Bed/chair exit alarm, Evaluate medications/consider consulting pharmacy, Patient to call before getting OOB    Elimination Interventions: Bed/chair exit alarm    History of Falls Interventions: Bed/chair exit alarm, Door open when patient unattended, Room close to nurse's station         Problem: Pressure Injury - Risk of  Goal: *Prevention of pressure injury  Description: Document Cruzito Scale and appropriate interventions in the flowsheet.   Outcome: Progressing Towards Goal  Note: Pressure Injury Interventions:  Sensory Interventions: Assess changes in LOC, Check visual cues for pain, Float heels    Moisture Interventions: Absorbent underpads, Apply protective barrier, creams and emollients, Check for incontinence Q2 hours and as needed, Internal/External urinary devices    Activity Interventions: Increase time out of bed, Pressure redistribution bed/mattress(bed type), PT/OT evaluation    Mobility Interventions: Float heels, HOB 30 degrees or less, Pressure redistribution bed/mattress (bed type), PT/OT evaluation    Nutrition Interventions: Document food/fluid/supplement intake    Friction and Shear Interventions: Apply protective barrier, creams and emollients, HOB 30 degrees or less

## 2021-03-03 NOTE — PROGRESS NOTES
Bedside shift change report given to [address] [place] [address] (oncoming nurse) by William Waters RN (offgoing nurse). Report included the following information SBAR, Kardex, MAR and Recent Results.

## 2021-03-03 NOTE — PROGRESS NOTES
Bedside and Verbal shift change report given to ANAHI Mi (oncoming nurse) by ANAHI Bosch (offgoing nurse). Report included the following information SBAR, Kardex, MAR and Recent Results.

## 2021-03-03 NOTE — PROGRESS NOTES
[facility] Adult  Hospitalist Group                                                                                          Hospitalist Progress Note  Arnold Hines MD  Answering service: [phone] from in house phone        Date of Service:  3/3/2021  NAME:  Jovi Hurley  :  1955  MRN:  [id number]      Admission Summary:     A 61-year-old man who presented from [facility] on  with acute onset shortness of breath, hypoxia, nausea, vomiting and diarrhea-shortness of breath acutely got worse after one of the vomiting episodes. Diagnosed with Covid about 2 weeks prior to presentation.  Patient placed on BiPAP on arrival-felt much better.  After getting fluids for resuscitation/elevated lactate level patient acutely decompensated-developed lethargy and drop in oxygen levels-intubated emergently requiring moderate to high vent support. His respiratory status improved but his renal function was declining suspecting acute kidney injury on top of chronic kidney disease, it was also difficult to control his agitation on the ventilator. However patient condition improved and he was successfully extubated on . Patient continued to do well and he was transferred to the intermediate care unit on . Over the last few days patient oxygen requirement was increasing and he is becoming more lethargic. This morning he was placed on high flow nasal cannula and later on on BiPAP. On BiPAP he seems to be much better with decreased work of breathing and good tidal volume and oxygenation. Reviewing his chest x-ray seems that he has progressive volume overload and perhaps a component of aspiration. He was giving 1 dose of Lasix with good urine output and ICU consulted.   Of note this patient been with positive fluid balance progressively since admission, according to documentation since admission he is about 20 L positive, over the last 3 days he is positive about 5 L.  He has good urine output despite worsening BUN and creatinine. No fever, no hemoptysis no nausea no vomiting. Intubated on 1/31 and extubated 2/11,   Transferred out of ICU on 2/13  Readmitted to ICU 2/15, placed on BiPAP, transferred out of the ICU on 2/19      Interval history / Subjective:     He said he feels better, no difficulty of swallowing, no chest pain     Assessment & Plan:     Acute hypoxic respiratory failure secondary to COVID 19 pneumonia  Mycoplasma and S Pneumonia and possible aspiration pneumonia with sepsis POA  -intubated on 1/31 and extubated 2/11, transferred out of ICU on 2/13  -readmitted to ICU on 2/15, placed on BiPAP, transferred out of the ICU on 2/19   -completed iv doxycycline for mycoplasma on 2/12  -iv cefepime 1/30-2/12, flagyl 1/30-2/4 and iv vancomycin 1/30-2/9  -Received vanco and merrem  -COVID 19 test on 1/30 and 2/12 was positive,   -repeated COVID 19 test on 2/19 negative  -off antibiotics now  -received IV decadron   -blood cx no growth  -afebrile, no leukocytosis  -chest x ray on 2/19 improved expansion and aeration of the lungs.  Continued interstitial prominence likely due to the patient's history of viral pneumonia  -SpO2 % on RA  -off droplet plus isolation  -improved and stable    ARF with volume over load  -received iv bicarb gtt  -renal function improving after dialysis  -perm cath placed on 3/2  -HD started on 2/23  -Nephrology on board    Acute metabolic encephalopathy  -improved  -patient is conscious and alert, oriented to place and person  -continue supportive care    Acute on chronic systolic HF NYHA Class IV   -LV EF 20-25%  -continue on coreg, isordil, hydralazine, bumex, HD  -monitor I/O and daily weight  -improved and compensated  -cardiologist on board    HTN  -BP normal, continue on norvasc, coreg, hydralazine and isordil, monitor BP    Anemia of chronic disease   -transfused on 2/14 one unit for Hgb of 6.4, also one unit of pRBC on 2/22 for Hgb of 6.7  -Hgb low but stable 7.8  -no sign and symptoms of active bleeding  -monitor H/H, to transfused for Hgb <7.0    T2DM with hyperglycemia  -A1c 7.4  -patient with dysphagia, lantus 10 units, monitor finger stick glucose on sliding sclae    Hx of CAD s/p stent  -no chest pain  -continue aspirin, ticagrelor, lipitor, coreg, and isordil    Deranged liver enzyme   -improving  -repeat liver enzyme    Dysphagia   -reevaluated by speech therapist, doing well, placed on mechanical soft diet  -GI on board    Mild posterior cervical wound infection  -improving  -continue wound care  -wound care nurse on board    Debility  -PT/OT      Code status: DNR  DVT prophylaxis: SCD    Care Plan discussed with: Patient/Family and Nurse  Anticipated Disposition: From [facility]  Anticipated Discharge: Greater than 48 hours     Hospital Problems  Never Reviewed          Codes Class Noted POA    Dysphagia, unspecified type ICD-10-CM: R13.10  ICD-9-CM: 787.20  Unknown Unknown        Fatigue, unspecified type ICD-10-CM: R53.83  ICD-9-CM: 780.79  Unknown Unknown        Chronic systolic congestive heart failure ([facility] [address] ICD-10-CM: I50.22  ICD-9-CM: 428.22, 428.0  2/16/2021 Unknown        Goals of care, counseling/discussion ICD-10-CM: Z71.89  ICD-9-CM: V65.49  Unknown Unknown        DNR (do not resuscitate) discussion ICD-10-CM: Z71.89  ICD-9-CM: V65.49  Unknown Unknown        JUAN C (acute kidney injury) ([facility] ICD-10-CM: N17.9  ICD-9-CM: 362. 9  Unknown Unknown        Acute hypoxemic respiratory failure due to COVID-19 [facility]) ICD-10-CM: U07.1, J96.01  ICD-9-CM: 518.81, 079.89, 799.02  1/30/2021 Unknown                 Vital Signs:    Last 24hrs VS reviewed since prior progress note.  Most recent are:  Visit Vitals  /69   Pulse 66   Temp 98.5 °F (36.9 °C)   Resp 16   Ht 5' 7\" (1.702 m)   Wt 73.4 kg (161 lb 14.4 oz)   SpO2 96%   BMI 25.36 kg/m²         Intake/Output Summary (Last 24 hours) at 3/3/2021 6497  Last data filed at 3/3/2021 0920  Gross per 24 hour   Intake 600 ml   Output 3000 ml   Net -2400 ml        Physical Examination:     I had a face to face encounter with this patient and independently examined them on 3/3/2021 as outlined below:          Constitutional:  No acute distress, cooperative, pleasant    ENT:  Oral mucosa moist, oropharynx benign. Resp:  Decrease bronchial breath sound bilaterally. No wheezing/rhonchi/rales. No accessory muscle use   CV:  Regular rhythm, normal rate, no murmurs, gallops, rubs    GI:  Soft, non distended, non tender. normoactive bowel sounds, no hepatosplenomegaly     Musculoskeletal:  No edema     Neurologic:  Conscious and alert, oriented to place, person and time, motor UE 5/5, LE 4/5     Skin:  Posterior neck chronic wound       Data Review:    Review and/or order of clinical lab test  Review and/or order of tests in the radiology section of CPT  Review and/or order of tests in the medicine section of CPT      Labs:     Recent Labs     03/03/21 0125 03/02/21 0138   WBC 8.2 8.2   HGB 7.8* 7.4*   HCT 25.5* 24.1*    298     Recent Labs     03/03/21 0125 03/02/21 0138 03/01/21  0514    143 143   K 3.6 3.2* 3.2*    109* 108   CO2 27 26 28   BUN 18 25* 22*   CREA 3.83* 5.73* 5.23*   GLU 85 75 88   CA 7.8* 7.3* 7.7*   MG 2.0 2.0 1.8   PHOS 3.7 5.8* 5.1*     No results for input(s): ALT, AP, TBIL, TBILI, TP, ALB, GLOB, GGT, AML, LPSE in the last 72 hours. No lab exists for component: SGOT, GPT, AMYP, HLPSE  Recent Labs     03/02/21 0138   INR 1.2*   PTP 12.4*      No results for input(s): FE, TIBC, PSAT, FERR in the last 72 hours. Lab Results   Component Value Date/Time    Folate 17.5 02/23/2021 03:59 AM      No results for input(s): PH, PCO2, PO2 in the last 72 hours. No results for input(s): CPK, CKNDX, TROIQ in the last 72 hours.     No lab exists for component: CPKMB  Lab Results   Component Value Date/Time    Cholesterol, total 115 02/26/2010 05:20 PM    HDL Cholesterol 31 (L) 02/26/2010 05:20 PM    LDL, calculated 46.2 02/26/2010 05:20 PM    Triglyceride 189 02/26/2010 05:20 PM    CHOL/HDL Ratio 3.7 02/26/2010 05:20 PM     Lab Results   Component Value Date/Time    Glucose (POC) 117 (H) 03/03/2021 06:33 AM    Glucose (POC) 73 03/02/2021 09:47 PM    Glucose (POC) 215 (H) 03/02/2021 04:59 PM    Glucose (POC) 136 (H) 03/02/2021 02:09 PM    Glucose (POC) 108 (H) 03/02/2021 06:06 AM     Lab Results   Component Value Date/Time    Color YELLOW/STRAW 02/10/2021 01:26 PM    Appearance TURBID (A) 02/10/2021 01:26 PM    Specific gravity 1.014 02/10/2021 01:26 PM    Specific gravity 1.015 02/26/2010 12:32 PM    pH (UA) 5.0 02/10/2021 01:26 PM    Protein 100 (A) 02/10/2021 01:26 PM    Glucose Negative 02/10/2021 01:26 PM    Ketone Negative 02/10/2021 01:26 PM    Bilirubin Negative 02/10/2021 01:26 PM    Urobilinogen 0.2 02/10/2021 01:26 PM    Nitrites Negative 02/10/2021 01:26 PM    Leukocyte Esterase Negative 02/10/2021 01:26 PM    Epithelial cells FEW 02/10/2021 01:26 PM    Bacteria Negative 02/10/2021 01:26 PM    WBC 0-4 02/10/2021 01:26 PM    RBC  02/10/2021 01:26 PM         Medications Reviewed:     Current Facility-Administered Medications   Medication Dose Route Frequency   • amLODIPine (NORVASC) tablet 10 mg  10 mg Oral DAILY   • ascorbic acid (vitamin C) (VITAMIN C) tablet 500 mg  500 mg Oral BID   • carvediloL (COREG) tablet 25 mg  25 mg Oral Q12H   • cholecalciferol (VITAMIN D3) (1000 Units /25 mcg) tablet 2,000 Units  2,000 Units Oral DAILY   • isosorbide dinitrate (ISORDIL) tablet 30 mg  30 mg Oral Q8H   • thiamine HCL (B-1) tablet 100 mg  100 mg Oral DAILY   • heparin (porcine) 1,000 unit/mL injection 3,800 Units  3,800 Units Hemodialysis DIALYSIS PRN   • HYDROmorphone (PF) (DILAUDID) injection 1 mg  1 mg IntraVENous Q2H PRN   • insulin glargine (LANTUS) injection 10 Units  10 Units SubCUTAneous DAILY   • hydrALAZINE (APRESOLINE) tablet 25 mg  25 mg Oral TID   •  diphenhydrAMINE (BENADRYL) capsule 25 mg  25 mg Oral Q6H PRN    0.9% sodium chloride infusion 250 mL  250 mL IntraVENous PRN    albuterol-ipratropium (DUO-NEB) 2.5 MG-0.5 MG/3 ML  3 mL Nebulization Q4H PRN    insulin lispro (HUMALOG) injection   SubCUTAneous AC&HS    balsam peru-castor oiL (VENELEX) ointment   Topical Q12H    bumetanide (BUMEX) injection 2 mg  2 mg IntraVENous Q12H    0.9% sodium chloride infusion 250 mL  250 mL IntraVENous PRN    pantoprazole (PROTONIX) 40 mg in 0.9% sodium chloride 10 mL injection  40 mg IntraVENous Q12H    labetaloL (NORMODYNE;TRANDATE) injection 20 mg  20 mg IntraVENous Q4H PRN    ticagrelor (BRILINTA) tablet 90 mg  90 mg Oral BID    alteplase (CATHFLO) 1 mg in sterile water (preservative free) 1 mL injection  1 mg InterCATHeter PRN    metoprolol (LOPRESSOR) injection 5 mg  5 mg IntraVENous Q6H PRN    [Held by provider] allopurinoL (ZYLOPRIM) tablet 100 mg  100 mg Oral DAILY    sodium chloride (NS) flush 5-40 mL  5-40 mL IntraVENous Q8H    sodium chloride (NS) flush 5-40 mL  5-40 mL IntraVENous PRN    acetaminophen (TYLENOL) tablet 650 mg  650 mg Oral Q6H PRN    Or    acetaminophen (TYLENOL) suppository 650 mg  650 mg Rectal Q6H PRN    glucose chewable tablet 16 g  4 Tab Oral PRN    dextrose (D50W) injection syrg 12.5-25 g  12.5-25 g IntraVENous PRN    glucagon (GLUCAGEN) injection 1 mg  1 mg IntraMUSCular PRN    [Held by provider] aspirin chewable tablet 81 mg  81 mg Oral DAILY    atorvastatin (LIPITOR) tablet 40 mg  40 mg Oral QHS     ______________________________________________________________________  EXPECTED LENGTH OF STAY: 12d 7h  ACTUAL LENGTH OF STAY:          32                 Octavia Gaviria MD

## 2021-03-03 NOTE — PROGRESS NOTES
Problem: Mobility Impaired (Adult and Pediatric)  Goal: *Acute Goals and Plan of Care (Insert Text)  Description: FUNCTIONAL STATUS PRIOR TO ADMISSION: Patient was independent and active without use of DME.    HOME SUPPORT PRIOR TO ADMISSION: The patient lived with wife but did not require assist.    Physical Therapy Goals  Initiated with re-eval 2/26/2021  1. Patient will move from supine to sit and sit to supine  and roll side to side in bed with maximal assistance within 7 day(s). 2.  Patient will transfer from bed to chair and chair to bed with maximal assistance x 2 using the least restrictive device within 7 day(s). 3.  Patient will perform sit to stand with maximal assistance x 2 within 7 day(s). 4.  Patient will tolerate EOB x 5 min with min A in prep for functional transfers within 7 days. Physical Therapy Goals  Initiated 2/12/2021  1. Patient will move from supine to sit and sit to supine , scoot up and down, and roll side to side in bed with minimal assistance/contact guard assist within 7 day(s). 2.  Patient will transfer from bed to chair and chair to bed with minimal assistance/contact guard assist using the least restrictive device within 7 day(s). 3.  Patient will perform sit to stand with minimal assistance/contact guard assist within 7 day(s). 4.  Patient will ambulate with minimal assistance/contact guard assist for 10 feet with the least restrictive device within 7 day(s). 5.  Patient will ascend/descend 2 stairs with one handrail(s) with minimal assistance/contact guard assist within 7 day(s).       Outcome: Progressing Towards Goal    PHYSICAL THERAPY TREATMENT  Patient: Kendall Anand (29 y.o. male)  Date: 3/3/2021  Diagnosis: Acute hypoxemic respiratory failure due [facility]19 [facility] [U07.1, J96.01] <principal problem not specified>       Precautions: Fall, DNR, Aspiration, Bed Alarm  Chart, physical therapy assessment, plan of care and goals were reviewed. ASSESSMENT  Patient continues with skilled PT services and is progressing towards goals. Participated well with therapies today needing less assist with Bed mobility and sitting balance. Two standing attempts with RW but did not tolerate standing more than a few seconds d/t fatigue and dizziness. Overall progressing well this week. Current Level of Function Impacting Discharge (mobility/balance): min-mod A for bed mobility, max A x2 for standing with RW    Other factors to consider for discharge: rehab         PLAN :  Patient continues to benefit from skilled intervention to address the above impairments. Continue treatment per established plan of care. to address goals. Recommendation for discharge: (in order for the patient to meet his/her long term goals)  Therapy up to 5 days/week in SNF setting    This discharge recommendation:  Has been made in collaboration with the attending provider and/or case management    IF patient discharges home will need the following DME: rolling walker and wheelchair       SUBJECTIVE:   Patient stated I can try.     OBJECTIVE DATA SUMMARY:   Critical Behavior:  Neurologic State: Alert  Orientation Level: Oriented X4  Cognition: Poor safety awareness, Memory loss  Safety/Judgement: Decreased awareness of environment  Functional Mobility Training:  Bed Mobility:  Rolling: Minimum assistance; Additional time  Supine to Sit: Minimum assistance; Moderate assistance;Assist x2  Sit to Supine:  Moderate assistance;Assist x2           Transfers:  Sit to Stand: Maximum assistance;Assist x2  Stand to Sit: Maximum assistance;Assist x2                             Balance:  Sitting: Impaired  Sitting - Static: Fair (occasional)  Sitting - Dynamic: Fair (occasional)  Standing: Impaired  Standing - Static: Poor  Standing - Dynamic : Poor    Therapeutic Exercises:   Supine BLE:  Calf stretch by therapist [id number]  Heel slides AAROM x10  Bridges x10  Hip ER/IR legs extended x10    Pain Ratin/10 BLE    Activity Tolerance:   Fair dizziness with standing and sitting EOB, BP taken supine stable    After treatment patient left in no apparent distress:   Supine in bed and Call bell within reach    COMMUNICATION/COLLABORATION:   The patients plan of care was discussed with: Occupational therapist and Registered nurse.      Marianne Wu, PT   Time Calculation: 33 mins

## 2021-03-03 NOTE — PROGRESS NOTES
Problem: Self Care Deficits Care Plan (Adult)  Goal: *Acute Goals and Plan of Care (Insert Text)  Description:   FUNCTIONAL STATUS PRIOR TO ADMISSION: Patient was independent and active without use of DME. Patient was independent for basic and instrumental ADLs at his true baseline, however admitted from [facility] for receiving abx in prep for skin graft. HOME SUPPORT: The patient lived with his wife but did not require assist.    Occupational Therapy Goals  Weekly Re-Assessment 3/3/2021 - Two goals met, upgraded. 1.  Patient will perform self-feeding (to include container management + cutting food) with SBA & compensatory techniques PRN within 7 day(s). 2.  Patient will perform EOB grooming with set-up and CGA within 7 day(s). 3.  Patient will perform anterior neck to thigh bathing with moderate assistance within 7 day(s). 4.  Patient will perform rolling in supine toilet transfers with moderate assistance within 7 day(s). 5.  Patient will participate in upper extremity therapeutic exercise/activities with moderate assistance for 10 minutes within 7 day(s). 7.  Patient will utilize energy conservation techniques during functional activities with verbal cues within 7 day(s). Initiated 2/12/2021; Goals reviewed 2/24/2021, all goals remain appropriate. 1.  Patient will perform self-feeding with moderate assistance & compensatory techniques PRN within 7 day(s). - MET 3/3  2. Patient will perform grooming with moderate assistance within 7 day(s). - infer MET 3/3   3. Patient will perform anterior neck to thigh bathing with moderate assistance within 7 day(s). 4.  Patient will perform rolling in supine toilet transfers with moderate assistance within 7 day(s). 5.  Patient will participate in upper extremity therapeutic exercise/activities with moderate assistance for 10 minutes within 7 day(s).     7.  Patient will utilize energy conservation techniques during functional activities with verbal cues within 7 day(s). Outcome: Progressing Towards Goal                                                                                                                                                                    OCCUPATIONAL THERAPY TREATMENT/WEEKLY RE-ASSESSMENT  Patient: Aimee Gaviria (41 y.o. male)  Date: 3/3/2021  Diagnosis: Acute hypoxemic respiratory failure due to [facility]19 [facility] [U07.1, J96.01] <principal problem not specified>       Precautions: Fall, DNR, Aspiration, Bed Alarm  Chart, occupational therapy assessment, plan of care, and goals were reviewed. ASSESSMENT  Patient continues with skilled OT services and is progressing towards goals. This session, patient most limited by decreased activity tolerance and orthostatic hypotension (symptomatic), however demonstrating much improved bed mobility, UE coordination/dexterity, and sitting balance. Patient able to transfer to EOB with min-mod assist x2, then demonstrating fair sitting balance and tolerating position for extended period. Patient agreeable to two trials sit> prep for functional transfers OOB to chair / BSC, however only able to sustain briefly 2/2 reports of significant dizziness. Patient unable to tolerate standing long enough for BP reading. However, upon return to supine, patient /56 with initial resting BP of 134/56. Patient with slow, steady recovery, staying in supine for several minutes before requesting HOB elevated for drinking. Patient able to use B hands to grasp / stabilize soda can and open tab, then pouring into cup stabilized by OT. Per patient, he has been feeding himself - has struggled with cutting food and not spilling (reports R hand tremors), but overall reports much improved and has been self-feeding meals. Patient progressing very well, meeting 2/7 goals and expressing excellent motivation / engagement in therapy.      Current Level of Function Impacting Discharge (ADLs): min A    Other factors to consider for discharge:          PLAN :  Goals have been updated based on progression since last assessment. Patient continues to benefit from skilled intervention to address the above impairments. Continue to follow patient 5 times a week to address goals. Recommend with staff: Chair position in bed for meals; Encourage active participation in self-care tasks, set-up and allow patient attempt before assisting    Recommend next OT session: EOB ADLs    Recommendation for discharge: (in order for the patient to meet his/her long term goals)  Therapy up to 5 days/week in SNF setting    This discharge recommendation:  Has been made in collaboration with the attending provider and/or case management    IF patient discharges home will need the following DME: bedside commode, walker: rolling, and wheelchair       SUBJECTIVE:   Patient stated I never thought I'd be in my sixties and need to learn how to walk and eat again.     OBJECTIVE DATA SUMMARY:   Cognitive/Behavioral Status:  Neurologic State: Alert  Orientation Level: Oriented X4  Cognition: Follows commands; Appropriate for age attention/concentration  Perception: Verbal;Tactile  Perseveration: No perseveration noted  Safety/Judgement: Awareness of environment; Fall prevention    Functional Mobility and Transfers for ADLs:  Bed Mobility:  Rolling: Minimum assistance; Additional time  Supine to Sit: Minimum assistance; Moderate assistance;Assist x2  Sit to Supine:  Moderate assistance;Assist x2    Transfers:  Sit to Stand: Maximum assistance;Assist x2    Balance:  Sitting: Impaired  Sitting - Static: Fair (occasional)  Sitting - Dynamic: Fair (occasional)  Standing: Impaired  Standing - Static: Poor  Standing - Dynamic : Poor    ADL Intervention:  Feeding  Feeding Assistance: Stand-by assistance;Minimum assistance(min A for cup stabilization)  Container Management: Standing-by assistance(opening soda can)  Drink to Mouth: Supervision  Cues: Verbal cues provided    Cognitive Retraining  Safety/Judgement: Awareness of environment; Fall prevention    Therapeutic Exercises: Tolerated two trials sit>stand with max assist x2, limited by orthostatic hypotension. Tolerated sitting EOB for extended period well. Pain:  Reports in BLEs, tolerated session well. Activity Tolerance:   Fair, requires rest breaks, and signs and symptoms of orthostatic hypotension    After treatment patient left in no apparent distress:   Supine in bed, Call bell within reach, and Side rails x 3    COMMUNICATION/COLLABORATION:   The patients plan of care was discussed with: Physical therapist and Registered nurse.      Rashmi Watson OT  Time Calculation: 29 mins

## 2021-03-03 NOTE — PROGRESS NOTES
Problem: Dysphagia (Adult)  Goal: *Acute Goals and Plan of Care (Insert Text)  Description: Speech therapy goals  Initiated 2/12/2021 Discontinued  Re-initiated 2/23/2021; re-evaluated 3/1    1. Patient will participate in re-evaluation of swallow function within 7 days MET 3/1  2. Added 3/1: Patient will tolerate [facility] soft diet/thin liquids without overt s/s of aspiration within 7 days   Outcome: Resolved/Met     SPEECH LANGUAGE PATHOLOGY DYSPHAGIA TREATMENT/DISCHARGE  Patient: Mohinder Blum (89 y.o. male)  Date: 3/3/2021  Diagnosis: Acute hypoxemic respiratory failure due to COVID-19 ([facility] [U07.1, J96.01] <principal problem not specified>       Precautions:  Fall, DNR, Aspiration, Bed Alarm    ASSESSMENT:  Pt now with functional swallow without any overt difficulty nor overt s/s of aspiration. Recommend pt resume baseline diet. No further SLP needs. Will sign-off. PLAN:  --regular diet/thin liquids  --general aspiration precautions    Patient will be discharged from acute skilled speech therapy at this time. Rationale for discharge:  Goals achieved    Discharge Recommendations:  Inpatient Rehab     SUBJECTIVE:   Patient stated, \"Even my speech is getting better. OBJECTIVE:   Cognitive and Communication Status:  Neurologic State: Alert  Orientation Level: Oriented X4  Cognition: Poor safety awareness, Memory loss    Perception: Appears intact    Perseveration: No perseveration noted    Safety/Judgement: Decreased awareness of environment  Dysphagia Treatment:    P.O. Trials:  Patient Position: upright in bed  Vocal quality prior to P.O.: No impairment  Consistency Presented: Thin liquid; Solid  How Presented: Self-fed/presented;Straw     Bolus Acceptance: No impairment  Bolus Formation/Control: No impairment     Propulsion: No impairment  Oral Residue: None  Initiation of Swallow: No impairment  Laryngeal Elevation: Functional  Aspiration Signs/Symptoms: None  Pharyngeal Phase Characteristics: No impairment, issues, or problems            Oral Phase Severity: No impairment  Pharyngeal Phase Severity : No impairment    NOMS:   The NOMS functional outcome measure was used to quantify this patient's level of swallowing impairment. Based on the NOMS, the patient was determined to be at level 7 for swallow function     NOMS Swallowing Levels:  Level 1 (CN): NPO  Level 2 (CM): NPO but takes consistency in therapy  Level 3 (CL): Takes less than 50% of nutrition p.o. and continues with nonoral feedings; and/or safe with mod cues; and/or max diet restriction  Level 4 (CK): Safe swallow but needs mod cues; and/or mod diet restriction; and/or still requires some nonoral feeding/supplements  Level 5 (CJ): Safe swallow with min diet restriction; and/or needs min cues  Level 6 (CI): Independent with p.o.; rare cues; usually self cues; may need to avoid some foods or needs extra time  Level 7 ([address] [place] [address]): Independent for all p.o.  ABISAI. (2003). National Outcomes Measurement System (NOMS): Adult Speech-Language Pathology User's Guide. Pain:  Pain Scale 1: Numeric (0 - 10)  Pain Intensity 1: 9  Pain Location 1: Leg    After treatment:   Call bell within reach and Nursing notified    COMMUNICATION/EDUCATION:     The patient's plan of care including recommendations, planned interventions, and recommended diet changes were discussed with: Registered nurse.      Vahid Lopes, SLP  Time Calculation: 10 mins

## 2021-03-03 NOTE — PROGRESS NOTES
DIEGO:  1. RUR-35%  2. [address] [place] [address] accepted, HD schedule MWF 3rd shift. 3. BLS transport on discharge. CM spoke to patient this morning, she would like patient to return back to [address] [place] [address], they can accept. CM sent an updated [facility] to [facility]. They can start him on MWF 3rd shift at [facility]. The patient can get HD on Thursday and go to OP chair time on Friday per Nephrology. CM will follow.       Ken Mcghee, [facility]

## 2021-03-04 LAB
ANION GAP SERPL CALC-SCNC: 8 MMOL/L (ref 5–15)
BASOPHILS # BLD: 0 K/UL (ref 0–0.1)
BASOPHILS NFR BLD: 0 % (ref 0–1)
BUN SERPL-MCNC: 28 MG/DL (ref 6–20)
BUN/CREAT SERPL: 5 (ref 12–20)
CALCIUM SERPL-MCNC: 7.6 MG/DL (ref 8.5–10.1)
CHLORIDE SERPL-SCNC: 102 MMOL/L (ref 97–108)
CO2 SERPL-SCNC: 27 MMOL/L (ref 21–32)
CREAT SERPL-MCNC: 5.19 MG/DL (ref 0.7–1.3)
DIFFERENTIAL METHOD BLD: ABNORMAL
EOSINOPHIL # BLD: 0.3 K/UL (ref 0–0.4)
EOSINOPHIL NFR BLD: 3 % (ref 0–7)
ERYTHROCYTE [DISTWIDTH] IN BLOOD BY AUTOMATED COUNT: 16.3 % (ref 11.5–14.5)
GLUCOSE BLD STRIP.AUTO-MCNC: 105 MG/DL (ref 65–100)
GLUCOSE BLD STRIP.AUTO-MCNC: 109 MG/DL (ref 65–100)
GLUCOSE BLD STRIP.AUTO-MCNC: 141 MG/DL (ref 65–100)
GLUCOSE BLD STRIP.AUTO-MCNC: 162 MG/DL (ref 65–100)
GLUCOSE SERPL-MCNC: 109 MG/DL (ref 65–100)
HCT VFR BLD AUTO: 24 % (ref 36.6–50.3)
HGB BLD-MCNC: 7.6 G/DL (ref 12.1–17)
IMM GRANULOCYTES # BLD AUTO: 0.1 K/UL (ref 0–0.04)
IMM GRANULOCYTES NFR BLD AUTO: 1 % (ref 0–0.5)
LYMPHOCYTES # BLD: 2.2 K/UL (ref 0.8–3.5)
LYMPHOCYTES NFR BLD: 23 % (ref 12–49)
MAGNESIUM SERPL-MCNC: 1.7 MG/DL (ref 1.6–2.4)
MCH RBC QN AUTO: 26.2 PG (ref 26–34)
MCHC RBC AUTO-ENTMCNC: 31.7 G/DL (ref 30–36.5)
MCV RBC AUTO: 82.8 FL (ref 80–99)
MONOCYTES # BLD: 0.9 K/UL (ref 0–1)
MONOCYTES NFR BLD: 10 % (ref 5–13)
NEUTS SEG # BLD: 5.8 K/UL (ref 1.8–8)
NEUTS SEG NFR BLD: 63 % (ref 32–75)
NRBC # BLD: 0 K/UL (ref 0–0.01)
NRBC BLD-RTO: 0 PER 100 WBC
PHOSPHATE SERPL-MCNC: 5.5 MG/DL (ref 2.6–4.7)
PLATELET # BLD AUTO: 286 K/UL (ref 150–400)
PMV BLD AUTO: 11.2 FL (ref 8.9–12.9)
POTASSIUM SERPL-SCNC: 3.7 MMOL/L (ref 3.5–5.1)
RBC # BLD AUTO: 2.9 M/UL (ref 4.1–5.7)
SERVICE CMNT-IMP: ABNORMAL
SODIUM SERPL-SCNC: 137 MMOL/L (ref 136–145)
WBC # BLD AUTO: 9.2 K/UL (ref 4.1–11.1)

## 2021-03-04 PROCEDURE — P9047 ALBUMIN (HUMAN), 25%, 50ML: HCPCS | Performed by: INTERNAL MEDICINE

## 2021-03-04 PROCEDURE — 80048 BASIC METABOLIC PNL TOTAL CA: CPT

## 2021-03-04 PROCEDURE — 83735 ASSAY OF MAGNESIUM: CPT

## 2021-03-04 PROCEDURE — 36415 COLL VENOUS BLD VENIPUNCTURE: CPT

## 2021-03-04 PROCEDURE — C9113 INJ PANTOPRAZOLE SODIUM, VIA: HCPCS | Performed by: HOSPITALIST

## 2021-03-04 PROCEDURE — 74011000250 HC RX REV CODE- 250

## 2021-03-04 PROCEDURE — 84100 ASSAY OF PHOSPHORUS: CPT

## 2021-03-04 PROCEDURE — 65270000032 HC RM SEMIPRIVATE

## 2021-03-04 PROCEDURE — 74011250636 HC RX REV CODE- 250/636: Performed by: INTERNAL MEDICINE

## 2021-03-04 PROCEDURE — 74011250637 HC RX REV CODE- 250/637: Performed by: INTERNAL MEDICINE

## 2021-03-04 PROCEDURE — 74011250637 HC RX REV CODE- 250/637: Performed by: HOSPITALIST

## 2021-03-04 PROCEDURE — 82962 GLUCOSE BLOOD TEST: CPT

## 2021-03-04 PROCEDURE — 74011000250 HC RX REV CODE- 250: Performed by: HOSPITALIST

## 2021-03-04 PROCEDURE — 74011250637 HC RX REV CODE- 250/637: Performed by: NURSE PRACTITIONER

## 2021-03-04 PROCEDURE — 74011250636 HC RX REV CODE- 250/636: Performed by: HOSPITALIST

## 2021-03-04 PROCEDURE — 74011636637 HC RX REV CODE- 636/637: Performed by: INTERNAL MEDICINE

## 2021-03-04 PROCEDURE — 74011000250 HC RX REV CODE- 250: Performed by: INTERNAL MEDICINE

## 2021-03-04 PROCEDURE — 74011250637 HC RX REV CODE- 250/637: Performed by: EMERGENCY MEDICINE

## 2021-03-04 PROCEDURE — 85025 COMPLETE CBC W/AUTO DIFF WBC: CPT

## 2021-03-04 RX ORDER — HYDRALAZINE HYDROCHLORIDE 25 MG/1
25 TABLET, FILM COATED ORAL 3 TIMES DAILY
Qty: 90 TAB | Refills: 0 | Status: SHIPPED | OUTPATIENT
Start: 2021-03-04

## 2021-03-04 RX ORDER — ISOSORBIDE DINITRATE 30 MG/1
30 TABLET ORAL EVERY 8 HOURS
Qty: 30 TAB | Refills: 0 | Status: SHIPPED | OUTPATIENT
Start: 2021-03-04 | End: 2022-04-13

## 2021-03-04 RX ORDER — GABAPENTIN 400 MG/1
400 CAPSULE ORAL 3 TIMES DAILY
Status: DISCONTINUED | OUTPATIENT
Start: 2021-03-04 | End: 2021-03-04

## 2021-03-04 RX ORDER — ALBUMIN HUMAN 250 G/1000ML
12.5 SOLUTION INTRAVENOUS
Status: DISCONTINUED | OUTPATIENT
Start: 2021-03-04 | End: 2021-03-05 | Stop reason: HOSPADM

## 2021-03-04 RX ORDER — BACITRACIN 500 UNIT/G
PACKET (EA) TOPICAL
Status: COMPLETED
Start: 2021-03-04 | End: 2021-03-04

## 2021-03-04 RX ORDER — LANOLIN ALCOHOL/MO/W.PET/CERES
100 CREAM (GRAM) TOPICAL DAILY
Qty: 5 TAB | Refills: 0 | Status: SHIPPED | OUTPATIENT
Start: 2021-03-05 | End: 2022-08-09

## 2021-03-04 RX ORDER — ATORVASTATIN CALCIUM 80 MG/1
40 TABLET, FILM COATED ORAL DAILY
Qty: 30 TAB | Refills: 0 | Status: SHIPPED | OUTPATIENT
Start: 2021-03-04

## 2021-03-04 RX ORDER — AMLODIPINE BESYLATE 10 MG/1
10 TABLET ORAL DAILY
Qty: 30 TAB | Refills: 0 | Status: SHIPPED | OUTPATIENT
Start: 2021-03-05

## 2021-03-04 RX ORDER — CARVEDILOL 12.5 MG/1
25 TABLET ORAL 2 TIMES DAILY
Qty: 60 TAB | Refills: 0 | Status: SHIPPED | OUTPATIENT
Start: 2021-03-04

## 2021-03-04 RX ORDER — LIDOCAINE 4 G/100G
3 PATCH TOPICAL DAILY PRN
Status: DISCONTINUED | OUTPATIENT
Start: 2021-03-04 | End: 2021-03-05 | Stop reason: HOSPADM

## 2021-03-04 RX ORDER — OXYCODONE HYDROCHLORIDE 5 MG/1
5 TABLET ORAL
Status: DISCONTINUED | OUTPATIENT
Start: 2021-03-04 | End: 2021-03-04

## 2021-03-04 RX ORDER — GABAPENTIN 400 MG/1
400 CAPSULE ORAL 3 TIMES DAILY
Status: DISCONTINUED | OUTPATIENT
Start: 2021-03-04 | End: 2021-03-05 | Stop reason: HOSPADM

## 2021-03-04 RX ORDER — BALSAM PERU/CASTOR OIL
OINTMENT (GRAM) TOPICAL EVERY 12 HOURS
Qty: 1 TUBE | Refills: 0 | Status: SHIPPED | OUTPATIENT
Start: 2021-03-04 | End: 2022-08-09

## 2021-03-04 RX ORDER — BUMETANIDE 2 MG/1
2 TABLET ORAL DAILY
Qty: 60 TAB | Refills: 0 | Status: SHIPPED | OUTPATIENT
Start: 2021-03-04 | End: 2022-04-13

## 2021-03-04 RX ADMIN — INSULIN LISPRO 3 UNITS: 100 INJECTION, SOLUTION INTRAVENOUS; SUBCUTANEOUS at 16:52

## 2021-03-04 RX ADMIN — HEPARIN SODIUM 1900 UNITS: 1000 INJECTION INTRAVENOUS; SUBCUTANEOUS at 12:40

## 2021-03-04 RX ADMIN — HYDROMORPHONE HYDROCHLORIDE 1 MG: 2 INJECTION INTRAMUSCULAR; INTRAVENOUS; SUBCUTANEOUS at 00:19

## 2021-03-04 RX ADMIN — BUMETANIDE 2 MG: 0.25 INJECTION INTRAMUSCULAR; INTRAVENOUS at 13:47

## 2021-03-04 RX ADMIN — ISOSORBIDE DINITRATE 30 MG: 10 TABLET ORAL at 21:06

## 2021-03-04 RX ADMIN — ALBUMIN (HUMAN) 12.5 G: 0.25 INJECTION, SOLUTION INTRAVENOUS at 10:15

## 2021-03-04 RX ADMIN — HYDROMORPHONE HYDROCHLORIDE 1 MG: 2 INJECTION INTRAMUSCULAR; INTRAVENOUS; SUBCUTANEOUS at 02:37

## 2021-03-04 RX ADMIN — SODIUM CHLORIDE 40 MG: 9 INJECTION INTRAMUSCULAR; INTRAVENOUS; SUBCUTANEOUS at 06:14

## 2021-03-04 RX ADMIN — HYDROMORPHONE HYDROCHLORIDE 1 MG: 2 INJECTION INTRAMUSCULAR; INTRAVENOUS; SUBCUTANEOUS at 06:15

## 2021-03-04 RX ADMIN — CASTOR OIL AND BALSAM, PERU: 788; 87 OINTMENT TOPICAL at 21:07

## 2021-03-04 RX ADMIN — CARVEDILOL 25 MG: 12.5 TABLET, FILM COATED ORAL at 21:07

## 2021-03-04 RX ADMIN — SODIUM CHLORIDE 40 MG: 9 INJECTION INTRAMUSCULAR; INTRAVENOUS; SUBCUTANEOUS at 16:52

## 2021-03-04 RX ADMIN — Medication 100 MG: at 13:53

## 2021-03-04 RX ADMIN — CARVEDILOL 25 MG: 12.5 TABLET, FILM COATED ORAL at 13:53

## 2021-03-04 RX ADMIN — Medication 10 ML: at 16:56

## 2021-03-04 RX ADMIN — TICAGRELOR 90 MG: 90 TABLET ORAL at 13:53

## 2021-03-04 RX ADMIN — BACITRACIN: 500 OINTMENT TOPICAL at 18:00

## 2021-03-04 RX ADMIN — CHOLECALCIFEROL (VITAMIN D3) 25 MCG (1,000 UNIT) TABLET 2000 UNITS: TABLET at 13:53

## 2021-03-04 RX ADMIN — ATORVASTATIN CALCIUM 40 MG: 40 TABLET, FILM COATED ORAL at 21:06

## 2021-03-04 RX ADMIN — ISOSORBIDE DINITRATE 30 MG: 10 TABLET ORAL at 06:15

## 2021-03-04 RX ADMIN — INSULIN GLARGINE 10 UNITS: 100 INJECTION, SOLUTION SUBCUTANEOUS at 08:49

## 2021-03-04 RX ADMIN — HYDROMORPHONE HYDROCHLORIDE 1 MG: 2 INJECTION INTRAMUSCULAR; INTRAVENOUS; SUBCUTANEOUS at 08:55

## 2021-03-04 RX ADMIN — EPOETIN ALFA-EPBX 20000 UNITS: 10000 INJECTION, SOLUTION INTRAVENOUS; SUBCUTANEOUS at 21:18

## 2021-03-04 RX ADMIN — CASTOR OIL AND BALSAM, PERU: 788; 87 OINTMENT TOPICAL at 13:56

## 2021-03-04 RX ADMIN — OXYCODONE HYDROCHLORIDE AND ACETAMINOPHEN 500 MG: 500 TABLET ORAL at 13:53

## 2021-03-04 RX ADMIN — HYDRALAZINE HYDROCHLORIDE 25 MG: 25 TABLET, FILM COATED ORAL at 21:07

## 2021-03-04 RX ADMIN — ISOSORBIDE DINITRATE 30 MG: 10 TABLET ORAL at 13:53

## 2021-03-04 RX ADMIN — AMLODIPINE BESYLATE 10 MG: 5 TABLET ORAL at 13:53

## 2021-03-04 RX ADMIN — HYDRALAZINE HYDROCHLORIDE 25 MG: 25 TABLET, FILM COATED ORAL at 13:53

## 2021-03-04 RX ADMIN — GABAPENTIN 400 MG: 400 CAPSULE ORAL at 21:06

## 2021-03-04 RX ADMIN — TICAGRELOR 90 MG: 90 TABLET ORAL at 21:09

## 2021-03-04 RX ADMIN — HYDROMORPHONE HYDROCHLORIDE 1 MG: 2 INJECTION INTRAMUSCULAR; INTRAVENOUS; SUBCUTANEOUS at 13:47

## 2021-03-04 RX ADMIN — HYDROMORPHONE HYDROCHLORIDE 1 MG: 2 INJECTION INTRAMUSCULAR; INTRAVENOUS; SUBCUTANEOUS at 16:55

## 2021-03-04 RX ADMIN — Medication 10 ML: at 06:16

## 2021-03-04 NOTE — PROGRESS NOTES
[facility]  Hospitalist Group                                                                                          Hospitalist Progress Note  Almon Gosselin, NP  Answering service: [phone] OR [phone] from in house phone        Date of Service:  3/4/2021  NAME:  Florencio Aase  :  1955  MRN:  [id number]      Admission Summary:     A 62-year-old man who presented from [facility] on  with acute onset shortness of breath, hypoxia, nausea, vomiting and diarrhea-shortness of breath acutely got worse after one of the vomiting episodes. Diagnosed with Covid about 2 weeks prior to presentation.  Patient placed on BiPAP on arrival-felt much better.  After getting fluids for resuscitation/elevated lactate level patient acutely decompensated-developed lethargy and drop in oxygen levels-intubated emergently requiring moderate to high vent support. His respiratory status improved but his renal function was declining suspecting acute kidney injury on top of chronic kidney disease, it was also difficult to control his agitation on the ventilator. However patient condition improved and he was successfully extubated on . Patient continued to do well and he was transferred to the intermediate care unit on . Over the last few days patient oxygen requirement was increasing and he is becoming more lethargic. This morning he was placed on high flow nasal cannula and later on on BiPAP. On BiPAP he seems to be much better with decreased work of breathing and good tidal volume and oxygenation. Reviewing his chest x-ray seems that he has progressive volume overload and perhaps a component of aspiration. He was giving 1 dose of Lasix with good urine output and ICU consulted.   Of note this patient been with positive fluid balance progressively since admission, according to documentation since admission he is about 20 L positive, over the last 3 days he is positive about 5 L.  He has good urine output despite worsening BUN and creatinine. No fever, no hemoptysis no nausea no vomiting. Intubated on 1/31 and extubated 2/11,   Transferred out of ICU on 2/13  Readmitted to ICU 2/15, placed on BiPAP, transferred out of the ICU on 2/19      Interval history / Subjective:     Patient denies any complaints. Swallowing back to normal and tolerating regular diet. He states he does not want to go to rehab but wants to stay here to continue rehab. Discussed that we do not offer IP rehab and the purpose of [facility] is to continue therapy. Multiple questions/concerns regarding med rec and needed follow ups including if patient is still on Methadone at [facility] (hx heroin abuse). Discussed with wife who thinks he is not still on methadone. Hold discharge until tomorrow so clarifications can be obtained for safe discharge. Assessment & Plan:     Acute hypoxic respiratory failure secondary to COVID 19 pneumonia  Mycoplasma and S Pneumonia and possible aspiration pneumonia with sepsis POA  -Resolved  -intubated on 1/31 and extubated 2/11, transferred out of ICU on 2/13  -readmitted to ICU on 2/15, placed on BiPAP, transferred out of the ICU on 2/19   -completed iv doxycycline for mycoplasma on 2/12  -iv cefepime 1/30-2/12, flagyl 1/30-2/4 and iv vancomycin 1/30-2/9  -Received vanco and merrem  -COVID 19 test on 1/30 and 2/12 was positive,   -repeated COVID 19 test on 2/19 negative  -off antibiotics now  -received IV decadron   -blood cx no growth  -afebrile, no leukocytosis  -chest x ray on 2/19 improved expansion and aeration of the lungs.  Continued interstitial prominence likely due to the patient's history of viral pneumonia  -SpO2 % on RA  -improved and stable    ARF: with volume over load  -Stable/improving, OP HD set up for MWF  -perm cath placed on 3/2  -HD started on 2/23  -Appreciate nephrology input    Acute metabolic encephalopathy  -Resolved  -A&Ox4 w/ periods of confusion    Acute on chronic systolic HF: NYHA Class IV   -Stable  -LV EF 20-25%  -continue on coreg, isordil, hydralazine, bumex, HD  -monitor I/O and daily weight  -appreciate cardiology input    HTN  -Stable  -continue on norvasc, coreg, hydralazine and isordil  -monitor BP    Anemia of chronic disease   -Stable hgb  -transfused on 2/14 one unit for Hgb of 6.4, also one unit of pRBC on 2/22 for Hgb of 6.7  -no sign and symptoms of active bleeding  -monitor H/H, to transfused for Hgb <7.0    T2DM: A1c 7.4  -BS stable  -Cont lantus 10 units, monitor finger stick glucose on sliding sclae    Neuropathy  -Resume home gabapentin  -Cont tylenol; start PRN lidocaine patches    Hx Heroin Abuse  -?still on methadone at [facility]  -Stop IV dilaudid; avoid narcotics    Hx of CAD s/p stent  -Stable, no chest pain  -continue aspirin, ticagrelor, lipitor, coreg, and isordil    Elevated LFTs   -Resolved    Dysphagia   -Resolved; normal diet    Mild posterior cervical wound infection   -improving  -completed abx  -appreciate wound nurse input    Debility  -Improving  -cont PT/OT          Code status: DNR  DVT prophylaxis: SCD    Care Plan discussed with: Patient/Family and Nurse  [place] [facility]  [place]ticipated Discharge: To [facility] Problems  Never Reviewed          Codes Class Noted POA    Dysphagia, unspecified type ICD-10-CM: R13.10  ICD-9-CM: 787.20  Unknown Unknown        Fatigue, unspecified type ICD-10-CM: R53.83  ICD-9-CM: 780.79  Unknown Unknown        Chronic systolic congestive heart failure ([facility] ICD-10-CM: I50.22  ICD-9-CM: 428.22, 428.0  2/16/2021 Unknown        Goals of care, counseling/discussion ICD-10-CM: Z71.89  ICD-9-CM: V65.49  Unknown Unknown        DNR (do not resuscitate) discussion ICD-10-CM: Z71.89  ICD-9-CM: V65.49  Unknown Unknown        JUAN C (acute kidney injury) ([facility] ICD-10-CM: N17.9  ICD-9-CM: 776. 9  Unknown Unknown        Acute hypoxemic respiratory failure due to COVID-19 [facility]) ICD-10-CM: U07.1, J96.01  ICD-9-CM: 518.81, 079.89, 799.02  1/30/2021 Unknown                 Vital Signs:    Last 24hrs VS reviewed since prior progress note. Most recent are:  Visit Vitals  BP (!) 167/73 (BP 1 Location: Left upper arm, BP Patient Position: At rest)   Pulse 79   Temp 98.9 °F (37.2 °C)   Resp 18   Ht 5' 7\" (1.702 m)   Wt 74.1 kg (163 lb 4.8 oz)   SpO2 97%   BMI 25.58 kg/m²         Intake/Output Summary (Last 24 hours) at 3/4/2021 1848  Last data filed at 3/4/2021 1349  Gross per 24 hour   Intake 360 ml   Output 100 ml   Net 260 ml        Physical Examination:     I had a face to face encounter with this patient and independently examined them on 3/4/2021 as outlined below:          Constitutional:  No acute distress, cooperative, pleasant    ENT:  Oral mucosa moist, oropharynx benign. Resp:  Decrease bronchial breath sound bilaterally. No wheezing/rhonchi/rales. No accessory muscle use   CV:  Regular rhythm, normal rate, no murmurs, gallops, rubs    GI:  Soft, non distended, non tender. normoactive bowel sounds, no hepatosplenomegaly     Musculoskeletal:  No edema     Neurologic:  Conscious and alert, oriented to place, person and time, motor UE 5/5, LE 4/5     Skin:  Posterior neck chronic wound - improving       Data Review:    Review and/or order of clinical lab test  Review and/or order of tests in the radiology section of CPT  Review and/or order of tests in the medicine section of CPT      Labs:     Recent Labs     03/04/21  0250 03/03/21  0125   WBC 9.2 8.2   HGB 7.6* 7.8*   HCT 24.0* 25.5*    249     Recent Labs     03/04/21  0250 03/03/21  0125 03/02/21  0138    139 143   K 3.7 3.6 3.2*    106 109*   CO2 27 27 26   BUN 28* 18 25*   CREA 5.19* 3.83* 5.73*   * 85 75   CA 7.6* 7.8* 7.3*   MG 1.7 2.0 2.0   PHOS 5.5* 3.7 5.8*     No results for input(s): ALT, AP, TBIL, TBILI, TP, ALB, GLOB, GGT, AML, LPSE in the last 72 hours.     No lab exists for component: SGOT, GPT, AMYP, HLPSE  Recent Labs     03/02/21  0138   INR 1.2*   PTP 12.4*      No results for input(s): FE, TIBC, PSAT, FERR in the last 72 hours. Lab Results   Component Value Date/Time    Folate 17.5 02/23/2021 03:59 AM      No results for input(s): PH, PCO2, PO2 in the last 72 hours. No results for input(s): CPK, CKNDX, TROIQ in the last 72 hours.     No lab exists for component: CPKMB  Lab Results   Component Value Date/Time    Cholesterol, total 115 02/26/2010 05:20 PM    HDL Cholesterol 31 (L) 02/26/2010 05:20 PM    LDL, calculated 46.2 02/26/2010 05:20 PM    Triglyceride 189 02/26/2010 05:20 PM    CHOL/HDL Ratio 3.7 02/26/2010 05:20 PM     Lab Results   Component Value Date/Time    Glucose (POC) 162 (H) 03/04/2021 04:46 PM    Glucose (POC) 105 (H) 03/04/2021 11:36 AM    Glucose (POC) 109 (H) 03/04/2021 06:15 AM    Glucose (POC) 117 (H) 03/03/2021 09:35 PM    Glucose (POC) 142 (H) 03/03/2021 04:46 PM     Lab Results   Component Value Date/Time    Color YELLOW/STRAW 02/10/2021 01:26 PM    Appearance TURBID (A) 02/10/2021 01:26 PM    Specific gravity 1.014 02/10/2021 01:26 PM    Specific gravity 1.015 02/26/2010 12:32 PM    pH (UA) 5.0 02/10/2021 01:26 PM    Protein 100 (A) 02/10/2021 01:26 PM    Glucose Negative 02/10/2021 01:26 PM    Ketone Negative 02/10/2021 01:26 PM    Bilirubin Negative 02/10/2021 01:26 PM    Urobilinogen 0.2 02/10/2021 01:26 PM    Nitrites Negative 02/10/2021 01:26 PM    Leukocyte Esterase Negative 02/10/2021 01:26 PM    Epithelial cells FEW 02/10/2021 01:26 PM    Bacteria Negative 02/10/2021 01:26 PM    WBC 0-4 02/10/2021 01:26 PM    RBC  02/10/2021 01:26 PM         Medications Reviewed:     Current Facility-Administered Medications   Medication Dose Route Frequency    albumin human 25% (BUMINATE) solution 12.5 g  12.5 g IntraVENous DIALYSIS PRN    heparin (porcine) 1,000 unit/mL injection 1,900 Units  1,900 Units InterCATHeter DIALYSIS PRN    And    heparin (porcine) 1,000 unit/mL injection 1,900 Units  1,900 Units InterCATHeter DIALYSIS PRN    amLODIPine (NORVASC) tablet 10 mg  10 mg Oral DAILY    ascorbic acid (vitamin C) (VITAMIN C) tablet 500 mg  500 mg Oral BID    carvediloL (COREG) tablet 25 mg  25 mg Oral Q12H    cholecalciferol (VITAMIN D3) (1000 Units /25 mcg) tablet 2,000 Units  2,000 Units Oral DAILY    isosorbide dinitrate (ISORDIL) tablet 30 mg  30 mg Oral Q8H    epoetin luis-epbx (RETACRIT) injection 20,000 Units  20,000 Units SubCUTAneous DIALYSIS TUE, THU & SAT    thiamine HCL (B-1) tablet 100 mg  100 mg Oral DAILY    insulin glargine (LANTUS) injection 10 Units  10 Units SubCUTAneous DAILY    hydrALAZINE (APRESOLINE) tablet 25 mg  25 mg Oral TID    diphenhydrAMINE (BENADRYL) capsule 25 mg  25 mg Oral Q6H PRN    0.9% sodium chloride infusion 250 mL  250 mL IntraVENous PRN    albuterol-ipratropium (DUO-NEB) 2.5 MG-0.5 MG/3 ML  3 mL Nebulization Q4H PRN    insulin lispro (HUMALOG) injection   SubCUTAneous AC&HS    balsam peru-castor oiL (VENELEX) ointment   Topical Q12H    bumetanide (BUMEX) injection 2 mg  2 mg IntraVENous Q12H    0.9% sodium chloride infusion 250 mL  250 mL IntraVENous PRN    pantoprazole (PROTONIX) 40 mg in 0.9% sodium chloride 10 mL injection  40 mg IntraVENous Q12H    ticagrelor (BRILINTA) tablet 90 mg  90 mg Oral BID    alteplase (CATHFLO) 1 mg in sterile water (preservative free) 1 mL injection  1 mg InterCATHeter PRN    allopurinoL (ZYLOPRIM) tablet 100 mg  100 mg Oral DAILY    sodium chloride (NS) flush 5-40 mL  5-40 mL IntraVENous Q8H    sodium chloride (NS) flush 5-40 mL  5-40 mL IntraVENous PRN    acetaminophen (TYLENOL) tablet 650 mg  650 mg Oral Q6H PRN    Or    acetaminophen (TYLENOL) suppository 650 mg  650 mg Rectal Q6H PRN    glucose chewable tablet 16 g  4 Tab Oral PRN    dextrose (D50W) injection syrg 12.5-25 g  12.5-25 g IntraVENous PRN    glucagon (GLUCAGEN) injection 1 mg  1 mg IntraMUSCular PRN    aspirin chewable tablet 81 mg  81 mg Oral DAILY    atorvastatin (LIPITOR) tablet 40 mg  40 mg Oral QHS     ______________________________________________________________________  EXPECTED LENGTH OF STAY: 12d 7h  ACTUAL LENGTH OF STAY:          1360 Juanito Bianchi, NP

## 2021-03-04 NOTE — PROGRESS NOTES
Nephrology Progress Note  Candida Cordova  Date of Admission : 1/30/2021    CC: Follow up for JUAN C on CKD       Assessment and Plan     JUAN C:  - 2/2 ATN + COVID-19 infection  - unclear baseline but suspect some component of CKD  - Renal U/S on 1/30 shows CKD  - PC placed 3/2  - HD today then d/c  - to start outpt HD tomorrow at [facility]    Volume overload:  - improving w/ HD    Encephalopathy    Anemia of CKD:  - BRYANNA TTS    HFrEF:  - EF 20-25% on 2/1     COVID-19 PNA:  - now COVID neg on 2/19     DM2:  - on insulin       Interval History:  Seen and examined on dialysis. BP dropped. UF off now. Volume much better. Pt feeling better. No cp, sob, n/v/d reported. Current Medications: all current  Medications have been eviewed in EPIC  Review of Systems: Review of systems not obtained due to patient factors. Objective:  Vitals:    Vitals:    03/04/21 1010 03/04/21 1015 03/04/21 1030 03/04/21 1045   BP: (!) 100/56 (!) 106/53 (!) 125/59 127/68   Pulse: 60 64 62 60   Resp: 16 16 16 16   Temp:  97.9 °F (36.6 °C)     TempSrc:  Oral     SpO2:  94%     Weight:       Height:         Intake and Output:  03/04 0701 - 03/04 1900  In: 120 [P.O.:120]  Out: -   03/02 1901 - 03/04 0700  In: 480 [P.O.:480]  Out: 350 [Urine:350]    Physical Examination:  Pt intubated    No  General: Awake, alert  Neck:  Supple, no mass  Resp:  Lungs Clear anteriorly  CV:  RRR,  no murmur or rub, 2 + LE edema  GI:  Soft, NT, + Bowel sounds, no hepatosplenomegaly  Neurologic:  Awake, alert  Skin:  No Rash  Access:           RIJ PC    []    High complexity decision making was performed  []    Patient is at high-risk of decompensation with multiple organ involvement    Lab Data Personally Reviewed: I have reviewed all the pertinent labs, microbiology data and radiology studies during assessment.     Recent Labs     03/04/21  0250 03/03/21  0125 03/02/21  0138    139 143   K 3.7 3.6 3.2*    106 109*   CO2 27 27 26   * 85 75 BUN 28* 18 25*   CREA 5.19* 3.83* 5.73*   CA 7.6* 7.8* 7.3*   MG 1.7 2.0 2.0   PHOS 5.5* 3.7 5.8*   INR  --   --  1.2*     Recent Labs     03/04/21  0250 03/03/21  0125 03/02/21  0138   WBC 9.2 8.2 8.2   HGB 7.6* 7.8* 7.4*   HCT 24.0* 25.5* 24.1*    249 298     Lab Results   Component Value Date/Time    Specimen Description: BLOOD 02/27/2010 07:00 PM     Lab Results   Component Value Date/Time    Culture result: NO GROWTH 5 DAYS 02/15/2021 10:58 AM    Culture result: LIGHT YEAST, (APPARENT CANDIDA ALBICANS) (A) 02/12/2021 04:53 PM    Culture result: LIGHT NORMAL RESPIRATORY CORI 02/12/2021 04:53 PM     Recent Results (from the past 24 hour(s))   GLUCOSE, POC    Collection Time: 03/03/21 11:29 AM   Result Value Ref Range    Glucose (POC) 142 (H) 65 - 100 mg/dL    Performed by Rylee Hutchinson    GLUCOSE, POC    Collection Time: 03/03/21  4:46 PM   Result Value Ref Range    Glucose (POC) 142 (H) 65 - 100 mg/dL    Performed by Alla Kim    GLUCOSE, POC    Collection Time: 03/03/21  9:35 PM   Result Value Ref Range    Glucose (POC) 117 (H) 65 - 100 mg/dL    Performed by Snyder Montana  PCT    MAGNESIUM    Collection Time: 03/04/21  2:50 AM   Result Value Ref Range    Magnesium 1.7 1.6 - 2.4 mg/dL   PHOSPHORUS    Collection Time: 03/04/21  2:50 AM   Result Value Ref Range    Phosphorus 5.5 (H) 2.6 - 4.7 MG/DL   CBC WITH AUTOMATED DIFF    Collection Time: 03/04/21  2:50 AM   Result Value Ref Range    WBC 9.2 4.1 - 11.1 K/uL    RBC 2.90 (L) 4.10 - 5.70 M/uL    HGB 7.6 (L) 12.1 - 17.0 g/dL    HCT 24.0 (L) 36.6 - 50.3 %    MCV 82.8 80.0 - 99.0 FL    MCH 26.2 26.0 - 34.0 PG    MCHC 31.7 30.0 - 36.5 g/dL    RDW 16.3 (H) 11.5 - 14.5 %    PLATELET 586 687 - 009 K/uL    MPV 11.2 8.9 - 12.9 FL    NRBC 0.0 0  WBC    ABSOLUTE NRBC 0.00 0.00 - 0.01 K/uL    NEUTROPHILS 63 32 - 75 %    LYMPHOCYTES 23 12 - 49 %    MONOCYTES 10 5 - 13 %    EOSINOPHILS 3 0 - 7 %    BASOPHILS 0 0 - 1 %    IMMATURE GRANULOCYTES 1 (H) 0.0 - 0.5 %    ABS. NEUTROPHILS 5.8 1.8 - 8.0 K/UL    ABS. LYMPHOCYTES 2.2 0.8 - 3.5 K/UL    ABS. MONOCYTES 0.9 0.0 - 1.0 K/UL    ABS. EOSINOPHILS 0.3 0.0 - 0.4 K/UL    ABS. BASOPHILS 0.0 0.0 - 0.1 K/UL    ABS. IMM. GRANS. 0.1 (H) 0.00 - 0.04 K/UL    DF AUTOMATED     METABOLIC PANEL, BASIC    Collection Time: 03/04/21  2:50 AM   Result Value Ref Range    Sodium 137 136 - 145 mmol/L    Potassium 3.7 3.5 - 5.1 mmol/L    Chloride 102 97 - 108 mmol/L    CO2 27 21 - 32 mmol/L    Anion gap 8 5 - 15 mmol/L    Glucose 109 (H) 65 - 100 mg/dL    BUN 28 (H) 6 - 20 MG/DL    Creatinine 5.19 (H) 0.70 - 1.30 MG/DL    BUN/Creatinine ratio 5 (L) 12 - 20      GFR est AA 14 (L) >60 ml/min/1.73m2    GFR est non-AA 11 (L) >60 ml/min/1.73m2    Calcium 7.6 (L) 8.5 - 10.1 MG/DL   GLUCOSE, POC    Collection Time: 03/04/21  6:15 AM   Result Value Ref Range    Glucose (POC) 109 (H) 65 - 100 mg/dL    Performed by Gregoria Downing  PCT              Dontrell Staff, MD  [address] [place] [address]  Phone - [phone]   Fax - [phone]  [email or web address]

## 2021-03-04 NOTE — DIALYSIS
[facility]  [phone]    Vitals   Pre   Post   Assessment   Pre   Post     Temp  Temp: 98.5 °F (36.9 °C) (03/04/21 0815)  98.9 LOC  Alert, answering questions appropriately  Alert    HR   Pulse (Heart Rate): 65 (03/04/21 0930) 76 Lungs   Clear to auscultation, unlabored even  unlabored, even   B/P   BP: 114/60 (03/04/21 0930) 153/76 Cardiac   Regular   regular   Resp   Resp Rate: 16 (03/04/21 0930) 18 Skin   Warm and dry   warm and dry    Pain level  Pain Intensity 1: 7 (03/04/21 6398) No verbal complaints Edema  ? Abdominal otherwise no edema noted      abdominal   Orders:    Duration:   Start:   0930 End:   1300 Total:   3.5   Dialyzer:   Dialyzer/Set Up Inspection: Layla Cogan (03/04/21 0930)   K Bath:   Dialysate K (mEq/L): 3 (03/04/21 0930)   Ca Bath:   Dialysate CA (mEq/L): 2.5 (03/04/21 0930)   Na/Bicarb:   Dialysate NA (mEq/L): 140 (03/04/21 0930)   Target Fluid Removal:   Goal/Amount of Fluid to Remove (mL): 1500 mL (03/04/21 0930)   Access     Type & Location:   Right tunneled CVC, dressing changed per policy and procedure dated 03/04/21, Each catheter limb disinfected per p&p, caps removed, hubs disinfected per p&p, aspirated 5 ml each limb, flushed easily.        Labs     Obtained/Reviewed   Critical Results Called   Date when labs were drawn-  Hgb-    HGB   Date Value Ref Range Status   03/04/2021 7.6 (L) 12.1 - 17.0 g/dL Final     K-    Potassium   Date Value Ref Range Status   03/04/2021 3.7 3.5 - 5.1 mmol/L Final     Ca-   Calcium   Date Value Ref Range Status   03/04/2021 7.6 (L) 8.5 - 10.1 MG/DL Final     Bun-   BUN   Date Value Ref Range Status   03/04/2021 28 (H) 6 - 20 MG/DL Final     Creat-   Creatinine   Date Value Ref Range Status   03/04/2021 5.19 (H) 0.70 - 1.30 MG/DL Final     Comment:     INVESTIGATED PER DELTA CHECK PROTOCOL        Medications/ Blood Products Given     Name   Dose   Route and Time     Albumin 25% 12.5 grams Iv drip   heparin 1:1000 1.9 arterial and 1.9 venous         Blood Volume Processed (BVP):    77 Net Fluid   Removed:  100   Comments   Time Out Done: 3417  Primary Nurse Rpt Pre: Guillermo Luciano RN   Primary Nurse Rpt Post: Guillermo Luciano RN   Pt Education: cvc infection control wearing mask while initiating discontinuing dialysis  Care Plan: continue current HD plan of care   Tx Summary:  0930 HD initiated as ordered  1000 patient asymptomatic of low blood pressure, minimum  cc NS MD notified of situation received order for albumin. 1030 dr. Elena Weinstein in to see patient, UF minimum today. Blood pressure 125/59  1300 treatment completed. Each dialysis catheter limb disinfected per p&p, blood returned per p&p, each dialysis hub disinfected per p&p, post dialysis catheter dwell instilled per order, and caps applied. All dialysis related medications have been reviewed. Admiting Diagnosis: respiratory distress  Pt's previous clinic- n/a  Consent signed - Informed Consent Verified: Yes (03/02/21 0945)  Hepatitis Status- 02/23/21 negative antigen, immune connect care   Machine #- Machine Number: [id number] (03/04/21 0930)  Telemetry status-   Pre-dialysis wt. - Pre-Dialysis Weight: 95.3 kg (210 lb 1.6 oz) (02/24/21 2030)

## 2021-03-04 NOTE — PROGRESS NOTES
Bedside and Verbal shift change report given to deepti (oncoming nurse) by Marylee Brunt (offgoing nurse). Report included the following information SBAR, Kardex and MAR.

## 2021-03-04 NOTE — PROGRESS NOTES
DIEGO:  1. RUR-40%  2. [address] accepted. 3.He will start OP HD at [facility] MWF at 3:45 p.m. tomorrow. 4. BLS transport for today at 6:30 p.m. Patient will start OP HD tomorrow at [facility] with chair time of 3:45 p.m. Starting on Monday his chair time is at 4:30 p.m. CM arranged discharge transport with [facility] at 6:30 p.m. Transport for tomorrow is arranged with [employer] ID# [id number].  They will pickup the patient at [facility] around 3:00 p.m.  notified NP.    Yves Primrose, [facility]

## 2021-03-05 VITALS
TEMPERATURE: 99 F | RESPIRATION RATE: 20 BRPM | DIASTOLIC BLOOD PRESSURE: 57 MMHG | HEART RATE: 81 BPM | WEIGHT: 160.2 LBS | BODY MASS INDEX: 25.15 KG/M2 | HEIGHT: 67 IN | SYSTOLIC BLOOD PRESSURE: 106 MMHG | OXYGEN SATURATION: 95 %

## 2021-03-05 LAB
BASOPHILS # BLD: 0.1 K/UL (ref 0–0.1)
BASOPHILS NFR BLD: 1 % (ref 0–1)
DIFFERENTIAL METHOD BLD: ABNORMAL
EOSINOPHIL # BLD: 0.2 K/UL (ref 0–0.4)
EOSINOPHIL NFR BLD: 3 % (ref 0–7)
ERYTHROCYTE [DISTWIDTH] IN BLOOD BY AUTOMATED COUNT: 16.3 % (ref 11.5–14.5)
GLUCOSE BLD STRIP.AUTO-MCNC: 101 MG/DL (ref 65–100)
GLUCOSE BLD STRIP.AUTO-MCNC: 223 MG/DL (ref 65–100)
HCT VFR BLD AUTO: 23.5 % (ref 36.6–50.3)
HGB BLD-MCNC: 7.4 G/DL (ref 12.1–17)
IMM GRANULOCYTES # BLD AUTO: 0 K/UL (ref 0–0.04)
IMM GRANULOCYTES NFR BLD AUTO: 0 % (ref 0–0.5)
LYMPHOCYTES # BLD: 1.9 K/UL (ref 0.8–3.5)
LYMPHOCYTES NFR BLD: 25 % (ref 12–49)
MCH RBC QN AUTO: 26.1 PG (ref 26–34)
MCHC RBC AUTO-ENTMCNC: 31.5 G/DL (ref 30–36.5)
MCV RBC AUTO: 82.7 FL (ref 80–99)
MONOCYTES # BLD: 0.9 K/UL (ref 0–1)
MONOCYTES NFR BLD: 12 % (ref 5–13)
NEUTS SEG # BLD: 4.6 K/UL (ref 1.8–8)
NEUTS SEG NFR BLD: 59 % (ref 32–75)
NRBC # BLD: 0 K/UL (ref 0–0.01)
NRBC BLD-RTO: 0 PER 100 WBC
PLATELET # BLD AUTO: 241 K/UL (ref 150–400)
PMV BLD AUTO: 10.7 FL (ref 8.9–12.9)
RBC # BLD AUTO: 2.84 M/UL (ref 4.1–5.7)
SERVICE CMNT-IMP: ABNORMAL
SERVICE CMNT-IMP: ABNORMAL
WBC # BLD AUTO: 7.7 K/UL (ref 4.1–11.1)

## 2021-03-05 PROCEDURE — 74011250637 HC RX REV CODE- 250/637: Performed by: EMERGENCY MEDICINE

## 2021-03-05 PROCEDURE — 74011250637 HC RX REV CODE- 250/637: Performed by: INTERNAL MEDICINE

## 2021-03-05 PROCEDURE — 74011250637 HC RX REV CODE- 250/637: Performed by: NURSE PRACTITIONER

## 2021-03-05 PROCEDURE — 82962 GLUCOSE BLOOD TEST: CPT

## 2021-03-05 PROCEDURE — 74011250637 HC RX REV CODE- 250/637: Performed by: HOSPITALIST

## 2021-03-05 PROCEDURE — 85025 COMPLETE CBC W/AUTO DIFF WBC: CPT

## 2021-03-05 PROCEDURE — 2709999900 HC NON-CHARGEABLE SUPPLY

## 2021-03-05 PROCEDURE — 36415 COLL VENOUS BLD VENIPUNCTURE: CPT

## 2021-03-05 PROCEDURE — 74011636637 HC RX REV CODE- 636/637: Performed by: INTERNAL MEDICINE

## 2021-03-05 RX ADMIN — TICAGRELOR 90 MG: 90 TABLET ORAL at 10:12

## 2021-03-05 RX ADMIN — CHOLECALCIFEROL (VITAMIN D3) 25 MCG (1,000 UNIT) TABLET 2000 UNITS: TABLET at 10:11

## 2021-03-05 RX ADMIN — HYDRALAZINE HYDROCHLORIDE 25 MG: 25 TABLET, FILM COATED ORAL at 10:12

## 2021-03-05 RX ADMIN — INSULIN GLARGINE 10 UNITS: 100 INJECTION, SOLUTION SUBCUTANEOUS at 10:13

## 2021-03-05 RX ADMIN — ISOSORBIDE DINITRATE 30 MG: 10 TABLET ORAL at 06:30

## 2021-03-05 RX ADMIN — CARVEDILOL 25 MG: 12.5 TABLET, FILM COATED ORAL at 10:11

## 2021-03-05 RX ADMIN — ASPIRIN 81 MG: 81 TABLET, CHEWABLE ORAL at 10:12

## 2021-03-05 RX ADMIN — CASTOR OIL AND BALSAM, PERU: 788; 87 OINTMENT TOPICAL at 10:14

## 2021-03-05 RX ADMIN — Medication 100 MG: at 10:12

## 2021-03-05 RX ADMIN — INSULIN LISPRO 4 UNITS: 100 INJECTION, SOLUTION INTRAVENOUS; SUBCUTANEOUS at 12:24

## 2021-03-05 RX ADMIN — OXYCODONE HYDROCHLORIDE AND ACETAMINOPHEN 500 MG: 500 TABLET ORAL at 10:12

## 2021-03-05 RX ADMIN — ALLOPURINOL 100 MG: 100 TABLET ORAL at 10:13

## 2021-03-05 RX ADMIN — AMLODIPINE BESYLATE 10 MG: 5 TABLET ORAL at 10:13

## 2021-03-05 RX ADMIN — GABAPENTIN 400 MG: 400 CAPSULE ORAL at 10:12

## 2021-03-05 NOTE — PROGRESS NOTES
Nephrology Progress Note  Jovi Hurley  Date of Admission : 1/30/2021    CC: Follow up for JUAN C on CKD       Assessment and Plan     JUAN C:  - 2/2 ATN + COVID-19 infection  - unclear baseline but suspect some component of CKD  - Renal U/S on 1/30 shows CKD  - PC placed 3/2  - HD yesterday  - plan to d/c today and get outpt HD at [facility] this afternoon     Volume overload:  - improving w/ HD    Encephalopathy    Anemia of CKD:  - BRYANNA TTS    HFrEF:  - EF 20-25% on 2/1     COVID-19 PNA:  - now COVID neg on 2/19     DM2:  - on insulin       Interval History:  Seen and examined. Feeling well. No issues with HD. Voiding about 1.3L in the past 24 hrs. No cp, sob, n/v/d. Current Medications: all current  Medications have been eviewed in EPIC  Review of Systems: Review of systems not obtained due to patient factors. Objective:  Vitals:    Vitals:    03/05/21 0219 03/05/21 0220 03/05/21 0630 03/05/21 0808   BP:  137/75 (!) 153/72 113/63   Pulse:  71 72 (!) 58   Resp:  18  20   Temp:  99.1 °F (37.3 °C)  98.5 °F (36.9 °C)   TempSrc:       SpO2:  99%  99%   Weight: 72.7 kg (160 lb 3.2 oz)      Height:         Intake and Output:  No intake/output data recorded. 03/03 1901 - 03/05 0700  In: 360 [P.O.:360]  Out: 1300 [Urine:1200]    Physical Examination:  Pt intubated    No  General: Awake, alert  Neck:  Supple, no mass  Resp:  Lungs Clear anteriorly  CV:  RRR,  no murmur or rub, 2 + LE edema  GI:  Soft, NT, + Bowel sounds, no hepatosplenomegaly  Neurologic:  Awake, alert  Skin:  No Rash  Access:           RIJ PC    []    High complexity decision making was performed  []    Patient is at high-risk of decompensation with multiple organ involvement    Lab Data Personally Reviewed: I have reviewed all the pertinent labs, microbiology data and radiology studies during assessment.     Recent Labs     03/04/21  0250 03/03/21  0125    139   K 3.7 3.6    106   CO2 27 27   * 85   BUN 28* 18   CREA 5.19* 3.83*   CA 7.6* 7.8*   MG 1.7 2.0   PHOS 5.5* 3.7     Recent Labs     03/05/21  0216 03/04/21  0250 03/03/21  0125   WBC 7.7 9.2 8.2   HGB 7.4* 7.6* 7.8*   HCT 23.5* 24.0* 25.5*    286 249     Lab Results   Component Value Date/Time    Specimen Description: BLOOD 02/27/2010 07:00 PM     Lab Results   Component Value Date/Time    Culture result: NO GROWTH 5 DAYS 02/15/2021 10:58 AM    Culture result: LIGHT YEAST, (APPARENT CANDIDA ALBICANS) (A) 02/12/2021 04:53 PM    Culture result: LIGHT NORMAL RESPIRATORY CORI 02/12/2021 04:53 PM     Recent Results (from the past 24 hour(s))   GLUCOSE, POC    Collection Time: 03/04/21 11:36 AM   Result Value Ref Range    Glucose (POC) 105 (H) 65 - 100 mg/dL    Performed by Chicho Falk    GLUCOSE, POC    Collection Time: 03/04/21  4:46 PM   Result Value Ref Range    Glucose (POC) 162 (H) 65 - 100 mg/dL    Performed by [address] [place] [address]., POC    Collection Time: 03/04/21  9:07 PM   Result Value Ref Range    Glucose (POC) 141 (H) 65 - 100 mg/dL    Performed by Glenys Elder    CBC WITH AUTOMATED DIFF    Collection Time: 03/05/21  2:16 AM   Result Value Ref Range    WBC 7.7 4.1 - 11.1 K/uL    RBC 2.84 (L) 4.10 - 5.70 M/uL    HGB 7.4 (L) 12.1 - 17.0 g/dL    HCT 23.5 (L) 36.6 - 50.3 %    MCV 82.7 80.0 - 99.0 FL    MCH 26.1 26.0 - 34.0 PG    MCHC 31.5 30.0 - 36.5 g/dL    RDW 16.3 (H) 11.5 - 14.5 %    PLATELET 785 365 - 632 K/uL    MPV 10.7 8.9 - 12.9 FL    NRBC 0.0 0  WBC    ABSOLUTE NRBC 0.00 0.00 - 0.01 K/uL    NEUTROPHILS 59 32 - 75 %    LYMPHOCYTES 25 12 - 49 %    MONOCYTES 12 5 - 13 %    EOSINOPHILS 3 0 - 7 %    BASOPHILS 1 0 - 1 %    IMMATURE GRANULOCYTES 0 0.0 - 0.5 %    ABS. NEUTROPHILS 4.6 1.8 - 8.0 K/UL    ABS. LYMPHOCYTES 1.9 0.8 - 3.5 K/UL    ABS. MONOCYTES 0.9 0.0 - 1.0 K/UL    ABS. EOSINOPHILS 0.2 0.0 - 0.4 K/UL    ABS. BASOPHILS 0.1 0.0 - 0.1 K/UL    ABS. IMM.  GRANS. 0.0 0.00 - 0.04 K/UL    DF AUTOMATED     GLUCOSE, POC    Collection Time: 03/05/21  6:17 AM   Result Value Ref Range    Glucose (POC) 101 (H) 65 - 100 mg/dL    Performed by [address], POC    Collection Time: 03/05/21 11:18 AM   Result Value Ref Range    Glucose (POC) 223 (H) 65 - 100 mg/dL    Performed by Iglesia Bernard MD  [facility]   [address] [address]  Phone - [phone]   Fax - [phone]  [email or web address]

## 2021-03-05 NOTE — ACP (ADVANCE CARE PLANNING)
Advance Care Planning Note    Name: Charleen Kocher  YOB: 1955  MRN: [id number]  Admission Date: 1/30/2021 12:48 AM    Date of discussion: 3/5/2021    Active Diagnoses:    Hospital Problems  Never Reviewed          Codes Class Noted POA    Dysphagia, unspecified type ICD-10-CM: R13.10  ICD-9-CM: 787.20  Unknown Unknown        Fatigue, unspecified type ICD-10-CM: R53.83  ICD-9-CM: 780.79  Unknown Unknown        Chronic systolic congestive heart failure ([facility] ICD-10-CM: I50.22  ICD-9-CM: 428.22, 428.0  2/16/2021 Unknown        Goals of care, counseling/discussion ICD-10-CM: Z71.89  ICD-9-CM: V65.49  Unknown Unknown        DNR (do not resuscitate) discussion ICD-10-CM: Z71.89  ICD-9-CM: V65.49  Unknown Unknown        JUAN C (acute kidney injury) ([facility] ICD-10-CM: N17.9  ICD-9-CM: 448. 9  Unknown Unknown        Acute hypoxemic respiratory failure due to COVID-19 [facility]) ICD-10-CM: U07.1, J96.01  ICD-9-CM: 518.81, 079.89, 799.02  1/30/2021 Unknown               These active diagnoses are of sufficient risk that focused discussion on advance care planning is indicated in order to allow the patient to thoughtfully consider personal goals of care, and if situations arise that prevent the ability to personally give input, to ensure appropriate representation of their personal desires for different levels and aggressiveness of care. Discussion:     Persons present and participating in discussion: Charleen Kocher, Harpal Beltran NP    Discussion: Patient doesn't remember much of the majority of his stay as he was sedated, very ill and intubated. Reviewed events of stay including change in code status. Patient feels he has recovered and that he wants to change his code status. Discussed code status including full vs partial vs DNR in detail. Also discussed resuscitation efforts including CPR, defibrillation, intubation and emergency drugs.  All questions answered. At this time, the patient/family wishes to change code status to FULL CODE. Time Spent:     Total time spent face-to-face in education and discussion: 16 minutes.      Ileana White NP  3/5/2021  2:22 PM

## 2021-03-05 NOTE — DISCHARGE INSTRUCTIONS
Discharge Instructions       PATIENT ID: Dmitry Olivas  MRN: [id number]   YOB: 1955    DATE OF ADMISSION: 1/30/2021 12:48 AM    DATE OF DISCHARGE: 3/5/2021    PRIMARY CARE PROVIDER: Jessi Gaona MD     ATTENDING PHYSICIAN: Natalie Blanc MD  DISCHARGING PROVIDER: Rosendo Bob NP    To contact this individual call [phone] and ask the  to page. If unavailable ask to be transferred the Adult Hospitalist Department.     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  Chronic peripheral neuropathy  Hx Heroin Abuse - stopped taking methadone ([facility]) 1yr ago per pt      CONSULTATIONS: IP CONSULT TO CARDIAC SURGERY  IP CONSULT TO INFECTIOUS DISEASES  IP CONSULT TO NEPHROLOGY  IP CONSULT TO INTENSIVIST  IP CONSULT TO PSYCHIATRY  IP CONSULT TO NEPHROLOGY  IP CONSULT TO GASTROENTEROLOGY  IP CONSULT TO CARDIOLOGY  IP CONSULT TO HOSPITALIST    PROCEDURES/SURGERIES: * No surgery found *    PENDING TEST RESULTS:   At the time of discharge the following test results are still pending: NA    FOLLOW UP APPOINTMENTS:   Follow-up Information     Follow up With Specialties Details Why Contact Info    Jessi Gaona MD  Schedule an appointment as soon as possible for a visit Hospital follow up, chronic leg pain - f/u with PCP Patient can only remember the practice name and not the physician      Heather Danielle MD Nephrology Schedule an appointment as soon as possible for a visit dialysis [address]  [phone] Mercedes Montanez MD Cardiology Schedule an appointment as soon as possible for a visit CHF,  [facility]  [address] [address]  [phone] [address]  Schedule an appointment as soon as possible for a visit chronic cervical wound Plastic and Reconstructive Surgery at [facility], [address] [facility], [address]  [phone]               ADDITIONAL CARE RECOMMENDATIONS:  It is imperative to follow up as instructed for further assessment and management of your acute and chronic health conditions. Please also read the attached educational handouts as they contain important information and instructions about your care. ACTIVITY: As tolerated with assistance and walker    WOUND CARE:   Posterior neck- Every other day cleanse with normal saline, wipe wound bed clean and dry, apply a piece of Opticell AG and cover with Optifoam Gentle (or other dressing that will remain in place). Right ear, sacrum, bilateral heels and any other reddened bony prominence- Every 12 hours liberally apply Venelex ointment (BPCO). Use Z flow pillow to offload right ear. Scrotum and distal gluteal cleft- Every 12 hours, proximal scrotum apply Z guard paste to open wound, apply Nourishing Skin Cream to intact skin on scrotum and elevate scrotum on a towel. Left posterior thigh- Please maintain Exuderm Hydrocolloid up to one week or change as needed with soiling or rolled edges. Please use adhesive remover wipes when changing. EQUIPMENT needed: Has needed       DISCHARGE MEDICATIONS:   See Medication Reconciliation Form    · It is important that you take the medication exactly as they are prescribed. · Keep your medication in the bottles provided by the pharmacist and keep a list of the medication names, dosages, and times to be taken in your wallet. · Do not take other medications without consulting your doctor.        NOTIFY YOUR PHYSICIAN FOR ANY OF THE FOLLOWING:   Fever over 101 degrees for 24 hours. Chest pain, shortness of breath, fever, chills, nausea, vomiting, diarrhea, change in mentation, falling, weakness, bleeding. Severe pain or pain not relieved by medications. Or, any other signs or symptoms that you may have questions about.       DISPOSITION:    Home With:   OT  PT  HH  RN      X SNF/Inpatient Rehab/LTAC    Independent/assisted living    Hospice    Other:       Signed:   Ashwin Wilson NP  3/5/2021  9:48 AM

## 2021-03-05 NOTE — PROGRESS NOTES
Report called to Rangel Hirsch at [facility]. Opportunity provided to ask and answer questions. Five calls made to patient's family to inform them of discharge time. Writer unable to reach point of contact. Report called to MARAH MANZO RN at [facility].

## 2021-03-05 NOTE — PROGRESS NOTES
DIEGO:  1. RUR-28%  2. Discharge to [facility] unit and then transition to [facility]. 3. BLS transport with [facility] scheduled. 1540- CM noted patient was still here in his room, [facility] contacted and spoke to Eugene Hope. She confirmed that [facility] would arrive to the unit around 3:40 p.m. CM spoke to the [facility] who confirmed they could accept the patient around 4:00 p.m. CM noted [facility] came on the phone before 4:00 p.m. and left around 4:05 p.m. CM unable to reach [facility] to let them know the patient is on his way. CM spoke with Jenn Lu at [facility], [phone]. She confirmed that they can accept patient from dialysis this evening. CM arranged HD transport via [facility] for 2:45 p.m. CM informed  and Manager of the plan, they are agreeable. CM will update [facility] with pickup location and time. RN aware of the plan. [address] will pickup the patient from dialysis this evening. [facility] will pickup patient from the hospital at 2:45 p.m so that he will arrive at [facility] for 3:45 p.m chair time.        Nathalie Pope, [facility]

## 2021-03-05 NOTE — WOUND CARE
Wound Care Note:     Follow-up visit for posterior neck and right ear    Chart shows:  Admitted for acute hypoxemic respiratory failure due to COVID-19, acute on chronic systolic heart failure, counseling/discussion goals of care, DNR discussion and JUAN C      Past Medical History:   Diagnosis Date    Diabetes ([facility]     Hypertension      WBC = 7.7 on 3/5/21  Admitted from [facility] 31:   Patient is groggy this morning, talking, incontinent with some assistance needed in repositioning. Bed: [place]  Patient wearing briefs for incontinence    Diet: Regular with nutritional supplements  Patient reports no pain. Bilateral heels, buttocks, and sacral skin intact and without erythema. 1. POA posterior neck wound continues to improve, approximately 1.5 cm x 1 cm x 0.1 cm, small sero/sang drainage, wound edges are open, carlene-wound intact. Opticell AG and foam dressing applied. 2.  Right ear with small crusted area, smaller today, approximately 0.5 cm x 0.3, no drainage, carlene-wound intact without erythema. Venelex ointment is being applied. 3.  Gluteal cleft with resolved fissure. 4.  Scrotum intact, no longer edematous, left open to air. Patient repositioned on supine. Recommendations:    Continue with current wound care.     Posterior neck- Every other day cleanse with normal saline, wipe wound bed clean and dry, apply a piece of Opticell AG and cover with Optifoam Gentle (or other dressing that will remain in place).     Right ear, sacrum, bilateral heels and any other reddened bony prominence- Every 12 hours liberally apply Venelex ointment.  Use Z flow pillow to offload right ear.     Scrotum and distal gluteal cleft- Every 12 hours, proximal scrotum apply Z guard paste to open wound, apply Nourishing Skin Cream to intact skin on scrotum and elevate scrotum on a towel.     Left posterior thigh- Please maintain Exuderm Hydrocolloid up to one week or change as needed with soiling or rolled edges. Please use adhesive remover wipes when changing. Skin Care & Pressure Prevention:  Minimize layers of linen/pads under patient to optimize support surface. Turn/reposition approximately every 2 hours and offload heels.   Manage incontinence / promote continence   Nourishing Skin Cream to dry skin, minimize use of briefs when able    Discussed above plan with patient & Negro Larkin RN    Transition of Care: Plan to follow as needed while admitted to hospital.    ZOEY Sullivan, RN, [facility], [facility].  office [phone]  pager [phone] or call  to page

## 2021-03-05 NOTE — PROGRESS NOTES
ED Note:    CM was called to make sure that transportation was arranged from [address] [place] [address] to [facility] [address] this evening.  -  [facility] was reachable at [phone] and confirmation number is [id number]. This information was given to staff at [facility] [phone] and to Juliet Lee at [facility] reachable at [phone].     Jennifer Willett  6:10 PM

## 2021-03-05 NOTE — ROUTINE PROCESS
Bedside and Verbal shift change report given to Isamar (oncoming nurse) by Katharina Layne (offgoing nurse). Report included the following information SBAR, Kardex, Intake/Output and MAR.

## 2021-03-05 NOTE — PROGRESS NOTES
Bedside and Verbal shift change report given to Hermelinda (oncoming nurse) by Mendel Kansas (offgoing nurse). Report included the following information SBAR, Kardex, MAR and Accordion.

## 2021-03-05 NOTE — DISCHARGE SUMMARY
Discharge Summary       PATIENT ID: Dmitry Olivas  MRN: [id number]   YOB: 1955    DATE OF ADMISSION: 1/30/2021 12:48 AM    DATE OF DISCHARGE: 03/05/21  PRIMARY CARE PROVIDER: Oliva Campo MD     ATTENDING PHYSICIAN: Dr. Vivian Baez   DISCHARGING PROVIDER: Rosendo Bob NP    To contact this individual call [phone] and ask the  to page. If unavailable ask to be transferred the Adult Hospitalist Department. CONSULTATIONS: IP CONSULT TO CARDIAC SURGERY  IP CONSULT TO INFECTIOUS DISEASES  IP CONSULT TO NEPHROLOGY  IP CONSULT TO INTENSIVIST  IP CONSULT TO PSYCHIATRY  IP CONSULT TO NEPHROLOGY  IP CONSULT TO GASTROENTEROLOGY  IP CONSULT TO CARDIOLOGY  IP CONSULT TO HOSPITALIST    PROCEDURES/SURGERIES: * No surgery found *    ADMITTING DIAGNOSES:   Acute hypoxic respiratory failure   Pulmonary edema  COVID pneumonia  Aspiration pneumonitis  Tachycardia  JUAN C  Diarrhea      HOSPITAL COURSE:    From H&P 01/30/2021:  \"A 63-year-old man who presented from [facility] on January 30 with acute onset shortness of breath, hypoxia, nausea, vomiting and diarrhea-shortness of breath acutely got worse after one of the vomiting episodes. Diagnosed with Covid about 2 weeks prior to presentation.  Patient placed on BiPAP on arrival-felt much better.  After getting fluids for resuscitation/elevated lactate level patient acutely decompensated-developed lethargy and drop in oxygen levels-intubated emergently requiring moderate to high vent support.  His respiratory status improved but his renal function was declining suspecting acute kidney injury on top of chronic kidney disease, it was also difficult to control his agitation on the ventilator.  However patient condition improved and he was successfully extubated on February 11. Patient continued to do well and he was transferred to the intermediate care unit on February 13.  Over the last few days patient oxygen requirement was increasing and he is becoming more lethargic. This morning he was placed on high flow nasal cannula and later on on BiPAP. On BiPAP he seems to be much better with decreased work of breathing and good tidal volume and oxygenation.  Reviewing his chest x-ray seems that he has progressive volume overload and perhaps a component of aspiration.  He was giving 1 dose of Lasix with good urine output and ICU consulted. Of note this patient been with positive fluid balance progressively since admission, according to documentation since admission he is about 20 L positive, over the last 3 days he is positive about 5 Bonita Emilia has good urine output despite worsening BUN and creatinine. No fever, no hemoptysis no nausea no vomiting. \"    ID Note 02/16/2021:  \"Assessment and Plan  Acute respiratory failure secondary to COVID 19 PNA  mycoplasma and s. pneumoniae   - Mycoplasma IgG  1411, S Pneumo (+)    Legionella (-)    resp cx (2/5) candida albicans - oropharyngeal contamination     sputum cx (2/12) candida     Blood cx (1/30) no growth    Blood cx (2/4) no growth    Quantiferon (-)  continue vanc and merrem. Family does not want to escalate care. Mid posterior cervical wound infection  - chronic wound; pt was waiting for skin graft according to the provider at [facility], Dr. Laz Ceja cx (1/31) LIGHT DIPHTHEROIDS   Wound measurement per our wound care team; 3.5 cm x 4 cm x 0.1 cm. NP iKtty Villatoro with the Managing provider at [facility], Dr. Lavonne Arnett, [address]. Wound care nurse, Ms. Verma Oscar [phone]. According to the wound care nurse, the wound was created after the removal of infected cyst.  Pt completed IV Zosyn then Vancomycin for 30 days at the rehab center; Blood cx and wound cx grew MRSA  Pt should follow up with Dr Luca Faustin at . [place] [address] upon discharge for his cervical wound\"    Cardiology Note 02/17/2021:  \"  Assessment and Plan  1.  Cardiomyopathy              - systolic HF acute on chronic              - NYHA class IV  01/30/21  ECHO ADULT COMPLETE 02/01/2021 2/1/2021    Narrative · LV: Estimated LVEF is 20 - 25%. Normal cavity size. Upper normal wall   thickness. Severely reduced systolic function. Severe (grade 3) left   ventricular diastolic dysfunction. · LA: Dilated left atrium. · MV: Mild mitral valve regurgitation is present. · TV: Mild tricuspid valve regurgitation is present. · PA: Moderate to severe pulmonary hypertension. · RV: Reduced systolic function. Signed by: Lazaro Wray MD              - Coreg, Isordil, Norvasc               - PTA Lisinopril, Coreg, Imdur and Norvasc  2. Acute Respiratory failure              - HiFlow at 100% FiO2              - PCXR 2/15 - Increasing bilateral pulmonary infiltrates and left pleural                effusion. 3. COVID Positive  4. Mycoplasma and strep PNA              - ID following  5. JUAN C              - Nephrology following              - Worsening Cr. 7.07              - Per Palliative Note - patient DNR/DNI - family does NOT want to pursue dialysis  6. CAD              - STEMI 3/24/20              - PCI 4/3/20              - [place] [facility] [place]              - Brilinta, ASA, Coreg and Liptior  7. PHTN              - Mod to severe on echo              - Sildenafil PTA  8. H/o Heroine abuse              - Getting Methadone at [facility]  9. Anemia              - No stool occult completed              - Hgb 7.3y              - per Primary team  10. HLD              - Liptior 80 mg  11. HTN              - Coreg, Norvasc, Lasix, Isordil              - Cardene gtt at 5  12. DM II              - SSI  RF continues to decline. Patient DNR/DNI and family does not want to move to dialysis. Appropriate to move to comfort care. Needs no further cardiac testing. Will follow from a distance until comfort measures are instituted, then will sign off. \"     Nephrology Note 03/04/2021:  Roney Kramer and Plan  JUAN C:  - 2/2 ATN + COVID-19 infection  - unclear baseline but suspect some component of CKD  - Renal U/S on 1/30 shows CKD  - PC placed 3/2  - HD today then d/c  - to start outpt HD tomorrow at [facility]  Volume overload:  - improving w/ HD  Encephalopathy  Anemia of CKD:  - BRYANNA TTS  HFrEF:  - EF 20-25% on 2/1  COVID-19 PNA:  - now COVID neg on 2/19  DM2:  - on insulin\"    01/30/2021: Rapid COVID test-  POSITIVE    01/31/2021: Intubated in ICU    02/11/2021: Extubated, remained in ICU    02/12/2021: COVID PCR-  POSITIVE    02/13/2021: Transferred out of ICU    02/15/2021: Transferred back to ICU for respiratory distress and placed on BiPAP    02/19/2021: Transferred out of ICU; COVID PCR - NEGATIVE    02/23/2021: Non tunneled HD catheter placed RIJ by IR    02/24/2021: Started HD for JUAN C    03/02/2021: RIJ non tunneled cath removed and permacath placed by IR    Patient has had a long and very difficult course this admission. Early on, patient was not doing well and continued to decline and was seen by palliative care who are in talks with the patient's wife and daughter regarding comfort measures, etc. The patient was made a DNR. Fortunately, the patient started to improve slowly and is currently stable for discharge. Of note, the patient is legally  although currently . Patient's wife and daughter have collaborated in his medical decision making and wife has been being updated on condition and plan of care. Apparently he had been on methadone which he received at [facility] clinic although the patient reports he has not taken methadone for approximately the last 1 year. According to records, it appears last methadone appointment was March 2020. Last night I called the wife to update her on plan of care and discharge. She states that she does not remember when he stopped going to the methadone clinic but is aware that he does not currently go to methadone clinic.   I asked her if she was aware of the last time he used heroin and she stated \"I do not really know but definitely not the last 4 months since he has been in and out of the hospital.\"    The patient has chronic peripheral neuropathy and is on gabapentin at home. This has been restarted. Patient was receiving IV Dilaudid although indication is unclear but patient states this for his neuropathy. IV Dilaudid was stopped and gabapentin restarted. Patient verbalized understanding of importance to avoid narcotics and that they are not indicated in the treatment of peripheral neuropathy. Pt is being discharged today to [facility]. He will have his first outpatient HD treatment today at 3/45pm. Discussed discharge with patient (and with wife last night), specifically d/c instructions, recommendations, follow ups and medications. All questions answered and patient verbalized understanding. At this time the patient denies HA, dizziness, visual disturbance, cough, CP, SOB, abd pain, n/v/d or dysuria. Peripheral neuropathy remains but improved since resuming gabapentin. Discussed with Dr. Ermelinda Hamman. Patient has decided to change code status from DNR to FULL CODE. DISCHARGE DIAGNOSES / PLAN:      Acute hypoxic respiratory failure secondary to COVID 19 pneumonia  Mycoplasma and S Pneumonia and possible aspiration pneumonia with sepsis POA  -Resolved   -completed iv doxycycline for mycoplasma on 2/12  -iv cefepime 1/30-2/12, flagyl 1/30-2/4 and iv vancomycin 1/30-2/9  -completed courses of vanco and merrem  -received IV decadron   -blood cx negative  -chest x ray on 2/19 improved expansion and aeration of the lungs.  Continued interstitial prominence likely due to the patient's history of viral pneumonia  -SpO2 % on RA  -f/u with PCP      ARF: with volume over load  -Stable/improving, OP HD set up for MWF at [facility]  -perm cath placed on 3/2  -f/u with nephrology     Acute metabolic encephalopathy  -Resolved  -A&Ox4 w/ periods of confusion     Acute on chronic systolic HF: NYHA Class IV POA  -Stable; NYHA Class II-III  -LV EF 20-25% on Echo  -continue on coreg, isordil, hydralazine, bumex  -daily weights  -f/u with cardiology    Severe Pulmonary HTN  -seen on echo  -was on sildenafil as OP; not restarted per cards  -Cont amlodipine, isosorbide dinitrate, bumex; on HD  -f/u with cards     HTN  -Stable  -continue on norvasc, coreg, hydralazine and isordil  -f/u with pcp     Anemia of chronic disease   -Stable hgb in 7s  -transfused on 2/14 one unit for Hgb of 6.4, also one unit of pRBC on 2/22 for Hgb of 6.7  -no sign and symptoms of active bleeding  -on retacrit per nephro  -f/u with nephrology     T2DM: A1c 7.4  -BS stable  -Resume home regimen  -f/u with PCP     Neuropathy  -Improving; cont home gabapentin  -f/u with PCP     Hx Heroin Abuse  -No longer taking methadone @ [facility] clinic (x1 yr per pt)  -unclear last heroin usage  -Avoid narcotics  -f/u with PCP     Hx of CAD s/p stent  -Stable, no chest pain  -continue aspirin, ticagrelor, lipitor, coreg, and isordil  -f/u with cardiology     Elevated LFTs   -Resolved     Dysphagia   -Resolved; normal diet     Mild posterior cervical wound infection  - chronic; d/t previously removed cyst  -improving  -completed abx  -wound care as ordered  -f/u with [facility] Dr. Jesse Dawkins    Skin alterations/wounds: d/t poor mobility, debility  -Scrotum and distal gluteal cleft, Posterior thigh  -Cont wound care as ordered  -f/u with PCP     Debility  -Improving  -cont PT/OT           ADDITIONAL CARE RECOMMENDATIONS:   It is imperative to follow up as instructed for further assessment and management of your acute and chronic health conditions. Please also read the attached educational handouts as they contain important information and instructions about your care.     PENDING TEST RESULTS:   At the time of discharge the following test results are still pending: NA    FOLLOW UP APPOINTMENTS:    Follow-up Information     Follow up With Specialties Details Why Contact Info Other, MD Jessi  Schedule an appointment as soon as possible for a visit Hospital follow up, chronic leg pain - f/u with PCP Patient can only remember the practice name and not the physician      Fatou Dennis MD Nephrology Schedule an appointment as soon as possible for a visit dialysis [address] [facility] [id number] [phone]      Rekha Rice MD Cardiology Schedule an appointment as soon as possible for a visit CHF, HTN [address]  [address] [facility] [address]  [address] [facility]  Schedule an appointment as soon as possible for a visit chronic cervical wound Plastic and Reconstructive Surgery at [facility], [address], [address]  [phone]               DIET: Regular w/ Nepro at breakfast and dinner    ACTIVITY: As tolerated with assistance and walker    WOUND CARE:   Posterior neck- Every other day cleanse with normal saline, wipe wound bed clean and dry, apply a piece of Opticell AG and cover with Optifoam Gentle (or other dressing that will remain in place). Right ear, sacrum, bilateral heels and any other reddened bony prominence- Every 12 hours liberally apply Venelex ointment (BPCO). Use Z flow pillow to offload right ear. Scrotum and distal gluteal cleft- Every 12 hours, proximal scrotum apply Z guard paste to open wound, apply Nourishing Skin Cream to intact skin on scrotum and elevate scrotum on a towel. Left posterior thigh- Please maintain Exuderm Hydrocolloid up to one week or change as needed with soiling or rolled edges. Please use adhesive remover wipes when changing. EQUIPMENT needed: Has needed       DISCHARGE MEDICATIONS:  Current Discharge Medication List      START taking these medications    Details   amLODIPine (NORVASC) 10 mg tablet Take 1 Tab by mouth daily. Qty: 30 Tab, Refills: 0      hydrALAZINE (APRESOLINE) 25 mg tablet Take 1 Tab by mouth three (3) times daily.   Qty: 90 Tab, Refills: 0      isosorbide dinitrate (ISORDIL) 30 mg tablet Take 1 Tab by mouth every eight (8) hours. Qty: 30 Tab, Refills: 0      thiamine HCL (B-1) 100 mg tablet Take 1 Tab by mouth daily. Qty: 5 Tab, Refills: 0      bumetanide (BUMEX) 2 mg tablet Take 1 Tab by mouth daily. Qty: 60 Tab, Refills: 0      balsam peru-castor oiL (VENELEX) ointment Apply  to affected area every twelve (12) hours. APPLY LIBERALLY TO Right ear, sacrum, b/l heels and any red/bony prominence  Qty: 1 Tube, Refills: 0         CONTINUE these medications which have CHANGED    Details   atorvastatin (LIPITOR) 80 mg tablet Take 0.5 Tabs by mouth daily. Qty: 30 Tab, Refills: 0      carvediloL (COREG) 12.5 mg tablet Take 2 Tabs by mouth two (2) times a day. Qty: 60 Tab, Refills: 0         CONTINUE these medications which have NOT CHANGED    Details   aspirin 81 mg chewable tablet Take 81 mg by mouth daily. docusate sodium (Colace) 100 mg capsule Take 100 mg by mouth two (2) times daily as needed for Constipation. ferrous sulfate 325 mg (65 mg iron) tablet Take  by mouth every fourty-eight (48) hours. gabapentin (NEURONTIN) 400 mg capsule Take 400 mg by mouth three (3) times daily. insulin lispro (HUMALOG) 100 unit/mL injection 10 Units by SubCUTAneous route Before breakfast, lunch, and dinner. insulin glargine (Lantus U-100 Insulin) 100 unit/mL injection 20 Units by SubCUTAneous route nightly. pantoprazole (PROTONIX) 40 mg tablet Take 40 mg by mouth daily. senna (Senna) 8.6 mg tablet Take 1 Tab by mouth daily. ticagrelor (BRILINTA) 90 mg tablet Take 90 mg by mouth two (2) times a day. ascorbic acid, vitamin C, (Vitamin C) 500 mg tablet Take 500 mg by mouth two (2) times a day. cholecalciferol (Vitamin D3) (1000 Units /25 mcg) tablet Take 1,000 Units by mouth daily. zinc sulfate (ZINCATE) 220 (50) mg capsule Take 220 mg by mouth daily.       therapeutic multivitamin [facility]) tablet Take 1 Tab by mouth daily.      acetaminophen (TYLENOL) 325 mg tablet Take 975 mg by mouth every six (6) hours as needed for Pain.         STOP taking these medications       doxycycline (ADOXA) 100 mg tablet Comments:   Reason for Stopping:         isosorbide mononitrate ER (IMDUR) 60 mg CR tablet Comments:   Reason for Stopping:         lisinopriL (PRINIVIL, ZESTRIL) 20 mg tablet Comments:   Reason for Stopping:         methocarbamoL (ROBAXIN) 500 mg tablet Comments:   Reason for Stopping:         oxyCODONE IR (ROXICODONE) 5 mg immediate release tablet Comments:   Reason for Stopping:         guaiFENesin-codeine (Guaiatussin AC) 100-10 mg/5 mL solution Comments:   Reason for Stopping:               NOTIFY YOUR PHYSICIAN FOR ANY OF THE FOLLOWING:   Fever over 101 degrees for 24 hours.   Chest pain, shortness of breath, fever, chills, nausea, vomiting, diarrhea, change in mentation, falling, weakness, bleeding. Severe pain or pain not relieved by medications.  Or, any other signs or symptoms that you may have questions about.    DISPOSITION:    Home With:   OT  PT  HH  RN      X Long term SNF/Inpatient Rehab    Independent/assisted living    Hospice    Other:       PATIENT CONDITION AT DISCHARGE:     Functional status    Poor    X Deconditioned     Independent      Cognition     Lucid    X Forgetful     Dementia      Catheters/lines (plus indication)    Bourgeois     PICC     PEG    X RIJ DIALYSIS CATHETER     Code status   X  Full code     DNR      PHYSICAL EXAMINATION AT DISCHARGE:  General:  Alert, cooperative, no distress, appears stated age   Head:  Normocephalic, without obvious abnormality, atraumatic.   Eyes:  Conjunctivae/corneas clear. Pupils equal, round, reactive to light.    Lungs:   Clear to auscultation w/ diminished bases   Heart:  Regular rate and rhythm, S1, S2 normal, no murmur, click, rub, or gallop.   Abdomen:   Soft, non-tender/non-distended. Bowel sounds normal   Extremities: Extremities  normal, atraumatic, no cyanosis or edema. Pulses: 2+ and symmetric all extremities. Skin: Skin color, texture, turgor normal. No rashes or lesions. Neurologic: CNII-XII grossly intact. BUE 5/5, BLE 4/5 strength. A&Ox4 w/ periods of confusion     BP (!) 106/57 (BP 1 Location: Left upper arm, BP Patient Position: At rest)   Pulse 81   Temp 99 °F (37.2 °C)   Resp 20   Ht 5' 7\" (1.702 m)   Wt 72.7 kg (160 lb 3.2 oz)   SpO2 95%   BMI 25.09 kg/m²       CHRONIC MEDICAL DIAGNOSES:  Problem List as of 3/5/2021 Never Reviewed          Codes Class Noted - Resolved    Dysphagia, unspecified type ICD-10-CM: R13.10  ICD-9-CM: 787.20  Unknown - Present        Fatigue, unspecified type ICD-10-CM: R53.83  ICD-9-CM: 780.79  Unknown - Present        Chronic systolic congestive heart failure ([facility] ICD-10-CM: I50.22  ICD-9-CM: 428.22, 428.0  2/16/2021 - Present        Goals of care, counseling/discussion ICD-10-CM: Z71.89  ICD-9-CM: V65.49  Unknown - Present        DNR (do not resuscitate) discussion ICD-10-CM: Z71.89  ICD-9-CM: V65.49  Unknown - Present        JUAN C (acute kidney injury) ([facility] ICD-10-CM: N17.9  ICD-9-CM: 272. 9  Unknown - Present        Acute hypoxemic respiratory failure due to COVID-19 [facility]) ICD-10-CM: U07.1, J96.01  ICD-9-CM: 518.81, 079.89, 799.02  1/30/2021 - Present              Greater than 30 minutes were spent with the patient on counseling and coordination of care    Signed:   Ashwin Wilson NP  3/5/2021  9:05 AM

## 2021-03-24 ENCOUNTER — HOSPITAL ENCOUNTER (OUTPATIENT)
Dept: LAB | Age: 66
Discharge: HOME | End: 2021-03-24

## 2021-03-24 LAB — CREAT SERPL-MCNC: 1.37 MG/DL (ref 0.7–1.3)

## 2021-03-24 PROCEDURE — 99000 SPECIMEN HANDLING OFFICE-LAB: CPT

## 2021-03-24 PROCEDURE — 82565 ASSAY OF CREATININE: CPT

## 2022-03-19 PROBLEM — U07.1 ACUTE HYPOXEMIC RESPIRATORY FAILURE DUE TO COVID-19 (HCC): Status: ACTIVE | Noted: 2021-01-30

## 2022-03-19 PROBLEM — J96.01 ACUTE HYPOXEMIC RESPIRATORY FAILURE DUE TO COVID-19 (HCC): Status: ACTIVE | Noted: 2021-01-30

## 2022-03-20 PROBLEM — I50.22 CHRONIC SYSTOLIC CONGESTIVE HEART FAILURE (HCC): Status: ACTIVE | Noted: 2021-02-16

## 2022-04-05 ENCOUNTER — APPOINTMENT (OUTPATIENT)
Dept: GENERAL RADIOLOGY | Age: 67
DRG: 194 | End: 2022-04-05
Attending: EMERGENCY MEDICINE
Payer: MEDICAID

## 2022-04-05 ENCOUNTER — HOSPITAL ENCOUNTER (INPATIENT)
Age: 67
LOS: 7 days | Discharge: HOME HEALTH | DRG: 194 | End: 2022-04-13
Attending: EMERGENCY MEDICINE | Admitting: INTERNAL MEDICINE
Payer: MEDICAID

## 2022-04-05 DIAGNOSIS — N17.9 AKI (ACUTE KIDNEY INJURY) (HCC): ICD-10-CM

## 2022-04-05 DIAGNOSIS — D62 ACUTE BLOOD LOSS ANEMIA: ICD-10-CM

## 2022-04-05 DIAGNOSIS — U07.1 ACUTE HYPOXEMIC RESPIRATORY FAILURE DUE TO COVID-19 (HCC): ICD-10-CM

## 2022-04-05 DIAGNOSIS — R07.89 ATYPICAL CHEST PAIN: ICD-10-CM

## 2022-04-05 DIAGNOSIS — I50.9 ACUTE ON CHRONIC CONGESTIVE HEART FAILURE, UNSPECIFIED HEART FAILURE TYPE (HCC): Primary | ICD-10-CM

## 2022-04-05 DIAGNOSIS — I16.0 HYPERTENSIVE URGENCY: ICD-10-CM

## 2022-04-05 DIAGNOSIS — E87.5 ACUTE HYPERKALEMIA: ICD-10-CM

## 2022-04-05 DIAGNOSIS — I50.43 ACUTE ON CHRONIC COMBINED SYSTOLIC AND DIASTOLIC ACC/AHA STAGE C CONGESTIVE HEART FAILURE (HCC): ICD-10-CM

## 2022-04-05 DIAGNOSIS — J96.01 ACUTE HYPOXEMIC RESPIRATORY FAILURE DUE TO COVID-19 (HCC): ICD-10-CM

## 2022-04-05 DIAGNOSIS — E11.65 UNCONTROLLED TYPE 2 DIABETES MELLITUS WITH HYPERGLYCEMIA (HCC): ICD-10-CM

## 2022-04-05 PROCEDURE — 36415 COLL VENOUS BLD VENIPUNCTURE: CPT

## 2022-04-05 PROCEDURE — 93005 ELECTROCARDIOGRAM TRACING: CPT

## 2022-04-05 PROCEDURE — 84484 ASSAY OF TROPONIN QUANT: CPT

## 2022-04-05 PROCEDURE — 99285 EMERGENCY DEPT VISIT HI MDM: CPT

## 2022-04-05 PROCEDURE — 94761 N-INVAS EAR/PLS OXIMETRY MLT: CPT

## 2022-04-05 PROCEDURE — 83880 ASSAY OF NATRIURETIC PEPTIDE: CPT

## 2022-04-05 PROCEDURE — 80053 COMPREHEN METABOLIC PANEL: CPT

## 2022-04-05 PROCEDURE — 85025 COMPLETE CBC W/AUTO DIFF WBC: CPT

## 2022-04-05 PROCEDURE — 85730 THROMBOPLASTIN TIME PARTIAL: CPT

## 2022-04-05 PROCEDURE — 85610 PROTHROMBIN TIME: CPT

## 2022-04-05 PROCEDURE — 71045 X-RAY EXAM CHEST 1 VIEW: CPT

## 2022-04-06 ENCOUNTER — APPOINTMENT (OUTPATIENT)
Dept: NON INVASIVE DIAGNOSTICS | Age: 67
DRG: 194 | End: 2022-04-06
Attending: INTERNAL MEDICINE
Payer: MEDICAID

## 2022-04-06 ENCOUNTER — APPOINTMENT (OUTPATIENT)
Dept: ULTRASOUND IMAGING | Age: 67
DRG: 194 | End: 2022-04-06
Attending: INTERNAL MEDICINE
Payer: MEDICAID

## 2022-04-06 PROBLEM — I50.9 ACUTE CHF (CONGESTIVE HEART FAILURE) (HCC): Status: ACTIVE | Noted: 2022-04-06

## 2022-04-06 PROBLEM — R07.89 ATYPICAL CHEST PAIN: Status: ACTIVE | Noted: 2022-04-06

## 2022-04-06 LAB
ALBUMIN SERPL-MCNC: 2.7 G/DL (ref 3.5–5)
ALBUMIN/GLOB SERPL: 0.6 {RATIO} (ref 1.1–2.2)
ALP SERPL-CCNC: 96 U/L (ref 45–117)
ALT SERPL-CCNC: 20 U/L (ref 12–78)
ANION GAP SERPL CALC-SCNC: 8 MMOL/L (ref 5–15)
ANION GAP SERPL CALC-SCNC: 8 MMOL/L (ref 5–15)
APPEARANCE UR: CLEAR
APTT PPP: 22.6 SEC (ref 22.1–31)
AST SERPL-CCNC: 16 U/L (ref 15–37)
ATRIAL RATE: 66 BPM
BACTERIA URNS QL MICRO: NEGATIVE /HPF
BASOPHILS # BLD: 0 K/UL (ref 0–0.1)
BASOPHILS # BLD: 0 K/UL (ref 0–0.1)
BASOPHILS NFR BLD: 0 % (ref 0–1)
BASOPHILS NFR BLD: 0 % (ref 0–1)
BILIRUB SERPL-MCNC: 0.3 MG/DL (ref 0.2–1)
BILIRUB UR QL: NEGATIVE
BNP SERPL-MCNC: ABNORMAL PG/ML
BUN SERPL-MCNC: 56 MG/DL (ref 6–20)
BUN SERPL-MCNC: 60 MG/DL (ref 6–20)
BUN/CREAT SERPL: 12 (ref 12–20)
BUN/CREAT SERPL: 13 (ref 12–20)
CALCIUM SERPL-MCNC: 7.5 MG/DL (ref 8.5–10.1)
CALCIUM SERPL-MCNC: 7.7 MG/DL (ref 8.5–10.1)
CALCULATED P AXIS, ECG09: -13 DEGREES
CALCULATED R AXIS, ECG10: 9 DEGREES
CALCULATED T AXIS, ECG11: 28 DEGREES
CHLORIDE SERPL-SCNC: 112 MMOL/L (ref 97–108)
CHLORIDE SERPL-SCNC: 115 MMOL/L (ref 97–108)
CO2 SERPL-SCNC: 19 MMOL/L (ref 21–32)
CO2 SERPL-SCNC: 23 MMOL/L (ref 21–32)
COLOR UR: ABNORMAL
CREAT SERPL-MCNC: 4.47 MG/DL (ref 0.7–1.3)
CREAT SERPL-MCNC: 4.52 MG/DL (ref 0.7–1.3)
DIAGNOSIS, 93000: NORMAL
DIFFERENTIAL METHOD BLD: ABNORMAL
DIFFERENTIAL METHOD BLD: ABNORMAL
ECHO AO ASC DIAM: 2.6 CM
ECHO AO ASCENDING AORTA INDEX: 1.34 CM/M2
ECHO AV AREA PEAK VELOCITY: 2.2 CM2
ECHO AV AREA VTI: 2.4 CM2
ECHO AV AREA/BSA PEAK VELOCITY: 1.1 CM2/M2
ECHO AV AREA/BSA VTI: 1.2 CM2/M2
ECHO AV MEAN GRADIENT: 12 MMHG
ECHO AV MEAN VELOCITY: 1.6 M/S
ECHO AV PEAK GRADIENT: 28 MMHG
ECHO AV PEAK VELOCITY: 2.7 M/S
ECHO AV VELOCITY RATIO: 0.48
ECHO AV VTI: 59.1 CM
ECHO LA DIAMETER INDEX: 2.01 CM/M2
ECHO LA DIAMETER: 3.9 CM
ECHO LA VOL 4C: 60 ML (ref 18–58)
ECHO LA VOLUME INDEX A4C: 31 ML/M2 (ref 16–34)
ECHO LV E' LATERAL VELOCITY: 6 CM/S
ECHO LV E' SEPTAL VELOCITY: 9 CM/S
ECHO LV EDV A4C: 179 ML
ECHO LV EDV INDEX A4C: 92 ML/M2
ECHO LV EJECTION FRACTION A4C: 53 %
ECHO LV ESV A4C: 84 ML
ECHO LV ESV INDEX A4C: 43 ML/M2
ECHO LV FRACTIONAL SHORTENING: 15 % (ref 28–44)
ECHO LV INTERNAL DIMENSION DIASTOLE INDEX: 2.73 CM/M2
ECHO LV INTERNAL DIMENSION DIASTOLIC: 5.3 CM (ref 4.2–5.9)
ECHO LV INTERNAL DIMENSION SYSTOLIC INDEX: 2.32 CM/M2
ECHO LV INTERNAL DIMENSION SYSTOLIC: 4.5 CM
ECHO LV IVSD: 1.1 CM (ref 0.6–1)
ECHO LV MASS 2D: 227.7 G (ref 88–224)
ECHO LV MASS INDEX 2D: 117.4 G/M2 (ref 49–115)
ECHO LV POSTERIOR WALL DIASTOLIC: 1.1 CM (ref 0.6–1)
ECHO LV RELATIVE WALL THICKNESS RATIO: 0.42
ECHO LVOT AREA: 4.5 CM2
ECHO LVOT AV VTI INDEX: 0.53
ECHO LVOT DIAM: 2.4 CM
ECHO LVOT MEAN GRADIENT: 4 MMHG
ECHO LVOT PEAK GRADIENT: 7 MMHG
ECHO LVOT PEAK VELOCITY: 1.3 M/S
ECHO LVOT STROKE VOLUME INDEX: 73.7 ML/M2
ECHO LVOT SV: 142.9 ML
ECHO LVOT VTI: 31.6 CM
ECHO MV A VELOCITY: 1.1 M/S
ECHO MV AREA VTI: 3.1 CM2
ECHO MV E DECELERATION TIME (DT): 144.6 MS
ECHO MV E VELOCITY: 1.1 M/S
ECHO MV E/A RATIO: 1
ECHO MV E/E' LATERAL: 18.33
ECHO MV E/E' RATIO (AVERAGED): 15.28
ECHO MV E/E' SEPTAL: 12.22
ECHO MV LVOT VTI INDEX: 1.44
ECHO MV MAX VELOCITY: 1.5 M/S
ECHO MV MEAN GRADIENT: 4 MMHG
ECHO MV MEAN VELOCITY: 0.9 M/S
ECHO MV PEAK GRADIENT: 9 MMHG
ECHO MV REGURGITANT PEAK GRADIENT: 112 MMHG
ECHO MV REGURGITANT PEAK VELOCITY: 5.3 M/S
ECHO MV VTI: 45.4 CM
ECHO PULMONARY ARTERY END DIASTOLIC PRESSURE: 6 MMHG
ECHO PV MAX VELOCITY: 1.1 M/S
ECHO PV PEAK GRADIENT: 5 MMHG
ECHO PV REGURGITANT MAX VELOCITY: 1.2 M/S
ECHO RV FREE WALL PEAK S': 15 CM/S
ECHO RV TAPSE: 1.8 CM (ref 1.5–2)
EOSINOPHIL # BLD: 0.1 K/UL (ref 0–0.4)
EOSINOPHIL # BLD: 0.1 K/UL (ref 0–0.4)
EOSINOPHIL NFR BLD: 1 % (ref 0–7)
EOSINOPHIL NFR BLD: 1 % (ref 0–7)
EPITH CASTS URNS QL MICRO: ABNORMAL /LPF
ERYTHROCYTE [DISTWIDTH] IN BLOOD BY AUTOMATED COUNT: 15.8 % (ref 11.5–14.5)
ERYTHROCYTE [DISTWIDTH] IN BLOOD BY AUTOMATED COUNT: 16.3 % (ref 11.5–14.5)
GLOBULIN SER CALC-MCNC: 4.2 G/DL (ref 2–4)
GLUCOSE BLD STRIP.AUTO-MCNC: 104 MG/DL (ref 65–117)
GLUCOSE BLD STRIP.AUTO-MCNC: 112 MG/DL (ref 65–117)
GLUCOSE BLD STRIP.AUTO-MCNC: 123 MG/DL (ref 65–117)
GLUCOSE BLD STRIP.AUTO-MCNC: 128 MG/DL (ref 65–117)
GLUCOSE SERPL-MCNC: 115 MG/DL (ref 65–100)
GLUCOSE SERPL-MCNC: 188 MG/DL (ref 65–100)
GLUCOSE UR STRIP.AUTO-MCNC: NEGATIVE MG/DL
GRAN CASTS URNS QL MICRO: ABNORMAL /LPF
HCT VFR BLD AUTO: 20.5 % (ref 36.6–50.3)
HCT VFR BLD AUTO: 22.9 % (ref 36.6–50.3)
HEMOCCULT STL QL: NEGATIVE
HGB BLD-MCNC: 6.4 G/DL (ref 12.1–17)
HGB BLD-MCNC: 7.3 G/DL (ref 12.1–17)
HGB UR QL STRIP: ABNORMAL
HISTORY CHECKED?,CKHIST: NORMAL
HYALINE CASTS URNS QL MICRO: ABNORMAL /LPF (ref 0–5)
IMM GRANULOCYTES # BLD AUTO: 0 K/UL (ref 0–0.04)
IMM GRANULOCYTES # BLD AUTO: 0 K/UL (ref 0–0.04)
IMM GRANULOCYTES NFR BLD AUTO: 0 % (ref 0–0.5)
IMM GRANULOCYTES NFR BLD AUTO: 1 % (ref 0–0.5)
INR PPP: 1.1 (ref 0.9–1.1)
IRON SATN MFR SERPL: 4 % (ref 20–50)
IRON SERPL-MCNC: 10 UG/DL (ref 35–150)
KETONES UR QL STRIP.AUTO: NEGATIVE MG/DL
LEUKOCYTE ESTERASE UR QL STRIP.AUTO: NEGATIVE
LYMPHOCYTES # BLD: 1.5 K/UL (ref 0.8–3.5)
LYMPHOCYTES # BLD: 1.8 K/UL (ref 0.8–3.5)
LYMPHOCYTES NFR BLD: 16 % (ref 12–49)
LYMPHOCYTES NFR BLD: 21 % (ref 12–49)
MCH RBC QN AUTO: 26.5 PG (ref 26–34)
MCH RBC QN AUTO: 26.6 PG (ref 26–34)
MCHC RBC AUTO-ENTMCNC: 31.2 G/DL (ref 30–36.5)
MCHC RBC AUTO-ENTMCNC: 31.9 G/DL (ref 30–36.5)
MCV RBC AUTO: 83.3 FL (ref 80–99)
MCV RBC AUTO: 85.1 FL (ref 80–99)
MONOCYTES # BLD: 0.8 K/UL (ref 0–1)
MONOCYTES # BLD: 1.2 K/UL (ref 0–1)
MONOCYTES NFR BLD: 10 % (ref 5–13)
MONOCYTES NFR BLD: 12 % (ref 5–13)
MUCOUS THREADS URNS QL MICRO: ABNORMAL /LPF
NEUTS SEG # BLD: 5.8 K/UL (ref 1.8–8)
NEUTS SEG # BLD: 6.8 K/UL (ref 1.8–8)
NEUTS SEG NFR BLD: 67 % (ref 32–75)
NEUTS SEG NFR BLD: 71 % (ref 32–75)
NITRITE UR QL STRIP.AUTO: NEGATIVE
NRBC # BLD: 0 K/UL (ref 0–0.01)
NRBC # BLD: 0 K/UL (ref 0–0.01)
NRBC BLD-RTO: 0 PER 100 WBC
NRBC BLD-RTO: 0 PER 100 WBC
P-R INTERVAL, ECG05: 128 MS
PH UR STRIP: 6 [PH] (ref 5–8)
PLATELET # BLD AUTO: 231 K/UL (ref 150–400)
PLATELET # BLD AUTO: 241 K/UL (ref 150–400)
PMV BLD AUTO: 10.3 FL (ref 8.9–12.9)
PMV BLD AUTO: 10.5 FL (ref 8.9–12.9)
POTASSIUM SERPL-SCNC: 4.8 MMOL/L (ref 3.5–5.1)
POTASSIUM SERPL-SCNC: 5.3 MMOL/L (ref 3.5–5.1)
PROT SERPL-MCNC: 6.9 G/DL (ref 6.4–8.2)
PROT UR STRIP-MCNC: 300 MG/DL
PROTHROMBIN TIME: 11.3 SEC (ref 9–11.1)
Q-T INTERVAL, ECG07: 426 MS
QRS DURATION, ECG06: 84 MS
QTC CALCULATION (BEZET), ECG08: 446 MS
RBC # BLD AUTO: 2.41 M/UL (ref 4.1–5.7)
RBC # BLD AUTO: 2.75 M/UL (ref 4.1–5.7)
RBC #/AREA URNS HPF: ABNORMAL /HPF (ref 0–5)
RBC MORPH BLD: ABNORMAL
RBC MORPH BLD: ABNORMAL
SERVICE CMNT-IMP: ABNORMAL
SERVICE CMNT-IMP: ABNORMAL
SERVICE CMNT-IMP: NORMAL
SERVICE CMNT-IMP: NORMAL
SODIUM SERPL-SCNC: 142 MMOL/L (ref 136–145)
SODIUM SERPL-SCNC: 143 MMOL/L (ref 136–145)
SP GR UR REFRACTOMETRY: 1.01 (ref 1–1.03)
THERAPEUTIC RANGE,PTTT: NORMAL SECS (ref 58–77)
TIBC SERPL-MCNC: 229 UG/DL (ref 250–450)
TROPONIN-HIGH SENSITIVITY: 43 NG/L (ref 0–76)
TROPONIN-HIGH SENSITIVITY: 70 NG/L (ref 0–76)
TROPONIN-HIGH SENSITIVITY: 83 NG/L (ref 0–76)
UA: UC IF INDICATED,UAUC: ABNORMAL
UROBILINOGEN UR QL STRIP.AUTO: 0.2 EU/DL (ref 0.2–1)
VENTRICULAR RATE, ECG03: 66 BPM
WBC # BLD AUTO: 8.5 K/UL (ref 4.1–11.1)
WBC # BLD AUTO: 9.6 K/UL (ref 4.1–11.1)
WBC URNS QL MICRO: ABNORMAL /HPF (ref 0–4)

## 2022-04-06 PROCEDURE — 82962 GLUCOSE BLOOD TEST: CPT

## 2022-04-06 PROCEDURE — 74011250636 HC RX REV CODE- 250/636: Performed by: INTERNAL MEDICINE

## 2022-04-06 PROCEDURE — 82272 OCCULT BLD FECES 1-3 TESTS: CPT

## 2022-04-06 PROCEDURE — 74011250637 HC RX REV CODE- 250/637: Performed by: INTERNAL MEDICINE

## 2022-04-06 PROCEDURE — 86923 COMPATIBILITY TEST ELECTRIC: CPT

## 2022-04-06 PROCEDURE — 74011000250 HC RX REV CODE- 250: Performed by: INTERNAL MEDICINE

## 2022-04-06 PROCEDURE — 36430 TRANSFUSION BLD/BLD COMPNT: CPT

## 2022-04-06 PROCEDURE — 86900 BLOOD TYPING SEROLOGIC ABO: CPT

## 2022-04-06 PROCEDURE — 74011636637 HC RX REV CODE- 636/637: Performed by: INTERNAL MEDICINE

## 2022-04-06 PROCEDURE — 99223 1ST HOSP IP/OBS HIGH 75: CPT | Performed by: INTERNAL MEDICINE

## 2022-04-06 PROCEDURE — 85025 COMPLETE CBC W/AUTO DIFF WBC: CPT

## 2022-04-06 PROCEDURE — 76770 US EXAM ABDO BACK WALL COMP: CPT

## 2022-04-06 PROCEDURE — 65270000029 HC RM PRIVATE

## 2022-04-06 PROCEDURE — C9113 INJ PANTOPRAZOLE SODIUM, VIA: HCPCS | Performed by: INTERNAL MEDICINE

## 2022-04-06 PROCEDURE — 80048 BASIC METABOLIC PNL TOTAL CA: CPT

## 2022-04-06 PROCEDURE — 81001 URINALYSIS AUTO W/SCOPE: CPT

## 2022-04-06 PROCEDURE — 83540 ASSAY OF IRON: CPT

## 2022-04-06 PROCEDURE — 84484 ASSAY OF TROPONIN QUANT: CPT

## 2022-04-06 PROCEDURE — 93306 TTE W/DOPPLER COMPLETE: CPT | Performed by: INTERNAL MEDICINE

## 2022-04-06 PROCEDURE — 36415 COLL VENOUS BLD VENIPUNCTURE: CPT

## 2022-04-06 PROCEDURE — 93306 TTE W/DOPPLER COMPLETE: CPT

## 2022-04-06 PROCEDURE — P9016 RBC LEUKOCYTES REDUCED: HCPCS

## 2022-04-06 PROCEDURE — 96374 THER/PROPH/DIAG INJ IV PUSH: CPT

## 2022-04-06 PROCEDURE — 74011000250 HC RX REV CODE- 250: Performed by: EMERGENCY MEDICINE

## 2022-04-06 PROCEDURE — 84300 ASSAY OF URINE SODIUM: CPT

## 2022-04-06 RX ORDER — SODIUM CHLORIDE 9 MG/ML
250 INJECTION, SOLUTION INTRAVENOUS AS NEEDED
Status: DISCONTINUED | OUTPATIENT
Start: 2022-04-06 | End: 2022-04-10 | Stop reason: SDUPTHER

## 2022-04-06 RX ORDER — BUMETANIDE 0.25 MG/ML
2 INJECTION INTRAMUSCULAR; INTRAVENOUS 3 TIMES DAILY
Status: DISCONTINUED | OUTPATIENT
Start: 2022-04-06 | End: 2022-04-11

## 2022-04-06 RX ORDER — GUAIFENESIN 100 MG/5ML
81 LIQUID (ML) ORAL DAILY
Status: DISCONTINUED | OUTPATIENT
Start: 2022-04-06 | End: 2022-04-13 | Stop reason: HOSPADM

## 2022-04-06 RX ORDER — POLYETHYLENE GLYCOL 3350 17 G/17G
17 POWDER, FOR SOLUTION ORAL DAILY PRN
Status: DISCONTINUED | OUTPATIENT
Start: 2022-04-06 | End: 2022-04-13 | Stop reason: HOSPADM

## 2022-04-06 RX ORDER — ASPIRIN 325 MG/1
100 TABLET, FILM COATED ORAL DAILY
Status: DISCONTINUED | OUTPATIENT
Start: 2022-04-06 | End: 2022-04-13 | Stop reason: HOSPADM

## 2022-04-06 RX ORDER — HYDRALAZINE HYDROCHLORIDE 25 MG/1
25 TABLET, FILM COATED ORAL 3 TIMES DAILY
Status: DISCONTINUED | OUTPATIENT
Start: 2022-04-06 | End: 2022-04-07

## 2022-04-06 RX ORDER — ONDANSETRON 2 MG/ML
4 INJECTION INTRAMUSCULAR; INTRAVENOUS
Status: DISCONTINUED | OUTPATIENT
Start: 2022-04-06 | End: 2022-04-13 | Stop reason: HOSPADM

## 2022-04-06 RX ORDER — SODIUM CHLORIDE 0.9 % (FLUSH) 0.9 %
5-40 SYRINGE (ML) INJECTION EVERY 8 HOURS
Status: DISCONTINUED | OUTPATIENT
Start: 2022-04-06 | End: 2022-04-13 | Stop reason: HOSPADM

## 2022-04-06 RX ORDER — SODIUM BICARBONATE 1 MEQ/ML
100 SYRINGE (ML) INTRAVENOUS ONCE
Status: COMPLETED | OUTPATIENT
Start: 2022-04-06 | End: 2022-04-06

## 2022-04-06 RX ORDER — AMLODIPINE BESYLATE 5 MG/1
10 TABLET ORAL DAILY
Status: DISCONTINUED | OUTPATIENT
Start: 2022-04-06 | End: 2022-04-13 | Stop reason: HOSPADM

## 2022-04-06 RX ORDER — BUMETANIDE 0.25 MG/ML
2 INJECTION INTRAMUSCULAR; INTRAVENOUS 2 TIMES DAILY
Status: DISCONTINUED | OUTPATIENT
Start: 2022-04-06 | End: 2022-04-06

## 2022-04-06 RX ORDER — LANOLIN ALCOHOL/MO/W.PET/CERES
1 CREAM (GRAM) TOPICAL
Status: DISCONTINUED | OUTPATIENT
Start: 2022-04-06 | End: 2022-04-11

## 2022-04-06 RX ORDER — PANTOPRAZOLE SODIUM 40 MG/1
40 TABLET, DELAYED RELEASE ORAL DAILY
Status: DISCONTINUED | OUTPATIENT
Start: 2022-04-06 | End: 2022-04-06

## 2022-04-06 RX ORDER — ISOSORBIDE DINITRATE 10 MG/1
30 TABLET ORAL EVERY 8 HOURS
Status: DISCONTINUED | OUTPATIENT
Start: 2022-04-06 | End: 2022-04-09

## 2022-04-06 RX ORDER — PROMETHAZINE HYDROCHLORIDE 25 MG/1
12.5 TABLET ORAL
Status: DISCONTINUED | OUTPATIENT
Start: 2022-04-06 | End: 2022-04-13 | Stop reason: HOSPADM

## 2022-04-06 RX ORDER — MAGNESIUM SULFATE 100 %
4 CRYSTALS MISCELLANEOUS AS NEEDED
Status: DISCONTINUED | OUTPATIENT
Start: 2022-04-06 | End: 2022-04-13 | Stop reason: HOSPADM

## 2022-04-06 RX ORDER — LANOLIN ALCOHOL/MO/W.PET/CERES
100 CREAM (GRAM) TOPICAL DAILY
Status: DISCONTINUED | OUTPATIENT
Start: 2022-04-06 | End: 2022-04-06 | Stop reason: CLARIF

## 2022-04-06 RX ORDER — ACETAMINOPHEN 325 MG/1
650 TABLET ORAL
Status: DISCONTINUED | OUTPATIENT
Start: 2022-04-06 | End: 2022-04-13 | Stop reason: HOSPADM

## 2022-04-06 RX ORDER — BUMETANIDE 0.25 MG/ML
1 INJECTION INTRAMUSCULAR; INTRAVENOUS 2 TIMES DAILY
Status: DISCONTINUED | OUTPATIENT
Start: 2022-04-06 | End: 2022-04-06

## 2022-04-06 RX ORDER — CARVEDILOL 12.5 MG/1
25 TABLET ORAL 2 TIMES DAILY
Status: DISCONTINUED | OUTPATIENT
Start: 2022-04-06 | End: 2022-04-13 | Stop reason: HOSPADM

## 2022-04-06 RX ORDER — INSULIN LISPRO 100 [IU]/ML
INJECTION, SOLUTION INTRAVENOUS; SUBCUTANEOUS
Status: DISCONTINUED | OUTPATIENT
Start: 2022-04-06 | End: 2022-04-13 | Stop reason: HOSPADM

## 2022-04-06 RX ORDER — ENOXAPARIN SODIUM 100 MG/ML
30 INJECTION SUBCUTANEOUS DAILY
Status: DISCONTINUED | OUTPATIENT
Start: 2022-04-06 | End: 2022-04-06

## 2022-04-06 RX ORDER — BUMETANIDE 0.25 MG/ML
1 INJECTION INTRAMUSCULAR; INTRAVENOUS ONCE
Status: COMPLETED | OUTPATIENT
Start: 2022-04-06 | End: 2022-04-06

## 2022-04-06 RX ORDER — INSULIN GLARGINE 100 [IU]/ML
20 INJECTION, SOLUTION SUBCUTANEOUS
Status: DISCONTINUED | OUTPATIENT
Start: 2022-04-06 | End: 2022-04-11

## 2022-04-06 RX ORDER — SODIUM CHLORIDE 0.9 % (FLUSH) 0.9 %
5-40 SYRINGE (ML) INJECTION AS NEEDED
Status: DISCONTINUED | OUTPATIENT
Start: 2022-04-06 | End: 2022-04-13 | Stop reason: HOSPADM

## 2022-04-06 RX ORDER — ATORVASTATIN CALCIUM 40 MG/1
40 TABLET, FILM COATED ORAL DAILY
Status: DISCONTINUED | OUTPATIENT
Start: 2022-04-06 | End: 2022-04-13 | Stop reason: HOSPADM

## 2022-04-06 RX ORDER — ACETAMINOPHEN 650 MG/1
650 SUPPOSITORY RECTAL
Status: DISCONTINUED | OUTPATIENT
Start: 2022-04-06 | End: 2022-04-13 | Stop reason: HOSPADM

## 2022-04-06 RX ORDER — DEXTROSE MONOHYDRATE 100 MG/ML
0-250 INJECTION, SOLUTION INTRAVENOUS AS NEEDED
Status: DISCONTINUED | OUTPATIENT
Start: 2022-04-06 | End: 2022-04-13 | Stop reason: HOSPADM

## 2022-04-06 RX ORDER — MORPHINE SULFATE 2 MG/ML
1 INJECTION, SOLUTION INTRAMUSCULAR; INTRAVENOUS
Status: DISCONTINUED | OUTPATIENT
Start: 2022-04-06 | End: 2022-04-13 | Stop reason: HOSPADM

## 2022-04-06 RX ADMIN — ISOSORBIDE DINITRATE 30 MG: 10 TABLET ORAL at 20:53

## 2022-04-06 RX ADMIN — BUMETANIDE 2 MG: 0.25 INJECTION, SOLUTION INTRAMUSCULAR; INTRAVENOUS at 15:15

## 2022-04-06 RX ADMIN — SODIUM ZIRCONIUM CYCLOSILICATE 10 G: 10 POWDER, FOR SUSPENSION ORAL at 13:19

## 2022-04-06 RX ADMIN — SODIUM CHLORIDE, PRESERVATIVE FREE 10 ML: 5 INJECTION INTRAVENOUS at 13:20

## 2022-04-06 RX ADMIN — TICAGRELOR 90 MG: 90 TABLET ORAL at 11:26

## 2022-04-06 RX ADMIN — SODIUM CHLORIDE 40 MG: 9 INJECTION INTRAMUSCULAR; INTRAVENOUS; SUBCUTANEOUS at 20:53

## 2022-04-06 RX ADMIN — ISOSORBIDE DINITRATE 30 MG: 10 TABLET ORAL at 04:27

## 2022-04-06 RX ADMIN — BUMETANIDE 1 MG: 0.25 INJECTION, SOLUTION INTRAMUSCULAR; INTRAVENOUS at 01:12

## 2022-04-06 RX ADMIN — TICAGRELOR 90 MG: 90 TABLET ORAL at 18:59

## 2022-04-06 RX ADMIN — SODIUM BICARBONATE 100 MEQ: 84 INJECTION, SOLUTION INTRAVENOUS at 13:19

## 2022-04-06 RX ADMIN — SODIUM CHLORIDE 40 MG: 9 INJECTION INTRAMUSCULAR; INTRAVENOUS; SUBCUTANEOUS at 10:26

## 2022-04-06 RX ADMIN — THIAMINE HCL TAB 100 MG 100 MG: 100 TAB at 10:26

## 2022-04-06 RX ADMIN — HYDRALAZINE HYDROCHLORIDE 25 MG: 50 TABLET, FILM COATED ORAL at 10:26

## 2022-04-06 RX ADMIN — GABAPENTIN 400 MG: 100 CAPSULE ORAL at 15:15

## 2022-04-06 RX ADMIN — ASPIRIN 81 MG: 81 TABLET, CHEWABLE ORAL at 10:25

## 2022-04-06 RX ADMIN — ATORVASTATIN CALCIUM 40 MG: 40 TABLET, FILM COATED ORAL at 10:26

## 2022-04-06 RX ADMIN — EPOETIN ALFA-EPBX 12000 UNITS: 10000 INJECTION, SOLUTION INTRAVENOUS; SUBCUTANEOUS at 23:00

## 2022-04-06 RX ADMIN — CARVEDILOL 25 MG: 12.5 TABLET, FILM COATED ORAL at 10:26

## 2022-04-06 RX ADMIN — CARVEDILOL 25 MG: 12.5 TABLET, FILM COATED ORAL at 18:59

## 2022-04-06 RX ADMIN — SODIUM CHLORIDE 40 MG: 9 INJECTION INTRAMUSCULAR; INTRAVENOUS; SUBCUTANEOUS at 03:41

## 2022-04-06 RX ADMIN — HYDRALAZINE HYDROCHLORIDE 25 MG: 50 TABLET, FILM COATED ORAL at 15:16

## 2022-04-06 RX ADMIN — SODIUM CHLORIDE, PRESERVATIVE FREE 10 ML: 5 INJECTION INTRAVENOUS at 01:50

## 2022-04-06 RX ADMIN — HYDRALAZINE HYDROCHLORIDE 25 MG: 50 TABLET, FILM COATED ORAL at 22:59

## 2022-04-06 RX ADMIN — GABAPENTIN 400 MG: 100 CAPSULE ORAL at 10:26

## 2022-04-06 RX ADMIN — GABAPENTIN 400 MG: 100 CAPSULE ORAL at 22:59

## 2022-04-06 RX ADMIN — FERROUS SULFATE TAB 325 MG (65 MG ELEMENTAL FE) 325 MG: 325 (65 FE) TAB at 03:41

## 2022-04-06 RX ADMIN — SODIUM CHLORIDE, PRESERVATIVE FREE 10 ML: 5 INJECTION INTRAVENOUS at 23:03

## 2022-04-06 RX ADMIN — SODIUM CHLORIDE, PRESERVATIVE FREE 10 ML: 5 INJECTION INTRAVENOUS at 05:32

## 2022-04-06 RX ADMIN — INSULIN GLARGINE 20 UNITS: 100 INJECTION, SOLUTION SUBCUTANEOUS at 03:40

## 2022-04-06 RX ADMIN — BUMETANIDE 2 MG: 0.25 INJECTION, SOLUTION INTRAMUSCULAR; INTRAVENOUS at 22:57

## 2022-04-06 RX ADMIN — BUMETANIDE 2 MG: 0.25 INJECTION INTRAMUSCULAR; INTRAVENOUS at 10:27

## 2022-04-06 RX ADMIN — AMLODIPINE BESYLATE 10 MG: 5 TABLET ORAL at 10:26

## 2022-04-06 NOTE — ED PROVIDER NOTES
EMERGENCY DEPARTMENT HISTORY AND PHYSICAL EXAM      Please note that this dictation was completed with the assistance of \"Dragon\", the computer voice recognition software. Quite often unanticipated grammatical, syntax, homophones, and other interpretive errors are inadvertently transcribed by the computer software. Please disregard these errors and any errors that have escaped final proofreading. Thank you. Patient: Randell Overton  DOS: 22  : 1955  MRN: [id number]  History of Presenting Illness     Chief Complaint   Patient presents with    Shortness of Breath    Chest Pain     History Provided By: Patient/family/EMS (if applicable)    HPI: Randell Overton, 61 y.o. male with past medical history as documented below presents to the ED with c/o of acute onset of 3 days of progressively worsening SOB with associated chest tightness. He states sx's worsen with any exertion. Pt reports recent MI with stent placement and is on aspirin and Brilinta. Denies any melena or hematochezia. He became acutely SOB tonight while taking out the trash prompting ED visit. Pt denies any other exacerbating or ameliorating factors. Additionally, pt specifically denies any recent fever, chills, headache, nausea, vomiting, abdominal pain, lightheadedness, dizziness, numbness, weakness, lower extremity swelling, heart palpitations, urinary sxs, diarrhea, constipation, melena, hematochezia, cough, or congestion. There are no other complaints, changes or physical findings pertinent to the HPI at this time.     PCP: Ary Barber MD  Past History   Past Medical History:  Past Medical History:   Diagnosis Date    Diabetes ([facility] [address]     Hypertension        Past Surgical History:  Past Surgical History:   Procedure Laterality Date    IR INSERT NON TUNL CVC OVER 5 YRS  2021    IR INSERT TUNL CVC W/O PORT OVER 5 YR  3/2/2021       Family History:   Family history reviewed and was non-contributory, unless specified below:  No family history on file. Social History:  Social History     Tobacco Use    Smoking status: Former Smoker    Smokeless tobacco: Never Used   Substance Use Topics    Alcohol use: Not Currently    Drug use: Not Currently     Types: Heroin       Allergies:  No Known Allergies    Current Medications:  No current facility-administered medications on file prior to encounter. Current Outpatient Medications on File Prior to Encounter   Medication Sig Dispense Refill    atorvastatin (LIPITOR) 80 mg tablet Take 0.5 Tabs by mouth daily. 30 Tab 0    carvediloL (COREG) 12.5 mg tablet Take 2 Tabs by mouth two (2) times a day. 60 Tab 0    amLODIPine (NORVASC) 10 mg tablet Take 1 Tab by mouth daily. 30 Tab 0    hydrALAZINE (APRESOLINE) 25 mg tablet Take 1 Tab by mouth three (3) times daily. 90 Tab 0    isosorbide dinitrate (ISORDIL) 30 mg tablet Take 1 Tab by mouth every eight (8) hours. 30 Tab 0    thiamine HCL (B-1) 100 mg tablet Take 1 Tab by mouth daily. 5 Tab 0    bumetanide (BUMEX) 2 mg tablet Take 1 Tab by mouth daily. 60 Tab 0    balsam peru-castor oiL (VENELEX) ointment Apply  to affected area every twelve (12) hours. APPLY LIBERALLY TO Right ear, sacrum, b/l heels and any red/bony prominence 1 Tube 0    acetaminophen (TYLENOL) 325 mg tablet Take 975 mg by mouth every six (6) hours as needed for Pain.  aspirin 81 mg chewable tablet Take 81 mg by mouth daily.  docusate sodium (Colace) 100 mg capsule Take 100 mg by mouth two (2) times daily as needed for Constipation.  ferrous sulfate 325 mg (65 mg iron) tablet Take  by mouth every fourty-eight (48) hours.  gabapentin (NEURONTIN) 400 mg capsule Take 400 mg by mouth three (3) times daily.  insulin lispro (HUMALOG) 100 unit/mL injection 10 Units by SubCUTAneous route Before breakfast, lunch, and dinner.  insulin glargine (Lantus U-100 Insulin) 100 unit/mL injection 20 Units by SubCUTAneous route nightly.  pantoprazole (PROTONIX) 40 mg tablet Take 40 mg by mouth daily.  senna (Senna) 8.6 mg tablet Take 1 Tab by mouth daily.  ticagrelor (BRILINTA) 90 mg tablet Take 90 mg by mouth two (2) times a day.  ascorbic acid, vitamin C, (Vitamin C) 500 mg tablet Take 500 mg by mouth two (2) times a day.  cholecalciferol (Vitamin D3) (1000 Units /25 mcg) tablet Take 1,000 Units by mouth daily.  zinc sulfate (ZINCATE) 220 (50) mg capsule Take 220 mg by mouth daily.  therapeutic multivitamin (THERAGRAN) tablet Take 1 Tab by mouth daily. Review of Systems   A complete ROS was reviewed by me today and all other systems negative, unless otherwise specified below:  Review of Systems   Constitutional: Negative. Negative for chills and fever. HENT: Negative. Negative for congestion and sore throat. Eyes: Negative. Respiratory: Positive for chest tightness and shortness of breath. Negative for cough and wheezing. Cardiovascular: Positive for chest pain. Negative for palpitations and leg swelling. Gastrointestinal: Negative. Negative for abdominal distention, abdominal pain, blood in stool, constipation, diarrhea, nausea and vomiting. Endocrine: Negative. Genitourinary: Negative. Negative for dysuria, flank pain, frequency, hematuria and urgency. Musculoskeletal: Negative. Negative for arthralgias, back pain and myalgias. Skin: Negative. Negative for color change and rash. Neurological: Negative. Negative for dizziness, syncope, speech difficulty, weakness, light-headedness, numbness and headaches. Hematological: Negative. Psychiatric/Behavioral: Negative. Negative for confusion and self-injury. The patient is not nervous/anxious. All other systems reviewed and are negative. Physical Exam   Physical Exam  Vitals and nursing note reviewed. Constitutional:       General: He is in acute distress. Appearance: He is well-developed.  He is ill-appearing, toxic-appearing and diaphoretic. HENT:      Head: Normocephalic and atraumatic. Mouth/Throat:      Pharynx: No posterior oropharyngeal erythema. Eyes:      Conjunctiva/sclera: Conjunctivae normal.   Cardiovascular:      Rate and Rhythm: Normal rate and regular rhythm. Heart sounds: Normal heart sounds. No murmur heard. No friction rub. No gallop. Pulmonary:      Effort: Tachypnea and accessory muscle usage present. No respiratory distress. Breath sounds: Rales present. No wheezing. Chest:      Chest wall: No tenderness. Abdominal:      General: Bowel sounds are normal. There is no distension. Palpations: Abdomen is soft. There is no mass. Tenderness: There is no abdominal tenderness. There is no guarding or rebound. Musculoskeletal:         General: Normal range of motion. Cervical back: Normal range of motion. Right lower leg: Edema present. Left lower leg: Edema present. Skin:     General: Skin is warm. Neurological:      General: No focal deficit present. Mental Status: He is alert and oriented to person, place, and time. Motor: No abnormal muscle tone. Psychiatric:         Behavior: Behavior is cooperative. Diagnostic Study Results     Laboratory Data  I have personally reviewed and interpreted all available laboratory results. Recent Results (from the past 24 hour(s))   EKG, 12 LEAD, INITIAL    Collection Time: 04/05/22 11:16 PM   Result Value Ref Range    Ventricular Rate 66 BPM    Atrial Rate 66 BPM    P-R Interval 128 ms    QRS Duration 84 ms    Q-T Interval 426 ms    QTC Calculation (Bezet) 446 ms    Calculated P Axis -13 degrees    Calculated R Axis 9 degrees    Calculated T Axis 28 degrees    Diagnosis       Normal sinus rhythm  Possible Anterior infarct , age undetermined  When compared with ECG of 16-FEB-2021 04:12,  Sinus rhythm has replaced Atrial fibrillation  Vent.  rate has decreased BY  42 BPM  T wave inversion less evident in Anterolateral leads  QT has shortened     CBC WITH AUTOMATED DIFF    Collection Time: 04/05/22 11:23 PM   Result Value Ref Range    WBC 9.6 4.1 - 11.1 K/uL    RBC 2.41 (L) 4.10 - 5.70 M/uL    HGB 6.4 (L) 12.1 - 17.0 g/dL    HCT 20.5 (L) 36.6 - 50.3 %    MCV 85.1 80.0 - 99.0 FL    MCH 26.6 26.0 - 34.0 PG    MCHC 31.2 30.0 - 36.5 g/dL    RDW 16.3 (H) 11.5 - 14.5 %    PLATELET 609 385 - 872 K/uL    MPV 10.5 8.9 - 12.9 FL    NRBC 0.0 0  WBC    ABSOLUTE NRBC 0.00 0.00 - 0.01 K/uL    NEUTROPHILS 71 32 - 75 %    LYMPHOCYTES 16 12 - 49 %    MONOCYTES 12 5 - 13 %    EOSINOPHILS 1 0 - 7 %    BASOPHILS 0 0 - 1 %    IMMATURE GRANULOCYTES 0 0.0 - 0.5 %    ABS. NEUTROPHILS 6.8 1.8 - 8.0 K/UL    ABS. LYMPHOCYTES 1.5 0.8 - 3.5 K/UL    ABS. MONOCYTES 1.2 (H) 0.0 - 1.0 K/UL    ABS. EOSINOPHILS 0.1 0.0 - 0.4 K/UL    ABS. BASOPHILS 0.0 0.0 - 0.1 K/UL    ABS. IMM. GRANS. 0.0 0.00 - 0.04 K/UL    DF SMEAR SCANNED      RBC COMMENTS ANISOCYTOSIS  1+        RBC COMMENTS SCHISTOCYTES  1+       METABOLIC PANEL, COMPREHENSIVE    Collection Time: 04/05/22 11:23 PM   Result Value Ref Range    Sodium 142 136 - 145 mmol/L    Potassium 5.3 (H) 3.5 - 5.1 mmol/L    Chloride 115 (H) 97 - 108 mmol/L    CO2 19 (L) 21 - 32 mmol/L    Anion gap 8 5 - 15 mmol/L    Glucose 188 (H) 65 - 100 mg/dL    BUN 60 (H) 6 - 20 MG/DL    Creatinine 4.47 (H) 0.70 - 1.30 MG/DL    BUN/Creatinine ratio 13 12 - 20      GFR est AA 16 (L) >60 ml/min/1.73m2    GFR est non-AA 13 (L) >60 ml/min/1.73m2    Calcium 7.5 (L) 8.5 - 10.1 MG/DL    Bilirubin, total 0.3 0.2 - 1.0 MG/DL    ALT (SGPT) 20 12 - 78 U/L    AST (SGOT) 16 15 - 37 U/L    Alk.  phosphatase 96 45 - 117 U/L    Protein, total 6.9 6.4 - 8.2 g/dL    Albumin 2.7 (L) 3.5 - 5.0 g/dL    Globulin 4.2 (H) 2.0 - 4.0 g/dL    A-G Ratio 0.6 (L) 1.1 - 2.2     TROPONIN-HIGH SENSITIVITY    Collection Time: 04/05/22 11:23 PM   Result Value Ref Range    Troponin-High Sensitivity 83 (HH) 0 - 76 ng/L   NT-PRO BNP Collection Time: 04/05/22 11:23 PM   Result Value Ref Range    NT pro-BNP 16,211 (H) <125 PG/ML   PTT    Collection Time: 04/05/22 11:59 PM   Result Value Ref Range    aPTT 22.6 22.1 - 31.0 sec    aPTT, therapeutic range     58.0 - 77.0 SECS   PROTHROMBIN TIME + INR    Collection Time: 04/05/22 11:59 PM   Result Value Ref Range    INR 1.1 0.9 - 1.1      Prothrombin time 11.3 (H) 9.0 - 11.1 sec   OCCULT BLOOD, STOOL    Collection Time: 04/06/22 12:52 AM   Result Value Ref Range    Occult blood, stool Negative NEG     RBC, ALLOCATE    Collection Time: 04/06/22  2:00 AM   Result Value Ref Range    HISTORY CHECKED? Historical check performed    TROPONIN-HIGH SENSITIVITY    Collection Time: 04/06/22  2:07 AM   Result Value Ref Range    Troponin-High Sensitivity 70 0 - 76 ng/L   TYPE & SCREEN    Collection Time: 04/06/22  2:07 AM   Result Value Ref Range    Crossmatch Expiration 04/09/2022,2359     ABO/Rh(D) A POSITIVE     Antibody screen NEG     Unit number [id number]     Blood component type RC LR     Unit division 00     Status of unit ISSUED     Crossmatch result Compatible    IRON PROFILE    Collection Time: 04/06/22  2:07 AM   Result Value Ref Range    Iron 10 (L) 35 - 150 ug/dL    TIBC 229 (L) 250 - 450 ug/dL    Iron % saturation 4 (L) 20 - 50 %   GLUCOSE, POC    Collection Time: 04/06/22  3:30 AM   Result Value Ref Range    Glucose (POC) 128 (H) 65 - 117 mg/dL    Performed by Светлана Mahajan PCT    GLUCOSE, POC    Collection Time: 04/06/22  7:49 AM   Result Value Ref Range    Glucose (POC) 104 65 - 117 mg/dL    Performed by Karen Thakur  PCT        Radiologic Studies   I have personally reviewed and interpreted all available imaging studies and agree with radiology interpretation. XR CHEST PORT   Final Result   1. Diffuse interstitial prominence which may represent pulmonary vascular   congestion or atypical infectious process.                US RETROPERITONEUM COMP    (Results Pending)     CT Results  (Last 48 hours)    None        CXR Results  (Last 48 hours)               04/06/22 0008  XR CHEST PORT Final result    Impression:  1. Diffuse interstitial prominence which may represent pulmonary vascular   congestion or atypical infectious process. Narrative:  INDICATION: . Shortness of breath   Additional history:   COMPARISON: Previous chest xray, 2/23/2021. LIMITATIONS: Portable technique. Austin Leongashely FINDINGS: Single frontal view of the chest.    .   Lines/tubes/surgical: Cardiac monitor leads overly the patient. Heart/mediastinum: Calcifications in the aortic arch. Lungs/pleura: Diffuse interstitial prominence. No visualized pleural effusion or   pneumothorax. Additional Comments: None. .           Medical Decision Making   I am the first and primary ED physician for this patient's ED visit today. I reviewed our electronic medical record system for any past medical records that may contribute to the patient's current condition, including their past medical history, surgical history, social and family history. This also includes their most recent ED visits, previous hospitalizations and prior diagnostic data. I have reviewed and summarized the most pertinent findings in my HPI and MDM.     Vital Signs Reviewed:  Patient Vitals for the past 24 hrs:   Temp Pulse Resp BP SpO2   04/06/22 0722 97.7 °F (36.5 °C) (!) 57 16 (!) 170/69 97 %   04/06/22 0700 97.3 °F (36.3 °C) (!) 57 13 (!) 159/68 98 %   04/06/22 0630 97.4 °F (36.3 °C) (!) 57 12 (!) 154/57 98 %   04/06/22 0616 -- 62 17 (!) 147/114 93 %   04/06/22 0600 97 °F (36.1 °C) (!) 58 16 (!) 149/60 98 %   04/06/22 0545 97.4 °F (36.3 °C) (!) 55 15 (!) 156/61 97 %   04/06/22 0530 97.4 °F (36.3 °C) (!) 59 15 (!) 161/61 97 %   04/06/22 0516 97.5 °F (36.4 °C) (!) 56 15 (!) 158/63 97 %   04/06/22 0500 97.4 °F (36.3 °C) (!) 55 11 (!) 140/68 97 %   04/06/22 0445 -- (!) 56 10 (!) 156/61 98 %   04/06/22 0234 98 °F (36.7 °C) (!) 59 17 (!) 170/68 98 % 04/06/22 0200 -- 71 19 (!) 165/74 97 %   04/06/22 0131 -- 60 10 (!) 159/67 98 %   04/06/22 0101 -- 61 20 (!) 164/67 96 %   04/06/22 0030 -- 84 25 (!) 163/70 95 %   04/06/22 0015 -- 62 18 (!) 153/76 96 %   04/06/22 0000 -- 67 19 (!) 153/67 95 %   04/05/22 2345 -- 63 19 (!) 152/66 96 %   04/05/22 2330 -- 63 17 (!) 159/67 97 %   04/05/22 2315 98.5 °F (36.9 °C) 71 26 (!) 175/62 97 %     Pulse Oximetry Analysis: 97% on RA    Cardiac Monitor:   Rate: 71 bpm  The cardiac monitor revealed the following rhythm as interpreted by me: Normal Sinus Rhythm  Cardiac monitoring was ordered to monitor patient for signs of cardiac dysrhythmia, which they are at risk for based on their history and/or risk for cardiovascular disease and/or metabolic abnormalities. EKG interpretation:   Billable EKG reviewed by ED Physician in the absence of a cardiologist: Yes  Rhythm: normal sinus rhythm; and regular . Rate (approx.): 66; Axis: normal; P wave: normal; QRS interval: normal ; ST/T wave: normal; Other findings: normal. This EKG was interpreted by Frank Solis MD    Records Reviewed: Nursing Notes, Old Medical Records, Previous electrocardiograms, Previous Radiology Studies and Previous Laboratory Studies, EMS reports    Provider Notes (Medical Decision Making):   DDx includes STEMI, NSTEMI, Angina, PE, Aortic Pathology, Chest Wall Pain, Pleurisy, Pneumonia, GERD/esophagitis, Anxiety. No cough/fever or focal lung findings to suggest pneumonia. No tachycardia, hypoxia or pleuritic component to suggest PE. Pulses symmetric and no extremely elevated BP/asymmetry or classic tearing sensation to suggest Aortic Dissection. Also, no neuro findings. No wretching/forceful vomiting to suggest esophageal disaster. Denies IV drug abuse, has native valves, no fevers/murmurs or skin lesions to suggest endocarditis. Will evaluate with EKG, labs, cardiac enzymes, chest x-ray. Will provide pain control and reassess.     Patient presents with acute dyspnea. DDx: asthma, copd, pna, pulmonary edema, acute bronchitis, ACS, ptx, pna. Will obtain EKG, labs, CXR, provide O2 as needed for hypoxia, treat symptomatically and reassess. Will continue to monitor closely in ED. ED Course:   Initial assessment performed. I discussed presenting problems and concerns, and my formulated plan for today's visit with the patient and any available family members. I have encouraged them to ask questions as they arise throughout the visit.    Social History     Tobacco Use    Smoking status: Former Smoker    Smokeless tobacco: Never Used   Substance Use Topics    Alcohol use: Not Currently    Drug use: Not Currently     Types: Heroin       ED Orders Placed:  Orders Placed This Encounter    MECHANICAL PROPHYLAXIS IS CONTRAINDICATED Other, please document Already on Anticoagulation    XR CHEST  Port (Per MD Order)    US RETROPERITONEUM COMP    CBC WITH AUTOMATED DIFF    METABOLIC PANEL, COMPREHENSIVE    TROPONIN-HIGH SENSITIVITY    NT-PRO BNP    OCCULT BLOOD, STOOL    PTT    PT    METABOLIC PANEL, COMPREHENSIVE    CBC WITH AUTOMATED DIFF    TROPONIN-HIGH SENSITIVITY    IRON PROFILE    METABOLIC PANEL, BASIC    CBC WITH AUTOMATED DIFF    ADULT DIET Regular; 3 carb choices (45 gm/meal)    SOB PANEL TRACKING (DO NOT DESELECT)    OXYGEN CANNULA (To maintain O2 sat greater than 92%) Consult MD if Hx. of COPD    PULSE OXIMETRY SPOT CHECK    VITAL SIGNS    ACTIVITY AS TOLERATED W/ASSIST    DAILY WEIGHT    STRICT I & O    CARDIAC MONITORING    TRANSFUSE PACKED RBC'S, 1 Units    FULL CODE    IP CONSULT TO GASTROENTEROLOGY    IP CONSULT TO NEPHROLOGY    GLUCOSE, POC    GLUCOSE, POC    EKG, 12 LEAD, INITIAL    TYPE & SCREEN    RBC, ALLOCATE, 1 Units Date needed for transfusion: 4/6/2022    SALINE LOCK IV ONE TIME STAT    bumetanide (BUMEX) injection 1 mg    amLODIPine (NORVASC) tablet 10 mg    aspirin chewable tablet 81 mg    atorvastatin (LIPITOR) tablet 40 mg    carvediloL (COREG) tablet 25 mg    ferrous sulfate tablet 325 mg    hydrALAZINE (APRESOLINE) tablet 25 mg    gabapentin (NEURONTIN) capsule 400 mg    insulin glargine (LANTUS) injection 20 Units    isosorbide dinitrate (ISORDIL) tablet 30 mg    DISCONTD: pantoprazole (PROTONIX) tablet 40 mg    thiamine HCL (B-1) tablet 100 mg    ticagrelor (BRILINTA) tablet 90 mg    sodium chloride (NS) flush 5-40 mL    sodium chloride (NS) flush 5-40 mL    OR Linked Order Group     acetaminophen (TYLENOL) tablet 650 mg     acetaminophen (TYLENOL) suppository 650 mg    polyethylene glycol (MIRALAX) packet 17 g    OR Linked Order Group     promethazine (PHENERGAN) tablet 12.5 mg     ondansetron (ZOFRAN) injection 4 mg    DISCONTD: enoxaparin (LOVENOX) injection 30 mg    morphine injection 1 mg    0.9% sodium chloride infusion 250 mL    pantoprazole (PROTONIX) 40 mg in 0.9% sodium chloride 10 mL injection    bumetanide (BUMEX) injection 1 mg    insulin lispro (HUMALOG) injection    glucose chewable tablet 16 g    glucagon (GLUCAGEN) injection 1 mg    dextrose 10% infusion 0-250 mL    IP CONSULT TO CARDIOLOGY    INITIAL PHYSICIAN ORDER: INPATIENT Telemetry; Yes; 3.  Patient receiving treatment that can only be provided in an inpatient setting (further clarification in H&P documentation)       ED Medications Administered During ED Course:  Medications   amLODIPine (NORVASC) tablet 10 mg (has no administration in time range)   aspirin chewable tablet 81 mg (has no administration in time range)   atorvastatin (LIPITOR) tablet 40 mg (has no administration in time range)   carvediloL (COREG) tablet 25 mg (has no administration in time range)   ferrous sulfate tablet 325 mg (325 mg Oral Given 4/6/22 3001)   hydrALAZINE (APRESOLINE) tablet 25 mg (has no administration in time range)   gabapentin (NEURONTIN) capsule 400 mg (has no administration in time range)   insulin glargine (LANTUS) injection 20 Units (20 Units SubCUTAneous Given 4/6/22 0340)   isosorbide dinitrate (ISORDIL) tablet 30 mg (30 mg Oral Given 4/6/22 0427)   thiamine HCL (B-1) tablet 100 mg (has no administration in time range)   ticagrelor (BRILINTA) tablet 90 mg (has no administration in time range)   sodium chloride (NS) flush 5-40 mL (10 mL IntraVENous Given 4/6/22 0532)   sodium chloride (NS) flush 5-40 mL (has no administration in time range)   acetaminophen (TYLENOL) tablet 650 mg (has no administration in time range)     Or   acetaminophen (TYLENOL) suppository 650 mg (has no administration in time range)   polyethylene glycol (MIRALAX) packet 17 g (has no administration in time range)   promethazine (PHENERGAN) tablet 12.5 mg (has no administration in time range)     Or   ondansetron (ZOFRAN) injection 4 mg (has no administration in time range)   morphine injection 1 mg (has no administration in time range)   0.9% sodium chloride infusion 250 mL (has no administration in time range)   pantoprazole (PROTONIX) 40 mg in 0.9% sodium chloride 10 mL injection (40 mg IntraVENous Given 4/6/22 0341)   bumetanide (BUMEX) injection 1 mg (has no administration in time range)   insulin lispro (HUMALOG) injection (has no administration in time range)   glucose chewable tablet 16 g (has no administration in time range)   glucagon (GLUCAGEN) injection 1 mg (has no administration in time range)   dextrose 10% infusion 0-250 mL (has no administration in time range)   bumetanide (BUMEX) injection 1 mg (1 mg IntraVENous Given 4/6/22 0112)         Procedure Note - Rectal Exam:   Performed by: Cleveland Apley, MD  Chaperoned by: Primary RN  Rectal exam performed. Brown stool was collected. Stool was collected and sent to the lab for Hemoccult testing. Other findings: no hemorrhoids, no evidence of active bleeding  The procedure took 1-15 minutes, and pt tolerated well.     Progress Note:  CXR reviewed, concern for pulmonary congestion, BNP > 16K    Progress Note:  Prior ECHO showing EF 25-30%, pt in overt acute exacerbation, will diurese, concern for cardiorenal syndrome given elevated Cr, per chart review, previously required HD    Progress Note:  IV Bumex given, will monitor ins/outs strictly, Nephro consult    Progress Note:  Troponin 83, Hgb 6.4, hemoccult negative, pt on dual antiplatelets, will transfuse    Progress Note:  I have discussed with the patient the rationale for blood component transfusion; its benefits in treating or preventing fatigue, organ damage, or death; and its risk which includes mild transfusion reactions, rare risk of blood borne infection, or more serious but rare reactions. I have discussed the alternatives to transfusion, including the risk and consequences of not receiving transfusion. The patient had an opportunity to ask questions and had agreed to proceed with transfusion of blood components. Progress Note:  IV PPI given, blood consent obtained    Progress Note:  Pt states his cardiologist is at [facility], recent AMI in 2/2021 with stents, he has been compliant with both his aspirin and brilinta, last ECHO 2/2021; shows EF 30-35%, mild concentric hypertrophy. Progress Note:  I have just re-evaluated the patient. Patient reports improvement of sx's. I have reviewed His vital signs and determined there is currently no worsening in their condition or physical exam. Results have been reviewed with them and their questions have been answered. We will continue to review further results as they come available. Consult Note:  Franck Alexander MD spoke with Dr. Hai Grimes  Specialty: Hospitalist  Discussed pt's hx, disposition, and available diagnostic and imaging results. Reviewed care plans. Agree with management and plan thus far. Consultant will evaluate pt.       CRITICAL CARE NOTE :  IMPENDING DETERIORATION -Airway, Respiratory, Cardiovascular, Metabolic and Renal  ASSOCIATED RISK FACTORS - Hypotension, Shock, Hypoxia, Bleeding, Dysrhythmia, Metabolic changes, Dehydration and Vascular Compromise  MANAGEMENT- Bedside Assessment and Supervision of Care  INTERPRETATION -  Xrays, CT Scan, ECG, Blood Pressure and Cardiac Output Measures   INTERVENTIONS - hemodynamic mngmt, vascular control and Metabolic interventions, blood transfusion  CASE REVIEW - Hospitalist/Intensivist, Nursing and Family  TREATMENT RESPONSE -Improved and Stable  PERFORMED BY - Self    NOTES   :  I personally spent 80 minutes of critical care time with this patient. This is time spent at this critically ill patient's bedside actively involved in patient care as well as the coordination of care and discussions with the patient's family. This includes time involved in ordering and reviewing of laboratory studies, pulse oximetry, re-evaluation of patient's condition, examination of patient, evaluation of patient's response to treatment, ordering and performing treatments and interventions, review of old charts, consultations with specialist, discussions with family regarding pertinent collateral history and plan of care, bedside attention and documentation. During this entire length of time I was immediately available to the patient. This does not include time spent on separately reported billable procedures. Critical Care: The reason for providing this level of medical care for this critically-ill patient was due to a critical illness that impaired one or more vital organ systems, such that there was a high probability of imminent or life-threatening deterioration in the patient's condition. This care involved the highest level of preparedness to intervene urgently. This care involved high complexity decision making to assess, manipulate, and support vital system functions, to treat this degree of vital organ system failure, and to prevent further life threatening deterioration of the patients condition requiring frequent assessments and interventions.     Jasper Desai Tere Tipton MD    ADMIT  Given the patient's current clinical presentation, I have a high level of concern for decompensation if discharged from the emergency department. Patient is being admitted to the hospital.  The results of their tests and reasons for their admission have been discussed with them and/or available family. They convey agreement and understanding for the need to be admitted and for their admission diagnosis. Consultation has been made with the inpatient physician specialist for hospitalization. Diagnosis   Clinical Impression:  1. Acute on chronic congestive heart failure, unspecified heart failure type ([address]    2. JUAN C (acute kidney injury) ([address]    3. Atypical chest pain    4. Acute hypoxemic respiratory failure due to COVID-19 ([address]    5. Acute on chronic combined systolic and diastolic ACC/AHA stage C congestive heart failure ([address]    6. Acute blood loss anemia    7. Hypertensive urgency    8. Acute hyperkalemia    9. Uncontrolled type 2 diabetes mellitus with hyperglycemia (HCC)      Attestation:  Solo Humphrey MD, am the attending of record for this patient. I personally performed the services described in this documentation on this date, 4/5/2022 for patient, Champ Sheppard. I have reviewed the chart and verified that the record is accurate and complete.

## 2022-04-06 NOTE — PROGRESS NOTES
TRANSFER - OUT REPORT:    Verbal report given to Eri Choi RN(name) on Jason Burns  being transferred to [facility] (unit) for routine progression of care       Report consisted of patients Situation, Background, Assessment and   Recommendations(SBAR). Information from the following report(s) SBAR, Kardex, ED Summary, Intake/Output, MAR, Recent Results and Cardiac Rhythm Sinus Laurance Deirdre and NSR was reviewed with the receiving nurse. Lines:   Peripheral IV 04/05/22 Left Antecubital (Active)   Site Assessment Clean, dry, & intact 04/06/22 1301   Phlebitis Assessment 0 04/06/22 1301   Infiltration Assessment 0 04/06/22 1301   Dressing Status Clean, dry, & intact 04/06/22 1301   Dressing Type Transparent;Tape 04/06/22 1301       Peripheral IV 04/06/22 Right Wrist (Active)   Site Assessment Clean, dry, & intact 04/06/22 1301   Phlebitis Assessment 0 04/06/22 1301   Infiltration Assessment 0 04/06/22 1301   Dressing Status Clean, dry, & intact 04/06/22 1301   Dressing Type Tape;Transparent 04/06/22 1301   Hub Color/Line Status Pink;Capped;Flushed;Patent 04/06/22 1301   Action Taken Blood drawn 04/06/22 5996        Opportunity for questions and clarification was provided.       Patient transported with:   [employer]

## 2022-04-06 NOTE — PROGRESS NOTES
Patient admitted earlier today for acute on chronic systolic congestive heart failure, acute anemia  As per nurse, his breathing is improved,, after Bumex had around 700 cc of urine output.  -For full plan see dr Yimi Anderson note  Update  -Consulted nephrology for JUAN C on CKD stage III.   Is baseline around 1.3-1.5  -Discussed with nephrology, may need hemodialysis  -Repeated hemoglobin today, appropriately responded  -Echo pending  -Repeat chemistry tomorrow

## 2022-04-06 NOTE — PROGRESS NOTES
[place] [id number] [facility]     Date of  Admission: 4/5/2022 11:13 PM     Admission type:Emergency    Referral for: atypical chest pain   Referral by: Dr. Lizandro Bernabe     Subjective:     Susana Sanchez is a 61 y.o. male with PMHx significant for DM II, CKD III, AMI/CAD with stents,Anemia admitted for Acute CHF (congestive heart failure) ([facility] [I50.9]  JUAN C (acute kidney injury) ([facility] [N17.9]  Atypical chest pain [R07.89]. Per attending provider, the patient presented with shortness of breath and chest pain. Upon assessment, the patient did indeed endorse the above complaints. He states that last night he was taking out the trash when he felt shortness of breath and chest pressure. He reports \"I just could not control my breathing\". He reports the pain was mid chest, non-radiating, did not feel it in his arm/back. or jaw. He also denies any associated diaphoresis. He reports he took 4 ASA, and it ceased with stopping activity. He also endorse feeling cold/chills. He rates the pain at worst as a 7/10, at time of assessment he rated it a 2/10. He denies any recent fever, chills, n/v/d. He states he took two Covid vaccines, no booster. He denies smoking, ETOH, or illicit drug use     The patient reports he sees cardiologist at [facility], recent AMI in Fall 2021 with stents. He reports compliance with ASA and Brillinta. ECHO 2/2021; shows EF 30-35%, mild concentric hypertrophy. EKG NSR with no acute changes. Cardiac risk factors: family history, dyslipidemia, diabetes mellitus, obesity, male gender, hypertension, stress.       Patient Active Problem List    Diagnosis Date Noted    Atypical chest pain 04/06/2022    Acute CHF (congestive heart failure) ([facility] 04/06/2022    Dysphagia, unspecified type     Fatigue, unspecified type     Chronic systolic congestive heart failure ([facility] 02/16/2021    Goals of care, counseling/discussion     DNR (do not resuscitate) discussion     JUAN C (acute kidney injury) ([facility]     Acute hypoxemic respiratory failure due to COVID-19 [facility]) 01/30/2021      Roger Deluna MD  Past Medical History:   Diagnosis Date    Diabetes ([facility]     Hypertension       Social History     Socioeconomic History    Marital status:    Tobacco Use    Smoking status: Former Smoker    Smokeless tobacco: Never Used   Substance and Sexual Activity    Alcohol use: Not Currently    Drug use: Not Currently     Types: Heroin     No Known Allergies   No family history on file.    Current Facility-Administered Medications   Medication Dose Route Frequency    amLODIPine (NORVASC) tablet 10 mg  10 mg Oral DAILY    aspirin chewable tablet 81 mg  81 mg Oral DAILY    atorvastatin (LIPITOR) tablet 40 mg  40 mg Oral DAILY    carvediloL (COREG) tablet 25 mg  25 mg Oral BID    ferrous sulfate tablet 325 mg  1 Tablet Oral Q48H    hydrALAZINE (APRESOLINE) tablet 25 mg  25 mg Oral TID    gabapentin (NEURONTIN) capsule 400 mg  400 mg Oral TID    insulin glargine (LANTUS) injection 20 Units  20 Units SubCUTAneous QHS    isosorbide dinitrate (ISORDIL) tablet 30 mg  30 mg Oral Q8H    ticagrelor (BRILINTA) tablet 90 mg  90 mg Oral BID    sodium chloride (NS) flush 5-40 mL  5-40 mL IntraVENous Q8H    sodium chloride (NS) flush 5-40 mL  5-40 mL IntraVENous PRN    acetaminophen (TYLENOL) tablet 650 mg  650 mg Oral Q6H PRN    Or    acetaminophen (TYLENOL) suppository 650 mg  650 mg Rectal Q6H PRN    polyethylene glycol (MIRALAX) packet 17 g  17 g Oral DAILY PRN    promethazine (PHENERGAN) tablet 12.5 mg  12.5 mg Oral Q6H PRN    Or    ondansetron (ZOFRAN) injection 4 mg  4 mg IntraVENous Q6H PRN    morphine injection 1 mg  1 mg IntraVENous Q3H PRN    0.9% sodium chloride infusion 250 mL  250 mL IntraVENous PRN    pantoprazole (PROTONIX) 40 mg in 0.9% sodium chloride 10 mL injection  40 mg IntraVENous Q12H    insulin lispro (HUMALOG) injection   SubCUTAneous AC&HS  glucose chewable tablet 16 g  4 Tablet Oral PRN    glucagon (GLUCAGEN) injection 1 mg  1 mg IntraMUSCular PRN    dextrose 10% infusion 0-250 mL  0-250 mL IntraVENous PRN    thiamine mononitrate (B-1) tablet 100 mg  100 mg Oral DAILY    bumetanide (BUMEX) injection 2 mg  2 mg IntraVENous BID     Current Outpatient Medications   Medication Sig    atorvastatin (LIPITOR) 80 mg tablet Take 0.5 Tabs by mouth daily.  carvediloL (COREG) 12.5 mg tablet Take 2 Tabs by mouth two (2) times a day.  amLODIPine (NORVASC) 10 mg tablet Take 1 Tab by mouth daily.  hydrALAZINE (APRESOLINE) 25 mg tablet Take 1 Tab by mouth three (3) times daily.  isosorbide dinitrate (ISORDIL) 30 mg tablet Take 1 Tab by mouth every eight (8) hours.  thiamine HCL (B-1) 100 mg tablet Take 1 Tab by mouth daily.  bumetanide (BUMEX) 2 mg tablet Take 1 Tab by mouth daily.  balsam peru-castor oiL (VENELEX) ointment Apply  to affected area every twelve (12) hours. APPLY LIBERALLY TO Right ear, sacrum, b/l heels and any red/bony prominence    acetaminophen (TYLENOL) 325 mg tablet Take 975 mg by mouth every six (6) hours as needed for Pain.  aspirin 81 mg chewable tablet Take 81 mg by mouth daily.  docusate sodium (Colace) 100 mg capsule Take 100 mg by mouth two (2) times daily as needed for Constipation.  ferrous sulfate 325 mg (65 mg iron) tablet Take  by mouth every fourty-eight (48) hours.  gabapentin (NEURONTIN) 400 mg capsule Take 400 mg by mouth three (3) times daily.  insulin lispro (HUMALOG) 100 unit/mL injection 10 Units by SubCUTAneous route Before breakfast, lunch, and dinner.  insulin glargine (Lantus U-100 Insulin) 100 unit/mL injection 20 Units by SubCUTAneous route nightly.  pantoprazole (PROTONIX) 40 mg tablet Take 40 mg by mouth daily.  senna (Senna) 8.6 mg tablet Take 1 Tab by mouth daily.  ticagrelor (BRILINTA) 90 mg tablet Take 90 mg by mouth two (2) times a day.     ascorbic acid, vitamin C, (Vitamin C) 500 mg tablet Take 500 mg by mouth two (2) times a day.  cholecalciferol (Vitamin D3) (1000 Units /25 mcg) tablet Take 1,000 Units by mouth daily.  zinc sulfate (ZINCATE) 220 (50) mg capsule Take 220 mg by mouth daily.  therapeutic multivitamin (THERAGRAN) tablet Take 1 Tab by mouth daily.           Review of Symptoms:  Constitutional: negative  Eyes: negative  Ears, nose, mouth, throat, and face: negative  Respiratory: shortness of breath   Cardiovascular: chest pain   Gastrointestinal: negative  Genitourinary:negative  Musculoskeletal:negative  Neurological: negative  Behvioral/Psych: negative  Endocrine: negative            Objective:      Visit Vitals  BP (!) 165/66   Pulse 60   Temp 97.9 °F (36.6 °C)   Resp 13   Ht 5' 8\" (1.727 m)   Wt 79.8 kg (176 lb)   SpO2 98%   BMI 26.76 kg/m²       Physical Assessment:   General Appearance:  alert, cooperative, well nourished, well developed; appears stated age, AA male in NAD   Eyes: sclera anicteric  Mouth/Throat: moist mucous membranes; oral pharynx clear  Neck: supple; no JVD or bruit  Pulmonary:  clear to auscultation bilaterally; good effort  Cardiovascular: regular rate and rhythm; no murmur, click, rub, or gallop  Abdomen: soft, non-tender, non-distended; bowel sounds normal  Musculoskeletal: no swelling or deformity; moves all extremities  Extremities: no edema; palpable distal pulses   Skin: dry and flaky on extremities   Neuro: grossly normal  Psych: normal mood and affect given the setting      Data Review:   Recent Labs     04/05/22  2323   WBC 9.6   HGB 6.4*   HCT 20.5*        Recent Labs     04/05/22  2359 04/05/22  2323   NA  --  142   K  --  5.3*   CL  --  115*   CO2  --  19*   GLU  --  188*   BUN  --  60*   CREA  --  4.47*   CA  --  7.5*   ALB  --  2.7*   TBILI  --  0.3   ALT  --  20   INR 1.1  --        Recent Labs     04/06/22  0207 04/05/22  2323   TROPHS 70 83*         Intake/Output Summary (Last 24 hours) at 4/6/2022 1139  Last data filed at 4/6/2022 0837  Gross per 24 hour   Intake 335.8 ml   Output --   Net 335.8 ml        Cardiographics    Telemetry: SB 50's   ECG: NSR no acute changes   Echocardiogram: prior as above, new pending   CXRAY: \"1. Diffuse interstitial prominence which may represent pulmonary vascular congestion or atypical infectious process. \"        Assessment:       Active Problems:    JUAN C (acute kidney injury) ([facility] ()      Atypical chest pain (4/6/2022)      Acute CHF (congestive heart failure) ([facility] (4/6/2022)         Plan:   Rafael Quiles is a 61 y.o. male with PMHx significant for DM II, CKD III, AMI/CAD with stents,Anemia admitted for Acute CHF (congestive heart failure) ([facility] [I50.9] JUAN C (acute kidney injury) ([facility] [N17.9] Atypical chest pain [R07.89]. Acute on chronic systolic/congestive heart failure: Hx CAD with stents/AMI:   Presented with shortness of breath  Chest x-ray with pulmonary vasculature congestion  NT-proBNP 16.2K  Previous EF as above, EF 35% no significant valvular pathology   Obtain new echo  Agree with Bumex 2 mg IV BID  Strict I/O, labs, daily weights      Atypical chest pain:   Coronary artery disease status post PCI  Patient reports compliance with Brilinta and ASA since PCI. Low suspicion for in stent restenosis. He has normal Troponin.  Given his anemia and elevated Creatinine and alleviation of symptoms, will be extremely conservative regarding any invasive ischemic work-up at this time  Medical management at this time, will reassess if he has refractory symptoms   Continue Brilinta, ASA, statin, and BB   No acute ischemic changes on EKG  Troponin negative, trended   Patient reports CP down to 2/10 from 7/10  Continue PRN morphine     Acute on chronic normocytic anemia:   Likely anemia of chronic disease due to CKD  Hemoglobin 6.4 on admission-received 1 unit PRBCs  Hemoccult negative in the ED  We have to continue dual antiplatelets due to recent stent reported by the patient  Monitor CBC      CKD. JUAN C:  Creatinine 4.4 on admission from baseline it looks like around 5  Continue IV diuresis  Follow [facility]  Nephrology consulted     Diabetes mellitus type 2  Manage as per internal medicine      Hyperkalemia: In setting of CKD/JUAN C  5.3, follow [facility]   Nephrology consulted     Thank you, will follow. Gini Okeefe NP   DNP,APRN,AG-ACNP-BC    Patient seen and examined by me with the above nurse practitioner. I personally performed all components of the history, physical, and medical decision making and agree with the assessment and plan with minor modifications as noted. Today the patient presents with shortness of breath and atypical chest discomfort in the setting of severe anemia, with evidence of mild pulmonary edema. General PE  Gen:  NAD  Mental Status - Alert. General Appearance - Not in acute distress. HEENT:  PERRL, no carotid bruits or JVD  Chest and Lung Exam   Inspection: Accessory muscles - No use of accessory muscles in breathing. Auscultation:   Breath sounds: -Few crackles at the bases   cardiovascular   Inspection: Jugular vein - Bilateral - Inspection Normal.   Palpation/Percussion:   Apical Impulse: - Normal.   Auscultation: Rhythm - Regular. Heart Sounds - S1 WNL and S2 WNL. No S3 or S4. Murmurs & Other Heart Sounds: Auscultation of the heart reveals - No Murmurs. Peripheral Vascular   Upper Extremity: Inspection - Bilateral - No Cyanotic nailbeds or Digital clubbing. Lower Extremity:   Palpation: Edema - Bilateral -trace edema. Abdomen:   Soft, non-tender, bowel sounds are active. Neuro: A&O times 3, CN and motor grossly WNL    Agree with blood transfusion and diuresis. Aggressive medical management. If symptoms resolve, will delay any ischemic evaluation at this time, but this can be considered in the future, especially of kidneys improved significantly. Thanks for the consult.   We appreciate the opportunity to participate in this patient's care.

## 2022-04-06 NOTE — H&P
Hospitalist Admission Note    NAME: Gee Collado   :  1955   MRN:  [id number]     Date/Time:  2022 1:49 AM    Patient PCP: Joan Rios MD  ______________________________________________________________________  Given the patient's current clinical presentation, I have a high level of concern for decompensation if discharged from the emergency department. Complex decision making was performed, which includes reviewing the patient's available past medical records, laboratory results, and x-ray films. My assessment of this patient's clinical condition and my plan of care is as follows. Assessment / Plan:      Acute on chronic systolic/congestive heart failure, POA  -Presented with shortness of breath  -Chest x-ray with congestion  -proBNP around 16,000  -Previous EF around 25 to 30%  -Obtain new echo  -Start Bumex 1 mg IV twice daily. Patient was supposed to be on Bumex 2 mg daily however he said he was not sure she was taking it or not  -Daily weights  -LIGIA's    Atypical chest pain, POA  Coronary artery disease status post PCI  -Patient reported that he has a stent recently but he was not sure when exactly  -I did not see cardiology notes in the EMR  -Is currently on aspirin and Brilinta, continue. He said he had a stent recently and they told him he have to be on both  -Continue statin  -Initial troponin 83 with no acute ischemic changes on EKG  -Follow-up troponin every 6 hours  -Consult cardiology for ischemic work-up  -Morphine as needed for chest pain    Acute on chronic normocytic anemia, POA  -Likely anemia of chronic disease  -Hemoglobin 6.4  -Hemoccult negative in the ED  -Denying any melena  -We have to continue dual antiplatelets due to recent stent reported by the patient  -We will consult GI for colonoscopy/EGD if applicable  -Start Protonix 40 mg IV twice daily  -Follow hemoglobin very closely.   Repeat hemoglobin 2 hours after finishing blood transfusion. I talked to the patient in details about blood transfusion and explained risks, benefits and alternatives and he understands and wants to proceed. Is alert and x4. CKD stage III  -Creatinine 4.4 on admission from baseline it looks like around 5  -Continue IV diuresis  -Follow BMP very closely    Diabetes mellitus type 2  -Continue Lantus  -Correction scale hyperglycemic with    Hyperkalemia  -5.3  -Repeat BMP    Code Status: Full code  Surrogate Decision Maker: His wife    DVT Prophylaxis: SCDs  GI Prophylaxis: not indicated    Baseline: Active, independent      Subjective:   CHIEF COMPLAINT: Shortness of breath, chest pain    HISTORY OF PRESENT ILLNESS:           The patient is 45-year-old male past medical history significant for coronary artery disease, diabetes mellitus presented emergency department due to shortness of breath started around 3 days ago. Patient reported that every time he moves around he feels short of breath and weird sensation in his chest.  He reported that his short of breath is more when he moves around and it gets better when he lays flat. He denied any cough, abdominal pain, nausea, vomiting, diarrhea, fever, chills. He also reported substernal chest pain, nonradiating, not aggravated or elevated by any factors. He said he is very concerned that he is having another heart attack. He also reported that he had a stent recently for which he is on aspirin and Brilinta. Patient does smoke, drink alcohol or use illicit drugs as he reported. The emergency department, he was found to be fluid overloaded. We were asked to admit for work up and evaluation of the above problems.      Past Medical History:   Diagnosis Date    Diabetes ([address]     Hypertension         Past Surgical History:   Procedure Laterality Date    IR INSERT NON TUNL CVC OVER 5 YRS  2/23/2021    IR INSERT TUNL CVC W/O PORT OVER 5 YR  3/2/2021       Social History     Tobacco Use    Smoking status: Former Smoker    Smokeless tobacco: Never Used   Substance Use Topics    Alcohol use: Not Currently        No family history on file. No Known Allergies     Prior to Admission medications    Medication Sig Start Date End Date Taking? Authorizing Provider   atorvastatin (LIPITOR) 80 mg tablet Take 0.5 Tabs by mouth daily. 3/4/21   Teri Orona NP   carvediloL (COREG) 12.5 mg tablet Take 2 Tabs by mouth two (2) times a day. 3/4/21   Teri Orona NP   amLODIPine (NORVASC) 10 mg tablet Take 1 Tab by mouth daily. 3/5/21   Teri Ornoa NP   hydrALAZINE (APRESOLINE) 25 mg tablet Take 1 Tab by mouth three (3) times daily. 3/4/21   Teri Orona NP   isosorbide dinitrate (ISORDIL) 30 mg tablet Take 1 Tab by mouth every eight (8) hours. 3/4/21   Teri Orona NP   thiamine HCL (B-1) 100 mg tablet Take 1 Tab by mouth daily. 3/5/21   Teri Orona NP   bumetanide (BUMEX) 2 mg tablet Take 1 Tab by mouth daily. 3/4/21   Teri Orona NP   balsam peru-castor oiL (VENELEX) ointment Apply  to affected area every twelve (12) hours. APPLY LIBERALLY TO Right ear, sacrum, b/l heels and any red/bony prominence 3/4/21   Teri Orona NP   acetaminophen (TYLENOL) 325 mg tablet Take 975 mg by mouth every six (6) hours as needed for Pain. Jessi Gaona MD   aspirin 81 mg chewable tablet Take 81 mg by mouth daily. Jessi Gaona MD   docusate sodium (Colace) 100 mg capsule Take 100 mg by mouth two (2) times daily as needed for Constipation. Jessi Gaona MD   ferrous sulfate 325 mg (65 mg iron) tablet Take  by mouth every fourty-eight (48) hours. Jessi Gaona MD   gabapentin (NEURONTIN) 400 mg capsule Take 400 mg by mouth three (3) times daily. Jessi Gaona MD   insulin lispro (HUMALOG) 100 unit/mL injection 10 Units by SubCUTAneous route Before breakfast, lunch, and dinner.     Jessi Gaona MD   insulin glargine (Lantus U-100 Insulin) 100 unit/mL injection 20 Units by SubCUTAneous route nightly. Jessi Gaona MD   pantoprazole (PROTONIX) 40 mg tablet Take 40 mg by mouth daily. Jessi Gaona MD   senna (Senna) 8.6 mg tablet Take 1 Tab by mouth daily. Jessi Gaona MD   ticagrelor (BRILINTA) 90 mg tablet Take 90 mg by mouth two (2) times a day. Jessi Gaona MD   ascorbic acid, vitamin C, (Vitamin C) 500 mg tablet Take 500 mg by mouth two (2) times a day. Jessi Gaona MD   cholecalciferol (Vitamin D3) (1000 Units /25 mcg) tablet Take 1,000 Units by mouth daily. Jessi Gaona MD   zinc sulfate (ZINCATE) 220 (50) mg capsule Take 220 mg by mouth daily. Provider, Historical   therapeutic multivitamin (THERAGRAN) tablet Take 1 Tab by mouth daily. Provider, Historical       REVIEW OF SYSTEMS:     I am not able to complete the review of systems because:    The patient is intubated and sedated    The patient has altered mental status due to his acute medical problems    The patient has baseline aphasia from prior stroke(s)    The patient has baseline dementia and is not reliable historian    The patient is in acute medical distress and unable to provide information           Total of 12 systems reviewed as follows:       POSITIVE= underlined text  Negative = text not underlined  General:  fever, chills, sweats, generalized weakness, weight loss/gain,      loss of appetite   Eyes:    blurred vision, eye pain, loss of vision, double vision  ENT:    rhinorrhea, pharyngitis   Respiratory:   cough, sputum production, SOB, WEBB, wheezing, pleuritic pain   Cardiology:   chest pain, palpitations, orthopnea, PND, edema, syncope   Gastrointestinal:  abdominal pain , N/V, diarrhea, dysphagia, constipation, bleeding   Genitourinary:  frequency, urgency, dysuria, hematuria, incontinence   Muskuloskeletal :  arthralgia, myalgia, back pain  Hematology:  easy bruising, nose or gum bleeding, lymphadenopathy   Dermatological: rash, ulceration, pruritis, color change / jaundice  Endocrine:   hot flashes or polydipsia   Neurological:  headache, dizziness, confusion, focal weakness, paresthesia,     Speech difficulties, memory loss, gait difficulty  Psychological: Feelings of anxiety, depression, agitation    Objective:   VITALS:    Visit Vitals  BP (!) 159/67   Pulse 60   Temp 98.5 °F (36.9 °C)   Resp 10   Ht 5' 8\" (1.727 m)   Wt 79.8 kg (176 lb)   SpO2 98%   BMI 26.76 kg/m²       PHYSICAL EXAM:    General:    Alert, cooperative, no distress, appears stated age. HEENT: Atraumatic, anicteric sclerae, pink conjunctivae     No oral ulcers, mucosa moist, throat clear, dentition fair  Neck:  Supple, symmetrical,  thyroid: non tender  Lungs:   Clear to auscultation bilaterally. No Wheezing or Rhonchi. No rales. Chest wall:  No tenderness  No Accessory muscle use. Heart:   Regular  rhythm,  No  murmur   +2 pitting edema on lower extremities well   Abdomen:   Soft, non-tender. Not distended. Bowel sounds normal  Extremities: No cyanosis. No clubbing,      Skin turgor normal, Capillary refill normal, Radial dial pulse 2+  Skin:     Not pale. Not Jaundiced  No rashes   Psych:  Good insight. Not depressed. Not anxious or agitated. Neurologic: EOMs intact. No facial asymmetry. No aphasia or slurred speech. Symmetrical strength, Sensation grossly intact.  Alert and oriented X 4.     _______________________________________________________________________  Care Plan discussed with:    Comments   Patient x    Family      RN x    Care Manager                    Consultant:      _______________________________________________________________________  Expected  Disposition:   Home with Family x   HH/PT/OT/RN    SNF/LTC    [facility]USSEIN    ________________________________________________________________________  TOTAL TIME:  72 Minutes    Critical Care Provided     Minutes non procedure based      Comments    x Reviewed previous records   >50% of visit spent in counseling and coordination of care x Discussion with patient and/or family and questions answered       ________________________________________________________________________  Signed: Eliseo Quiroz MD    Procedures: see electronic medical records for all procedures/Xrays and details which were not copied into this note but were reviewed prior to creation of Plan. LAB DATA REVIEWED:    Recent Results (from the past 24 hour(s))   EKG, 12 LEAD, INITIAL    Collection Time: 04/05/22 11:16 PM   Result Value Ref Range    Ventricular Rate 66 BPM    Atrial Rate 66 BPM    P-R Interval 128 ms    QRS Duration 84 ms    Q-T Interval 426 ms    QTC Calculation (Bezet) 446 ms    Calculated P Axis -13 degrees    Calculated R Axis 9 degrees    Calculated T Axis 28 degrees    Diagnosis       Normal sinus rhythm  Possible Anterior infarct , age undetermined  When compared with ECG of 16-FEB-2021 04:12,  Sinus rhythm has replaced Atrial fibrillation  Vent. rate has decreased BY  42 BPM  T wave inversion less evident in Anterolateral leads  QT has shortened     CBC WITH AUTOMATED DIFF    Collection Time: 04/05/22 11:23 PM   Result Value Ref Range    WBC 9.6 4.1 - 11.1 K/uL    RBC 2.41 (L) 4.10 - 5.70 M/uL    HGB 6.4 (L) 12.1 - 17.0 g/dL    HCT 20.5 (L) 36.6 - 50.3 %    MCV 85.1 80.0 - 99.0 FL    MCH 26.6 26.0 - 34.0 PG    MCHC 31.2 30.0 - 36.5 g/dL    RDW 16.3 (H) 11.5 - 14.5 %    PLATELET 807 592 - 895 K/uL    MPV 10.5 8.9 - 12.9 FL    NRBC 0.0 0  WBC    ABSOLUTE NRBC 0.00 0.00 - 0.01 K/uL    NEUTROPHILS 71 32 - 75 %    LYMPHOCYTES 16 12 - 49 %    MONOCYTES 12 5 - 13 %    EOSINOPHILS 1 0 - 7 %    BASOPHILS 0 0 - 1 %    IMMATURE GRANULOCYTES 0 0.0 - 0.5 %    ABS. NEUTROPHILS 6.8 1.8 - 8.0 K/UL    ABS. LYMPHOCYTES 1.5 0.8 - 3.5 K/UL    ABS. MONOCYTES 1.2 (H) 0.0 - 1.0 K/UL    ABS. EOSINOPHILS 0.1 0.0 - 0.4 K/UL    ABS. BASOPHILS 0.0 0.0 - 0.1 K/UL    ABS. IMM.  GRANS. 0.0 0.00 - 0.04 K/UL    DF SMEAR SCANNED      RBC COMMENTS ANISOCYTOSIS  1+        RBC COMMENTS SCHISTOCYTES  1+       METABOLIC PANEL, COMPREHENSIVE    Collection Time: 04/05/22 11:23 PM   Result Value Ref Range    Sodium 142 136 - 145 mmol/L    Potassium 5.3 (H) 3.5 - 5.1 mmol/L    Chloride 115 (H) 97 - 108 mmol/L    CO2 19 (L) 21 - 32 mmol/L    Anion gap 8 5 - 15 mmol/L    Glucose 188 (H) 65 - 100 mg/dL    BUN 60 (H) 6 - 20 MG/DL    Creatinine 4.47 (H) 0.70 - 1.30 MG/DL    BUN/Creatinine ratio 13 12 - 20      GFR est AA 16 (L) >60 ml/min/1.73m2    GFR est non-AA 13 (L) >60 ml/min/1.73m2    Calcium 7.5 (L) 8.5 - 10.1 MG/DL    Bilirubin, total 0.3 0.2 - 1.0 MG/DL    ALT (SGPT) 20 12 - 78 U/L    AST (SGOT) 16 15 - 37 U/L    Alk.  phosphatase 96 45 - 117 U/L    Protein, total 6.9 6.4 - 8.2 g/dL    Albumin 2.7 (L) 3.5 - 5.0 g/dL    Globulin 4.2 (H) 2.0 - 4.0 g/dL    A-G Ratio 0.6 (L) 1.1 - 2.2     TROPONIN-HIGH SENSITIVITY    Collection Time: 04/05/22 11:23 PM   Result Value Ref Range    Troponin-High Sensitivity 83 (HH) 0 - 76 ng/L   NT-PRO BNP    Collection Time: 04/05/22 11:23 PM   Result Value Ref Range    NT pro-BNP 16,211 (H) <125 PG/ML   PTT    Collection Time: 04/05/22 11:59 PM   Result Value Ref Range    aPTT 22.6 22.1 - 31.0 sec    aPTT, therapeutic range     58.0 - 77.0 SECS   PROTHROMBIN TIME + INR    Collection Time: 04/05/22 11:59 PM   Result Value Ref Range    INR 1.1 0.9 - 1.1      Prothrombin time 11.3 (H) 9.0 - 11.1 sec   OCCULT BLOOD, STOOL    Collection Time: 04/06/22 12:52 AM   Result Value Ref Range    Occult blood, stool Negative NEG

## 2022-04-06 NOTE — CONSULTS
Nephrology Consult Note     Harry Sandoval     www. [facility]              Phone - [phone]   Patient: Rafael Quiles   YOB: 1955    Date- 4/6/2022  MRN: [id number]             REASON FOR CONSULTATION: JUAN C stage III  CONSULTING PHYSICIAN: Dr. Zhang Castaneda MD     IMPRESSION & PLAN:    JUAN C stage III(secondary to cardiorenal syndrome)   CKD stage III baseline creatinine of 1.4-1.5.  Systolic heart failure(EF 30 to 35% in 2021)   Acute volume overload   Pulmonary edema   Mild hyperkalemia   Non-anion gap metabolic acidosis   Anemia   Type 2 diabetes   Hypertension   CAD    PLAN-  · Patient is significantly volume overloaded with ongoing pulmonary edema. · He has made almost 400 cc of urine with 1 dose of Bumex 2 mg. · I will put him on Bumex 2 mg IV 3 times daily. · Renal ultrasound shows no hydro. · I will order UA, urine lites and intact PTH. · Patient was briefly on dialysis in 2021 and he recovered. I have approved to him again for possible hemodialysis if continues to be volume overloaded and continues to have worsening renal indicis. He is agreeable for hemodialysis and has consented. · I have ordered for 1 dose of Lokelma and 2 amps of sodium bicarbonate. · Repeat BMP again at 2 PM.  · There is a high possibility that he will need to start on dialysis again if he fails diuretic challenge. · Thank you the consult follow with you     · Active Problems:  ·   JUAN C (acute kidney injury) ([address] ()  ·   ·   Atypical chest pain (4/6/2022)  ·   ·   Acute CHF (congestive heart failure) ([address] (4/6/2022)  ·   ·     [x] High complexity decision making was performed  [x] Patient is at high-risk of decompensation with multiple organ involvement    Subjective:   HPI: Rafael Quiles is a 61 y.o.   male with past medical history of Covid pneumonia resulting in JUAN C from ATN in 2021 and he was briefly on dialysis from February 27th to April 24 April 2021. His PermCath was removed on that day. He was following up with my partner Dr. Zan Vaughn and was getting dialyzed at [facility] during that time. He was last seen in our office in April after dialysis and at that time his creatinine was 1.35. He then lost follow-up. Patient has history of systolic heart failure, CAD, type 2 diabetes, hypertension, hyperlipidemia. His renal ultrasound done 1 year ago shows signs of medical renal disease. And he has documented CKD stage III. Patient has not been feeling well for last few days and has been complaining of PND orthopnea and proximal nocturnal dyspnea. He presented today with shortness of breath getting worse. He was found to be significantly anemic in the initial ER work-up and was found to have JUAN C with a creatinine of 4.5 and mild hyperkalemia. He was given a unit of blood in the ER. Renal consult was requested for evaluation for JUAN C on CKD. Review of Systems:       A 11 point review of system was performed today. Pertinent positives and negatives are mentioned in the HPI. The reminder of the ROS is negative and noncontributory. Past Medical History:   Diagnosis Date    Diabetes ([address]     Hypertension       Past Surgical History:   Procedure Laterality Date    IR INSERT NON TUNL CVC OVER 5 YRS  2/23/2021    IR INSERT TUNL CVC W/O PORT OVER 5 YR  3/2/2021      Prior to Admission medications    Medication Sig Start Date End Date Taking? Authorizing Provider   atorvastatin (LIPITOR) 80 mg tablet Take 0.5 Tabs by mouth daily. 3/4/21   Pasquale Freeman NP   carvediloL (COREG) 12.5 mg tablet Take 2 Tabs by mouth two (2) times a day. 3/4/21   Pasquale Freeman NP   amLODIPine (NORVASC) 10 mg tablet Take 1 Tab by mouth daily. 3/5/21   Pasquale Freeman NP   hydrALAZINE (APRESOLINE) 25 mg tablet Take 1 Tab by mouth three (3) times daily.  3/4/21   Pasquale Freeman NP   isosorbide dinitrate (ISORDIL) 30 mg tablet Take 1 Tab by mouth every eight (8) hours. 3/4/21   Marybeth Certain, NP   thiamine HCL (B-1) 100 mg tablet Take 1 Tab by mouth daily. 3/5/21   Marybeth Certain, NP   bumetanide (BUMEX) 2 mg tablet Take 1 Tab by mouth daily. 3/4/21   Marybeth Certain, NP   balsam peru-castor oiL (VENELEX) ointment Apply  to affected area every twelve (12) hours. APPLY LIBERALLY TO Right ear, sacrum, b/l heels and any red/bony prominence 3/4/21   Marybeth Certain, NP   acetaminophen (TYLENOL) 325 mg tablet Take 975 mg by mouth every six (6) hours as needed for Pain. Jessi Gaona MD   aspirin 81 mg chewable tablet Take 81 mg by mouth daily. Jessi Gaona MD   docusate sodium (Colace) 100 mg capsule Take 100 mg by mouth two (2) times daily as needed for Constipation. Jessi Gaona MD   ferrous sulfate 325 mg (65 mg iron) tablet Take  by mouth every fourty-eight (48) hours. Jessi Gaona MD   gabapentin (NEURONTIN) 400 mg capsule Take 400 mg by mouth three (3) times daily. Jessi Gaona MD   insulin lispro (HUMALOG) 100 unit/mL injection 10 Units by SubCUTAneous route Before breakfast, lunch, and dinner. Jessi Gaona MD   insulin glargine (Lantus U-100 Insulin) 100 unit/mL injection 20 Units by SubCUTAneous route nightly. Jessi Gaona MD   pantoprazole (PROTONIX) 40 mg tablet Take 40 mg by mouth daily. Jessi Gaona MD   senna (Senna) 8.6 mg tablet Take 1 Tab by mouth daily. Jessi Gaona MD   ticagrelor (BRILINTA) 90 mg tablet Take 90 mg by mouth two (2) times a day. Jessi Gaona MD   ascorbic acid, vitamin C, (Vitamin C) 500 mg tablet Take 500 mg by mouth two (2) times a day. Jessi Gaoan MD   cholecalciferol (Vitamin D3) (1000 Units /25 mcg) tablet Take 1,000 Units by mouth daily. Jessi Gaona MD   zinc sulfate (ZINCATE) 220 (50) mg capsule Take 220 mg by mouth daily. Provider, Historical   therapeutic multivitamin (THERAGRAN) tablet Take 1 Tab by mouth daily. Provider, Historical     No Known Allergies   Social History     Tobacco Use    Smoking status: Former Smoker    Smokeless tobacco: Never Used   Substance Use Topics    Alcohol use: Not Currently      No family history on file.      Objective:      Patient Vitals for the past 24 hrs:   Temp Pulse Resp BP SpO2   04/06/22 1120 97.8 °F (36.6 °C) 60 13 (!) 165/66 98 %   04/06/22 0845 97.9 °F (36.6 °C) (!) 58 13 (!) 166/66 --   04/06/22 0837 97.9 °F (36.6 °C) (!) 58 13 (!) 166/66 98 %   04/06/22 0800 -- (!) 59 -- -- --   04/06/22 0722 97.7 °F (36.5 °C) (!) 57 16 (!) 170/69 97 %   04/06/22 0700 97.3 °F (36.3 °C) (!) 57 13 (!) 159/68 98 %   04/06/22 0630 97.4 °F (36.3 °C) (!) 57 12 (!) 154/57 98 %   04/06/22 0616 -- 62 17 (!) 147/114 93 %   04/06/22 0600 97 °F (36.1 °C) (!) 58 16 (!) 149/60 98 %   04/06/22 0545 97.4 °F (36.3 °C) (!) 55 15 (!) 156/61 97 %   04/06/22 0530 97.4 °F (36.3 °C) (!) 59 15 (!) 161/61 97 %   04/06/22 0516 97.5 °F (36.4 °C) (!) 56 15 (!) 158/63 97 %   04/06/22 0500 97.4 °F (36.3 °C) (!) 55 11 (!) 140/68 97 %   04/06/22 0445 -- (!) 56 10 (!) 156/61 98 %   04/06/22 0234 98 °F (36.7 °C) (!) 59 17 (!) 170/68 98 %   04/06/22 0200 -- 71 19 (!) 165/74 97 %   04/06/22 0131 -- 60 10 (!) 159/67 98 %   04/06/22 0101 -- 61 20 (!) 164/67 96 %   04/06/22 0030 -- 84 25 (!) 163/70 95 %   04/06/22 0015 -- 62 18 (!) 153/76 96 %   04/06/22 0000 -- 67 19 (!) 153/67 95 %   04/05/22 2345 -- 63 19 (!) 152/66 96 %   04/05/22 2330 -- 63 17 (!) 159/67 97 %   04/05/22 2315 98.5 °F (36.9 °C) 71 26 (!) 175/62 97 %     04/06 0701 - 04/06 1900  In: 335.8   Out: -   Last 3 Recorded Weights in this Encounter    04/05/22 2316   Weight: 79.8 kg (176 lb)      Physical Exam:  General: Moderate respiratory distress  Eyes:No scleral icterus, No conjunctival pallor  Neck:Supple,no mass palpable,no thyromegaly  Lungs: Bilateral rales  CVS: Tachycardic  Abdomen:Soft, Non tender, No hepatosplenomegaly  Extremities: 1+ LE edema  Skin:No rash or lesions, Warm and DRY   : No Bourgeois  Musculoskeletal : no redness, no joint tenderness  NEURO: Normal Speech, Non focal deficit       CODE STATUS: Full  Care Plan discussed with: Patient     Chart reviewed. Yes Reviewed previous records   Yes Discussion with patient and/or family and questions answered         Xray/CT/US/MRI REV:yes  Lab Data Personally Reviewed: (see below)  Recent Labs     04/05/22  2359 04/05/22  2323   WBC  --  9.6   HGB  --  6.4*   PLT  --  241   ANEU  --  6.8   INR 1.1  --    APTT 22.6  --    NA  --  142   K  --  5.3*   GLU  --  188*   BUN  --  60*   CREA  --  4.47*   ALT  --  20   TBILI  --  0.3   AP  --  96   CA  --  7.5*     Lab Results   Component Value Date/Time    Color YELLOW/STRAW 02/10/2021 01:26 PM    Appearance TURBID (A) 02/10/2021 01:26 PM    Specific gravity 1.014 02/10/2021 01:26 PM    Specific gravity 1.015 02/26/2010 12:32 PM    pH (UA) 5.0 02/10/2021 01:26 PM    Protein 100 (A) 02/10/2021 01:26 PM    Glucose Negative 02/10/2021 01:26 PM    Ketone Negative 02/10/2021 01:26 PM    Bilirubin Negative 02/10/2021 01:26 PM    Urobilinogen 0.2 02/10/2021 01:26 PM    Nitrites Negative 02/10/2021 01:26 PM    Leukocyte Esterase Negative 02/10/2021 01:26 PM    Epithelial cells FEW 02/10/2021 01:26 PM    Bacteria Negative 02/10/2021 01:26 PM    WBC 0-4 02/10/2021 01:26 PM    RBC  02/10/2021 01:26 PM       Lab Results   Component Value Date/Time    Iron 10 (L) 04/06/2022 02:07 AM    TIBC 229 (L) 04/06/2022 02:07 AM    Iron % saturation 4 (L) 04/06/2022 02:07 AM    Ferritin 661 (H) 02/23/2021 03:59 AM     Lab Results   Component Value Date/Time    Culture result: NO GROWTH 5 DAYS 02/15/2021 10:58 AM    Culture result: LIGHT YEAST, (APPARENT CANDIDA ALBICANS) (A) 02/12/2021 04:53 PM    Culture result: LIGHT NORMAL RESPIRATORY CORI 02/12/2021 04:53 PM     Prior to Admission Medications   Prescriptions Last Dose Informant Patient Reported?  Taking?   acetaminophen (TYLENOL) 325 mg tablet   Yes No   Sig: Take 975 mg by mouth every six (6) hours as needed for Pain. amLODIPine (NORVASC) 10 mg tablet   No No   Sig: Take 1 Tab by mouth daily. ascorbic acid, vitamin C, (Vitamin C) 500 mg tablet   Yes No   Sig: Take 500 mg by mouth two (2) times a day. aspirin 81 mg chewable tablet   Yes No   Sig: Take 81 mg by mouth daily. atorvastatin (LIPITOR) 80 mg tablet   No No   Sig: Take 0.5 Tabs by mouth daily. balsam peru-castor oiL (VENELEX) ointment   No No   Sig: Apply  to affected area every twelve (12) hours. APPLY LIBERALLY TO Right ear, sacrum, b/l heels and any red/bony prominence   bumetanide (BUMEX) 2 mg tablet   No No   Sig: Take 1 Tab by mouth daily. carvediloL (COREG) 12.5 mg tablet   No No   Sig: Take 2 Tabs by mouth two (2) times a day. cholecalciferol (Vitamin D3) (1000 Units /25 mcg) tablet   Yes No   Sig: Take 1,000 Units by mouth daily. docusate sodium (Colace) 100 mg capsule   Yes No   Sig: Take 100 mg by mouth two (2) times daily as needed for Constipation. ferrous sulfate 325 mg (65 mg iron) tablet   Yes No   Sig: Take  by mouth every fourty-eight (48) hours. gabapentin (NEURONTIN) 400 mg capsule   Yes No   Sig: Take 400 mg by mouth three (3) times daily. hydrALAZINE (APRESOLINE) 25 mg tablet   No No   Sig: Take 1 Tab by mouth three (3) times daily. insulin glargine (Lantus U-100 Insulin) 100 unit/mL injection   Yes No   Si Units by SubCUTAneous route nightly. insulin lispro (HUMALOG) 100 unit/mL injection   Yes No   Sig: 10 Units by SubCUTAneous route Before breakfast, lunch, and dinner. isosorbide dinitrate (ISORDIL) 30 mg tablet   No No   Sig: Take 1 Tab by mouth every eight (8) hours. pantoprazole (PROTONIX) 40 mg tablet   Yes No   Sig: Take 40 mg by mouth daily. senna (Senna) 8.6 mg tablet   Yes No   Sig: Take 1 Tab by mouth daily. therapeutic multivitamin [facility]) tablet   Yes No   Sig: Take 1 Tab by mouth daily.    thiamine HCL (B-1) 100 mg tablet   No No   Sig: Take 1 Tab by mouth daily. ticagrelor (BRILINTA) 90 mg tablet   Yes No   Sig: Take 90 mg by mouth two (2) times a day. zinc sulfate (ZINCATE) 220 (50) mg capsule   Yes No   Sig: Take 220 mg by mouth daily. Facility-Administered Medications: None     Imaging:    Medications list Personally Reviewed   [x]      Yes     []               No    Thank you for allowing us to participate in the care this patient. We will follow patient with you. Signed By: Aristeo Ariza [facility]  [facility] SYSTM FRANCISCritical access hospitalCARE ANABELA [address], [address]  [address] [facility] [address], [address] [place]  Phone - [phone]         Fax - [phone] [facility] Office  [address] [place] [address]  Phone - [phone]        Fax - [phone]     www. [facility]

## 2022-04-06 NOTE — ED TRIAGE NOTES
Pt arrived via EMS from home with c/o sudden onset SOB and chest pain, worse with exertion.   Audible expiratory wheezes present  Pt took  mg prior to calling EMS.      +2/+3 pitting edema to BLE

## 2022-04-06 NOTE — PROGRESS NOTES
Bedside shift change report given to [id number] Md, Dr (oncoming nurse) by Pippa Balderas (offgoing nurse).   Report included the following information SBAR, Kardex, [facility] and Recent Results

## 2022-04-06 NOTE — PROGRESS NOTES
Transition of Care Plan:    RUR: 14% low risk for readmission  Disposition:Home with spouse  Follow up appointments: PCP, Specialists as indicated  DME needed: Pt owns/uses no DME. No DME needs anticipated  Transportation at Discharge: Pt's family   Sushma Fly or means to access home: Pt has access       IM Medicare Letter: N/A; Medicaid coverage only  Is patient a BCPI-A Bundle: N/A          If yes, was Bundle Letter given?:    Is patient a [place] and connected with the [place]? N/A               If yes, was [place] transfer form completed and [facility] notified? Caregiver Contact: Pt's spouse, [facility]) [phone]  Discharge Caregiver contacted prior to discharge? To be contacted prior to d/c   Care Conference needed?: No                    Reason for Admission: Acute on chronic systolic/congestive heart failure, atypical chest pain, acute on chronic normocytic anemia, CKD stage III, etc.                     RUR Score:  14% low risk for readmission                   Plan for utilizing home health: No home health needs identified         PCP: First and Last name:  Ana Vee MD     Name of Practice: [facility]   Are you a current patient: Yes/No: Yes   Approximate date of last visit: 3 weeks ago   Can you participate in a virtual visit with your PCP: No                    Current Advanced Directive/Advance Care Plan: Full Code   [address] Decision Maker:       Primary Decision Maker: Vargas Pa - [phone]    Today we documented Decision Maker(s) consistent with Legal Next of Kin hierarchy. Healthcare Decision Maker:              Primary Decision Maker: Ridge Zayas - Spouse - [phone]                  Transition of Care Plan:  Home with spouse, outpatient follow up; nephrology following, possible need for OP HD? CM reviewed chart. CM completed assessment with pt at bedside.  CM introduced self, role of CM, verified demographics, and discussed transition of care planning. Pt resides with spouse, is independent at baseline, uses no DME. Pt drives himself to appointments, anticipates family to transport home at d/c. Pt has previous OP HD hx in 2021, previously being followed by Dr. Diallo Teixeira and dialyzed at [facility]. Pt denies concerns with transition of care plans at this time. Rx preference is [address] on [address]. Unit care management will continue to follow for transition of care planning needs. Care Management Interventions  PCP Verified by CM: Yes  Palliative Care Criteria Met (RRAT>21 & CHF Dx)?: No  Mode of Transport at Discharge:  Other (see comment) (Family)  Transition of Care Consult (CM Consult): Discharge Planning  Discharge Durable Medical Equipment: No (No DME use)  Physical Therapy Consult: No  Occupational Therapy Consult: No  Speech Therapy Consult: No  Support Systems: Spouse/Significant Other,Other Family Member(s)  Confirm Follow Up Transport: Self (Self vs family)  Discharge Location  Patient Expects to be Discharged to[de-identified] Home (Home with spouse, outpatient follow up, possible need for OP HD?)    Reta Carreon [facility] [address]

## 2022-04-06 NOTE — CONSULTS
Gastroenterology Consultation Note  SULEMAN Romero   for Dr. Goyo Nieto    NAME: Gee Collado : 1955 MRN: [id number]   ATTG: Dr. Cruz [facility] PCP: Joan Rios MD  Date/Time:  2022 7:58 AM  Subjective:   REASON FOR CONSULT:      Gee Collado is a 61 y.o.  male who I was asked to see for anemia. He has hx of CAD as well as DM, taking ASA and Brilinta with last use yesterday AM due to recent stent placement about a year ago per patient. He reports he was having chest pain and SOB. Difficulty compelting activities and needing to take a break. He denies any melena or hematochezia. No change in BM consistency. Denies any NSAID use. Has CKD however is no longer on dialysis. He denies any abdominal pain, N/V, reflux sx or dysphagia. No weight loss, more so weight gain. Labs in the ER show:   Results for Donovan Wood (MRN [id number]) as of 2022 07:59   Ref. Range 3/2/2021 01:38 3/3/2021 01:25 3/4/2021 02:50 3/5/2021 02:16 2022 23:23   HGB Latest Ref Range: 12.1 - 17.0 g/dL 7.4 (L) 7.8 (L) 7.6 (L) 7.4 (L) 6.4 (L)   HCT Latest Ref Range: 36.6 - 50.3 % 24.1 (L) 25.5 (L) 24.0 (L) 23.5 (L) 20.5 (L)   Results for Donovan Wood (MRN [id number]) as of 2022 07:59   Ref. Range 2022 02:07   Iron Latest Ref Range: 35 - 150 ug/dL 10 (L)   TIBC Latest Ref Range: 250 - 450 ug/dL 229 (L)   Iron % saturation Latest Ref Range: 20 - 50 % 4 (L)   He has had ongoing issues with anemia dating back to last year. No previous EGD or colonoscopy in the past.     BUN 60 and Cr 4.47, trop 83, pro BNP 16,211    CXR: IMPRESSION  1. Diffuse interstitial prominence which may represent pulmonary vascular  congestion or atypical infectious process. Stool checked and heme negative. Patient was admitted in 2021 for COVID PNA, requiring intubation, with extubation on  and a negative COVID PCR on .   Seen by our group for possible PEG placement due to oropharyngeal dysphagia however he refused. Awaiting cardiology consult, sonographer is at bedside to complete repeat echo. Previous echo 2/25/21: Result status: Final result  · LV: Estimated LVEF is 30 - 35%. Normal cavity size. Mild concentric hypertrophy. Severely reduced systolic function. He denies any famiyl hx of GI malignancy or colon polyps. No tobacco or EtOH use, patient denies any hx of liver disease. Past Medical History:   Diagnosis Date    Diabetes ([address]     Hypertension       Past Surgical History:   Procedure Laterality Date    IR INSERT NON TUNL CVC OVER 5 YRS  2/23/2021    IR INSERT TUNL CVC W/O PORT OVER 5 YR  3/2/2021     Social History     Tobacco Use    Smoking status: Former Smoker    Smokeless tobacco: Never Used   Substance Use Topics    Alcohol use: Not Currently      No family history on file. No Known Allergies   Home Medications:  Prior to Admission Medications   Prescriptions Last Dose Informant Patient Reported? Taking?   acetaminophen (TYLENOL) 325 mg tablet   Yes No   Sig: Take 975 mg by mouth every six (6) hours as needed for Pain. amLODIPine (NORVASC) 10 mg tablet   No No   Sig: Take 1 Tab by mouth daily. ascorbic acid, vitamin C, (Vitamin C) 500 mg tablet   Yes No   Sig: Take 500 mg by mouth two (2) times a day. aspirin 81 mg chewable tablet   Yes No   Sig: Take 81 mg by mouth daily. atorvastatin (LIPITOR) 80 mg tablet   No No   Sig: Take 0.5 Tabs by mouth daily. balsam peru-castor oiL (VENELEX) ointment   No No   Sig: Apply  to affected area every twelve (12) hours. APPLY LIBERALLY TO Right ear, sacrum, b/l heels and any red/bony prominence   bumetanide (BUMEX) 2 mg tablet   No No   Sig: Take 1 Tab by mouth daily. carvediloL (COREG) 12.5 mg tablet   No No   Sig: Take 2 Tabs by mouth two (2) times a day. cholecalciferol (Vitamin D3) (1000 Units /25 mcg) tablet   Yes No   Sig: Take 1,000 Units by mouth daily.    docusate sodium (Colace) 100 mg capsule   Yes No   Sig: Take 100 mg by mouth two (2) times daily as needed for Constipation. ferrous sulfate 325 mg (65 mg iron) tablet   Yes No   Sig: Take  by mouth every fourty-eight (48) hours. gabapentin (NEURONTIN) 400 mg capsule   Yes No   Sig: Take 400 mg by mouth three (3) times daily. hydrALAZINE (APRESOLINE) 25 mg tablet   No No   Sig: Take 1 Tab by mouth three (3) times daily. insulin glargine (Lantus U-100 Insulin) 100 unit/mL injection   Yes No   Si Units by SubCUTAneous route nightly. insulin lispro (HUMALOG) 100 unit/mL injection   Yes No   Sig: 10 Units by SubCUTAneous route Before breakfast, lunch, and dinner. isosorbide dinitrate (ISORDIL) 30 mg tablet   No No   Sig: Take 1 Tab by mouth every eight (8) hours. pantoprazole (PROTONIX) 40 mg tablet   Yes No   Sig: Take 40 mg by mouth daily. senna (Senna) 8.6 mg tablet   Yes No   Sig: Take 1 Tab by mouth daily. therapeutic multivitamin [facility]) tablet   Yes No   Sig: Take 1 Tab by mouth daily. thiamine HCL (B-1) 100 mg tablet   No No   Sig: Take 1 Tab by mouth daily. ticagrelor (BRILINTA) 90 mg tablet   Yes No   Sig: Take 90 mg by mouth two (2) times a day. zinc sulfate (ZINCATE) 220 (50) mg capsule   Yes No   Sig: Take 220 mg by mouth daily.       Facility-Administered Medications: None     Hospital medications:  Current Facility-Administered Medications   Medication Dose Route Frequency    amLODIPine (NORVASC) tablet 10 mg  10 mg Oral DAILY    aspirin chewable tablet 81 mg  81 mg Oral DAILY    atorvastatin (LIPITOR) tablet 40 mg  40 mg Oral DAILY    carvediloL (COREG) tablet 25 mg  25 mg Oral BID    ferrous sulfate tablet 325 mg  1 Tablet Oral Q48H    hydrALAZINE (APRESOLINE) tablet 25 mg  25 mg Oral TID    gabapentin (NEURONTIN) capsule 400 mg  400 mg Oral TID    insulin glargine (LANTUS) injection 20 Units  20 Units SubCUTAneous QHS    isosorbide dinitrate (ISORDIL) tablet 30 mg  30 mg Oral Q8H    thiamine HCL (B-1) tablet 100 mg  100 mg Oral DAILY    ticagrelor (BRILINTA) tablet 90 mg  90 mg Oral BID    sodium chloride (NS) flush 5-40 mL  5-40 mL IntraVENous Q8H    sodium chloride (NS) flush 5-40 mL  5-40 mL IntraVENous PRN    acetaminophen (TYLENOL) tablet 650 mg  650 mg Oral Q6H PRN    Or    acetaminophen (TYLENOL) suppository 650 mg  650 mg Rectal Q6H PRN    polyethylene glycol (MIRALAX) packet 17 g  17 g Oral DAILY PRN    promethazine (PHENERGAN) tablet 12.5 mg  12.5 mg Oral Q6H PRN    Or    ondansetron (ZOFRAN) injection 4 mg  4 mg IntraVENous Q6H PRN    morphine injection 1 mg  1 mg IntraVENous Q3H PRN    0.9% sodium chloride infusion 250 mL  250 mL IntraVENous PRN    pantoprazole (PROTONIX) 40 mg in 0.9% sodium chloride 10 mL injection  40 mg IntraVENous Q12H    bumetanide (BUMEX) injection 1 mg  1 mg IntraVENous BID    insulin lispro (HUMALOG) injection   SubCUTAneous AC&HS    glucose chewable tablet 16 g  4 Tablet Oral PRN    glucagon (GLUCAGEN) injection 1 mg  1 mg IntraMUSCular PRN    dextrose 10% infusion 0-250 mL  0-250 mL IntraVENous PRN     Current Outpatient Medications   Medication Sig    atorvastatin (LIPITOR) 80 mg tablet Take 0.5 Tabs by mouth daily.  carvediloL (COREG) 12.5 mg tablet Take 2 Tabs by mouth two (2) times a day.  amLODIPine (NORVASC) 10 mg tablet Take 1 Tab by mouth daily.  hydrALAZINE (APRESOLINE) 25 mg tablet Take 1 Tab by mouth three (3) times daily.  isosorbide dinitrate (ISORDIL) 30 mg tablet Take 1 Tab by mouth every eight (8) hours.  thiamine HCL (B-1) 100 mg tablet Take 1 Tab by mouth daily.  bumetanide (BUMEX) 2 mg tablet Take 1 Tab by mouth daily.  balsam peru-castor oiL (VENELEX) ointment Apply  to affected area every twelve (12) hours. APPLY LIBERALLY TO Right ear, sacrum, b/l heels and any red/bony prominence    acetaminophen (TYLENOL) 325 mg tablet Take 975 mg by mouth every six (6) hours as needed for Pain.     aspirin 81 mg chewable tablet Take 81 mg by mouth daily.  docusate sodium (Colace) 100 mg capsule Take 100 mg by mouth two (2) times daily as needed for Constipation.  ferrous sulfate 325 mg (65 mg iron) tablet Take  by mouth every fourty-eight (48) hours.  gabapentin (NEURONTIN) 400 mg capsule Take 400 mg by mouth three (3) times daily.  insulin lispro (HUMALOG) 100 unit/mL injection 10 Units by SubCUTAneous route Before breakfast, lunch, and dinner.  insulin glargine (Lantus U-100 Insulin) 100 unit/mL injection 20 Units by SubCUTAneous route nightly.  pantoprazole (PROTONIX) 40 mg tablet Take 40 mg by mouth daily.  senna (Senna) 8.6 mg tablet Take 1 Tab by mouth daily.  ticagrelor (BRILINTA) 90 mg tablet Take 90 mg by mouth two (2) times a day.  ascorbic acid, vitamin C, (Vitamin C) 500 mg tablet Take 500 mg by mouth two (2) times a day.  cholecalciferol (Vitamin D3) (1000 Units /25 mcg) tablet Take 1,000 Units by mouth daily.  zinc sulfate (ZINCATE) 220 (50) mg capsule Take 220 mg by mouth daily.  therapeutic multivitamin (THERAGRAN) tablet Take 1 Tab by mouth daily.      REVIEW OF SYSTEMS:     []     Unable to obtain  ROS due to  []    mental status change  []    sedated   []    intubated  Review of Systems -   History obtained from the patient  General ROS: negative for - weight loss  Psychological ROS: negative for - anxiety or depression  Hematological and Lymphatic ROS: negative for - bleeding problems  Respiratory ROS: positive for - shortness of breath  Cardiovascular ROS: positive for - chest pain  Gastrointestinal ROS: See HPI  Genito-Urinary ROS: no dysuria, trouble voiding, or hematuria  Musculoskeletal ROS: negative for - joint pain  Neurological ROS: no TIA or stroke symptoms  Dermatological ROS: negative for - rash    Objective:   VITALS:    Visit Vitals  BP (!) 170/69   Pulse (!) 57   Temp 97.3 °F (36.3 °C)   Resp 13   Ht 5' 8\" (1.727 m)   Wt 79.8 kg (176 lb)   SpO2 97%   BMI 26.76 kg/m²     Temp (24hrs), Av.5 °F (36.4 °C), Min:97 °F (36.1 °C), Max:98.5 °F (36.9 °C)    PHYSICAL EXAM:   General:    Alert, cooperative BM, no distress, appears stated age. Head:   Normocephalic, without obvious abnormality, atraumatic. Eyes:   Conjunctivae clear, anicteric sclerae. Pupils are equal  Nose:  Nares normal. No drainage or sinus tenderness. Throat:    Lips, mucosa, and tongue normal.  No Thrush  Neck:  Supple, symmetrical,  no adenopathy, thyroid: non tender  Lungs:   Slight expiratiory wheezing, no rhonchi/rales. Heart:   Regular rate and rhythm,  no murmur, rub or gallop. Abdomen:   Soft, non-tender. Not distended. Bowel sounds normal. No masses. No hepatosplenomegaly. Rectal:  Deferred  Extremities: No cyanosis. No edema. No clubbing  Skin:     Texture, turgor normal. No rashes/lesions/jaundice  Lymph: Cervical, supraclavicular normal.  Psych:  Good insight. Not depressed. Not anxious or agitated. Neurologic: EOMs intact. No facial asymmetry. No aphasia or slurred speech normal strength, A/O X 3. LAB DATA REVIEWED:    Lab Results   Component Value Date/Time    WBC 9.6 2022 11:23 PM    HGB 6.4 (L) 2022 11:23 PM    HCT 20.5 (L) 2022 11:23 PM    PLATELET 693  11:23 PM    MCV 85.1 2022 11:23 PM     Lab Results   Component Value Date/Time    ALT (SGPT) 20 2022 11:23 PM    Alk.  phosphatase 96 2022 11:23 PM    Bilirubin, direct 0.2 2010 05:20 PM    Bilirubin, total 0.3 2022 11:23 PM     Lab Results   Component Value Date/Time    Sodium 142 2022 11:23 PM    Potassium 5.3 (H) 2022 11:23 PM    Chloride 115 (H) 2022 11:23 PM    CO2 19 (L) 2022 11:23 PM    Anion gap 8 2022 11:23 PM    Glucose 188 (H) 2022 11:23 PM    BUN 60 (H) 2022 11:23 PM    Creatinine 4.47 (H) 2022 11:23 PM    BUN/Creatinine ratio 13 2022 11:23 PM    GFR est AA 16 (L) 2022 11:23 PM    GFR est non-AA 13 (L) 04/05/2022 11:23 PM    Calcium 7.5 (L) 04/05/2022 11:23 PM     No results found for: LPSE  Lab Results   Component Value Date/Time    INR 1.1 04/05/2022 11:59 PM    INR 1.2 (H) 03/02/2021 01:38 AM    Prothrombin time 11.3 (H) 04/05/2022 11:59 PM    Prothrombin time 12.4 (H) 03/02/2021 01:38 AM       XR Results (most recent):  Results from Hospital Encounter encounter on 04/05/22    XR CHEST PORT    Narrative  INDICATION: . Shortness of breath  Additional history:  COMPARISON: Previous chest xray, 2/23/2021. LIMITATIONS: Portable technique. Radha Rued FINDINGS: Single frontal view of the chest.  .  Lines/tubes/surgical: Cardiac monitor leads overly the patient. Heart/mediastinum: Calcifications in the aortic arch. Lungs/pleura: Diffuse interstitial prominence. No visualized pleural effusion or  pneumothorax. Additional Comments: None. .    Impression  1. Diffuse interstitial prominence which may represent pulmonary vascular  congestion or atypical infectious process. Impression:  Active Problems:    JUAN C (acute kidney injury) ([address] ()      Atypical chest pain (4/6/2022)      Acute CHF (congestive heart failure) ([address] (4/6/2022)       Plan:  Patient with chest pain and SOB with worsening acute on chronic anemia, possibly anemia of chronic disease/CKD. He denies any overt bleeding and stool was heme negative in ER. Seems to have exacerbation of CHF with elevated BNP and bump in trop. Awaiting cardiology consult and results of repeat echo given last EF was low at 30-35%. He is also taking Brilinta and ASA due to recent CAD. Agree with transfusion, would be better suited at hgb>8. Continue BID PPI. Will hold off on endoscopic eval until we get cardiology's input as he is not acutely bleeding. Should this change please let us know.   At some point would benefit from EGD and colonoscopy given sevarity of anemia but needs further optimization and determination if it is safe to hold Brilinta prior to eval.          ___________________________________________________  Care Plan discussed with:    [x]    Patient   []    Family   [x]    Nursing   []    Attending  Total Time :  30   minutes   ___________________________________________________  GI: SULEMAN Reid

## 2022-04-07 ENCOUNTER — APPOINTMENT (OUTPATIENT)
Dept: GENERAL RADIOLOGY | Age: 67
DRG: 194 | End: 2022-04-07
Attending: STUDENT IN AN ORGANIZED HEALTH CARE EDUCATION/TRAINING PROGRAM
Payer: MEDICAID

## 2022-04-07 ENCOUNTER — APPOINTMENT (OUTPATIENT)
Dept: INTERVENTIONAL RADIOLOGY/VASCULAR | Age: 67
DRG: 194 | End: 2022-04-07
Attending: INTERNAL MEDICINE
Payer: MEDICAID

## 2022-04-07 ENCOUNTER — APPOINTMENT (OUTPATIENT)
Dept: GENERAL RADIOLOGY | Age: 67
DRG: 194 | End: 2022-04-07
Attending: INTERNAL MEDICINE
Payer: MEDICAID

## 2022-04-07 LAB
ABO + RH BLD: NORMAL
ALBUMIN SERPL-MCNC: 2.4 G/DL (ref 3.5–5)
ALBUMIN/GLOB SERPL: 0.6 {RATIO} (ref 1.1–2.2)
ALP SERPL-CCNC: 90 U/L (ref 45–117)
ALT SERPL-CCNC: 17 U/L (ref 12–78)
ANION GAP SERPL CALC-SCNC: 7 MMOL/L (ref 5–15)
AST SERPL-CCNC: 18 U/L (ref 15–37)
BASOPHILS # BLD: 0 K/UL (ref 0–0.1)
BASOPHILS NFR BLD: 0 % (ref 0–1)
BILIRUB SERPL-MCNC: 0.3 MG/DL (ref 0.2–1)
BLD PROD TYP BPU: NORMAL
BLOOD GROUP ANTIBODIES SERPL: NORMAL
BPU ID: NORMAL
BUN SERPL-MCNC: 63 MG/DL (ref 6–20)
BUN/CREAT SERPL: 12 (ref 12–20)
CALCIUM SERPL-MCNC: 7.6 MG/DL (ref 8.5–10.1)
CALCIUM SERPL-MCNC: 7.7 MG/DL (ref 8.5–10.1)
CHLORIDE SERPL-SCNC: 109 MMOL/L (ref 97–108)
CO2 SERPL-SCNC: 23 MMOL/L (ref 21–32)
COMMENT, HOLDF: NORMAL
CREAT SERPL-MCNC: 5.19 MG/DL (ref 0.7–1.3)
CROSSMATCH RESULT,%XM: NORMAL
DIFFERENTIAL METHOD BLD: ABNORMAL
EOSINOPHIL # BLD: 0.1 K/UL (ref 0–0.4)
EOSINOPHIL NFR BLD: 1 % (ref 0–7)
ERYTHROCYTE [DISTWIDTH] IN BLOOD BY AUTOMATED COUNT: 15.6 % (ref 11.5–14.5)
GLOBULIN SER CALC-MCNC: 4.3 G/DL (ref 2–4)
GLUCOSE BLD STRIP.AUTO-MCNC: 126 MG/DL (ref 65–117)
GLUCOSE BLD STRIP.AUTO-MCNC: 262 MG/DL (ref 65–117)
GLUCOSE BLD STRIP.AUTO-MCNC: 73 MG/DL (ref 65–117)
GLUCOSE SERPL-MCNC: 158 MG/DL (ref 65–100)
HBV CORE IGM SER QL: NONREACTIVE
HBV SURFACE AB SER QL: REACTIVE
HBV SURFACE AB SER-ACNC: 276.41 MIU/ML
HBV SURFACE AG SER QL: <0.1 INDEX
HBV SURFACE AG SER QL: NEGATIVE
HCT VFR BLD AUTO: 23.3 % (ref 36.6–50.3)
HGB BLD-MCNC: 7.4 G/DL (ref 12.1–17)
IMM GRANULOCYTES # BLD AUTO: 0.1 K/UL (ref 0–0.04)
IMM GRANULOCYTES NFR BLD AUTO: 1 % (ref 0–0.5)
LYMPHOCYTES # BLD: 2.4 K/UL (ref 0.8–3.5)
LYMPHOCYTES NFR BLD: 25 % (ref 12–49)
MCH RBC QN AUTO: 26.7 PG (ref 26–34)
MCHC RBC AUTO-ENTMCNC: 31.8 G/DL (ref 30–36.5)
MCV RBC AUTO: 84.1 FL (ref 80–99)
MONOCYTES # BLD: 0.9 K/UL (ref 0–1)
MONOCYTES NFR BLD: 9 % (ref 5–13)
NEUTS SEG # BLD: 6.3 K/UL (ref 1.8–8)
NEUTS SEG NFR BLD: 64 % (ref 32–75)
NRBC # BLD: 0 K/UL (ref 0–0.01)
NRBC BLD-RTO: 0 PER 100 WBC
PHOSPHATE SERPL-MCNC: 5.3 MG/DL (ref 2.6–4.7)
PLATELET # BLD AUTO: 231 K/UL (ref 150–400)
PMV BLD AUTO: 10.5 FL (ref 8.9–12.9)
POTASSIUM SERPL-SCNC: 4.3 MMOL/L (ref 3.5–5.1)
PROT SERPL-MCNC: 6.7 G/DL (ref 6.4–8.2)
PTH-INTACT SERPL-MCNC: 185.5 PG/ML (ref 18.4–88)
RBC # BLD AUTO: 2.77 M/UL (ref 4.1–5.7)
SAMPLES BEING HELD,HOLD: NORMAL
SERVICE CMNT-IMP: ABNORMAL
SERVICE CMNT-IMP: ABNORMAL
SERVICE CMNT-IMP: NORMAL
SODIUM SERPL-SCNC: 139 MMOL/L (ref 136–145)
SODIUM UR-SCNC: 60 MMOL/L
SPECIMEN EXP DATE BLD: NORMAL
STATUS OF UNIT,%ST: NORMAL
UNIT DIVISION, %UDIV: 0
WBC # BLD AUTO: 9.7 K/UL (ref 4.1–11.1)

## 2022-04-07 PROCEDURE — 87340 HEPATITIS B SURFACE AG IA: CPT

## 2022-04-07 PROCEDURE — 74011250636 HC RX REV CODE- 250/636: Performed by: INTERNAL MEDICINE

## 2022-04-07 PROCEDURE — 74011000250 HC RX REV CODE- 250: Performed by: INTERNAL MEDICINE

## 2022-04-07 PROCEDURE — 76937 US GUIDE VASCULAR ACCESS: CPT

## 2022-04-07 PROCEDURE — 74011250636 HC RX REV CODE- 250/636: Performed by: STUDENT IN AN ORGANIZED HEALTH CARE EDUCATION/TRAINING PROGRAM

## 2022-04-07 PROCEDURE — 82962 GLUCOSE BLOOD TEST: CPT

## 2022-04-07 PROCEDURE — C9113 INJ PANTOPRAZOLE SODIUM, VIA: HCPCS | Performed by: INTERNAL MEDICINE

## 2022-04-07 PROCEDURE — 74011636637 HC RX REV CODE- 636/637: Performed by: INTERNAL MEDICINE

## 2022-04-07 PROCEDURE — 85025 COMPLETE CBC W/AUTO DIFF WBC: CPT

## 2022-04-07 PROCEDURE — 74011250637 HC RX REV CODE- 250/637: Performed by: INTERNAL MEDICINE

## 2022-04-07 PROCEDURE — 86706 HEP B SURFACE ANTIBODY: CPT

## 2022-04-07 PROCEDURE — 74011000250 HC RX REV CODE- 250: Performed by: STUDENT IN AN ORGANIZED HEALTH CARE EDUCATION/TRAINING PROGRAM

## 2022-04-07 PROCEDURE — 71045 X-RAY EXAM CHEST 1 VIEW: CPT

## 2022-04-07 PROCEDURE — 99233 SBSQ HOSP IP/OBS HIGH 50: CPT | Performed by: INTERNAL MEDICINE

## 2022-04-07 PROCEDURE — 83970 ASSAY OF PARATHORMONE: CPT

## 2022-04-07 PROCEDURE — C1892 INTRO/SHEATH,FIXED,PEEL-AWAY: HCPCS

## 2022-04-07 PROCEDURE — 2709999900 HC NON-CHARGEABLE SUPPLY

## 2022-04-07 PROCEDURE — C1752 CATH,HEMODIALYSIS,SHORT-TERM: HCPCS

## 2022-04-07 PROCEDURE — 02HV33Z INSERTION OF INFUSION DEVICE INTO SUPERIOR VENA CAVA, PERCUTANEOUS APPROACH: ICD-10-PCS | Performed by: STUDENT IN AN ORGANIZED HEALTH CARE EDUCATION/TRAINING PROGRAM

## 2022-04-07 PROCEDURE — 84100 ASSAY OF PHOSPHORUS: CPT

## 2022-04-07 PROCEDURE — 65270000029 HC RM PRIVATE

## 2022-04-07 PROCEDURE — 5A1D70Z PERFORMANCE OF URINARY FILTRATION, INTERMITTENT, LESS THAN 6 HOURS PER DAY: ICD-10-PCS | Performed by: INTERNAL MEDICINE

## 2022-04-07 PROCEDURE — 80053 COMPREHEN METABOLIC PANEL: CPT

## 2022-04-07 PROCEDURE — 90935 HEMODIALYSIS ONE EVALUATION: CPT

## 2022-04-07 PROCEDURE — 36415 COLL VENOUS BLD VENIPUNCTURE: CPT

## 2022-04-07 PROCEDURE — 86705 HEP B CORE ANTIBODY IGM: CPT

## 2022-04-07 RX ORDER — HYDRALAZINE HYDROCHLORIDE 20 MG/ML
10 INJECTION INTRAMUSCULAR; INTRAVENOUS
Status: DISCONTINUED | OUTPATIENT
Start: 2022-04-07 | End: 2022-04-13 | Stop reason: HOSPADM

## 2022-04-07 RX ORDER — HYDRALAZINE HYDROCHLORIDE 50 MG/1
50 TABLET, FILM COATED ORAL 3 TIMES DAILY
Status: DISCONTINUED | OUTPATIENT
Start: 2022-04-07 | End: 2022-04-13 | Stop reason: HOSPADM

## 2022-04-07 RX ORDER — HEPARIN 100 UNIT/ML
300 SYRINGE INTRAVENOUS ONCE
Status: COMPLETED | OUTPATIENT
Start: 2022-04-07 | End: 2022-04-07

## 2022-04-07 RX ORDER — IPRATROPIUM BROMIDE AND ALBUTEROL SULFATE 2.5; .5 MG/3ML; MG/3ML
3 SOLUTION RESPIRATORY (INHALATION)
Status: DISCONTINUED | OUTPATIENT
Start: 2022-04-07 | End: 2022-04-13 | Stop reason: HOSPADM

## 2022-04-07 RX ORDER — LIDOCAINE HYDROCHLORIDE 20 MG/ML
10 INJECTION, SOLUTION INFILTRATION; PERINEURAL ONCE
Status: COMPLETED | OUTPATIENT
Start: 2022-04-07 | End: 2022-04-07

## 2022-04-07 RX ORDER — HEPARIN SODIUM 200 [USP'U]/100ML
400 INJECTION, SOLUTION INTRAVENOUS ONCE
Status: COMPLETED | OUTPATIENT
Start: 2022-04-07 | End: 2022-04-07

## 2022-04-07 RX ADMIN — TICAGRELOR 90 MG: 90 TABLET ORAL at 18:13

## 2022-04-07 RX ADMIN — GABAPENTIN 400 MG: 100 CAPSULE ORAL at 23:29

## 2022-04-07 RX ADMIN — HYDRALAZINE HYDROCHLORIDE 50 MG: 50 TABLET, FILM COATED ORAL at 10:21

## 2022-04-07 RX ADMIN — BUMETANIDE 2 MG: 0.25 INJECTION, SOLUTION INTRAMUSCULAR; INTRAVENOUS at 23:29

## 2022-04-07 RX ADMIN — GABAPENTIN 400 MG: 100 CAPSULE ORAL at 10:21

## 2022-04-07 RX ADMIN — SODIUM CHLORIDE, PRESERVATIVE FREE 10 ML: 5 INJECTION INTRAVENOUS at 18:16

## 2022-04-07 RX ADMIN — SODIUM CHLORIDE, PRESERVATIVE FREE 10 ML: 5 INJECTION INTRAVENOUS at 05:37

## 2022-04-07 RX ADMIN — TICAGRELOR 90 MG: 90 TABLET ORAL at 10:21

## 2022-04-07 RX ADMIN — ISOSORBIDE DINITRATE 30 MG: 10 TABLET ORAL at 23:29

## 2022-04-07 RX ADMIN — GABAPENTIN 400 MG: 100 CAPSULE ORAL at 18:13

## 2022-04-07 RX ADMIN — ISOSORBIDE DINITRATE 30 MG: 10 TABLET ORAL at 18:14

## 2022-04-07 RX ADMIN — HYDRALAZINE HYDROCHLORIDE 50 MG: 50 TABLET, FILM COATED ORAL at 23:29

## 2022-04-07 RX ADMIN — ISOSORBIDE DINITRATE 30 MG: 10 TABLET ORAL at 05:37

## 2022-04-07 RX ADMIN — CARVEDILOL 25 MG: 12.5 TABLET, FILM COATED ORAL at 10:21

## 2022-04-07 RX ADMIN — INSULIN GLARGINE 20 UNITS: 100 INJECTION, SOLUTION SUBCUTANEOUS at 00:13

## 2022-04-07 RX ADMIN — THIAMINE HCL TAB 100 MG 100 MG: 100 TAB at 10:21

## 2022-04-07 RX ADMIN — IPRATROPIUM BROMIDE AND ALBUTEROL SULFATE 3 ML: .5; 3 SOLUTION RESPIRATORY (INHALATION) at 14:21

## 2022-04-07 RX ADMIN — AMLODIPINE BESYLATE 10 MG: 5 TABLET ORAL at 10:21

## 2022-04-07 RX ADMIN — ASPIRIN 81 MG: 81 TABLET, CHEWABLE ORAL at 10:21

## 2022-04-07 RX ADMIN — SODIUM CHLORIDE, PRESERVATIVE FREE 300 UNITS: 5 INJECTION INTRAVENOUS at 15:00

## 2022-04-07 RX ADMIN — IRON SUCROSE 200 MG: 20 INJECTION, SOLUTION INTRAVENOUS at 10:21

## 2022-04-07 RX ADMIN — ATORVASTATIN CALCIUM 40 MG: 40 TABLET, FILM COATED ORAL at 10:21

## 2022-04-07 RX ADMIN — SODIUM CHLORIDE 40 MG: 9 INJECTION INTRAMUSCULAR; INTRAVENOUS; SUBCUTANEOUS at 23:30

## 2022-04-07 RX ADMIN — HYDRALAZINE HYDROCHLORIDE 50 MG: 50 TABLET, FILM COATED ORAL at 18:14

## 2022-04-07 RX ADMIN — BUMETANIDE 2 MG: 0.25 INJECTION, SOLUTION INTRAMUSCULAR; INTRAVENOUS at 10:37

## 2022-04-07 RX ADMIN — LIDOCAINE HYDROCHLORIDE 200 MG: 20 INJECTION, SOLUTION INFILTRATION; PERINEURAL at 15:00

## 2022-04-07 RX ADMIN — CARVEDILOL 25 MG: 12.5 TABLET, FILM COATED ORAL at 18:13

## 2022-04-07 RX ADMIN — SODIUM CHLORIDE, PRESERVATIVE FREE 10 ML: 5 INJECTION INTRAVENOUS at 23:36

## 2022-04-07 RX ADMIN — HEPARIN SODIUM IN SODIUM CHLORIDE 800 UNITS: 200 INJECTION INTRAVENOUS at 15:15

## 2022-04-07 RX ADMIN — SODIUM CHLORIDE 40 MG: 9 INJECTION INTRAMUSCULAR; INTRAVENOUS; SUBCUTANEOUS at 10:22

## 2022-04-07 RX ADMIN — Medication 2 UNITS: at 18:18

## 2022-04-07 NOTE — PROGRESS NOTES
TRANSFER - OUT REPORT:    Verbal report given to Misty Butts RN (name) on Carola Gomez  being transferred to [id number] (unit) for routine progression of care       Report consisted of patients Situation, Background, Assessment and   Recommendations(SBAR). Information from the following report(s) Procedure Summary was reviewed with the receiving nurse. Lines:   Peripheral IV 04/05/22 Left Antecubital (Active)   Site Assessment Clean, dry, & intact 04/07/22 0015   Phlebitis Assessment 0 04/07/22 0015   Infiltration Assessment 0 04/07/22 0015   Dressing Status Clean, dry, & intact 04/07/22 0015   Dressing Type Transparent 04/07/22 0015   Hub Color/Line Status Flushed;Patent 04/07/22 0015   Alcohol Cap Used Yes 04/07/22 0015       Peripheral IV 04/06/22 Right Wrist (Active)   Site Assessment Clean, dry, & intact 04/07/22 0015   Phlebitis Assessment 0 04/07/22 0015   Infiltration Assessment 0 04/07/22 0015   Dressing Status Clean, dry, & intact 04/07/22 0015   Dressing Type Transparent 04/07/22 0015   Hub Color/Line Status Flushed;Patent 04/07/22 0015   Action Taken Blood drawn 04/06/22 0218   Alcohol Cap Used Yes 04/07/22 0015        Opportunity for questions and clarification was provided.

## 2022-04-07 NOTE — PROGRESS NOTES
Bedside shift change report given to Tonny Garcias (oncoming nurse) by Salinas Fisher (offgoing nurse). Report included the following information SBAR, Kardex, Intake/Output and MAR.

## 2022-04-07 NOTE — PROGRESS NOTES
[address]  [phone]      Cardiology Progress Note      4/7/2022 0940 AM    Admit Date: 4/5/2022    Admit Diagnosis:   Acute CHF (congestive heart failure) ([facility] [I50.9]  JUAN C (acute kidney injury) ([facility] [N17.9]  Atypical chest pain [R07.89]    Subjective:     Tanya Saunders continues to endorse some abdominal pain. Expresses concerns \"about my heart\". VSS, slightly hypertensive. Troponin down to 43.      Visit Vitals  BP (!) 161/58   Pulse 62   Temp 99.2 °F (37.3 °C)   Resp 18   Ht 5' 8\" (1.727 m)   Wt 79.8 kg (176 lb)   SpO2 97%   BMI 26.76 kg/m²       Current Facility-Administered Medications   Medication Dose Route Frequency    hydrALAZINE (APRESOLINE) 20 mg/mL injection 10 mg  10 mg IntraVENous Q6H PRN    albuterol-ipratropium (DUO-NEB) 2.5 MG-0.5 MG/3 ML  3 mL Nebulization Q4H PRN    hydrALAZINE (APRESOLINE) tablet 50 mg  50 mg Oral TID    iron sucrose (VENOFER) injection 200 mg  200 mg IntraVENous Q24H    amLODIPine (NORVASC) tablet 10 mg  10 mg Oral DAILY    aspirin chewable tablet 81 mg  81 mg Oral DAILY    atorvastatin (LIPITOR) tablet 40 mg  40 mg Oral DAILY    carvediloL (COREG) tablet 25 mg  25 mg Oral BID    ferrous sulfate tablet 325 mg  1 Tablet Oral Q48H    gabapentin (NEURONTIN) capsule 400 mg  400 mg Oral TID    insulin glargine (LANTUS) injection 20 Units  20 Units SubCUTAneous QHS    isosorbide dinitrate (ISORDIL) tablet 30 mg  30 mg Oral Q8H    ticagrelor (BRILINTA) tablet 90 mg  90 mg Oral BID    sodium chloride (NS) flush 5-40 mL  5-40 mL IntraVENous Q8H    sodium chloride (NS) flush 5-40 mL  5-40 mL IntraVENous PRN    acetaminophen (TYLENOL) tablet 650 mg  650 mg Oral Q6H PRN    Or    acetaminophen (TYLENOL) suppository 650 mg  650 mg Rectal Q6H PRN    polyethylene glycol (MIRALAX) packet 17 g  17 g Oral DAILY PRN    promethazine (PHENERGAN) tablet 12.5 mg  12.5 mg Oral Q6H PRN    Or    ondansetron (ZOFRAN) injection 4 mg  4 mg IntraVENous Q6H PRN    morphine injection 1 mg  1 mg IntraVENous Q3H PRN    0.9% sodium chloride infusion 250 mL  250 mL IntraVENous PRN    pantoprazole (PROTONIX) 40 mg in 0.9% sodium chloride 10 mL injection  40 mg IntraVENous Q12H    insulin lispro (HUMALOG) injection   SubCUTAneous AC&HS    glucose chewable tablet 16 g  4 Tablet Oral PRN    glucagon (GLUCAGEN) injection 1 mg  1 mg IntraMUSCular PRN    dextrose 10% infusion 0-250 mL  0-250 mL IntraVENous PRN    thiamine mononitrate (B-1) tablet 100 mg  100 mg Oral DAILY    bumetanide (BUMEX) injection 2 mg  2 mg IntraVENous TID    epoetin luis-epbx (RETACRIT) 12,000 Units combo injection  12,000 Units SubCUTAneous Q MON, WED & FRI       Objective:      Physical Assessment:   General Appearance:  alert, cooperative, well nourished, well developed; appears stated age, AA male in NAD   Eyes: sclera anicteric  Mouth/Throat: moist mucous membranes; oral pharynx clear  Neck: supple; no JVD or bruit  Pulmonary:  clear to auscultation bilaterally; good effort  Cardiovascular: regular rate and rhythm; no murmur, click, rub, or gallop  Abdomen: soft, non-tender, non-distended; bowel sounds normal  Musculoskeletal: no swelling or deformity; moves all extremities  Extremities: no edema; palpable distal pulses   Skin: dry and flaky on extremities   Neuro: grossly normal  Psych: normal mood and affect given the setting       Data Review:   Recent Labs     04/07/22  0547 04/06/22  1345 04/05/22  2323   WBC 9.7 8.5 9.6   HGB 7.4* 7.3* 6.4*   HCT 23.3* 22.9* 20.5*    231 241     Recent Labs     04/07/22  0547 04/06/22  1345 04/05/22  2359 04/05/22  2323    143  --  142   K 4.3 4.8  --  5.3*   * 112*  --  115*   CO2 23 23  --  19*   * 115*  --  188*   BUN 63* 56*  --  60*   CREA 5.19* 4.52*  --  4.47*   CA PENDING  7.7* 7.7*  --  7.5*   PHOS 5.3*  --   --   --    ALB 2.4*  --   --  2.7*   TBILI 0.3  --   --  0.3   ALT 17  --   --  20 INR  --   --  1.1  --        Recent Labs     04/06/22  1345 04/06/22  0207 04/05/22  2323   TROPHS 43 70 83*         Intake/Output Summary (Last 24 hours) at 4/7/2022 1128  Last data filed at 4/7/2022 0242  Gross per 24 hour   Intake 600 ml   Output 1150 ml   Net -550 ml        Cardiographics     Telemetry: SB 50's   ECG: NSR no acute changes   Echocardiogram: EF 46-34%, normal diastolic function, no significant valvular pathology   CXRAY: \"1. Diffuse interstitial prominence which may represent pulmonary vascular congestion or atypical infectious process. \"       Assessment:     Active Problems:    JUAN C (acute kidney injury) ([facility] ()      Atypical chest pain (4/6/2022)      Acute CHF (congestive heart failure) ([facility] (4/6/2022)        Plan:   Dayo Lentz is a 61 y.o. male with PMHx significant for DM II, CKD III, AMI/CAD with stents,Anemia admitted for Acute CHF (congestive heart failure) ([facility] [I50.9] JUAN C (acute kidney injury) ([facility] [N17.9] Atypical chest pain [R07.89].    History of acute on chronic systolic/congestive heart failure, with normal LVEF this admission:  Hx CAD with stents/AMI:   Presented with shortness of breath  Chest x-ray with pulmonary vasculature congestion  NT-proBNP 16.2K- repeat in am   Previous EF as above, EF 35% no significant valvular pathology   New ECHO EF 55-60%, no valvular pathology-therefore this admission I think volume overload is due to renal insufficiency primarily   agree with Bumex 2 mg IV TID  Strict I/O (incomplete), labs, daily weights   Nephrology following     Atypical chest pain:   Coronary artery disease status post PCI  Patient reports compliance with Brilinta and ASA since PCI. Low suspicion for in stent restenosis. He has normal Troponin.  Given his anemia and elevated Creatinine and alleviation of symptoms, will be extremely conservative regarding any invasive ischemic work-up at this time  Medical management at this time, will reassess if he has refractory symptoms   Continue Brilinta, ASA, statin, and BB   No acute ischemic changes on EKG  Troponin negative, trended   Continue PRN morphine      Acute on chronic normocytic anemia:   Likely anemia of chronic disease due to CKD  Hemoglobin 6.4 on admission-received 1 unit PRBCs-H/H stable   Hemoccult negative in the ED  We have to continue dual antiplatelets due to recent stent reported by the patient  Monitor CBC      CKD. JUAN C:  Creatinine 5.19  Continue IV diuresis as per nephrology   Follow [facility]     Diabetes mellitus type 2  Manage as per internal medicine      Hyperkalemia: resolved   In setting of CKD/JUAN C  4.3, follow [facility]   Nephrology managing    Cardiology will sign-off, follow as needed, please call with questions. Will schedule outpaitent follow-up.         Abdirahman Pink NP   [facility]     Patient seen and examined by me with the above nurse practitioner. I personally performed all components of the history, physical, and medical decision making and agree with the assessment and plan with minor modifications as noted.      Today the patient presents with  improved shortness of breath after transfusion and diuresis in the setting of severe anemia, with evidence of mild pulmonary edema.     General PE  Gen:  NAD  Mental Status - Alert. General Appearance - Not in acute distress. HEENT:  PERRL, no carotid bruits or JVD  Chest and Lung Exam   Inspection: Accessory muscles - No use of accessory muscles in breathing. Auscultation:   Breath sounds: -Few crackles at the bases   cardiovascular   Inspection: Jugular vein - Bilateral - Inspection Normal.   Palpation/Percussion:   Apical Impulse: - Normal.   Auscultation: Rhythm - Regular. Heart Sounds - S1 WNL and S2 WNL. No S3 or S4. Murmurs & Other Heart Sounds: Auscultation of the heart reveals - No Murmurs. Peripheral Vascular   Upper Extremity: Inspection - Bilateral - No Cyanotic nailbeds or Digital clubbing.    Lower Extremity:   Palpation: Edema - Bilateral -trace edema. Abdomen:   Soft, non-tender, bowel sounds are active. Neuro: A&O times 3, CN and motor grossly WNL     Better after blood transfusion and diuresis. Aggressive medical management. Since symptoms are improved/resolved, will delay any ischemic evaluation at this time, but this can be considered in the future, especially of kidneys improved significantly already complex committed to dialysis. Volume control per nephrology. For now doing okay with diuresis but I suspect he may need dialysis permanently at some point in the not too distant future. Thanks for the consult. We appreciate the opportunity to participate in this patient's care. We will sign off for now and have him follow-up in the office in the near future. Please call if we can assist further during his hospitalization.

## 2022-04-07 NOTE — PROCEDURES
Hemodialysis / [phone]    Vitals Pre Post Assessment Pre Post   BP BP: (!) 133/55 (04/07/22 1927) 169/70 LOC A&O x4 A&O x4   HR Pulse (Heart Rate): (!) 59 (04/07/22 1927) 78 Lungs coarse coarse   Resp Resp Rate: 22 (04/07/22 1927) 20 Cardiac Regular Regular   Temp Temp: 98.1 °F (36.7 °C) (04/07/22 1927) 98.6 Skin RIJ CVC RIJ CVC   Weight  NA NA Edema +3 pitting BLE +3 pitting BLE   Tele status Remote Remote Pain Pain Intensity 1: 0 (04/07/22 1413) 0     Orders   Duration: Start: 1927 End: 2200 Total: 2.5 hrs   Dialyzer: Dialyzer/Set Up Inspection: Modesta Gonzalez (04/07/22 1927)   K Bath: Dialysate K (mEq/L): 3 (04/07/22 1927)   Ca Bath: Dialysate CA (mEq/L): 2.5 (04/07/22 1927)   Na: Dialysate NA (mEq/L): 138 (04/07/22 1927)   Bicarb: Dialysate HCO3 (mEq/L): 35 (04/07/22 1927)   Target Fluid Removal: Goal/Amount of Fluid to Remove (mL): 2000 mL (04/07/22 1927)     Access   Type & Location: [facility]   Comments:   Dressing changed due to oozing around site. No s/s of infection. Both lumens aspirate & flush well. Running well at .                                       Labs   HBsAg (Antigen) / date:     Negative 4/7/22                                          HBsAb (Antibody) / date:    Immune   4/7/22   Source:    Epic   Obtained/Reviewed  Critical Results Called HGB   Date Value Ref Range Status   04/07/2022 7.4 (L) 12.1 - 17.0 g/dL Final     Potassium   Date Value Ref Range Status   04/07/2022 4.3 3.5 - 5.1 mmol/L Final     Calcium   Date Value Ref Range Status   04/07/2022 7.7 (L) 8.5 - 10.1 MG/DL Final   04/07/2022 7.6 (L) 8.5 - 10.1 MG/DL Final     BUN   Date Value Ref Range Status   04/07/2022 63 (H) 6 - 20 MG/DL Final     Creatinine   Date Value Ref Range Status   04/07/2022 5.19 (H) 0.70 - 1.30 MG/DL Final        Meds Given   Name Dose Route   None ordered                 Adequacy / Fluid    Total Liters Process: 35.5 L   Net Fluid Removed: 2000 mL      Comments   Time Out Done:   (Time) 1925 Admitting Diagnosis: SOB   Consent obtained/signed: Informed Consent Verified: Yes (04/07/22 1927)   Machine / Shilpi Reis # Machine Number: [id number] (04/07/22 1927)   Primary Nurse Rpt Pre: Rachel Marrero RN   Primary Nurse Rpt Post: Jodi Orlando RN   Pt Education: CVC care, First treatment education, S&S of low BP   Care Plan: On going   Pts outpatient clinic: None     Tx Summary   Comments:    RIJ CVC: Dressing changed due to oozing around site. No s/s of infection. Both lumens aspirate & flush well. Running well at . SBAR received from Primary RN. Pt arrived to HD suite A&Ox4. Consent signed & on file. 1927: Each catheter limb disinfected per p&p, caps removed, hubs disinfected per p&p. Each lumen aspirated for blood return and flushed with Normal Saline per policy. VSS. Dialysis Tx initiated. 2145: Joe site oozing. Dressing changed again and surgifoam applied. 2200[de-identified] Tx ended. VSS. Each dialysis catheter limb disinfected per p&p, all possible blood returned per p&p, and each dialysis hub disinfected per p&p. Each lumen flushed. Bed locked and in the lowest position, call bell and belongings in reach. SBAR given to Primary, RN. Patient is stable at time of their departure. All Dialysis related medications have been reviewed.

## 2022-04-07 NOTE — PROGRESS NOTES
Nephrology Progress Note  Harry Sandoval     [facility]. [facility]  Phone - [phone]   Patient: Kiran Harding    YOB: 1955        Date- 4/7/2022   Admit Date: 4/5/2022  CC: Follow up for JUAN C stage III          IMPRESSION & PLAN:   · JUAN C stage III(secondary to cardiorenal syndrome)  · CKD stage III baseline creatinine of 1.4-1.5. · Systolic heart failure(EF 30 to 35% in 2021)  · Acute volume overload  · Pulmonary edema  · Mild hyperkalemia  · Non-anion gap metabolic acidosis  · Anemia  · Type 2 diabetes  · Hypertension  · CAD     PLAN-  · Patient remains significantly hypervolemic. · Currently on Bumex 2 mg 3 times daily with only 1 L urine output. · Creatinine now worse . · I had a discussion with the patient today. And talk to him about initiation of hemodialysis for better volume removal and metabolic clearance. · Patient is agreeable and has consented for hemodialysis. I also spoke to his wife on the phone. · I placed consult for IR for Joe placement. · Notified [facility] to start dialysis treatments. Plan for first treatment today and second tomorrow. · Discussed with Dr. Brady Vieira     Subjective: Interval History:   -Seen and examined today.   -Remains significantly hypervolemic  -Not much urine output on aggressive diuretics  -Creatinine worsening    Objective:   Vitals:    04/07/22 0015 04/07/22 0016 04/07/22 0241 04/07/22 0415   BP: (!) 184/76 (!) 179/80 (!) 174/73 (!) 161/58   Pulse: 66  62 62   Resp: 18  18 18   Temp: 98.9 °F (37.2 °C)  98.6 °F (37 °C) 99.2 °F (37.3 °C)   SpO2: 94%  95% 97%   Weight:       Height:          04/06 0701 - 04/07 0700  In: 1055.8 [P.O.:720]  Out: 1150 [Urine:1150]  Last 3 Recorded Weights in this Encounter    04/05/22 2316 04/06/22 1419   Weight: 79.8 kg (176 lb) 79.8 kg (176 lb)      Physical exam:    GEN: NAD  NECK- Supple, no mass  RESP: No wheezing, Clear b/l  CVS: S1,S2  RRR  NEURO: Normal speech, Non focal  EXT: No Edema PSYCH: Normal Mood    Chart reviewed. Pertinent Notes reviewed.      Data Review :  Recent Labs     04/07/22  0547 04/06/22  1345 04/05/22  2323    143 142   K 4.3 4.8 5.3*   * 112* 115*   CO2 23 23 19*   BUN 63* 56* 60*   CREA 5.19* 4.52* 4.47*   * 115* 188*   CA PENDING  7.7* 7.7* 7.5*   PHOS 5.3*  --   --      Recent Labs     04/07/22  0547 04/06/22  1345 04/05/22  2323   WBC 9.7 8.5 9.6   HGB 7.4* 7.3* 6.4*   HCT 23.3* 22.9* 20.5*    231 241     Recent Labs     04/06/22  0207   TIBC 229*   PSAT 4*      Medication list  reviewed  Current Facility-Administered Medications   Medication Dose Route Frequency    hydrALAZINE (APRESOLINE) 20 mg/mL injection 10 mg  10 mg IntraVENous Q6H PRN    albuterol-ipratropium (DUO-NEB) 2.5 MG-0.5 MG/3 ML  3 mL Nebulization Q4H PRN    hydrALAZINE (APRESOLINE) tablet 50 mg  50 mg Oral TID    iron sucrose (VENOFER) injection 200 mg  200 mg IntraVENous Q24H    amLODIPine (NORVASC) tablet 10 mg  10 mg Oral DAILY    aspirin chewable tablet 81 mg  81 mg Oral DAILY    atorvastatin (LIPITOR) tablet 40 mg  40 mg Oral DAILY    carvediloL (COREG) tablet 25 mg  25 mg Oral BID    ferrous sulfate tablet 325 mg  1 Tablet Oral Q48H    gabapentin (NEURONTIN) capsule 400 mg  400 mg Oral TID    insulin glargine (LANTUS) injection 20 Units  20 Units SubCUTAneous QHS    isosorbide dinitrate (ISORDIL) tablet 30 mg  30 mg Oral Q8H    ticagrelor (BRILINTA) tablet 90 mg  90 mg Oral BID    sodium chloride (NS) flush 5-40 mL  5-40 mL IntraVENous Q8H    sodium chloride (NS) flush 5-40 mL  5-40 mL IntraVENous PRN    acetaminophen (TYLENOL) tablet 650 mg  650 mg Oral Q6H PRN    Or    acetaminophen (TYLENOL) suppository 650 mg  650 mg Rectal Q6H PRN    polyethylene glycol (MIRALAX) packet 17 g  17 g Oral DAILY PRN    promethazine (PHENERGAN) tablet 12.5 mg  12.5 mg Oral Q6H PRN    Or    ondansetron (ZOFRAN) injection 4 mg  4 mg IntraVENous Q6H PRN    morphine injection 1 mg  1 mg IntraVENous Q3H PRN    0.9% sodium chloride infusion 250 mL  250 mL IntraVENous PRN    pantoprazole (PROTONIX) 40 mg in 0.9% sodium chloride 10 mL injection  40 mg IntraVENous Q12H    insulin lispro (HUMALOG) injection   SubCUTAneous AC&HS    glucose chewable tablet 16 g  4 Tablet Oral PRN    glucagon (GLUCAGEN) injection 1 mg  1 mg IntraMUSCular PRN    dextrose 10% infusion 0-250 mL  0-250 mL IntraVENous PRN    thiamine mononitrate (B-1) tablet 100 mg  100 mg Oral DAILY    bumetanide (BUMEX) injection 2 mg  2 mg IntraVENous TID    epoetin luis-epbx (RETACRIT) 12,000 Units combo injection  12,000 Units SubCUTAneous Q MON, WED & FRI          Melissa Wray MD              [facility]  [facility] / [facility]  [facility], [address]  [place], [address]  Phone - [phone]               Fax - [phone]

## 2022-04-07 NOTE — PROGRESS NOTES
0028- Received perfectserve from nursing that stated: \"BP is 184/76, recheck was 179/80. Tried on the other arm and it was higher\". Order placed for prn IV hydralazine.

## 2022-04-07 NOTE — PROGRESS NOTES
Gastroenterology Progress Note  SULEMAN Peres   for Dr. Sherren Carson    4/7/2022    Admit Date: 4/5/2022    Subjective: Follow up for:  1)  Anemia    2)  Acute systolic CHF    3) CKD may need to resume dialysis    4) DAPT s/p stenting in Fall 2021    Patient is on Regular diet without any N/V. Pain: Patient complains of abdominal pain no. There is no bleeding    Feels fluid overload is improving but still wheezing. Results for Alisson Ceballos (MRN [id number]) as of 4/7/2022 09:11   Ref. Range 4/5/2022 23:23 4/6/2022 13:45 4/7/2022 05:47   HGB Latest Ref Range: 12.1 - 17.0 g/dL 6.4 (L) 7.3 (L) 7.4 (L)   HCT Latest Ref Range: 36.6 - 50.3 % 20.5 (L) 22.9 (L) 23.3 (L)     Received one unit of blood on 4/6    Hospitalist giving IV iron today.     Worsening kidney function with Cr of 5.19 from 4.52, BUN increased to 63 from 56    Has continued on Brilinta and ASA    Current Facility-Administered Medications   Medication Dose Route Frequency    hydrALAZINE (APRESOLINE) 20 mg/mL injection 10 mg  10 mg IntraVENous Q6H PRN    albuterol-ipratropium (DUO-NEB) 2.5 MG-0.5 MG/3 ML  3 mL Nebulization Q4H PRN    hydrALAZINE (APRESOLINE) tablet 50 mg  50 mg Oral TID    amLODIPine (NORVASC) tablet 10 mg  10 mg Oral DAILY    aspirin chewable tablet 81 mg  81 mg Oral DAILY    atorvastatin (LIPITOR) tablet 40 mg  40 mg Oral DAILY    carvediloL (COREG) tablet 25 mg  25 mg Oral BID    ferrous sulfate tablet 325 mg  1 Tablet Oral Q48H    gabapentin (NEURONTIN) capsule 400 mg  400 mg Oral TID    insulin glargine (LANTUS) injection 20 Units  20 Units SubCUTAneous QHS    isosorbide dinitrate (ISORDIL) tablet 30 mg  30 mg Oral Q8H    ticagrelor (BRILINTA) tablet 90 mg  90 mg Oral BID    sodium chloride (NS) flush 5-40 mL  5-40 mL IntraVENous Q8H    sodium chloride (NS) flush 5-40 mL  5-40 mL IntraVENous PRN    acetaminophen (TYLENOL) tablet 650 mg  650 mg Oral Q6H PRN    Or    acetaminophen (TYLENOL) suppository 650 mg  650 mg Rectal Q6H PRN    polyethylene glycol (MIRALAX) packet 17 g  17 g Oral DAILY PRN    promethazine (PHENERGAN) tablet 12.5 mg  12.5 mg Oral Q6H PRN    Or    ondansetron (ZOFRAN) injection 4 mg  4 mg IntraVENous Q6H PRN    morphine injection 1 mg  1 mg IntraVENous Q3H PRN    0.9% sodium chloride infusion 250 mL  250 mL IntraVENous PRN    pantoprazole (PROTONIX) 40 mg in 0.9% sodium chloride 10 mL injection  40 mg IntraVENous Q12H    insulin lispro (HUMALOG) injection   SubCUTAneous AC&HS    glucose chewable tablet 16 g  4 Tablet Oral PRN    glucagon (GLUCAGEN) injection 1 mg  1 mg IntraMUSCular PRN    dextrose 10% infusion 0-250 mL  0-250 mL IntraVENous PRN    thiamine mononitrate (B-1) tablet 100 mg  100 mg Oral DAILY    bumetanide (BUMEX) injection 2 mg  2 mg IntraVENous TID    epoetin luis-epbx (RETACRIT) 12,000 Units combo injection  12,000 Units SubCUTAneous Q MON, WED & FRI        Objective:     Blood pressure (!) 161/58, pulse 62, temperature 99.2 °F (37.3 °C), resp. rate 18, height 5' 8\" (1.727 m), weight 79.8 kg (176 lb), SpO2 97 %. No intake/output data recorded. 04/05 1901 - 04/07 0700  In: 1055.8 [P.O.:720]  Out: 1150 [Urine:1150]    EXAM:  GEN: Pleasant BM, NAD  HEENT: NCAT, sclera anicteric  Lungs:  Audible expiratory wheeze in all lung fields  Ext: pitting edema BLE    Data Review    Recent Results (from the past 24 hour(s))   GLUCOSE, POC    Collection Time: 04/06/22 11:49 AM   Result Value Ref Range    Glucose (POC) 123 (H) 65 - 117 mg/dL    Performed by Serenity Carmona  PCT    TROPONIN-HIGH SENSITIVITY    Collection Time: 04/06/22  1:45 PM   Result Value Ref Range    Troponin-High Sensitivity 43 0 - 76 ng/L   METABOLIC PANEL, BASIC    Collection Time: 04/06/22  1:45 PM   Result Value Ref Range    Sodium 143 136 - 145 mmol/L    Potassium 4.8 3.5 - 5.1 mmol/L    Chloride 112 (H) 97 - 108 mmol/L    CO2 23 21 - 32 mmol/L    Anion gap 8 5 - 15 mmol/L Glucose 115 (H) 65 - 100 mg/dL    BUN 56 (H) 6 - 20 MG/DL    Creatinine 4.52 (H) 0.70 - 1.30 MG/DL    BUN/Creatinine ratio 12 12 - 20      GFR est AA 16 (L) >60 ml/min/1.73m2    GFR est non-AA 13 (L) >60 ml/min/1.73m2    Calcium 7.7 (L) 8.5 - 10.1 MG/DL   CBC WITH AUTOMATED DIFF    Collection Time: 04/06/22  1:45 PM   Result Value Ref Range    WBC 8.5 4.1 - 11.1 K/uL    RBC 2.75 (L) 4.10 - 5.70 M/uL    HGB 7.3 (L) 12.1 - 17.0 g/dL    HCT 22.9 (L) 36.6 - 50.3 %    MCV 83.3 80.0 - 99.0 FL    MCH 26.5 26.0 - 34.0 PG    MCHC 31.9 30.0 - 36.5 g/dL    RDW 15.8 (H) 11.5 - 14.5 %    PLATELET 653 450 - 929 K/uL    MPV 10.3 8.9 - 12.9 FL    NRBC 0.0 0  WBC    ABSOLUTE NRBC 0.00 0.00 - 0.01 K/uL    NEUTROPHILS 67 32 - 75 %    LYMPHOCYTES 21 12 - 49 %    MONOCYTES 10 5 - 13 %    EOSINOPHILS 1 0 - 7 %    BASOPHILS 0 0 - 1 %    IMMATURE GRANULOCYTES 1 (H) 0.0 - 0.5 %    ABS. NEUTROPHILS 5.8 1.8 - 8.0 K/UL    ABS. LYMPHOCYTES 1.8 0.8 - 3.5 K/UL    ABS. MONOCYTES 0.8 0.0 - 1.0 K/UL    ABS. EOSINOPHILS 0.1 0.0 - 0.4 K/UL    ABS. BASOPHILS 0.0 0.0 - 0.1 K/UL    ABS. IMM.  GRANS. 0.0 0.00 - 0.04 K/UL    DF AUTOMATED     ECHO ADULT COMPLETE    Collection Time: 04/06/22  4:40 PM   Result Value Ref Range    IVSd 1.1 (A) 0.6 - 1.0 cm    LVIDd 5.3 4.2 - 5.9 cm    LVIDs 4.5 cm    LVOT Diameter 2.4 cm    LVPWd 1.1 (A) 0.6 - 1.0 cm    LV Ejection Fraction A4C 53 %    LV EDV A4C 179 mL    LV ESV A4C 84 mL    LVOT Peak Gradient 7 mmHg    LVOT Mean Gradient 4 mmHg    LVOT .9 ml    LVOT Peak Velocity 1.3 m/s    LVOT VTI 31.6 cm    RV Free Wall Peak S' 15 cm/s    LA Diameter 3.9 cm    LA Volume 4C 60 (A) 18 - 58 mL    AV Area by Peak Velocity 2.2 cm2    AV Area by VTI 2.4 cm2    AV Peak Gradient 28 mmHg    AV Mean Gradient 12 mmHg    AV Peak Velocity 2.7 m/s    AV Mean Velocity 1.6 m/s    AV VTI 59.1 cm    MV A Velocity 1.10 m/s    MV E Wave Deceleration Time 144.6 ms    MV E Velocity 1.10 m/s    LV E' Lateral Velocity 6 cm/s    LV E' Septal Velocity 9 cm/s    MV Area by VTI 3.1 cm2    MR Peak Gradient 112 mmHg    MR Peak Velocity 5.3 m/s    MV Peak Gradient 9 mmHg    MV Mean Gradient 4 mmHg    MV Max Velocity 1.5 m/s    MV Mean Velocity 0.9 m/s    MV VTI 45.4 cm    Pulmonary Artery EDP 6 mmHg    VA Max Velocity 1.2 m/s    PV Peak Gradient 5 mmHg    PV Max Velocity 1.1 m/s    TAPSE 1.8 1.5 - 2.0 cm    Ascending Aorta 2.6 cm    Fractional Shortening 2D 15 28 - 44 %    LV ESV Index A4C 43 mL/m2    LV EDV Index A4C 92 mL/m2    LVIDd Index 2.73 cm/m2    LVIDs Index 2.32 cm/m2    LV RWT Ratio 0.42     LV Mass 2D 227.7 (A) 88 - 224 g    LV Mass 2D Index 117.4 (A) 49 - 115 g/m2    MV E/A 1.00     E/E' Ratio (Averaged) 15.28     E/E' Lateral 18.33     E/E' Septal 12.22     LVOT Stroke Volume Index 73.7 mL/m2    LVOT Area 4.5 cm2    LA Volume Index 4C 31 16 - 34 mL/m2    LA Size Index 2.01 cm/m2    Ascending Aorta Index 1.34 cm/m2    AV Velocity Ratio 0.48     LVOT:AV VTI Index 0.53     ROXY/BSA VTI 1.2 cm2/m2    ROXY/BSA Peak Velocity 1.1 cm2/m2    MV:LVOT VTI Index 1.44    URINALYSIS W/ REFLEX CULTURE    Collection Time: 04/06/22  8:52 PM    Specimen: Urine   Result Value Ref Range    Color YELLOW/STRAW      Appearance CLEAR CLEAR      Specific gravity 1.012 1.003 - 1.030      pH (UA) 6.0 5.0 - 8.0      Protein 300 (A) NEG mg/dL    Glucose Negative NEG mg/dL    Ketone Negative NEG mg/dL    Bilirubin Negative NEG      Blood TRACE (A) NEG      Urobilinogen 0.2 0.2 - 1.0 EU/dL    Nitrites Negative NEG      Leukocyte Esterase Negative NEG      WBC 5-10 0 - 4 /hpf    RBC 10-20 0 - 5 /hpf    Epithelial cells FEW FEW /lpf    Bacteria Negative NEG /hpf    UA:UC IF INDICATED CULTURE NOT INDICATED BY UA RESULT CNI      Mucus 1+ (A) NEG /lpf    Hyaline cast 0-2 0 - 5 /lpf    Granular cast 0-2 (A) NEG /lpf   SODIUM, UR, RANDOM    Collection Time: 04/06/22  8:52 PM   Result Value Ref Range    Sodium,urine random 60 MMOL/L   GLUCOSE, POC    Collection Time: 04/06/22 11:09 PM   Result Value Ref Range    Glucose (POC) 112 65 - 117 mg/dL    Performed by Lawyer Deng (MAUREEN RN)    METABOLIC PANEL, COMPREHENSIVE    Collection Time: 04/07/22  5:47 AM   Result Value Ref Range    Sodium 139 136 - 145 mmol/L    Potassium 4.3 3.5 - 5.1 mmol/L    Chloride 109 (H) 97 - 108 mmol/L    CO2 23 21 - 32 mmol/L    Anion gap 7 5 - 15 mmol/L    Glucose 158 (H) 65 - 100 mg/dL    BUN 63 (H) 6 - 20 MG/DL    Creatinine 5.19 (H) 0.70 - 1.30 MG/DL    BUN/Creatinine ratio 12 12 - 20      GFR est AA 14 (L) >60 ml/min/1.73m2    GFR est non-AA 11 (L) >60 ml/min/1.73m2    Calcium 7.7 (L) 8.5 - 10.1 MG/DL    Bilirubin, total 0.3 0.2 - 1.0 MG/DL    ALT (SGPT) 17 12 - 78 U/L    AST (SGOT) 18 15 - 37 U/L    Alk. phosphatase 90 45 - 117 U/L    Protein, total 6.7 6.4 - 8.2 g/dL    Albumin 2.4 (L) 3.5 - 5.0 g/dL    Globulin 4.3 (H) 2.0 - 4.0 g/dL    A-G Ratio 0.6 (L) 1.1 - 2.2     CBC WITH AUTOMATED DIFF    Collection Time: 04/07/22  5:47 AM   Result Value Ref Range    WBC 9.7 4.1 - 11.1 K/uL    RBC 2.77 (L) 4.10 - 5.70 M/uL    HGB 7.4 (L) 12.1 - 17.0 g/dL    HCT 23.3 (L) 36.6 - 50.3 %    MCV 84.1 80.0 - 99.0 FL    MCH 26.7 26.0 - 34.0 PG    MCHC 31.8 30.0 - 36.5 g/dL    RDW 15.6 (H) 11.5 - 14.5 %    PLATELET 242 226 - 413 K/uL    MPV 10.5 8.9 - 12.9 FL    NRBC 0.0 0  WBC    ABSOLUTE NRBC 0.00 0.00 - 0.01 K/uL    NEUTROPHILS 64 32 - 75 %    LYMPHOCYTES 25 12 - 49 %    MONOCYTES 9 5 - 13 %    EOSINOPHILS 1 0 - 7 %    BASOPHILS 0 0 - 1 %    IMMATURE GRANULOCYTES 1 (H) 0.0 - 0.5 %    ABS. NEUTROPHILS 6.3 1.8 - 8.0 K/UL    ABS. LYMPHOCYTES 2.4 0.8 - 3.5 K/UL    ABS. MONOCYTES 0.9 0.0 - 1.0 K/UL    ABS. EOSINOPHILS 0.1 0.0 - 0.4 K/UL    ABS. BASOPHILS 0.0 0.0 - 0.1 K/UL    ABS. IMM.  GRANS. 0.1 (H) 0.00 - 0.04 K/UL    DF AUTOMATED     PHOSPHORUS    Collection Time: 04/07/22  5:47 AM   Result Value Ref Range    Phosphorus 5.3 (H) 2.6 - 4.7 MG/DL   SAMPLES BEING HELD    Collection Time: 04/07/22  5:47 AM   Result Value Ref Range    SAMPLES BEING HELD  PST     COMMENT        Add-on orders for these samples will be processed based on acceptable specimen integrity and analyte stability, which may vary by analyte. Recent Labs     04/07/22  0547 04/06/22  1345   WBC 9.7 8.5   HGB 7.4* 7.3*   HCT 23.3* 22.9*    231     Recent Labs     04/07/22  0547 04/06/22  1345 04/05/22  2323    143 142   K 4.3 4.8 5.3*   * 112* 115*   CO2 23 23 19*   BUN 63* 56* 60*   CREA 5.19* 4.52* 4.47*   * 115* 188*   CA 7.7* 7.7* 7.5*   PHOS 5.3*  --   --      Recent Labs     04/07/22  0547 04/05/22  2323   ALT 17 20   AP 90 96   TBILI 0.3 0.3   TP 6.7 6.9   ALB 2.4* 2.7*   GLOB 4.3* 4.2*     Recent Labs     04/05/22  2359   INR 1.1   PTP 11.3*   APTT 22.6      Recent Labs     04/06/22  0207   TIBC 229*   PSAT 4*      Lab Results   Component Value Date/Time    Folate 17.5 02/23/2021 03:59 AM      No results for input(s): PH, PCO2, PO2 in the last 72 hours. No results for input(s): CPK, CKNDX, TROIQ in the last 72 hours.     No lab exists for component: CPKMB  Lab Results   Component Value Date/Time    Cholesterol, total 115 02/26/2010 05:20 PM    HDL Cholesterol 31 (L) 02/26/2010 05:20 PM    LDL, calculated 46.2 02/26/2010 05:20 PM    Triglyceride 189 02/26/2010 05:20 PM    CHOL/HDL Ratio 3.7 02/26/2010 05:20 PM     Lab Results   Component Value Date/Time    Glucose (POC) 112 04/06/2022 11:09 PM    Glucose (POC) 123 (H) 04/06/2022 11:49 AM    Glucose (POC) 104 04/06/2022 07:49 AM    Glucose (POC) 128 (H) 04/06/2022 03:30 AM    Glucose (POC) 223 (H) 03/05/2021 11:18 AM     Lab Results   Component Value Date/Time    Color YELLOW/STRAW 04/06/2022 08:52 PM    Appearance CLEAR 04/06/2022 08:52 PM    Specific gravity 1.012 04/06/2022 08:52 PM    Specific gravity 1.015 02/26/2010 12:32 PM    pH (UA) 6.0 04/06/2022 08:52 PM    Protein 300 (A) 04/06/2022 08:52 PM    Glucose Negative 04/06/2022 08:52 PM    Ketone Negative 04/06/2022 08:52 PM    Bilirubin Negative 04/06/2022 08:52 PM    Urobilinogen 0.2 04/06/2022 08:52 PM    Nitrites Negative 04/06/2022 08:52 PM    Leukocyte Esterase Negative 04/06/2022 08:52 PM    Epithelial cells FEW 04/06/2022 08:52 PM    Bacteria Negative 04/06/2022 08:52 PM    WBC 5-10 04/06/2022 08:52 PM    RBC 10-20 04/06/2022 08:52 PM     04/05/22    ECHO ADULT COMPLETE 04/06/2022 4/6/2022    Interpretation Summary    Left Ventricle: Left ventricle size is normal. Normal wall thickness. Normal wall motion. Normal left ventricular systolic function with a visually estimated EF of 55 - 60%. Normal diastolic function. Signed by: Cong Jiménez MD on 4/6/2022  6:47 PM       Assessment:     Active Problems:    JUAN C (acute kidney injury) ([address] ()      Atypical chest pain (4/6/2022)      Acute CHF (congestive heart failure) ([address] (4/6/2022)        Plan:   Patient with ongoing fluid overload, considering resuming dialysis given worsening kidney function. Awaiting nephrology's input. No s/sx of bleeding and hgb stable after one unit of blood. Continue to monitor hgb and transfuse as needed, agree with iron supplementation as well. Continue on PPI for now. Needs to remain on DAPT given recent stenting this past fall. Given he is hemodynamically stable will hold off on endoscopic eval during admission, provided patient with office info. He does eventually need EGD and colonoscopy once cardiopulmonary status is optimized, preferably when he can safely hold DAPT however not a requirement. We will sign off the case, feel free to call with questions or if further consultation is required.      SULEMAN Aponte    04/07/22  9:10 AM  [address] [place] [address], [address] [facility]  [phone]  [facility] [place] [address] [place]: [phone]

## 2022-04-07 NOTE — PROGRESS NOTES
Hospitalist Progress Note    NAME: Kiran Harding   :  1955   MRN:  [id number]       Assessment / Plan:  Acute on chronic systolic/congestive heart failure, POA  JUAN C on CKD3  -Presented with shortness of breath  -Chest x-ray with congestion  -proBNP around 16,000  -Previous EF around 25 to 30%, repeat Echo showed 55-60% EF  -cont' Bumex as per nephrology. Pt is still significantly volume overload. He may  Need HD again. --no hydro on renal US  --follow cr  -Daily weights, strict I&O not accurate  -nephrology following    Atypical chest pain, POA  Coronary artery disease status post PCI  HTN  No ischemic changes on EKG. Trop neg. CP improved  Cont' cardiac meds. BP elevated, will incr PO hydralazine, titrate prn  Morphine prn for pain  Cardiology following.     Acute on chronic normocytic anemia, POA  Likely anemia of chronic disease and iron deficiency  fobt neg. Will start IV iron  S/p prbc transfusion.           Diabetes mellitus type 2  Continue Lantus  SSI     Hyperkalemia  Resolved with lokelma x1     Code Status: Full code  Surrogate Decision Maker: His wife   DVT Prophylaxis: SCDs  GI Prophylaxis: not indicated   Baseline: Active, independent     Subjective:     Chief Complaint / Reason for Physician Visit  NAD. Shannan. Discussed with RN events overnight. Review of Systems:  Symptom Y/N Comments  Symptom Y/N Comments   Fever/Chills    Chest Pain     Poor Appetite    Edema     Cough    Abdominal Pain     Sputum    Joint Pain     SOB/WEBB    Pruritis/Rash     Nausea/vomit    Tolerating PT/OT     Diarrhea    Tolerating Diet     Constipation    Other       Could NOT obtain due to:      Objective:     VITALS:   Last 24hrs VS reviewed since prior progress note.  Most recent are:  Patient Vitals for the past 24 hrs:   Temp Pulse Resp BP SpO2   22 0415 99.2 °F (37.3 °C) 62 18 (!) 161/58 97 %   22 0241 98.6 °F (37 °C) 62 18 (!) 174/73 95 %   22 0016 -- -- -- (!) 179/80 -- 04/07/22 0015 98.9 °F (37.2 °C) 66 18 (!) 184/76 94 %   04/06/22 2012 97.8 °F (36.6 °C) 66 18 (!) 157/70 97 %   04/06/22 1645 98.2 °F (36.8 °C) 88 20 (!) 162/64 94 %   04/06/22 1419 -- -- -- (!) 165/66 --   04/06/22 1330 -- 82 20 (!) 164/61 93 %   04/06/22 1300 -- (!) 59 14 (!) 168/70 96 %   04/06/22 1200 -- 63 -- -- --   04/06/22 1120 97.8 °F (36.6 °C) 60 13 (!) 165/66 98 %       Intake/Output Summary (Last 24 hours) at 4/7/2022 0851  Last data filed at 4/7/2022 0242  Gross per 24 hour   Intake 600 ml   Output 1150 ml   Net -550 ml        I had a face to face encounter and independently examined this patient on 4/7/2022, as outlined below:  PHYSICAL EXAM:  General: WD, WN. Alert, cooperative, no acute distress    EENT:  EOMI. Anicteric sclerae. MMM  Resp:  CTA bilaterally, no wheezing or rales. No accessory muscle use  CV:  Regular  rhythm,  No edema  GI:  Anasarca, +BS, NT  Neurologic:  Alert and oriented X 3, normal speech  Psych:   Good insight. Not anxious nor agitated  Skin:  No rashes. No jaundice    Reviewed most current lab test results and cultures  YES  Reviewed most current radiology test results   YES  Review and summation of old records today    NO  Reviewed patient's current orders and MAR    YES  PMH/SH reviewed - no change compared to H&P  ________________________________________________________________________  Care Plan discussed with:    Comments   Patient x    Family      RN x    Care Manager     Consultant                        Multidiciplinary team rounds were held today with , nursing, pharmacist and clinical coordinator. Patient's plan of care was discussed; medications were reviewed and discharge planning was addressed.      ________________________________________________________________________  Total NON critical care TIME:  35  Minutes    Total CRITICAL CARE TIME Spent:   Minutes non procedure based      Comments   >50% of visit spent in counseling and coordination of care     ________________________________________________________________________  Cem Wu MD     Procedures: see electronic medical records for all procedures/Xrays and details which were not copied into this note but were reviewed prior to creation of Plan. LABS:  I reviewed today's most current labs and imaging studies.   Pertinent labs include:  Recent Labs     04/07/22  0547 04/06/22  1345 04/05/22  2323   WBC 9.7 8.5 9.6   HGB 7.4* 7.3* 6.4*   HCT 23.3* 22.9* 20.5*    231 241     Recent Labs     04/07/22  0547 04/06/22  1345 04/05/22  2359 04/05/22  2323    143  --  142   K 4.3 4.8  --  5.3*   * 112*  --  115*   CO2 23 23  --  19*   * 115*  --  188*   BUN 63* 56*  --  60*   CREA 5.19* 4.52*  --  4.47*   CA 7.7* 7.7*  --  7.5*   PHOS 5.3*  --   --   --    ALB 2.4*  --   --  2.7*   TBILI 0.3  --   --  0.3   ALT 17  --   --  20   INR  --   --  1.1  --        Signed: Cem Wu MD

## 2022-04-07 NOTE — PROGRESS NOTES
Pt slept pretty well throughout the night. Pretty sleepy but arousable. No new concerns at this time.

## 2022-04-08 LAB
ALBUMIN SERPL-MCNC: 2.4 G/DL (ref 3.5–5)
ANION GAP SERPL CALC-SCNC: 6 MMOL/L (ref 5–15)
BASOPHILS # BLD: 0 K/UL (ref 0–0.1)
BASOPHILS NFR BLD: 0 % (ref 0–1)
BUN SERPL-MCNC: 50 MG/DL (ref 6–20)
BUN/CREAT SERPL: 11 (ref 12–20)
CALCIUM SERPL-MCNC: 7.7 MG/DL (ref 8.5–10.1)
CHLORIDE SERPL-SCNC: 106 MMOL/L (ref 97–108)
CO2 SERPL-SCNC: 26 MMOL/L (ref 21–32)
CREAT SERPL-MCNC: 4.35 MG/DL (ref 0.7–1.3)
DIFFERENTIAL METHOD BLD: ABNORMAL
EOSINOPHIL # BLD: 0.1 K/UL (ref 0–0.4)
EOSINOPHIL NFR BLD: 1 % (ref 0–7)
ERYTHROCYTE [DISTWIDTH] IN BLOOD BY AUTOMATED COUNT: 15.6 % (ref 11.5–14.5)
GLUCOSE BLD STRIP.AUTO-MCNC: 117 MG/DL (ref 65–117)
GLUCOSE BLD STRIP.AUTO-MCNC: 127 MG/DL (ref 65–117)
GLUCOSE BLD STRIP.AUTO-MCNC: 140 MG/DL (ref 65–117)
GLUCOSE BLD STRIP.AUTO-MCNC: 159 MG/DL (ref 65–117)
GLUCOSE SERPL-MCNC: 138 MG/DL (ref 65–100)
HCT VFR BLD AUTO: 21.5 % (ref 36.6–50.3)
HGB BLD-MCNC: 6.8 G/DL (ref 12.1–17)
IMM GRANULOCYTES # BLD AUTO: 0.1 K/UL (ref 0–0.04)
IMM GRANULOCYTES NFR BLD AUTO: 1 % (ref 0–0.5)
LYMPHOCYTES # BLD: 2.5 K/UL (ref 0.8–3.5)
LYMPHOCYTES NFR BLD: 23 % (ref 12–49)
MCH RBC QN AUTO: 26.7 PG (ref 26–34)
MCHC RBC AUTO-ENTMCNC: 31.6 G/DL (ref 30–36.5)
MCV RBC AUTO: 84.3 FL (ref 80–99)
MONOCYTES # BLD: 1.2 K/UL (ref 0–1)
MONOCYTES NFR BLD: 11 % (ref 5–13)
NEUTS SEG # BLD: 7.1 K/UL (ref 1.8–8)
NEUTS SEG NFR BLD: 64 % (ref 32–75)
NRBC # BLD: 0 K/UL (ref 0–0.01)
NRBC BLD-RTO: 0 PER 100 WBC
PHOSPHATE SERPL-MCNC: 4.4 MG/DL (ref 2.6–4.7)
PLATELET # BLD AUTO: 281 K/UL (ref 150–400)
PMV BLD AUTO: 10.5 FL (ref 8.9–12.9)
POTASSIUM SERPL-SCNC: 4.6 MMOL/L (ref 3.5–5.1)
RBC # BLD AUTO: 2.55 M/UL (ref 4.1–5.7)
RBC MORPH BLD: ABNORMAL
SERVICE CMNT-IMP: ABNORMAL
SERVICE CMNT-IMP: NORMAL
SODIUM SERPL-SCNC: 138 MMOL/L (ref 136–145)
WBC # BLD AUTO: 11 K/UL (ref 4.1–11.1)

## 2022-04-08 PROCEDURE — 74011250637 HC RX REV CODE- 250/637: Performed by: INTERNAL MEDICINE

## 2022-04-08 PROCEDURE — 90935 HEMODIALYSIS ONE EVALUATION: CPT

## 2022-04-08 PROCEDURE — 74011000250 HC RX REV CODE- 250: Performed by: INTERNAL MEDICINE

## 2022-04-08 PROCEDURE — 74011250636 HC RX REV CODE- 250/636: Performed by: INTERNAL MEDICINE

## 2022-04-08 PROCEDURE — 94640 AIRWAY INHALATION TREATMENT: CPT

## 2022-04-08 PROCEDURE — 85025 COMPLETE CBC W/AUTO DIFF WBC: CPT

## 2022-04-08 PROCEDURE — 36415 COLL VENOUS BLD VENIPUNCTURE: CPT

## 2022-04-08 PROCEDURE — 82962 GLUCOSE BLOOD TEST: CPT

## 2022-04-08 PROCEDURE — 65270000029 HC RM PRIVATE

## 2022-04-08 PROCEDURE — 74011636637 HC RX REV CODE- 636/637: Performed by: INTERNAL MEDICINE

## 2022-04-08 PROCEDURE — C9113 INJ PANTOPRAZOLE SODIUM, VIA: HCPCS | Performed by: INTERNAL MEDICINE

## 2022-04-08 PROCEDURE — 80069 RENAL FUNCTION PANEL: CPT

## 2022-04-08 RX ADMIN — IRON SUCROSE 200 MG: 20 INJECTION, SOLUTION INTRAVENOUS at 10:36

## 2022-04-08 RX ADMIN — INSULIN GLARGINE 20 UNITS: 100 INJECTION, SOLUTION SUBCUTANEOUS at 00:01

## 2022-04-08 RX ADMIN — ISOSORBIDE DINITRATE 30 MG: 10 TABLET ORAL at 13:23

## 2022-04-08 RX ADMIN — ISOSORBIDE DINITRATE 30 MG: 10 TABLET ORAL at 06:14

## 2022-04-08 RX ADMIN — IPRATROPIUM BROMIDE AND ALBUTEROL SULFATE 3 ML: .5; 3 SOLUTION RESPIRATORY (INHALATION) at 11:11

## 2022-04-08 RX ADMIN — HYDRALAZINE HYDROCHLORIDE 50 MG: 50 TABLET, FILM COATED ORAL at 22:55

## 2022-04-08 RX ADMIN — HYDRALAZINE HYDROCHLORIDE 50 MG: 50 TABLET, FILM COATED ORAL at 10:35

## 2022-04-08 RX ADMIN — IPRATROPIUM BROMIDE AND ALBUTEROL SULFATE 3 ML: .5; 3 SOLUTION RESPIRATORY (INHALATION) at 00:05

## 2022-04-08 RX ADMIN — EPOETIN ALFA-EPBX 12000 UNITS: 10000 INJECTION, SOLUTION INTRAVENOUS; SUBCUTANEOUS at 21:30

## 2022-04-08 RX ADMIN — TICAGRELOR 90 MG: 90 TABLET ORAL at 10:35

## 2022-04-08 RX ADMIN — BUMETANIDE 2 MG: 0.25 INJECTION, SOLUTION INTRAMUSCULAR; INTRAVENOUS at 22:55

## 2022-04-08 RX ADMIN — SODIUM CHLORIDE 40 MG: 9 INJECTION INTRAMUSCULAR; INTRAVENOUS; SUBCUTANEOUS at 10:35

## 2022-04-08 RX ADMIN — GABAPENTIN 400 MG: 100 CAPSULE ORAL at 10:35

## 2022-04-08 RX ADMIN — ATORVASTATIN CALCIUM 40 MG: 40 TABLET, FILM COATED ORAL at 10:36

## 2022-04-08 RX ADMIN — BUMETANIDE 2 MG: 0.25 INJECTION, SOLUTION INTRAMUSCULAR; INTRAVENOUS at 18:22

## 2022-04-08 RX ADMIN — BUMETANIDE 2 MG: 0.25 INJECTION, SOLUTION INTRAMUSCULAR; INTRAVENOUS at 10:38

## 2022-04-08 RX ADMIN — INSULIN GLARGINE 20 UNITS: 100 INJECTION, SOLUTION SUBCUTANEOUS at 21:23

## 2022-04-08 RX ADMIN — AMLODIPINE BESYLATE 10 MG: 5 TABLET ORAL at 10:36

## 2022-04-08 RX ADMIN — FERROUS SULFATE TAB 325 MG (65 MG ELEMENTAL FE) 325 MG: 325 (65 FE) TAB at 03:27

## 2022-04-08 RX ADMIN — TICAGRELOR 90 MG: 90 TABLET ORAL at 18:15

## 2022-04-08 RX ADMIN — SODIUM CHLORIDE, PRESERVATIVE FREE 10 ML: 5 INJECTION INTRAVENOUS at 06:14

## 2022-04-08 RX ADMIN — THIAMINE HCL TAB 100 MG 100 MG: 100 TAB at 10:36

## 2022-04-08 RX ADMIN — CARVEDILOL 25 MG: 12.5 TABLET, FILM COATED ORAL at 10:35

## 2022-04-08 RX ADMIN — HYDRALAZINE HYDROCHLORIDE 50 MG: 50 TABLET, FILM COATED ORAL at 18:15

## 2022-04-08 RX ADMIN — SODIUM CHLORIDE, PRESERVATIVE FREE 10 ML: 5 INJECTION INTRAVENOUS at 18:22

## 2022-04-08 RX ADMIN — GABAPENTIN 400 MG: 100 CAPSULE ORAL at 21:25

## 2022-04-08 RX ADMIN — GABAPENTIN 400 MG: 100 CAPSULE ORAL at 18:15

## 2022-04-08 RX ADMIN — ISOSORBIDE DINITRATE 30 MG: 10 TABLET ORAL at 21:29

## 2022-04-08 RX ADMIN — ASPIRIN 81 MG: 81 TABLET, CHEWABLE ORAL at 10:35

## 2022-04-08 RX ADMIN — CARVEDILOL 25 MG: 12.5 TABLET, FILM COATED ORAL at 18:15

## 2022-04-08 RX ADMIN — SODIUM CHLORIDE, PRESERVATIVE FREE 10 ML: 5 INJECTION INTRAVENOUS at 21:38

## 2022-04-08 RX ADMIN — SODIUM CHLORIDE 40 MG: 9 INJECTION INTRAMUSCULAR; INTRAVENOUS; SUBCUTANEOUS at 21:24

## 2022-04-08 NOTE — PROGRESS NOTES
While waiting for transport to bring pt to suite, transport arrived without pt informing that delay was due to pt's new cvc oozing around dressing. Went to examine catheter site. Site slightly bloody, but not bleeding very much or very quickly. Pt in NAD, VSS. Pt's site cleaned and new dressing applied under sterile technique. Dialysis RN's transported pt to suite to perform treatment following dressing change.

## 2022-04-08 NOTE — PROGRESS NOTES
Nephrology Progress Note  Harry Sandoval     [email or web address]  Phone - [phone]   Patient: Brady Daley    YOB: 1955        Date- 4/8/2022   Admit Date: 4/5/2022  CC: Follow up for JUAN C stage III          IMPRESSION & PLAN:   · JUAN C stage III(secondary to cardiorenal syndrome)  · CKD stage III baseline creatinine of 1.4-1.5. · Systolic heart failure(EF 30 to 35% in 2021)  · Acute volume overload  · Pulmonary edema  · Mild hyperkalemia  · Non-anion gap metabolic acidosis  · Anemia  · Type 2 diabetes  · Hypertension  · CAD     PLAN-  · Patient tolerated HD well yesterday with 2 L ultrafiltration. · Plan for HD again today with ultrafiltration. · Will decide over the weekend if he needs any additional UF. · Continue on Bumex. · Will follow with you     Subjective: Interval History:   -Seen and examined today. -Hypervolemia better.  -Tolerated HD well yesterday    Objective:   Vitals:    04/08/22 0007 04/08/22 0327 04/08/22 0908 04/08/22 1111   BP:  (!) 160/77 (!) 163/64    Pulse:  72 64    Resp:  20 20    Temp:  98.9 °F (37.2 °C) 98.9 °F (37.2 °C)    TempSrc:       SpO2: 97% 98% 99% 95%   Weight:       Height:          04/07 0701 - 04/08 0700  In: 840 [P.O.:840]  Out: 1000 [Urine:1000]  Last 3 Recorded Weights in this Encounter    04/05/22 2316 04/06/22 1419 04/07/22 1413   Weight: 79.8 kg (176 lb) 79.8 kg (176 lb) 79.8 kg (176 lb)      Physical exam:    GEN: NAD  NECK- Supple, no mass  RESP: No wheezing, Clear b/l  CVS: S1,S2  RRR  NEURO: Normal speech, Non focal  EXT: No Edema   PSYCH: Normal Mood    Chart reviewed. Pertinent Notes reviewed.      Data Review :  Recent Labs     04/08/22  0357 04/07/22  0547 04/06/22  1345    139 143   K 4.6 4.3 4.8    109* 112*   CO2 26 23 23   BUN 50* 63* 56*   CREA 4.35* 5.19* 4.52*   * 158* 115*   CA 7.7* 7.6*  7.7* 7.7*   PHOS 4.4 5.3*  --      Recent Labs     04/08/22  0357 04/07/22  0547 04/06/22  1345   WBC 11.0 9.7 8.5   HGB 6.8* 7.4* 7.3*   HCT 21.5* 23.3* 22.9*    231 231     Recent Labs     04/06/22  0207   TIBC 229*   PSAT 4*      Medication list  reviewed  Current Facility-Administered Medications   Medication Dose Route Frequency    hydrALAZINE (APRESOLINE) 20 mg/mL injection 10 mg  10 mg IntraVENous Q6H PRN    albuterol-ipratropium (DUO-NEB) 2.5 MG-0.5 MG/3 ML  3 mL Nebulization Q4H PRN    hydrALAZINE (APRESOLINE) tablet 50 mg  50 mg Oral TID    iron sucrose (VENOFER) injection 200 mg  200 mg IntraVENous Q24H    amLODIPine (NORVASC) tablet 10 mg  10 mg Oral DAILY    aspirin chewable tablet 81 mg  81 mg Oral DAILY    atorvastatin (LIPITOR) tablet 40 mg  40 mg Oral DAILY    carvediloL (COREG) tablet 25 mg  25 mg Oral BID    ferrous sulfate tablet 325 mg  1 Tablet Oral Q48H    gabapentin (NEURONTIN) capsule 400 mg  400 mg Oral TID    insulin glargine (LANTUS) injection 20 Units  20 Units SubCUTAneous QHS    isosorbide dinitrate (ISORDIL) tablet 30 mg  30 mg Oral Q8H    ticagrelor (BRILINTA) tablet 90 mg  90 mg Oral BID    sodium chloride (NS) flush 5-40 mL  5-40 mL IntraVENous Q8H    sodium chloride (NS) flush 5-40 mL  5-40 mL IntraVENous PRN    acetaminophen (TYLENOL) tablet 650 mg  650 mg Oral Q6H PRN    Or    acetaminophen (TYLENOL) suppository 650 mg  650 mg Rectal Q6H PRN    polyethylene glycol (MIRALAX) packet 17 g  17 g Oral DAILY PRN    promethazine (PHENERGAN) tablet 12.5 mg  12.5 mg Oral Q6H PRN    Or    ondansetron (ZOFRAN) injection 4 mg  4 mg IntraVENous Q6H PRN    morphine injection 1 mg  1 mg IntraVENous Q3H PRN    0.9% sodium chloride infusion 250 mL  250 mL IntraVENous PRN    pantoprazole (PROTONIX) 40 mg in 0.9% sodium chloride 10 mL injection  40 mg IntraVENous Q12H    insulin lispro (HUMALOG) injection   SubCUTAneous AC&HS    glucose chewable tablet 16 g  4 Tablet Oral PRN    glucagon (GLUCAGEN) injection 1 mg  1 mg IntraMUSCular PRN    dextrose 10% infusion 0-250 mL  0-250 mL IntraVENous PRN    thiamine mononitrate (B-1) tablet 100 mg  100 mg Oral DAILY    bumetanide (BUMEX) injection 2 mg  2 mg IntraVENous TID    epoetin luis-epbx (RETACRIT) 12,000 Units combo injection  12,000 Units SubCUTAneous Q MON, WED & [address], [place]              [facility]  [facility] / [facility]  Chandni TateFormerly [facility], [facility] [address], [address]  [place], [address]  Phone - [phone]               Fax - [phone]

## 2022-04-08 NOTE — PROGRESS NOTES
Bedside shift change report given to Maureen (oncoming nurse) by Marli Valadez (offgoing nurse). Report included the following information SBAR, Kardex, Intake/Output, MAR, Recent Results and Cardiac Rhythm NSR.

## 2022-04-08 NOTE — PROGRESS NOTES
Hospitalist Progress Note    NAME: Princess Iverson   :  1955   MRN:  [id number]       Assessment / Plan:  Acute on chronic systolic/congestive heart failure, POA  JUAN C on CKD3  -Presented with shortness of breath  -Chest x-ray with congestion  -proBNP around 16,000  -Previous EF around 25 to 30%, repeat Echo showed 55-60% EF  -cont' Bumex as per nephrology. --no hydro on renal US  --s/p sami placement with first HD session on , plan for HD again today  -Daily weights, strict I&O not accurate  -nephrology following    Atypical chest pain, POA  Coronary artery disease status post PCI  HTN  No ischemic changes on EKG. Trop neg. CP improved  Cont' cardiac meds. BP elevated, cont' incr dose hydralazine, titrate prn  Morphine prn for pain  Cardiology following.     Acute on chronic normocytic anemia, POA  Likely anemia of chronic disease and iron deficiency  fobt neg.  cont' IV iron  S/p prbc transfusion. oupt f/u with GI     Diabetes mellitus type 2  Continue Lantus  SSI     Hyperkalemia  Resolved with lokelma x1     Code Status: Full code  Surrogate Decision Maker: His wife   DVT Prophylaxis: SCDs  GI Prophylaxis: not indicated   Baseline: Active, independent     Subjective:     Chief Complaint / Reason for Physician Visit  NAD. Anasarca. Oozing around [facility] site. Feels better today. Less swelling per pt. Discussed with RN events overnight. Review of Systems:  Symptom Y/N Comments  Symptom Y/N Comments   Fever/Chills    Chest Pain     Poor Appetite    Edema     Cough    Abdominal Pain     Sputum    Joint Pain     SOB/WEBB    Pruritis/Rash     Nausea/vomit    Tolerating PT/OT     Diarrhea    Tolerating Diet     Constipation    Other       Could NOT obtain due to:      Objective:     VITALS:   Last 24hrs VS reviewed since prior progress note.  Most recent are:  Patient Vitals for the past 24 hrs:   Temp Pulse Resp BP SpO2   22 0908 98.9 °F (37.2 °C) 64 20 (!) 163/64 99 %   22 0327 98.9 °F (37.2 °C) 72 20 (!) 160/77 98 %   04/08/22 0007 -- -- -- -- 97 %   04/07/22 2322 99 °F (37.2 °C) 70 20 (!) 139/56 97 %   04/07/22 2200 98.6 °F (37 °C) 78 20 (!) 169/70 --   04/07/22 2145 -- 75 20 134/69 --   04/07/22 2130 -- 76 20 (!) 154/80 --   04/07/22 2115 -- 78 20 (!) 161/78 --   04/07/22 2100 -- 77 20 130/77 --   04/07/22 2045 -- 67 20 (!) 149/74 --   04/07/22 2030 -- 67 20 (!) 169/65 --   04/07/22 2015 -- 66 20 (!) 153/67 --   04/07/22 2000 -- 62 20 (!) 151/67 --   04/07/22 1945 -- 63 20 (!) 154/65 --   04/07/22 1927 98.1 °F (36.7 °C) (!) 59 22 (!) 133/55 --   04/07/22 1500 -- 60 16 -- 96 %   04/07/22 1450 -- (!) 55 16 (!) 165/61 96 %   04/07/22 1440 -- (!) 57 15 (!) 168/62 96 %   04/07/22 1413 98.4 °F (36.9 °C) 60 16 (!) 152/68 97 %   04/07/22 1156 97.8 °F (36.6 °C) 63 18 (!) 176/76 97 %       Intake/Output Summary (Last 24 hours) at 4/8/2022 0930  Last data filed at 4/8/2022 0007  Gross per 24 hour   Intake --   Output 400 ml   Net -400 ml        I had a face to face encounter and independently examined this patient on 4/8/2022, as outlined below:  PHYSICAL EXAM:  General: WD, WN. Alert, cooperative, no acute distress    EENT:  EOMI. Anicteric sclerae. MMM  Resp:  CTA bilaterally, no wheezing or rales. No accessory muscle use  CV:  Regular  rhythm,  No edema  GI:  Anasarca, +BS, NT  Neurologic:  Alert and oriented X 3, normal speech  Psych:   Good insight. Not anxious nor agitated  Skin:  No rashes.   No jaundice    Reviewed most current lab test results and cultures  YES  Reviewed most current radiology test results   YES  Review and summation of old records today    NO  Reviewed patient's current orders and MAR    YES  PMH/SH reviewed - no change compared to H&P  ________________________________________________________________________  Care Plan discussed with:    Comments   Patient x    Family      RN x    Care Manager     Consultant                        Multidiciplinary team rounds were held today with , nursing, pharmacist and clinical coordinator. Patient's plan of care was discussed; medications were reviewed and discharge planning was addressed. ________________________________________________________________________  Total NON critical care TIME:  35  Minutes    Total CRITICAL CARE TIME Spent:   Minutes non procedure based      Comments   >50% of visit spent in counseling and coordination of care     ________________________________________________________________________  Hunter Hodge MD     Procedures: see electronic medical records for all procedures/Xrays and details which were not copied into this note but were reviewed prior to creation of Plan. LABS:  I reviewed today's most current labs and imaging studies. Pertinent labs include:  Recent Labs     04/08/22  0357 04/07/22  0547 04/06/22  1345   WBC 11.0 9.7 8.5   HGB 6.8* 7.4* 7.3*   HCT 21.5* 23.3* 22.9*    231 231     Recent Labs     04/08/22  0357 04/07/22  0547 04/06/22  1345 04/05/22  2359 04/05/22  2323 04/05/22  2323    139 143  --    < > 142   K 4.6 4.3 4.8  --    < > 5.3*    109* 112*  --    < > 115*   CO2 26 23 23  --    < > 19*   * 158* 115*  --    < > 188*   BUN 50* 63* 56*  --    < > 60*   CREA 4.35* 5.19* 4.52*  --    < > 4.47*   CA 7.7* 7.6*  7.7* 7.7*  --    < > 7.5*   PHOS 4.4 5.3*  --   --   --   --    ALB 2.4* 2.4*  --   --   --  2.7*   TBILI  --  0.3  --   --   --  0.3   ALT  --  17  --   --   --  20   INR  --   --   --  1.1  --   --     < > = values in this interval not displayed.        Signed: Hunter Hodge MD

## 2022-04-08 NOTE — PROGRESS NOTES
Bedside shift change report given to Radha Miramontes (oncoming nurse) by Emmy Turcios (offgoing nurse). Report included the following information SBAR, Kardex, Procedure Summary, Intake/Output and MAR.

## 2022-04-08 NOTE — PROCEDURES
Hemodialysis / [phone]    Vitals Pre Post Assessment Pre Post   BP BP: 135/60 (04/08/22 1400) 156/68 LOC A/O A/O   HR Pulse (Heart Rate): 61 (04/08/22 1400) 73 Lungs diminished diminished   Resp Resp Rate: 18 (04/08/22 1400) 18 Cardiac Regular rate Regular rate   Temp Temp: 98.3 °F (36.8 °C) (04/08/22 1400) 98.4 Skin Visible skin cdi Visible skin cdi   Weight  na na Edema None noted None noted   Tele status remote remote Pain Pain Intensity 1: 0 (04/08/22 0800) 0     Orders   Duration: Start: 1400 End: 1700 Total: 3.0   Dialyzer: Dialyzer/Set Up Inspection: Revaclear (04/08/22 1400)   K Bath: Dialysate K (mEq/L): 2 (04/08/22 1400)   Ca Bath: Dialysate CA (mEq/L): 2.5 (04/08/22 1400)   Na: Dialysate NA (mEq/L): 138 (04/08/22 1400)   Bicarb: Dialysate HCO3 (mEq/L): 35 (04/08/22 1400)   Target Fluid Removal: Goal/Amount of Fluid to Remove (mL): 2500 mL (04/08/22 1400)     Access   Type & Location: RIJ CVC: Dressing CDI. No s/s of infection. Both lumens aspirate & flush well. Each catheter limb disinfected per p&p, caps removed, hubs disinfected per p&p.    Comments:                                        Labs   HBsAg (Antigen) / date:4/7/22 negative   HBsAb (Antibody) / date:4/7/22 immune   Source: cc   Obtained/Reviewed  Critical Results Called HGB   Date Value Ref Range Status   04/08/2022 6.8 (L) 12.1 - 17.0 g/dL Final     Potassium   Date Value Ref Range Status   04/08/2022 4.6 3.5 - 5.1 mmol/L Final     Calcium   Date Value Ref Range Status   04/08/2022 7.7 (L) 8.5 - 10.1 MG/DL Final     BUN   Date Value Ref Range Status   04/08/2022 50 (H) 6 - 20 MG/DL Final     Creatinine   Date Value Ref Range Status   04/08/2022 4.35 (H) 0.70 - 1.30 MG/DL Final        Meds Given   Name Dose Route                    Adequacy / Fluid    Total Liters Process: 50   Net Fluid Removed: 1500mL      Comments   Time Out Done:   (Time) 1558   Admitting Diagnosis: Acute CHF (congestive heart failure) ([facility] [address] JUAN C (acute kidney injury) [facility]), Atypical chest pain   Consent obtained/signed: Informed Consent Verified: Yes (04/08/22 1400)   Machine / RO # Machine Number: [id number] (04/08/22 1400)   Primary Nurse Rpt Pre: Carola Valente RN   Primary Nurse Rpt Post: HARRISON Agosto RN   Pt Education: Procedural   Care Plan: HD POC   Pts outpatient clinic: na     Tx Summary ** Dr. Carter Argueta notified of CVC status. Verbal to not change as bleeding is controlled with this dressing   Comments:                         1400: Treatment initiated  1700: Tx ended. VSS. Each dialysis catheter limb disinfected per p&p, all possible blood returned per p&p, and each dialysis hub disinfected per p&p. Each lumen flushed, post dialysis catheter Heparin dwell instilled per order, and caps applied. Bed locked and in the lowest position, call bell and belongings in reach. SBAR given to Primary, RN. Patient is stable at time of their departure. All Dialysis related medications have been reviewed.

## 2022-04-08 NOTE — PROGRESS NOTES
Pt was leaving for dialysis when this writer arrived for shift. Came back to the floor a few hours later. There were concerns with his HD cath. Was told in report that there may be a clot by the catheter and that it was leaking blood around the site. In dialysis, they were able to utilize the catheter and they changed the dressing and added quick clot. This writer noticed that the new dressing is becoming saturated with blood as well. Informed dialysis nurse who said he would take pt to dialysis this afternoon during second shift and he would try to get the bleeding to stop but IR may need to get involved. Labs were drawn this AM and the provider would like to be notified with the renal function panel results.

## 2022-04-08 NOTE — PROGRESS NOTES
Problem: Falls - Risk of  Goal: *Absence of Falls  Description: Document Heather Ruggiero Fall Risk and appropriate interventions in the flowsheet.   Outcome: Progressing Towards Goal  Note: Fall Risk Interventions:            Medication Interventions: Teach patient to arise slowly                   Problem: Patient Education: Go to Patient Education Activity  Goal: Patient/Family Education  Outcome: Progressing Towards Goal     Problem: Patient Education: Go to Patient Education Activity  Goal: Patient/Family Education  Outcome: Progressing Towards Goal     Problem: Heart Failure: Day 1  Goal: Off Pathway (Use only if patient is Off Pathway)  Outcome: Progressing Towards Goal  Goal: Activity/Safety  Outcome: Progressing Towards Goal  Goal: Consults, if ordered  Outcome: Progressing Towards Goal  Goal: Diagnostic Test/Procedures  Outcome: Progressing Towards Goal  Goal: Nutrition/Diet  Outcome: Progressing Towards Goal  Goal: Discharge Planning  Outcome: Progressing Towards Goal  Goal: Medications  Outcome: Progressing Towards Goal  Goal: Respiratory  Outcome: Progressing Towards Goal  Goal: Treatments/Interventions/Procedures  Outcome: Progressing Towards Goal  Goal: Psychosocial  Outcome: Progressing Towards Goal  Goal: *Oxygen saturation within defined limits  Outcome: Progressing Towards Goal  Goal: *Hemodynamically stable  Outcome: Progressing Towards Goal  Goal: *Optimal pain control at patient's stated goal  Outcome: Progressing Towards Goal  Goal: *Anxiety reduced or absent  Outcome: Progressing Towards Goal     Problem: Breathing Pattern - Ineffective  Goal: *Absence of hypoxia  Outcome: Progressing Towards Goal  Goal: *Use of effective breathing techniques  Outcome: Progressing Towards Goal     Problem: Patient Education: Go to Patient Education Activity  Goal: Patient/Family Education  Outcome: Progressing Towards Goal

## 2022-04-09 LAB
ALBUMIN SERPL-MCNC: 2.3 G/DL (ref 3.5–5)
ANION GAP SERPL CALC-SCNC: 7 MMOL/L (ref 5–15)
BUN SERPL-MCNC: 39 MG/DL (ref 6–20)
BUN/CREAT SERPL: 9 (ref 12–20)
CALCIUM SERPL-MCNC: 7.9 MG/DL (ref 8.5–10.1)
CHLORIDE SERPL-SCNC: 104 MMOL/L (ref 97–108)
CO2 SERPL-SCNC: 28 MMOL/L (ref 21–32)
COMMENT, HOLDF: NORMAL
CREAT SERPL-MCNC: 4.22 MG/DL (ref 0.7–1.3)
GLUCOSE BLD STRIP.AUTO-MCNC: 105 MG/DL (ref 65–117)
GLUCOSE BLD STRIP.AUTO-MCNC: 115 MG/DL (ref 65–117)
GLUCOSE BLD STRIP.AUTO-MCNC: 125 MG/DL (ref 65–117)
GLUCOSE BLD STRIP.AUTO-MCNC: 90 MG/DL (ref 65–117)
GLUCOSE SERPL-MCNC: 74 MG/DL (ref 65–100)
PHOSPHATE SERPL-MCNC: 4.1 MG/DL (ref 2.6–4.7)
POTASSIUM SERPL-SCNC: 4.1 MMOL/L (ref 3.5–5.1)
SAMPLES BEING HELD,HOLD: NORMAL
SERVICE CMNT-IMP: ABNORMAL
SERVICE CMNT-IMP: NORMAL
SODIUM SERPL-SCNC: 139 MMOL/L (ref 136–145)

## 2022-04-09 PROCEDURE — 74011250637 HC RX REV CODE- 250/637: Performed by: INTERNAL MEDICINE

## 2022-04-09 PROCEDURE — 74011636637 HC RX REV CODE- 636/637: Performed by: INTERNAL MEDICINE

## 2022-04-09 PROCEDURE — 74011000250 HC RX REV CODE- 250: Performed by: INTERNAL MEDICINE

## 2022-04-09 PROCEDURE — 65270000029 HC RM PRIVATE

## 2022-04-09 PROCEDURE — 74011250636 HC RX REV CODE- 250/636: Performed by: INTERNAL MEDICINE

## 2022-04-09 PROCEDURE — 82962 GLUCOSE BLOOD TEST: CPT

## 2022-04-09 PROCEDURE — 80069 RENAL FUNCTION PANEL: CPT

## 2022-04-09 PROCEDURE — 36415 COLL VENOUS BLD VENIPUNCTURE: CPT

## 2022-04-09 PROCEDURE — C9113 INJ PANTOPRAZOLE SODIUM, VIA: HCPCS | Performed by: INTERNAL MEDICINE

## 2022-04-09 RX ORDER — ISOSORBIDE DINITRATE 20 MG/1
40 TABLET ORAL EVERY 8 HOURS
Status: DISCONTINUED | OUTPATIENT
Start: 2022-04-09 | End: 2022-04-13 | Stop reason: HOSPADM

## 2022-04-09 RX ADMIN — HYDRALAZINE HYDROCHLORIDE 50 MG: 50 TABLET, FILM COATED ORAL at 17:30

## 2022-04-09 RX ADMIN — BUMETANIDE 2 MG: 0.25 INJECTION, SOLUTION INTRAMUSCULAR; INTRAVENOUS at 22:32

## 2022-04-09 RX ADMIN — IRON SUCROSE 200 MG: 20 INJECTION, SOLUTION INTRAVENOUS at 09:20

## 2022-04-09 RX ADMIN — SODIUM CHLORIDE 40 MG: 9 INJECTION INTRAMUSCULAR; INTRAVENOUS; SUBCUTANEOUS at 22:21

## 2022-04-09 RX ADMIN — SODIUM CHLORIDE, PRESERVATIVE FREE 10 ML: 5 INJECTION INTRAVENOUS at 22:21

## 2022-04-09 RX ADMIN — ASPIRIN 81 MG: 81 TABLET, CHEWABLE ORAL at 09:13

## 2022-04-09 RX ADMIN — SODIUM CHLORIDE 40 MG: 9 INJECTION INTRAMUSCULAR; INTRAVENOUS; SUBCUTANEOUS at 09:13

## 2022-04-09 RX ADMIN — SODIUM CHLORIDE, PRESERVATIVE FREE 10 ML: 5 INJECTION INTRAVENOUS at 09:14

## 2022-04-09 RX ADMIN — BUMETANIDE 2 MG: 0.25 INJECTION, SOLUTION INTRAMUSCULAR; INTRAVENOUS at 17:29

## 2022-04-09 RX ADMIN — ISOSORBIDE DINITRATE 40 MG: 10 TABLET ORAL at 22:26

## 2022-04-09 RX ADMIN — AMLODIPINE BESYLATE 10 MG: 5 TABLET ORAL at 09:14

## 2022-04-09 RX ADMIN — GABAPENTIN 400 MG: 100 CAPSULE ORAL at 22:21

## 2022-04-09 RX ADMIN — TICAGRELOR 90 MG: 90 TABLET ORAL at 17:30

## 2022-04-09 RX ADMIN — HYDRALAZINE HYDROCHLORIDE 50 MG: 50 TABLET, FILM COATED ORAL at 22:26

## 2022-04-09 RX ADMIN — CARVEDILOL 25 MG: 12.5 TABLET, FILM COATED ORAL at 17:30

## 2022-04-09 RX ADMIN — GABAPENTIN 400 MG: 100 CAPSULE ORAL at 09:14

## 2022-04-09 RX ADMIN — SODIUM CHLORIDE, PRESERVATIVE FREE 10 ML: 5 INJECTION INTRAVENOUS at 17:35

## 2022-04-09 RX ADMIN — ATORVASTATIN CALCIUM 40 MG: 40 TABLET, FILM COATED ORAL at 09:14

## 2022-04-09 RX ADMIN — CARVEDILOL 25 MG: 12.5 TABLET, FILM COATED ORAL at 09:13

## 2022-04-09 RX ADMIN — TICAGRELOR 90 MG: 90 TABLET ORAL at 09:14

## 2022-04-09 RX ADMIN — INSULIN GLARGINE 20 UNITS: 100 INJECTION, SOLUTION SUBCUTANEOUS at 22:21

## 2022-04-09 RX ADMIN — THIAMINE HCL TAB 100 MG 100 MG: 100 TAB at 09:14

## 2022-04-09 RX ADMIN — ISOSORBIDE DINITRATE 40 MG: 10 TABLET ORAL at 12:06

## 2022-04-09 RX ADMIN — GABAPENTIN 400 MG: 100 CAPSULE ORAL at 17:30

## 2022-04-09 RX ADMIN — BUMETANIDE 2 MG: 0.25 INJECTION, SOLUTION INTRAMUSCULAR; INTRAVENOUS at 09:20

## 2022-04-09 RX ADMIN — HYDRALAZINE HYDROCHLORIDE 50 MG: 50 TABLET, FILM COATED ORAL at 09:14

## 2022-04-09 NOTE — PROGRESS NOTES
Nephrology Progress Note  Harry Sandoval     [email or web address]  Phone - [phone]   Patient: Paty Patel    YOB: 1955        Date- 4/9/2022   Admit Date: 4/5/2022  CC: Follow up for JUAN C stage III          IMPRESSION & PLAN:   · JUAN C stage III(secondary to cardiorenal syndrome)  · CKD stage III baseline creatinine of 1.4-1.5. · Systolic heart failure(EF 30 to 35% in 2021)  · Acute volume overload  · Pulmonary edema  · Mild hyperkalemia  · Non-anion gap metabolic acidosis  · Anemia  · Type 2 diabetes  · Hypertension  · CAD     PLAN-  · No plan for Hd today   · Continue on Bumex. adjjust bp meds   · Will follow with you     Subjective: Interval History:   -Seen and examined today. -Hypervolemia better.  -made 2 liters of urine and no need for HD today    Objective:   Vitals:    04/08/22 2251 04/09/22 0012 04/09/22 0521 04/09/22 0813   BP: (!) 155/70 (!) 150/76 (!) 166/77 (!) 187/44   Pulse: 70  62 71   Resp: 20  20 18   Temp: 97.8 °F (36.6 °C)  98.2 °F (36.8 °C) 98.3 °F (36.8 °C)   TempSrc:       SpO2: 95%  98% 95%   Weight:       Height:          04/08 0701 - 04/09 0700  In: 334 [P.O.:300; I.V.:34]  Out: 3575 [Urine:2075]  Last 3 Recorded Weights in this Encounter    04/05/22 2316 04/06/22 1419 04/07/22 1413   Weight: 79.8 kg (176 lb) 79.8 kg (176 lb) 79.8 kg (176 lb)      Physical exam:    GEN: NAD  NECK- Supple, no mass  RESP: No wheezing, Clear b/l  CVS: S1,S2  RRR  NEURO: Normal speech, Non focal  EXT: No Edema   PSYCH: Normal Mood    Chart reviewed. Pertinent Notes reviewed.      Data Review :  Recent Labs     04/09/22  0453 04/08/22  0357 04/07/22  0547    138 139   K 4.1 4.6 4.3    106 109*   CO2 28 26 23   BUN 39* 50* 63*   CREA 4.22* 4.35* 5.19*   GLU 74 138* 158*   CA 7.9* 7.7* 7.6*  7.7*   PHOS 4.1 4.4 5.3*     Recent Labs     04/08/22  0357 04/07/22  0547 04/06/22  1345   WBC 11.0 9.7 8.5   HGB 6.8* 7.4* 7.3*   HCT 21.5* 23.3* 22.9*  231 231     No results for input(s): FE, TIBC, PSAT, FERR in the last 72 hours.    Medication list  reviewed  Current Facility-Administered Medications   Medication Dose Route Frequency    hydrALAZINE (APRESOLINE) 20 mg/mL injection 10 mg  10 mg IntraVENous Q6H PRN    albuterol-ipratropium (DUO-NEB) 2.5 MG-0.5 MG/3 ML  3 mL Nebulization Q4H PRN    hydrALAZINE (APRESOLINE) tablet 50 mg  50 mg Oral TID    amLODIPine (NORVASC) tablet 10 mg  10 mg Oral DAILY    aspirin chewable tablet 81 mg  81 mg Oral DAILY    atorvastatin (LIPITOR) tablet 40 mg  40 mg Oral DAILY    carvediloL (COREG) tablet 25 mg  25 mg Oral BID    ferrous sulfate tablet 325 mg  1 Tablet Oral Q48H    gabapentin (NEURONTIN) capsule 400 mg  400 mg Oral TID    insulin glargine (LANTUS) injection 20 Units  20 Units SubCUTAneous QHS    isosorbide dinitrate (ISORDIL) tablet 30 mg  30 mg Oral Q8H    ticagrelor (BRILINTA) tablet 90 mg  90 mg Oral BID    sodium chloride (NS) flush 5-40 mL  5-40 mL IntraVENous Q8H    sodium chloride (NS) flush 5-40 mL  5-40 mL IntraVENous PRN    acetaminophen (TYLENOL) tablet 650 mg  650 mg Oral Q6H PRN    Or    acetaminophen (TYLENOL) suppository 650 mg  650 mg Rectal Q6H PRN    polyethylene glycol (MIRALAX) packet 17 g  17 g Oral DAILY PRN    promethazine (PHENERGAN) tablet 12.5 mg  12.5 mg Oral Q6H PRN    Or    ondansetron (ZOFRAN) injection 4 mg  4 mg IntraVENous Q6H PRN    morphine injection 1 mg  1 mg IntraVENous Q3H PRN    0.9% sodium chloride infusion 250 mL  250 mL IntraVENous PRN    pantoprazole (PROTONIX) 40 mg in 0.9% sodium chloride 10 mL injection  40 mg IntraVENous Q12H    insulin lispro (HUMALOG) injection   SubCUTAneous AC&HS    glucose chewable tablet 16 g  4 Tablet Oral PRN    glucagon (GLUCAGEN) injection 1 mg  1 mg IntraMUSCular PRN    dextrose 10% infusion 0-250 mL  0-250 mL IntraVENous PRN    thiamine mononitrate (B-1) tablet 100 mg  100 mg Oral DAILY    bumetanide (BUMEX) injection 2 mg  2 mg IntraVENous TID    epoetin luis-epbx (RETACRIT) 12,000 Units combo injection  12,000 Units SubCUTAneous Q MON, WED & Josh Pelaez, MD              [facility]  [facility]  [address], [address], [address]  Phone - [phone]               Fax - [phone]

## 2022-04-09 NOTE — PROGRESS NOTES
Problem: Falls - Risk of  Goal: *Absence of Falls  Description: Document Tamra Mena Fall Risk and appropriate interventions in the flowsheet.   Outcome: Progressing Towards Goal  Note: Fall Risk Interventions:            Medication Interventions: Teach patient to arise slowly                   Problem: Patient Education: Go to Patient Education Activity  Goal: Patient/Family Education  Outcome: Progressing Towards Goal     Problem: Patient Education: Go to Patient Education Activity  Goal: Patient/Family Education  Outcome: Progressing Towards Goal     Problem: Heart Failure: Day 1  Goal: Off Pathway (Use only if patient is Off Pathway)  Outcome: Progressing Towards Goal  Goal: Activity/Safety  Outcome: Progressing Towards Goal  Goal: Consults, if ordered  Outcome: Progressing Towards Goal  Goal: Diagnostic Test/Procedures  Outcome: Progressing Towards Goal  Goal: Nutrition/Diet  Outcome: Progressing Towards Goal  Goal: Discharge Planning  Outcome: Progressing Towards Goal  Goal: Medications  Outcome: Progressing Towards Goal  Goal: Respiratory  Outcome: Progressing Towards Goal  Goal: Treatments/Interventions/Procedures  Outcome: Progressing Towards Goal  Goal: Psychosocial  Outcome: Progressing Towards Goal  Goal: *Oxygen saturation within defined limits  Outcome: Progressing Towards Goal  Goal: *Hemodynamically stable  Outcome: Progressing Towards Goal  Goal: *Optimal pain control at patient's stated goal  Outcome: Progressing Towards Goal  Goal: *Anxiety reduced or absent  Outcome: Progressing Towards Goal     Problem: Heart Failure: Day 2  Goal: Off Pathway (Use only if patient is Off Pathway)  Outcome: Progressing Towards Goal  Goal: Activity/Safety  Outcome: Progressing Towards Goal  Goal: Consults, if ordered  Outcome: Progressing Towards Goal  Goal: Diagnostic Test/Procedures  Outcome: Progressing Towards Goal  Goal: Nutrition/Diet  Outcome: Progressing Towards Goal  Goal: Discharge Planning  Outcome: Progressing Towards Goal  Goal: Medications  Outcome: Progressing Towards Goal  Goal: Respiratory  Outcome: Progressing Towards Goal  Goal: Treatments/Interventions/Procedures  Outcome: Progressing Towards Goal  Goal: Psychosocial  Outcome: Progressing Towards Goal  Goal: *Oxygen saturation within defined limits  Outcome: Progressing Towards Goal  Goal: *Hemodynamically stable  Outcome: Progressing Towards Goal  Goal: *Optimal pain control at patient's stated goal  Outcome: Progressing Towards Goal  Goal: *Anxiety reduced or absent  Outcome: Progressing Towards Goal  Goal: *Demonstrates progressive activity  Outcome: Progressing Towards Goal     Problem: Heart Failure: Day 3  Goal: Off Pathway (Use only if patient is Off Pathway)  Outcome: Progressing Towards Goal  Goal: Activity/Safety  Outcome: Progressing Towards Goal  Goal: Diagnostic Test/Procedures  Outcome: Progressing Towards Goal  Goal: Nutrition/Diet  Outcome: Progressing Towards Goal  Goal: Discharge Planning  Outcome: Progressing Towards Goal  Goal: Medications  Outcome: Progressing Towards Goal  Goal: Respiratory  Outcome: Progressing Towards Goal  Goal: Treatments/Interventions/Procedures  Outcome: Progressing Towards Goal  Goal: Psychosocial  Outcome: Progressing Towards Goal  Goal: *Oxygen saturation within defined limits  Outcome: Progressing Towards Goal  Goal: *Hemodynamically stable  Outcome: Progressing Towards Goal  Goal: *Optimal pain control at patient's stated goal  Outcome: Progressing Towards Goal  Goal: *Anxiety reduced or absent  Outcome: Progressing Towards Goal  Goal: *Demonstrates progressive activity  Outcome: Progressing Towards Goal     Problem: Heart Failure: Discharge Outcomes  Goal: *Demonstrates ability to perform prescribed activity without shortness of breath or discomfort  Outcome: Progressing Towards Goal  Goal: *Left ventricular function assessment completed prior to or during stay, or planned for post-discharge  Outcome: Progressing Towards Goal  Goal: *ACEI prescribed if LVEF less than 40% and no contraindications or ARB prescribed  Outcome: Progressing Towards Goal  Goal: *Verbalizes understanding and describes prescribed diet  Outcome: Progressing Towards Goal  Goal: *Verbalizes understanding/describes prescribed medications  Outcome: Progressing Towards Goal  Goal: *Describes available resources and support systems  Description: (eg: Home Health, Palliative Care, Advanced Medical Directive)  Outcome: Progressing Towards Goal  Goal: *Describes smoking cessation resources  Outcome: Progressing Towards Goal  Goal: *Understands and describes signs and symptoms to report to providers(Stroke Metric)  Outcome: Progressing Towards Goal  Goal: *Describes/verbalizes understanding of follow-up/return appt  Description: (eg: to physicians, diabetes treatment coordinator, and other resources  Outcome: Progressing Towards Goal  Goal: *Describes importance of continuing daily weights and changes to report to physician  Outcome: Progressing Towards Goal

## 2022-04-09 NOTE — PROGRESS NOTES
Hospitalist Progress Note    NAME: Stephon Valencia   :  1955   MRN:  [id number]       Assessment / Plan:  Acute on chronic systolic/congestive heart failure, POA  JUAN C on CKD3  -Presented with shortness of breath  -Chest x-ray with congestion  -proBNP around 16,000  -Previous EF around 25 to 30%, repeat Echo showed 55-60% EF  -cont' Bumex as per nephrology. --no hydro on renal US  --s/p sami placement with first HD session on , . No dialysis today.  -Daily weights, strict I&O not accurate  -nephrology following    Atypical chest pain, POA  Coronary artery disease status post PCI  HTN  No ischemic changes on EKG. Trop neg. CP improved  Cont' cardiac meds. BP elevated, cont' incr dose hydralazine, increased imdur  Morphine prn for pain  Cardiology following.     Acute on chronic normocytic anemia, POA  Likely anemia of chronic disease and iron deficiency  fobt neg.  cont' IV iron  S/p prbc transfusion. oupt f/u with GI  Hgb 6.8 today. Will hold off on blood transfusion today and re-draw in AM.  If still low, will need blood, possibly with HD     Diabetes mellitus type 2  Continue Lantus  SSI     Hyperkalemia  Resolved with lokelma x1     Code Status: Full code  Surrogate Decision Maker: His wife   DVT Prophylaxis: SCDs  GI Prophylaxis: not indicated   Baseline: Active, independent     Subjective:     Chief Complaint / Reason for Physician Visit  Patient seen and examined at the bedside. Patient states that he feels really good today. Denies any chest pain or shortness of breath. He does continue to have urine output. He is hopeful that he will not bleed hemodialysis long-term. Discussed with RN events overnight.      Review of Systems:  Symptom Y/N Comments  Symptom Y/N Comments   Fever/Chills    Chest Pain     Poor Appetite    Edema     Cough    Abdominal Pain     Sputum    Joint Pain     SOB/WEBB    Pruritis/Rash     Nausea/vomit    Tolerating PT/OT     Diarrhea    Tolerating Diet Constipation    Other       Could NOT obtain due to:      Objective:     VITALS:   Last 24hrs VS reviewed since prior progress note. Most recent are:  Patient Vitals for the past 24 hrs:   Temp Pulse Resp BP SpO2   04/09/22 1140 97.9 °F (36.6 °C) 96 16 (!) 153/69 (!) 65 %   04/09/22 0813 98.3 °F (36.8 °C) 71 18 (!) 187/44 95 %   04/09/22 0521 98.2 °F (36.8 °C) 62 20 (!) 166/77 98 %   04/09/22 0012 -- -- -- (!) 150/76 --   04/08/22 2251 97.8 °F (36.6 °C) 70 20 (!) 155/70 95 %   04/08/22 2126 -- 72 -- (!) 163/81 --   04/08/22 1923 97.9 °F (36.6 °C) 71 16 (!) 160/74 95 %   04/08/22 1658 98.4 °F (36.9 °C) 73 18 (!) 156/68 --   04/08/22 1645 -- 70 18 (!) 137/59 --   04/08/22 1630 -- 80 18 138/62 --   04/08/22 1615 -- 75 18 137/77 --   04/08/22 1600 -- 69 18 99/67 --   04/08/22 1545 -- 73 18 134/68 --   04/08/22 1530 -- 71 18 122/61 --   04/08/22 1515 -- 70 18 (!) 103/58 --   04/08/22 1500 -- 67 18 (!) 116/59 --   04/08/22 1445 -- 70 18 (!) 153/70 --   04/08/22 1430 -- 69 18 124/70 --   04/08/22 1415 -- (!) 57 18 (!) 107/58 --   04/08/22 1400 98.3 °F (36.8 °C) 61 18 135/60 --   04/08/22 1329 98.4 °F (36.9 °C) 63 18 (!) 157/68 --   04/08/22 1200 -- 73 -- -- --       Intake/Output Summary (Last 24 hours) at 4/9/2022 1151  Last data filed at 4/9/2022 0425  Gross per 24 hour   Intake 334 ml   Output 3025 ml   Net -2691 ml        I had a face to face encounter and independently examined this patient on 4/9/2022, as outlined below:  PHYSICAL EXAM:  General: WD, WN. Alert, cooperative, no acute distress    EENT:  EOMI. Anicteric sclerae. MMM  Resp:  CTA bilaterally, no wheezing or rales. No accessory muscle use  CV:  Regular  rhythm,  No edema  GI:  Soft, NT, ND  Neurologic:  Alert and oriented X 3, normal speech  Psych:   Good insight. Not anxious nor agitated  Skin:  No rashes.   No jaundice, permcath right chest wall    Reviewed most current lab test results and cultures  YES  Reviewed most current radiology test results YES  Review and summation of old records today    NO  Reviewed patient's current orders and MAR    YES  PMH/SH reviewed - no change compared to H&P  ________________________________________________________________________  Care Plan discussed with:    Comments   Patient x    Family      RN x    Care Manager     Consultant                        Multidiciplinary team rounds were held today with , nursing, pharmacist and clinical coordinator. Patient's plan of care was discussed; medications were reviewed and discharge planning was addressed. ________________________________________________________________________  Total NON critical care TIME:  35  Minutes    Total CRITICAL CARE TIME Spent:   Minutes non procedure based      Comments   >50% of visit spent in counseling and coordination of care     ________________________________________________________________________  Andrew Truong MD     Procedures: see electronic medical records for all procedures/Xrays and details which were not copied into this note but were reviewed prior to creation of Plan. LABS:  I reviewed today's most current labs and imaging studies.   Pertinent labs include:  Recent Labs     04/08/22  0357 04/07/22  0547 04/06/22  1345   WBC 11.0 9.7 8.5   HGB 6.8* 7.4* 7.3*   HCT 21.5* 23.3* 22.9*    231 231     Recent Labs     04/09/22  0453 04/08/22  0357 04/07/22  0547    138 139   K 4.1 4.6 4.3    106 109*   CO2 28 26 23   GLU 74 138* 158*   BUN 39* 50* 63*   CREA 4.22* 4.35* 5.19*   CA 7.9* 7.7* 7.6*  7.7*   PHOS 4.1 4.4 5.3*   ALB 2.3* 2.4* 2.4*   TBILI  --   --  0.3   ALT  --   --  17       Signed: Andrew Truong MD

## 2022-04-10 LAB
ANION GAP SERPL CALC-SCNC: 9 MMOL/L (ref 5–15)
BASOPHILS # BLD: 0.1 K/UL (ref 0–0.1)
BASOPHILS NFR BLD: 1 % (ref 0–1)
BUN SERPL-MCNC: 55 MG/DL (ref 6–20)
BUN/CREAT SERPL: 10 (ref 12–20)
CALCIUM SERPL-MCNC: 7.7 MG/DL (ref 8.5–10.1)
CHLORIDE SERPL-SCNC: 103 MMOL/L (ref 97–108)
CO2 SERPL-SCNC: 25 MMOL/L (ref 21–32)
COMMENT, HOLDF: NORMAL
CREAT SERPL-MCNC: 5.32 MG/DL (ref 0.7–1.3)
DIFFERENTIAL METHOD BLD: ABNORMAL
EOSINOPHIL # BLD: 0.2 K/UL (ref 0–0.4)
EOSINOPHIL NFR BLD: 2 % (ref 0–7)
ERYTHROCYTE [DISTWIDTH] IN BLOOD BY AUTOMATED COUNT: 15.4 % (ref 11.5–14.5)
GLUCOSE BLD STRIP.AUTO-MCNC: 111 MG/DL (ref 65–117)
GLUCOSE BLD STRIP.AUTO-MCNC: 118 MG/DL (ref 65–117)
GLUCOSE BLD STRIP.AUTO-MCNC: 118 MG/DL (ref 65–117)
GLUCOSE BLD STRIP.AUTO-MCNC: 133 MG/DL (ref 65–117)
GLUCOSE SERPL-MCNC: 95 MG/DL (ref 65–100)
HCT VFR BLD AUTO: 21.5 % (ref 36.6–50.3)
HGB BLD-MCNC: 6.9 G/DL (ref 12.1–17)
HISTORY CHECKED?,CKHIST: NORMAL
IMM GRANULOCYTES # BLD AUTO: 0.1 K/UL (ref 0–0.04)
IMM GRANULOCYTES NFR BLD AUTO: 1 % (ref 0–0.5)
LYMPHOCYTES # BLD: 2.5 K/UL (ref 0.8–3.5)
LYMPHOCYTES NFR BLD: 26 % (ref 12–49)
MAGNESIUM SERPL-MCNC: 2.2 MG/DL (ref 1.6–2.4)
MCH RBC QN AUTO: 27.3 PG (ref 26–34)
MCHC RBC AUTO-ENTMCNC: 32.1 G/DL (ref 30–36.5)
MCV RBC AUTO: 85 FL (ref 80–99)
MONOCYTES # BLD: 1.2 K/UL (ref 0–1)
MONOCYTES NFR BLD: 12 % (ref 5–13)
NEUTS SEG # BLD: 5.8 K/UL (ref 1.8–8)
NEUTS SEG NFR BLD: 59 % (ref 32–75)
NRBC # BLD: 0.02 K/UL (ref 0–0.01)
NRBC BLD-RTO: 0.2 PER 100 WBC
PHOSPHATE SERPL-MCNC: 4.8 MG/DL (ref 2.6–4.7)
PLATELET # BLD AUTO: 276 K/UL (ref 150–400)
PMV BLD AUTO: 10 FL (ref 8.9–12.9)
POTASSIUM SERPL-SCNC: 4.3 MMOL/L (ref 3.5–5.1)
RBC # BLD AUTO: 2.53 M/UL (ref 4.1–5.7)
SAMPLES BEING HELD,HOLD: NORMAL
SERVICE CMNT-IMP: ABNORMAL
SERVICE CMNT-IMP: NORMAL
SODIUM SERPL-SCNC: 137 MMOL/L (ref 136–145)
WBC # BLD AUTO: 9.8 K/UL (ref 4.1–11.1)

## 2022-04-10 PROCEDURE — 74011250636 HC RX REV CODE- 250/636: Performed by: INTERNAL MEDICINE

## 2022-04-10 PROCEDURE — 86923 COMPATIBILITY TEST ELECTRIC: CPT

## 2022-04-10 PROCEDURE — 74011250637 HC RX REV CODE- 250/637: Performed by: INTERNAL MEDICINE

## 2022-04-10 PROCEDURE — 82962 GLUCOSE BLOOD TEST: CPT

## 2022-04-10 PROCEDURE — 84100 ASSAY OF PHOSPHORUS: CPT

## 2022-04-10 PROCEDURE — 74011636637 HC RX REV CODE- 636/637: Performed by: INTERNAL MEDICINE

## 2022-04-10 PROCEDURE — C9113 INJ PANTOPRAZOLE SODIUM, VIA: HCPCS | Performed by: INTERNAL MEDICINE

## 2022-04-10 PROCEDURE — 74011000250 HC RX REV CODE- 250: Performed by: INTERNAL MEDICINE

## 2022-04-10 PROCEDURE — 86900 BLOOD TYPING SEROLOGIC ABO: CPT

## 2022-04-10 PROCEDURE — 83735 ASSAY OF MAGNESIUM: CPT

## 2022-04-10 PROCEDURE — 80048 BASIC METABOLIC PNL TOTAL CA: CPT

## 2022-04-10 PROCEDURE — 36415 COLL VENOUS BLD VENIPUNCTURE: CPT

## 2022-04-10 PROCEDURE — 65270000029 HC RM PRIVATE

## 2022-04-10 PROCEDURE — 85025 COMPLETE CBC W/AUTO DIFF WBC: CPT

## 2022-04-10 RX ORDER — SODIUM CHLORIDE 9 MG/ML
250 INJECTION, SOLUTION INTRAVENOUS AS NEEDED
Status: DISCONTINUED | OUTPATIENT
Start: 2022-04-10 | End: 2022-04-12 | Stop reason: SDUPTHER

## 2022-04-10 RX ADMIN — HYDRALAZINE HYDROCHLORIDE 50 MG: 50 TABLET, FILM COATED ORAL at 22:14

## 2022-04-10 RX ADMIN — TICAGRELOR 90 MG: 90 TABLET ORAL at 17:09

## 2022-04-10 RX ADMIN — SODIUM CHLORIDE 40 MG: 9 INJECTION INTRAMUSCULAR; INTRAVENOUS; SUBCUTANEOUS at 08:26

## 2022-04-10 RX ADMIN — HYDRALAZINE HYDROCHLORIDE 50 MG: 50 TABLET, FILM COATED ORAL at 17:09

## 2022-04-10 RX ADMIN — SODIUM CHLORIDE, PRESERVATIVE FREE 5 ML: 5 INJECTION INTRAVENOUS at 14:00

## 2022-04-10 RX ADMIN — THIAMINE HCL TAB 100 MG 100 MG: 100 TAB at 08:26

## 2022-04-10 RX ADMIN — ATORVASTATIN CALCIUM 40 MG: 40 TABLET, FILM COATED ORAL at 08:26

## 2022-04-10 RX ADMIN — ISOSORBIDE DINITRATE 40 MG: 10 TABLET ORAL at 22:14

## 2022-04-10 RX ADMIN — BUMETANIDE 2 MG: 0.25 INJECTION, SOLUTION INTRAMUSCULAR; INTRAVENOUS at 22:23

## 2022-04-10 RX ADMIN — BUMETANIDE 2 MG: 0.25 INJECTION, SOLUTION INTRAMUSCULAR; INTRAVENOUS at 17:12

## 2022-04-10 RX ADMIN — GABAPENTIN 400 MG: 100 CAPSULE ORAL at 08:26

## 2022-04-10 RX ADMIN — CARVEDILOL 25 MG: 12.5 TABLET, FILM COATED ORAL at 17:09

## 2022-04-10 RX ADMIN — AMLODIPINE BESYLATE 10 MG: 5 TABLET ORAL at 08:26

## 2022-04-10 RX ADMIN — GABAPENTIN 400 MG: 100 CAPSULE ORAL at 22:14

## 2022-04-10 RX ADMIN — ISOSORBIDE DINITRATE 40 MG: 10 TABLET ORAL at 05:44

## 2022-04-10 RX ADMIN — SODIUM CHLORIDE, PRESERVATIVE FREE 10 ML: 5 INJECTION INTRAVENOUS at 22:27

## 2022-04-10 RX ADMIN — GABAPENTIN 400 MG: 100 CAPSULE ORAL at 17:09

## 2022-04-10 RX ADMIN — SODIUM CHLORIDE, PRESERVATIVE FREE 10 ML: 5 INJECTION INTRAVENOUS at 05:11

## 2022-04-10 RX ADMIN — SODIUM CHLORIDE 40 MG: 9 INJECTION INTRAMUSCULAR; INTRAVENOUS; SUBCUTANEOUS at 22:14

## 2022-04-10 RX ADMIN — ISOSORBIDE DINITRATE 40 MG: 10 TABLET ORAL at 12:08

## 2022-04-10 RX ADMIN — BUMETANIDE 2 MG: 0.25 INJECTION, SOLUTION INTRAMUSCULAR; INTRAVENOUS at 09:53

## 2022-04-10 RX ADMIN — ASPIRIN 81 MG: 81 TABLET, CHEWABLE ORAL at 08:26

## 2022-04-10 RX ADMIN — CARVEDILOL 25 MG: 12.5 TABLET, FILM COATED ORAL at 08:26

## 2022-04-10 RX ADMIN — INSULIN GLARGINE 20 UNITS: 100 INJECTION, SOLUTION SUBCUTANEOUS at 22:15

## 2022-04-10 RX ADMIN — TICAGRELOR 90 MG: 90 TABLET ORAL at 08:26

## 2022-04-10 RX ADMIN — HYDRALAZINE HYDROCHLORIDE 50 MG: 50 TABLET, FILM COATED ORAL at 08:26

## 2022-04-10 RX ADMIN — FERROUS SULFATE TAB 325 MG (65 MG ELEMENTAL FE) 325 MG: 325 (65 FE) TAB at 00:57

## 2022-04-10 NOTE — PROGRESS NOTES
Hospitalist Progress Note    NAME: Paty Patel   :  1955   MRN:  [id number]       Assessment / Plan:  Acute on chronic systolic/congestive heart failure, POA  JUAN C on CKD3  -Presented with shortness of breath  -Chest x-ray with congestion  -proBNP around 16,000  -Previous EF around 25 to 30%, repeat Echo showed 55-60% EF  -cont' Bumex as per nephrology. --no hydro on renal US  --s/p sami placement with first HD session on , . No dialysis today.  -Daily weights, strict I&O  -nephrology following. May need to resume HD with worsening renal function  -Cr worsening today, up to 5.32    Atypical chest pain, POA  Coronary artery disease status post PCI  HTN  No ischemic changes on EKG. Trop neg. CP improved  Cont' cardiac meds. BP elevated, cont' incr dose hydralazine, increased imdur. Will discuss with nephro about adding another agent for BP control  Morphine prn for pain  Cardiology following.     Acute on chronic normocytic anemia, POA  Likely anemia of chronic disease and iron deficiency  fobt neg.  cont' IV iron  S/p prbc transfusion. oupt f/u with GI  Hgb 6.9 today. S/p 1 unit PRBC     Diabetes mellitus type 2  Continue Lantus  SSI     Hyperkalemia  Resolved with lokelma x1     Code Status: Full code  Surrogate Decision Maker: His wife   DVT Prophylaxis: SCDs  GI Prophylaxis: not indicated   Baseline: Active, independent     Subjective:     Chief Complaint / Reason for Physician Visit  Patient seen and examined at the bedside. Patient states he continues to feel well and has no trouble breathing or chest pain. Patient states that he continues to have adequate urine output. Discussed with RN events overnight.      Review of Systems:  Symptom Y/N Comments  Symptom Y/N Comments   Fever/Chills    Chest Pain     Poor Appetite    Edema     Cough    Abdominal Pain     Sputum    Joint Pain     SOB/WEBB    Pruritis/Rash     Nausea/vomit    Tolerating PT/OT     Diarrhea    Tolerating Diet Constipation    Other       Could NOT obtain due to:      Objective:     VITALS:   Last 24hrs VS reviewed since prior progress note. Most recent are:  Patient Vitals for the past 24 hrs:   Temp Pulse Resp BP SpO2   04/10/22 0832 98.6 °F (37 °C) 68 16 (!) 162/71 95 %   04/10/22 0548 98.7 °F (37.1 °C) -- -- -- --   04/10/22 0544 -- 68 -- (!) 152/67 --   04/10/22 0306 99.2 °F (37.3 °C) 69 14 (!) 162/69 95 %   04/10/22 0110 -- -- -- 138/76 --   04/09/22 2222 99 °F (37.2 °C) 64 17 (!) 155/68 100 %   04/09/22 1728 98.2 °F (36.8 °C) 62 16 (!) 160/70 96 %   04/09/22 1201 98.8 °F (37.1 °C) 67 18 (!) 164/70 100 %   04/09/22 1200 -- 67 -- -- --   04/09/22 1140 97.9 °F (36.6 °C) 96 16 (!) 153/69 (!) 65 %       Intake/Output Summary (Last 24 hours) at 4/10/2022 1129  Last data filed at 4/10/2022 0550  Gross per 24 hour   Intake 1011 ml   Output 2075 ml   Net -1064 ml        I had a face to face encounter and independently examined this patient on 4/10/2022, as outlined below:  PHYSICAL EXAM:  General: WD, WN. Alert, cooperative, no acute distress    EENT:  EOMI. Anicteric sclerae. MMM  Resp:  CTA bilaterally, no wheezing or rales. No accessory muscle use  CV:  Regular  rhythm,  No edema  GI:  Soft, NT, ND  Neurologic:  Alert and oriented X 3, normal speech  Psych:   Good insight. Not anxious nor agitated  Skin:  No rashes. No jaundice, permcath right chest wall    Reviewed most current lab test results and cultures  YES  Reviewed most current radiology test results   YES  Review and summation of old records today    NO  Reviewed patient's current orders and MAR    YES  PMH/SH reviewed - no change compared to H&P  ________________________________________________________________________  Care Plan discussed with:    Comments   Patient x    Family      RN x    Care Manager     Consultant                        Multidiciplinary team rounds were held today with , nursing, pharmacist and clinical coordinator. Patient's plan of care was discussed; medications were reviewed and discharge planning was addressed. ________________________________________________________________________  Total NON critical care TIME:  35  Minutes    Total CRITICAL CARE TIME Spent:   Minutes non procedure based      Comments   >50% of visit spent in counseling and coordination of care     ________________________________________________________________________  Rey Ying MD     Procedures: see electronic medical records for all procedures/Xrays and details which were not copied into this note but were reviewed prior to creation of Plan. LABS:  I reviewed today's most current labs and imaging studies.   Pertinent labs include:  Recent Labs     04/10/22  0311 04/08/22  0357   WBC 9.8 11.0   HGB 6.9* 6.8*   HCT 21.5* 21.5*    281     Recent Labs     04/10/22  0311 04/09/22  0453 04/08/22 0357    139 138   K 4.3 4.1 4.6    104 106   CO2 25 28 26   GLU 95 74 138*   BUN 55* 39* 50*   CREA 5.32* 4.22* 4.35*   CA 7.7* 7.9* 7.7*   MG 2.2  --   --    PHOS 4.8* 4.1 4.4   ALB  --  2.3* 2.4*       Signed: Rey Ying MD

## 2022-04-11 LAB
ALBUMIN SERPL-MCNC: 2.3 G/DL (ref 3.5–5)
ANION GAP SERPL CALC-SCNC: 8 MMOL/L (ref 5–15)
BASOPHILS # BLD: 0 K/UL (ref 0–0.1)
BASOPHILS NFR BLD: 0 % (ref 0–1)
BUN SERPL-MCNC: 64 MG/DL (ref 6–20)
BUN/CREAT SERPL: 10 (ref 12–20)
CALCIUM SERPL-MCNC: 7.9 MG/DL (ref 8.5–10.1)
CHLORIDE SERPL-SCNC: 105 MMOL/L (ref 97–108)
CO2 SERPL-SCNC: 26 MMOL/L (ref 21–32)
CREAT SERPL-MCNC: 6.21 MG/DL (ref 0.7–1.3)
DIFFERENTIAL METHOD BLD: ABNORMAL
EOSINOPHIL # BLD: 0.2 K/UL (ref 0–0.4)
EOSINOPHIL NFR BLD: 2 % (ref 0–7)
ERYTHROCYTE [DISTWIDTH] IN BLOOD BY AUTOMATED COUNT: 15.5 % (ref 11.5–14.5)
GLUCOSE BLD STRIP.AUTO-MCNC: 116 MG/DL (ref 65–117)
GLUCOSE BLD STRIP.AUTO-MCNC: 126 MG/DL (ref 65–117)
GLUCOSE BLD STRIP.AUTO-MCNC: 143 MG/DL (ref 65–117)
GLUCOSE BLD STRIP.AUTO-MCNC: 157 MG/DL (ref 65–117)
GLUCOSE BLD STRIP.AUTO-MCNC: 64 MG/DL (ref 65–117)
GLUCOSE SERPL-MCNC: 56 MG/DL (ref 65–100)
HCT VFR BLD AUTO: 21.7 % (ref 36.6–50.3)
HGB BLD-MCNC: 6.8 G/DL (ref 12.1–17)
HISTORY CHECKED?,CKHIST: NORMAL
IMM GRANULOCYTES # BLD AUTO: 0.1 K/UL (ref 0–0.04)
IMM GRANULOCYTES NFR BLD AUTO: 1 % (ref 0–0.5)
LYMPHOCYTES # BLD: 2.5 K/UL (ref 0.8–3.5)
LYMPHOCYTES NFR BLD: 28 % (ref 12–49)
MAGNESIUM SERPL-MCNC: 2.5 MG/DL (ref 1.6–2.4)
MCH RBC QN AUTO: 26.8 PG (ref 26–34)
MCHC RBC AUTO-ENTMCNC: 31.3 G/DL (ref 30–36.5)
MCV RBC AUTO: 85.4 FL (ref 80–99)
MONOCYTES # BLD: 1.2 K/UL (ref 0–1)
MONOCYTES NFR BLD: 13 % (ref 5–13)
NEUTS SEG # BLD: 5.1 K/UL (ref 1.8–8)
NEUTS SEG NFR BLD: 56 % (ref 32–75)
NRBC # BLD: 0 K/UL (ref 0–0.01)
NRBC BLD-RTO: 0 PER 100 WBC
PHOSPHATE SERPL-MCNC: 5.7 MG/DL (ref 2.6–4.7)
PLATELET # BLD AUTO: 290 K/UL (ref 150–400)
PMV BLD AUTO: 9.9 FL (ref 8.9–12.9)
POTASSIUM SERPL-SCNC: 4.1 MMOL/L (ref 3.5–5.1)
RBC # BLD AUTO: 2.54 M/UL (ref 4.1–5.7)
RBC MORPH BLD: ABNORMAL
RBC MORPH BLD: ABNORMAL
SERVICE CMNT-IMP: ABNORMAL
SERVICE CMNT-IMP: NORMAL
SODIUM SERPL-SCNC: 139 MMOL/L (ref 136–145)
WBC # BLD AUTO: 9.1 K/UL (ref 4.1–11.1)

## 2022-04-11 PROCEDURE — 65270000029 HC RM PRIVATE

## 2022-04-11 PROCEDURE — 74011000250 HC RX REV CODE- 250: Performed by: INTERNAL MEDICINE

## 2022-04-11 PROCEDURE — 36415 COLL VENOUS BLD VENIPUNCTURE: CPT

## 2022-04-11 PROCEDURE — 74011250636 HC RX REV CODE- 250/636: Performed by: INTERNAL MEDICINE

## 2022-04-11 PROCEDURE — 74011250637 HC RX REV CODE- 250/637: Performed by: INTERNAL MEDICINE

## 2022-04-11 PROCEDURE — 36430 TRANSFUSION BLD/BLD COMPNT: CPT

## 2022-04-11 PROCEDURE — 85025 COMPLETE CBC W/AUTO DIFF WBC: CPT

## 2022-04-11 PROCEDURE — 74011636637 HC RX REV CODE- 636/637: Performed by: INTERNAL MEDICINE

## 2022-04-11 PROCEDURE — 83735 ASSAY OF MAGNESIUM: CPT

## 2022-04-11 PROCEDURE — C9113 INJ PANTOPRAZOLE SODIUM, VIA: HCPCS | Performed by: INTERNAL MEDICINE

## 2022-04-11 PROCEDURE — 80069 RENAL FUNCTION PANEL: CPT

## 2022-04-11 PROCEDURE — 82962 GLUCOSE BLOOD TEST: CPT

## 2022-04-11 PROCEDURE — P9016 RBC LEUKOCYTES REDUCED: HCPCS

## 2022-04-11 RX ORDER — SODIUM CHLORIDE 9 MG/ML
250 INJECTION, SOLUTION INTRAVENOUS AS NEEDED
Status: DISCONTINUED | OUTPATIENT
Start: 2022-04-11 | End: 2022-04-13 | Stop reason: HOSPADM

## 2022-04-11 RX ORDER — INSULIN GLARGINE 100 [IU]/ML
16 INJECTION, SOLUTION SUBCUTANEOUS
Status: DISCONTINUED | OUTPATIENT
Start: 2022-04-11 | End: 2022-04-11

## 2022-04-11 RX ORDER — INSULIN GLARGINE 100 [IU]/ML
10 INJECTION, SOLUTION SUBCUTANEOUS
Status: DISCONTINUED | OUTPATIENT
Start: 2022-04-11 | End: 2022-04-13 | Stop reason: HOSPADM

## 2022-04-11 RX ADMIN — CARVEDILOL 25 MG: 12.5 TABLET, FILM COATED ORAL at 10:25

## 2022-04-11 RX ADMIN — GABAPENTIN 400 MG: 100 CAPSULE ORAL at 10:25

## 2022-04-11 RX ADMIN — HYDRALAZINE HYDROCHLORIDE 50 MG: 50 TABLET, FILM COATED ORAL at 10:25

## 2022-04-11 RX ADMIN — CARVEDILOL 25 MG: 12.5 TABLET, FILM COATED ORAL at 17:45

## 2022-04-11 RX ADMIN — SODIUM CHLORIDE, PRESERVATIVE FREE 10 ML: 5 INJECTION INTRAVENOUS at 15:36

## 2022-04-11 RX ADMIN — ISOSORBIDE DINITRATE 40 MG: 10 TABLET ORAL at 05:38

## 2022-04-11 RX ADMIN — IRON SUCROSE 200 MG: 20 INJECTION, SOLUTION INTRAVENOUS at 10:26

## 2022-04-11 RX ADMIN — TICAGRELOR 90 MG: 90 TABLET ORAL at 10:26

## 2022-04-11 RX ADMIN — ASPIRIN 81 MG: 81 TABLET, CHEWABLE ORAL at 10:26

## 2022-04-11 RX ADMIN — ISOSORBIDE DINITRATE 40 MG: 10 TABLET ORAL at 15:36

## 2022-04-11 RX ADMIN — AMLODIPINE BESYLATE 10 MG: 5 TABLET ORAL at 10:25

## 2022-04-11 RX ADMIN — SODIUM CHLORIDE 40 MG: 9 INJECTION INTRAMUSCULAR; INTRAVENOUS; SUBCUTANEOUS at 10:25

## 2022-04-11 RX ADMIN — SODIUM CHLORIDE, PRESERVATIVE FREE 10 ML: 5 INJECTION INTRAVENOUS at 21:53

## 2022-04-11 RX ADMIN — ISOSORBIDE DINITRATE 40 MG: 10 TABLET ORAL at 21:59

## 2022-04-11 RX ADMIN — SODIUM CHLORIDE, PRESERVATIVE FREE 10 ML: 5 INJECTION INTRAVENOUS at 05:38

## 2022-04-11 RX ADMIN — SODIUM CHLORIDE 40 MG: 9 INJECTION INTRAMUSCULAR; INTRAVENOUS; SUBCUTANEOUS at 21:53

## 2022-04-11 RX ADMIN — ATORVASTATIN CALCIUM 40 MG: 40 TABLET, FILM COATED ORAL at 10:26

## 2022-04-11 RX ADMIN — GABAPENTIN 400 MG: 100 CAPSULE ORAL at 17:45

## 2022-04-11 RX ADMIN — THIAMINE HCL TAB 100 MG 100 MG: 100 TAB at 10:26

## 2022-04-11 RX ADMIN — GABAPENTIN 400 MG: 100 CAPSULE ORAL at 21:52

## 2022-04-11 RX ADMIN — HYDRALAZINE HYDROCHLORIDE 50 MG: 50 TABLET, FILM COATED ORAL at 21:52

## 2022-04-11 RX ADMIN — EPOETIN ALFA-EPBX 12000 UNITS: 10000 INJECTION, SOLUTION INTRAVENOUS; SUBCUTANEOUS at 21:59

## 2022-04-11 RX ADMIN — HYDRALAZINE HYDROCHLORIDE 50 MG: 50 TABLET, FILM COATED ORAL at 17:45

## 2022-04-11 RX ADMIN — TICAGRELOR 90 MG: 90 TABLET ORAL at 17:45

## 2022-04-11 RX ADMIN — INSULIN GLARGINE 10 UNITS: 100 INJECTION, SOLUTION SUBCUTANEOUS at 21:53

## 2022-04-11 NOTE — PROGRESS NOTES
Transition of Care Plan:     RUR: 14% low risk for readmission  Disposition:Home with spouse   Outpatient HD:   [facility] MWF @4pm   Follow up appointments: PCP, Specialists as indicated  DME needed: Pt owns/uses no DME. No DME needs anticipated  Transportation at Discharge: Pt's family   [address] or means to access home: Pt has access       IM Medicare Letter: N/A; Medicaid coverage only  Is patient a BCPI-A Bundle: N/A                     If yes, was Bundle Letter given?:    Is patient a [place] and connected with the [place]? N/A               If yes, was [facility] [place] transfer form completed and [facility] notified? Caregiver Contact: Pt's spouse, Max screws) [phone]  Discharge Caregiver contacted prior to discharge? To be contacted prior to d/c   Care Conference needed?: No          Update 3:50pm: CM [facility] [address] [facility]. Pt is to arrive at HD unit on Wednesday, 4/13/22 at 3pm. Pt's chair time will be at 4pm.    9am: CM spoke with nephrology. CM notified to arrange OP HD chair. CM contacted [facility] intake:   [phone]    Plan to arrange chair time with Pt's previous HD unit [facility].    Intake is reviewing for a MWF 2nd shift chair time. Pt's chair time will be confirmed once clinicals are received and reviewed. CM was provided a request #: [id number]    Coordinator: Patti rodriguez [id number]               8:30am: Per chart review, nephrology following. Potential need for outpatient HD. CM will continue to follow for discharge planning needs.        [facility], CM  [place]

## 2022-04-11 NOTE — PROGRESS NOTES
Hospitalist Progress Note    NAME: Champ Sheppard   :  1955   MRN:  [id number]       Assessment / Plan:  Acute on chronic systolic/congestive heart failure, POA  JUAN C on CKD3  -Presented with shortness of breath  -Chest x-ray with congestion  -proBNP around 16,000  -Previous EF around 25 to 30%, repeat Echo showed 55-60% EF  Hold Bumex as per nephrology. - Cr worsening, trend. Permcath if Cr continues to worsen and needs outpatient HD    --no hydro on renal US  --s/p sami placement with first HD session on , . No dialysis today.  -Daily weights, strict I&O  -nephrology following. May need to resume HD with worsening renal function  -Cr worsening today, up to 5.32    Atypical chest pain, POA  Coronary artery disease status post PCI  HTN  No ischemic changes on EKG. Trop neg. CP improved  Cont' cardiac meds. BP elevated, cont' incr dose hydralazine, increased imdur. Will discuss with nephro about adding another agent for BP control  Morphine prn for pain  Cardiology following.     Acute on chronic normocytic anemia, POA  Likely anemia of chronic disease and iron deficiency  fobt neg.  cont' IV iron  S/p prbc transfusion. oupt f/u with GI  Hgb 6.9 today. transfuse 1 units PRBC on      Diabetes mellitus type 2  Continue Lantus  SSI     Hyperkalemia  Resolved with lokelma x1     Code Status: Full code  Surrogate Decision Maker: His wife   DVT Prophylaxis: SCDs  GI Prophylaxis: not indicated   Baseline: Active, independent     Subjective:     Chief Complaint / Reason for Physician Visit  Patient seen and examined at the bedside. Patient states he continues to feel well and has no trouble breathing or chest pain. Patient states that he continues to have adequate urine output. Discouraged about the potential need for HD as outpatient. Understands he will receive a unit of blood today  Discussed with RN events overnight.      Review of Systems:  Symptom Y/N Comments  Symptom Y/N Comments Fever/Chills    Chest Pain     Poor Appetite    Edema     Cough    Abdominal Pain     Sputum    Joint Pain     SOB/WEBB    Pruritis/Rash     Nausea/vomit    Tolerating PT/OT     Diarrhea    Tolerating Diet     Constipation    Other       Could NOT obtain due to:      Objective:     VITALS:   Last 24hrs VS reviewed since prior progress note. Most recent are:  Patient Vitals for the past 24 hrs:   Temp Pulse Resp BP SpO2   04/11/22 1530 98.4 °F (36.9 °C) 61 16 -- 96 %   04/11/22 1354 98.2 °F (36.8 °C) -- 20 (!) 152/68 97 %   04/11/22 1215 98.2 °F (36.8 °C) 63 20 (!) 152/68 --   04/11/22 1212 98.2 °F (36.8 °C) -- 16 132/62 --   04/11/22 1129 98.1 °F (36.7 °C) 63 16 (!) 147/66 --   04/11/22 0758 98.5 °F (36.9 °C) 70 20 (!) 149/57 97 %   04/11/22 0345 98.3 °F (36.8 °C) 81 20 (!) 151/64 97 %   04/11/22 0047 98.6 °F (37 °C) 79 18 (!) 142/76 98 %   04/10/22 2138 98.7 °F (37.1 °C) 78 18 (!) 162/68 98 %   04/10/22 1613 98.8 °F (37.1 °C) 65 18 (!) 144/66 96 %       Intake/Output Summary (Last 24 hours) at 4/11/2022 1548  Last data filed at 4/11/2022 1525  Gross per 24 hour   Intake 500 ml   Output 2625 ml   Net -2125 ml        I had a face to face encounter and independently examined this patient on 4/11/2022, as outlined below:  PHYSICAL EXAM:  General: WD, WN. Alert, cooperative, no acute distress    EENT:  EOMI. Anicteric sclerae. MMM  Resp:  CTA bilaterally, no wheezing or rales. No accessory muscle use  CV:  Regular  rhythm,  No edema  GI:  Soft, NT, ND  Neurologic:  Alert and oriented X 3, normal speech  Psych:   Good insight. Not anxious nor agitated  Skin:  No rashes.   No jaundice, permcath right chest wall    Reviewed most current lab test results and cultures  YES  Reviewed most current radiology test results   YES  Review and summation of old records today    NO  Reviewed patient's current orders and MAR    YES  PMH/SH reviewed - no change compared to H&P  ________________________________________________________________________  Care Plan discussed with:    Comments   Patient x    Family      RN x    Care Manager     Consultant                        Multidiciplinary team rounds were held today with , nursing, pharmacist and clinical coordinator. Patient's plan of care was discussed; medications were reviewed and discharge planning was addressed. ________________________________________________________________________  Total NON critical care TIME:  35  Minutes    Total CRITICAL CARE TIME Spent:   Minutes non procedure based      Comments   >50% of visit spent in counseling and coordination of care     ________________________________________________________________________  Deb Spangler MD     Procedures: see electronic medical records for all procedures/Xrays and details which were not copied into this note but were reviewed prior to creation of Plan. LABS:  I reviewed today's most current labs and imaging studies.   Pertinent labs include:  Recent Labs     04/11/22  0112 04/10/22  0311   WBC 9.1 9.8   HGB 6.8* 6.9*   HCT 21.7* 21.5*    276     Recent Labs     04/11/22  0112 04/10/22  0311 04/09/22  0453    137 139   K 4.1 4.3 4.1    103 104   CO2 26 25 28   GLU 56* 95 74   BUN 64* 55* 39*   CREA 6.21* 5.32* 4.22*   CA 7.9* 7.7* 7.9*   MG 2.5* 2.2  --    PHOS 5.7* 4.8* 4.1   ALB 2.3*  --  2.3*       Signed: Deb Spangler MD

## 2022-04-11 NOTE — PROGRESS NOTES
Spiritual Care Assessment/Progress Note  [facility]      NAME: Dayo Lentz      MRN: [id number]  AGE: 61 y.o. SEX: male  Voodoo Affiliation: Other   Language: English     4/11/2022     Total Time (in minutes): 11     Spiritual Assessment begun in [facility] 2 MED TELE through conversation with:         []Patient        [] Family    [] Friend(s)        Reason for Consult: Initial/Spiritual assessment, patient floor     Spiritual beliefs: (Please include comment if needed)     [] Identifies with a andrez tradition:         [] Supported by a andrez community:            [] Claims no spiritual orientation:           [] Seeking spiritual identity:                [] Adheres to an individual form of spirituality:           [x] Not able to assess:                           Identified resources for coping:      [] Prayer                               [] Music                  [] Guided Imagery     [] Family/friends                 [] Pet visits     [] Devotional reading                         [x] Unknown     [] Other:                                            Interventions offered during this visit: (See comments for more details)    Patient Interventions: Initial visit,Other (comment) (Left pastoral care note)           Plan of Care:     [x] Support spiritual and/or cultural needs    [] Support AMD and/or advance care planning process      [] Support grieving process   [] Coordinate Rites and/or Rituals    [] Coordination with community clergy   [] No spiritual needs identified at this time   [] Detailed Plan of Care below (See Comments)  [] Make referral to Music Therapy  [] Make referral to Pet Therapy     [] Make referral to Addiction services  [] Make referral to [facility]  [] Make referral to Spiritual Care Partner  [] No future visits requested        [x] Contact Spiritual Care for further referrals     Comments: Reviewed chart prior to visit on Med Tele for spiritual assessment.   No family/friends present. Patient appeared to be sleeping soundly and did not stir when  knocked on door and called his name. Unable to assess for spiritual needs or concerns at this time. Left pastoral care note for patient.    available upon referral by nurse or by patient request.       SPENCER Ruggiero, [facility], Staff [address]    [address] Paging Service  [phone]

## 2022-04-11 NOTE — PROGRESS NOTES
1405 pt complained of sharp pain in chest when breathing in. Pt states that he no longer has chest pain at this time and that it only lasted a second or two. Vitals stable, see flowsheet. MD notified and no new orders received.

## 2022-04-11 NOTE — PROGRESS NOTES
Problem: Falls - Risk of  Goal: *Absence of Falls  Description: Document Sakshi Fregosoo Fall Risk and appropriate interventions in the flowsheet.   Outcome: Progressing Towards Goal  Note: Fall Risk Interventions:            Medication Interventions: Teach patient to arise slowly                   Problem: Patient Education: Go to Patient Education Activity  Goal: Patient/Family Education  Outcome: Progressing Towards Goal     Problem: Patient Education: Go to Patient Education Activity  Goal: Patient/Family Education  Outcome: Progressing Towards Goal     Problem: Heart Failure: Day 1  Goal: Off Pathway (Use only if patient is Off Pathway)  Outcome: Progressing Towards Goal  Goal: Activity/Safety  Outcome: Progressing Towards Goal  Goal: Consults, if ordered  Outcome: Progressing Towards Goal  Goal: Diagnostic Test/Procedures  Outcome: Progressing Towards Goal  Goal: Nutrition/Diet  Outcome: Progressing Towards Goal  Goal: Discharge Planning  Outcome: Progressing Towards Goal  Goal: Medications  Outcome: Progressing Towards Goal  Goal: Respiratory  Outcome: Progressing Towards Goal  Goal: Treatments/Interventions/Procedures  Outcome: Progressing Towards Goal  Goal: Psychosocial  Outcome: Progressing Towards Goal  Goal: *Oxygen saturation within defined limits  Outcome: Progressing Towards Goal  Goal: *Hemodynamically stable  Outcome: Progressing Towards Goal  Goal: *Optimal pain control at patient's stated goal  Outcome: Progressing Towards Goal  Goal: *Anxiety reduced or absent  Outcome: Progressing Towards Goal     Problem: Heart Failure: Day 2  Goal: Off Pathway (Use only if patient is Off Pathway)  Outcome: Progressing Towards Goal  Goal: Activity/Safety  Outcome: Progressing Towards Goal  Goal: Consults, if ordered  Outcome: Progressing Towards Goal  Goal: Diagnostic Test/Procedures  Outcome: Progressing Towards Goal  Goal: Nutrition/Diet  Outcome: Progressing Towards Goal  Goal: Discharge Planning  Outcome: Progressing Towards Goal  Goal: Medications  Outcome: Progressing Towards Goal  Goal: Respiratory  Outcome: Progressing Towards Goal  Goal: Treatments/Interventions/Procedures  Outcome: Progressing Towards Goal  Goal: Psychosocial  Outcome: Progressing Towards Goal  Goal: *Oxygen saturation within defined limits  Outcome: Progressing Towards Goal  Goal: *Hemodynamically stable  Outcome: Progressing Towards Goal  Goal: *Optimal pain control at patient's stated goal  Outcome: Progressing Towards Goal  Goal: *Anxiety reduced or absent  Outcome: Progressing Towards Goal  Goal: *Demonstrates progressive activity  Outcome: Progressing Towards Goal     Problem: Heart Failure: Day 3  Goal: Off Pathway (Use only if patient is Off Pathway)  Outcome: Progressing Towards Goal  Goal: Activity/Safety  Outcome: Progressing Towards Goal  Goal: Diagnostic Test/Procedures  Outcome: Progressing Towards Goal  Goal: Nutrition/Diet  Outcome: Progressing Towards Goal  Goal: Discharge Planning  Outcome: Progressing Towards Goal  Goal: Medications  Outcome: Progressing Towards Goal  Goal: Respiratory  Outcome: Progressing Towards Goal  Goal: Treatments/Interventions/Procedures  Outcome: Progressing Towards Goal  Goal: Psychosocial  Outcome: Progressing Towards Goal  Goal: *Oxygen saturation within defined limits  Outcome: Progressing Towards Goal  Goal: *Hemodynamically stable  Outcome: Progressing Towards Goal  Goal: *Optimal pain control at patient's stated goal  Outcome: Progressing Towards Goal  Goal: *Anxiety reduced or absent  Outcome: Progressing Towards Goal  Goal: *Demonstrates progressive activity  Outcome: Progressing Towards Goal

## 2022-04-11 NOTE — PROGRESS NOTES
Nephrology Progress Note  Harry Sandoval     [email or web address]  Phone - [phone]   Patient: Bee Crowley    YOB: 1955        Date- 4/11/2022   Admit Date: 4/5/2022  CC: Follow up for JUAN C        IMPRESSION & PLAN:   · JUAN C (secondary to cardiorenal syndrome)  · CKD stage III baseline creatinine of 1.4-1.5. · Anemia of ckd  · hypertension  · Systolic heart failure(EF 30 to 35% in 2021)  · Acute volume overload  · Pulmonary edema  · Mild hyperkalemia  · Non-anion gap metabolic acidosis  · Anemia  · Type 2 diabetes  · CAD  · H/O COVID 19 IN 2021---  · ARF - REQUIRING HD IN 2021     PLAN-  · No DIALYSIS today   · HOLD BUMEX   · Continue norvasc, coreg, hydralazine  · Check bmp in am  · Consider PRBC tx  · Give epogen and venofer  · If cr worse- he will need permcath in am and out pt hd set up     Subjective: Interval History:   -Seen and examined today. Cr increased to 6.2  No sob  No edema  He says- he is feeling back to normal  GOOD URINE OUT PUT      Objective:   Vitals:    04/11/22 0047 04/11/22 0345 04/11/22 0656 04/11/22 0758   BP: (!) 142/76 (!) 151/64  (!) 149/57   Pulse: 79 81  70   Resp: 18 20  20   Temp: 98.6 °F (37 °C) 98.3 °F (36.8 °C)  98.5 °F (36.9 °C)   TempSrc:       SpO2: 98% 97%  97%   Weight:   79.2 kg (174 lb 9.6 oz)    Height:          04/10 0701 - 04/11 0700  In: -   Out: 2050 [Urine:2050]  Last 3 Recorded Weights in this Encounter    04/06/22 1419 04/07/22 1413 04/11/22 0656   Weight: 79.8 kg (176 lb) 79.8 kg (176 lb) 79.2 kg (174 lb 9.6 oz)      Physical exam:    GEN:  NAD  NECK:  Supple, no thyromegaly  RESP: Clear  b/l, no  wheezing,   CVS: RRR,S1,S2   NEURO: non focal, normal speech  EXT: + Edema +nt       sami cath    Chart reviewed. Pertinent Notes reviewed.      Data Review :  Recent Labs     04/11/22  0112 04/10/22  0311 04/09/22  0453    137 139   K 4.1 4.3 4.1    103 104   CO2 26 25 28   BUN 64* 55* 39*   CREA 6.21* 5.32* 4.22*   GLU 56* 95 74   CA 7.9* 7.7* 7.9*   MG 2.5* 2.2  --    PHOS 5.7* 4.8* 4.1     Recent Labs     04/11/22  0112 04/10/22  0311   WBC 9.1 9.8   HGB 6.8* 6.9*   HCT 21.7* 21.5*    276     No results for input(s): FE, TIBC, PSAT, FERR in the last 72 hours.    Medication list  reviewed  Current Facility-Administered Medications   Medication Dose Route Frequency    0.9% sodium chloride infusion 250 mL  250 mL IntraVENous PRN    insulin glargine (LANTUS) injection 16 Units  16 Units SubCUTAneous QHS    0.9% sodium chloride infusion 250 mL  250 mL IntraVENous PRN    isosorbide dinitrate (ISORDIL) tablet 40 mg  40 mg Oral Q8H    hydrALAZINE (APRESOLINE) 20 mg/mL injection 10 mg  10 mg IntraVENous Q6H PRN    albuterol-ipratropium (DUO-NEB) 2.5 MG-0.5 MG/3 ML  3 mL Nebulization Q4H PRN    hydrALAZINE (APRESOLINE) tablet 50 mg  50 mg Oral TID    amLODIPine (NORVASC) tablet 10 mg  10 mg Oral DAILY    aspirin chewable tablet 81 mg  81 mg Oral DAILY    atorvastatin (LIPITOR) tablet 40 mg  40 mg Oral DAILY    carvediloL (COREG) tablet 25 mg  25 mg Oral BID    gabapentin (NEURONTIN) capsule 400 mg  400 mg Oral TID    ticagrelor (BRILINTA) tablet 90 mg  90 mg Oral BID    sodium chloride (NS) flush 5-40 mL  5-40 mL IntraVENous Q8H    sodium chloride (NS) flush 5-40 mL  5-40 mL IntraVENous PRN    acetaminophen (TYLENOL) tablet 650 mg  650 mg Oral Q6H PRN    Or    acetaminophen (TYLENOL) suppository 650 mg  650 mg Rectal Q6H PRN    polyethylene glycol (MIRALAX) packet 17 g  17 g Oral DAILY PRN    promethazine (PHENERGAN) tablet 12.5 mg  12.5 mg Oral Q6H PRN    Or    ondansetron (ZOFRAN) injection 4 mg  4 mg IntraVENous Q6H PRN    morphine injection 1 mg  1 mg IntraVENous Q3H PRN    pantoprazole (PROTONIX) 40 mg in 0.9% sodium chloride 10 mL injection  40 mg IntraVENous Q12H    insulin lispro (HUMALOG) injection   SubCUTAneous AC&HS    glucose chewable tablet 16 g  4 Tablet Oral PRN    glucagon (GLUCAGEN) injection 1 mg  1 mg IntraMUSCular PRN    dextrose 10% infusion 0-250 mL  0-250 mL IntraVENous PRN    thiamine mononitrate (B-1) tablet 100 mg  100 mg Oral DAILY    epoetin luis-epbx (RETACRIT) 12,000 Units combo injection  12,000 Units SubCUTAneous Q MON, WED & FRI          Tosha Hill MD              [address] [facility]  Formerly [facility] / [facility], [address]  [place], [address]  Phone - [phone]               Fax - [phone]

## 2022-04-11 NOTE — PROGRESS NOTES
Bedside shift change report given to Janis Mohs  (oncoming nurse) by Sona Adan (offgoing nurse). Report included the following information SBAR, Kardex, Intake/Output, MAR and Recent Results.

## 2022-04-12 ENCOUNTER — APPOINTMENT (OUTPATIENT)
Dept: INTERVENTIONAL RADIOLOGY/VASCULAR | Age: 67
DRG: 194 | End: 2022-04-12
Attending: INTERNAL MEDICINE
Payer: MEDICAID

## 2022-04-12 LAB
ABO + RH BLD: NORMAL
ALBUMIN SERPL-MCNC: 2.3 G/DL (ref 3.5–5)
ANION GAP SERPL CALC-SCNC: 10 MMOL/L (ref 5–15)
BASOPHILS # BLD: 0 K/UL (ref 0–0.1)
BASOPHILS NFR BLD: 0 % (ref 0–1)
BLD PROD TYP BPU: NORMAL
BLOOD GROUP ANTIBODIES SERPL: NORMAL
BPU ID: NORMAL
BUN SERPL-MCNC: 73 MG/DL (ref 6–20)
BUN/CREAT SERPL: 12 (ref 12–20)
CALCIUM SERPL-MCNC: 8.1 MG/DL (ref 8.5–10.1)
CHLORIDE SERPL-SCNC: 106 MMOL/L (ref 97–108)
CO2 SERPL-SCNC: 23 MMOL/L (ref 21–32)
COMMENT, HOLDF: NORMAL
CREAT SERPL-MCNC: 6.3 MG/DL (ref 0.7–1.3)
CROSSMATCH RESULT,%XM: NORMAL
DIFFERENTIAL METHOD BLD: ABNORMAL
EOSINOPHIL # BLD: 0.2 K/UL (ref 0–0.4)
EOSINOPHIL NFR BLD: 2 % (ref 0–7)
ERYTHROCYTE [DISTWIDTH] IN BLOOD BY AUTOMATED COUNT: 15.7 % (ref 11.5–14.5)
GLUCOSE BLD STRIP.AUTO-MCNC: 133 MG/DL (ref 65–117)
GLUCOSE BLD STRIP.AUTO-MCNC: 55 MG/DL (ref 65–117)
GLUCOSE BLD STRIP.AUTO-MCNC: 79 MG/DL (ref 65–117)
GLUCOSE BLD STRIP.AUTO-MCNC: 88 MG/DL (ref 65–117)
GLUCOSE SERPL-MCNC: 64 MG/DL (ref 65–100)
HCT VFR BLD AUTO: 24.7 % (ref 36.6–50.3)
HGB BLD-MCNC: 7.8 G/DL (ref 12.1–17)
IMM GRANULOCYTES # BLD AUTO: 0 K/UL (ref 0–0.04)
IMM GRANULOCYTES NFR BLD AUTO: 0 % (ref 0–0.5)
INR PPP: 1.1 (ref 0.9–1.1)
LYMPHOCYTES # BLD: 2.5 K/UL (ref 0.8–3.5)
LYMPHOCYTES NFR BLD: 26 % (ref 12–49)
MCH RBC QN AUTO: 27.3 PG (ref 26–34)
MCHC RBC AUTO-ENTMCNC: 31.6 G/DL (ref 30–36.5)
MCV RBC AUTO: 86.4 FL (ref 80–99)
MONOCYTES # BLD: 1.3 K/UL (ref 0–1)
MONOCYTES NFR BLD: 13 % (ref 5–13)
NEUTS SEG # BLD: 5.4 K/UL (ref 1.8–8)
NEUTS SEG NFR BLD: 59 % (ref 32–75)
NRBC # BLD: 0.02 K/UL (ref 0–0.01)
NRBC BLD-RTO: 0.2 PER 100 WBC
PHOSPHATE SERPL-MCNC: 5.9 MG/DL (ref 2.6–4.7)
PLATELET # BLD AUTO: 334 K/UL (ref 150–400)
PMV BLD AUTO: 9.8 FL (ref 8.9–12.9)
POTASSIUM SERPL-SCNC: 4.5 MMOL/L (ref 3.5–5.1)
PROTHROMBIN TIME: 11.3 SEC (ref 9–11.1)
RBC # BLD AUTO: 2.86 M/UL (ref 4.1–5.7)
SAMPLES BEING HELD,HOLD: NORMAL
SERVICE CMNT-IMP: ABNORMAL
SERVICE CMNT-IMP: ABNORMAL
SERVICE CMNT-IMP: NORMAL
SERVICE CMNT-IMP: NORMAL
SODIUM SERPL-SCNC: 139 MMOL/L (ref 136–145)
SPECIMEN EXP DATE BLD: NORMAL
STATUS OF UNIT,%ST: NORMAL
UNIT DIVISION, %UDIV: 0
WBC # BLD AUTO: 9.4 K/UL (ref 4.1–11.1)

## 2022-04-12 PROCEDURE — 2709999900 HC NON-CHARGEABLE SUPPLY

## 2022-04-12 PROCEDURE — 74011000250 HC RX REV CODE- 250: Performed by: INTERNAL MEDICINE

## 2022-04-12 PROCEDURE — 90935 HEMODIALYSIS ONE EVALUATION: CPT

## 2022-04-12 PROCEDURE — 82962 GLUCOSE BLOOD TEST: CPT

## 2022-04-12 PROCEDURE — 74011636637 HC RX REV CODE- 636/637: Performed by: INTERNAL MEDICINE

## 2022-04-12 PROCEDURE — 80069 RENAL FUNCTION PANEL: CPT

## 2022-04-12 PROCEDURE — 74011250637 HC RX REV CODE- 250/637: Performed by: INTERNAL MEDICINE

## 2022-04-12 PROCEDURE — 85025 COMPLETE CBC W/AUTO DIFF WBC: CPT

## 2022-04-12 PROCEDURE — 77030010507 HC ADH SKN DERMBND J&J -B

## 2022-04-12 PROCEDURE — 0JH63XZ INSERTION OF TUNNELED VASCULAR ACCESS DEVICE INTO CHEST SUBCUTANEOUS TISSUE AND FASCIA, PERCUTANEOUS APPROACH: ICD-10-PCS | Performed by: RADIOLOGY

## 2022-04-12 PROCEDURE — 74011250636 HC RX REV CODE- 250/636: Performed by: RADIOLOGY

## 2022-04-12 PROCEDURE — 65270000029 HC RM PRIVATE

## 2022-04-12 PROCEDURE — 74011250636 HC RX REV CODE- 250/636: Performed by: INTERNAL MEDICINE

## 2022-04-12 PROCEDURE — 74011250636 HC RX REV CODE- 250/636: Performed by: HOSPITALIST

## 2022-04-12 PROCEDURE — 74011000250 HC RX REV CODE- 250: Performed by: RADIOLOGY

## 2022-04-12 PROCEDURE — C1750 CATH, HEMODIALYSIS,LONG-TERM: HCPCS

## 2022-04-12 PROCEDURE — 77030029065 HC DRSG HEMO QCLOT ZMED -B

## 2022-04-12 PROCEDURE — C9113 INJ PANTOPRAZOLE SODIUM, VIA: HCPCS | Performed by: INTERNAL MEDICINE

## 2022-04-12 PROCEDURE — 36558 INSERT TUNNELED CV CATH: CPT

## 2022-04-12 PROCEDURE — 77030031139 HC SUT VCRL2 J&J -A

## 2022-04-12 PROCEDURE — 85610 PROTHROMBIN TIME: CPT

## 2022-04-12 RX ORDER — FUROSEMIDE 10 MG/ML
40 INJECTION INTRAMUSCULAR; INTRAVENOUS DAILY
Status: DISCONTINUED | OUTPATIENT
Start: 2022-04-12 | End: 2022-04-13

## 2022-04-12 RX ORDER — HEPARIN SODIUM 200 [USP'U]/100ML
400 INJECTION, SOLUTION INTRAVENOUS ONCE
Status: COMPLETED | OUTPATIENT
Start: 2022-04-12 | End: 2022-04-12

## 2022-04-12 RX ORDER — DEXTROSE MONOHYDRATE 50 MG/ML
50 INJECTION, SOLUTION INTRAVENOUS CONTINUOUS
Status: DISCONTINUED | OUTPATIENT
Start: 2022-04-12 | End: 2022-04-13 | Stop reason: HOSPADM

## 2022-04-12 RX ORDER — HEPARIN SODIUM 1000 [USP'U]/ML
10000 INJECTION, SOLUTION INTRAVENOUS; SUBCUTANEOUS ONCE
Status: COMPLETED | OUTPATIENT
Start: 2022-04-12 | End: 2022-04-12

## 2022-04-12 RX ORDER — MIDAZOLAM HYDROCHLORIDE 1 MG/ML
1-5 INJECTION, SOLUTION INTRAMUSCULAR; INTRAVENOUS
Status: DISCONTINUED | OUTPATIENT
Start: 2022-04-12 | End: 2022-04-12

## 2022-04-12 RX ORDER — LIDOCAINE HYDROCHLORIDE 20 MG/ML
18 INJECTION, SOLUTION INFILTRATION; PERINEURAL ONCE
Status: COMPLETED | OUTPATIENT
Start: 2022-04-12 | End: 2022-04-12

## 2022-04-12 RX ORDER — FENTANYL CITRATE 50 UG/ML
100 INJECTION, SOLUTION INTRAMUSCULAR; INTRAVENOUS
Status: DISCONTINUED | OUTPATIENT
Start: 2022-04-12 | End: 2022-04-12

## 2022-04-12 RX ORDER — SODIUM CHLORIDE 9 MG/ML
25 INJECTION, SOLUTION INTRAVENOUS CONTINUOUS
Status: DISCONTINUED | OUTPATIENT
Start: 2022-04-12 | End: 2022-04-12

## 2022-04-12 RX ADMIN — ACETAMINOPHEN 325MG 650 MG: 325 TABLET ORAL at 20:44

## 2022-04-12 RX ADMIN — AMLODIPINE BESYLATE 10 MG: 5 TABLET ORAL at 09:09

## 2022-04-12 RX ADMIN — GABAPENTIN 400 MG: 100 CAPSULE ORAL at 17:49

## 2022-04-12 RX ADMIN — GABAPENTIN 400 MG: 100 CAPSULE ORAL at 21:18

## 2022-04-12 RX ADMIN — ISOSORBIDE DINITRATE 40 MG: 10 TABLET ORAL at 20:44

## 2022-04-12 RX ADMIN — MIDAZOLAM HYDROCHLORIDE 1 MG: 1 INJECTION, SOLUTION INTRAMUSCULAR; INTRAVENOUS at 12:45

## 2022-04-12 RX ADMIN — ISOSORBIDE DINITRATE 40 MG: 10 TABLET ORAL at 05:45

## 2022-04-12 RX ADMIN — FUROSEMIDE 40 MG: 10 INJECTION, SOLUTION INTRAMUSCULAR; INTRAVENOUS at 17:55

## 2022-04-12 RX ADMIN — HYDRALAZINE HYDROCHLORIDE 50 MG: 50 TABLET, FILM COATED ORAL at 21:18

## 2022-04-12 RX ADMIN — HYDRALAZINE HYDROCHLORIDE 50 MG: 50 TABLET, FILM COATED ORAL at 17:49

## 2022-04-12 RX ADMIN — HEPARIN SODIUM 3600 UNITS: 1000 INJECTION, SOLUTION INTRAVENOUS; SUBCUTANEOUS at 13:00

## 2022-04-12 RX ADMIN — MORPHINE SULFATE 1 MG: 2 INJECTION, SOLUTION INTRAMUSCULAR; INTRAVENOUS at 17:55

## 2022-04-12 RX ADMIN — TICAGRELOR 90 MG: 90 TABLET ORAL at 17:49

## 2022-04-12 RX ADMIN — CEFAZOLIN SODIUM 2 G: 1 INJECTION, POWDER, FOR SOLUTION INTRAMUSCULAR; INTRAVENOUS at 12:40

## 2022-04-12 RX ADMIN — SODIUM CHLORIDE 40 MG: 9 INJECTION INTRAMUSCULAR; INTRAVENOUS; SUBCUTANEOUS at 20:45

## 2022-04-12 RX ADMIN — SODIUM CHLORIDE 40 MG: 9 INJECTION INTRAMUSCULAR; INTRAVENOUS; SUBCUTANEOUS at 09:09

## 2022-04-12 RX ADMIN — HYDRALAZINE HYDROCHLORIDE 50 MG: 50 TABLET, FILM COATED ORAL at 09:09

## 2022-04-12 RX ADMIN — CARVEDILOL 25 MG: 12.5 TABLET, FILM COATED ORAL at 09:09

## 2022-04-12 RX ADMIN — SODIUM CHLORIDE, PRESERVATIVE FREE 10 ML: 5 INJECTION INTRAVENOUS at 05:45

## 2022-04-12 RX ADMIN — SODIUM CHLORIDE, PRESERVATIVE FREE 10 ML: 5 INJECTION INTRAVENOUS at 21:19

## 2022-04-12 RX ADMIN — FENTANYL CITRATE 25 MCG: 50 INJECTION, SOLUTION INTRAMUSCULAR; INTRAVENOUS at 12:45

## 2022-04-12 RX ADMIN — LIDOCAINE HYDROCHLORIDE 4 ML: 20 INJECTION, SOLUTION INFILTRATION; PERINEURAL at 13:00

## 2022-04-12 RX ADMIN — DEXTROSE MONOHYDRATE 250 ML: 100 INJECTION, SOLUTION INTRAVENOUS at 08:24

## 2022-04-12 RX ADMIN — DEXTROSE MONOHYDRATE 50 ML/HR: 50 INJECTION, SOLUTION INTRAVENOUS at 09:14

## 2022-04-12 RX ADMIN — SODIUM CHLORIDE 25 ML/HR: 9 INJECTION, SOLUTION INTRAVENOUS at 12:00

## 2022-04-12 RX ADMIN — HEPARIN SODIUM IN SODIUM CHLORIDE 25 ML: 200 INJECTION INTRAVENOUS at 13:00

## 2022-04-12 RX ADMIN — CARVEDILOL 25 MG: 12.5 TABLET, FILM COATED ORAL at 17:49

## 2022-04-12 RX ADMIN — INSULIN GLARGINE 10 UNITS: 100 INJECTION, SOLUTION SUBCUTANEOUS at 21:59

## 2022-04-12 NOTE — PROCEDURES
Hemodialysis / [phone]    Vitals Pre Post Assessment Pre Post   BP BP: (!) 162/78 (04/12/22 1400) 184/94 LOC A&Ox4 A&Ox4   HR Pulse (Heart Rate): 70 (04/12/22 1400) 81 Lungs clear clear   Resp Resp Rate: 18 (04/12/22 1400) 18 Cardiac RRR RRR   Temp Temp: 98.8 °F (37.1 °C) (04/12/22 1332) 98.5 Skin CDI CDI   Weight  n/a n/a Edema none none   Tele status n/a n/a Pain Pain Intensity 1: 0 (04/12/22 1300) 0     Orders   Duration: Start: 8398 End: 7659 Total: 3.5   Dialyzer: Dialyzer/Set Up Inspection: Revaclear (04/12/22 1332)   K Bath: Dialysate K (mEq/L): 3 (04/12/22 1332)   Ca Bath: Dialysate CA (mEq/L): 2.5 (04/12/22 1332)   Na: Dialysate NA (mEq/L): 138 (04/12/22 1332)   Bicarb: Dialysate HCO3 (mEq/L): 35 (04/12/22 1332)   Target Fluid Removal: Goal/Amount of Fluid to Remove (mL): 1000 mL (04/12/22 1332)     Access   Type & Location: RIJ CVC   Comments:  RI CVC: S/p catheter placement. Pressure dressing CDI. Both lumens aspirate & flush well. Running well at .                                        Labs   HBsAg (Antigen) / date:  Negative 4/7/2022                                             HBsAb (Antibody) / date: Immune 4/7/2022   Source: EPIC   Obtained/Reviewed  Critical Results Called HGB   Date Value Ref Range Status   04/12/2022 7.8 (L) 12.1 - 17.0 g/dL Final     Potassium   Date Value Ref Range Status   04/12/2022 4.5 3.5 - 5.1 mmol/L Final     Calcium   Date Value Ref Range Status   04/12/2022 8.1 (L) 8.5 - 10.1 MG/DL Final     BUN   Date Value Ref Range Status   04/12/2022 73 (H) 6 - 20 MG/DL Final     Creatinine   Date Value Ref Range Status   04/12/2022 6.30 (H) 0.70 - 1.30 MG/DL Final        Meds Given   Name Dose Route   n/a                 Adequacy / Fluid    Total Liters Process: 78.1L   Net Fluid Removed: 1000ml      Comments   Time Out Done:   (Time) 1330   Admitting Diagnosis: Acute CHF (congestive heart failure) ([facility] [address] JUAN C (acute kidney injury) ([facility] [address] Atypical chest pain   Consent obtained/signed: Informed Consent Verified: Yes (04/12/22 1332)   Machine / RO # Machine Number: [id number] (04/12/22 1332)   Primary Nurse Rpt Pre: HARRISON Agosto RN   Primary Nurse Rpt Post: HARRISON Agosto RN   Pt Education: Procedure   Care Plan: On going   Pts outpatient clinic: undertermined     Tx Summary   Comments:      SBAR received from Primary RN. Pt arrived to HD suite A&Ox4. Consent signed & on file. 1332: Each catheter limb disinfected per p&p, caps removed, hubs disinfected per p&p. Each lumen aspirated for blood return and flushed with Normal Saline per policy. VSS. Dialysis Tx initiated. *1600: Pt urinated 550ml, clear yellow. 1704: Tx ended. VSS. Each dialysis catheter limb disinfected per p&p, all possible blood returned per p&p, and each dialysis hub disinfected per p&p. Each lumen flushed with normal saline, and caps applied. Bed locked and in the lowest position, call bell and belongings in reach. SBAR given to Primary, RN. Patient is stable at time of their departure. All Dialysis related medications have been reviewed.

## 2022-04-12 NOTE — H&P
Interventional and Vascular Radiology History and Physical    Patient: Tevin Hewitt 61 y.o. male       Chief Complaint: Shortness of Breath and Chest Pain      History of Present Illness: dialysis     History:    Past Medical History:   Diagnosis Date    Diabetes ([address]     Hypertension      No family history on file. Social History     Socioeconomic History    Marital status:      Spouse name: Not on file    Number of children: Not on file    Years of education: Not on file    Highest education level: Not on file   Occupational History    Not on file   Tobacco Use    Smoking status: Former Smoker    Smokeless tobacco: Never Used   Substance and Sexual Activity    Alcohol use: Not Currently    Drug use: Not Currently     Types: Heroin    Sexual activity: Not on file   Other Topics Concern    Not on file   Social History Narrative    Not on file     Social Determinants of Health     Financial Resource Strain:     Difficulty of Paying Living Expenses: Not on file   Food Insecurity:     Worried About [address] in the Last Year: Not on file    Norberto of Food in the Last Year: Not on file   Transportation Needs:     Lack of Transportation (Medical): Not on file    Lack of Transportation (Non-Medical):  Not on file   Physical Activity:     Days of Exercise per Week: Not on file    Minutes of Exercise per Session: Not on file   Stress:     Feeling of Stress : Not on file   Social Connections:     Frequency of Communication with Friends and Family: Not on file    Frequency of Social Gatherings with Friends and Family: Not on file    Attends Caodaism Services: Not on file    Active Member of Clubs or Organizations: Not on file    Attends Club or Organization Meetings: Not on file    Marital Status: Not on file   Intimate Partner Violence:     Fear of Current or Ex-Partner: Not on file    Emotionally Abused: Not on file    Physically Abused: Not on file    Sexually Abused: Not on file   Housing Stability:     Unable to Pay for Housing in the Last Year: Not on file    Number of Places Lived in the Last Year: Not on file    Unstable Housing in the Last Year: Not on file       Allergies: No Known Allergies    Current Medications:  Current Facility-Administered Medications   Medication Dose Route Frequency    dextrose 5% infusion  50 mL/hr IntraVENous CONTINUOUS    furosemide (LASIX) injection 40 mg  40 mg IntraVENous DAILY    0.9% sodium chloride infusion 250 mL  250 mL IntraVENous PRN    insulin glargine (LANTUS) injection 10 Units  10 Units SubCUTAneous QHS    isosorbide dinitrate (ISORDIL) tablet 40 mg  40 mg Oral Q8H    hydrALAZINE (APRESOLINE) 20 mg/mL injection 10 mg  10 mg IntraVENous Q6H PRN    albuterol-ipratropium (DUO-NEB) 2.5 MG-0.5 MG/3 ML  3 mL Nebulization Q4H PRN    hydrALAZINE (APRESOLINE) tablet 50 mg  50 mg Oral TID    amLODIPine (NORVASC) tablet 10 mg  10 mg Oral DAILY    aspirin chewable tablet 81 mg  81 mg Oral DAILY    atorvastatin (LIPITOR) tablet 40 mg  40 mg Oral DAILY    carvediloL (COREG) tablet 25 mg  25 mg Oral BID    gabapentin (NEURONTIN) capsule 400 mg  400 mg Oral TID    ticagrelor (BRILINTA) tablet 90 mg  90 mg Oral BID    sodium chloride (NS) flush 5-40 mL  5-40 mL IntraVENous Q8H    sodium chloride (NS) flush 5-40 mL  5-40 mL IntraVENous PRN    acetaminophen (TYLENOL) tablet 650 mg  650 mg Oral Q6H PRN    Or    acetaminophen (TYLENOL) suppository 650 mg  650 mg Rectal Q6H PRN    polyethylene glycol (MIRALAX) packet 17 g  17 g Oral DAILY PRN    promethazine (PHENERGAN) tablet 12.5 mg  12.5 mg Oral Q6H PRN    Or    ondansetron (ZOFRAN) injection 4 mg  4 mg IntraVENous Q6H PRN    morphine injection 1 mg  1 mg IntraVENous Q3H PRN    pantoprazole (PROTONIX) 40 mg in 0.9% sodium chloride 10 mL injection  40 mg IntraVENous Q12H    insulin lispro (HUMALOG) injection   SubCUTAneous AC&HS    glucose chewable tablet 16 g  4 Tablet Oral PRN    glucagon (GLUCAGEN) injection 1 mg  1 mg IntraMUSCular PRN    dextrose 10% infusion 0-250 mL  0-250 mL IntraVENous PRN    thiamine mononitrate (B-1) tablet 100 mg  100 mg Oral DAILY    epoetin luis-epbx (RETACRIT) 12,000 Units combo injection  12,000 Units SubCUTAneous Q MON, WED & FRI        Physical Exam:  Blood pressure (!) 158/76, pulse 71, temperature 98.8 °F (37.1 °C), temperature source Oral, resp. rate 18, height 5' 8\" (1.727 m), weight 79.2 kg (174 lb 9.6 oz), SpO2 97 %. LUNG: clear to auscultation bilaterally, HEART: regular rate and rhythm, S1, S2 normal, no murmur, click, rub or gallop      Alerts:    Hospital Problems  Never Reviewed          Codes Class Noted POA    Atypical chest pain ICD-10-CM: R07.89  ICD-9-CM: 786.59  4/6/2022 Unknown        Acute CHF (congestive heart failure) ([facility]) ICD-10-CM: I50.9  ICD-9-CM: 428.0  4/6/2022 Unknown        JUAN C (acute kidney injury) ([facility] [address] ICD-10-CM: N17.9  ICD-9-CM: 734. 9  Unknown Unknown              Laboratory:      Recent Labs     04/12/22  0536   HGB 7.8*   HCT 24.7*   WBC 9.4      INR 1.1   BUN 73*   CREA 6.30*   K 4.5         Plan of Care/Planned Procedure:  Risks, benefits, and alternatives reviewed with patient and he agrees to proceed with the procedure. Conscious sedation will be performed with IV fentanyl and versed.  Plan is for permcath placement       Meng Marquez MD

## 2022-04-12 NOTE — CARDIO/PULMONARY
Cardiac Rehab Note: chart review/referral     CP, CHF, JUAN C    EF 55-60%  on 4/6/22 per echo  Per cardiology note 4/7/22:  \"New ECHO EF 55-60%, no valvular pathology-therefore this admission I think volume overload is due to renal insufficiency primarily \"    Smoking history assessed. Patient is a former smoker.  Smoking Cessation Program link has not been added to the AVS.

## 2022-04-12 NOTE — ROUTINE PROCESS
1115-Received care of pt from room 2143 to radiology recovery area for tunneled dialysis catheter placement. Pt alert and without complaints. Being prepped for procedure. 1125-Dr. Ralph Hopper into speak with pt and procedure explained along with risks and benefits; consent obtained for procedure.

## 2022-04-12 NOTE — PROGRESS NOTES
Problem: Falls - Risk of  Goal: *Absence of Falls  Description: Document Scooter Cr Fall Risk and appropriate interventions in the flowsheet.   Outcome: Progressing Towards Goal  Note: Fall Risk Interventions:            Medication Interventions: Patient to call before getting OOB                   Problem: Patient Education: Go to Patient Education Activity  Goal: Patient/Family Education  Outcome: Progressing Towards Goal     Problem: Patient Education: Go to Patient Education Activity  Goal: Patient/Family Education  Outcome: Progressing Towards Goal     Problem: Heart Failure: Day 1  Goal: Off Pathway (Use only if patient is Off Pathway)  Outcome: Progressing Towards Goal  Goal: Activity/Safety  Outcome: Progressing Towards Goal  Goal: Consults, if ordered  Outcome: Progressing Towards Goal  Goal: Diagnostic Test/Procedures  Outcome: Progressing Towards Goal  Goal: Nutrition/Diet  Outcome: Progressing Towards Goal  Goal: Discharge Planning  Outcome: Progressing Towards Goal  Goal: Medications  Outcome: Progressing Towards Goal  Goal: Respiratory  Outcome: Progressing Towards Goal  Goal: Treatments/Interventions/Procedures  Outcome: Progressing Towards Goal  Goal: Psychosocial  Outcome: Progressing Towards Goal  Goal: *Oxygen saturation within defined limits  Outcome: Progressing Towards Goal  Goal: *Hemodynamically stable  Outcome: Progressing Towards Goal  Goal: *Optimal pain control at patient's stated goal  Outcome: Progressing Towards Goal  Goal: *Anxiety reduced or absent  Outcome: Progressing Towards Goal

## 2022-04-12 NOTE — PROGRESS NOTES
Hospitalist Progress Note    NAME: Darryl Gerard   :  1955   MRN:  [id number]       Assessment / Plan:  Acute on chr sys CHF 55%  -Presented with shortness of breath  -Chest x-ray with congestion  -proBNP around 16,000    JUAN C on CKD3 Now needing HD    Hold Bumex as per nephrology. Permcath today and cont   HD   Per renal  --no hydro on renal US  --s/p sami placement with first HD session on , . No dialysis today. Atypical chest pain/CAD status post PCI/HTN  No ischemic changes on EKG. Trop neg. CP improved  Cont' cardiac meds. Cardiology following.     Acute on chronic normocytic anemia, POA  Likely anemia of chronic disease and iron deficiency  fobt neg. IV iron  S/p prbc transfusion. oupt f/u with GI  Hgb >7 today.      Diabetes mellitus type 2  Continue Lantus  SSI     Hyperkalemia  Resolved with lokelma x1     Code Status: Full code  Surrogate Decision Maker: His wife   DVT Prophylaxis: SCDs  Dispo needs OP hemodialysis chair set up       Subjective:     Chief Complaint / Reason for Physician Visit  Patient seen and examined chart was reviewed. Patient for permacath placement today. Patient undergoing hemodialysis at this time. Patient denies any complaints, no other acute issues reported to me by staff at this time. Objective:     VITALS:   Last 24hrs VS reviewed since prior progress note.  Most recent are:  Patient Vitals for the past 24 hrs:   Temp Pulse Resp BP SpO2   22 1306 -- 73 18 (!) 140/57 97 %   22 1300 -- 74 18 (!) 141/58 97 %   22 1255 -- 71 18 (!) 144/55 100 %   22 1250 -- 72 18 (!) 156/57 100 %   22 1245 -- 69 18 (!) 156/61 100 %   22 1146 99 °F (37.2 °C) 72 21 (!) 151/49 97 %   22 0802 98.1 °F (36.7 °C) 72 20 139/62 92 %   22 0325 98 °F (36.7 °C) 74 20 (!) 141/68 96 %   22 2354 98.3 °F (36.8 °C) 70 20 137/63 95 %   22 98.1 °F (36.7 °C) 61 18 (!) 141/58 98 %   22 1530 98.4 °F (36.9 °C) 61 16 (!) 148/69 96 %   04/11/22 1354 98.2 °F (36.8 °C) -- 20 (!) 152/68 97 %       Intake/Output Summary (Last 24 hours) at 4/12/2022 1322  Last data filed at 4/11/2022 1747  Gross per 24 hour   Intake 1350 ml   Output 575 ml   Net 775 ml        I had a face to face encounter and independently examined this patient on 4/12/2022, as outlined below:    PHYSICAL EXAM:  General: WD WN   EENT:  MMM  Resp:   No accessory muscle use  CV:  Regular  rhythm  GI:  Soft, ND  Neurologic:  Alert  normal speech  Psych:    Not anxious nor agitated  Skin:  No rashes        LABS:  I reviewed today's most current labs and imaging studies.   Pertinent labs include:  Recent Labs     04/12/22  0536 04/11/22  0112 04/10/22  0311   WBC 9.4 9.1 9.8   HGB 7.8* 6.8* 6.9*   HCT 24.7* 21.7* 21.5*    290 276     Recent Labs     04/12/22  0536 04/11/22  0112 04/10/22  0311    139 137   K 4.5 4.1 4.3    105 103   CO2 23 26 25   GLU 64* 56* 95   BUN 73* 64* 55*   CREA 6.30* 6.21* 5.32*   CA 8.1* 7.9* 7.7*   MG  --  2.5* 2.2   PHOS 5.9* 5.7* 4.8*   ALB 2.3* 2.3*  --    INR 1.1  --   --        Signed: Jillian Bauer MD

## 2022-04-12 NOTE — ROUTINE PROCESS
Procedure: Tunneled dialysis catheter placement    Sedation total time: 10 minutes    Medications given during procedure: Versed 1 mg and Fentanyl 25 mcg    Patient Status post procedure: Pt alert without complains    Procedure site: Dressing to right neck clean and dry      TRANSFER - OUT REPORT:    Verbal report given to dialysis nurse Shantal Naik RN on Kiran Harding  being transferred to dialysis for routine progression of care       Report consisted of patients Situation, Background, Assessment and   Recommendations(SBAR). Information from the following report(s) Procedure Summary was reviewed with the receiving nurse. Opportunity for questions and clarification was provided.       Patient transported with:  Transporter via stretcher

## 2022-04-12 NOTE — PROGRESS NOTES
Nephrology Progress Note  Harry Sandoval     [facility]. [facility]  Phone - [phone]   Patient: Champ Sheppard    YOB: 1955        Date- 4/12/2022   Admit Date: 4/5/2022  CC: Follow up for  jemima      IMPRESSION & PLAN:   · jemima - (secondary to cardiorenal syndrome)  · CKD stage III baseline creatinine of 1.4-1.5. · Anemia of ckd  · hypertension  · Systolic heart failure(EF 30 to 35% in 2021)  · Acute volume overload  · Pulmonary edema  · Mild hyperkalemia  · Non-anion gap metabolic acidosis  · Anemia  · Type 2 diabetes  · CAD  · H/O COVID 19 IN 2021---  · ARF - REQUIRING HD IN 2021     PLAN-  · Dialysis today  · Place permacath  · Set up out pt hd unit   · Continue norvasc, coreg, hydralazine  · Check bmp in am  · Continue epogen     Subjective: Interval History:   -cr increased to 6.3 , bun increased to 73      Objective:   Vitals:    04/11/22 2015 04/11/22 2354 04/12/22 0325 04/12/22 0802   BP: (!) 141/58 137/63 (!) 141/68 139/62   Pulse: 61 70 74 72   Resp: 18 20 20    Temp: 98.1 °F (36.7 °C) 98.3 °F (36.8 °C) 98 °F (36.7 °C) 98.1 °F (36.7 °C)   TempSrc:       SpO2: 98% 95% 96% 92%   Weight:       Height:          04/11 0701 - 04/12 0700  In: 1350 [P.O.:850]  Out: 1850 [Urine:1850]  Last 3 Recorded Weights in this Encounter    04/06/22 1419 04/07/22 1413 04/11/22 0656   Weight: 79.8 kg (176 lb) 79.8 kg (176 lb) 79.2 kg (174 lb 9.6 oz)      Physical exam:    GEN:  NAD  NECK:  Supple, no thyromegaly  RESP: clear  b/l, no  wheezing,   CVS: RRR,S1,S2   NEURO: non focal,  normal speech  EXT: + Edema +nt     [place] cath    Chart reviewed. Pertinent Notes reviewed.      Data Review :  Recent Labs     04/12/22  0536 04/11/22  0112 04/10/22  0311    139 137   K 4.5 4.1 4.3    105 103   CO2 23 26 25   BUN 73* 64* 55*   CREA 6.30* 6.21* 5.32*   GLU 64* 56* 95   CA 8.1* 7.9* 7.7*   MG  --  2.5* 2.2   PHOS 5.9* 5.7* 4.8*     Recent Labs     04/12/22  0536 04/11/22  0112 04/10/22  0311   WBC 9.4 9.1 9.8   HGB 7.8* 6.8* 6.9*   HCT 24.7* 21.7* 21.5*    290 276     No results for input(s): FE, TIBC, PSAT, FERR in the last 72 hours.    Medication list  reviewed  Current Facility-Administered Medications   Medication Dose Route Frequency    0.9% sodium chloride infusion 250 mL  250 mL IntraVENous PRN    insulin glargine (LANTUS) injection 10 Units  10 Units SubCUTAneous QHS    0.9% sodium chloride infusion 250 mL  250 mL IntraVENous PRN    isosorbide dinitrate (ISORDIL) tablet 40 mg  40 mg Oral Q8H    hydrALAZINE (APRESOLINE) 20 mg/mL injection 10 mg  10 mg IntraVENous Q6H PRN    albuterol-ipratropium (DUO-NEB) 2.5 MG-0.5 MG/3 ML  3 mL Nebulization Q4H PRN    hydrALAZINE (APRESOLINE) tablet 50 mg  50 mg Oral TID    amLODIPine (NORVASC) tablet 10 mg  10 mg Oral DAILY    aspirin chewable tablet 81 mg  81 mg Oral DAILY    atorvastatin (LIPITOR) tablet 40 mg  40 mg Oral DAILY    carvediloL (COREG) tablet 25 mg  25 mg Oral BID    gabapentin (NEURONTIN) capsule 400 mg  400 mg Oral TID    ticagrelor (BRILINTA) tablet 90 mg  90 mg Oral BID    sodium chloride (NS) flush 5-40 mL  5-40 mL IntraVENous Q8H    sodium chloride (NS) flush 5-40 mL  5-40 mL IntraVENous PRN    acetaminophen (TYLENOL) tablet 650 mg  650 mg Oral Q6H PRN    Or    acetaminophen (TYLENOL) suppository 650 mg  650 mg Rectal Q6H PRN    polyethylene glycol (MIRALAX) packet 17 g  17 g Oral DAILY PRN    promethazine (PHENERGAN) tablet 12.5 mg  12.5 mg Oral Q6H PRN    Or    ondansetron (ZOFRAN) injection 4 mg  4 mg IntraVENous Q6H PRN    morphine injection 1 mg  1 mg IntraVENous Q3H PRN    pantoprazole (PROTONIX) 40 mg in 0.9% sodium chloride 10 mL injection  40 mg IntraVENous Q12H    insulin lispro (HUMALOG) injection   SubCUTAneous AC&HS    glucose chewable tablet 16 g  4 Tablet Oral PRN    glucagon (GLUCAGEN) injection 1 mg  1 mg IntraMUSCular PRN    dextrose 10% infusion 0-250 mL 0-250 mL IntraVENous PRN    thiamine mononitrate (B-1) tablet 100 mg  100 mg Oral DAILY    epoetin luis-epbx (RETACRIT) 12,000 Units combo injection  12,000 Units SubCUTAneous Q MON, WED & FRI          Laz Montana MD              [facility]  [facility] [address]  [place], [address]  Phone - [phone]               Fax - [phone]

## 2022-04-12 NOTE — PROGRESS NOTES
Transition of Care Plan:     RUR: 14% low risk for readmission  Disposition:Home with spouse   Outpatient HD:   [facility] MWF @4pm   Follow up appointments: PCP, Specialists as indicated  DME needed: Pt owns/uses no DME. No DME needs anticipated  Transportation at [facility]. [facility] 3 or means to access home: Pt has access       IM Medicare Letter: N/A; Medicaid coverage only  Is patient a BCPI-A Bundle: N/A                     If yes, was Bundle Letter given?:    Is patient a  and connected with the [facility]? N/A               If yes, was [employer] transfer form completed and VA notified? Caregiver Contact: Pt's spouse, Itz Millan [phone]  Discharge Caregiver contacted prior to discharge? To be contacted prior to d/c   Care Conference needed?: No           Update: CM received follow-up with Cullen weldon CM was informed that both [facility] and [facility] are currently not accepting new Pts at this time. ROBERT was informed that the next closest [facility] is just as far as the [facility]. It was suggested that Pt would remain at the [facility] and be placed on a waiting list at the [facility] location. CM attempted to update Pt's wife on HD chair, CM unable to reach her at this time. 12pm: ROBERT spoke with nephrologist regarding Pt's chairtime at [facility]. ROBERT was notified that Pt was initially seen last year at the [facility] temporarily due to [place]. ROBERT was informed that Pt may want to go to a [facility] that closer to home. CM followed-up with Pt's wife to discuss discharge plan. Pt's wife stated that she would prefer Pt to go to a [facility]. ROBERT contacted Mino Gill [phone] and spoke with  Patti. ROBERT notified of Pt's family wanting to [facility] HD location to [facility]. ROBERT was informed that they will follow-up with ROBERT on if [facility] has chair times available.  ROBERT was also informed that [facility] would be the next closest HD unit if [place] is full.        Mendel Areola, [facility], [address] [facility]

## 2022-04-12 NOTE — PROGRESS NOTES
HYPOGLYCEMIC EPISODE DOCUMENTATION    Patient with hypoglycemic episode(s) at 0810 (time) on 4/12/22(date). BG value(s) pre-treatment 54    Was patient symptomatic?  [] yes, [x] no  Patient was treated with the following rescue medications/treatments: [x] D50                [] Glucose tablets                [] Glucagon                [] 4oz juice                [] 6oz reg soda                [] 8oz low fat milk  BG value post-treatment: 88  Once BG treated and value greater than 80mg/dl, pt was provided with the following:  [] snack  [] meal  Name of MD notified:Whit  The following orders were received: Start patient on D5 @ 50

## 2022-04-13 VITALS
OXYGEN SATURATION: 99 % | RESPIRATION RATE: 18 BRPM | HEIGHT: 68 IN | SYSTOLIC BLOOD PRESSURE: 130 MMHG | TEMPERATURE: 98.5 F | DIASTOLIC BLOOD PRESSURE: 56 MMHG | WEIGHT: 175.3 LBS | BODY MASS INDEX: 26.57 KG/M2 | HEART RATE: 70 BPM

## 2022-04-13 PROBLEM — R07.89 ATYPICAL CHEST PAIN: Status: RESOLVED | Noted: 2022-04-06 | Resolved: 2022-04-13

## 2022-04-13 LAB
ALBUMIN SERPL-MCNC: 2.2 G/DL (ref 3.5–5)
ANION GAP SERPL CALC-SCNC: 8 MMOL/L (ref 5–15)
BUN SERPL-MCNC: 37 MG/DL (ref 6–20)
BUN/CREAT SERPL: 9 (ref 12–20)
CALCIUM SERPL-MCNC: 7.8 MG/DL (ref 8.5–10.1)
CHLORIDE SERPL-SCNC: 100 MMOL/L (ref 97–108)
CO2 SERPL-SCNC: 28 MMOL/L (ref 21–32)
CREAT SERPL-MCNC: 4.07 MG/DL (ref 0.7–1.3)
GLUCOSE BLD STRIP.AUTO-MCNC: 104 MG/DL (ref 65–117)
GLUCOSE BLD STRIP.AUTO-MCNC: 152 MG/DL (ref 65–117)
GLUCOSE SERPL-MCNC: 148 MG/DL (ref 65–100)
PHOSPHATE SERPL-MCNC: 4.3 MG/DL (ref 2.6–4.7)
POTASSIUM SERPL-SCNC: 4.2 MMOL/L (ref 3.5–5.1)
SERVICE CMNT-IMP: ABNORMAL
SERVICE CMNT-IMP: NORMAL
SODIUM SERPL-SCNC: 136 MMOL/L (ref 136–145)

## 2022-04-13 PROCEDURE — 80069 RENAL FUNCTION PANEL: CPT

## 2022-04-13 PROCEDURE — 82962 GLUCOSE BLOOD TEST: CPT

## 2022-04-13 PROCEDURE — 74011250637 HC RX REV CODE- 250/637: Performed by: INTERNAL MEDICINE

## 2022-04-13 PROCEDURE — 74011250636 HC RX REV CODE- 250/636: Performed by: INTERNAL MEDICINE

## 2022-04-13 PROCEDURE — 36415 COLL VENOUS BLD VENIPUNCTURE: CPT

## 2022-04-13 PROCEDURE — 74011250636 HC RX REV CODE- 250/636: Performed by: HOSPITALIST

## 2022-04-13 PROCEDURE — C9113 INJ PANTOPRAZOLE SODIUM, VIA: HCPCS | Performed by: INTERNAL MEDICINE

## 2022-04-13 PROCEDURE — 74011000250 HC RX REV CODE- 250: Performed by: INTERNAL MEDICINE

## 2022-04-13 RX ORDER — INSULIN GLARGINE 100 [IU]/ML
10 INJECTION, SOLUTION SUBCUTANEOUS
Qty: 1 ML | Refills: 0 | Status: SHIPPED
Start: 2022-04-13

## 2022-04-13 RX ORDER — PANTOPRAZOLE SODIUM 40 MG/1
40 TABLET, DELAYED RELEASE ORAL
Status: DISCONTINUED | OUTPATIENT
Start: 2022-04-14 | End: 2022-04-13 | Stop reason: HOSPADM

## 2022-04-13 RX ORDER — FUROSEMIDE 40 MG/1
80 TABLET ORAL 2 TIMES DAILY
Status: DISCONTINUED | OUTPATIENT
Start: 2022-04-13 | End: 2022-04-13 | Stop reason: HOSPADM

## 2022-04-13 RX ORDER — ISOSORBIDE DINITRATE 40 MG/1
40 TABLET ORAL EVERY 8 HOURS
Qty: 90 TABLET | Refills: 1 | Status: SHIPPED | OUTPATIENT
Start: 2022-04-13 | End: 2022-08-09

## 2022-04-13 RX ORDER — PANTOPRAZOLE SODIUM 40 MG/1
40 TABLET, DELAYED RELEASE ORAL
Qty: 30 TABLET | Refills: 0 | Status: SHIPPED | OUTPATIENT
Start: 2022-04-14 | End: 2022-08-09

## 2022-04-13 RX ORDER — FUROSEMIDE 80 MG/1
80 TABLET ORAL 2 TIMES DAILY
Qty: 30 TABLET | Refills: 1 | Status: SHIPPED | OUTPATIENT
Start: 2022-04-13 | End: 2022-08-09

## 2022-04-13 RX ADMIN — THIAMINE HCL TAB 100 MG 100 MG: 100 TAB at 09:30

## 2022-04-13 RX ADMIN — ISOSORBIDE DINITRATE 40 MG: 10 TABLET ORAL at 04:29

## 2022-04-13 RX ADMIN — TICAGRELOR 90 MG: 90 TABLET ORAL at 09:30

## 2022-04-13 RX ADMIN — ASPIRIN 81 MG: 81 TABLET, CHEWABLE ORAL at 09:30

## 2022-04-13 RX ADMIN — ISOSORBIDE DINITRATE 40 MG: 10 TABLET ORAL at 12:27

## 2022-04-13 RX ADMIN — SODIUM CHLORIDE, PRESERVATIVE FREE 10 ML: 5 INJECTION INTRAVENOUS at 06:17

## 2022-04-13 RX ADMIN — HYDRALAZINE HYDROCHLORIDE 50 MG: 50 TABLET, FILM COATED ORAL at 09:30

## 2022-04-13 RX ADMIN — AMLODIPINE BESYLATE 10 MG: 5 TABLET ORAL at 09:30

## 2022-04-13 RX ADMIN — GABAPENTIN 400 MG: 100 CAPSULE ORAL at 09:29

## 2022-04-13 RX ADMIN — ATORVASTATIN CALCIUM 40 MG: 40 TABLET, FILM COATED ORAL at 09:30

## 2022-04-13 RX ADMIN — SODIUM CHLORIDE 40 MG: 9 INJECTION INTRAMUSCULAR; INTRAVENOUS; SUBCUTANEOUS at 09:28

## 2022-04-13 RX ADMIN — CARVEDILOL 25 MG: 12.5 TABLET, FILM COATED ORAL at 09:30

## 2022-04-13 RX ADMIN — FUROSEMIDE 40 MG: 10 INJECTION, SOLUTION INTRAMUSCULAR; INTRAVENOUS at 09:30

## 2022-04-13 NOTE — PROGRESS NOTES
Hospital follow-up PCP transitional care appointment has been scheduled with Dr. Devon Isaac on 4/15/22 at 1000. Pending patient discharge.   Mary Martin, Care Management Assistant

## 2022-04-13 NOTE — PROGRESS NOTES
Problem: Falls - Risk of  Goal: *Absence of Falls  Description: Document Madelyn Daniel Fall Risk and appropriate interventions in the flowsheet.   Outcome: Resolved/Met  Note: Fall Risk Interventions:            Medication Interventions: Patient to call before getting OOB                   Problem: Patient Education: Go to Patient Education Activity  Goal: Patient/Family Education  Outcome: Resolved/Met     Problem: Patient Education: Go to Patient Education Activity  Goal: Patient/Family Education  Outcome: Resolved/Met     Problem: Heart Failure: Day 1  Goal: Off Pathway (Use only if patient is Off Pathway)  Outcome: Resolved/Met  Goal: Activity/Safety  Outcome: Resolved/Met  Goal: Consults, if ordered  Outcome: Resolved/Met  Goal: Diagnostic Test/Procedures  Outcome: Resolved/Met  Goal: Nutrition/Diet  Outcome: Resolved/Met  Goal: Discharge Planning  Outcome: Resolved/Met  Goal: Medications  Outcome: Resolved/Met  Goal: Respiratory  Outcome: Resolved/Met  Goal: Treatments/Interventions/Procedures  Outcome: Resolved/Met  Goal: Psychosocial  Outcome: Resolved/Met  Goal: *Oxygen saturation within defined limits  Outcome: Resolved/Met  Goal: *Hemodynamically stable  Outcome: Resolved/Met  Goal: *Optimal pain control at patient's stated goal  Outcome: Resolved/Met  Goal: *Anxiety reduced or absent  Outcome: Resolved/Met     Problem: Heart Failure: Day 2  Goal: Off Pathway (Use only if patient is Off Pathway)  Outcome: Resolved/Met  Goal: Activity/Safety  Outcome: Resolved/Met  Goal: Consults, if ordered  Outcome: Resolved/Met  Goal: Diagnostic Test/Procedures  Outcome: Resolved/Met  Goal: Nutrition/Diet  Outcome: Resolved/Met  Goal: Discharge Planning  Outcome: Resolved/Met  Goal: Medications  Outcome: Resolved/Met  Goal: Respiratory  Outcome: Resolved/Met  Goal: Treatments/Interventions/Procedures  Outcome: Resolved/Met  Goal: Psychosocial  Outcome: Resolved/Met  Goal: *Oxygen saturation within defined limits  Outcome: Resolved/Met  Goal: *Hemodynamically stable  Outcome: Resolved/Met  Goal: *Optimal pain control at patient's stated goal  Outcome: Resolved/Met  Goal: *Anxiety reduced or absent  Outcome: Resolved/Met  Goal: *Demonstrates progressive activity  Outcome: Resolved/Met     Problem: Heart Failure: Day 3  Goal: Off Pathway (Use only if patient is Off Pathway)  Outcome: Resolved/Met  Goal: Activity/Safety  Outcome: Resolved/Met  Goal: Diagnostic Test/Procedures  Outcome: Resolved/Met  Goal: Nutrition/Diet  Outcome: Resolved/Met  Goal: Discharge Planning  Outcome: Resolved/Met  Goal: Medications  Outcome: Resolved/Met  Goal: Respiratory  Outcome: Resolved/Met  Goal: Treatments/Interventions/Procedures  Outcome: Resolved/Met  Goal: Psychosocial  Outcome: Resolved/Met  Goal: *Oxygen saturation within defined limits  Outcome: Resolved/Met  Goal: *Hemodynamically stable  Outcome: Resolved/Met  Goal: *Optimal pain control at patient's stated goal  Outcome: Resolved/Met  Goal: *Anxiety reduced or absent  Outcome: Resolved/Met  Goal: *Demonstrates progressive activity  Outcome: Resolved/Met     Problem: Heart Failure: Day 4  Goal: Off Pathway (Use only if patient is Off Pathway)  Outcome: Resolved/Met  Goal: Activity/Safety  Outcome: Resolved/Met  Goal: Diagnostic Test/Procedures  Outcome: Resolved/Met  Goal: Nutrition/Diet  Outcome: Resolved/Met  Goal: Discharge Planning  Outcome: Resolved/Met  Goal: Medications  Outcome: Resolved/Met  Goal: Respiratory  Outcome: Resolved/Met  Goal: Treatments/Interventions/Procedures  Outcome: Resolved/Met  Goal: Psychosocial  Outcome: Resolved/Met  Goal: *Oxygen saturation within defined limits  Outcome: Resolved/Met  Goal: *Hemodynamically stable  Outcome: Resolved/Met  Goal: *Optimal pain control at patient's stated goal  Outcome: Resolved/Met  Goal: *Anxiety reduced or absent  Outcome: Resolved/Met  Goal: *Demonstrates progressive activity  Outcome: Resolved/Met     Problem: Heart Failure: Day 5  Goal: Off Pathway (Use only if patient is Off Pathway)  Outcome: Resolved/Met  Goal: Activity/Safety  Outcome: Resolved/Met  Goal: Diagnostic Test/Procedures  Outcome: Resolved/Met  Goal: Nutrition/Diet  Outcome: Resolved/Met  Goal: Discharge Planning  Outcome: Resolved/Met  Goal: Medications  Outcome: Resolved/Met  Goal: Respiratory  Outcome: Resolved/Met  Goal: Treatments/Interventions/Procedures  Outcome: Resolved/Met  Goal: Psychosocial  Outcome: Resolved/Met     Problem: Heart Failure: Discharge Outcomes  Goal: *Demonstrates ability to perform prescribed activity without shortness of breath or discomfort  Outcome: Resolved/Met  Goal: *Left ventricular function assessment completed prior to or during stay, or planned for post-discharge  Outcome: Resolved/Met  Goal: *ACEI prescribed if LVEF less than 40% and no contraindications or ARB prescribed  Outcome: Resolved/Met  Goal: *Verbalizes understanding and describes prescribed diet  Outcome: Resolved/Met  Goal: *Verbalizes understanding/describes prescribed medications  Outcome: Resolved/Met  Goal: *Describes available resources and support systems  Description: (eg: Home Health, Palliative Care, Advanced Medical Directive)  Outcome: Resolved/Met  Goal: *Describes smoking cessation resources  Outcome: Resolved/Met  Goal: *Understands and describes signs and symptoms to report to providers(Stroke Metric)  Outcome: Resolved/Met  Goal: *Describes/verbalizes understanding of follow-up/return appt  Description: (eg: to physicians, diabetes treatment coordinator, and other resources  Outcome: Resolved/Met  Goal: *Describes importance of continuing daily weights and changes to report to physician  Outcome: Resolved/Met

## 2022-04-13 NOTE — PROGRESS NOTES
Transition of Care Plan:     RUR: 13% low risk for readmission  Disposition:Home with spouse   Outpatient HD:   [facility] MWF @4pm   Follow up appointments: PCP, Specialists as indicated  DME needed: Pt owns/uses no DME. No DME needs anticipated  Transportation at Discharge: Pt's family   Teodoro Mock or means to access home: Pt has access       IM Medicare Letter: N/A; Medicaid coverage only  Is patient a BCPI-A Bundle: N/A                     If yes, was Bundle Letter given?:    Is patient a  and connected with the [place]? N/A               If yes, was [place] transfer form completed and VA notified? Caregiver Contact: Pt's spouse, Diana Mckeon) [phone]  Discharge Caregiver contacted prior to discharge? To be contacted prior to d/c   Care Conference needed?: No         CM notified of Pt discharging today. PCP  follow-up has been arranged and added to AVS.    CM contacted Dayne Jag to notify of discharge. CM faxed updated clinicals: [phone]         CM notified nursing      No further discharge needs indicated at this time. Pt is cleared from CM standpoint.       Eliz Ortiz, [facility], ROBERT Hastings

## 2022-04-13 NOTE — DISCHARGE SUMMARY
Hospitalist Discharge Summary     Patient ID:  Champ Sheppard  [id number]  55 y.o.  1955  4/5/2022    PCP on record: Mikki Vzaquez MD    Admit date: 4/5/2022  Discharge date and time: 4/13/2022    DISCHARGE DIAGNOSIS:  JUAN C now needing dialysis  CKD stage III   chronic diastolic heart failure EF 55%  HTN  Chronic anemia  DM type II  CAD with chest pain        CONSULTATIONS:  IP CONSULT TO GASTROENTEROLOGY  IP CONSULT TO NEPHROLOGY  IP CONSULT TO CARDIOLOGY    Excerpted HPI from H&P of Colby Miller MD:    CHIEF COMPLAINT: Shortness of breath, chest pain     HISTORY OF PRESENT ILLNESS:              The patient is 61-year-old male past medical history significant for coronary artery disease, diabetes mellitus presented emergency department due to shortness of breath started around 3 days ago. Patient reported that every time he moves around he feels short of breath and weird sensation in his chest.  He reported that his short of breath is more when he moves around and it gets better when he lays flat. He denied any cough, abdominal pain, nausea, vomiting, diarrhea, fever, chills. He also reported substernal chest pain, nonradiating, not aggravated or elevated by any factors. He said he is very concerned that he is having another heart attack. He also reported that he had a stent recently for which he is on aspirin and Brilinta. Patient does smoke, drink alcohol or use illicit drugs as he reported.     The emergency department, he was found to be fluid overloaded.     We were asked to admit for work up and evaluation of the above problems. ______________________________________________________________________  DISCHARGE SUMMARY/HOSPITAL COURSE:  for full details see H&P, daily progress notes, labs, consult notes.      Acute on chr DCHF 55%  -Presented with shortness of breath  -Chest x-ray with congestion  -proBNP around 16,000  --Continue diuretics as per renal Lasix 80 twice daily     JUAN C on CKD3 Now needing HD    Hold Bumex as per nephrology. Permcath today and cont   HD   Per renal  --no hydro on renal US  --s/p sami placement with first HD session on 4/7, 4/8. --Cleared by nephrology to go home on dialysis Monday Wednesday Friday     Atypical chest pain/CAD status post PCI/HTN  No ischemic changes on EKG. Trop neg. CP resolved  Cont' cardiac meds. Cardiology follow-up as outpatient arranged     Acute on chronic normocytic anemia, POA  Likely anemia of chronic disease and iron deficiency  fobt neg. IV iron  S/p prbc transfusion. oupt f/u with GI  Hgb >7  before discharge  No bleeding issues reported on the day of discharge     Diabetes mellitus type 2Continue Lantus SSI follow-up PCP     HyperkalemiaResolved with lokelma x1         _______________________________________________________________________  Patient seen and examined by me on discharge day. Patient maximized inpatient benefit stay, cleared by nephrology to go home. Per case management outpatient dialysis chair has been arranged. Patient eager to go home, no acute issues reported to me by staff at this time. Patient verbalized understanding of discharge plan and instructions including to check labs, follow-up dialysis Monday Wednesday Friday as arranged. _______________________________________________________________________  DISCHARGE MEDICATIONS:   Current Discharge Medication List      START taking these medications    Details   furosemide (LASIX) 80 mg tablet Take 1 Tablet by mouth two (2) times a day. Qty: 30 Tablet, Refills: 1  Start date: 4/13/2022         CONTINUE these medications which have CHANGED    Details   insulin glargine (Lantus U-100 Insulin) 100 unit/mL injection 10 Units by SubCUTAneous route nightly. Qty: 1 mL, Refills: 0  Start date: 4/13/2022      isosorbide dinitrate (ISORDIL) 40 mg tablet Take 1 Tablet by mouth every eight (8) hours.   Qty: 90 Tablet, Refills: 1  Start date: 4/13/2022      pantoprazole (PROTONIX) 40 mg tablet Take 1 Tablet by mouth Daily (before breakfast). Qty: 30 Tablet, Refills: 0  Start date: 4/14/2022         CONTINUE these medications which have NOT CHANGED    Details   atorvastatin (LIPITOR) 80 mg tablet Take 0.5 Tabs by mouth daily. Qty: 30 Tab, Refills: 0      carvediloL (COREG) 12.5 mg tablet Take 2 Tabs by mouth two (2) times a day. Qty: 60 Tab, Refills: 0      amLODIPine (NORVASC) 10 mg tablet Take 1 Tab by mouth daily. Qty: 30 Tab, Refills: 0      hydrALAZINE (APRESOLINE) 25 mg tablet Take 1 Tab by mouth three (3) times daily. Qty: 90 Tab, Refills: 0      thiamine HCL (B-1) 100 mg tablet Take 1 Tab by mouth daily. Qty: 5 Tab, Refills: 0      acetaminophen (TYLENOL) 325 mg tablet Take 975 mg by mouth every six (6) hours as needed for Pain. aspirin 81 mg chewable tablet Take 81 mg by mouth daily. docusate sodium (Colace) 100 mg capsule Take 100 mg by mouth two (2) times daily as needed for Constipation. ferrous sulfate 325 mg (65 mg iron) tablet Take  by mouth every fourty-eight (48) hours. gabapentin (NEURONTIN) 400 mg capsule Take 400 mg by mouth three (3) times daily. ascorbic acid, vitamin C, (Vitamin C) 500 mg tablet Take 500 mg by mouth two (2) times a day. therapeutic multivitamin (THERAGRAN) tablet Take 1 Tab by mouth daily. balsam peru-castor oiL (VENELEX) ointment Apply  to affected area every twelve (12) hours. APPLY LIBERALLY TO Right ear, sacrum, b/l heels and any red/bony prominence  Qty: 1 Tube, Refills: 0      senna (Senna) 8.6 mg tablet Take 1 Tab by mouth daily. ticagrelor (BRILINTA) 90 mg tablet Take 90 mg by mouth two (2) times a day. cholecalciferol (Vitamin D3) (1000 Units /25 mcg) tablet Take 1,000 Units by mouth daily. zinc sulfate (ZINCATE) 220 (50) mg capsule Take 220 mg by mouth daily.          STOP taking these medications       bumetanide (BUMEX) 2 mg tablet Comments: Reason for Stopping:         insulin lispro (HUMALOG) 100 unit/mL injection Comments:   Reason for Stopping:                 Patient Follow Up Instructions:   Diet cardiac/renal  Activity slowly increase as tolerated to levels before  Check labs at PCPs office  Continue hemodialysis Monday Wednesday Friday as arranged by nephrology  Return to ER or call 911 immediately symptoms recur or get worse  Watch for any black stools, melena and report to ER immediately.     Follow-up Information     Follow up With Specialties Details Why Contact Info    Cathryn Leon MD [address] [place] [address] Vascular Surgery, Cardiology, Interventional Cardiology Call in 2 weeks For hospital follow up [facility]      Speedy Schwartz MD Family Medicine In 1 week Check CBC/BMP [address]      [address], Kristine Pearl, [address] Gastroenterology In 2 weeks For GI issues [address]  [facility]  [phone]      Katja Rae MD Nephrology In 4 weeks Kidney issues [address]  [address]  [facility]  [phone]          ________________________________________________________________    Risk of deterioration: Low    Condition at Discharge:  Stable  __________________________________________________________________    Disposition  Home with family and home health services    ____________________________________________________________________    Code Status: Full Code  ___________________________________________________________________      Total time in minutes spent coordinating this discharge 35  minutes    Signed:  Tony Martin MD .

## 2022-04-13 NOTE — DISCHARGE INSTRUCTIONS
Diet cardiac/renal  Activity slowly increase as tolerated to levels before  Check labs at PCPs office  Continue hemodialysis Monday Wednesday Friday as arranged by nephrology  Return to ER or call 911 immediately symptoms recur or get worse  Watch for any black stools, melena and report to ER immediately.

## 2022-04-13 NOTE — PROGRESS NOTES
Harry Lori Baocn  VEW:[id number]   :1955                  Pt seen  He has permacath  He had hd yesterday    Next hd Friday as out pt at [facility] to d/c home today             Laz Montana, [facility]  [facility]  [address], Raynaldo Nanas  [place], [address] [place]  Phone - [phone]         Fax - [phone] [facility] Office  [address] [place] [address]  Phone - [phone]        Fax - [phone]     [email or web address]

## 2022-04-13 NOTE — PROGRESS NOTES
[id number]  Discharge paperwork reviewed with patient at this time. Pt states understanding. IV access removed. Pt eductaed on the importance of keeping HDY cath dressing clean dry and intact and the area around the dressing clean. Pt has first HDY treatment on 4/15. Stated understanding.

## 2022-04-13 NOTE — PROGRESS NOTES
Nephrology Progress Note  Harry Sandoval     [email or web address]  Phone - [phone]   Patient: Bhavna Barrera    YOB: 1955        Date- 4/13/2022   Admit Date: 4/5/2022  CC: Follow up for JUAN C     IMPRESSION & PLAN:   · JUAN C - (secondary to cardiorenal syndrome)  · CKD stage III baseline creatinine of 1.4-1.5. · Anemia of ckd  · hypertension  · Systolic heart failure(EF 30 to 35% in 2021)  · Acute volume overload  · Pulmonary edema  · Mild hyperkalemia  · Non-anion gap metabolic acidosis  · Anemia  · Type 2 diabetes  · CAD  · H/O COVID 19 IN 2021---  · ARF - REQUIRING HD IN 2021     PLAN-  · No hd today  · Next hd Friday  · He had permcath placement yesterday  · Restart lasix  · Out pt hd - [facility]  · Continue norvasc, coreg, hydralazine  · Okay to d/c home today     Subjective: Interval History:   -cr increased to 6.3 , bun increased to 73      Objective:   Vitals:    04/12/22 2043 04/12/22 2325 04/13/22 0315 04/13/22 0729   BP: (!) 165/77 (!) 149/74 (!) 143/68 (!) 140/61   Pulse: 86 93 64 65   Resp: 18 20 18 18   Temp: 99.3 °F (37.4 °C) 100.3 °F (37.9 °C) 98.1 °F (36.7 °C) 98 °F (36.7 °C)   TempSrc:       SpO2: 96% 96% 98% 98%   Weight:    79.5 kg (175 lb 4.8 oz)   Height:          04/12 0701 - 04/13 0700  In: -   Out: 1000   Last 3 Recorded Weights in this Encounter    04/07/22 1413 04/11/22 0656 04/13/22 0729   Weight: 79.8 kg (176 lb) 79.2 kg (174 lb 9.6 oz) 79.5 kg (175 lb 4.8 oz)      Physical exam:    GEN:  NAD  NECK:  Supple, no thyromegaly  RESP: Clear  b/l, no  wheezing,   CVS: RRR,S1,S2   NEURO: non focal, normal speech  permacath +        Chart reviewed. Pertinent Notes reviewed.      Data Review :  Recent Labs     04/13/22  0030 04/12/22  0536 04/11/22  0112    139 139   K 4.2 4.5 4.1    106 105   CO2 28 23 26   BUN 37* 73* 64*   CREA 4.07* 6.30* 6.21*   * 64* 56*   CA 7.8* 8.1* 7.9*   MG  --   --  2.5*   PHOS 4.3 5.9* 5.7* Recent Labs     04/12/22  0536 04/11/22  0112   WBC 9.4 9.1   HGB 7.8* 6.8*   HCT 24.7* 21.7*    290     No results for input(s): FE, TIBC, PSAT, FERR in the last 72 hours.    Medication list  reviewed  Current Facility-Administered Medications   Medication Dose Route Frequency    furosemide (LASIX) tablet 80 mg  80 mg Oral BID    dextrose 5% infusion  50 mL/hr IntraVENous CONTINUOUS    furosemide (LASIX) injection 40 mg  40 mg IntraVENous DAILY    0.9% sodium chloride infusion 250 mL  250 mL IntraVENous PRN    insulin glargine (LANTUS) injection 10 Units  10 Units SubCUTAneous QHS    isosorbide dinitrate (ISORDIL) tablet 40 mg  40 mg Oral Q8H    hydrALAZINE (APRESOLINE) 20 mg/mL injection 10 mg  10 mg IntraVENous Q6H PRN    albuterol-ipratropium (DUO-NEB) 2.5 MG-0.5 MG/3 ML  3 mL Nebulization Q4H PRN    hydrALAZINE (APRESOLINE) tablet 50 mg  50 mg Oral TID    amLODIPine (NORVASC) tablet 10 mg  10 mg Oral DAILY    aspirin chewable tablet 81 mg  81 mg Oral DAILY    atorvastatin (LIPITOR) tablet 40 mg  40 mg Oral DAILY    carvediloL (COREG) tablet 25 mg  25 mg Oral BID    gabapentin (NEURONTIN) capsule 400 mg  400 mg Oral TID    ticagrelor (BRILINTA) tablet 90 mg  90 mg Oral BID    sodium chloride (NS) flush 5-40 mL  5-40 mL IntraVENous Q8H    sodium chloride (NS) flush 5-40 mL  5-40 mL IntraVENous PRN    acetaminophen (TYLENOL) tablet 650 mg  650 mg Oral Q6H PRN    Or    acetaminophen (TYLENOL) suppository 650 mg  650 mg Rectal Q6H PRN    polyethylene glycol (MIRALAX) packet 17 g  17 g Oral DAILY PRN    promethazine (PHENERGAN) tablet 12.5 mg  12.5 mg Oral Q6H PRN    Or    ondansetron (ZOFRAN) injection 4 mg  4 mg IntraVENous Q6H PRN    morphine injection 1 mg  1 mg IntraVENous Q3H PRN    pantoprazole (PROTONIX) 40 mg in 0.9% sodium chloride 10 mL injection  40 mg IntraVENous Q12H    insulin lispro (HUMALOG) injection   SubCUTAneous AC&HS    glucose chewable tablet 16 g  4 Tablet Oral PRN    glucagon (GLUCAGEN) injection 1 mg  1 mg IntraMUSCular PRN    dextrose 10% infusion 0-250 mL  0-250 mL IntraVENous PRN    thiamine mononitrate (B-1) tablet 100 mg  100 mg Oral DAILY    epoetin luis-epbx (RETACRIT) 12,000 Units combo injection  12,000 Units SubCUTAneous Q MON, WED & FRI          Lorrie Palencia MD              [address] [facility]  [facility] / [facility], [address]  [place], [address]  Phone - [phone]               Fax - [phone]

## 2022-08-21 ENCOUNTER — HOSPITAL ENCOUNTER (EMERGENCY)
Age: 67
Discharge: HOME | End: 2022-08-21
Attending: EMERGENCY MEDICINE
Payer: MEDICAID

## 2022-08-21 VITALS
SYSTOLIC BLOOD PRESSURE: 151 MMHG | WEIGHT: 175 LBS | RESPIRATION RATE: 18 BRPM | HEART RATE: 56 BPM | DIASTOLIC BLOOD PRESSURE: 94 MMHG | TEMPERATURE: 98.6 F | HEIGHT: 69 IN | BODY MASS INDEX: 25.92 KG/M2 | OXYGEN SATURATION: 97 %

## 2022-08-21 DIAGNOSIS — S81.802A OPEN WOUND OF BOTH LOWER EXTREMITIES, INITIAL ENCOUNTER: Primary | ICD-10-CM

## 2022-08-21 DIAGNOSIS — N18.6 ESRD ON DIALYSIS (HCC): ICD-10-CM

## 2022-08-21 DIAGNOSIS — I10 ACCELERATED HYPERTENSION: ICD-10-CM

## 2022-08-21 DIAGNOSIS — M79.2 NEUROPATHIC PAIN: ICD-10-CM

## 2022-08-21 DIAGNOSIS — Z99.2 ESRD ON DIALYSIS (HCC): ICD-10-CM

## 2022-08-21 DIAGNOSIS — S81.801A OPEN WOUND OF BOTH LOWER EXTREMITIES, INITIAL ENCOUNTER: Primary | ICD-10-CM

## 2022-08-21 DIAGNOSIS — L03.90 CELLULITIS, UNSPECIFIED CELLULITIS SITE: ICD-10-CM

## 2022-08-21 PROCEDURE — 74011250637 HC RX REV CODE- 250/637: Performed by: EMERGENCY MEDICINE

## 2022-08-21 PROCEDURE — 99283 EMERGENCY DEPT VISIT LOW MDM: CPT

## 2022-08-21 RX ORDER — GABAPENTIN 300 MG/1
300 CAPSULE ORAL ONCE
Status: COMPLETED | OUTPATIENT
Start: 2022-08-21 | End: 2022-08-21

## 2022-08-21 RX ORDER — OXYCODONE AND ACETAMINOPHEN 5; 325 MG/1; MG/1
1 TABLET ORAL
Qty: 12 TABLET | Refills: 0 | Status: SHIPPED | OUTPATIENT
Start: 2022-08-21 | End: 2022-08-24

## 2022-08-21 RX ORDER — CEPHALEXIN 500 MG/1
500 CAPSULE ORAL
Status: COMPLETED | OUTPATIENT
Start: 2022-08-21 | End: 2022-08-21

## 2022-08-21 RX ORDER — OXYCODONE AND ACETAMINOPHEN 5; 325 MG/1; MG/1
1 TABLET ORAL
Status: COMPLETED | OUTPATIENT
Start: 2022-08-21 | End: 2022-08-21

## 2022-08-21 RX ORDER — CEPHALEXIN 500 MG/1
500 CAPSULE ORAL 4 TIMES DAILY
Qty: 28 CAPSULE | Refills: 0 | Status: SHIPPED | OUTPATIENT
Start: 2022-08-21 | End: 2022-08-28

## 2022-08-21 RX ORDER — GABAPENTIN 100 MG/1
100 CAPSULE ORAL 3 TIMES DAILY
Qty: 15 CAPSULE | Refills: 0 | Status: SHIPPED | OUTPATIENT
Start: 2022-08-21

## 2022-08-21 RX ADMIN — GABAPENTIN 300 MG: 300 CAPSULE ORAL at 21:12

## 2022-08-21 RX ADMIN — OXYCODONE HYDROCHLORIDE AND ACETAMINOPHEN 1 TABLET: 5; 325 TABLET ORAL at 21:12

## 2022-08-21 RX ADMIN — CEPHALEXIN 500 MG: 500 CAPSULE ORAL at 21:12

## 2022-08-22 NOTE — ED PROVIDER NOTES
EMERGENCY DEPARTMENT HISTORY AND PHYSICAL EXAM        Please note that this dictation was completed with the assistance of \"Dragon\", the computer voice recognition software. Quite often unanticipated grammatical, syntax, homophones, and other interpretive errors are inadvertently transcribed by the computer software. Please disregard these errors and any errors that have escaped final proofreading. Thank you. Date: 08/21/22  Patient: Gillian Trujillo  Patient Age and Sex: 61 y.o. male   MRN: [id number]  [facility]: [id number]    History of Presenting Illness     Chief Complaint   Patient presents with    Leg Pain    Skin Problem     History Provided By: Patient/family/EMS (if applicable)    HPI: Gillian Trujillo, 61 y.o. male with past medical history as documented below presents to the ED with c/o of 2 to 3 weeks of bilateral lower extremity wounds and sores. Patient reports that he initially noticed a bump to his left calf after scratching it. Patient states that the area has since progressed. He reports a firm, hard black wound to the left calf. Denies any associated numbness or weakness. Patient does go to dialysis Monday, Wednesday, Friday. Patient does report compliance. He had a full treatment this past Friday. Patient is also diabetic and reports compliance with his medications. Patient last admitted to the hospital in April 2022 for CHF, JUAN C requiring dialysis. Patient does have a port to his right chest.  He has taken no medications yet for his pain. He has expressed concern for possible neuropathy. Denies any prior history of MRSA. There is redness to both legs. Additionally, denies any history of DVT or PE. Pt is on aspirin and brilinta for CAD but denies any chest pain or SOB at this time. Pt denies any other exacerbating or ameliorating factors.  Additionally, pt specifically denies any recent fever, chills, headache, nausea, vomiting, abdominal pain, CP, SOB, lightheadedness, dizziness, numbness, weakness, lower extremity swelling, heart palpitations, urinary sxs, diarrhea, constipation, melena, hematochezia, cough, or congestion. There are no other complaints, changes or physical findings pertinent to the HPI at this time. PCP: Yoshi Alvarez MD  Past History   Past Medical History:  Past Medical History:   Diagnosis Date    A-fib ([facility]     Anemia     Arrhythmia     CAD (coronary artery disease)     Chronic kidney disease     Diabetes ([facility]     DM2    Dialysis patient [facility])     M-W- [facility] [phone]    Heart failure ([facility]     Hypertension     STEMI (ST elevation myocardial infarction) ([facility]        Past Surgical History:  Past Surgical History:   Procedure Laterality Date    HX HEART CATHETERIZATION      IR INSERT NON TUNL CVC OVER 5 YRS  2021    IR INSERT NON TUNL CVC OVER 5 YRS  2022    IR INSERT TUNL CVC W/O PORT OVER 5 YR  2021    IR INSERT TUNL CVC W/O PORT OVER 5 YR  2022    [facility] CARDIAC SURG PROCEDURE UNLIST         Family History:   Family history reviewed and was non-contributory, unless specified below:  Family History   Problem Relation Age of Onset    Cancer Mother         breast    No Known Problems Father        Social History:  Social History     Tobacco Use    Smoking status: Former     Packs/day: 0.50     Years: 15.00     Pack years: 7.50     Types: Cigarettes     Quit date:      Years since quittin.6    Smokeless tobacco: Never   Vaping Use    Vaping Use: Never used   Substance Use Topics    Alcohol use: Not Currently    Drug use: Not Currently     Types: Heroin, Cocaine, Marijuana       Allergies:  No Known Allergies    Current Medications:  No current facility-administered medications on file prior to encounter. Current Outpatient Medications on File Prior to Encounter   Medication Sig Dispense Refill    calcium carbonate (TUMS) 200 mg calcium (500 mg) chew Take 1 Tablet by mouth in the morning.       insulin glargine (Lantus U-100 Insulin) 100 unit/mL injection 10 Units by SubCUTAneous route nightly. 1 mL 0    atorvastatin (LIPITOR) 80 mg tablet Take 0.5 Tabs by mouth daily. 30 Tab 0    carvediloL (COREG) 12.5 mg tablet Take 2 Tabs by mouth two (2) times a day. 60 Tab 0    amLODIPine (NORVASC) 10 mg tablet Take 1 Tab by mouth daily. 30 Tab 0    hydrALAZINE (APRESOLINE) 25 mg tablet Take 1 Tab by mouth three (3) times daily. 90 Tab 0    acetaminophen (TYLENOL) 325 mg tablet Take 975 mg by mouth every six (6) hours as needed for Pain. aspirin 81 mg chewable tablet Take 81 mg by mouth daily. Review of Systems   A complete ROS was reviewed by me today and all other systems negative, unless otherwise specified below:  Review of Systems   Constitutional: Negative. Negative for chills and fever. HENT: Negative. Negative for congestion and sore throat. Eyes: Negative. Respiratory: Negative. Negative for cough, chest tightness, shortness of breath and wheezing. Cardiovascular: Negative. Negative for chest pain, palpitations and leg swelling. Gastrointestinal: Negative. Negative for abdominal distention, abdominal pain, blood in stool, constipation, diarrhea, nausea and vomiting. Endocrine: Negative. Genitourinary: Negative. Negative for difficulty urinating, dysuria, flank pain, frequency, hematuria and urgency. Musculoskeletal: Negative. Negative for back pain and neck stiffness. Skin:  Positive for rash and wound. Allergic/Immunologic: Negative. Neurological: Negative. Negative for dizziness, syncope, weakness, light-headedness, numbness and headaches. Hematological: Negative. Psychiatric/Behavioral: Negative. Negative for confusion and self-injury. Physical Exam   Physical Exam  Vitals and nursing note reviewed. Constitutional:       Appearance: He is well-developed. He is not toxic-appearing. HENT:      Head: Normocephalic and atraumatic.       Mouth/Throat: Pharynx: No posterior oropharyngeal erythema. Eyes:      Conjunctiva/sclera: Conjunctivae normal.      Pupils: Pupils are equal, round, and reactive to light. Cardiovascular:      Rate and Rhythm: Normal rate and regular rhythm. Heart sounds: Normal heart sounds. No murmur heard. No friction rub. No gallop. Comments: Patient does have a dialysis port to the right chest wall, clean, dry and intact. Pulmonary:      Effort: Pulmonary effort is normal. No respiratory distress. Breath sounds: Normal breath sounds. No wheezing or rales. Chest:      Chest wall: No tenderness. Abdominal:      General: Bowel sounds are normal. There is no distension. Palpations: Abdomen is soft. There is no mass. Tenderness: There is no abdominal tenderness. There is no guarding or rebound. Musculoskeletal:         General: No tenderness or deformity. Normal range of motion. Cervical back: Normal range of motion. Skin:     General: Skin is warm. Findings: Erythema and rash present. Comments: Dry eschar noted to left medial calf, approximately 3 x 4 cm. No palpable fluctuance. Minimal induration. No calf tenderness or palpable cords. Compartments are soft. Neurovascular intact distally. Scattered lesions noted to the right lower extremity consistent with chronic venous stasis changes. No erythema noted. No palpable cords. Neurovascular intact distally. Neurological:      Mental Status: He is alert and oriented to person, place, and time. Cranial Nerves: No cranial nerve deficit. Motor: No abnormal muscle tone. Coordination: Coordination normal.      Deep Tendon Reflexes: Reflexes normal.   Psychiatric:         Behavior: Behavior is cooperative. Diagnostic Study Results     Laboratory Data  I have personally reviewed and interpreted all available laboratory results. No results found for this or any previous visit (from the past 24 hour(s)).     Radiologic Studies   I have personally reviewed and interpreted all available imaging studies and agree with radiology interpretation. No orders to display     CT Results  (Last 48 hours)      None          CXR Results  (Last 48 hours)      None          Medical Decision Making   I am the first and primary ED physician for this patient's ED visit today. I reviewed our electronic medical record system for any past medical records that may contribute to the patient's current condition, including their past medical history, surgical history, social and family history. This also includes their most recent ED visits, previous hospitalizations and prior diagnostic data. I have reviewed and summarized the most pertinent findings in my HPI and MDM. Vital Signs Reviewed:  Patient Vitals for the past 24 hrs:   Temp Pulse Resp BP SpO2   08/21/22 2041 98.6 °F (37 °C) (!) 56 18 (!) 151/94 97 %     Pulse Oximetry Analysis: 97% on RA    Cardiac Monitor:   Rate: 60 bpm  The cardiac monitor revealed the following rhythm as interpreted by me: Normal Sinus Rhythm  Cardiac monitoring was ordered to monitor patient for signs of cardiac dysrhythmia, which they are at risk for based on their history and/or risk for cardiovascular disease and/or metabolic abnormalities. Records Reviewed: Nursing Notes, Old Medical Records, Previous electrocardiograms, Previous Radiology Studies and Previous Laboratory Studies, EMS reports    Provider Notes (Medical Decision Making):   Pt presents with increased warmth, erythema and tenderness of his BLE concerning for cellulitis vs phlegmon vs abscess. Pt is afebrile with stable vitals and nontoxic appearing. No systemic symptoms. Will perform bedside soft tissue ultrasound to evaluate for fluid collection.  If positive, will perform incision and drainage; otherwise as patient is antibiotic native, will treat for uncomplicated cellulitis with PO antibiotics, warm compresses and PCP follow-up for wound recheck. The pt is unlikely to have DVT. There is no recent travel, h/o PE/DVT, neg briseyda, no hormone use, non-smoker. There is no trauma, deformity to warrant x-ray. The leg is not cold to touch, there is strong peripheral pulse, no paralysis or paresthesia, no pallor to suggest compartment syndrome. The pt is motor and sensory intact. Anticipate discharge home with close PCP follow-up. Wound care instructions provided at bedside and patient was given a backup wound care supplies. Referral given to wound care clinic. Strict return precautions given including worsening pain, redness, drainage, fevers or other significant concerns. Pt adised to attend his scheduled dialysis session tomorrow. ED Course:   Initial assessment performed. I discussed presenting problems and concerns, and my formulated plan for today's visit with the patient and any available family members. I have encouraged them to ask questions as they arise throughout the visit.    Social History     Tobacco Use    Smoking status: Former     Packs/day: 0.50     Years: 15.00     Pack years: 7.50     Types: Cigarettes     Quit date:      Years since quittin.6    Smokeless tobacco: Never   Vaping Use    Vaping Use: Never used   Substance Use Topics    Alcohol use: Not Currently    Drug use: Not Currently     Types: Heroin, Cocaine, Marijuana       ED Orders Placed:  Orders Placed This Encounter    APPLY DRESSING Non-Stick Dressing    oxyCODONE-acetaminophen (PERCOCET) 5-325 mg per tablet 1 Tablet    gabapentin (NEURONTIN) capsule 300 mg    collagenase (SANTYL) 250 unit/gram ointment    cephALEXin (KEFLEX) capsule 500 mg    cephALEXin (Keflex) 500 mg capsule    collagenase (SANTYL) 250 unit/gram ointment    oxyCODONE-acetaminophen (Percocet) 5-325 mg per tablet    gabapentin (NEURONTIN) 100 mg capsule       ED Medications Administered During ED Course:  Medications   collagenase (SANTYL) 250 unit/gram ointment (has no administration in time range)   oxyCODONE-acetaminophen (PERCOCET) 5-325 mg per tablet 1 Tablet (1 Tablet Oral Given 8/21/22 2112)   gabapentin (NEURONTIN) capsule 300 mg (300 mg Oral Given 8/21/22 2112)   cephALEXin (KEFLEX) capsule 500 mg (500 mg Oral Given 8/21/22 2112)        Progress Note:  I have just re-evaluated the patient. Patient reports improvement of sx's. I have reviewed His vital signs and determined there is currently no worsening in their condition or physical exam. Results have been reviewed with them and their questions have been answered. We will continue to review further results as they come available. Progress Note:  Pt reassessed and symptoms noted to have improved significantly after ED treatment. Pt is clinically stable for discharge. Iman Abdi labs and imaging have been reviewed with him and available family. He verbally conveys understanding and agreement of the signs, symptoms, diagnosis, treatment and prognosis and additionally agrees to follow up as recommended with Dr. Reta Gonzáles MD and/or specialist as instructed. He agrees with the care plan we have created and conveys that all of his questions have been answered. Additionally, I have put together a packet of discharge instructions for him that include: 1) educational information regarding their diagnosis, 2) how to care for their diagnosis at home, as well a 3) list of reasons why they would want to return to the ED prior to their follow-up appointment should the patient's condition change or symptoms worsen. I have answered all questions to the patient's satisfaction. Strict return precautions given. He conveyed understanding and agreement with care plan. Vital signs stable for discharge. Disposition:  DISCHARGE  The pt is ready for discharge. The pt's signs, symptoms, diagnosis, and discharge instructions have been discussed and pt has conveyed their understanding.  The pt is to follow up as recommended or return to ER should their symptoms worsen. Plan has been discussed and pt is in agreement. Plan:  1. Return precautions as discussed with patient and available family/caregiver. 2.   Discharge Medication List as of 8/21/2022  9:44 PM        START taking these medications    Details   cephALEXin (Keflex) 500 mg capsule Take 1 Capsule by mouth four (4) times daily for 7 days. , Normal, Disp-28 Capsule, R-0      collagenase (SANTYL) 250 unit/gram ointment Apply  to affected area daily. , Normal, Disp-15 g, R-0      oxyCODONE-acetaminophen (Percocet) 5-325 mg per tablet Take 1 Tablet by mouth every six (6) hours as needed for Pain for up to 3 days. Max Daily Amount: 4 Tablets., Normal, Disp-12 Tablet, R-0      gabapentin (NEURONTIN) 100 mg capsule Take 1 Capsule by mouth three (3) times daily. Max Daily Amount: 300 mg., Normal, Disp-15 Capsule, R-0           CONTINUE these medications which have NOT CHANGED    Details   calcium carbonate (TUMS) 200 mg calcium (500 mg) chew Take 1 Tablet by mouth in the morning., Historical Med      insulin glargine (Lantus U-100 Insulin) 100 unit/mL injection 10 Units by SubCUTAneous route nightly., No Print, Disp-1 mL, R-0      atorvastatin (LIPITOR) 80 mg tablet Take 0.5 Tabs by mouth daily. , Normal, Disp-30 Tab, R-0      carvediloL (COREG) 12.5 mg tablet Take 2 Tabs by mouth two (2) times a day., Normal, Disp-60 Tab, R-0, AMBER      amLODIPine (NORVASC) 10 mg tablet Take 1 Tab by mouth daily. , Normal, Disp-30 Tab, R-0      hydrALAZINE (APRESOLINE) 25 mg tablet Take 1 Tab by mouth three (3) times daily. , Normal, Disp-90 Tab, R-0      acetaminophen (TYLENOL) 325 mg tablet Take 975 mg by mouth every six (6) hours as needed for Pain., Historical Med      aspirin 81 mg chewable tablet Take 81 mg by mouth daily. , Historical Med           3.    Follow-up Information       Follow up With Specialties Details Why Contact Info    Lyudmila Emery MD Family Medicine  As needed, If symptoms worsen [address] [address] [address] [facility]      [address] [facility] EMERGENCY DEPT Emergency Medicine  As needed, If symptoms worsen [address] [facility]    5[address] [facility] [address] [facility]  [phone]    [facility] OP WOUND CARE Wound Care   [address]  [place] [facility]  [phone]          Instructed to return to ED if worse  Diagnosis   Clinical Impression:  1. Open wound of both lower extremities, initial encounter    2. ESRD on dialysis ([facility]    3. Accelerated hypertension    4. Neuropathic pain    5. Cellulitis, unspecified cellulitis site      Attestation:  Walt Scott MD, am the attending of record for this patient. I personally performed the services described in this documentation on this date, 8/21/2022 for patient, Candelario Cranker. I have reviewed the chart and verified that the record is accurate and complete.

## 2022-08-22 NOTE — ED NOTES
Wound care completed on bilateral lower ext. Wounds were cleaned with wound , an non adhesive dressing was placed on top and wrapped with roll gauze and an ace wrap.  Pt tolerated wound care

## 2022-08-22 NOTE — ED TRIAGE NOTES
CC of bilateral leg pain and \"sores\" to bilateral lower leg times 2 weeks. Pt states that he had a \"bump\" that worsened after scratching it. Pt is dialysis pt MWF, last dialysis Friday, pt has hx DM.  Dialysis port right upper chest.

## 2022-08-22 NOTE — DISCHARGE INSTRUCTIONS
Thank You! It was a pleasure taking care of you in our Emergency Department today. We know that when you come to [address] [place] [address], you are entrusting us with your health, comfort, and safety. Our physicians and nurses honor that trust, and truly appreciate the opportunity to care for you and your loved ones. We also value your feedback. If you receive a survey about your Emergency Department experience today, please fill it out. We care about our patients' feedback, and we listen to what you have to say. Thank you. Dr. Kd Buenrostro M.D.      ____________________________________________________________________  I have included a copy of your lab results and/or radiologic studies from today's visit so you can have them easily available at your follow-up visit. We hope you feel better and please do not hesitate to contact the ED if you have any questions at all! No results found for this or any previous visit (from the past 12 hour(s)). No orders to display     CT Results  (Last 48 hours)      None          The exam and treatment you received in the Emergency Department were for an urgent problem and are not intended as complete care. It is important that you follow up with a doctor, nurse practitioner, or physician assistant for ongoing care. If your symptoms become worse or you do not improve as expected and you are unable to reach your usual health care provider, you should return to the Emergency Department. We are available 24 hours a day. Please take your discharge instructions with you when you go to your follow-up appointment. If a prescription has been provided, please have it filled as soon as possible to prevent a delay in treatment. Read the entire medication instruction sheet provided to you by the pharmacy.  If you have any questions or reservations about taking the medication due to side effects or interactions with other medications, please call your primary care physician or contact the ER to speak with the charge nurse. Please make an appointment with your family doctor or the physician you were referred to for follow-up of this visit as instructed on your discharge paperwork. Return to the ER if you are unable to be seen or if you are unable to be seen in a timely manner. If you have any problem arranging the follow-up visit, contact the Emergency Department immediately.

## 2022-10-05 ENCOUNTER — HOSPITAL ENCOUNTER (EMERGENCY)
Age: 67
Discharge: NURSING, REHAB OR LONG-TERM CARE | End: 2022-10-06
Attending: STUDENT IN AN ORGANIZED HEALTH CARE EDUCATION/TRAINING PROGRAM
Payer: MEDICAID

## 2022-10-05 ENCOUNTER — APPOINTMENT (OUTPATIENT)
Dept: GENERAL RADIOLOGY | Age: 67
End: 2022-10-05
Attending: STUDENT IN AN ORGANIZED HEALTH CARE EDUCATION/TRAINING PROGRAM
Payer: MEDICAID

## 2022-10-05 DIAGNOSIS — I10 HYPERTENSION, UNSPECIFIED TYPE: ICD-10-CM

## 2022-10-05 DIAGNOSIS — R07.9 CHEST PAIN, UNSPECIFIED TYPE: Primary | ICD-10-CM

## 2022-10-05 PROCEDURE — 99285 EMERGENCY DEPT VISIT HI MDM: CPT

## 2022-10-05 PROCEDURE — 71045 X-RAY EXAM CHEST 1 VIEW: CPT

## 2022-10-05 PROCEDURE — 83880 ASSAY OF NATRIURETIC PEPTIDE: CPT

## 2022-10-05 PROCEDURE — 36415 COLL VENOUS BLD VENIPUNCTURE: CPT

## 2022-10-05 PROCEDURE — 80053 COMPREHEN METABOLIC PANEL: CPT

## 2022-10-05 PROCEDURE — 84484 ASSAY OF TROPONIN QUANT: CPT

## 2022-10-05 PROCEDURE — 93005 ELECTROCARDIOGRAM TRACING: CPT

## 2022-10-05 PROCEDURE — 85025 COMPLETE CBC W/AUTO DIFF WBC: CPT

## 2022-10-05 PROCEDURE — 74011250637 HC RX REV CODE- 250/637: Performed by: STUDENT IN AN ORGANIZED HEALTH CARE EDUCATION/TRAINING PROGRAM

## 2022-10-05 RX ORDER — NITROGLYCERIN 0.4 MG/1
0.4 TABLET SUBLINGUAL
Status: COMPLETED | OUTPATIENT
Start: 2022-10-05 | End: 2022-10-05

## 2022-10-05 RX ORDER — CARVEDILOL 12.5 MG/1
12.5 TABLET ORAL
Status: COMPLETED | OUTPATIENT
Start: 2022-10-05 | End: 2022-10-06

## 2022-10-05 RX ORDER — LISINOPRIL 10 MG/1
5 TABLET ORAL
Status: COMPLETED | OUTPATIENT
Start: 2022-10-05 | End: 2022-10-06

## 2022-10-05 RX ORDER — FUROSEMIDE 10 MG/ML
40 INJECTION INTRAMUSCULAR; INTRAVENOUS ONCE
Status: COMPLETED | OUTPATIENT
Start: 2022-10-05 | End: 2022-10-06

## 2022-10-05 RX ADMIN — NITROGLYCERIN 0.4 MG: 0.4 TABLET, ORALLY DISINTEGRATING SUBLINGUAL at 23:56

## 2022-10-06 VITALS
HEART RATE: 96 BPM | WEIGHT: 135 LBS | RESPIRATION RATE: 15 BRPM | DIASTOLIC BLOOD PRESSURE: 96 MMHG | BODY MASS INDEX: 20.46 KG/M2 | OXYGEN SATURATION: 100 % | TEMPERATURE: 98 F | SYSTOLIC BLOOD PRESSURE: 189 MMHG | HEIGHT: 68 IN

## 2022-10-06 LAB
ALBUMIN SERPL-MCNC: 2.6 G/DL (ref 3.5–5)
ALBUMIN/GLOB SERPL: 0.5 {RATIO} (ref 1.1–2.2)
ALP SERPL-CCNC: 90 U/L (ref 45–117)
ALT SERPL-CCNC: 13 U/L (ref 12–78)
ANION GAP SERPL CALC-SCNC: 8 MMOL/L (ref 5–15)
AST SERPL W P-5'-P-CCNC: 16 U/L (ref 15–37)
ATRIAL RATE: 106 BPM
BASOPHILS # BLD: 0 K/UL (ref 0–0.1)
BASOPHILS NFR BLD: 0 % (ref 0–1)
BILIRUB SERPL-MCNC: 0.3 MG/DL (ref 0.2–1)
BNP SERPL-MCNC: 3374 PG/ML
BUN SERPL-MCNC: 33 MG/DL (ref 6–20)
BUN/CREAT SERPL: 12 (ref 12–20)
CA-I BLD-MCNC: 8.4 MG/DL (ref 8.5–10.1)
CALCULATED P AXIS, ECG09: 87 DEGREES
CALCULATED R AXIS, ECG10: -9 DEGREES
CALCULATED T AXIS, ECG11: 29 DEGREES
CHLORIDE SERPL-SCNC: 99 MMOL/L (ref 97–108)
CO2 SERPL-SCNC: 26 MMOL/L (ref 21–32)
CREAT SERPL-MCNC: 2.7 MG/DL (ref 0.7–1.3)
DIAGNOSIS, 93000: NORMAL
DIFFERENTIAL METHOD BLD: ABNORMAL
EOSINOPHIL # BLD: 0 K/UL (ref 0–0.4)
EOSINOPHIL NFR BLD: 0 % (ref 0–7)
ERYTHROCYTE [DISTWIDTH] IN BLOOD BY AUTOMATED COUNT: 15.7 % (ref 11.5–14.5)
GLOBULIN SER CALC-MCNC: 4.8 G/DL (ref 2–4)
GLUCOSE SERPL-MCNC: 315 MG/DL (ref 65–100)
HCT VFR BLD AUTO: 22.9 % (ref 36.6–50.3)
HGB BLD-MCNC: 7.4 G/DL (ref 12.1–17)
IMM GRANULOCYTES # BLD AUTO: 0 K/UL (ref 0–0.04)
IMM GRANULOCYTES NFR BLD AUTO: 1 % (ref 0–0.5)
LYMPHOCYTES # BLD: 0.9 K/UL (ref 0.8–3.5)
LYMPHOCYTES NFR BLD: 13 % (ref 12–49)
MCH RBC QN AUTO: 28.8 PG (ref 26–34)
MCHC RBC AUTO-ENTMCNC: 32.3 G/DL (ref 30–36.5)
MCV RBC AUTO: 89.1 FL (ref 80–99)
MONOCYTES # BLD: 0.1 K/UL (ref 0–1)
MONOCYTES NFR BLD: 2 % (ref 5–13)
NEUTS SEG # BLD: 5.7 K/UL (ref 1.8–8)
NEUTS SEG NFR BLD: 84 % (ref 32–75)
NRBC # BLD: 0 K/UL (ref 0–0.01)
NRBC BLD-RTO: 0 PER 100 WBC
P-R INTERVAL, ECG05: 180 MS
PLATELET # BLD AUTO: 342 K/UL (ref 150–400)
PMV BLD AUTO: 10 FL (ref 8.9–12.9)
POTASSIUM SERPL-SCNC: 3.9 MMOL/L (ref 3.5–5.1)
PROT SERPL-MCNC: 7.4 G/DL (ref 6.4–8.2)
Q-T INTERVAL, ECG07: 356 MS
QRS DURATION, ECG06: 92 MS
QTC CALCULATION (BEZET), ECG08: 472 MS
RBC # BLD AUTO: 2.57 M/UL (ref 4.1–5.7)
SODIUM SERPL-SCNC: 133 MMOL/L (ref 136–145)
TROPONIN-HIGH SENSITIVITY: 16 NG/L (ref 0–76)
TROPONIN-HIGH SENSITIVITY: 19 NG/L (ref 0–76)
VENTRICULAR RATE, ECG03: 106 BPM
WBC # BLD AUTO: 6.8 K/UL (ref 4.1–11.1)

## 2022-10-06 PROCEDURE — 96361 HYDRATE IV INFUSION ADD-ON: CPT

## 2022-10-06 PROCEDURE — 36415 COLL VENOUS BLD VENIPUNCTURE: CPT

## 2022-10-06 PROCEDURE — 74011250636 HC RX REV CODE- 250/636: Performed by: STUDENT IN AN ORGANIZED HEALTH CARE EDUCATION/TRAINING PROGRAM

## 2022-10-06 PROCEDURE — 96374 THER/PROPH/DIAG INJ IV PUSH: CPT

## 2022-10-06 PROCEDURE — 74011250637 HC RX REV CODE- 250/637: Performed by: STUDENT IN AN ORGANIZED HEALTH CARE EDUCATION/TRAINING PROGRAM

## 2022-10-06 PROCEDURE — 84484 ASSAY OF TROPONIN QUANT: CPT

## 2022-10-06 RX ORDER — OXYCODONE HYDROCHLORIDE 5 MG/1
5 TABLET ORAL
Status: COMPLETED | OUTPATIENT
Start: 2022-10-06 | End: 2022-10-06

## 2022-10-06 RX ADMIN — LISINOPRIL 5 MG: 10 TABLET ORAL at 00:44

## 2022-10-06 RX ADMIN — SODIUM CHLORIDE 500 ML: 9 INJECTION, SOLUTION INTRAVENOUS at 04:28

## 2022-10-06 RX ADMIN — FUROSEMIDE 40 MG: 10 INJECTION, SOLUTION INTRAMUSCULAR; INTRAVENOUS at 00:41

## 2022-10-06 RX ADMIN — OXYCODONE 5 MG: 5 TABLET ORAL at 03:09

## 2022-10-06 RX ADMIN — CARVEDILOL 12.5 MG: 12.5 TABLET, FILM COATED ORAL at 00:44

## 2022-10-06 NOTE — DISCHARGE INSTRUCTIONS
Thank you! Thank you for allowing me to care for you in the emergency department. It is my goal to provide you with excellent care. If you have not received excellent quality care, please ask to speak to the nurse manager. Please fill out the survey that will come to you by mail or email since we listen to your feedback! Below you will find a list of your tests from today's visit. Should you have any questions, please do not hesitate to call the emergency department. Labs  Recent Results (from the past 12 hour(s))   CBC WITH AUTOMATED DIFF    Collection Time: 10/05/22 11:38 PM   Result Value Ref Range    WBC 6.8 4.1 - 11.1 K/uL    RBC 2.57 (L) 4.10 - 5.70 M/uL    HGB 7.4 (L) 12.1 - 17.0 g/dL    HCT 22.9 (L) 36.6 - 50.3 %    MCV 89.1 80.0 - 99.0 FL    MCH 28.8 26.0 - 34.0 PG    MCHC 32.3 30.0 - 36.5 g/dL    RDW 15.7 (H) 11.5 - 14.5 %    PLATELET 645 920 - 494 K/uL    MPV 10.0 8.9 - 12.9 FL    NRBC 0.0 0.0  WBC    ABSOLUTE NRBC 0.00 0.00 - 0.01 K/uL    NEUTROPHILS 84 (H) 32 - 75 %    LYMPHOCYTES 13 12 - 49 %    MONOCYTES 2 (L) 5 - 13 %    EOSINOPHILS 0 0 - 7 %    BASOPHILS 0 0 - 1 %    IMMATURE GRANULOCYTES 1 (H) 0 - 0.5 %    ABS. NEUTROPHILS 5.7 1.8 - 8.0 K/UL    ABS. LYMPHOCYTES 0.9 0.8 - 3.5 K/UL    ABS. MONOCYTES 0.1 0.0 - 1.0 K/UL    ABS. EOSINOPHILS 0.0 0.0 - 0.4 K/UL    ABS. BASOPHILS 0.0 0.0 - 0.1 K/UL    ABS. IMM.  GRANS. 0.0 0.00 - 0.04 K/UL    DF AUTOMATED     METABOLIC PANEL, COMPREHENSIVE    Collection Time: 10/05/22 11:38 PM   Result Value Ref Range    Sodium 133 (L) 136 - 145 mmol/L    Potassium 3.9 3.5 - 5.1 mmol/L    Chloride 99 97 - 108 mmol/L    CO2 26 21 - 32 mmol/L    Anion gap 8 5 - 15 mmol/L    Glucose 315 (H) 65 - 100 mg/dL    BUN 33 (H) 6 - 20 mg/dL    Creatinine 2.70 (H) 0.70 - 1.30 mg/dL    BUN/Creatinine ratio 12 12 - 20      eGFR 26 (L) >60 ml/min/1.73m2    Calcium 8.4 (L) 8.5 - 10.1 mg/dL    Bilirubin, total 0.3 0.2 - 1.0 mg/dL    AST (SGOT) 16 15 - 37 U/L    ALT (SGPT) 13 12 - 78 U/L    Alk. phosphatase 90 45 - 117 U/L    Protein, total 7.4 6.4 - 8.2 g/dL    Albumin 2.6 (L) 3.5 - 5.0 g/dL    Globulin 4.8 (H) 2.0 - 4.0 g/dL    A-G Ratio 0.5 (L) 1.1 - 2.2     TROPONIN-HIGH SENSITIVITY    Collection Time: 10/05/22 11:38 PM   Result Value Ref Range    Troponin-High Sensitivity 16 0 - 76 ng/L   NT-PRO BNP    Collection Time: 10/05/22 11:38 PM   Result Value Ref Range    NT pro-BNP 3,374 (H) <125 pg/mL   TROPONIN-HIGH SENSITIVITY    Collection Time: 10/06/22  1:55 AM   Result Value Ref Range    Troponin-High Sensitivity 19 0 - 76 ng/L       Radiologic Studies  XR CHEST PORT   Final Result   No acute process. CT Results  (Last 48 hours)      None          CXR Results  (Last 48 hours)                 10/05/22 2356  XR CHEST PORT Final result    Impression:  No acute process. Narrative:  EXAM:  CR chest portable       INDICATION: Chest pain       COMPARISON: 4/7/2022. TECHNIQUE: Portable AP semiupright chest view at 2353 hours       FINDINGS: The right IJ catheter is stable. The cardiomediastinal contours are   stable. The lungs and pleural spaces are clear. There is no pneumothorax. The   bones and upper abdomen are stable.                 ------------------------------------------------------------------------------------------------------------  The exam and treatment you received in the Emergency Department were for an urgent problem and are not intended as complete care. It is important that you follow-up with a doctor, nurse practitioner, or physician assistant to:  (1) confirm your diagnosis,  (2) re-evaluation of changes in your illness and treatment, and  (3) for ongoing care. Please take your discharge instructions with you when you go to your follow-up appointment. If you have any problem arranging a follow-up appointment, contact the Emergency Department.   If your symptoms become worse or you do not improve as expected and you are unable to reach your health care provider, please return to the Emergency Department. We are available 24 hours a day. If a prescription has been provided, please have it filled as soon as possible to prevent a delay in treatment. If you have any questions or reservations about taking the medication due to side effects or interactions with other medications, please call your primary care provider or contact the ER.

## 2022-10-06 NOTE — ED TRIAGE NOTES
Per EMS: pt recently d/keily from [facility] for left hand amputation; pt reports that they did not give him any of his medication today, and he is upset about it; pt c/o chest pain and HTN en route with EMS; +dialysis patient, last dialysis today 10/05/2022

## 2022-10-06 NOTE — ED PROVIDER NOTES
[facility]  EMERGENCY DEPARTMENT ENCOUNTER NOTE        Date: 10/5/2022  Patient Name: Kristi Christianson      History of Presenting Illness     Chief Complaint   Patient presents with    Chest Pain       History Provided By: Patient    HPI: Kristi Christianson, 74 y.o. male with PMH of HTN, HLD, DM, CKD on HD M/W/F, CHF, and CAD who presents to the ED with acute onset of chest pain. Described as something sitting on his chest, nonradiating, nothing specific that makes it better or worse with no nausea, vomiting, diaphoresis, radiation of pain, shortness of breath. He is concerned that this pain is similar to when he had his heart attack in the past.  No new lower extremity swelling. 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 Of note, patient was admitted to SNF this morning. He reports that he did not have any of his blood pressure medications or any of his medications today. There are no other complaints, changes, or physical findings at this time. PCP: Jared Hyde MD    Current Outpatient Medications   Medication Sig Dispense Refill    collagenase (SANTYL) 250 unit/gram ointment Apply  to affected area daily. 15 g 0    gabapentin (NEURONTIN) 100 mg capsule Take 1 Capsule by mouth three (3) times daily. Max Daily Amount: 300 mg. 15 Capsule 0    calcium carbonate (TUMS) 200 mg calcium (500 mg) chew Take 1 Tablet by mouth in the morning. insulin glargine (Lantus U-100 Insulin) 100 unit/mL injection 10 Units by SubCUTAneous route nightly. 1 mL 0    atorvastatin (LIPITOR) 80 mg tablet Take 0.5 Tabs by mouth daily. 30 Tab 0    carvediloL (COREG) 12.5 mg tablet Take 2 Tabs by mouth two (2) times a day. 60 Tab 0    amLODIPine (NORVASC) 10 mg tablet Take 1 Tab by mouth daily.  30 Tab 0    hydrALAZINE (APRESOLINE) 25 mg tablet Take 1 Tab by mouth three (3) times daily. 90 Tab 0    acetaminophen (TYLENOL) 325 mg tablet Take 975 mg by mouth every six (6) hours as needed for Pain. aspirin 81 mg chewable tablet Take 81 mg by mouth daily. Past History     Past Medical History:  Past Medical History:   Diagnosis Date    A-fib ([facility]     Anemia     Arrhythmia     CAD (coronary artery disease)     Chronic kidney disease     Diabetes ([facility]     DM2    Dialysis patient [facility])     M-W- [facility] [phone]    Heart failure (HCC)     Hypertension     STEMI (ST elevation myocardial infarction) ([facility]        Past Surgical History:  Past Surgical History:   Procedure Laterality Date    HX AMPUTATION Left     5 fingers    HX HEART CATHETERIZATION      IR INSERT NON TUNL CVC OVER 5 YRS  2021    IR INSERT NON TUNL CVC OVER 5 YRS  2022    IR INSERT TUNL CVC W/O PORT OVER 5 YR  2021    IR INSERT TUNL CVC W/O PORT OVER 5 YR  2022    NC CARDIAC SURG PROCEDURE UNLIST         Family History:  Family History   Problem Relation Age of Onset    Cancer Mother         breast    No Known Problems Father        Social History:  Social History     Tobacco Use    Smoking status: Former     Packs/day: 0.50     Years: 15.00     Pack years: 7.50     Types: Cigarettes     Quit date:      Years since quittin.7    Smokeless tobacco: Never   Vaping Use    Vaping Use: Never used   Substance Use Topics    Alcohol use: Not Currently    Drug use: Not Currently     Types: Heroin, Cocaine, Marijuana       Allergies:  No Known Allergies      Review of Systems     Review of Systems    A 10 point review of system was performed and was negative except as noted above in HPI    Physical Exam     Physical Exam  Vitals and nursing note reviewed. Constitutional:       General: He is not in acute distress. Appearance: He is not ill-appearing, toxic-appearing or diaphoretic. HENT:      Head: Normocephalic and atraumatic.    Cardiovascular:      Rate and Rhythm: Regular rhythm. Tachycardia present. Heart sounds: Normal heart sounds. Pulmonary:      Effort: Pulmonary effort is normal.      Breath sounds: Normal breath sounds. Chest:      Comments: Port in the right upper chest  Abdominal:      Palpations: Abdomen is soft. Tenderness: There is no abdominal tenderness. Musculoskeletal:      Cervical back: Normal range of motion and neck supple. Right lower leg: No tenderness. No edema. Left lower leg: No tenderness. No edema. Comments: Left hand ambutation   Skin:     General: Skin is warm and dry. Neurological:      Mental Status: He is alert and oriented to person, place, and time. Lab and Diagnostic Study Results     Labs -     Recent Results (from the past 12 hour(s))   CBC WITH AUTOMATED DIFF    Collection Time: 10/05/22 11:38 PM   Result Value Ref Range    WBC 6.8 4.1 - 11.1 K/uL    RBC 2.57 (L) 4.10 - 5.70 M/uL    HGB 7.4 (L) 12.1 - 17.0 g/dL    HCT 22.9 (L) 36.6 - 50.3 %    MCV 89.1 80.0 - 99.0 FL    MCH 28.8 26.0 - 34.0 PG    MCHC 32.3 30.0 - 36.5 g/dL    RDW 15.7 (H) 11.5 - 14.5 %    PLATELET 902 393 - 948 K/uL    MPV 10.0 8.9 - 12.9 FL    NRBC 0.0 0.0  WBC    ABSOLUTE NRBC 0.00 0.00 - 0.01 K/uL    NEUTROPHILS 84 (H) 32 - 75 %    LYMPHOCYTES 13 12 - 49 %    MONOCYTES 2 (L) 5 - 13 %    EOSINOPHILS 0 0 - 7 %    BASOPHILS 0 0 - 1 %    IMMATURE GRANULOCYTES 1 (H) 0 - 0.5 %    ABS. NEUTROPHILS 5.7 1.8 - 8.0 K/UL    ABS. LYMPHOCYTES 0.9 0.8 - 3.5 K/UL    ABS. MONOCYTES 0.1 0.0 - 1.0 K/UL    ABS. EOSINOPHILS 0.0 0.0 - 0.4 K/UL    ABS. BASOPHILS 0.0 0.0 - 0.1 K/UL    ABS. IMM.  GRANS. 0.0 0.00 - 0.04 K/UL    DF AUTOMATED     METABOLIC PANEL, COMPREHENSIVE    Collection Time: 10/05/22 11:38 PM   Result Value Ref Range    Sodium 133 (L) 136 - 145 mmol/L    Potassium 3.9 3.5 - 5.1 mmol/L    Chloride 99 97 - 108 mmol/L    CO2 26 21 - 32 mmol/L    Anion gap 8 5 - 15 mmol/L    Glucose 315 (H) 65 - 100 mg/dL    BUN 33 (H) 6 - 20 mg/dL    Creatinine 2.70 (H) 0.70 - 1.30 mg/dL    BUN/Creatinine ratio 12 12 - 20      eGFR 26 (L) >60 ml/min/1.73m2    Calcium 8.4 (L) 8.5 - 10.1 mg/dL    Bilirubin, total 0.3 0.2 - 1.0 mg/dL    AST (SGOT) 16 15 - 37 U/L    ALT (SGPT) 13 12 - 78 U/L    Alk. phosphatase 90 45 - 117 U/L    Protein, total 7.4 6.4 - 8.2 g/dL    Albumin 2.6 (L) 3.5 - 5.0 g/dL    Globulin 4.8 (H) 2.0 - 4.0 g/dL    A-G Ratio 0.5 (L) 1.1 - 2.2     TROPONIN-HIGH SENSITIVITY    Collection Time: 10/05/22 11:38 PM   Result Value Ref Range    Troponin-High Sensitivity 16 0 - 76 ng/L   NT-PRO BNP    Collection Time: 10/05/22 11:38 PM   Result Value Ref Range    NT pro-BNP 3,374 (H) <125 pg/mL   TROPONIN-HIGH SENSITIVITY    Collection Time: 10/06/22  1:55 AM   Result Value Ref Range    Troponin-High Sensitivity 19 0 - 76 ng/L       Radiologic Studies -   [unfilled]  CT Results  (Last 48 hours)      None          CXR Results  (Last 48 hours)                 10/05/22 2356  XR CHEST PORT Final result    Impression:  No acute process. Narrative:  EXAM:  CR chest portable       INDICATION: Chest pain       COMPARISON: 4/7/2022. TECHNIQUE: Portable AP semiupright chest view at 2353 hours       FINDINGS: The right IJ catheter is stable. The cardiomediastinal contours are   stable. The lungs and pleural spaces are clear. There is no pneumothorax. The   bones and upper abdomen are stable. Medical Decision Making and ED Course   - I am the first and primary provider for this patient AND AM THE PRIMARY PROVIDER OF RECORD. - I reviewed the vital signs, available nursing notes, past medical history, past surgical history, family history and social history. - Initial assessment performed. The patients presenting problems have been discussed, and the staff are in agreement with the care plan formulated and outlined with them. I have encouraged them to ask questions as they arise throughout their visit.     Vital Signs-Reviewed the patient's vital signs. Patient Vitals for the past 24 hrs:   Temp Pulse Resp BP SpO2   10/06/22 0615 -- 92 17 (!) 170/82 100 %   10/06/22 0530 -- 97 16 (!) 172/82 99 %   10/06/22 0515 -- 96 9 (!) 164/81 100 %   10/06/22 0500 -- 97 11 (!) 166/81 100 %   10/06/22 0450 -- 100 17 (!) 175/85 100 %   10/06/22 0430 -- 97 17 (!) 171/88 100 %   10/06/22 0415 -- 99 15 (!) 174/87 100 %   10/06/22 0358 -- (!) 103 17 (!) 175/89 100 %   10/06/22 0348 -- (!) 101 15 (!) 176/87 100 %   10/06/22 0328 -- 100 17 (!) 175/87 100 %   10/06/22 0318 -- 100 16 (!) 191/98 100 %   10/06/22 0240 -- (!) 101 15 (!) 190/96 100 %   10/06/22 0225 -- 97 15 (!) 193/94 100 %   10/06/22 0140 -- 100 13 (!) 197/98 100 %   10/06/22 0125 -- 99 14 (!) 186/98 100 %   10/06/22 0041 -- 99 -- (!) 198/96 --   10/06/22 0040 -- (!) 102 14 (!) 198/96 99 %   10/06/22 0015 -- (!) 112 12 (!) 173/92 100 %   10/05/22 2356 -- 100 -- (!) 215/103 --   10/05/22 2345 -- (!) 105 16 (!) 215/103 --   10/05/22 2325 98 °F (36.7 °C) (!) 104 20 (!) 208/95 100 %       Records Reviewed: Nursing Notes and Old Medical Records    Provider Notes (Medical Decision Making):     Patient is 56-year-old gentleman with comorbidities as above including coronary artery disease who did not get any of his medications today and is coming to the ED with chest pain. Patient does not have any other concerns or symptoms today. He does have significant risk factors for coronary artery disease. Will get CBC, chemistry, troponin, proBNP, and chest x-ray. We will also obtain EKG. Given his pain, and if it recurs will he will be given nitroglycerin. Additionally, I will order his home medications of blood pressure medications. EKG was done at 11:23 PM interpreted by me as sinus tachycardia at rate of 106, intervals within normal, left axis deviation, no specific ST elevation or depression, LVH noted, no signs of dysrhythmia nor blocks, baseline motion artifact.     Patient work-up showed CBC consistent with his baseline. His chemistry showed elevated creatinine consistent with his history of CKD and otherwise no hyperkalemia. Troponins x2 is negative. Patient proBNP is elevated which is secondary to impaired clearance. He does not have any shortness of breath orthopnea. As a matter fact his laying comfortable in the ED. Chest x-ray is negative for signs of volume overload. Upon arrival to the ED, patient was given a dose of nitroglycerin. He was given a full dose aspirin by EMS. I did order his home dose of antihypertensives and observed him in the emergency department see if he has recurrence of pain or worsening of his blood pressure. After observation, his blood pressure has improved significantly due to administration of his home dose of medication. Notably on vital signs, patient was cardiac. He was given intravenous fluid which has resulted and resolution of his tachycardia. He has no recurrence of pain during his entire stay in the ED. Thus, patient is appropriate for outpatient follow-up. Patient will be discharged back to SNF to continue care. Patient reports that he has all of his medication at SNF. Instructed to follow-up with his PCP as well and cardiology. He will come back to the ED if his symptoms recur or develop new concerns. ED evaluation, work-up, clinical impression, and disposition was discussed with the patient. Patient is agreeable. Patient verbalized understanding and will be able to arrange follow-up. Anticipatory guidance and return precautions discussed with the patient. At the time of discharge, all concerns have been addressed and patient had no further questions. Patient is hemodynamically stable and appropriate for discharge. Diagnosis     Clinical Impression:   1. Chest pain, unspecified type    2. Hypertension, unspecified type          Disposition     Disposition: Condition resolved  DC- Adult Discharges:  All of the diagnostic tests were reviewed and questions answered. Diagnosis, care plan and treatment options were discussed. The patient understands the instructions and will follow up as directed. The patients results have been reviewed with them. They have been counseled regarding their diagnosis. The patient verbally convey understanding and agreement of the signs, symptoms, diagnosis, treatment and prognosis and additionally agrees to follow up as recommended with their PCP in 24 - 48 hours. They also agree with the care-plan and convey that all of their questions have been answered. I have also put together some discharge instructions for them that include: 1) educational information regarding their diagnosis, 2) how to care for their diagnosis at home, as well a 3) list of reasons why they would want to return to the ED prior to their follow-up appointment, should their condition change. Discharged      DISCHARGE PLAN:  1. Follow-up Information       Follow up With Specialties Details Why [facility] EMERGENCY DEPT Emergency Medicine Go to  As needed, If symptoms worsen [address] [address]  [phone]    Roger Deng MD Family Medicine Schedule an appointment as soon as possible for a visit on 10/10/2022 For reevaluation, Discuss your visit to the ER [address] [place] [address]  [phone]            2. Return to ED if worse   3. Current Discharge Medication List            Attestations: Adina Lau MD    Please note that this dictation was completed with [employer], the computer voice recognition software. Quite often unanticipated grammatical, syntax, homophones, and other interpretive errors are inadvertently transcribed by the computer software. Please disregard these errors. Please excuse any errors that have escaped final proofreading. Thank you.

## 2022-10-06 NOTE — ED NOTES
Pt requesting his daily medications and more pain meds. Pt is looking at monitor then reporting his BP is elevated. Instructed pt re: meds given after midnight and appropriate times for medications. Instructed pt will coordinate c nursing home to make sure he gets AM meds upon return.

## 2022-10-07 LAB
ATRIAL RATE: 105 BPM
CALCULATED P AXIS, ECG09: 106 DEGREES
CALCULATED R AXIS, ECG10: -11 DEGREES
CALCULATED T AXIS, ECG11: 46 DEGREES
DIAGNOSIS, 93000: NORMAL
P-R INTERVAL, ECG05: 168 MS
Q-T INTERVAL, ECG07: 350 MS
QRS DURATION, ECG06: 96 MS
QTC CALCULATION (BEZET), ECG08: 462 MS
VENTRICULAR RATE, ECG03: 105 BPM

## 2022-12-22 ENCOUNTER — OFFICE VISIT (OUTPATIENT)
Dept: SURGERY | Age: 67
End: 2022-12-22
Payer: MEDICARE

## 2022-12-22 ENCOUNTER — TELEPHONE (OUTPATIENT)
Dept: SURGERY | Age: 67
End: 2022-12-22

## 2022-12-22 ENCOUNTER — HOSPITAL ENCOUNTER (INPATIENT)
Age: 67
LOS: 26 days | Discharge: NURSING, REHAB OR LONG-TERM CARE | DRG: 853 | End: 2023-01-17
Attending: STUDENT IN AN ORGANIZED HEALTH CARE EDUCATION/TRAINING PROGRAM | Admitting: INTERNAL MEDICINE
Payer: MEDICARE

## 2022-12-22 VITALS
DIASTOLIC BLOOD PRESSURE: 66 MMHG | OXYGEN SATURATION: 98 % | BODY MASS INDEX: 20.53 KG/M2 | HEART RATE: 98 BPM | HEIGHT: 68 IN | SYSTOLIC BLOOD PRESSURE: 126 MMHG | TEMPERATURE: 97.1 F

## 2022-12-22 DIAGNOSIS — R52 PAIN: ICD-10-CM

## 2022-12-22 DIAGNOSIS — A41.9 SEVERE SEPSIS (HCC): ICD-10-CM

## 2022-12-22 DIAGNOSIS — I96 WET GANGRENE (HCC): Primary | ICD-10-CM

## 2022-12-22 DIAGNOSIS — R65.20 SEVERE SEPSIS (HCC): ICD-10-CM

## 2022-12-22 LAB
ALBUMIN SERPL-MCNC: 2.5 G/DL (ref 3.5–5)
ALBUMIN/GLOB SERPL: 0.4 (ref 1.1–2.2)
ALP SERPL-CCNC: 74 U/L (ref 45–117)
ALT SERPL-CCNC: 20 U/L (ref 12–78)
ANION GAP SERPL CALC-SCNC: 10 MMOL/L (ref 5–15)
AST SERPL W P-5'-P-CCNC: 23 U/L (ref 15–37)
BASOPHILS # BLD: 0 K/UL (ref 0–0.1)
BASOPHILS NFR BLD: 0 % (ref 0–1)
BILIRUB SERPL-MCNC: 0.5 MG/DL (ref 0.2–1)
BUN SERPL-MCNC: 50 MG/DL (ref 6–20)
BUN/CREAT SERPL: 16 (ref 12–20)
CA-I BLD-MCNC: 9.3 MG/DL (ref 8.5–10.1)
CHLORIDE SERPL-SCNC: 96 MMOL/L (ref 97–108)
CO2 SERPL-SCNC: 26 MMOL/L (ref 21–32)
CREAT SERPL-MCNC: 3.09 MG/DL (ref 0.7–1.3)
CRP SERPL HS-MCNC: >9.5 MG/L
DIFFERENTIAL METHOD BLD: ABNORMAL
EOSINOPHIL # BLD: 0.1 K/UL (ref 0–0.4)
EOSINOPHIL NFR BLD: 1 % (ref 0–7)
ERYTHROCYTE [DISTWIDTH] IN BLOOD BY AUTOMATED COUNT: 17.7 % (ref 11.5–14.5)
ERYTHROCYTE [SEDIMENTATION RATE] IN BLOOD: 83 MM/HR (ref 0–20)
GLOBULIN SER CALC-MCNC: 5.6 G/DL (ref 2–4)
GLUCOSE BLD STRIP.AUTO-MCNC: 120 MG/DL (ref 65–100)
GLUCOSE SERPL-MCNC: 197 MG/DL (ref 65–100)
HCT VFR BLD AUTO: 30 % (ref 36.6–50.3)
HGB BLD-MCNC: 8.8 G/DL (ref 12.1–17)
IMM GRANULOCYTES # BLD AUTO: 0 K/UL (ref 0–0.04)
IMM GRANULOCYTES NFR BLD AUTO: 0 % (ref 0–0.5)
LACTATE SERPL-SCNC: 1.9 MMOL/L (ref 0.4–2)
LACTATE SERPL-SCNC: 2.3 MMOL/L (ref 0.4–2)
LYMPHOCYTES # BLD: 1.8 K/UL (ref 0.8–3.5)
LYMPHOCYTES NFR BLD: 22 % (ref 12–49)
MCH RBC QN AUTO: 24 PG (ref 26–34)
MCHC RBC AUTO-ENTMCNC: 29.3 G/DL (ref 30–36.5)
MCV RBC AUTO: 82 FL (ref 80–99)
MONOCYTES # BLD: 0.4 K/UL (ref 0–1)
MONOCYTES NFR BLD: 5 % (ref 5–13)
NEUTS SEG # BLD: 5.7 K/UL (ref 1.8–8)
NEUTS SEG NFR BLD: 72 % (ref 32–75)
NRBC # BLD: 0 K/UL (ref 0–0.01)
NRBC BLD-RTO: 0 PER 100 WBC
PERFORMED BY, TECHID: ABNORMAL
PLATELET # BLD AUTO: 639 K/UL (ref 150–400)
PMV BLD AUTO: 9.1 FL (ref 8.9–12.9)
POTASSIUM SERPL-SCNC: 3.8 MMOL/L (ref 3.5–5.1)
PROCALCITONIN SERPL-MCNC: 0.44 NG/ML
PROT SERPL-MCNC: 8.1 G/DL (ref 6.4–8.2)
RBC # BLD AUTO: 3.66 M/UL (ref 4.1–5.7)
SODIUM SERPL-SCNC: 132 MMOL/L (ref 136–145)
WBC # BLD AUTO: 8 K/UL (ref 4.1–11.1)

## 2022-12-22 PROCEDURE — 1101F PT FALLS ASSESS-DOCD LE1/YR: CPT | Performed by: SURGERY

## 2022-12-22 PROCEDURE — 87040 BLOOD CULTURE FOR BACTERIA: CPT

## 2022-12-22 PROCEDURE — 96376 TX/PRO/DX INJ SAME DRUG ADON: CPT

## 2022-12-22 PROCEDURE — G8420 CALC BMI NORM PARAMETERS: HCPCS | Performed by: SURGERY

## 2022-12-22 PROCEDURE — 74011000250 HC RX REV CODE- 250: Performed by: INTERNAL MEDICINE

## 2022-12-22 PROCEDURE — G8536 NO DOC ELDER MAL SCRN: HCPCS | Performed by: SURGERY

## 2022-12-22 PROCEDURE — 80053 COMPREHEN METABOLIC PANEL: CPT

## 2022-12-22 PROCEDURE — 82962 GLUCOSE BLOOD TEST: CPT

## 2022-12-22 PROCEDURE — 96366 THER/PROPH/DIAG IV INF ADDON: CPT

## 2022-12-22 PROCEDURE — 36415 COLL VENOUS BLD VENIPUNCTURE: CPT

## 2022-12-22 PROCEDURE — 74011250636 HC RX REV CODE- 250/636: Performed by: STUDENT IN AN ORGANIZED HEALTH CARE EDUCATION/TRAINING PROGRAM

## 2022-12-22 PROCEDURE — 99285 EMERGENCY DEPT VISIT HI MDM: CPT

## 2022-12-22 PROCEDURE — 84145 PROCALCITONIN (PCT): CPT

## 2022-12-22 PROCEDURE — 74011250637 HC RX REV CODE- 250/637: Performed by: INTERNAL MEDICINE

## 2022-12-22 PROCEDURE — 85652 RBC SED RATE AUTOMATED: CPT

## 2022-12-22 PROCEDURE — 86141 C-REACTIVE PROTEIN HS: CPT

## 2022-12-22 PROCEDURE — 96365 THER/PROPH/DIAG IV INF INIT: CPT

## 2022-12-22 PROCEDURE — 3017F COLORECTAL CA SCREEN DOC REV: CPT | Performed by: SURGERY

## 2022-12-22 PROCEDURE — G8427 DOCREV CUR MEDS BY ELIG CLIN: HCPCS | Performed by: SURGERY

## 2022-12-22 PROCEDURE — 65270000029 HC RM PRIVATE

## 2022-12-22 PROCEDURE — 96375 TX/PRO/DX INJ NEW DRUG ADDON: CPT

## 2022-12-22 PROCEDURE — 74011000258 HC RX REV CODE- 258: Performed by: STUDENT IN AN ORGANIZED HEALTH CARE EDUCATION/TRAINING PROGRAM

## 2022-12-22 PROCEDURE — 85025 COMPLETE CBC W/AUTO DIFF WBC: CPT

## 2022-12-22 PROCEDURE — 83605 ASSAY OF LACTIC ACID: CPT

## 2022-12-22 PROCEDURE — 1124F ACP DISCUSS-NO DSCNMKR DOCD: CPT | Performed by: SURGERY

## 2022-12-22 PROCEDURE — 99203 OFFICE O/P NEW LOW 30 MIN: CPT | Performed by: SURGERY

## 2022-12-22 PROCEDURE — G8510 SCR DEP NEG, NO PLAN REQD: HCPCS | Performed by: SURGERY

## 2022-12-22 RX ORDER — GABAPENTIN 100 MG/1
100 CAPSULE ORAL 3 TIMES DAILY
Status: DISCONTINUED | OUTPATIENT
Start: 2022-12-22 | End: 2023-01-17 | Stop reason: HOSPADM

## 2022-12-22 RX ORDER — SODIUM CHLORIDE 0.9 % (FLUSH) 0.9 %
5-40 SYRINGE (ML) INJECTION EVERY 8 HOURS
Status: DISCONTINUED | OUTPATIENT
Start: 2022-12-22 | End: 2022-12-25

## 2022-12-22 RX ORDER — AMLODIPINE BESYLATE 5 MG/1
10 TABLET ORAL DAILY
Status: DISCONTINUED | OUTPATIENT
Start: 2022-12-23 | End: 2023-01-17 | Stop reason: HOSPADM

## 2022-12-22 RX ORDER — ACETAMINOPHEN 325 MG/1
650 TABLET ORAL
Status: DISCONTINUED | OUTPATIENT
Start: 2022-12-22 | End: 2023-01-17 | Stop reason: HOSPADM

## 2022-12-22 RX ORDER — HYDRALAZINE HYDROCHLORIDE 25 MG/1
25 TABLET, FILM COATED ORAL 3 TIMES DAILY
Status: DISCONTINUED | OUTPATIENT
Start: 2022-12-22 | End: 2023-01-17 | Stop reason: HOSPADM

## 2022-12-22 RX ORDER — CARVEDILOL 12.5 MG/1
25 TABLET ORAL 2 TIMES DAILY
Status: DISCONTINUED | OUTPATIENT
Start: 2022-12-22 | End: 2023-01-17 | Stop reason: HOSPADM

## 2022-12-22 RX ORDER — LISINOPRIL 5 MG/1
5 TABLET ORAL DAILY
Status: DISCONTINUED | OUTPATIENT
Start: 2022-12-23 | End: 2023-01-17 | Stop reason: HOSPADM

## 2022-12-22 RX ORDER — POLYETHYLENE GLYCOL 3350 17 G/17G
17 POWDER, FOR SOLUTION ORAL DAILY PRN
Status: DISCONTINUED | OUTPATIENT
Start: 2022-12-22 | End: 2023-01-17 | Stop reason: HOSPADM

## 2022-12-22 RX ORDER — LISINOPRIL 5 MG/1
5 TABLET ORAL DAILY
COMMUNITY

## 2022-12-22 RX ORDER — MELATONIN 5 MG
5 CAPSULE ORAL
COMMUNITY

## 2022-12-22 RX ORDER — ISOSORBIDE DINITRATE 20 MG/1
20 TABLET ORAL 3 TIMES DAILY
Status: DISCONTINUED | OUTPATIENT
Start: 2022-12-22 | End: 2023-01-17 | Stop reason: HOSPADM

## 2022-12-22 RX ORDER — ATORVASTATIN CALCIUM 40 MG/1
40 TABLET, FILM COATED ORAL DAILY
Status: DISCONTINUED | OUTPATIENT
Start: 2022-12-23 | End: 2023-01-17 | Stop reason: HOSPADM

## 2022-12-22 RX ORDER — INSULIN GLARGINE 100 [IU]/ML
10 INJECTION, SOLUTION SUBCUTANEOUS
Status: DISCONTINUED | OUTPATIENT
Start: 2022-12-22 | End: 2023-01-17 | Stop reason: HOSPADM

## 2022-12-22 RX ORDER — ISOSORBIDE DINITRATE 20 MG/1
20 TABLET ORAL 3 TIMES DAILY
COMMUNITY

## 2022-12-22 RX ORDER — MORPHINE SULFATE 4 MG/ML
4 INJECTION INTRAVENOUS ONCE
Status: COMPLETED | OUTPATIENT
Start: 2022-12-22 | End: 2022-12-22

## 2022-12-22 RX ORDER — ONDANSETRON 4 MG/1
4 TABLET, ORALLY DISINTEGRATING ORAL
Status: DISCONTINUED | OUTPATIENT
Start: 2022-12-22 | End: 2022-12-25 | Stop reason: SDUPTHER

## 2022-12-22 RX ORDER — CHOLECALCIFEROL (VITAMIN D3) 125 MCG
5 CAPSULE ORAL
Status: DISCONTINUED | OUTPATIENT
Start: 2022-12-22 | End: 2023-01-17 | Stop reason: HOSPADM

## 2022-12-22 RX ORDER — ONDANSETRON 2 MG/ML
4 INJECTION INTRAMUSCULAR; INTRAVENOUS
Status: DISCONTINUED | OUTPATIENT
Start: 2022-12-22 | End: 2022-12-25 | Stop reason: SDUPTHER

## 2022-12-22 RX ORDER — ACETAMINOPHEN 650 MG/1
650 SUPPOSITORY RECTAL
Status: DISCONTINUED | OUTPATIENT
Start: 2022-12-22 | End: 2023-01-17 | Stop reason: HOSPADM

## 2022-12-22 RX ORDER — SODIUM CHLORIDE 0.9 % (FLUSH) 0.9 %
5-40 SYRINGE (ML) INJECTION AS NEEDED
Status: DISCONTINUED | OUTPATIENT
Start: 2022-12-22 | End: 2023-01-16

## 2022-12-22 RX ORDER — CALCIUM CARBONATE 200(500)MG
200 TABLET,CHEWABLE ORAL DAILY
Status: DISCONTINUED | OUTPATIENT
Start: 2022-12-23 | End: 2023-01-17 | Stop reason: HOSPADM

## 2022-12-22 RX ADMIN — CARVEDILOL 25 MG: 12.5 TABLET, FILM COATED ORAL at 22:05

## 2022-12-22 RX ADMIN — MORPHINE SULFATE 4 MG: 4 INJECTION, SOLUTION INTRAMUSCULAR; INTRAVENOUS at 16:35

## 2022-12-22 RX ADMIN — TICAGRELOR 90 MG: 90 TABLET ORAL at 22:05

## 2022-12-22 RX ADMIN — PIPERACILLIN AND TAZOBACTAM 4.5 G: 4; .5 INJECTION, POWDER, FOR SOLUTION INTRAVENOUS at 15:55

## 2022-12-22 RX ADMIN — ACETAMINOPHEN 650 MG: 325 TABLET, FILM COATED ORAL at 22:04

## 2022-12-22 RX ADMIN — GABAPENTIN 100 MG: 100 CAPSULE ORAL at 22:05

## 2022-12-22 RX ADMIN — MELATONIN TAB 5 MG 5 MG: 5 TAB at 22:05

## 2022-12-22 RX ADMIN — VANCOMYCIN HYDROCHLORIDE 1250 MG: 1.25 INJECTION, POWDER, LYOPHILIZED, FOR SOLUTION INTRAVENOUS at 17:31

## 2022-12-22 RX ADMIN — HYDRALAZINE HYDROCHLORIDE 25 MG: 25 TABLET, FILM COATED ORAL at 22:04

## 2022-12-22 RX ADMIN — SODIUM CHLORIDE, PRESERVATIVE FREE 10 ML: 5 INJECTION INTRAVENOUS at 22:09

## 2022-12-22 RX ADMIN — MORPHINE SULFATE 4 MG: 4 INJECTION, SOLUTION INTRAMUSCULAR; INTRAVENOUS at 18:07

## 2022-12-22 RX ADMIN — SODIUM CHLORIDE 500 ML: 9 INJECTION, SOLUTION INTRAVENOUS at 15:56

## 2022-12-22 NOTE — PROGRESS NOTES
Identified pt with two pt identifiers (name and ). Reviewed chart in preparation for visit and have obtained necessary documentation. Pura Altamirano is a 74 y.o. male  Chief Complaint   Patient presents with    New Patient     ulcers on legs     Visit Vitals  /66 (BP 1 Location: Right upper arm, BP Patient Position: Sitting, BP Cuff Size: Large adult)   Pulse 98   Temp 97.1 °F (36.2 °C) (Temporal)   Ht 5' 8\" (1.727 m)   SpO2 98%   BMI 20.53 kg/m²       1. Have you been to the ER, urgent care clinic since your last visit? Hospitalized since your last visit? No    2. Have you seen or consulted any other health care providers outside of the [address] [place] [address] since your last visit? Include any pap smears or colon screening. No    Patient and provider made aware of elevated BP. Patient asymptomatic. Patient reminded to monitor BP, continue to take BP medications if prescribed, and follow up with PCP/Cardiologist.  Patient expressed understanding and agreement.

## 2022-12-22 NOTE — TELEPHONE ENCOUNTER
Spoke with Grisel Roberts and explained to her that Chantell Orellana wanted Alannah Amaya to go to the ER due to gangrene. She said that she didn't understand because he was getting treatment there. I told her that the infection has spread and per Chantell Orellana he needs to be at the ER immediately. She asked why the lady upfront couldn't tell her I explained that she works at the front and wasn't aware of everything going on in the back which is why she transferred the call. I asked her for her name and she said Grisel Roberts then hung up.

## 2022-12-22 NOTE — ED TRIAGE NOTES
EMS dispatched to [facility] for c/o left leg pain since October. Was reported that pt with possible gangrene in that ext. Later noticed that pt had appt with Dr. Bhumi Holt at his office today.

## 2022-12-22 NOTE — ED PROVIDER NOTES
[id number]  EMERGENCY DEPARTMENT ENCOUNTER NOTE    Date: 12/22/2022  Patient Name: Odalys Hines    History of Presenting Illness     Chief Complaint   Patient presents with    Leg Pain     HPI: Odalys Hines, 74 y.o. male with a past medical history and outpatient medications as listed and reviewed below  presents for left knee gangrene. The patient was complaining of any infection over the past month at the nursing home however today, it was noted to have progressed so they called EMS. The vascular surgeon had already discussed the possibility of above-the-knee amputation but he was refusing that. He started reporting pain, malodorous purulence, and limited range of motion of the knee however he denies any fevers, chills, chest pain, short of breath, bone pain, nausea, or vomiting. .    Medical History   I reviewed the medical, surgical, family, and social history, as well as allergies:    PCP: Anna Nguyen MD    Past Medical History:  Past Medical History:   Diagnosis Date    A-fib ([facility] [address]     Anemia     Arrhythmia     CAD (coronary artery disease)     Chronic kidney disease     Diabetes ([facility] [address]     DM2    Dialysis patient [facility])     M-W-F [facility] [phone]    Heart failure ([facility] [address]     Hypertension     STEMI (ST elevation myocardial infarction) ([facility] [address]      Past Surgical History:  Past Surgical History:   Procedure Laterality Date    HX AMPUTATION Left     5 fingers    HX HEART CATHETERIZATION      IR INSERT NON TUNL CVC OVER 5 YRS  02/23/2021    IR INSERT NON TUNL CVC OVER 5 YRS  04/07/2022    IR INSERT TUNL CVC W/O PORT OVER 5 YR  03/02/2021    IR INSERT TUNL CVC W/O PORT OVER 5 YR  04/12/2022    FL CARDIAC SURG PROCEDURE UNLIST       Current Outpatient Medications:  Current Outpatient Medications   Medication Instructions    acetaminophen (TYLENOL) 975 mg, Oral, EVERY 6 HOURS AS NEEDED    amLODIPine (NORVASC) 10 mg, Oral, DAILY    aspirin 81 mg, Oral, DAILY    atorvastatin (LIPITOR) 40 mg, Oral, DAILY    calcium carbonate (TUMS) 200 mg calcium (500 mg) chew 1 Tablet, Oral, DAILY    carvediloL (COREG) 25 mg, Oral, 2 TIMES DAILY    collagenase (SANTYL) 250 unit/gram ointment Topical, DAILY    gabapentin (NEURONTIN) 100 mg, Oral, 3 TIMES DAILY    hydrALAZINE (APRESOLINE) 25 mg, Oral, 3 TIMES DAILY    insulin glargine (LANTUS U-100 INSULIN) 10 Units, SubCUTAneous, EVERY BEDTIME    isosorbide dinitrate (ISORDIL) 20 mg, Oral, 3 TIMES DAILY    lisinopriL (PRINIVIL, ZESTRIL) 5 mg, Oral, DAILY    melatonin 5 mg, Oral, EVERY BEDTIME    ticagrelor (BRILINTA) 90 mg, Oral, 2 TIMES DAILY      Family History:  Family History   Problem Relation Age of Onset    Cancer Mother         breast    No Known Problems Father      Social History:  Social History     Tobacco Use    Smoking status: Former     Packs/day: 0.50     Years: 15.00     Pack years: 7.50     Types: Cigarettes     Quit date:      Years since quittin.9    Smokeless tobacco: Never   Vaping Use    Vaping Use: Never used   Substance Use Topics    Alcohol use: Not Currently    Drug use: Not Currently     Types: Heroin, Cocaine, Marijuana     Comment: no drugs since nsg home placement. Allergies:  No Known Allergies    Review of Systems     Review of Systems  Negative: Positives and pertinent negatives as per HPI. All other systems were reviewed and are negative. Physical Exam & Vital Signs   Vital Signs - I reviewed the patient's vital signs.     Patient Vitals for the past 12 hrs:   Temp Pulse Resp BP SpO2   22 1402 -- 93 14 -- --   22 1346 97.8 °F (36.6 °C) 95 20 (!) 151/74 96 %     Physical Exam:    GENERAL: awake, alert, cooperative, not in distress  HEENT:  * Pupils equal, EOMI  * Head atraumatic  CV:  * audible heart sounds  * warm and perfused extremities bilaterally  PULMONARY: Good air movement, no wheezes, no crackles  ABDOMEN/: soft, no distension, no guarding, no abdominal tenderness  EXTREMITIES/BACK: warm and perfused. Left knee malodorous discharge from a large knee eschar with gangrene with partially exposed joint. SKIN: no rashes or signs of trauma  NEURO:  * Speech clear  * Moves U&LE to command    Medical Decision Making     Patient is a 74 y.o. male presenting for knee gangrene (wet). Vitals reveal no significant abnormalities and physical exam reveals  knee gangrene and eschar . Based on the history, physical exam, risk factors, and vital signs, differential includes: Gangrene, osteomyelitis, sepsis. Will start antibiotics. We will send preop labs. .    See ED Course and Reassessment for evaluation and discussion. EMR Automatically Imported Results     Labs:  Recent Results (from the past 12 hour(s))   CBC WITH AUTOMATED DIFF    Collection Time: 12/22/22  2:05 PM   Result Value Ref Range    WBC 8.0 4.1 - 11.1 K/uL    RBC 3.66 (L) 4.10 - 5.70 M/uL    HGB 8.8 (L) 12.1 - 17.0 g/dL    HCT 30.0 (L) 36.6 - 50.3 %    MCV 82.0 80.0 - 99.0 FL    MCH 24.0 (L) 26.0 - 34.0 PG    MCHC 29.3 (L) 30.0 - 36.5 g/dL    RDW 17.7 (H) 11.5 - 14.5 %    PLATELET 013 (H) 004 - 400 K/uL    MPV 9.1 8.9 - 12.9 FL    NRBC 0.0 0.0  WBC    ABSOLUTE NRBC 0.00 0.00 - 0.01 K/uL    NEUTROPHILS 72 32 - 75 %    LYMPHOCYTES 22 12 - 49 %    MONOCYTES 5 5 - 13 %    EOSINOPHILS 1 0 - 7 %    BASOPHILS 0 0 - 1 %    IMMATURE GRANULOCYTES 0 0 - 0.5 %    ABS. NEUTROPHILS 5.7 1.8 - 8.0 K/UL    ABS. LYMPHOCYTES 1.8 0.8 - 3.5 K/UL    ABS. MONOCYTES 0.4 0.0 - 1.0 K/UL    ABS. EOSINOPHILS 0.1 0.0 - 0.4 K/UL    ABS. BASOPHILS 0.0 0.0 - 0.1 K/UL    ABS. IMM.  GRANS. 0.0 0.00 - 0.04 K/UL    DF AUTOMATED     METABOLIC PANEL, COMPREHENSIVE    Collection Time: 12/22/22  2:05 PM   Result Value Ref Range    Sodium 132 (L) 136 - 145 mmol/L    Potassium 3.8 3.5 - 5.1 mmol/L    Chloride 96 (L) 97 - 108 mmol/L    CO2 26 21 - 32 mmol/L    Anion gap 10 5 - 15 mmol/L    Glucose 197 (H) 65 - 100 mg/dL    BUN 50 (H) 6 - 20 mg/dL Creatinine 3.09 (H) 0.70 - 1.30 mg/dL    BUN/Creatinine ratio 16 12 - 20      eGFR 22 (L) >60 ml/min/1.73m2    Calcium 9.3 8.5 - 10.1 mg/dL    Bilirubin, total 0.5 0.2 - 1.0 mg/dL    AST (SGOT) 23 15 - 37 U/L    ALT (SGPT) 20 12 - 78 U/L    Alk.  phosphatase 74 45 - 117 U/L    Protein, total 8.1 6.4 - 8.2 g/dL    Albumin 2.5 (L) 3.5 - 5.0 g/dL    Globulin 5.6 (H) 2.0 - 4.0 g/dL    A-G Ratio 0.4 (L) 1.1 - 2.2     LACTIC ACID    Collection Time: 12/22/22  2:05 PM   Result Value Ref Range    Lactic acid 2.3 (HH) 0.4 - 2.0 mmol/L   CULTURE, BLOOD, PAIRED    Collection Time: 12/22/22  2:05 PM    Specimen: Blood   Result Value Ref Range    Special Requests: No Special Requests      Culture result: No growth after 4 hours     SED RATE (ESR)    Collection Time: 12/22/22  2:05 PM   Result Value Ref Range    Sed rate, automated 83 (H) 0 - 20 mm/hr   CRP, HIGH SENSITIVITY    Collection Time: 12/22/22  2:05 PM   Result Value Ref Range    CRP, High sensitivity >9.5 mg/L   PROCALCITONIN    Collection Time: 12/22/22  2:05 PM   Result Value Ref Range    Procalcitonin 0.44 (H) 0 ng/mL   LACTIC ACID    Collection Time: 12/22/22  4:15 PM   Result Value Ref Range    Lactic acid 1.9 0.4 - 2.0 mmol/L     Radiologic Studies:  CT Results  (Last 48 hours)      None          CXR Results  (Last 48 hours)      None          Medications ordered:  Medications   vancomycin (VANCOCIN) 1,250 mg in 0.9% sodium chloride 250 mL ([id number]) (1,250 mg IntraVENous Given 12/22/22 1731)   sodium chloride 0.9 % bolus infusion 500 mL (0 mL IntraVENous IV Completed 12/22/22 1734)   piperacillin-tazobactam (ZOSYN) 4.5 g in 0.9% sodium chloride (MBP/ADV) 100 mL MBP (0 g IntraVENous IV Completed 12/22/22 1731)   morphine injection 4 mg (4 mg IntraVENous Given 12/22/22 1635)   morphine injection 4 mg (4 mg IntraVENous Given 12/22/22 1807)       ED Course & Reassessment     ED Course:     ED Course as of 12/22/22 1852   u Dec 22, 2022   1438 CBC does not show any evidence of acute process. Leukocytosis not present to suggest infection. Hemoglobin not suggestive of acute anemia. [SS]   1512 No significant electrolyte derangements. Creatinine is not elevated more than baseline range making JUAN C unlikely. No significant transaminitis noted. Normal bilirubin. ESR and procal elevated. Lactate 2.3. Picture of severe sepsis with gangrenous wound. As the patient has CKD, it is contraindicated to give him significant amount of fluids, will instead give him 500 cc. [SS]   1848 Patient amenable to AKA. Will admit. [SS]      ED Course User Index  [SS] Teo Dhaliwal MD       Reassessment:    Will admit. Final Disposition     Admission: [place] [id number]    After completion of ED workup and discussion of results and diagnoses, patient was admitted to the hospital. Case was discussed with admitting provider. Procedures, Critical Care, & Clinical Tools   Performed by: Mary Kate Bull MD  Procedures       CRITICAL CARE DOCUMENTATION  SEPSIS CRITICAL CARE NOTE :  6:50 PM    Critical care time is being documented due to the fulfillment of at least one of the following:    - Critical conditions: condition that acutely impairs one or more vital organ systems such that there is a high probability of imminent or life-threatening deterioration in condition. Examples are diagnoses including but not limited to Afib RVR, DKA, PE, Etc. .    - Critical interventions: an action whose failure to initiate would likely allow a sudden, clinically significant decline in the patient's condition. These include  Requirement of transfer or ICU admission  Contemplation or provision of tPA  Drip initiation (pressors, antiarrhythmics, heparin, etc.)  Antidotes given (narcan, charcoal, epi for anaphylaxis, etc..)  >=2L fluid bolus  >=3 Duonebs  >1 IV/IM doses of sedatives, antiepileptics, BP meds, rate control meds, adenosine.   Procedures that are suggestive of critical care: chest tubes, cardioversion, BiPAP, IO, etc..    Critical care time is documented based on continuous or non-continuous provision of care that includes face-to-face time, placing orders, chart review, documentation, discussion with consultants, discussion with family. This time calculation is a best approximation and does not include time spent on CPR, EKG interpretation, central line placement, intubation, laceration repairs, and other separately billed procedures. Amount of Critical Care Time: 35minutes    Details of critical care provision is documented above. A general summary is listed below:    IMPENDING DETERIORATION -Cardiovascular and Metabolic  ASSOCIATED RISK FACTORS - Metabolic changes and Sepsis  MANAGEMENT- Bedside Assessment  INTERPRETATION -  Blood Pressure  INTERVENTIONS - hemodynamic mngmt and Metobolic interventions  CASE REVIEW - Hospitalist  TREATMENT RESPONSE -Stable  PERFORMED BY - Self    NOTES   :  During this entire length of time I was immediately available to the patient . Stacie Nguyen MD      SEPSIS REASSESSMENT NOTE  Sepsis Reassessment:    The patient meets criteria for Severe Sepsis due to a suspected source being Skin/Bone/Joint  Cultures and antibiotics were initiated sequentially and appropriately as per orders. Fluid resuscitation volume with 30ml/kg crystalloid bolus was: CONTRAINDICATED: the patient HAS severe sepsis/septic shock OR had a hypotensive blood pressure reading but received a bolus LESS than the standard 30ml/kg bolus due to concern for harm as the patient has Concern for fluid overload and Renal failure. Therefore, a crystalloid bolus volume of 500ml was given. I have performed a sepsis reassessment of the patient's clinical volume status and tissue perfusion after the final volume of target fluid was initiated, at 6:50 PM, and the patient is adequately resuscitated. Diagnosis     Clinical Impression:   1. Wet gangrene ([address]    2.  Severe sepsis ([address] Attestations:  Hieu Gupta MD    Documentation Comments   - I am the first and primary provider for this patient and am the primary provider of record. - Initial assessment performed. The patients presenting problems have been discussed, and the staff are in agreement with the care plan formulated and outlined with them. I have encouraged them to ask questions as they arise throughout their visit. - Available medical records, nursing notes, old EKGs, and EMS run sheets (if patient was EMS transported) were reviewed    Please note that this dictation was completed with [employer], the computer voice recognition software. Quite often unanticipated grammatical, syntax, homophones, and other interpretive errors are inadvertently transcribed by the computer software. Please disregard these errors. Please excuse any errors that have escaped final proofreading.

## 2022-12-22 NOTE — TELEPHONE ENCOUNTER
Amanda Mcelroy from [facility] called and asked why Dr Quirino Isaac wanted to sent the patient to the hospital, I advised her that he told the patient he should go to ED but the patient insisted that he would go to [facility]. Amanda Mcelroy asked what the purpose of sending him to ED was, I placed her on hold and transferred to [facility] who told her the purpose and what was discussed between Dr Quirino Isaac and patient.

## 2022-12-23 ENCOUNTER — APPOINTMENT (OUTPATIENT)
Dept: NON INVASIVE DIAGNOSTICS | Age: 67
DRG: 853 | End: 2022-12-23
Attending: SURGERY
Payer: MEDICARE

## 2022-12-23 ENCOUNTER — ANESTHESIA EVENT (OUTPATIENT)
Dept: SURGERY | Age: 67
DRG: 853 | End: 2022-12-23
Payer: MEDICARE

## 2022-12-23 LAB
ALBUMIN SERPL-MCNC: 2.2 G/DL (ref 3.5–5)
ALBUMIN/GLOB SERPL: 0.5 (ref 1.1–2.2)
ALP SERPL-CCNC: 59 U/L (ref 45–117)
ALT SERPL-CCNC: 12 U/L (ref 12–78)
ANION GAP SERPL CALC-SCNC: 8 MMOL/L (ref 5–15)
AST SERPL W P-5'-P-CCNC: 8 U/L (ref 15–37)
ATRIAL RATE: 91 BPM
BILIRUB SERPL-MCNC: 0.4 MG/DL (ref 0.2–1)
BUN SERPL-MCNC: 49 MG/DL (ref 6–20)
BUN/CREAT SERPL: 16 (ref 12–20)
CA-I BLD-MCNC: 8.6 MG/DL (ref 8.5–10.1)
CALCULATED P AXIS, ECG09: 6 DEGREES
CALCULATED R AXIS, ECG10: 9 DEGREES
CALCULATED T AXIS, ECG11: 33 DEGREES
CHLORIDE SERPL-SCNC: 99 MMOL/L (ref 97–108)
CO2 SERPL-SCNC: 26 MMOL/L (ref 21–32)
CREAT SERPL-MCNC: 3.02 MG/DL (ref 0.7–1.3)
DIAGNOSIS, 93000: NORMAL
ERYTHROCYTE [DISTWIDTH] IN BLOOD BY AUTOMATED COUNT: 17.5 % (ref 11.5–14.5)
GLOBULIN SER CALC-MCNC: 4.1 G/DL (ref 2–4)
GLUCOSE BLD STRIP.AUTO-MCNC: 166 MG/DL (ref 65–100)
GLUCOSE BLD STRIP.AUTO-MCNC: 193 MG/DL (ref 65–100)
GLUCOSE SERPL-MCNC: 143 MG/DL (ref 65–100)
HBV SURFACE AG SER QL: <0.1 INDEX
HBV SURFACE AG SER QL: NEGATIVE
HCT VFR BLD AUTO: 24.3 % (ref 36.6–50.3)
HGB BLD-MCNC: 7.3 G/DL (ref 12.1–17)
INR PPP: 1.2 (ref 0.9–1.1)
LACTATE SERPL-SCNC: 1.3 MMOL/L (ref 0.4–2)
MCH RBC QN AUTO: 24.3 PG (ref 26–34)
MCHC RBC AUTO-ENTMCNC: 30 G/DL (ref 30–36.5)
MCV RBC AUTO: 80.7 FL (ref 80–99)
MRSA DNA SPEC QL NAA+PROBE: DETECTED
NRBC # BLD: 0 K/UL (ref 0–0.01)
NRBC BLD-RTO: 0 PER 100 WBC
P-R INTERVAL, ECG05: 160 MS
PERFORMED BY, TECHID: ABNORMAL
PERFORMED BY, TECHID: ABNORMAL
PLATELET # BLD AUTO: 528 K/UL (ref 150–400)
PMV BLD AUTO: 9 FL (ref 8.9–12.9)
POTASSIUM SERPL-SCNC: 3.4 MMOL/L (ref 3.5–5.1)
PROT SERPL-MCNC: 6.3 G/DL (ref 6.4–8.2)
PROTHROMBIN TIME: 15.3 SEC (ref 11.9–14.6)
Q-T INTERVAL, ECG07: 360 MS
QRS DURATION, ECG06: 94 MS
QTC CALCULATION (BEZET), ECG08: 442 MS
RBC # BLD AUTO: 3.01 M/UL (ref 4.1–5.7)
RIGHT ABI: 0.74
RIGHT ARM BP: 93 MMHG
RIGHT POSTERIOR TIBIAL: 69 MMHG
SODIUM SERPL-SCNC: 133 MMOL/L (ref 136–145)
VANCOMYCIN SERPL-MCNC: 17.9 UG/ML
VENTRICULAR RATE, ECG03: 91 BPM
WBC # BLD AUTO: 9.9 K/UL (ref 4.1–11.1)

## 2022-12-23 PROCEDURE — 93005 ELECTROCARDIOGRAM TRACING: CPT

## 2022-12-23 PROCEDURE — 99222 1ST HOSP IP/OBS MODERATE 55: CPT | Performed by: SURGERY

## 2022-12-23 PROCEDURE — 82962 GLUCOSE BLOOD TEST: CPT

## 2022-12-23 PROCEDURE — 74011000250 HC RX REV CODE- 250: Performed by: INTERNAL MEDICINE

## 2022-12-23 PROCEDURE — 85027 COMPLETE CBC AUTOMATED: CPT

## 2022-12-23 PROCEDURE — 80202 ASSAY OF VANCOMYCIN: CPT

## 2022-12-23 PROCEDURE — 85610 PROTHROMBIN TIME: CPT

## 2022-12-23 PROCEDURE — 87340 HEPATITIS B SURFACE AG IA: CPT

## 2022-12-23 PROCEDURE — 74011250636 HC RX REV CODE- 250/636: Performed by: INTERNAL MEDICINE

## 2022-12-23 PROCEDURE — 74011000258 HC RX REV CODE- 258: Performed by: INTERNAL MEDICINE

## 2022-12-23 PROCEDURE — 87040 BLOOD CULTURE FOR BACTERIA: CPT

## 2022-12-23 PROCEDURE — 90935 HEMODIALYSIS ONE EVALUATION: CPT

## 2022-12-23 PROCEDURE — 93923 UPR/LXTR ART STDY 3+ LVLS: CPT | Performed by: SURGERY

## 2022-12-23 PROCEDURE — 87641 MR-STAPH DNA AMP PROBE: CPT

## 2022-12-23 PROCEDURE — 5A1D70Z PERFORMANCE OF URINARY FILTRATION, INTERMITTENT, LESS THAN 6 HOURS PER DAY: ICD-10-PCS | Performed by: INTERNAL MEDICINE

## 2022-12-23 PROCEDURE — 65270000029 HC RM PRIVATE

## 2022-12-23 PROCEDURE — 93923 UPR/LXTR ART STDY 3+ LVLS: CPT

## 2022-12-23 PROCEDURE — 74011250636 HC RX REV CODE- 250/636

## 2022-12-23 PROCEDURE — 80053 COMPREHEN METABOLIC PANEL: CPT

## 2022-12-23 PROCEDURE — 74011250637 HC RX REV CODE- 250/637: Performed by: INTERNAL MEDICINE

## 2022-12-23 PROCEDURE — 83605 ASSAY OF LACTIC ACID: CPT

## 2022-12-23 RX ORDER — HEPARIN SODIUM 1000 [USP'U]/ML
3800 INJECTION, SOLUTION INTRAVENOUS; SUBCUTANEOUS
Status: DISCONTINUED | OUTPATIENT
Start: 2022-12-23 | End: 2023-01-17 | Stop reason: HOSPADM

## 2022-12-23 RX ORDER — OXYCODONE HYDROCHLORIDE 5 MG/1
5 TABLET ORAL
Status: DISCONTINUED | OUTPATIENT
Start: 2022-12-23 | End: 2023-01-17 | Stop reason: HOSPADM

## 2022-12-23 RX ORDER — HEPARIN SODIUM 1000 [USP'U]/ML
INJECTION, SOLUTION INTRAVENOUS; SUBCUTANEOUS
Status: COMPLETED
Start: 2022-12-23 | End: 2022-12-23

## 2022-12-23 RX ADMIN — LISINOPRIL 5 MG: 5 TABLET ORAL at 09:28

## 2022-12-23 RX ADMIN — MELATONIN TAB 5 MG 5 MG: 5 TAB at 22:34

## 2022-12-23 RX ADMIN — EPOETIN ALFA-EPBX 8000 UNITS: 4000 INJECTION, SOLUTION INTRAVENOUS; SUBCUTANEOUS at 14:22

## 2022-12-23 RX ADMIN — PIPERACILLIN AND TAZOBACTAM 3.38 G: 3; .375 INJECTION, POWDER, FOR SOLUTION INTRAVENOUS at 22:34

## 2022-12-23 RX ADMIN — ISOSORBIDE DINITRATE 20 MG: 20 TABLET ORAL at 09:15

## 2022-12-23 RX ADMIN — ISOSORBIDE DINITRATE 20 MG: 20 TABLET ORAL at 22:34

## 2022-12-23 RX ADMIN — ACETAMINOPHEN 650 MG: 325 TABLET, FILM COATED ORAL at 09:28

## 2022-12-23 RX ADMIN — AMLODIPINE BESYLATE 10 MG: 5 TABLET ORAL at 09:15

## 2022-12-23 RX ADMIN — SODIUM CHLORIDE, PRESERVATIVE FREE 10 ML: 5 INJECTION INTRAVENOUS at 22:35

## 2022-12-23 RX ADMIN — CARVEDILOL 25 MG: 12.5 TABLET, FILM COATED ORAL at 22:34

## 2022-12-23 RX ADMIN — SODIUM CHLORIDE, PRESERVATIVE FREE 10 ML: 5 INJECTION INTRAVENOUS at 06:49

## 2022-12-23 RX ADMIN — CALCIUM CARBONATE 200 MG: 500 TABLET, CHEWABLE ORAL at 09:15

## 2022-12-23 RX ADMIN — HEPARIN SODIUM 3800 UNITS: 1000 INJECTION, SOLUTION INTRAVENOUS; SUBCUTANEOUS at 14:32

## 2022-12-23 RX ADMIN — GABAPENTIN 100 MG: 100 CAPSULE ORAL at 22:34

## 2022-12-23 RX ADMIN — TICAGRELOR 90 MG: 90 TABLET ORAL at 09:28

## 2022-12-23 RX ADMIN — HYDRALAZINE HYDROCHLORIDE 25 MG: 25 TABLET, FILM COATED ORAL at 22:34

## 2022-12-23 RX ADMIN — GABAPENTIN 100 MG: 100 CAPSULE ORAL at 09:15

## 2022-12-23 RX ADMIN — ACETAMINOPHEN 650 MG: 325 TABLET, FILM COATED ORAL at 15:52

## 2022-12-23 RX ADMIN — GABAPENTIN 100 MG: 100 CAPSULE ORAL at 15:52

## 2022-12-23 RX ADMIN — PIPERACILLIN AND TAZOBACTAM 3.38 G: 3; .375 INJECTION, POWDER, FOR SOLUTION INTRAVENOUS at 09:08

## 2022-12-23 RX ADMIN — VANCOMYCIN HYDROCHLORIDE 750 MG: 750 INJECTION, POWDER, LYOPHILIZED, FOR SOLUTION INTRAVENOUS at 17:02

## 2022-12-23 RX ADMIN — CARVEDILOL 25 MG: 12.5 TABLET, FILM COATED ORAL at 09:15

## 2022-12-23 RX ADMIN — OXYCODONE 5 MG: 5 TABLET ORAL at 22:34

## 2022-12-23 RX ADMIN — OXYCODONE 5 MG: 5 TABLET ORAL at 17:02

## 2022-12-23 RX ADMIN — ATORVASTATIN CALCIUM 40 MG: 40 TABLET, FILM COATED ORAL at 09:15

## 2022-12-23 RX ADMIN — HYDRALAZINE HYDROCHLORIDE 25 MG: 25 TABLET, FILM COATED ORAL at 09:28

## 2022-12-23 NOTE — PROGRESS NOTES
Problem: Falls - Risk of  Goal: *Absence of Falls  Description: Document Karine Mast Fall Risk and appropriate interventions in the flowsheet. Outcome: Progressing Towards Goal  Note: Fall Risk Interventions:            Medication Interventions: Bed/chair exit alarm                   Problem: Patient Education: Go to Patient Education Activity  Goal: Patient/Family Education  Outcome: Progressing Towards Goal     Problem: Pressure Injury - Risk of  Goal: *Prevention of pressure injury  Description: Document Cruzito Scale and appropriate interventions in the flowsheet. Outcome: Progressing Towards Goal  Note: Pressure Injury Interventions:  Sensory Interventions: Minimize linen layers, Maintain/enhance activity level, Monitor skin under medical devices, Pad between skin to skin         Activity Interventions: PT/OT evaluation    Mobility Interventions: HOB 30 degrees or less    Nutrition Interventions: Document food/fluid/supplement intake    Friction and Shear Interventions: Minimize layers                Problem: Patient Education: Go to Patient Education Activity  Goal: Patient/Family Education  Outcome: Progressing Towards Goal     Problem: Risk for Spread of Infection  Goal: Prevent transmission of infectious organism to others  Description: Prevent the transmission of infectious organisms to other patients, staff members, and visitors.   Outcome: Progressing Towards Goal     Problem: Patient Education:  Go to Education Activity  Goal: Patient/Family Education  Outcome: Progressing Towards Goal

## 2022-12-23 NOTE — CONSULTS
Consult Date: 2022    IP CONSULT TO NEPHROLOGY  Consult performed by: Supriya Chavez MD  Consult ordered by: Mic Daley MD          Subjective   HISTORY OF PRESENTING ILLNESS :  Goyo Parra is a 74 y. o.,male ,BLACK/ with History of congestive heart failure with reduced ejection fraction, coronary artery disease s/p MI, PCI and stent placement, peripheral vascular disease, chronic atrial fibrillation, ESRD on hemodialysis is admitted for left heel gangrene. He is seen by vascular surgery and left AKA is being planned for tomorrow. Is also being evaluated by cardiology. He has end-stage renal disease and dialyzes by means of tunneled hemodialysis catheter over the last 6 months. He dialyzes at [facility].  In usual dialysis schedule is Monday, Wednesday and Friday.   He states his usual times around 3 hours  Past Medical History:   Diagnosis Date    A-fib ([address]     Anemia     Arrhythmia     CAD (coronary artery disease)     Chronic kidney disease     Diabetes ([facility]     DM2    Dialysis patient [facility])     M-W- Master Russell [phone]    Heart failure (HCC)     Hypertension     STEMI (ST elevation myocardial infarction) ([facility] [address]       Past Surgical History:   Procedure Laterality Date    HX AMPUTATION Left     5 fingers    HX HEART CATHETERIZATION      IR INSERT NON TUNL CVC OVER 5 YRS  2021    IR INSERT NON TUNL CVC OVER 5 YRS  2022    IR INSERT TUNL CVC W/O PORT OVER 5 YR  2021    IR INSERT TUNL CVC W/O PORT OVER 5 YR  2022    OK CARDIAC SURG PROCEDURE UNLIST       Family History   Problem Relation Age of Onset    Cancer Mother         breast    No Known Problems Father       Social History     Tobacco Use    Smoking status: Former     Packs/day: 0.50     Years: 15.00     Pack years: 7.50     Types: Cigarettes     Quit date:      Years since quittin.9    Smokeless tobacco: Never   Substance Use Topics    Alcohol use: Not Currently       Current Facility-Administered Medications   Medication Dose Route Frequency Provider Last Rate Last Admin    epoetin luis-epbx (RETACRIT) injection 8,000 Units  8,000 Units SubCUTAneous DIALYSIS MON, WED & FRI Radha Flor MD        amLODIPine (NORVASC) tablet 10 mg  10 mg Oral DAILY Sugar CARBAJAL MD   10 mg at 12/23/22 0915    atorvastatin (LIPITOR) tablet 40 mg  40 mg Oral DAILY Sugar CARBAJAL MD   40 mg at 12/23/22 0915    calcium carbonate (TUMS) chewable tablet 200 mg [elemental]  200 mg Oral DAILY Jorie Bumpers, MD   200 mg at 12/23/22 0915    carvediloL (COREG) tablet 25 mg  25 mg Oral BID Sugar CARBAJAL MD   25 mg at 12/23/22 0915    gabapentin (NEURONTIN) capsule 100 mg  100 mg Oral TID Jorie Bumpers, MD   100 mg at 12/23/22 0915    hydrALAZINE (APRESOLINE) tablet 25 mg  25 mg Oral TID Jorie Bumpers, MD   25 mg at 12/23/22 7200    insulin glargine (LANTUS) injection 10 Units  10 Units SubCUTAneous QHS Sugar CARBAJAL MD        isosorbide dinitrate (ISORDIL) tablet 20 mg  20 mg Oral TID Jorie Bumpers, MD   20 mg at 12/23/22 0915    lisinopriL (PRINIVIL, ZESTRIL) tablet 5 mg  5 mg Oral DAILY Jorie Bumpers, MD   5 mg at 12/23/22 4153    melatonin tablet 5 mg  5 mg Oral QHS Sugar CARBAJAL MD   5 mg at 12/22/22 2205    ticagrelor (BRILINTA) tablet 90 mg  90 mg Oral BID Sugar CARBAJAL MD   90 mg at 12/23/22 1214    sodium chloride (NS) flush 5-40 mL  5-40 mL IntraVENous Q8H Jorie Bumpers, MD   10 mL at 12/23/22 0649    sodium chloride (NS) flush 5-40 mL  5-40 mL IntraVENous PRN Jorie Bumpers, MD        acetaminophen (TYLENOL) tablet 650 mg  650 mg Oral Q6H PRN Sugar CARBAJAL MD   650 mg at 12/23/22 2310    Or    acetaminophen (TYLENOL) suppository 650 mg  650 mg Rectal Q6H PRN Sugar CARBAJAL MD        polyethylene glycol (MIRALAX) packet 17 g  17 g Oral DAILY PRN Sugar CARBAJAL MD        ondansetron (ZOFRAN ODT) tablet 4 mg  4 mg Oral Q8H PRN Jorie Bumpers, MD Or    ondansetron (ZOFRAN) injection 4 mg  4 mg IntraVENous Q6H PRN Melissa CARBAJAL MD        piperacillin-tazobactam (ZOSYN) 3.375 g in 0.9% sodium chloride (MBP/ADV) 100 mL MBP  3.375 g IntraVENous Q12H Melissa CARBAJAL MD 25 mL/hr at 12/23/22 0908 3.375 g at 12/23/22 0908    Vancomycin - Pharmacy to Dose   Other Rx Dosing/Monitoring Mecca Johansen MD            Review of Systems   Constitutional:  Positive for appetite change and fatigue. Negative for chills and fever. Eyes:  Negative for photophobia and visual disturbance. Respiratory:  Negative for shortness of breath and wheezing. Cardiovascular:  Negative for chest pain and palpitations. Gastrointestinal:  Positive for nausea. Negative for abdominal distention, abdominal pain, blood in stool, constipation, diarrhea and vomiting. Genitourinary:  Negative for dysuria, frequency and hematuria. Musculoskeletal:  Negative for back pain, myalgias and neck pain. Skin:  Negative for rash. Allergic/Immunologic: Negative for environmental allergies. Neurological:  Negative for dizziness, tremors, speech difficulty and headaches. Hematological:  Does not bruise/bleed easily. Psychiatric/Behavioral:  Negative for behavioral problems and suicidal ideas. The patient is not nervous/anxious.       Objective     Vital signs for last 24 hours:  Visit Vitals  BP (!) 101/54   Pulse 86   Temp 98.7 °F (37.1 °C) (Oral)   Resp 18   Ht 5' 8\" (1.727 m)   Wt 63.5 kg (140 lb)   SpO2 98%   BMI 21.29 kg/m²           Recent Results (from the past 24 hour(s))   CBC WITH AUTOMATED DIFF    Collection Time: 12/22/22  2:05 PM   Result Value Ref Range    WBC 8.0 4.1 - 11.1 K/uL    RBC 3.66 (L) 4.10 - 5.70 M/uL    HGB 8.8 (L) 12.1 - 17.0 g/dL    HCT 30.0 (L) 36.6 - 50.3 %    MCV 82.0 80.0 - 99.0 FL    MCH 24.0 (L) 26.0 - 34.0 PG    MCHC 29.3 (L) 30.0 - 36.5 g/dL    RDW 17.7 (H) 11.5 - 14.5 %    PLATELET 003 (H) 535 - 400 K/uL    MPV 9.1 8.9 - 12.9 FL    NRBC 0.0 0.0  WBC    ABSOLUTE NRBC 0.00 0.00 - 0.01 K/uL    NEUTROPHILS 72 32 - 75 %    LYMPHOCYTES 22 12 - 49 %    MONOCYTES 5 5 - 13 %    EOSINOPHILS 1 0 - 7 %    BASOPHILS 0 0 - 1 %    IMMATURE GRANULOCYTES 0 0 - 0.5 %    ABS. NEUTROPHILS 5.7 1.8 - 8.0 K/UL    ABS. LYMPHOCYTES 1.8 0.8 - 3.5 K/UL    ABS. MONOCYTES 0.4 0.0 - 1.0 K/UL    ABS. EOSINOPHILS 0.1 0.0 - 0.4 K/UL    ABS. BASOPHILS 0.0 0.0 - 0.1 K/UL    ABS. IMM. GRANS. 0.0 0.00 - 0.04 K/UL    DF AUTOMATED     METABOLIC PANEL, COMPREHENSIVE    Collection Time: 12/22/22  2:05 PM   Result Value Ref Range    Sodium 132 (L) 136 - 145 mmol/L    Potassium 3.8 3.5 - 5.1 mmol/L    Chloride 96 (L) 97 - 108 mmol/L    CO2 26 21 - 32 mmol/L    Anion gap 10 5 - 15 mmol/L    Glucose 197 (H) 65 - 100 mg/dL    BUN 50 (H) 6 - 20 mg/dL    Creatinine 3.09 (H) 0.70 - 1.30 mg/dL    BUN/Creatinine ratio 16 12 - 20      eGFR 22 (L) >60 ml/min/1.73m2    Calcium 9.3 8.5 - 10.1 mg/dL    Bilirubin, total 0.5 0.2 - 1.0 mg/dL    AST (SGOT) 23 15 - 37 U/L    ALT (SGPT) 20 12 - 78 U/L    Alk.  phosphatase 74 45 - 117 U/L    Protein, total 8.1 6.4 - 8.2 g/dL    Albumin 2.5 (L) 3.5 - 5.0 g/dL    Globulin 5.6 (H) 2.0 - 4.0 g/dL    A-G Ratio 0.4 (L) 1.1 - 2.2     LACTIC ACID    Collection Time: 12/22/22  2:05 PM   Result Value Ref Range    Lactic acid 2.3 (HH) 0.4 - 2.0 mmol/L   CULTURE, BLOOD, PAIRED    Collection Time: 12/22/22  2:05 PM    Specimen: Blood   Result Value Ref Range    Special Requests: No Special Requests      Culture result: No growth after 17 hours     SED RATE (ESR)    Collection Time: 12/22/22  2:05 PM   Result Value Ref Range    Sed rate, automated 83 (H) 0 - 20 mm/hr   CRP, HIGH SENSITIVITY    Collection Time: 12/22/22  2:05 PM   Result Value Ref Range    CRP, High sensitivity >9.5 mg/L   PROCALCITONIN    Collection Time: 12/22/22  2:05 PM   Result Value Ref Range    Procalcitonin 0.44 (H) 0 ng/mL   LACTIC ACID    Collection Time: 12/22/22  4:15 PM   Result Value Ref Range Lactic acid 1.9 0.4 - 2.0 mmol/L   GLUCOSE, POC    Collection Time: 12/22/22 10:04 PM   Result Value Ref Range    Glucose (POC) 120 (H) 65 - 100 mg/dL    Performed by Ismael Kinney    MRSA SCREEN - PCR (NASAL)    Collection Time: 12/23/22 12:00 AM   Result Value Ref Range    MRSA by PCR, Nasal DETECTED (A) Not Detected     METABOLIC PANEL, COMPREHENSIVE    Collection Time: 12/23/22  3:07 AM   Result Value Ref Range    Sodium 133 (L) 136 - 145 mmol/L    Potassium 3.4 (L) 3.5 - 5.1 mmol/L    Chloride 99 97 - 108 mmol/L    CO2 26 21 - 32 mmol/L    Anion gap 8 5 - 15 mmol/L    Glucose 143 (H) 65 - 100 mg/dL    BUN 49 (H) 6 - 20 mg/dL    Creatinine 3.02 (H) 0.70 - 1.30 mg/dL    BUN/Creatinine ratio 16 12 - 20      eGFR 22 (L) >60 ml/min/1.73m2    Calcium 8.6 8.5 - 10.1 mg/dL    Bilirubin, total 0.4 0.2 - 1.0 mg/dL    AST (SGOT) 8 (L) 15 - 37 U/L    ALT (SGPT) 12 12 - 78 U/L    Alk.  phosphatase 59 45 - 117 U/L    Protein, total 6.3 (L) 6.4 - 8.2 g/dL    Albumin 2.2 (L) 3.5 - 5.0 g/dL    Globulin 4.1 (H) 2.0 - 4.0 g/dL    A-G Ratio 0.5 (L) 1.1 - 2.2     LACTIC ACID    Collection Time: 12/23/22  3:07 AM   Result Value Ref Range    Lactic acid 1.3 0.4 - 2.0 mmol/L   CBC W/O DIFF    Collection Time: 12/23/22  3:07 AM   Result Value Ref Range    WBC 9.9 4.1 - 11.1 K/uL    RBC 3.01 (L) 4.10 - 5.70 M/uL    HGB 7.3 (L) 12.1 - 17.0 g/dL    HCT 24.3 (L) 36.6 - 50.3 %    MCV 80.7 80.0 - 99.0 FL    MCH 24.3 (L) 26.0 - 34.0 PG    MCHC 30.0 30.0 - 36.5 g/dL    RDW 17.5 (H) 11.5 - 14.5 %    PLATELET 323 (H) 523 - 400 K/uL    MPV 9.0 8.9 - 12.9 FL    NRBC 0.0 0.0  WBC    ABSOLUTE NRBC 0.00 0.00 - 0.01 K/uL   PROTHROMBIN TIME + INR    Collection Time: 12/23/22  3:07 AM   Result Value Ref Range    Prothrombin time 15.3 (H) 11.9 - 14.6 sec    INR 1.2 (H) 0.9 - 1.1     VANCOMYCIN, RANDOM    Collection Time: 12/23/22  3:07 AM   Result Value Ref Range    Vancomycin, random 17.9 ug/mL   EKG, 12 LEAD, SUBSEQUENT    Collection Time: 12/23/22 11:12 AM   Result Value Ref Range    Ventricular Rate 91 BPM    Atrial Rate 91 BPM    P-R Interval 160 ms    QRS Duration 94 ms    Q-T Interval 360 ms    QTC Calculation (Bezet) 442 ms    Calculated P Axis 6 degrees    Calculated R Axis 9 degrees    Calculated T Axis 33 degrees    Diagnosis       Normal sinus rhythm  Possible Inferior infarct , old  Abnormal ECG  When compared with ECG of 05-OCT-2022 23:50,  Nonspecific T wave abnormality now evident in Anterior leads  Confirmed by Mirna Hoffman MD, Rickey Sainz ([id number]) on 12/23/2022 12:01:32 PM            Intake/Output Summary (Last 24 hours) at 12/23/2022 1312  Last data filed at 12/23/2022 0932  Gross per 24 hour   Intake 916.67 ml   Output 200 ml   Net 716.67 ml      Current Shift: 12/23 0701 - 12/23 1900  In: -   Out: 200 [Urine:200]  Last 3 Shifts: 12/21 1901 - 12/23 0700  In: 916.7 [I.V.:916.7]  Out: -   Physical Exam  Vitals reviewed. Constitutional:       General: He is not in acute distress. Appearance: He is ill-appearing. HENT:      Head: Normocephalic and atraumatic. Nose: Nose normal.      Mouth/Throat:      Pharynx: No oropharyngeal exudate. Eyes:      General: No scleral icterus. Conjunctiva/sclera: Conjunctivae normal.   Cardiovascular:      Rate and Rhythm: Normal rate and regular rhythm. Pulses: Normal pulses. Heart sounds: Normal heart sounds. Pulmonary:      Effort: Pulmonary effort is normal.      Breath sounds: Normal breath sounds. Abdominal:      General: There is no distension. Palpations: Abdomen is soft. Tenderness: There is no abdominal tenderness. Musculoskeletal:      Cervical back: Neck supple. Skin:     General: Skin is warm and dry. Findings: No rash. Neurological:      General: No focal deficit present. Mental Status: He is alert and oriented to person, place, and time. Mental status is at baseline.    Psychiatric:         Mood and Affect: Mood normal.         Behavior: Behavior normal.         Judgment: Judgment normal.      Left heel gangrene-wounds on both legs under bandages  Tunneled hemodialysis catheter right side-exit site clean  Data Review:   Recent Results (from the past 24 hour(s))   CBC WITH AUTOMATED DIFF    Collection Time: 12/22/22  2:05 PM   Result Value Ref Range    WBC 8.0 4.1 - 11.1 K/uL    RBC 3.66 (L) 4.10 - 5.70 M/uL    HGB 8.8 (L) 12.1 - 17.0 g/dL    HCT 30.0 (L) 36.6 - 50.3 %    MCV 82.0 80.0 - 99.0 FL    MCH 24.0 (L) 26.0 - 34.0 PG    MCHC 29.3 (L) 30.0 - 36.5 g/dL    RDW 17.7 (H) 11.5 - 14.5 %    PLATELET 707 (H) 241 - 400 K/uL    MPV 9.1 8.9 - 12.9 FL    NRBC 0.0 0.0  WBC    ABSOLUTE NRBC 0.00 0.00 - 0.01 K/uL    NEUTROPHILS 72 32 - 75 %    LYMPHOCYTES 22 12 - 49 %    MONOCYTES 5 5 - 13 %    EOSINOPHILS 1 0 - 7 %    BASOPHILS 0 0 - 1 %    IMMATURE GRANULOCYTES 0 0 - 0.5 %    ABS. NEUTROPHILS 5.7 1.8 - 8.0 K/UL    ABS. LYMPHOCYTES 1.8 0.8 - 3.5 K/UL    ABS. MONOCYTES 0.4 0.0 - 1.0 K/UL    ABS. EOSINOPHILS 0.1 0.0 - 0.4 K/UL    ABS. BASOPHILS 0.0 0.0 - 0.1 K/UL    ABS. IMM. GRANS. 0.0 0.00 - 0.04 K/UL    DF AUTOMATED     METABOLIC PANEL, COMPREHENSIVE    Collection Time: 12/22/22  2:05 PM   Result Value Ref Range    Sodium 132 (L) 136 - 145 mmol/L    Potassium 3.8 3.5 - 5.1 mmol/L    Chloride 96 (L) 97 - 108 mmol/L    CO2 26 21 - 32 mmol/L    Anion gap 10 5 - 15 mmol/L    Glucose 197 (H) 65 - 100 mg/dL    BUN 50 (H) 6 - 20 mg/dL    Creatinine 3.09 (H) 0.70 - 1.30 mg/dL    BUN/Creatinine ratio 16 12 - 20      eGFR 22 (L) >60 ml/min/1.73m2    Calcium 9.3 8.5 - 10.1 mg/dL    Bilirubin, total 0.5 0.2 - 1.0 mg/dL    AST (SGOT) 23 15 - 37 U/L    ALT (SGPT) 20 12 - 78 U/L    Alk.  phosphatase 74 45 - 117 U/L    Protein, total 8.1 6.4 - 8.2 g/dL    Albumin 2.5 (L) 3.5 - 5.0 g/dL    Globulin 5.6 (H) 2.0 - 4.0 g/dL    A-G Ratio 0.4 (L) 1.1 - 2.2     LACTIC ACID    Collection Time: 12/22/22  2:05 PM   Result Value Ref Range    Lactic acid 2.3 (HH) 0.4 - 2.0 mmol/L CULTURE, BLOOD, PAIRED    Collection Time: 12/22/22  2:05 PM    Specimen: Blood   Result Value Ref Range    Special Requests: No Special Requests      Culture result: No growth after 17 hours     SED RATE (ESR)    Collection Time: 12/22/22  2:05 PM   Result Value Ref Range    Sed rate, automated 83 (H) 0 - 20 mm/hr   CRP, HIGH SENSITIVITY    Collection Time: 12/22/22  2:05 PM   Result Value Ref Range    CRP, High sensitivity >9.5 mg/L   PROCALCITONIN    Collection Time: 12/22/22  2:05 PM   Result Value Ref Range    Procalcitonin 0.44 (H) 0 ng/mL   LACTIC ACID    Collection Time: 12/22/22  4:15 PM   Result Value Ref Range    Lactic acid 1.9 0.4 - 2.0 mmol/L   GLUCOSE, POC    Collection Time: 12/22/22 10:04 PM   Result Value Ref Range    Glucose (POC) 120 (H) 65 - 100 mg/dL    Performed by Anais Moran    MRSA SCREEN - PCR (NASAL)    Collection Time: 12/23/22 12:00 AM   Result Value Ref Range    MRSA by PCR, Nasal DETECTED (A) Not Detected     METABOLIC PANEL, COMPREHENSIVE    Collection Time: 12/23/22  3:07 AM   Result Value Ref Range    Sodium 133 (L) 136 - 145 mmol/L    Potassium 3.4 (L) 3.5 - 5.1 mmol/L    Chloride 99 97 - 108 mmol/L    CO2 26 21 - 32 mmol/L    Anion gap 8 5 - 15 mmol/L    Glucose 143 (H) 65 - 100 mg/dL    BUN 49 (H) 6 - 20 mg/dL    Creatinine 3.02 (H) 0.70 - 1.30 mg/dL    BUN/Creatinine ratio 16 12 - 20      eGFR 22 (L) >60 ml/min/1.73m2    Calcium 8.6 8.5 - 10.1 mg/dL    Bilirubin, total 0.4 0.2 - 1.0 mg/dL    AST (SGOT) 8 (L) 15 - 37 U/L    ALT (SGPT) 12 12 - 78 U/L    Alk.  phosphatase 59 45 - 117 U/L    Protein, total 6.3 (L) 6.4 - 8.2 g/dL    Albumin 2.2 (L) 3.5 - 5.0 g/dL    Globulin 4.1 (H) 2.0 - 4.0 g/dL    A-G Ratio 0.5 (L) 1.1 - 2.2     LACTIC ACID    Collection Time: 12/23/22  3:07 AM   Result Value Ref Range    Lactic acid 1.3 0.4 - 2.0 mmol/L   CBC W/O DIFF    Collection Time: 12/23/22  3:07 AM   Result Value Ref Range    WBC 9.9 4.1 - 11.1 K/uL    RBC 3.01 (L) 4.10 - 5.70 M/uL    HGB 7.3 (L) 12.1 - 17.0 g/dL    HCT 24.3 (L) 36.6 - 50.3 %    MCV 80.7 80.0 - 99.0 FL    MCH 24.3 (L) 26.0 - 34.0 PG    MCHC 30.0 30.0 - 36.5 g/dL    RDW 17.5 (H) 11.5 - 14.5 %    PLATELET 262 (H) 212 - 400 K/uL    MPV 9.0 8.9 - 12.9 FL    NRBC 0.0 0.0  WBC    ABSOLUTE NRBC 0.00 0.00 - 0.01 K/uL   PROTHROMBIN TIME + INR    Collection Time: 12/23/22  3:07 AM   Result Value Ref Range    Prothrombin time 15.3 (H) 11.9 - 14.6 sec    INR 1.2 (H) 0.9 - 1.1     VANCOMYCIN, RANDOM    Collection Time: 12/23/22  3:07 AM   Result Value Ref Range    Vancomycin, random 17.9 ug/mL   EKG, 12 LEAD, SUBSEQUENT    Collection Time: 12/23/22 11:12 AM   Result Value Ref Range    Ventricular Rate 91 BPM    Atrial Rate 91 BPM    P-R Interval 160 ms    QRS Duration 94 ms    Q-T Interval 360 ms    QTC Calculation (Bezet) 442 ms    Calculated P Axis 6 degrees    Calculated R Axis 9 degrees    Calculated T Axis 33 degrees    Diagnosis       Normal sinus rhythm  Possible Inferior infarct , old  Abnormal ECG  When compared with ECG of 05-OCT-2022 23:50,  Nonspecific T wave abnormality now evident in Anterior leads  Confirmed by Lory Mckeon MD, Erica Mott ([id number]) on 12/23/2022 12:01:32 PM           LOWER EXT ART PVR MULT LEVEL SEG PRESSURES    (Results Pending)        Patient Active Problem List   Diagnosis Code    Acute hypoxemic respiratory failure due to COVID-19 ([facility]) U07.1, J96.01    Chronic systolic congestive heart failure ([facility]) I50.22    Goals of care, counseling/discussion Z71.89    DNR (do not resuscitate) discussion Z71.89    JUAN C (acute kidney injury) ([facility] [address] N17.9    Fatigue, unspecified type R53.83    Dysphagia, unspecified type R13.10    Acute CHF (congestive heart failure) ([facility]) I50.9    Wet gangrene ([facility]) I96        DIAGNOSES:  ESRD on hemodialysis  Peripheral artery disease  Wet gangrene  CHF with reduced ejection fraction  Chronic atrial fibrillation  Anemia of chronic kidney disease/chronic inflammation  hypokalemia  DISCUSSION:  concerning presentation with severe PAD/gangrene. Would suggest transfusion presurgery    PLAN:  Hemodialysis today-3 hours  Fluid removal as tolerated  8000 units Procrit given  Would suggest transfusion prior to surgery        Thanks for consulting me. Please don't hesitate to contact me if any questions arise of if I can assist in any manner. This dictation was done by dragon, computer voice recognition software. Often unanticipated grammatical, syntax, phones and other interpretive errors are inadvertently transcribed. Please excuse errors that have escaped final proofreading. Please contact me if you suspect dictation or transcription errors.   Dr Lina Henry  [address], [address] [facility], [facility]  Cell Phone: [phone]  Office phone: [phone]  Fax: [phone]

## 2022-12-23 NOTE — ED NOTES
TRANSFER - OUT REPORT:    Verbal report given to Reina Miller on Ileana Vazquez  being transferred to [facility] for routine progression of care       Report consisted of patients Situation, Background, Assessment and   Recommendations(SBAR). Information from the following report(s) SBAR, ED Summary, MAR, and Recent Results was reviewed with the receiving nurse. Lines:   Peripheral IV 12/22/22 Distal;Posterior;Right Forearm (Active)        Opportunity for questions and clarification was provided.       Patient transported with:   [employer]

## 2022-12-23 NOTE — CONSULTS
History and Physical    Chief complaints: Left knee gangrene   History of Presenting Illness:  Adriane Larsen is a 74 y.o. very pleasant gentleman admitted to hospital for left heel gangrene. Patient also has a problem with the circulation on the right leg as well. Patient has no fever. On exam patient has a significant black eschar over the left knee and purulent drainage. Patient also has contractions on the left leg. Past Medical History:   Diagnosis Date    A-fib ([facility]     Anemia     Arrhythmia     CAD (coronary artery disease)     Chronic kidney disease     Diabetes ([facility]     DM2    Dialysis patient [facility])     M-W- Javiernarinder Gordono [phone]    Heart failure (HCC)     Hypertension     STEMI (ST elevation myocardial infarction) ([facility]       Past Surgical History:   Procedure Laterality Date    HX AMPUTATION Left     5 fingers    HX HEART CATHETERIZATION      IR INSERT NON TUNL CVC OVER 5 YRS  2021    IR INSERT NON TUNL CVC OVER 5 YRS  2022    IR INSERT TUNL CVC W/O PORT OVER 5 YR  2021    IR INSERT TUNL CVC W/O PORT OVER 5 YR  2022    UT CARDIAC SURG PROCEDURE UNLIST       Family History   Problem Relation Age of Onset    Cancer Mother         breast    No Known Problems Father       Social History     Tobacco Use    Smoking status: Former     Packs/day: 0.50     Years: 15.00     Pack years: 7.50     Types: Cigarettes     Quit date:      Years since quittin.9    Smokeless tobacco: Never   Substance Use Topics    Alcohol use: Not Currently       Prior to Admission medications    Medication Sig Start Date End Date Taking? Authorizing Provider   ticagrelor (BRILINTA) 90 mg tablet Take 90 mg by mouth two (2) times a day. Yes Candelaria, MD Jessi   isosorbide dinitrate (ISORDIL) 20 mg tablet Take 20 mg by mouth three (3) times daily. Yes Candelaria, MD Jessi   lisinopriL (PRINIVIL, ZESTRIL) 5 mg tablet Take 5 mg by mouth daily.    Yes Candelaria, MD Jessi   melatonin 5 mg cap capsule Take 5 mg by mouth nightly. Yes Candelaria, MD Jessi   atorvastatin (LIPITOR) 80 mg tablet Take 0.5 Tabs by mouth daily. 3/4/21  Yes Summer Zuleta NP   carvediloL (COREG) 12.5 mg tablet Take 2 Tabs by mouth two (2) times a day. 3/4/21  Yes Summer Zuleta NP   hydrALAZINE (APRESOLINE) 25 mg tablet Take 1 Tab by mouth three (3) times daily. Patient taking differently: Take 50 mg by mouth three (3) times daily. 3/4/21  Yes Summer Zuleta NP   aspirin 81 mg chewable tablet Take 81 mg by mouth daily. Yes Candelaria, MD Jessi   collagenase (SANTYL) 250 unit/gram ointment Apply  to affected area daily. 8/21/22   Luisa Nance MD   gabapentin (NEURONTIN) 100 mg capsule Take 1 Capsule by mouth three (3) times daily. Max Daily Amount: 300 mg. 8/21/22   Luisa Nance MD   calcium carbonate (TUMS) 200 mg calcium (500 mg) chew Take 1 Tablet by mouth in the morning. Provider, Historical   insulin glargine (Lantus U-100 Insulin) 100 unit/mL injection 10 Units by SubCUTAneous route nightly. 4/13/22   WhitTony adam MD   amLODIPine (NORVASC) 10 mg tablet Take 1 Tab by mouth daily. Patient not taking: Reported on 12/22/2022 3/5/21   Smumer Zuleta NP   acetaminophen (TYLENOL) 325 mg tablet Take 975 mg by mouth every six (6) hours as needed for Pain. Other, MD Jessi     No Known Allergies     Review of Systems:  Pertinent review of systems discussed in HPI, and rest of organ systems personally reviewed and they are negative. Objective:   Vital signs reviewed:      Visit Vitals  BP (!) 161/75   Pulse 89   Temp 98.1 °F (36.7 °C)   Resp 18   Ht 5' 8\" (1.727 m)   Wt 140 lb (63.5 kg)   SpO2 98%   BMI 21.29 kg/m²       Physical Exam:   General appearance:   Patient is awake and alert, not in particular distress. Head and neck atraumatic normocephalic. ENT shows normal oral mucosa, no jaundice no hoarse voice. Eyes: Pupil equal gaze appropriate. Cardiac system regular rate rhythm.   Pulmonary: No audible wheeze. Chest wall: Chest wall excursion normal with respiration cycle, there is no deformity or chest trauma. Abdomen: Soft not tender or distended, bowel sounds active. There is no obvious palpable mass, or hernia. Neurologic: Nonfocal.  Cranial nerves intact, no new focal findings. Musculoskeletal system: Motor function normal limits, motor function 5 out of 5, range of motion normal in all 4 extremity  Skin: Warm and moist.  Hematologic system: No obvious bruising. Psychosocial: Appropriate and cooperative. Vascular examination: Lower and upper extremities warm to touch, no signs of ischemia or cyanosis. Data Review: Labs are reviewed. Discussed  Recent Results (from the past 24 hour(s))   CBC WITH AUTOMATED DIFF    Collection Time: 12/22/22  2:05 PM   Result Value Ref Range    WBC 8.0 4.1 - 11.1 K/uL    RBC 3.66 (L) 4.10 - 5.70 M/uL    HGB 8.8 (L) 12.1 - 17.0 g/dL    HCT 30.0 (L) 36.6 - 50.3 %    MCV 82.0 80.0 - 99.0 FL    MCH 24.0 (L) 26.0 - 34.0 PG    MCHC 29.3 (L) 30.0 - 36.5 g/dL    RDW 17.7 (H) 11.5 - 14.5 %    PLATELET 493 (H) 607 - 400 K/uL    MPV 9.1 8.9 - 12.9 FL    NRBC 0.0 0.0  WBC    ABSOLUTE NRBC 0.00 0.00 - 0.01 K/uL    NEUTROPHILS 72 32 - 75 %    LYMPHOCYTES 22 12 - 49 %    MONOCYTES 5 5 - 13 %    EOSINOPHILS 1 0 - 7 %    BASOPHILS 0 0 - 1 %    IMMATURE GRANULOCYTES 0 0 - 0.5 %    ABS. NEUTROPHILS 5.7 1.8 - 8.0 K/UL    ABS. LYMPHOCYTES 1.8 0.8 - 3.5 K/UL    ABS. MONOCYTES 0.4 0.0 - 1.0 K/UL    ABS. EOSINOPHILS 0.1 0.0 - 0.4 K/UL    ABS. BASOPHILS 0.0 0.0 - 0.1 K/UL    ABS. IMM.  GRANS. 0.0 0.00 - 0.04 K/UL    DF AUTOMATED     METABOLIC PANEL, COMPREHENSIVE    Collection Time: 12/22/22  2:05 PM   Result Value Ref Range    Sodium 132 (L) 136 - 145 mmol/L    Potassium 3.8 3.5 - 5.1 mmol/L    Chloride 96 (L) 97 - 108 mmol/L    CO2 26 21 - 32 mmol/L    Anion gap 10 5 - 15 mmol/L    Glucose 197 (H) 65 - 100 mg/dL    BUN 50 (H) 6 - 20 mg/dL    Creatinine 3.09 (H) 0.70 - 1.30 mg/dL    BUN/Creatinine ratio 16 12 - 20      eGFR 22 (L) >60 ml/min/1.73m2    Calcium 9.3 8.5 - 10.1 mg/dL    Bilirubin, total 0.5 0.2 - 1.0 mg/dL    AST (SGOT) 23 15 - 37 U/L    ALT (SGPT) 20 12 - 78 U/L    Alk.  phosphatase 74 45 - 117 U/L    Protein, total 8.1 6.4 - 8.2 g/dL    Albumin 2.5 (L) 3.5 - 5.0 g/dL    Globulin 5.6 (H) 2.0 - 4.0 g/dL    A-G Ratio 0.4 (L) 1.1 - 2.2     LACTIC ACID    Collection Time: 12/22/22  2:05 PM   Result Value Ref Range    Lactic acid 2.3 (HH) 0.4 - 2.0 mmol/L   CULTURE, BLOOD, PAIRED    Collection Time: 12/22/22  2:05 PM    Specimen: Blood   Result Value Ref Range    Special Requests: No Special Requests      Culture result: No growth after 17 hours     SED RATE (ESR)    Collection Time: 12/22/22  2:05 PM   Result Value Ref Range    Sed rate, automated 83 (H) 0 - 20 mm/hr   CRP, HIGH SENSITIVITY    Collection Time: 12/22/22  2:05 PM   Result Value Ref Range    CRP, High sensitivity >9.5 mg/L   PROCALCITONIN    Collection Time: 12/22/22  2:05 PM   Result Value Ref Range    Procalcitonin 0.44 (H) 0 ng/mL   LACTIC ACID    Collection Time: 12/22/22  4:15 PM   Result Value Ref Range    Lactic acid 1.9 0.4 - 2.0 mmol/L   GLUCOSE, POC    Collection Time: 12/22/22 10:04 PM   Result Value Ref Range    Glucose (POC) 120 (H) 65 - 100 mg/dL    Performed by Lewis Sharma    MRSA SCREEN - PCR (NASAL)    Collection Time: 12/23/22 12:00 AM   Result Value Ref Range    MRSA by PCR, Nasal DETECTED (A) Not Detected     METABOLIC PANEL, COMPREHENSIVE    Collection Time: 12/23/22  3:07 AM   Result Value Ref Range    Sodium 133 (L) 136 - 145 mmol/L    Potassium 3.4 (L) 3.5 - 5.1 mmol/L    Chloride 99 97 - 108 mmol/L    CO2 26 21 - 32 mmol/L    Anion gap 8 5 - 15 mmol/L    Glucose 143 (H) 65 - 100 mg/dL    BUN 49 (H) 6 - 20 mg/dL    Creatinine 3.02 (H) 0.70 - 1.30 mg/dL    BUN/Creatinine ratio 16 12 - 20      eGFR 22 (L) >60 ml/min/1.73m2    Calcium 8.6 8.5 - 10.1 mg/dL    Bilirubin, total 0.4 0.2 - 1.0 mg/dL    AST (SGOT) 8 (L) 15 - 37 U/L    ALT (SGPT) 12 12 - 78 U/L    Alk. phosphatase 59 45 - 117 U/L    Protein, total 6.3 (L) 6.4 - 8.2 g/dL    Albumin 2.2 (L) 3.5 - 5.0 g/dL    Globulin 4.1 (H) 2.0 - 4.0 g/dL    A-G Ratio 0.5 (L) 1.1 - 2.2     LACTIC ACID    Collection Time: 12/23/22  3:07 AM   Result Value Ref Range    Lactic acid 1.3 0.4 - 2.0 mmol/L   CBC W/O DIFF    Collection Time: 12/23/22  3:07 AM   Result Value Ref Range    WBC 9.9 4.1 - 11.1 K/uL    RBC 3.01 (L) 4.10 - 5.70 M/uL    HGB 7.3 (L) 12.1 - 17.0 g/dL    HCT 24.3 (L) 36.6 - 50.3 %    MCV 80.7 80.0 - 99.0 FL    MCH 24.3 (L) 26.0 - 34.0 PG    MCHC 30.0 30.0 - 36.5 g/dL    RDW 17.5 (H) 11.5 - 14.5 %    PLATELET 389 (H) 766 - 400 K/uL    MPV 9.0 8.9 - 12.9 FL    NRBC 0.0 0.0  WBC    ABSOLUTE NRBC 0.00 0.00 - 0.01 K/uL   PROTHROMBIN TIME + INR    Collection Time: 12/23/22  3:07 AM   Result Value Ref Range    Prothrombin time 15.3 (H) 11.9 - 14.6 sec    INR 1.2 (H) 0.9 - 1.1     VANCOMYCIN, RANDOM    Collection Time: 12/23/22  3:07 AM   Result Value Ref Range    Vancomycin, random 17.9 ug/mL         No images are attached to the encounter. Imagings reviewed: discussed as below. No name on file. Assessment:     Active Problems:    Wet gangrene ([facility] [address] (12/22/2022)        Plan:     Patient will need a left above-knee amputation. We discussed about this in details. Patient has significant gangrene on the left leg. Unfortunately I do not think we can salvage his leg. Meanwhile I will arrange NORMA examination on the right leg. Patient is scheduled for left AKA tomorrow morning.

## 2022-12-23 NOTE — DIALYSIS
GCS 15 pt tolerated 3h dialysis with 1 L fluid removal.  Right chest perm cath dressing changed, site wnl. Report via SBAR to [employer]. Pt hypotensive during treatment no mental status changes and asymptomatic. Pt received HTN meds prior to dialysis. EPO on dialysis for hgb 7.3. Pt returns to room via transport.

## 2022-12-23 NOTE — PROGRESS NOTES
Progress Note    Patient: Roxanne Fuentes MRN: [id number]  SSN: [id number]    YOB: 1955  Age: 74 y.o. Sex: male      Admit Date: 12/22/2022    LOS: 1 day     Subjective:     74years old admitted with osteomyelitis for possible surgical amputation history of end-stage renal disease CAD    Objective:     Vitals:    12/22/22 2200 12/23/22 0040 12/23/22 0250 12/23/22 0723   BP: (!) 149/81 122/68 121/64 (!) 161/75   Pulse: 91 (!) 102 94 89   Resp:  18 18 18   Temp:  98.2 °F (36.8 °C) 98 °F (36.7 °C) 98.1 °F (36.7 °C)   SpO2:  97% 99% 98%   Weight:       Height:            Intake and Output:  Current Shift: 12/23 0701 - 12/23 1900  In: -   Out: 200 [Urine:200]  Last three shifts: 12/21 1901 - 12/23 0700  In: 916.7 [I.V.:916.7]  Out: -     Physical Exam:   General:  Alert, cooperative, no distress, appears stated age. Eyes:  Conjunctivae/corneas clear. PERRL, EOMs intact. Fundi benign   Ears:  Normal TMs and external ear canals both ears. Nose: Nares normal. Septum midline. Mucosa normal. No drainage or sinus tenderness. Mouth/Throat: Lips, mucosa, and tongue normal. Teeth and gums normal.   Neck: Supple, symmetrical, trachea midline, no adenopathy, thyroid: no enlargment/tenderness/nodules, no carotid bruit and no JVD. Back:   Symmetric, no curvature. ROM normal. No CVA tenderness. Lungs:   Clear to auscultation bilaterally. Heart:  Regular rate and rhythm, S1, S2 normal, no murmur, click, rub or gallop. Abdomen:   Soft, non-tender. Bowel sounds normal. No masses,  No organomegaly. Extremities: Infected knee   Pulses: 2+ and symmetric all extremities. Skin: Skin color, texture, turgor normal. No rashes or lesions   Lymph nodes: Cervical, supraclavicular, and axillary nodes normal.   Neurologic: CNII-XII intact. Normal strength, sensation and reflexes throughout. Lab/Data Review: All lab results for the last 24 hours reviewed.      Recent Results (from the past 24 hour(s)) CBC WITH AUTOMATED DIFF    Collection Time: 12/22/22  2:05 PM   Result Value Ref Range    WBC 8.0 4.1 - 11.1 K/uL    RBC 3.66 (L) 4.10 - 5.70 M/uL    HGB 8.8 (L) 12.1 - 17.0 g/dL    HCT 30.0 (L) 36.6 - 50.3 %    MCV 82.0 80.0 - 99.0 FL    MCH 24.0 (L) 26.0 - 34.0 PG    MCHC 29.3 (L) 30.0 - 36.5 g/dL    RDW 17.7 (H) 11.5 - 14.5 %    PLATELET 249 (H) 819 - 400 K/uL    MPV 9.1 8.9 - 12.9 FL    NRBC 0.0 0.0  WBC    ABSOLUTE NRBC 0.00 0.00 - 0.01 K/uL    NEUTROPHILS 72 32 - 75 %    LYMPHOCYTES 22 12 - 49 %    MONOCYTES 5 5 - 13 %    EOSINOPHILS 1 0 - 7 %    BASOPHILS 0 0 - 1 %    IMMATURE GRANULOCYTES 0 0 - 0.5 %    ABS. NEUTROPHILS 5.7 1.8 - 8.0 K/UL    ABS. LYMPHOCYTES 1.8 0.8 - 3.5 K/UL    ABS. MONOCYTES 0.4 0.0 - 1.0 K/UL    ABS. EOSINOPHILS 0.1 0.0 - 0.4 K/UL    ABS. BASOPHILS 0.0 0.0 - 0.1 K/UL    ABS. IMM. GRANS. 0.0 0.00 - 0.04 K/UL    DF AUTOMATED     METABOLIC PANEL, COMPREHENSIVE    Collection Time: 12/22/22  2:05 PM   Result Value Ref Range    Sodium 132 (L) 136 - 145 mmol/L    Potassium 3.8 3.5 - 5.1 mmol/L    Chloride 96 (L) 97 - 108 mmol/L    CO2 26 21 - 32 mmol/L    Anion gap 10 5 - 15 mmol/L    Glucose 197 (H) 65 - 100 mg/dL    BUN 50 (H) 6 - 20 mg/dL    Creatinine 3.09 (H) 0.70 - 1.30 mg/dL    BUN/Creatinine ratio 16 12 - 20      eGFR 22 (L) >60 ml/min/1.73m2    Calcium 9.3 8.5 - 10.1 mg/dL    Bilirubin, total 0.5 0.2 - 1.0 mg/dL    AST (SGOT) 23 15 - 37 U/L    ALT (SGPT) 20 12 - 78 U/L    Alk.  phosphatase 74 45 - 117 U/L    Protein, total 8.1 6.4 - 8.2 g/dL    Albumin 2.5 (L) 3.5 - 5.0 g/dL    Globulin 5.6 (H) 2.0 - 4.0 g/dL    A-G Ratio 0.4 (L) 1.1 - 2.2     LACTIC ACID    Collection Time: 12/22/22  2:05 PM   Result Value Ref Range    Lactic acid 2.3 (HH) 0.4 - 2.0 mmol/L   CULTURE, BLOOD, PAIRED    Collection Time: 12/22/22  2:05 PM    Specimen: Blood   Result Value Ref Range    Special Requests: No Special Requests      Culture result: No growth after 17 hours     SED RATE (ESR) Collection Time: 12/22/22  2:05 PM   Result Value Ref Range    Sed rate, automated 83 (H) 0 - 20 mm/hr   CRP, HIGH SENSITIVITY    Collection Time: 12/22/22  2:05 PM   Result Value Ref Range    CRP, High sensitivity >9.5 mg/L   PROCALCITONIN    Collection Time: 12/22/22  2:05 PM   Result Value Ref Range    Procalcitonin 0.44 (H) 0 ng/mL   LACTIC ACID    Collection Time: 12/22/22  4:15 PM   Result Value Ref Range    Lactic acid 1.9 0.4 - 2.0 mmol/L   GLUCOSE, POC    Collection Time: 12/22/22 10:04 PM   Result Value Ref Range    Glucose (POC) 120 (H) 65 - 100 mg/dL    Performed by Mitch Weldon    MRSA SCREEN - PCR (NASAL)    Collection Time: 12/23/22 12:00 AM   Result Value Ref Range    MRSA by PCR, Nasal DETECTED (A) Not Detected     METABOLIC PANEL, COMPREHENSIVE    Collection Time: 12/23/22  3:07 AM   Result Value Ref Range    Sodium 133 (L) 136 - 145 mmol/L    Potassium 3.4 (L) 3.5 - 5.1 mmol/L    Chloride 99 97 - 108 mmol/L    CO2 26 21 - 32 mmol/L    Anion gap 8 5 - 15 mmol/L    Glucose 143 (H) 65 - 100 mg/dL    BUN 49 (H) 6 - 20 mg/dL    Creatinine 3.02 (H) 0.70 - 1.30 mg/dL    BUN/Creatinine ratio 16 12 - 20      eGFR 22 (L) >60 ml/min/1.73m2    Calcium 8.6 8.5 - 10.1 mg/dL    Bilirubin, total 0.4 0.2 - 1.0 mg/dL    AST (SGOT) 8 (L) 15 - 37 U/L    ALT (SGPT) 12 12 - 78 U/L    Alk.  phosphatase 59 45 - 117 U/L    Protein, total 6.3 (L) 6.4 - 8.2 g/dL    Albumin 2.2 (L) 3.5 - 5.0 g/dL    Globulin 4.1 (H) 2.0 - 4.0 g/dL    A-G Ratio 0.5 (L) 1.1 - 2.2     LACTIC ACID    Collection Time: 12/23/22  3:07 AM   Result Value Ref Range    Lactic acid 1.3 0.4 - 2.0 mmol/L   CBC W/O DIFF    Collection Time: 12/23/22  3:07 AM   Result Value Ref Range    WBC 9.9 4.1 - 11.1 K/uL    RBC 3.01 (L) 4.10 - 5.70 M/uL    HGB 7.3 (L) 12.1 - 17.0 g/dL    HCT 24.3 (L) 36.6 - 50.3 %    MCV 80.7 80.0 - 99.0 FL    MCH 24.3 (L) 26.0 - 34.0 PG    MCHC 30.0 30.0 - 36.5 g/dL    RDW 17.5 (H) 11.5 - 14.5 %    PLATELET 966 (H) 924 - 400 K/uL MPV 9.0 8.9 - 12.9 FL    NRBC 0.0 0.0  WBC    ABSOLUTE NRBC 0.00 0.00 - 0.01 K/uL   PROTHROMBIN TIME + INR    Collection Time: 12/23/22  3:07 AM   Result Value Ref Range    Prothrombin time 15.3 (H) 11.9 - 14.6 sec    INR 1.2 (H) 0.9 - 1.1     VANCOMYCIN, RANDOM    Collection Time: 12/23/22  3:07 AM   Result Value Ref Range    Vancomycin, random 17.9 ug/mL         Assessment:     Active Problems:    Wet gangrene ([address] (12/22/2022)      Tension  End-stage renal disease dialysis dependent  Plan:   IV antibiotics  Surgery and infectious disease to evaluate and treat  Signed By: Low Breen MD     December 23, 2022

## 2022-12-23 NOTE — H&P
History and Physical    Patient: Minnie Cobian MRN: [id number]  SSN: [id number]    YOB: 1955  Age: 74 y.o. Sex: male      Subjective:      Minnie Cobian is a 74 y.o. male who has a history of multiple medical case including atrial fibrillation, diabetes, end-stage renal disease, hypertension, history of amputation who has infected knee with open bone requiring amputation which patient is finally agreed to. Past Medical History:   Diagnosis Date    A-fib ([facility]     Anemia     Arrhythmia     CAD (coronary artery disease)     Chronic kidney disease     Diabetes ([facility]     DM2    Dialysis patient [facility])     M-W- [place] [phone]    Heart failure (HCC)     Hypertension     STEMI (ST elevation myocardial infarction) ([facility]      Past Surgical History:   Procedure Laterality Date    HX AMPUTATION Left     5 fingers    HX HEART CATHETERIZATION      IR INSERT NON TUNL CVC OVER 5 YRS  2021    IR INSERT NON TUNL CVC OVER 5 YRS  2022    IR INSERT TUNL CVC W/O PORT OVER 5 YR  2021    IR INSERT TUNL CVC W/O PORT OVER 5 YR  2022    GA CARDIAC SURG PROCEDURE UNLIST        Family History   Problem Relation Age of Onset    Cancer Mother         breast    No Known Problems Father      Social History     Tobacco Use    Smoking status: Former     Packs/day: 0.50     Years: 15.00     Pack years: 7.50     Types: Cigarettes     Quit date:      Years since quittin.9    Smokeless tobacco: Never   Substance Use Topics    Alcohol use: Not Currently      Prior to Admission medications    Medication Sig Start Date End Date Taking? Authorizing Provider   ticagrelor (BRILINTA) 90 mg tablet Take 90 mg by mouth two (2) times a day. Yes Other, MD Jessi   isosorbide dinitrate (ISORDIL) 20 mg tablet Take 20 mg by mouth three (3) times daily. Yes Other, MD Jessi   lisinopriL (PRINIVIL, ZESTRIL) 5 mg tablet Take 5 mg by mouth daily.    Yes Other, MD Jessi   melatonin 5 mg cap capsule Take 5 mg by mouth nightly. Yes Candelaria, MD Jessi   atorvastatin (LIPITOR) 80 mg tablet Take 0.5 Tabs by mouth daily. 3/4/21  Yes Martinez Vuong NP   carvediloL (COREG) 12.5 mg tablet Take 2 Tabs by mouth two (2) times a day. 3/4/21  Yes Martinez Vuong NP   hydrALAZINE (APRESOLINE) 25 mg tablet Take 1 Tab by mouth three (3) times daily. Patient taking differently: Take 50 mg by mouth three (3) times daily. 3/4/21  Yes Martinez Vuong NP   aspirin 81 mg chewable tablet Take 81 mg by mouth daily. Yes Candelaria, MD Jessi   collagenase (SANTYL) 250 unit/gram ointment Apply  to affected area daily. 8/21/22   Joaquin Brody MD   gabapentin (NEURONTIN) 100 mg capsule Take 1 Capsule by mouth three (3) times daily. Max Daily Amount: 300 mg. 8/21/22   Joaquin Brody MD   calcium carbonate (TUMS) 200 mg calcium (500 mg) chew Take 1 Tablet by mouth in the morning. Provider, Historical   insulin glargine (Lantus U-100 Insulin) 100 unit/mL injection 10 Units by SubCUTAneous route nightly. 4/13/22   Tony Sherman MD   amLODIPine (NORVASC) 10 mg tablet Take 1 Tab by mouth daily. Patient not taking: Reported on 12/22/2022 3/5/21   Martinez Vuong NP   acetaminophen (TYLENOL) 325 mg tablet Take 975 mg by mouth every six (6) hours as needed for Pain. Other, MD Jessi        No Known Allergies    Review of Systems:  A comprehensive review of systems was negative except for that written in the History of Present Illness. Objective:     Vitals:    12/22/22 1346 12/22/22 1402   BP: (!) 151/74    Pulse: 95 93   Resp: 20 14   Temp: 97.8 °F (36.6 °C)    SpO2: 96%    Weight: 63.5 kg (140 lb)    Height: 5' 8\" (1.727 m)         Physical Exam:  General:  Alert, cooperative, no distress, appears stated age. Eyes:  Conjunctivae/corneas clear. PERRL, EOMs intact. Fundi benign   Ears:  Normal TMs and external ear canals both ears. Nose: Nares normal. Septum midline. Mucosa normal. No drainage or sinus tenderness.    Mouth/Throat: Lips, mucosa, and tongue normal. Teeth and gums normal.   Neck: Supple, symmetrical, trachea midline, no adenopathy, thyroid: no enlargment/tenderness/nodules, no carotid bruit and no JVD. Back:   Symmetric, no curvature. ROM normal. No CVA tenderness. Lungs:   Clear to auscultation bilaterally. Heart:  Regular rate and rhythm, S1, S2 normal, no murmur, click, rub or gallop. Abdomen:   Soft, non-tender. Bowel sounds normal. No masses,  No organomegaly. Extremities: Upper left extremity hypoplasty  or edema. Left lower extremity infected knee   Pulses: 2+ and symmetric all extremities. Skin: Skin color, texture, turgor normal. No rashes or lesions   Lymph nodes: Cervical, supraclavicular, and axillary nodes normal.   Neurologic: CNII-XII intact. Normal strength, sensation and reflexes throughout. Recent Results (from the past 24 hour(s))   CBC WITH AUTOMATED DIFF    Collection Time: 12/22/22  2:05 PM   Result Value Ref Range    WBC 8.0 4.1 - 11.1 K/uL    RBC 3.66 (L) 4.10 - 5.70 M/uL    HGB 8.8 (L) 12.1 - 17.0 g/dL    HCT 30.0 (L) 36.6 - 50.3 %    MCV 82.0 80.0 - 99.0 FL    MCH 24.0 (L) 26.0 - 34.0 PG    MCHC 29.3 (L) 30.0 - 36.5 g/dL    RDW 17.7 (H) 11.5 - 14.5 %    PLATELET 435 (H) 151 - 400 K/uL    MPV 9.1 8.9 - 12.9 FL    NRBC 0.0 0.0  WBC    ABSOLUTE NRBC 0.00 0.00 - 0.01 K/uL    NEUTROPHILS 72 32 - 75 %    LYMPHOCYTES 22 12 - 49 %    MONOCYTES 5 5 - 13 %    EOSINOPHILS 1 0 - 7 %    BASOPHILS 0 0 - 1 %    IMMATURE GRANULOCYTES 0 0 - 0.5 %    ABS. NEUTROPHILS 5.7 1.8 - 8.0 K/UL    ABS. LYMPHOCYTES 1.8 0.8 - 3.5 K/UL    ABS. MONOCYTES 0.4 0.0 - 1.0 K/UL    ABS. EOSINOPHILS 0.1 0.0 - 0.4 K/UL    ABS. BASOPHILS 0.0 0.0 - 0.1 K/UL    ABS. IMM.  GRANS. 0.0 0.00 - 0.04 K/UL    DF AUTOMATED     METABOLIC PANEL, COMPREHENSIVE    Collection Time: 12/22/22  2:05 PM   Result Value Ref Range    Sodium 132 (L) 136 - 145 mmol/L    Potassium 3.8 3.5 - 5.1 mmol/L    Chloride 96 (L) 97 - 108 mmol/L    CO2 26 21 - 32 mmol/L    Anion gap 10 5 - 15 mmol/L    Glucose 197 (H) 65 - 100 mg/dL    BUN 50 (H) 6 - 20 mg/dL    Creatinine 3.09 (H) 0.70 - 1.30 mg/dL    BUN/Creatinine ratio 16 12 - 20      eGFR 22 (L) >60 ml/min/1.73m2    Calcium 9.3 8.5 - 10.1 mg/dL    Bilirubin, total 0.5 0.2 - 1.0 mg/dL    AST (SGOT) 23 15 - 37 U/L    ALT (SGPT) 20 12 - 78 U/L    Alk.  phosphatase 74 45 - 117 U/L    Protein, total 8.1 6.4 - 8.2 g/dL    Albumin 2.5 (L) 3.5 - 5.0 g/dL    Globulin 5.6 (H) 2.0 - 4.0 g/dL    A-G Ratio 0.4 (L) 1.1 - 2.2     LACTIC ACID    Collection Time: 12/22/22  2:05 PM   Result Value Ref Range    Lactic acid 2.3 (HH) 0.4 - 2.0 mmol/L   CULTURE, BLOOD, PAIRED    Collection Time: 12/22/22  2:05 PM    Specimen: Blood   Result Value Ref Range    Special Requests: No Special Requests      Culture result: No growth after 4 hours     SED RATE (ESR)    Collection Time: 12/22/22  2:05 PM   Result Value Ref Range    Sed rate, automated 83 (H) 0 - 20 mm/hr   CRP, HIGH SENSITIVITY    Collection Time: 12/22/22  2:05 PM   Result Value Ref Range    CRP, High sensitivity >9.5 mg/L   PROCALCITONIN    Collection Time: 12/22/22  2:05 PM   Result Value Ref Range    Procalcitonin 0.44 (H) 0 ng/mL   LACTIC ACID    Collection Time: 12/22/22  4:15 PM   Result Value Ref Range    Lactic acid 1.9 0.4 - 2.0 mmol/L       Assessment:     Hospital Problems  Never Reviewed            Codes Class Noted POA    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

## 2022-12-23 NOTE — ED NOTES
Attempted to give patient ordered 10 units of Lantus, but pt. States that he does not take this medication. Upon looking in chart, the last time the pt. Picked up his Lantus from the pharmacy was on 7/29/2022 and it was a 90 day supply. Took patient's blood sugar and it was 120. Will hold Lantus at this time and reevaluate with Dr. Greg Luis in the morning.

## 2022-12-23 NOTE — CONSULTS
Cardiology Consult    NAME: Adrianna Hunter   :  1955   MRN:  [id number]     Date/Time:  2022 10:52 AM    Patient PCP: Lata Faust MD  ________________________________________________________________________     Assessment/Plan:     Preop amputation  of left leg for infected knee. Denies chest pain or shortness of breath. Agree to proceed, increased risk from advanced vascular disease as detailed below. Continue beta-blockers nitrates and ACE inhibitor. Heart Failure with reduced ejection fraction,  ejection fraction of 25 to 30%. Clinically compensated. Coronary artery disease, status post STEMI, PCI of LAD and RCA ,  NSTEMI,  repeat catheterization with patent LAD and RCA stents, D2 disease, being managed medically. Peripheral vascular disease, status post transradial amputation left hand, gangrenous tip of right index finger, painful. Atrial fibrillation, was anticoagulated with Eliquis,? Off prior to admission. Now in sinus rhythm, continues Brilinta. End-stage renal disease, on hemodialysis           []        High complexity decision making was performed        Subjective:   CHIEF COMPLAINT:   Pain leg    REASON FOR CONSULT:    Preop for left leg amputation  HISTORY OF PRESENT ILLNESS:     Adrianna Hunter is a 74 y.o. BLACK/ male who presents with pain left leg. Has infected need for amputation. Known severe peripheral vascular disease. History of coronary artery disease, STEMI  followed by NSTEMI  and repeat catheterization  showing patent vessels LAD and RCA, diagonal disease felt to be best managed medically. Known severe left ventricular systolic dysfunction, on guideline directed medical therapy. Clinically compensated. Denies symptoms suggestive of unstable angina or heart failure. Remote tobacco use  Hypertension, fairly well controlled. Diabetes mellitus on insulin.   Peripheral vascular disease, has gangrenous tip of right index finger.   Status post [place] carpal amputation left and.  A. fib, was on apixaban, currently on Brilinta        Past Medical History:   Diagnosis Date    A-fib ([facility] [address]     Anemia     Arrhythmia     CAD (coronary artery disease)     Chronic kidney disease     Diabetes (HCC)     DM2    Dialysis patient [facility])     M-W-F [facility] [phone]    Heart failure (HCC)     Hypertension     STEMI (ST elevation myocardial infarction) ([facility] [address]       Past Surgical History:   Procedure Laterality Date    HX AMPUTATION Left     5 fingers    HX HEART CATHETERIZATION      IR INSERT NON TUNL CVC OVER 5 YRS  2021    IR INSERT NON TUNL CVC OVER 5 YRS  2022    IR INSERT TUNL CVC W/O PORT OVER 5 YR  2021    IR INSERT TUNL CVC W/O PORT OVER 5 YR  2022    PA CARDIAC SURG PROCEDURE UNLIST       No Known Allergies   Meds:  See below  Social History     Tobacco Use    Smoking status: Former     Packs/day: 0.50     Years: 15.00     Pack years: 7.50     Types: Cigarettes     Quit date:      Years since quittin.9    Smokeless tobacco: Never   Substance Use Topics    Alcohol use: Not Currently      Family History   Problem Relation Age of Onset    Cancer Mother         breast    No Known Problems Father        REVIEW OF SYSTEMS:     []         Unable to obtain  ROS due to ---   [x]         Total of 12 systems reviewed as follows:    Constitutional: negative fever, negative chills, negative weight loss  Eyes:   negative visual changes  ENT:   negative sore throat, tongue or lip swelling  Respiratory:  negative cough, negative dyspnea  Cards:      See HPI   GI:   negative for nausea, vomiting, diarrhea, and abdominal pain  Genitourinary: negative for frequency, dysuria  Integument:  negative for rash   Hematologic:  negative for easy bruising and gum/nose bleeding  Musculoskel: negative for myalgias,  back pain  Neurological:  negative for headaches, dizziness, vertigo, weakness  Behavl/Psych: negative for feelings of anxiety, depression     Pertinent Positives include :    Objective:      Physical Exam:    Last 24hrs VS reviewed since prior progress note. Most recent are:    Visit Vitals  BP (!) 161/75   Pulse 89   Temp 98.1 °F (36.7 °C)   Resp 18   Ht 5' 8\" (1.727 m)   Wt 63.5 kg (140 lb)   SpO2 98%   BMI 21.29 kg/m²       Intake/Output Summary (Last 24 hours) at 12/23/2022 1052  Last data filed at 12/23/2022 0932  Gross per 24 hour   Intake 916.67 ml   Output 200 ml   Net 716.67 ml        General Appearance: Thinly built male alert, no acute distress. Ears/Nose/Mouth/Throat: Pupils equal and round, Hearing grossly normal.  Neck: Supple. JVP within normal limits. Carotids good upstrokes, with no bruit. Chest: Lungs clear to auscultation bilaterally. Cardiovascular: Regular rate and rhythm, S1S2 normal, no murmur, rubs, gallops. Abdomen: Soft, non-tender, bowel sounds are active. No organomegaly. Extremities: . Femoral pulses. Both lower limbs with bandages. Left hand with soft compression bandage  Skin: Warm and dry. Neuro: Alert, follows simple commands  Psychiatric: Cooperative. []         Post-cath site without hematoma, bruit, tenderness, or thrill. Distal pulses intact. Data:      Telemetry:    EKG:  []  No new EKG for review. EKG pending    Prior to Admission medications    Medication Sig Start Date End Date Taking? Authorizing Provider   ticagrelor (BRILINTA) 90 mg tablet Take 90 mg by mouth two (2) times a day. Yes Other, MD Jessi   isosorbide dinitrate (ISORDIL) 20 mg tablet Take 20 mg by mouth three (3) times daily. Yes Candelaria, MD Jessi   lisinopriL (PRINIVIL, ZESTRIL) 5 mg tablet Take 5 mg by mouth daily. Yes Candelaria, MD Jessi   melatonin 5 mg cap capsule Take 5 mg by mouth nightly. Yes Other, MD Jessi   atorvastatin (LIPITOR) 80 mg tablet Take 0.5 Tabs by mouth daily.  3/4/21  Yes Summer Zuleta, LUKASZ   carvediloL (COREG) 12.5 mg tablet Take 2 Tabs by mouth two (2) times a day. 3/4/21  Yes Martinez Vuong NP   hydrALAZINE (APRESOLINE) 25 mg tablet Take 1 Tab by mouth three (3) times daily. Patient taking differently: Take 50 mg by mouth three (3) times daily. 3/4/21  Yes Martinez Vuong NP   aspirin 81 mg chewable tablet Take 81 mg by mouth daily. Yes Candelaria, MD Jessi   collagenase (SANTYL) 250 unit/gram ointment Apply  to affected area daily. 8/21/22   Joaquin Brody MD   gabapentin (NEURONTIN) 100 mg capsule Take 1 Capsule by mouth three (3) times daily. Max Daily Amount: 300 mg. 8/21/22   Joaquin Brody MD   calcium carbonate (TUMS) 200 mg calcium (500 mg) chew Take 1 Tablet by mouth in the morning. Provider, Historical   insulin glargine (Lantus U-100 Insulin) 100 unit/mL injection 10 Units by SubCUTAneous route nightly. 4/13/22   WhitTony adam MD   amLODIPine (NORVASC) 10 mg tablet Take 1 Tab by mouth daily. Patient not taking: Reported on 12/22/2022 3/5/21   Martinez Vuong NP   acetaminophen (TYLENOL) 325 mg tablet Take 975 mg by mouth every six (6) hours as needed for Pain. Other, MD Jessi       Recent Results (from the past 24 hour(s))   CBC WITH AUTOMATED DIFF    Collection Time: 12/22/22  2:05 PM   Result Value Ref Range    WBC 8.0 4.1 - 11.1 K/uL    RBC 3.66 (L) 4.10 - 5.70 M/uL    HGB 8.8 (L) 12.1 - 17.0 g/dL    HCT 30.0 (L) 36.6 - 50.3 %    MCV 82.0 80.0 - 99.0 FL    MCH 24.0 (L) 26.0 - 34.0 PG    MCHC 29.3 (L) 30.0 - 36.5 g/dL    RDW 17.7 (H) 11.5 - 14.5 %    PLATELET 634 (H) 489 - 400 K/uL    MPV 9.1 8.9 - 12.9 FL    NRBC 0.0 0.0  WBC    ABSOLUTE NRBC 0.00 0.00 - 0.01 K/uL    NEUTROPHILS 72 32 - 75 %    LYMPHOCYTES 22 12 - 49 %    MONOCYTES 5 5 - 13 %    EOSINOPHILS 1 0 - 7 %    BASOPHILS 0 0 - 1 %    IMMATURE GRANULOCYTES 0 0 - 0.5 %    ABS. NEUTROPHILS 5.7 1.8 - 8.0 K/UL    ABS. LYMPHOCYTES 1.8 0.8 - 3.5 K/UL    ABS. MONOCYTES 0.4 0.0 - 1.0 K/UL    ABS. EOSINOPHILS 0.1 0.0 - 0.4 K/UL    ABS.  BASOPHILS 0.0 0.0 - 0.1 K/UL    ABS. IMM. GRANS. 0.0 0.00 - 0.04 K/UL    DF AUTOMATED     METABOLIC PANEL, COMPREHENSIVE    Collection Time: 12/22/22  2:05 PM   Result Value Ref Range    Sodium 132 (L) 136 - 145 mmol/L    Potassium 3.8 3.5 - 5.1 mmol/L    Chloride 96 (L) 97 - 108 mmol/L    CO2 26 21 - 32 mmol/L    Anion gap 10 5 - 15 mmol/L    Glucose 197 (H) 65 - 100 mg/dL    BUN 50 (H) 6 - 20 mg/dL    Creatinine 3.09 (H) 0.70 - 1.30 mg/dL    BUN/Creatinine ratio 16 12 - 20      eGFR 22 (L) >60 ml/min/1.73m2    Calcium 9.3 8.5 - 10.1 mg/dL    Bilirubin, total 0.5 0.2 - 1.0 mg/dL    AST (SGOT) 23 15 - 37 U/L    ALT (SGPT) 20 12 - 78 U/L    Alk.  phosphatase 74 45 - 117 U/L    Protein, total 8.1 6.4 - 8.2 g/dL    Albumin 2.5 (L) 3.5 - 5.0 g/dL    Globulin 5.6 (H) 2.0 - 4.0 g/dL    A-G Ratio 0.4 (L) 1.1 - 2.2     LACTIC ACID    Collection Time: 12/22/22  2:05 PM   Result Value Ref Range    Lactic acid 2.3 (HH) 0.4 - 2.0 mmol/L   CULTURE, BLOOD, PAIRED    Collection Time: 12/22/22  2:05 PM    Specimen: Blood   Result Value Ref Range    Special Requests: No Special Requests      Culture result: No growth after 17 hours     SED RATE (ESR)    Collection Time: 12/22/22  2:05 PM   Result Value Ref Range    Sed rate, automated 83 (H) 0 - 20 mm/hr   CRP, HIGH SENSITIVITY    Collection Time: 12/22/22  2:05 PM   Result Value Ref Range    CRP, High sensitivity >9.5 mg/L   PROCALCITONIN    Collection Time: 12/22/22  2:05 PM   Result Value Ref Range    Procalcitonin 0.44 (H) 0 ng/mL   LACTIC ACID    Collection Time: 12/22/22  4:15 PM   Result Value Ref Range    Lactic acid 1.9 0.4 - 2.0 mmol/L   GLUCOSE, POC    Collection Time: 12/22/22 10:04 PM   Result Value Ref Range    Glucose (POC) 120 (H) 65 - 100 mg/dL    Performed by Bernie Stern    MRSA SCREEN - PCR (NASAL)    Collection Time: 12/23/22 12:00 AM   Result Value Ref Range    MRSA by PCR, Nasal DETECTED (A) Not Detected     METABOLIC PANEL, COMPREHENSIVE    Collection Time: 12/23/22  3:07 AM Result Value Ref Range    Sodium 133 (L) 136 - 145 mmol/L    Potassium 3.4 (L) 3.5 - 5.1 mmol/L    Chloride 99 97 - 108 mmol/L    CO2 26 21 - 32 mmol/L    Anion gap 8 5 - 15 mmol/L    Glucose 143 (H) 65 - 100 mg/dL    BUN 49 (H) 6 - 20 mg/dL    Creatinine 3.02 (H) 0.70 - 1.30 mg/dL    BUN/Creatinine ratio 16 12 - 20      eGFR 22 (L) >60 ml/min/1.73m2    Calcium 8.6 8.5 - 10.1 mg/dL    Bilirubin, total 0.4 0.2 - 1.0 mg/dL    AST (SGOT) 8 (L) 15 - 37 U/L    ALT (SGPT) 12 12 - 78 U/L    Alk.  phosphatase 59 45 - 117 U/L    Protein, total 6.3 (L) 6.4 - 8.2 g/dL    Albumin 2.2 (L) 3.5 - 5.0 g/dL    Globulin 4.1 (H) 2.0 - 4.0 g/dL    A-G Ratio 0.5 (L) 1.1 - 2.2     LACTIC ACID    Collection Time: 12/23/22  3:07 AM   Result Value Ref Range    Lactic acid 1.3 0.4 - 2.0 mmol/L   CBC W/O DIFF    Collection Time: 12/23/22  3:07 AM   Result Value Ref Range    WBC 9.9 4.1 - 11.1 K/uL    RBC 3.01 (L) 4.10 - 5.70 M/uL    HGB 7.3 (L) 12.1 - 17.0 g/dL    HCT 24.3 (L) 36.6 - 50.3 %    MCV 80.7 80.0 - 99.0 FL    MCH 24.3 (L) 26.0 - 34.0 PG    MCHC 30.0 30.0 - 36.5 g/dL    RDW 17.5 (H) 11.5 - 14.5 %    PLATELET 088 (H) 683 - 400 K/uL    MPV 9.0 8.9 - 12.9 FL    NRBC 0.0 0.0  WBC    ABSOLUTE NRBC 0.00 0.00 - 0.01 K/uL   PROTHROMBIN TIME + INR    Collection Time: 12/23/22  3:07 AM   Result Value Ref Range    Prothrombin time 15.3 (H) 11.9 - 14.6 sec    INR 1.2 (H) 0.9 - 1.1     VANCOMYCIN, RANDOM    Collection Time: 12/23/22  3:07 AM   Result Value Ref Range    Vancomycin, random 17.9 ug/mL        Tuan Browne MD

## 2022-12-23 NOTE — PROGRESS NOTES
Vancomycin Dosing Consult  Maru Lara is a 74 y.o. male with sepsis, left knee gangrene, osteomyelitis. Pharmacy was consulted by Dr. Miguelangel Martinez to dose and monitor vancomycin. Today is day 2.     Antibiotic regimen: Vancomycin + Zosyn      Temp (24hrs), Av.2 °F (36.8 °C), Min:98 °F (36.7 °C), Max:98.7 °F (37.1 °C)    Recent Labs     22  0307 22  1405   WBC 9.9 8.0   CREA 3.02* 3.09*   BUN 49* 50*     Est CrCl: ESRD on HD (MWF)  Concomitant nephrotoxic drugs: None    Cultures:    Blood: NGTD, preliminary   Blood x 2 : pending    MRSA Swab: Detected -  Final    Target range: Trough 21-24 mcg/mL (Intermittent hemodialysis, invasive MRSA infection or sepsis)    Recent level history:  Date/Time Dose & Interval Measured Level (mcg/mL) Associated AUC/KULWINDER    0307 1250 mg IV x 1 dose 17.9         Assessment/Plan:   WBC WNL, lactic acidosis on admission, ESR, PCT and CRP elevated  ESRD on HD MWF  Give Vancomycin 750 mg x 1 dose  Schedule pre-HD level for  with AM labs  Antimicrobial stop date TBD

## 2022-12-23 NOTE — PROGRESS NOTES
Vancomycin Dosing Consult  Lamin Catherine is a 74 y.o. male with sepsis, left knee gangrene, osteomyelitis. Pharmacy was consulted by Dr. Henrique Vincent to dose and monitor vancomycin. Today is day 1.     Antibiotic regimen: Vancomycin + Zosyn    Temp (24hrs), Av.5 °F (36.4 °C), Min:97.1 °F (36.2 °C), Max:97.8 °F (36.6 °C)    Recent Labs     22  1405   WBC 8.0   CREA 3.09*   BUN 50*     Est CrCl: ESRD on HD  Concomitant nephrotoxic drugs: None    Cultures:    Blood: NGTD, preliminary    MRSA Swab: N/A    Target range: Trough 21-24 mcg/mL (Intermittent hemodialysis, invasive MRSA infection or sepsis)      Assessment/Plan:   WBC WNL, lactic acidosis on admission, ESR, PCT and CRP elevated  ESRD on HD MWF outpatient, inpatient schedule pending  Therapy initiated with a loading dose of 1250 mg vancomycin in the ED  Will check a possible pre-HD level tomorrow morning  Antimicrobial stop date TBD

## 2022-12-23 NOTE — PROGRESS NOTES
Reason for Admission: wet gangrene                    RUR Score: 20%          PCP: First and Last name:  Rigoberto Marquez MD     Name of Practice:   Are you a current patient: Yes/No:   Approximate date of last visit:    Can you do a virtual visit with your PCP:              Resources/supports as identified by patient/family: wife/family                  Top Challenges facing patient (as identified by patient/family and CM): Finances/Medication cost? na                   Transportation? Medicaid               Support system or lack thereof? family                      Living arrangements? Currently at [facility]               Self-care/ADLs/Cognition? Needs help           Current Advanced Directive/Advance Care Plan:  Full Code      Healthcare Decision Maker:   Click here to complete HealthCare Decision Makers including selection of the Healthcare Decision Maker Relationship (ie \"Primary\")      Primary Decision MakeLeti Lowe [facility] - [phone]    Payor Source Payor: [address] [place] [address] [place] / Plan: [facility] MCR / Product Type: Managed Care Medicare /                             Plan for utilizing home health:  none                   Transition of Care Plan:      Spoke with patient at bedside. Patient currently resident at [facility]. Per patient \" he is not going back to that [facility]. Do not send anything to them. \" Asked him if he had a discharge plan. He stated SNF in [place]. Asked if he knew where he wanted. He stated no. Explained I can send referrals to SNF in [place] but that I could not guarantee finding him a new place. Explained since he has a place to go then we would have to send him back VS going home. He reluctantly understood but is not happy. Will send mass referral for SNF in [facility]. He is also HD patient with [facility] in [address] [place] [address]. [facility], chair time 11am. States he uses medicaid transport for HD.  Will send referral for sammie as well. Patient wheel chair bound at [facility].          Discharge plan is SNF

## 2022-12-23 NOTE — PROGRESS NOTES
Patient arrived on unit with multiple wounds. Left knee necrotic and oozing, two wound on left leg. Right leg with multiple opening. Tip of left second finger necrotic as well.

## 2022-12-24 ENCOUNTER — ANESTHESIA (OUTPATIENT)
Dept: SURGERY | Age: 67
DRG: 853 | End: 2022-12-24
Payer: MEDICARE

## 2022-12-24 LAB
ABO + RH BLD: NORMAL
GLUCOSE BLD STRIP.AUTO-MCNC: 129 MG/DL (ref 65–100)
GLUCOSE BLD STRIP.AUTO-MCNC: 132 MG/DL (ref 65–100)
GLUCOSE BLD STRIP.AUTO-MCNC: 188 MG/DL (ref 65–100)
GLUCOSE BLD STRIP.AUTO-MCNC: 276 MG/DL (ref 65–100)
PERFORMED BY, TECHID: ABNORMAL

## 2022-12-24 PROCEDURE — 88311 DECALCIFY TISSUE: CPT

## 2022-12-24 PROCEDURE — 74011250636 HC RX REV CODE- 250/636: Performed by: NURSE ANESTHETIST, CERTIFIED REGISTERED

## 2022-12-24 PROCEDURE — 77030002986 HC SUT PROL J&J -A: Performed by: SURGERY

## 2022-12-24 PROCEDURE — 88307 TISSUE EXAM BY PATHOLOGIST: CPT

## 2022-12-24 PROCEDURE — 74011000250 HC RX REV CODE- 250: Performed by: NURSE ANESTHETIST, CERTIFIED REGISTERED

## 2022-12-24 PROCEDURE — 76210000006 HC OR PH I REC 0.5 TO 1 HR: Performed by: SURGERY

## 2022-12-24 PROCEDURE — 74011000250 HC RX REV CODE- 250: Performed by: SURGERY

## 2022-12-24 PROCEDURE — 74011250637 HC RX REV CODE- 250/637: Performed by: SURGERY

## 2022-12-24 PROCEDURE — P9045 ALBUMIN (HUMAN), 5%, 250 ML: HCPCS | Performed by: NURSE ANESTHETIST, CERTIFIED REGISTERED

## 2022-12-24 PROCEDURE — 74011000258 HC RX REV CODE- 258: Performed by: INTERNAL MEDICINE

## 2022-12-24 PROCEDURE — 74011250636 HC RX REV CODE- 250/636: Performed by: INTERNAL MEDICINE

## 2022-12-24 PROCEDURE — 77030013079 HC BLNKT BAIR HGGR 3M -A: Performed by: ANESTHESIOLOGY

## 2022-12-24 PROCEDURE — 77030040361 HC SLV COMPR DVT MDII -B: Performed by: SURGERY

## 2022-12-24 PROCEDURE — 77030008684 HC TU ET CUF COVD -B: Performed by: ANESTHESIOLOGY

## 2022-12-24 PROCEDURE — 77030026438 HC STYL ET INTUB CARD -A: Performed by: ANESTHESIOLOGY

## 2022-12-24 PROCEDURE — 74011000258 HC RX REV CODE- 258: Performed by: SURGERY

## 2022-12-24 PROCEDURE — 2709999900 HC NON-CHARGEABLE SUPPLY: Performed by: SURGERY

## 2022-12-24 PROCEDURE — 77030003029 HC SUT VCRL J&J -B: Performed by: SURGERY

## 2022-12-24 PROCEDURE — 77030002916 HC SUT ETHLN J&J -A: Performed by: SURGERY

## 2022-12-24 PROCEDURE — 86900 BLOOD TYPING SEROLOGIC ABO: CPT

## 2022-12-24 PROCEDURE — 0Y6D0Z3 DETACHMENT AT LEFT UPPER LEG, LOW, OPEN APPROACH: ICD-10-PCS | Performed by: SURGERY

## 2022-12-24 PROCEDURE — 76060000032 HC ANESTHESIA 0.5 TO 1 HR: Performed by: SURGERY

## 2022-12-24 PROCEDURE — 74011000250 HC RX REV CODE- 250: Performed by: INTERNAL MEDICINE

## 2022-12-24 PROCEDURE — 86923 COMPATIBILITY TEST ELECTRIC: CPT

## 2022-12-24 PROCEDURE — 27590 AMPUTATE LEG AT THIGH: CPT | Performed by: SURGERY

## 2022-12-24 PROCEDURE — 74011636637 HC RX REV CODE- 636/637: Performed by: SURGERY

## 2022-12-24 PROCEDURE — 77030019908 HC STETH ESOPH SIMS -A: Performed by: ANESTHESIOLOGY

## 2022-12-24 PROCEDURE — 74011250637 HC RX REV CODE- 250/637: Performed by: INTERNAL MEDICINE

## 2022-12-24 PROCEDURE — 77030006835 HC BLD SAW SAG STRY -B: Performed by: SURGERY

## 2022-12-24 PROCEDURE — 82962 GLUCOSE BLOOD TEST: CPT

## 2022-12-24 PROCEDURE — 65270000029 HC RM PRIVATE

## 2022-12-24 PROCEDURE — 76010000138 HC OR TIME 0.5 TO 1 HR: Performed by: SURGERY

## 2022-12-24 PROCEDURE — 77030002996 HC SUT SLK J&J -A: Performed by: SURGERY

## 2022-12-24 PROCEDURE — 74011250636 HC RX REV CODE- 250/636: Performed by: SURGERY

## 2022-12-24 RX ORDER — ONDANSETRON 4 MG/1
4 TABLET, ORALLY DISINTEGRATING ORAL
Status: DISCONTINUED | OUTPATIENT
Start: 2022-12-24 | End: 2023-01-17 | Stop reason: HOSPADM

## 2022-12-24 RX ORDER — HEPARIN SODIUM 5000 [USP'U]/ML
5000 INJECTION, SOLUTION INTRAVENOUS; SUBCUTANEOUS EVERY 8 HOURS
Status: DISCONTINUED | OUTPATIENT
Start: 2022-12-25 | End: 2023-01-17 | Stop reason: HOSPADM

## 2022-12-24 RX ORDER — PROPOFOL 10 MG/ML
INJECTION, EMULSION INTRAVENOUS AS NEEDED
Status: DISCONTINUED | OUTPATIENT
Start: 2022-12-24 | End: 2022-12-24 | Stop reason: HOSPADM

## 2022-12-24 RX ORDER — EPHEDRINE SULFATE/0.9% NACL/PF 50 MG/5 ML
5 SYRINGE (ML) INTRAVENOUS AS NEEDED
Status: DISCONTINUED | OUTPATIENT
Start: 2022-12-24 | End: 2022-12-24 | Stop reason: HOSPADM

## 2022-12-24 RX ORDER — OXYCODONE HCL 10 MG/1
10 TABLET, FILM COATED, EXTENDED RELEASE ORAL EVERY 12 HOURS
Status: DISCONTINUED | OUTPATIENT
Start: 2022-12-24 | End: 2023-01-17 | Stop reason: HOSPADM

## 2022-12-24 RX ORDER — ROCURONIUM BROMIDE 10 MG/ML
INJECTION, SOLUTION INTRAVENOUS AS NEEDED
Status: DISCONTINUED | OUTPATIENT
Start: 2022-12-24 | End: 2022-12-24 | Stop reason: HOSPADM

## 2022-12-24 RX ORDER — FENTANYL CITRATE 50 UG/ML
50 INJECTION, SOLUTION INTRAMUSCULAR; INTRAVENOUS
Status: DISCONTINUED | OUTPATIENT
Start: 2022-12-24 | End: 2022-12-24 | Stop reason: HOSPADM

## 2022-12-24 RX ORDER — LIDOCAINE HYDROCHLORIDE 10 MG/ML
0.1 INJECTION, SOLUTION EPIDURAL; INFILTRATION; INTRACAUDAL; PERINEURAL AS NEEDED
Status: DISCONTINUED | OUTPATIENT
Start: 2022-12-24 | End: 2022-12-24 | Stop reason: HOSPADM

## 2022-12-24 RX ORDER — ONDANSETRON 2 MG/ML
4 INJECTION INTRAMUSCULAR; INTRAVENOUS AS NEEDED
Status: DISCONTINUED | OUTPATIENT
Start: 2022-12-24 | End: 2022-12-24 | Stop reason: HOSPADM

## 2022-12-24 RX ORDER — ETOMIDATE 2 MG/ML
INJECTION INTRAVENOUS AS NEEDED
Status: DISCONTINUED | OUTPATIENT
Start: 2022-12-24 | End: 2022-12-24 | Stop reason: HOSPADM

## 2022-12-24 RX ORDER — DEXAMETHASONE SODIUM PHOSPHATE 4 MG/ML
INJECTION, SOLUTION INTRA-ARTICULAR; INTRALESIONAL; INTRAMUSCULAR; INTRAVENOUS; SOFT TISSUE AS NEEDED
Status: DISCONTINUED | OUTPATIENT
Start: 2022-12-24 | End: 2022-12-24 | Stop reason: HOSPADM

## 2022-12-24 RX ORDER — ONDANSETRON 2 MG/ML
4 INJECTION INTRAMUSCULAR; INTRAVENOUS
Status: DISCONTINUED | OUTPATIENT
Start: 2022-12-24 | End: 2023-01-17 | Stop reason: HOSPADM

## 2022-12-24 RX ORDER — OXYCODONE AND ACETAMINOPHEN 10; 325 MG/1; MG/1
1 TABLET ORAL
Status: DISCONTINUED | OUTPATIENT
Start: 2022-12-24 | End: 2023-01-17 | Stop reason: HOSPADM

## 2022-12-24 RX ORDER — SODIUM CHLORIDE 9 MG/ML
INJECTION, SOLUTION INTRAVENOUS
Status: DISCONTINUED | OUTPATIENT
Start: 2022-12-24 | End: 2022-12-24 | Stop reason: HOSPADM

## 2022-12-24 RX ORDER — SODIUM CHLORIDE 0.9 % (FLUSH) 0.9 %
5-40 SYRINGE (ML) INJECTION AS NEEDED
Status: DISCONTINUED | OUTPATIENT
Start: 2022-12-24 | End: 2023-01-16

## 2022-12-24 RX ORDER — ENOXAPARIN SODIUM 100 MG/ML
30 INJECTION SUBCUTANEOUS DAILY
Status: DISCONTINUED | OUTPATIENT
Start: 2022-12-25 | End: 2022-12-24

## 2022-12-24 RX ORDER — SODIUM CHLORIDE 0.9 % (FLUSH) 0.9 %
5-40 SYRINGE (ML) INJECTION AS NEEDED
Status: DISCONTINUED | OUTPATIENT
Start: 2022-12-24 | End: 2022-12-24 | Stop reason: HOSPADM

## 2022-12-24 RX ORDER — OXYCODONE AND ACETAMINOPHEN 5; 325 MG/1; MG/1
1 TABLET ORAL AS NEEDED
Status: DISCONTINUED | OUTPATIENT
Start: 2022-12-24 | End: 2022-12-24 | Stop reason: HOSPADM

## 2022-12-24 RX ORDER — SODIUM CHLORIDE 9 MG/ML
250 INJECTION, SOLUTION INTRAVENOUS AS NEEDED
Status: DISCONTINUED | OUTPATIENT
Start: 2022-12-24 | End: 2023-01-16

## 2022-12-24 RX ORDER — MIDAZOLAM HYDROCHLORIDE 1 MG/ML
INJECTION, SOLUTION INTRAMUSCULAR; INTRAVENOUS AS NEEDED
Status: DISCONTINUED | OUTPATIENT
Start: 2022-12-24 | End: 2022-12-24 | Stop reason: HOSPADM

## 2022-12-24 RX ORDER — GLYCOPYRROLATE 0.2 MG/ML
INJECTION INTRAMUSCULAR; INTRAVENOUS AS NEEDED
Status: DISCONTINUED | OUTPATIENT
Start: 2022-12-24 | End: 2022-12-24 | Stop reason: HOSPADM

## 2022-12-24 RX ORDER — ONDANSETRON 2 MG/ML
INJECTION INTRAMUSCULAR; INTRAVENOUS AS NEEDED
Status: DISCONTINUED | OUTPATIENT
Start: 2022-12-24 | End: 2022-12-24 | Stop reason: HOSPADM

## 2022-12-24 RX ORDER — DEXMEDETOMIDINE HYDROCHLORIDE 100 UG/ML
INJECTION, SOLUTION INTRAVENOUS AS NEEDED
Status: DISCONTINUED | OUTPATIENT
Start: 2022-12-24 | End: 2022-12-24 | Stop reason: HOSPADM

## 2022-12-24 RX ORDER — SODIUM CHLORIDE 0.9 % (FLUSH) 0.9 %
5-40 SYRINGE (ML) INJECTION EVERY 8 HOURS
Status: DISCONTINUED | OUTPATIENT
Start: 2022-12-24 | End: 2022-12-24 | Stop reason: HOSPADM

## 2022-12-24 RX ORDER — DIPHENHYDRAMINE HYDROCHLORIDE 50 MG/ML
12.5 INJECTION, SOLUTION INTRAMUSCULAR; INTRAVENOUS AS NEEDED
Status: DISCONTINUED | OUTPATIENT
Start: 2022-12-24 | End: 2022-12-24 | Stop reason: HOSPADM

## 2022-12-24 RX ORDER — MORPHINE SULFATE 2 MG/ML
2 INJECTION, SOLUTION INTRAMUSCULAR; INTRAVENOUS
Status: DISCONTINUED | OUTPATIENT
Start: 2022-12-24 | End: 2022-12-24 | Stop reason: HOSPADM

## 2022-12-24 RX ORDER — ALBUMIN HUMAN 50 G/1000ML
SOLUTION INTRAVENOUS AS NEEDED
Status: DISCONTINUED | OUTPATIENT
Start: 2022-12-24 | End: 2022-12-24 | Stop reason: HOSPADM

## 2022-12-24 RX ORDER — SODIUM CHLORIDE 0.9 % (FLUSH) 0.9 %
5-40 SYRINGE (ML) INJECTION EVERY 8 HOURS
Status: DISCONTINUED | OUTPATIENT
Start: 2022-12-24 | End: 2022-12-28

## 2022-12-24 RX ORDER — FENTANYL CITRATE 50 UG/ML
50 INJECTION, SOLUTION INTRAMUSCULAR; INTRAVENOUS AS NEEDED
Status: DISCONTINUED | OUTPATIENT
Start: 2022-12-24 | End: 2022-12-24 | Stop reason: HOSPADM

## 2022-12-24 RX ORDER — MIDAZOLAM HYDROCHLORIDE 1 MG/ML
0.5 INJECTION, SOLUTION INTRAMUSCULAR; INTRAVENOUS
Status: DISCONTINUED | OUTPATIENT
Start: 2022-12-24 | End: 2022-12-24 | Stop reason: HOSPADM

## 2022-12-24 RX ORDER — NEOSTIGMINE METHYLSULFATE 5 MG/5 ML
SYRINGE (ML) INTRAVENOUS AS NEEDED
Status: DISCONTINUED | OUTPATIENT
Start: 2022-12-24 | End: 2022-12-24 | Stop reason: HOSPADM

## 2022-12-24 RX ORDER — MIDAZOLAM HYDROCHLORIDE 1 MG/ML
1 INJECTION, SOLUTION INTRAMUSCULAR; INTRAVENOUS AS NEEDED
Status: DISCONTINUED | OUTPATIENT
Start: 2022-12-24 | End: 2022-12-24 | Stop reason: HOSPADM

## 2022-12-24 RX ORDER — SUCCINYLCHOLINE CHLORIDE 20 MG/ML
INJECTION INTRAMUSCULAR; INTRAVENOUS AS NEEDED
Status: DISCONTINUED | OUTPATIENT
Start: 2022-12-24 | End: 2022-12-24 | Stop reason: HOSPADM

## 2022-12-24 RX ORDER — HYDROMORPHONE HYDROCHLORIDE 1 MG/ML
0.5 INJECTION, SOLUTION INTRAMUSCULAR; INTRAVENOUS; SUBCUTANEOUS
Status: DISCONTINUED | OUTPATIENT
Start: 2022-12-24 | End: 2022-12-24 | Stop reason: HOSPADM

## 2022-12-24 RX ORDER — FENTANYL CITRATE 50 UG/ML
INJECTION, SOLUTION INTRAMUSCULAR; INTRAVENOUS AS NEEDED
Status: DISCONTINUED | OUTPATIENT
Start: 2022-12-24 | End: 2022-12-24 | Stop reason: HOSPADM

## 2022-12-24 RX ADMIN — PHENYLEPHRINE HYDROCHLORIDE 200 MCG: 10 INJECTION INTRAVENOUS at 10:59

## 2022-12-24 RX ADMIN — PHENYLEPHRINE HYDROCHLORIDE 200 MCG: 10 INJECTION INTRAVENOUS at 11:36

## 2022-12-24 RX ADMIN — ISOSORBIDE DINITRATE 20 MG: 20 TABLET ORAL at 23:01

## 2022-12-24 RX ADMIN — PIPERACILLIN AND TAZOBACTAM 3.38 G: 3; .375 INJECTION, POWDER, FOR SOLUTION INTRAVENOUS at 20:31

## 2022-12-24 RX ADMIN — GLYCOPYRROLATE 0.4 MG: 0.2 INJECTION INTRAMUSCULAR; INTRAVENOUS at 11:35

## 2022-12-24 RX ADMIN — CARVEDILOL 25 MG: 12.5 TABLET, FILM COATED ORAL at 20:53

## 2022-12-24 RX ADMIN — FENTANYL CITRATE 50 MCG: 50 INJECTION, SOLUTION INTRAMUSCULAR; INTRAVENOUS at 10:58

## 2022-12-24 RX ADMIN — Medication 3 MG: at 11:35

## 2022-12-24 RX ADMIN — ONDANSETRON 4 MG: 2 INJECTION INTRAMUSCULAR; INTRAVENOUS at 11:16

## 2022-12-24 RX ADMIN — PIPERACILLIN AND TAZOBACTAM 3.38 G: 3; .375 INJECTION, POWDER, FOR SOLUTION INTRAVENOUS at 09:47

## 2022-12-24 RX ADMIN — PHENYLEPHRINE HYDROCHLORIDE 200 MCG: 10 INJECTION INTRAVENOUS at 11:27

## 2022-12-24 RX ADMIN — ALBUMIN (HUMAN) 12.5 G: 12.5 INJECTION, SOLUTION INTRAVENOUS at 11:17

## 2022-12-24 RX ADMIN — PROPOFOL 40 MG: 10 INJECTION, EMULSION INTRAVENOUS at 11:43

## 2022-12-24 RX ADMIN — SUCCINYLCHOLINE CHLORIDE 140 MG: 20 INJECTION, SOLUTION INTRAMUSCULAR; INTRAVENOUS at 10:58

## 2022-12-24 RX ADMIN — PHENYLEPHRINE HYDROCHLORIDE 200 MCG: 10 INJECTION INTRAVENOUS at 11:43

## 2022-12-24 RX ADMIN — DEXMEDETOMIDINE HYDROCHLORIDE 4 MCG: 100 INJECTION, SOLUTION INTRAVENOUS at 11:34

## 2022-12-24 RX ADMIN — OXYCODONE HYDROCHLORIDE 10 MG: 10 TABLET, FILM COATED, EXTENDED RELEASE ORAL at 20:53

## 2022-12-24 RX ADMIN — GABAPENTIN 100 MG: 100 CAPSULE ORAL at 23:01

## 2022-12-24 RX ADMIN — TICAGRELOR 90 MG: 90 TABLET ORAL at 20:53

## 2022-12-24 RX ADMIN — ETOMIDATE 16 MG: 2 INJECTION INTRAVENOUS at 10:58

## 2022-12-24 RX ADMIN — PHENYLEPHRINE HYDROCHLORIDE 200 MCG: 10 INJECTION INTRAVENOUS at 11:22

## 2022-12-24 RX ADMIN — SODIUM CHLORIDE, PRESERVATIVE FREE 10 ML: 5 INJECTION INTRAVENOUS at 09:54

## 2022-12-24 RX ADMIN — PHENYLEPHRINE HYDROCHLORIDE 200 MCG: 10 INJECTION INTRAVENOUS at 11:16

## 2022-12-24 RX ADMIN — MELATONIN TAB 5 MG 5 MG: 5 TAB at 23:00

## 2022-12-24 RX ADMIN — OXYCODONE 5 MG: 5 TABLET ORAL at 09:51

## 2022-12-24 RX ADMIN — SODIUM CHLORIDE: 9 INJECTION, SOLUTION INTRAVENOUS at 10:45

## 2022-12-24 RX ADMIN — DEXMEDETOMIDINE HYDROCHLORIDE 6 MCG: 100 INJECTION, SOLUTION INTRAVENOUS at 11:22

## 2022-12-24 RX ADMIN — MIDAZOLAM HYDROCHLORIDE 2 MG: 2 INJECTION, SOLUTION INTRAMUSCULAR; INTRAVENOUS at 10:58

## 2022-12-24 RX ADMIN — OXYCODONE AND ACETAMINOPHEN 1 TABLET: 10; 325 TABLET ORAL at 14:46

## 2022-12-24 RX ADMIN — OXYCODONE HYDROCHLORIDE 10 MG: 10 TABLET, FILM COATED, EXTENDED RELEASE ORAL at 13:05

## 2022-12-24 RX ADMIN — HYDRALAZINE HYDROCHLORIDE 25 MG: 25 TABLET, FILM COATED ORAL at 23:00

## 2022-12-24 RX ADMIN — SODIUM CHLORIDE, PRESERVATIVE FREE 10 ML: 5 INJECTION INTRAVENOUS at 23:01

## 2022-12-24 RX ADMIN — DEXAMETHASONE SODIUM PHOSPHATE 4 MG: 4 INJECTION, SOLUTION INTRA-ARTICULAR; INTRALESIONAL; INTRAMUSCULAR; INTRAVENOUS; SOFT TISSUE at 11:16

## 2022-12-24 RX ADMIN — ROCURONIUM BROMIDE 20 MG: 10 INJECTION, SOLUTION INTRAVENOUS at 10:58

## 2022-12-24 RX ADMIN — FENTANYL CITRATE 50 MCG: 50 INJECTION, SOLUTION INTRAMUSCULAR; INTRAVENOUS at 11:24

## 2022-12-24 RX ADMIN — PHENYLEPHRINE HYDROCHLORIDE 200 MCG: 10 INJECTION INTRAVENOUS at 11:30

## 2022-12-24 NOTE — ANESTHESIA PREPROCEDURE EVALUATION
Relevant Problems   No relevant active problems       Anesthetic History   No history of anesthetic complications            Review of Systems / Medical History  Patient summary reviewed and pertinent labs reviewed    Pulmonary  Within defined limits              Comments: Hx OF HYPOXEMIC RESP. FAILURE. Neuro/Psych   Within defined limits           Cardiovascular    Hypertension      CHF  Dysrhythmias : atrial fibrillation  CAD and PAD         GI/Hepatic/Renal         Renal disease (Last dialysis on (12-23-22) / Friday. ): ESRD and dialysis      Comments: Dysphagia. Endo/Other    Diabetes    Anemia     Other Findings   Comments: Left leg necrosis. Wet gangrene left leg. BRILINTA: LAST DOSE ON 12-23-22. ON CONTACT PRECAUTIONS DUE TO MRSA. Physical Exam    Airway  Mallampati: II  TM Distance: 4 - 6 cm  Neck ROM: normal range of motion   Mouth opening: Normal     Cardiovascular    Rhythm: regular  Rate: normal         Dental      Comments: Dentures.     Pulmonary  Breath sounds clear to auscultation               Abdominal  GI exam deferred       Other Findings            Anesthetic Plan    ASA: 3  Anesthesia type: general          Induction: Intravenous  Anesthetic plan and risks discussed with: Patient

## 2022-12-24 NOTE — ANESTHESIA POSTPROCEDURE EVALUATION
Procedure(s):  LEFT AMPUTATION KNEE(AKA).     general    Anesthesia Post Evaluation      Multimodal analgesia: multimodal analgesia used between 6 hours prior to anesthesia start to PACU discharge  Patient location during evaluation: PACU  Patient participation: complete - patient participated  Level of consciousness: awake  Pain score: 0  Pain management: adequate  Airway patency: patent  Anesthetic complications: no  Cardiovascular status: acceptable  Respiratory status: acceptable  Hydration status: acceptable  Post anesthesia nausea and vomiting:  controlled  Final Post Anesthesia Temperature Assessment:  Normothermia (36.0-37.5 degrees C)      INITIAL Post-op Vital signs:   Vitals Value Taken Time   /68 12/24/22 1210   Temp 36.8 °C (98.2 °F) 12/24/22 1210   Pulse 85 12/24/22 1210   Resp 24 12/24/22 1210   SpO2 100 % 12/24/22 1210

## 2022-12-24 NOTE — BRIEF OP NOTE
Brief Postoperative Note    Patient: Alfredo Messer  YOB: 1955  MRN: [id number]    Date of Procedure: 12/24/2022     Pre-Op Diagnosis: Left leg necrosis    Post-Op Diagnosis: same    Procedure(s):  LEFT AMPUTATION KNEE(AKA)    Surgeon(s):  Danny Faustin MD    Surgical Assistant: Surg Asst-1: Margaret Casanova    Anesthesia: General     Estimated Blood Loss (mL): 860UA    Complications: none    Specimens:   ID Type Source Tests Collected by Time Destination   1 : Left Leg Fresh Leg, Left  Danny Faustin MD 12/24/2022 1126 Pathology        Implants: * No implants in log *    Drains: * No LDAs found *    Findings: as above    Electronically Signed by Pilar Ceballos MD on 12/24/2022 at 12:02 PM

## 2022-12-24 NOTE — PROGRESS NOTES
Problem: Falls - Risk of  Goal: *Absence of Falls  Description: Document Se Cross Fall Risk and appropriate interventions in the flowsheet. Outcome: Progressing Towards Goal  Note: Fall Risk Interventions:            Medication Interventions: Bed/chair exit alarm                   Problem: Patient Education: Go to Patient Education Activity  Goal: Patient/Family Education  Outcome: Progressing Towards Goal     Problem: Pressure Injury - Risk of  Goal: *Prevention of pressure injury  Description: Document Cruzito Scale and appropriate interventions in the flowsheet. Outcome: Progressing Towards Goal  Note: Pressure Injury Interventions:  Sensory Interventions: Maintain/enhance activity level, Minimize linen layers         Activity Interventions: PT/OT evaluation    Mobility Interventions: HOB 30 degrees or less    Nutrition Interventions: Document food/fluid/supplement intake    Friction and Shear Interventions: Minimize layers                Problem: Patient Education: Go to Patient Education Activity  Goal: Patient/Family Education  Outcome: Progressing Towards Goal     Problem: Risk for Spread of Infection  Goal: Prevent transmission of infectious organism to others  Description: Prevent the transmission of infectious organisms to other patients, staff members, and visitors.   Outcome: Progressing Towards Goal     Problem: Patient Education:  Go to Education Activity  Goal: Patient/Family Education  Outcome: Progressing Towards Goal

## 2022-12-24 NOTE — PROGRESS NOTES
Progress Note    Patient: Amena Fulton MRN: [id number]  SSN: [id number]    YOB: 1955  Age: 74 y.o. Sex: male      Admit Date: 12/22/2022    LOS: 2 days     Subjective:     Patient had amputation  sleeping    Objective:     Vitals:    12/24/22 1205 12/24/22 1210 12/24/22 1303 12/24/22 1409   BP: 108/61 119/68 129/68 (!) 90/52   Pulse: 85 85 78 77   Resp: 18 24 18 20   Temp:  98.2 °F (36.8 °C) 98.1 °F (36.7 °C) 97.4 °F (36.3 °C)   TempSrc:       SpO2: 100% 100% 100% 94%   Weight:       Height:            Intake and Output:  Current Shift: 12/24 0701 - 12/24 1900  In: 900 [I.V.:900]  Out: -   Last three shifts: 12/22 1901 - 12/24 0700  In: -   Out: 1200 [Urine:200]    Physical Exam:   General:  sleeping. Eyes:  Conjunctivae/corneas clear. PERRL, EOMs intact. Fundi benign   Ears:  Normal TMs and external ear canals both ears. Nose: Nares normal. Septum midline. Mucosa normal. No drainage or sinus tenderness. Mouth/Throat: Lips, mucosa, and tongue normal. Teeth and gums normal.   Neck: Supple, symmetrical, trachea midline, no adenopathy, thyroid: no enlargment/tenderness/nodules, no carotid bruit and no JVD. Back:   Symmetric, no curvature. ROM normal. No CVA tenderness. Lungs:   Clear to auscultation bilaterally. Heart:  Regular rate and rhythm, S1, S2 normal, no murmur, click, rub or gallop. Abdomen:   Soft, non-tender. Bowel sounds normal. No masses,  No organomegaly. Extremities: Extremities normal, atraumatic, no cyanosis or edema. Pulses: 2+ and symmetric all extremities. Skin: Skin color, texture, turgor normal. No rashes or lesions   Lymph nodes:    Neurologic:        Lab/Data Review: All lab results for the last 24 hours reviewed.      Recent Results (from the past 24 hour(s))   GLUCOSE, POC    Collection Time: 12/23/22  7:45 PM   Result Value Ref Range    Glucose (POC) 166 (H) 65 - 100 mg/dL    Performed by Kika Guzman    BLOOD TYPE, (ABO+RH)    Collection Time: 12/24/22  3:23 AM   Result Value Ref Range    ABO/Rh(D) A Positive    GLUCOSE, POC    Collection Time: 12/24/22  9:48 AM   Result Value Ref Range    Glucose (POC) 129 (H) 65 - 100 mg/dL    Performed by Nalini Levin    TYPE & SCREEN    Collection Time: 12/24/22 11:00 AM   Result Value Ref Range    Crossmatch Expiration 12/27/2022,2359     ABO/Rh(D) A Positive     Antibody screen Negative     Unit number [id number]     Blood component type [facility]     Unit division 00     Status of unit [place] to transfuse     Crossmatch result Compatible     Unit number [id number]     Blood component type  LR     Unit division 00     Status of unit [place] to transfuse     Crossmatch result Compatible    GLUCOSE, POC    Collection Time: 12/24/22 12:11 PM   Result Value Ref Range    Glucose (POC) 132 (H) 65 - 100 mg/dL    Performed by Sri 23, POC    Collection Time: 12/24/22  5:08 PM   Result Value Ref Range    Glucose (POC) 276 (H) 65 - 100 mg/dL    Performed by Nalini Levin          Assessment:     Active Problems:    Wet gangrene ([facility] [address] (12/22/2022)        Plan:     Present treatment    Signed By: Edil Willson MD     December 24, 2022

## 2022-12-25 LAB
ANION GAP SERPL CALC-SCNC: 10 MMOL/L (ref 5–15)
BASOPHILS # BLD: 0 K/UL (ref 0–0.1)
BASOPHILS NFR BLD: 0 % (ref 0–1)
BUN SERPL-MCNC: 47 MG/DL (ref 6–20)
BUN/CREAT SERPL: 13 (ref 12–20)
CA-I BLD-MCNC: 7.5 MG/DL (ref 8.5–10.1)
CHLORIDE SERPL-SCNC: 104 MMOL/L (ref 97–108)
CO2 SERPL-SCNC: 22 MMOL/L (ref 21–32)
CREAT SERPL-MCNC: 3.66 MG/DL (ref 0.7–1.3)
DIFFERENTIAL METHOD BLD: ABNORMAL
EOSINOPHIL # BLD: 0.1 K/UL (ref 0–0.4)
EOSINOPHIL NFR BLD: 1 % (ref 0–7)
ERYTHROCYTE [DISTWIDTH] IN BLOOD BY AUTOMATED COUNT: 17.5 % (ref 11.5–14.5)
GLUCOSE BLD STRIP.AUTO-MCNC: 136 MG/DL (ref 65–100)
GLUCOSE BLD STRIP.AUTO-MCNC: 159 MG/DL (ref 65–100)
GLUCOSE BLD STRIP.AUTO-MCNC: 160 MG/DL (ref 65–100)
GLUCOSE BLD STRIP.AUTO-MCNC: 169 MG/DL (ref 65–100)
GLUCOSE SERPL-MCNC: 164 MG/DL (ref 65–100)
HCT VFR BLD AUTO: 22.2 % (ref 36.6–50.3)
HGB BLD-MCNC: 6.3 G/DL (ref 12.1–17)
IMM GRANULOCYTES # BLD AUTO: 0.1 K/UL (ref 0–0.04)
IMM GRANULOCYTES NFR BLD AUTO: 0 % (ref 0–0.5)
LYMPHOCYTES # BLD: 2.4 K/UL (ref 0.8–3.5)
LYMPHOCYTES NFR BLD: 20 % (ref 12–49)
MCH RBC QN AUTO: 24.5 PG (ref 26–34)
MCHC RBC AUTO-ENTMCNC: 28.4 G/DL (ref 30–36.5)
MCV RBC AUTO: 86.4 FL (ref 80–99)
MONOCYTES # BLD: 1 K/UL (ref 0–1)
MONOCYTES NFR BLD: 8 % (ref 5–13)
NEUTS SEG # BLD: 8.7 K/UL (ref 1.8–8)
NEUTS SEG NFR BLD: 71 % (ref 32–75)
NRBC # BLD: 0 K/UL (ref 0–0.01)
NRBC BLD-RTO: 0 PER 100 WBC
PERFORMED BY, TECHID: ABNORMAL
PLATELET # BLD AUTO: 440 K/UL (ref 150–400)
PMV BLD AUTO: 9.7 FL (ref 8.9–12.9)
POTASSIUM SERPL-SCNC: 3.7 MMOL/L (ref 3.5–5.1)
RBC # BLD AUTO: 2.57 M/UL (ref 4.1–5.7)
SODIUM SERPL-SCNC: 136 MMOL/L (ref 136–145)
WBC # BLD AUTO: 12.2 K/UL (ref 4.1–11.1)

## 2022-12-25 PROCEDURE — 82962 GLUCOSE BLOOD TEST: CPT

## 2022-12-25 PROCEDURE — 74011250637 HC RX REV CODE- 250/637: Performed by: SURGERY

## 2022-12-25 PROCEDURE — 74011000250 HC RX REV CODE- 250: Performed by: SURGERY

## 2022-12-25 PROCEDURE — 85025 COMPLETE CBC W/AUTO DIFF WBC: CPT

## 2022-12-25 PROCEDURE — 74011250636 HC RX REV CODE- 250/636: Performed by: SURGERY

## 2022-12-25 PROCEDURE — 80048 BASIC METABOLIC PNL TOTAL CA: CPT

## 2022-12-25 PROCEDURE — 65270000029 HC RM PRIVATE

## 2022-12-25 PROCEDURE — 74011250637 HC RX REV CODE- 250/637: Performed by: INTERNAL MEDICINE

## 2022-12-25 PROCEDURE — 99024 POSTOP FOLLOW-UP VISIT: CPT | Performed by: SURGERY

## 2022-12-25 PROCEDURE — 36415 COLL VENOUS BLD VENIPUNCTURE: CPT

## 2022-12-25 PROCEDURE — 74011000258 HC RX REV CODE- 258: Performed by: SURGERY

## 2022-12-25 RX ORDER — AMITRIPTYLINE HYDROCHLORIDE 25 MG/1
50 TABLET, FILM COATED ORAL
Status: DISCONTINUED | OUTPATIENT
Start: 2022-12-25 | End: 2023-01-17 | Stop reason: HOSPADM

## 2022-12-25 RX ORDER — NALOXONE HYDROCHLORIDE 1 MG/ML
1 INJECTION INTRAMUSCULAR; INTRAVENOUS; SUBCUTANEOUS ONCE
Status: DISCONTINUED | OUTPATIENT
Start: 2022-12-25 | End: 2022-12-25 | Stop reason: CLARIF

## 2022-12-25 RX ORDER — NALOXONE HYDROCHLORIDE 1 MG/ML
1 INJECTION INTRAMUSCULAR; INTRAVENOUS; SUBCUTANEOUS AS NEEDED
Status: DISCONTINUED | OUTPATIENT
Start: 2022-12-25 | End: 2023-01-17 | Stop reason: HOSPADM

## 2022-12-25 RX ADMIN — GABAPENTIN 100 MG: 100 CAPSULE ORAL at 21:28

## 2022-12-25 RX ADMIN — HYDRALAZINE HYDROCHLORIDE 25 MG: 25 TABLET, FILM COATED ORAL at 21:29

## 2022-12-25 RX ADMIN — CARVEDILOL 25 MG: 12.5 TABLET, FILM COATED ORAL at 21:29

## 2022-12-25 RX ADMIN — LISINOPRIL 5 MG: 5 TABLET ORAL at 11:04

## 2022-12-25 RX ADMIN — ISOSORBIDE DINITRATE 20 MG: 20 TABLET ORAL at 16:52

## 2022-12-25 RX ADMIN — SODIUM CHLORIDE, PRESERVATIVE FREE 10 ML: 5 INJECTION INTRAVENOUS at 14:00

## 2022-12-25 RX ADMIN — GABAPENTIN 100 MG: 100 CAPSULE ORAL at 16:52

## 2022-12-25 RX ADMIN — AMITRIPTYLINE HYDROCHLORIDE 50 MG: 25 TABLET, FILM COATED ORAL at 21:28

## 2022-12-25 RX ADMIN — GABAPENTIN 100 MG: 100 CAPSULE ORAL at 11:04

## 2022-12-25 RX ADMIN — ISOSORBIDE DINITRATE 20 MG: 20 TABLET ORAL at 21:29

## 2022-12-25 RX ADMIN — TICAGRELOR 90 MG: 90 TABLET ORAL at 11:04

## 2022-12-25 RX ADMIN — SODIUM CHLORIDE, PRESERVATIVE FREE 10 ML: 5 INJECTION INTRAVENOUS at 06:20

## 2022-12-25 RX ADMIN — PIPERACILLIN AND TAZOBACTAM 3.38 G: 3; .375 INJECTION, POWDER, FOR SOLUTION INTRAVENOUS at 11:05

## 2022-12-25 RX ADMIN — OXYCODONE AND ACETAMINOPHEN 1 TABLET: 10; 325 TABLET ORAL at 16:52

## 2022-12-25 RX ADMIN — OXYCODONE HYDROCHLORIDE 10 MG: 10 TABLET, FILM COATED, EXTENDED RELEASE ORAL at 21:28

## 2022-12-25 RX ADMIN — HYDRALAZINE HYDROCHLORIDE 25 MG: 25 TABLET, FILM COATED ORAL at 16:52

## 2022-12-25 RX ADMIN — ATORVASTATIN CALCIUM 40 MG: 40 TABLET, FILM COATED ORAL at 11:04

## 2022-12-25 RX ADMIN — OXYCODONE AND ACETAMINOPHEN 1 TABLET: 10; 325 TABLET ORAL at 06:15

## 2022-12-25 RX ADMIN — HEPARIN SODIUM 5000 UNITS: 5000 INJECTION INTRAVENOUS; SUBCUTANEOUS at 06:09

## 2022-12-25 RX ADMIN — OXYCODONE HYDROCHLORIDE 10 MG: 10 TABLET, FILM COATED, EXTENDED RELEASE ORAL at 11:04

## 2022-12-25 RX ADMIN — OXYCODONE 5 MG: 5 TABLET ORAL at 13:10

## 2022-12-25 RX ADMIN — ISOSORBIDE DINITRATE 20 MG: 20 TABLET ORAL at 11:04

## 2022-12-25 RX ADMIN — SODIUM CHLORIDE, PRESERVATIVE FREE 10 ML: 5 INJECTION INTRAVENOUS at 21:37

## 2022-12-25 RX ADMIN — HYDRALAZINE HYDROCHLORIDE 25 MG: 25 TABLET, FILM COATED ORAL at 11:04

## 2022-12-25 RX ADMIN — AMLODIPINE BESYLATE 10 MG: 5 TABLET ORAL at 11:04

## 2022-12-25 RX ADMIN — PIPERACILLIN AND TAZOBACTAM 3.38 G: 3; .375 INJECTION, POWDER, FOR SOLUTION INTRAVENOUS at 20:03

## 2022-12-25 RX ADMIN — TICAGRELOR 90 MG: 90 TABLET ORAL at 21:40

## 2022-12-25 RX ADMIN — CARVEDILOL 25 MG: 12.5 TABLET, FILM COATED ORAL at 11:04

## 2022-12-25 RX ADMIN — MELATONIN TAB 5 MG 5 MG: 5 TAB at 21:30

## 2022-12-25 NOTE — PROGRESS NOTES
Patient examined this morning. Looks comfortable. Pain score is 7 out of 10. Left AKA dressings intact. Will change dressing tomorrow morning. I will increase pain medication to oxycodone extended release 20 twice daily with Percocet for breakthrough. Patient is ready to be discharged to rehab.

## 2022-12-25 NOTE — PROGRESS NOTES
Progress Note    Patient: Dina Tariq MRN: [id number]  SSN: [id number]    YOB: 1955  Age: 74 y.o. Sex: male      Admit Date: 12/22/2022    LOS: 3 days     Subjective:     Patient had amputation  sleeping  12/25  Pain \"phantom pain\"  Objective:     Vitals:    12/24/22 2343 12/25/22 0605 12/25/22 0845 12/25/22 1254   BP: 97/60 118/67 (!) 145/78 (!) 145/78   Pulse: 83 83 78 78   Resp:  16  16   Temp: 98 °F (36.7 °C) 98.1 °F (36.7 °C) 97.9 °F (36.6 °C) 97.9 °F (36.6 °C)   TempSrc:       SpO2: 98% 99% 97%    Weight:       Height:            Intake and Output:  Current Shift: No intake/output data recorded. Last three shifts: 12/23 1901 - 12/25 0700  In: 6598 [P.O.:720; I.V.:1000]  Out: 0     Physical Exam:   General:  sleeping. Eyes:  Conjunctivae/corneas clear. PERRL, EOMs intact. Fundi benign   Ears:  Normal TMs and external ear canals both ears. Nose: Nares normal. Septum midline. Mucosa normal. No drainage or sinus tenderness. Mouth/Throat: Lips, mucosa, and tongue normal. Teeth and gums normal.   Neck: Supple, symmetrical, trachea midline, no adenopathy, thyroid: no enlargment/tenderness/nodules, no carotid bruit and no JVD. Back:   Symmetric, no curvature. ROM normal. No CVA tenderness. Lungs:   Clear to auscultation bilaterally. Heart:  Regular rate and rhythm, S1, S2 normal, no murmur, click, rub or gallop. Abdomen:   Soft, non-tender. Bowel sounds normal. No masses,  No organomegaly. L AKA    Pulses: 2+ and symmetric all extremities. Skin: Skin color, texture, turgor normal. No rashes or lesions   Lymph nodes:    Neurologic:        Lab/Data Review: All lab results for the last 24 hours reviewed.      Recent Results (from the past 24 hour(s))   GLUCOSE, POC    Collection Time: 12/24/22  5:08 PM   Result Value Ref Range    Glucose (POC) 276 (H) 65 - 100 mg/dL    Performed by Shantal Ho    GLUCOSE, POC    Collection Time: 12/24/22  8:57 PM   Result Value Ref Range Glucose (POC) 188 (H) 65 - 100 mg/dL    Performed by Isabel Mayetta    METABOLIC PANEL, BASIC    Collection Time: 12/25/22  7:08 AM   Result Value Ref Range    Sodium 136 136 - 145 mmol/L    Potassium 3.7 3.5 - 5.1 mmol/L    Chloride 104 97 - 108 mmol/L    CO2 22 21 - 32 mmol/L    Anion gap 10 5 - 15 mmol/L    Glucose 164 (H) 65 - 100 mg/dL    BUN 47 (H) 6 - 20 mg/dL    Creatinine 3.66 (H) 0.70 - 1.30 mg/dL    BUN/Creatinine ratio 13 12 - 20      eGFR 18 (L) >60 ml/min/1.73m2    Calcium 7.5 (L) 8.5 - 10.1 mg/dL   CBC WITH AUTOMATED DIFF    Collection Time: 12/25/22  7:08 AM   Result Value Ref Range    WBC 12.2 (H) 4.1 - 11.1 K/uL    RBC 2.57 (L) 4.10 - 5.70 M/uL    HGB 6.3 (L) 12.1 - 17.0 g/dL    HCT 22.2 (L) 36.6 - 50.3 %    MCV 86.4 80.0 - 99.0 FL    MCH 24.5 (L) 26.0 - 34.0 PG    MCHC 28.4 (L) 30.0 - 36.5 g/dL    RDW 17.5 (H) 11.5 - 14.5 %    PLATELET 593 (H) 565 - 400 K/uL    MPV 9.7 8.9 - 12.9 FL    NRBC 0.0 0.0  WBC    ABSOLUTE NRBC 0.00 0.00 - 0.01 K/uL    NEUTROPHILS 71 32 - 75 %    LYMPHOCYTES 20 12 - 49 %    MONOCYTES 8 5 - 13 %    EOSINOPHILS 1 0 - 7 %    BASOPHILS 0 0 - 1 %    IMMATURE GRANULOCYTES 0 0 - 0.5 %    ABS. NEUTROPHILS 8.7 (H) 1.8 - 8.0 K/UL    ABS. LYMPHOCYTES 2.4 0.8 - 3.5 K/UL    ABS. MONOCYTES 1.0 0.0 - 1.0 K/UL    ABS. EOSINOPHILS 0.1 0.0 - 0.4 K/UL    ABS. BASOPHILS 0.0 0.0 - 0.1 K/UL    ABS. IMM.  GRANS. 0.1 (H) 0.00 - 0.04 K/UL    DF AUTOMATED     GLUCOSE, POC    Collection Time: 12/25/22  7:28 AM   Result Value Ref Range    Glucose (POC) 159 (H) 65 - 100 mg/dL    Performed by Elayne Edward    GLUCOSE, POC    Collection Time: 12/25/22 11:22 AM   Result Value Ref Range    Glucose (POC) 160 (H) 65 - 100 mg/dL    Performed by Elayne Edward          Assessment:     Active Problems:    Wet gangrene ([address] (12/22/2022)      Plan:     Present treatment  Add neurontin  Signed By: Raul Witt MD     December 25, 2022

## 2022-12-25 NOTE — OP NOTES
This is operative report on Torri Martin, patient's YOB: 1955. Date of surgery: December 24, 2022. Surgeon: Dr. Sherwin Kirk, [address] [facility] diagnosis: Left leg gangrene, left knee gangrene  Left leg contractions    Postop diagnosis: Same as above    Procedure: Left above-knee amputation. Anesthesia: General with endotracheal tube. Anesthesiologist: Dr. Meghana Díaz  Assistant: Mr. Jimenezlyvinay Salcedo  EBL: Minimal  Specimen: Left lower leg  Implants: None  Complication: None  Fluids and urine output: Please see anesthesia report. Indication for surgery: Mr. Sophia Kirk is currently hospitalized with significant left leg wound gangrene. Knee joint has a large black eschar with a purulent drainage. Patient also has developed contractures as well. Patient was discussed about his left leg conditions. I personally do not think this leg could be salvageable. Patient has a significant infection as well. Patient has a large black eschar noted at the left knee area. After discussion of the rationale for the procedure, and the level of the amputations and also discussed with the surgical risks benefits. Also talked about postop wound cares and timing for new prosthetic legs were discussed. Patient has signed surgical consent for left above-knee amputation. Description of procedure: Patient was brought to the operating table comfortably supine position. Followed by general anesthesia was initiated. Followed by left leg was prepped using Betadine solution. Followed by sterile drapes applied usual fashion. At this time timeout was called after confirmation was made procedure commenced. Using marking pen anterior and posterior flap at the above-knee level was marked followed by skin incision was made using #10 blade. Followed by using electro Bovie cautery subcutaneous tissue musculature was divided. The vascular pedicle was suture-ligated with 2-0 Vicryl sutures.   Followed by distal femur was divided with electric saw. Followed by posterior flap of the musculature was divided with amputation knife. Followed by hemostasis obtained. Followed by fascial level of the posterior flap and anterior flap was reapproximated with 0 Vicryl sutures interrupted fashion. Followed by skin was closed with 2-0 nylon sutures with vertical mattress suturing technique. Followed by sterile dressings applied. At the end the procedure instrument counts correct. Patient was awakened and extubated in the OR transferred to recovery area stable condition.

## 2022-12-25 NOTE — PROGRESS NOTES
Consult Date: 2022        Subjective   Seen and examined in his room. He is accompanied by family member/his brother  Kaelyn Cole pain is better controlled. He can still feel it in his stump. Some oral intake.   Not in respiratory distress  Past Medical History:   Diagnosis Date    A-fib ([facility]     Anemia     Arrhythmia     CAD (coronary artery disease)     Chronic kidney disease     Diabetes ([facility]     DM2    Dialysis patient [facility])     M-W-F [facility] [phone]    Heart failure ([facility]     Hypertension     STEMI (ST elevation myocardial infarction) ([facility]       Past Surgical History:   Procedure Laterality Date    HX AMPUTATION Left     5 fingers    HX HEART CATHETERIZATION      IR INSERT NON TUNL CVC OVER 5 YRS  2021    IR INSERT NON TUNL CVC OVER 5 YRS  2022    IR INSERT TUNL CVC W/O PORT OVER 5 YR  2021    IR INSERT TUNL CVC W/O PORT OVER 5 YR  2022    [place] CARDIAC SURG PROCEDURE UNLIST       Family History   Problem Relation Age of Onset    Cancer Mother         breast    No Known Problems Father       Social History     Tobacco Use    Smoking status: Former     Packs/day: 0.50     Years: 15.00     Pack years: 7.50     Types: Cigarettes     Quit date:      Years since quittin.9    Smokeless tobacco: Never   Substance Use Topics    Alcohol use: Not Currently       Current Facility-Administered Medications   Medication Dose Route Frequency Provider Last Rate Last Admin    amitriptyline (ELAVIL) tablet 50 mg  50 mg Oral QHS Ad Fuentes MD        0.9% sodium chloride infusion 250 mL  250 mL IntraVENous PRN Katlin Faustin MD        sodium chloride (NS) flush 5-40 mL  5-40 mL IntraVENous Q8H Katlin Faustin MD   10 mL at 22 8557    sodium chloride (NS) flush 5-40 mL  5-40 mL IntraVENous PRN Katlin Faustin MD        ondansetron (ZOFRAN ODT) tablet 4 mg  4 mg Oral Q8H PRN Katlin Faustin MD        Or    ondansetron [facility]) injection 4 mg  4 mg IntraVENous Q6H PRN Ramin, [address] Veena Hendrickson MD        oxyCODONE ER (OxyCONTIN) tablet 10 mg  10 mg Oral Q12H Feli Faustin MD   10 mg at 12/25/22 1104    oxyCODONE-acetaminophen (PERCOCET 10)  mg per tablet 1 Tablet  1 Tablet Oral Q6H PRN Feli Faustin MD   1 Tablet at 12/25/22 0615    heparin (porcine) injection 5,000 Units  5,000 Units SubCUTAneous Q8H Feli Faustin MD   5,000 Units at 12/25/22 0236    heparin (porcine) 1,000 unit/mL injection 3,800 Units  3,800 Units Hemodialysis DIALYSIS PRN Feli Faustin MD   3,800 Units at 12/23/22 1432    epoetin luis-epbx (RETACRIT) injection 8,000 Units  8,000 Units IntraVENous DIALYSIS MON, WED & FRI Feli Faustin MD   8,000 Units at 12/23/22 1422    [START ON 12/26/2022] Vancomycin - Draw trough at 0400 on 12/26 prior to HD. Thanks!    Other ONCE Feli Faustin MD        oxyCODONE IR (ROXICODONE) tablet 5 mg  5 mg Oral Q4H PRN Feli Faustin MD   5 mg at 12/25/22 1310    amLODIPine (NORVASC) tablet 10 mg  10 mg Oral DAILY Feli Faustin MD   10 mg at 12/25/22 1104    atorvastatin (LIPITOR) tablet 40 mg  40 mg Oral DAILY Feli Faustin MD   40 mg at 12/25/22 1104    calcium carbonate (TUMS) chewable tablet 200 mg [elemental]  200 mg Oral DAILY Feli Faustin MD   200 mg at 12/23/22 0915    carvediloL (COREG) tablet 25 mg  25 mg Oral BID Feli Faustin MD   25 mg at 12/25/22 1104    gabapentin (NEURONTIN) capsule 100 mg  100 mg Oral TID Feli Faustin MD   100 mg at 12/25/22 1104    hydrALAZINE (APRESOLINE) tablet 25 mg  25 mg Oral TID Feli Faustin MD   25 mg at 12/25/22 1104    insulin glargine (LANTUS) injection 10 Units  10 Units SubCUTAneous QHS Feli Faustin MD        isosorbide dinitrate (ISORDIL) tablet 20 mg  20 mg Oral TID Feli Faustin MD   20 mg at 12/25/22 1104    lisinopriL (PRINIVIL, ZESTRIL) tablet 5 mg  5 mg Oral DAILY Feli Faustin MD   5 mg at 12/25/22 1104    melatonin tablet 5 mg  5 mg Oral QHS Feli Faustin MD   5 mg at 12/24/22 2300    ticagrelor (Dg Andre) tablet 90 mg  90 mg Oral BID Nyla Faustin MD   90 mg at 12/25/22 1104    sodium chloride (NS) flush 5-40 mL  5-40 mL IntraVENous PRN Nyla Faustin MD        acetaminophen (TYLENOL) tablet 650 mg  650 mg Oral Q6H PRN Nyla Faustin MD   650 mg at 12/23/22 1552    Or    acetaminophen (TYLENOL) suppository 650 mg  650 mg Rectal Q6H PRN Nyla aFustin MD        polyethylene glycol (MIRALAX) packet 17 g  17 g Oral DAILY PRN Nyla Faustin MD        piperacillin-tazobactam (ZOSYN) 3.375 g in 0.9% sodium chloride (MBP/ADV) 100 mL MBP  3.375 g IntraVENous Q12H Nyla Faustin MD 25 mL/hr at 12/25/22 1105 3.375 g at 12/25/22 1105    Vancomycin - Pharmacy to Dose   Other Rx Dosing/Monitoring Nyla Faustin MD            Review of Systems   Constitutional:  Positive for appetite change and fatigue. Negative for chills and fever. Eyes:  Negative for photophobia and visual disturbance. Respiratory:  Negative for shortness of breath and wheezing. Cardiovascular:  Negative for chest pain and palpitations. Gastrointestinal:  Positive for nausea. Negative for abdominal distention, abdominal pain, blood in stool, constipation, diarrhea and vomiting. Genitourinary:  Negative for dysuria, frequency and hematuria. Musculoskeletal:  Negative for back pain, myalgias and neck pain. Skin:  Negative for rash. Allergic/Immunologic: Negative for environmental allergies. Neurological:  Negative for dizziness, tremors, speech difficulty and headaches. Hematological:  Does not bruise/bleed easily. Psychiatric/Behavioral:  Negative for behavioral problems and suicidal ideas. The patient is not nervous/anxious.       Objective     Vital signs for last 24 hours:  Visit Vitals  BP (!) 145/78   Pulse 78   Temp 97.9 °F (36.6 °C)   Resp 16   Ht 5' 8\" (1.727 m)   Wt 59.2 kg (130 lb 8.2 oz)   SpO2 97%   BMI 19.84 kg/m²           Recent Results (from the past 24 hour(s))   GLUCOSE, POC    Collection Time: 12/24/22  5:08 PM Result Value Ref Range    Glucose (POC) 276 (H) 65 - 100 mg/dL    Performed by Nataliia Portillo    GLUCOSE, POC    Collection Time: 12/24/22  8:57 PM   Result Value Ref Range    Glucose (POC) 188 (H) 65 - 100 mg/dL    Performed by Celeste Ferrer    METABOLIC PANEL, BASIC    Collection Time: 12/25/22  7:08 AM   Result Value Ref Range    Sodium 136 136 - 145 mmol/L    Potassium 3.7 3.5 - 5.1 mmol/L    Chloride 104 97 - 108 mmol/L    CO2 22 21 - 32 mmol/L    Anion gap 10 5 - 15 mmol/L    Glucose 164 (H) 65 - 100 mg/dL    BUN 47 (H) 6 - 20 mg/dL    Creatinine 3.66 (H) 0.70 - 1.30 mg/dL    BUN/Creatinine ratio 13 12 - 20      eGFR 18 (L) >60 ml/min/1.73m2    Calcium 7.5 (L) 8.5 - 10.1 mg/dL   CBC WITH AUTOMATED DIFF    Collection Time: 12/25/22  7:08 AM   Result Value Ref Range    WBC 12.2 (H) 4.1 - 11.1 K/uL    RBC 2.57 (L) 4.10 - 5.70 M/uL    HGB 6.3 (L) 12.1 - 17.0 g/dL    HCT 22.2 (L) 36.6 - 50.3 %    MCV 86.4 80.0 - 99.0 FL    MCH 24.5 (L) 26.0 - 34.0 PG    MCHC 28.4 (L) 30.0 - 36.5 g/dL    RDW 17.5 (H) 11.5 - 14.5 %    PLATELET 415 (H) 836 - 400 K/uL    MPV 9.7 8.9 - 12.9 FL    NRBC 0.0 0.0  WBC    ABSOLUTE NRBC 0.00 0.00 - 0.01 K/uL    NEUTROPHILS 71 32 - 75 %    LYMPHOCYTES 20 12 - 49 %    MONOCYTES 8 5 - 13 %    EOSINOPHILS 1 0 - 7 %    BASOPHILS 0 0 - 1 %    IMMATURE GRANULOCYTES 0 0 - 0.5 %    ABS. NEUTROPHILS 8.7 (H) 1.8 - 8.0 K/UL    ABS. LYMPHOCYTES 2.4 0.8 - 3.5 K/UL    ABS. MONOCYTES 1.0 0.0 - 1.0 K/UL    ABS. EOSINOPHILS 0.1 0.0 - 0.4 K/UL    ABS. BASOPHILS 0.0 0.0 - 0.1 K/UL    ABS. IMM.  GRANS. 0.1 (H) 0.00 - 0.04 K/UL    DF AUTOMATED     GLUCOSE, POC    Collection Time: 12/25/22  7:28 AM   Result Value Ref Range    Glucose (POC) 159 (H) 65 - 100 mg/dL    Performed by Anyang Phoenix [employer]    GLUCOSE, POC    Collection Time: 12/25/22 11:22 AM   Result Value Ref Range    Glucose (POC) 160 (H) 65 - 100 mg/dL    Performed by Windy Bread           Intake/Output Summary (Last 24 hours) at 12/25/2022 1300 [facility] filed at 12/25/2022 0615  Gross per 24 hour   Intake 820 ml   Output 0 ml   Net 820 ml        Current Shift: No intake/output data recorded. Last 3 Shifts: 12/23 1901 - 12/25 0700  In: 7903 [P.O.:720; I.V.:1000]  Out: 0   Physical Exam  Vitals reviewed. Constitutional:       General: He is not in acute distress. Appearance: He is ill-appearing. HENT:      Head: Normocephalic and atraumatic. Nose: Nose normal.      Mouth/Throat:      Pharynx: No oropharyngeal exudate. Eyes:      General: No scleral icterus. Conjunctiva/sclera: Conjunctivae normal.   Cardiovascular:      Rate and Rhythm: Normal rate and regular rhythm. Pulses: Normal pulses. Heart sounds: Normal heart sounds. Pulmonary:      Effort: Pulmonary effort is normal.      Breath sounds: Normal breath sounds. Abdominal:      General: There is no distension. Palpations: Abdomen is soft. Tenderness: There is no abdominal tenderness. Musculoskeletal:      Cervical back: Neck supple. Skin:     General: Skin is warm and dry. Findings: No rash. Neurological:      General: No focal deficit present. Mental Status: He is alert and oriented to person, place, and time. Mental status is at baseline.    Psychiatric:         Mood and Affect: Mood normal.         Behavior: Behavior normal.         Judgment: Judgment normal.      Left heel gangrene-wounds on both legs under bandages  Tunneled hemodialysis catheter right side-exit site clean  Data Review:   Recent Results (from the past 24 hour(s))   GLUCOSE, POC    Collection Time: 12/24/22  5:08 PM   Result Value Ref Range    Glucose (POC) 276 (H) 65 - 100 mg/dL    Performed by Basilia Arenas    GLUCOSE, POC    Collection Time: 12/24/22  8:57 PM   Result Value Ref Range    Glucose (POC) 188 (H) 65 - 100 mg/dL    Performed by Kylie Gonsalez    METABOLIC PANEL, BASIC    Collection Time: 12/25/22  7:08 AM   Result Value Ref Range    Sodium 136 136 - 145 mmol/L    Potassium 3.7 3.5 - 5.1 mmol/L    Chloride 104 97 - 108 mmol/L    CO2 22 21 - 32 mmol/L    Anion gap 10 5 - 15 mmol/L    Glucose 164 (H) 65 - 100 mg/dL    BUN 47 (H) 6 - 20 mg/dL    Creatinine 3.66 (H) 0.70 - 1.30 mg/dL    BUN/Creatinine ratio 13 12 - 20      eGFR 18 (L) >60 ml/min/1.73m2    Calcium 7.5 (L) 8.5 - 10.1 mg/dL   CBC WITH AUTOMATED DIFF    Collection Time: 12/25/22  7:08 AM   Result Value Ref Range    WBC 12.2 (H) 4.1 - 11.1 K/uL    RBC 2.57 (L) 4.10 - 5.70 M/uL    HGB 6.3 (L) 12.1 - 17.0 g/dL    HCT 22.2 (L) 36.6 - 50.3 %    MCV 86.4 80.0 - 99.0 FL    MCH 24.5 (L) 26.0 - 34.0 PG    MCHC 28.4 (L) 30.0 - 36.5 g/dL    RDW 17.5 (H) 11.5 - 14.5 %    PLATELET 254 (H) 075 - 400 K/uL    MPV 9.7 8.9 - 12.9 FL    NRBC 0.0 0.0  WBC    ABSOLUTE NRBC 0.00 0.00 - 0.01 K/uL    NEUTROPHILS 71 32 - 75 %    LYMPHOCYTES 20 12 - 49 %    MONOCYTES 8 5 - 13 %    EOSINOPHILS 1 0 - 7 %    BASOPHILS 0 0 - 1 %    IMMATURE GRANULOCYTES 0 0 - 0.5 %    ABS. NEUTROPHILS 8.7 (H) 1.8 - 8.0 K/UL    ABS. LYMPHOCYTES 2.4 0.8 - 3.5 K/UL    ABS. MONOCYTES 1.0 0.0 - 1.0 K/UL    ABS. EOSINOPHILS 0.1 0.0 - 0.4 K/UL    ABS. BASOPHILS 0.0 0.0 - 0.1 K/UL    ABS. IMM.  GRANS. 0.1 (H) 0.00 - 0.04 K/UL    DF AUTOMATED     GLUCOSE, POC    Collection Time: 12/25/22  7:28 AM   Result Value Ref Range    Glucose (POC) 159 (H) 65 - 100 mg/dL    Performed by Nina Phoenix, POC    Collection Time: 12/25/22 11:22 AM   Result Value Ref Range    Glucose (POC) 160 (H) 65 - 100 mg/dL    Performed by Alla RODRIGUEZ EXT ART PVR MULT LEVEL SEG PRESSURES   Final Result           Patient Active Problem List   Diagnosis Code    Acute hypoxemic respiratory failure due to COVID-19 (HCC) U07.1, J96.01    Chronic systolic congestive heart failure (HCC) I50.22    Goals of care, counseling/discussion Z71.89    DNR (do not resuscitate) discussion Z71.89    JUAN C (acute kidney injury) ([facility] N17.9    Fatigue, unspecified type R53.83 Dysphagia, unspecified type R13.10    Acute CHF (congestive heart failure) (HCC) I50.9    Wet gangrene (HCC) I96        DIAGNOSES:  ESRD on hemodialysis  Peripheral artery disease  Wet gangrene  CHF with reduced ejection fraction  Chronic atrial fibrillation  Anemia of chronic kidney disease/chronic inflammation  hypokalemia  DISCUSSION:  Would suggest transfuse with a hemoglobin of 6.3. PLAN:  Hemodialysis tomorrow. Transfusion 1 unit PRBC  Procrit 8000 unit  Appreciate vascular surgery-Dr. Martin Rowe input. Thanks for consulting me. Please don't hesitate to contact me if any questions arise of if I can assist in any manner. This dictation was done by dragon, computer voice recognition software. Often unanticipated grammatical, syntax, phones and other interpretive errors are inadvertently transcribed. Please excuse errors that have escaped final proofreading. Please contact me if you suspect dictation or transcription errors.   Dr Jamar Urbina  [address] [place] [address], [address] [place], [address] [facility]  Cell Phone: [phone]  Office phone: [phone]  Fax: [phone]

## 2022-12-26 LAB
ANION GAP SERPL CALC-SCNC: 8 MMOL/L (ref 5–15)
BASOPHILS # BLD: 0 K/UL (ref 0–0.1)
BASOPHILS NFR BLD: 0 % (ref 0–1)
BUN SERPL-MCNC: 54 MG/DL (ref 6–20)
BUN/CREAT SERPL: 16 (ref 12–20)
CA-I BLD-MCNC: 8 MG/DL (ref 8.5–10.1)
CHLORIDE SERPL-SCNC: 107 MMOL/L (ref 97–108)
CO2 SERPL-SCNC: 23 MMOL/L (ref 21–32)
CREAT SERPL-MCNC: 3.42 MG/DL (ref 0.7–1.3)
DIFFERENTIAL METHOD BLD: ABNORMAL
EOSINOPHIL # BLD: 0.1 K/UL (ref 0–0.4)
EOSINOPHIL NFR BLD: 2 % (ref 0–7)
ERYTHROCYTE [DISTWIDTH] IN BLOOD BY AUTOMATED COUNT: 17.5 % (ref 11.5–14.5)
GLUCOSE BLD STRIP.AUTO-MCNC: 116 MG/DL (ref 65–100)
GLUCOSE BLD STRIP.AUTO-MCNC: 119 MG/DL (ref 65–100)
GLUCOSE BLD STRIP.AUTO-MCNC: 125 MG/DL (ref 65–100)
GLUCOSE BLD STRIP.AUTO-MCNC: 166 MG/DL (ref 65–100)
GLUCOSE SERPL-MCNC: 119 MG/DL (ref 65–100)
HCT VFR BLD AUTO: 20.9 % (ref 36.6–50.3)
HGB BLD-MCNC: 6.1 G/DL (ref 12.1–17)
IMM GRANULOCYTES # BLD AUTO: 0.1 K/UL (ref 0–0.04)
IMM GRANULOCYTES NFR BLD AUTO: 1 % (ref 0–0.5)
LYMPHOCYTES # BLD: 2.5 K/UL (ref 0.8–3.5)
LYMPHOCYTES NFR BLD: 27 % (ref 12–49)
MCH RBC QN AUTO: 24.2 PG (ref 26–34)
MCHC RBC AUTO-ENTMCNC: 29.2 G/DL (ref 30–36.5)
MCV RBC AUTO: 82.9 FL (ref 80–99)
MONOCYTES # BLD: 0.6 K/UL (ref 0–1)
MONOCYTES NFR BLD: 6 % (ref 5–13)
NEUTS SEG # BLD: 6 K/UL (ref 1.8–8)
NEUTS SEG NFR BLD: 64 % (ref 32–75)
NRBC # BLD: 0 K/UL (ref 0–0.01)
NRBC BLD-RTO: 0 PER 100 WBC
PERFORMED BY, TECHID: ABNORMAL
PLATELET # BLD AUTO: 629 K/UL (ref 150–400)
PMV BLD AUTO: 8.9 FL (ref 8.9–12.9)
POTASSIUM SERPL-SCNC: 3.4 MMOL/L (ref 3.5–5.1)
RBC # BLD AUTO: 2.52 M/UL (ref 4.1–5.7)
SODIUM SERPL-SCNC: 138 MMOL/L (ref 136–145)
VANCOMYCIN SERPL-MCNC: 6.7 UG/ML
WBC # BLD AUTO: 9.4 K/UL (ref 4.1–11.1)

## 2022-12-26 PROCEDURE — 74011250637 HC RX REV CODE- 250/637: Performed by: SURGERY

## 2022-12-26 PROCEDURE — 36430 TRANSFUSION BLD/BLD COMPNT: CPT

## 2022-12-26 PROCEDURE — 74011250636 HC RX REV CODE- 250/636: Performed by: INTERNAL MEDICINE

## 2022-12-26 PROCEDURE — 30233N1 TRANSFUSION OF NONAUTOLOGOUS RED BLOOD CELLS INTO PERIPHERAL VEIN, PERCUTANEOUS APPROACH: ICD-10-PCS | Performed by: INTERNAL MEDICINE

## 2022-12-26 PROCEDURE — 90935 HEMODIALYSIS ONE EVALUATION: CPT

## 2022-12-26 PROCEDURE — 74011250637 HC RX REV CODE- 250/637: Performed by: INTERNAL MEDICINE

## 2022-12-26 PROCEDURE — 74011250636 HC RX REV CODE- 250/636: Performed by: SURGERY

## 2022-12-26 PROCEDURE — 74011000250 HC RX REV CODE- 250: Performed by: SURGERY

## 2022-12-26 PROCEDURE — 80202 ASSAY OF VANCOMYCIN: CPT

## 2022-12-26 PROCEDURE — 82962 GLUCOSE BLOOD TEST: CPT

## 2022-12-26 PROCEDURE — 36415 COLL VENOUS BLD VENIPUNCTURE: CPT

## 2022-12-26 PROCEDURE — 74011250636 HC RX REV CODE- 250/636

## 2022-12-26 PROCEDURE — 74011636637 HC RX REV CODE- 636/637: Performed by: SURGERY

## 2022-12-26 PROCEDURE — 85025 COMPLETE CBC W/AUTO DIFF WBC: CPT

## 2022-12-26 PROCEDURE — 65270000029 HC RM PRIVATE

## 2022-12-26 PROCEDURE — P9016 RBC LEUKOCYTES REDUCED: HCPCS

## 2022-12-26 PROCEDURE — 74011000258 HC RX REV CODE- 258: Performed by: SURGERY

## 2022-12-26 PROCEDURE — 80048 BASIC METABOLIC PNL TOTAL CA: CPT

## 2022-12-26 RX ORDER — HEPARIN SODIUM 1000 [USP'U]/ML
INJECTION, SOLUTION INTRAVENOUS; SUBCUTANEOUS
Status: COMPLETED
Start: 2022-12-26 | End: 2022-12-26

## 2022-12-26 RX ORDER — SODIUM CHLORIDE 9 MG/ML
250 INJECTION, SOLUTION INTRAVENOUS AS NEEDED
Status: DISCONTINUED | OUTPATIENT
Start: 2022-12-26 | End: 2023-01-16

## 2022-12-26 RX ADMIN — GABAPENTIN 100 MG: 100 CAPSULE ORAL at 17:47

## 2022-12-26 RX ADMIN — PIPERACILLIN AND TAZOBACTAM 3.38 G: 3; .375 INJECTION, POWDER, FOR SOLUTION INTRAVENOUS at 14:54

## 2022-12-26 RX ADMIN — GABAPENTIN 100 MG: 100 CAPSULE ORAL at 14:48

## 2022-12-26 RX ADMIN — GABAPENTIN 100 MG: 100 CAPSULE ORAL at 21:10

## 2022-12-26 RX ADMIN — AMLODIPINE BESYLATE 10 MG: 5 TABLET ORAL at 14:48

## 2022-12-26 RX ADMIN — ISOSORBIDE DINITRATE 20 MG: 20 TABLET ORAL at 14:48

## 2022-12-26 RX ADMIN — HEPARIN SODIUM 5000 UNITS: 5000 INJECTION INTRAVENOUS; SUBCUTANEOUS at 07:29

## 2022-12-26 RX ADMIN — ISOSORBIDE DINITRATE 20 MG: 20 TABLET ORAL at 21:17

## 2022-12-26 RX ADMIN — TICAGRELOR 90 MG: 90 TABLET ORAL at 14:53

## 2022-12-26 RX ADMIN — EPOETIN ALFA-EPBX 8000 UNITS: 4000 INJECTION, SOLUTION INTRAVENOUS; SUBCUTANEOUS at 09:31

## 2022-12-26 RX ADMIN — TICAGRELOR 90 MG: 90 TABLET ORAL at 21:09

## 2022-12-26 RX ADMIN — ATORVASTATIN CALCIUM 40 MG: 40 TABLET, FILM COATED ORAL at 14:48

## 2022-12-26 RX ADMIN — HYDRALAZINE HYDROCHLORIDE 25 MG: 25 TABLET, FILM COATED ORAL at 17:47

## 2022-12-26 RX ADMIN — HYDRALAZINE HYDROCHLORIDE 25 MG: 25 TABLET, FILM COATED ORAL at 21:10

## 2022-12-26 RX ADMIN — VANCOMYCIN HYDROCHLORIDE 1000 MG: 1 INJECTION, POWDER, LYOPHILIZED, FOR SOLUTION INTRAVENOUS at 15:16

## 2022-12-26 RX ADMIN — CALCIUM CARBONATE 200 MG: 500 TABLET, CHEWABLE ORAL at 14:48

## 2022-12-26 RX ADMIN — OXYCODONE HYDROCHLORIDE 10 MG: 10 TABLET, FILM COATED, EXTENDED RELEASE ORAL at 21:09

## 2022-12-26 RX ADMIN — PIPERACILLIN AND TAZOBACTAM 3.38 G: 3; .375 INJECTION, POWDER, FOR SOLUTION INTRAVENOUS at 21:13

## 2022-12-26 RX ADMIN — MELATONIN TAB 5 MG 5 MG: 5 TAB at 21:10

## 2022-12-26 RX ADMIN — LISINOPRIL 5 MG: 5 TABLET ORAL at 14:53

## 2022-12-26 RX ADMIN — HYDRALAZINE HYDROCHLORIDE 25 MG: 25 TABLET, FILM COATED ORAL at 14:53

## 2022-12-26 RX ADMIN — AMITRIPTYLINE HYDROCHLORIDE 50 MG: 25 TABLET, FILM COATED ORAL at 21:10

## 2022-12-26 RX ADMIN — SODIUM CHLORIDE, PRESERVATIVE FREE 10 ML: 5 INJECTION INTRAVENOUS at 21:13

## 2022-12-26 RX ADMIN — OXYCODONE 5 MG: 5 TABLET ORAL at 10:58

## 2022-12-26 RX ADMIN — OXYCODONE HYDROCHLORIDE 10 MG: 10 TABLET, FILM COATED, EXTENDED RELEASE ORAL at 14:47

## 2022-12-26 RX ADMIN — HEPARIN SODIUM 3800 UNITS: 1000 INJECTION, SOLUTION INTRAVENOUS; SUBCUTANEOUS at 11:06

## 2022-12-26 RX ADMIN — SODIUM CHLORIDE, PRESERVATIVE FREE 10 ML: 5 INJECTION INTRAVENOUS at 15:02

## 2022-12-26 RX ADMIN — CARVEDILOL 25 MG: 12.5 TABLET, FILM COATED ORAL at 14:48

## 2022-12-26 RX ADMIN — CARVEDILOL 25 MG: 12.5 TABLET, FILM COATED ORAL at 21:09

## 2022-12-26 RX ADMIN — SODIUM CHLORIDE, PRESERVATIVE FREE 10 ML: 5 INJECTION INTRAVENOUS at 07:31

## 2022-12-26 RX ADMIN — OXYCODONE AND ACETAMINOPHEN 1 TABLET: 10; 325 TABLET ORAL at 07:30

## 2022-12-26 NOTE — DIALYSIS
[facility] 15 pt alert, tearful,with pain to left stump. No drainage noted on ace wrap to left stump. Right chest perm cath dressing c/d/I  last changed 12/23/2022. Labs already drawn this am per pt, verified with lab.   Treatment initiated via right chest perm cath, good blood return, plan for 3 h dialysis with 1 L fluid removal.

## 2022-12-26 NOTE — PROGRESS NOTES
Progress Note    Patient: Deanne Bustillo MRN: [id number]  SSN: [id number]    YOB: 1955  Age: 74 y.o. Sex: male      Admit Date: 12/22/2022    LOS: 4 days     Subjective:     74years old with recent above-knee amputation for osteomyelitis hemoglobin is down to 6.1 g  Patient has just completed his dialysis for the day    Objective:     Vitals:    12/26/22 1030 12/26/22 1100 12/26/22 1319 12/26/22 1335   BP: (!) 140/87  (!) 146/83 138/76   Pulse: 96  87 90   Resp: 18  20 18   Temp:  98.1 °F (36.7 °C) 97.9 °F (36.6 °C) 97.9 °F (36.6 °C)   TempSrc:  Oral     SpO2:   97%    Weight:       Height:            Intake and Output:  Current Shift: 12/26 0701 - 12/26 1900  In: -   Out: 1000   Last three shifts: 12/24 1901 - 12/26 0700  In: 1400 [P.O.:1200; I.V.:200]  Out: 900 [Urine:900]    Physical Exam:   General:  Alert, cooperative, no distress, appears stated age. Eyes:  Conjunctivae/corneas clear. PERRL, EOMs intact. Fundi benign   Ears:  Normal TMs and external ear canals both ears. Nose: Nares normal. Septum midline. Mucosa normal. No drainage or sinus tenderness. Mouth/Throat: Lips, mucosa, and tongue normal. Teeth and gums normal.   Neck: Supple, symmetrical, trachea midline, no adenopathy, thyroid: no enlargment/tenderness/nodules, no carotid bruit and no JVD. Back:   Symmetric, no curvature. ROM normal. No CVA tenderness. Lungs:   Clear to auscultation bilaterally. Heart:  Regular rate and rhythm, S1, S2 normal, no murmur, click, rub or gallop. Abdomen:   Soft, non-tender. Bowel sounds normal. No masses,  No organomegaly. Extremities: Above-knee amputation left side   Pulses: 2+ and symmetric all extremities. Skin: Skin color, texture, turgor normal. No rashes or lesions   Lymph nodes: Cervical, supraclavicular, and axillary nodes normal.   Neurologic: CNII-XII intact. Normal strength, sensation and reflexes throughout. Lab/Data Review:   All lab results for the last 24 hours reviewed. left  Recent Results (from the past 24 hour(s))   GLUCOSE, POC    Collection Time: 12/25/22  4:53 PM   Result Value Ref Range    Glucose (POC) 136 (H) 65 - 100 mg/dL    Performed by Douglas Huston    GLUCOSE, POC    Collection Time: 12/25/22  9:19 PM   Result Value Ref Range    Glucose (POC) 169 (H) 65 - 100 mg/dL    Performed by Karol Sotelo, RANDOM    Collection Time: 12/26/22  6:18 AM   Result Value Ref Range    Vancomycin, random 6.7 ug/mL   METABOLIC PANEL, BASIC    Collection Time: 12/26/22  6:18 AM   Result Value Ref Range    Sodium 138 136 - 145 mmol/L    Potassium 3.4 (L) 3.5 - 5.1 mmol/L    Chloride 107 97 - 108 mmol/L    CO2 23 21 - 32 mmol/L    Anion gap 8 5 - 15 mmol/L    Glucose 119 (H) 65 - 100 mg/dL    BUN 54 (H) 6 - 20 mg/dL    Creatinine 3.42 (H) 0.70 - 1.30 mg/dL    BUN/Creatinine ratio 16 12 - 20      eGFR 19 (L) >60 ml/min/1.73m2    Calcium 8.0 (L) 8.5 - 10.1 mg/dL   CBC WITH AUTOMATED DIFF    Collection Time: 12/26/22  6:18 AM   Result Value Ref Range    WBC 9.4 4.1 - 11.1 K/uL    RBC 2.52 (L) 4.10 - 5.70 M/uL    HGB 6.1 (L) 12.1 - 17.0 g/dL    HCT 20.9 (L) 36.6 - 50.3 %    MCV 82.9 80.0 - 99.0 FL    MCH 24.2 (L) 26.0 - 34.0 PG    MCHC 29.2 (L) 30.0 - 36.5 g/dL    RDW 17.5 (H) 11.5 - 14.5 %    PLATELET 218 (H) 217 - 400 K/uL    MPV 8.9 8.9 - 12.9 FL    NRBC 0.0 0.0  WBC    ABSOLUTE NRBC 0.00 0.00 - 0.01 K/uL    NEUTROPHILS 64 32 - 75 %    LYMPHOCYTES 27 12 - 49 %    MONOCYTES 6 5 - 13 %    EOSINOPHILS 2 0 - 7 %    BASOPHILS 0 0 - 1 %    IMMATURE GRANULOCYTES 1 (H) 0 - 0.5 %    ABS. NEUTROPHILS 6.0 1.8 - 8.0 K/UL    ABS. LYMPHOCYTES 2.5 0.8 - 3.5 K/UL    ABS. MONOCYTES 0.6 0.0 - 1.0 K/UL    ABS. EOSINOPHILS 0.1 0.0 - 0.4 K/UL    ABS. BASOPHILS 0.0 0.0 - 0.1 K/UL    ABS. IMM.  GRANS. 0.1 (H) 0.00 - 0.04 K/UL    DF AUTOMATED     GLUCOSE, POC    Collection Time: 12/26/22  7:17 AM   Result Value Ref Range    Glucose (POC) 116 (H) 65 - 100 mg/dL    Performed by Teodoro Smith, POC    Collection Time: 12/26/22 12:16 PM   Result Value Ref Range    Glucose (POC) 125 (H) 65 - 100 mg/dL    Performed by Matthew Romo          Assessment:     Active Problems:    Wet gangrene ([address] (12/22/2022)      End-stage renal disease  Anemia possibly p post surgery  Plan:     Patient scheduled for transfusion  Obtain CBC for tomorrow patient will need to rehab placement Case management to assist with her    Signed By: Julio C Ruffin MD     December 26, 2022

## 2022-12-26 NOTE — PROGRESS NOTES
Vancomycin Dosing Consult  Abbey Huddleston is a 74 y.o. male with sepsis, left knee gangrene, osteomyelitis. Pharmacy was consulted by Dr. Isidro Warner to dose and monitor vancomycin. Today is day 5.     Antibiotic regimen: Vancomycin + Pip/Tazo    Temp (24hrs), Av.2 °F (36.8 °C), Min:98 °F (36.7 °C), Max:98.7 °F (37.1 °C)    Recent Labs     22  0307 22  1405   WBC 9.9 8.0   CREA 3.02* 3.09*   BUN 49* 50*     Est CrCl: ESRD on HD (MWF)  Concomitant nephrotoxic drugs: None    Cultures:    Blood: ngtd, prelim    Blood x 2: ngtd, prelim    MRSA Swab: Detected     Target range: Trough 21-24 mcg/mL (Intermittent hemodialysis, invasive MRSA infection or sepsis)    Recent level history:  Date/Time Dose & Interval Measured Level (mcg/mL)    0307 1250 mg IV x 1 dose 17.9    0618 750 mg IV x 1 dose 6.7      Assessment/Plan:   Afebrile, leukocytosis, lactic acidosis on admission, ESR & PCT elevated  ESRD on HD MWF  Level resulted subtherapeutic - will order vancomycin 1,000 mg IV x 1 dose today post-HD  Pre-HD level scheduled for  0400  Antimicrobial stop date TBD

## 2022-12-26 NOTE — PROGRESS NOTES
Pt returned from dialysis at this time. While reviewing labs, this nurse noticed pt's Hgb was 6.3 yesterday 12/25/2022 and is currently 6.1 today. Dr. Fidencio Titus was informed at this time. Dr. Fidencio Titus instructed this nurse to call Nephrology. 80  Paged Dr. Astrid Corcoran to inform him that pts Hgb was 6.3 yesterday (12/25/2022) and is currently 6.1 today. In his 12/25/2022 note, his plan was to infuse 1 unit of PRBCs, however the order was not actually placed. New order: Type and Cross and One unit of PRBCs to be infused.

## 2022-12-26 NOTE — PROGRESS NOTES
Comprehensive Nutrition Assessment    Type and Reason for Visit: Consult, Wound    Nutrition Recommendations/Plan:   Continue current diet  Ensure enlive 2x/day, Pablito 2x/day  Monitor and record PO intakes, supplement acceptance, and Bms in I/Os     Malnutrition Assessment:  Malnutrition Status:  Mild malnutrition (12/26/22 1432)    Context:  Acute illness     Findings of the 6 clinical characteristics of malnutrition:   Energy Intake:  No significant decrease in energy intake  Weight Loss:  Greater than 7.5% over 3 months (?veracity of amount)     Body Fat Loss:  Unable to assess (contact iso),     Muscle Mass Loss:  Unable to assess,    Fluid Accumulation:  No significant fluid accumulation,     Strength:  Not performed     Nutrition Assessment:    Admitted for L knee gangrene. S/p L AKA. Plan to transfuse PRBCs. Noted consult for wounds. Plan to add ONS to support wound healing and help meet needs. Noted significant weight loss per EMAR. Labs: Na 138, K 3.4, BUN 54, Creat 3.42, Gluc 119, Alb 2.2. Meds: atorvastatin, calcium carbonate    Nutrition Related Findings:    (P) NFPE deferred d/t contact iso. No n/v, d/c, or problems chewing/swallowing. No edema. BM 12/24. Wound Type: (P) Multiple, Open wounds, Surgical incision, Full thickness    Current Nutrition Intake & Therapies:  ADULT DIET Regular    Anthropometric Measures:  Height: 5' 7.99\" (172.7 cm)  Ideal Body Weight (IBW): 154 lbs (70 kg)  Current Body Wt:  59.2 kg (130 lb 8.2 oz), 84.7 % IBW.  Bed scale  Current BMI (kg/m2): 19.8  Weight Adjustment: Amputation  Total Amputation Percentage: 10.8  Adjusted Ideal Body Weight (lbs) (Calculated): 137.4  Adjusted Ideal Body Weight (kg) (Calculated): 62.45 kg  Adjusted % IBW (Calculated): 95  Adjusted BMI (Calculated): 21.9  BMI Category: Underweight (BMI less than 22) age over 72    Estimated Daily Nutrient Needs:  Energy Requirements Based On: (P) Kcal/kg  Weight Used for Energy Requirements: (P) Current  Energy (kcal/day): (P) 1776kcal (30kcal/kg)  Weight Used for Protein Requirements: (P) Current  Protein (g/day): (P) 89g (1.5g/kg)  Method Used for Fluid Requirements: (P) Standard renal  Fluid (ml/day): (P) 1500mL    Nutrition Diagnosis:   (P) Inadequate protein-energy intake related to (P) increased demand for energy/nutrients as evidenced by (P) wounds, weight loss    Nutrition Interventions:   Food and/or Nutrient Delivery: (P) Continue current diet, Start oral nutrition supplement  Nutrition Education/Counseling: (P) No recommendations at this time  Coordination of Nutrition Care: (P) Continue to monitor while inpatient, Feeding assistance/environmental change    Goals:  Goals: (P) PO intake 50% or greater, by next RD assessment, other (specify)  Specify Other Goals: (P) +signs of wound healing    Nutrition Monitoring and Evaluation:   Behavioral-Environmental Outcomes: (P) None identified  Food/Nutrient Intake Outcomes: (P) Food and nutrient intake, Supplement intake  Physical Signs/Symptoms Outcomes: (P) Meal time behavior, Skin, Weight    Discharge Planning:    (P) Too soon to determine    Bud Sewell RD  Contact: [phone]

## 2022-12-26 NOTE — DIALYSIS
GCS 15 pt tolerated 3h dialysis well with 1 L fluid removal.  Report via SBAR to BRIAN Lehman. Pt returns to [facility] with transport. EPO on dialysis.

## 2022-12-26 NOTE — WOUND CARE
IP WOUND CONSULT    Elisabeth Hernandez RECORD NUMBER:  [id number]  AGE: 74 y.o. GENDER: male  : 1955  TODAY'S DATE:  2022    GENERAL     [] Follow-up   [x] New Consult    Adriane Larsen is a 74 y.o. male referred by:   [x] Physician  [] Nursing  [] Other:         PAST MEDICAL HISTORY    Past Medical History:   Diagnosis Date    A-fib ([facility]     Anemia     Arrhythmia     CAD (coronary artery disease)     Chronic kidney disease     Diabetes ([facility]     DM2    Dialysis patient [facility])     M-W-F [facility] [phone]    Heart failure ([facility]     Hypertension     STEMI (ST elevation myocardial infarction) ([facility]         PAST SURGICAL HISTORY    Past Surgical History:   Procedure Laterality Date    HX AMPUTATION Left     5 fingers    HX HEART CATHETERIZATION      IR INSERT NON TUNL CVC OVER 5 YRS  2021    IR INSERT NON TUNL CVC OVER 5 YRS  2022    IR INSERT TUNL CVC W/O PORT OVER 5 YR  2021    IR INSERT TUNL CVC W/O PORT OVER 5 YR  2022    AR CARDIAC SURG PROCEDURE UNLIST         FAMILY HISTORY    Family History   Problem Relation Age of Onset    Cancer Mother         breast    No Known Problems Father          ALLERGIES    No Known Allergies    MEDICATIONS    No current facility-administered medications on file prior to encounter. Current Outpatient Medications on File Prior to Encounter   Medication Sig Dispense Refill    ticagrelor (BRILINTA) 90 mg tablet Take 90 mg by mouth two (2) times a day. isosorbide dinitrate (ISORDIL) 20 mg tablet Take 20 mg by mouth three (3) times daily. lisinopriL (PRINIVIL, ZESTRIL) 5 mg tablet Take 5 mg by mouth daily. melatonin 5 mg cap capsule Take 5 mg by mouth nightly. atorvastatin (LIPITOR) 80 mg tablet Take 0.5 Tabs by mouth daily. 30 Tab 0    carvediloL (COREG) 12.5 mg tablet Take 2 Tabs by mouth two (2) times a day. 60 Tab 0    hydrALAZINE (APRESOLINE) 25 mg tablet Take 1 Tab by mouth three (3) times daily.  (Patient taking differently: Take 50 mg by mouth three (3) times daily.) 90 Tab 0    aspirin 81 mg chewable tablet Take 81 mg by mouth daily. collagenase (SANTYL) 250 unit/gram ointment Apply  to affected area daily. 15 g 0    gabapentin (NEURONTIN) 100 mg capsule Take 1 Capsule by mouth three (3) times daily. Max Daily Amount: 300 mg. 15 Capsule 0    calcium carbonate (TUMS) 200 mg calcium (500 mg) chew Take 1 Tablet by mouth in the morning. insulin glargine (Lantus U-100 Insulin) 100 unit/mL injection 10 Units by SubCUTAneous route nightly. 1 mL 0    amLODIPine (NORVASC) 10 mg tablet Take 1 Tab by mouth daily. (Patient not taking: Reported on 12/22/2022) 30 Tab 0    acetaminophen (TYLENOL) 325 mg tablet Take 975 mg by mouth every six (6) hours as needed for Pain. Pompey@[employer] Vitals  /76 (BP 1 Location: Left upper arm)   Pulse 90   Temp 97.9 °F (36.6 °C)   Resp 18   Ht 5' 8\" (1.727 m)   Wt 59.2 kg (130 lb 8.2 oz)   SpO2 97%   BMI 19.84 kg/m²       ASSESSMENT     Wound Identification & Type: venous stasis to RLE and dry gangrene to right 2nd finger   Dressing change: see flow chart  Verbal consent for picture: Yes    Contributing Factors: anticoagulation therapy, diabetes, poor glucose control, decreased mobility, shear force, and incontinence of urine    Wound Neck Posterior (Active)   Number of days: 694       Wound Sacrum Posterior (Active)   Number of days: 686       Wound Ear Right dry scabbed over area to mid earlobe, was present on admission to [facility] on 2/13/2021 02/13/21 (Active)   Number of days: 680       Wound Wrist Anterior; Left Hand amputation done 10-4-2022 (Active)   Number of days: 82       Wound Pretibial Right Full Thickness 12/26/22 (Active)   Wound Image   12/26/22 1406   Wound Etiology Venous 12/26/22 1406   Dressing Status New dressing applied 12/26/22 1406   Cleansed Irrigated with saline 12/26/22 1406   Dressing/Treatment Xeroform;ABD pad;Roll gauze 12/26/22 1406 Dressing Change Due 12/27/22 12/26/22 1406   Wound Length (cm) 4.7 cm 12/26/22 1406   Wound Width (cm) 3.1 cm 12/26/22 1406   Wound Depth (cm) 0.1 cm 12/26/22 1406   Wound Surface Area (cm^2) 14.57 cm^2 12/26/22 1406   Wound Volume (cm^3) 1.457 cm^3 12/26/22 1406   Wound Assessment Pink/red;Slough;Eschar dry 12/26/22 1406   Drainage Amount Scant 12/26/22 1406   Drainage Description [place] 12/26/22 1406   Wound Odor None 12/26/22 1406   Armida-Wound/Incision Assessment Dry/flaky 12/26/22 1406   Edges Undefined edges 12/26/22 1406   Wound Thickness Description Full thickness 12/26/22 1406   Number of days: 0       Wound Finger (Comment which one) Anterior;Right Dry Eschar 12/26/22 (Active)   Wound Image   12/26/22 1408   Wound Etiology Other (Comment) 12/26/22 1408   Dressing Status New dressing applied 12/26/22 1408   Cleansed Betadine/Povidone Iodine 12/26/22 1408   Dressing/Treatment Gauze dressing/dressing sponge 12/26/22 1408   Dressing Change Due 12/27/22 12/26/22 1408   Wound Assessment Eschar dry 12/26/22 1408   Drainage Amount None 12/26/22 1408   Wound Odor None 12/26/22 1408   Armida-Wound/Incision Assessment Intact 12/26/22 1408   Edges Undefined edges 12/26/22 1408   Number of days: 0       Incision 12/24/22 Leg Left (Active)   Dressing Status Clean;Dry; Intact 12/25/22 1956   Dressing/Treatment Ace wrap 12/25/22 1956   Closure Other (Comment) 12/24/22 1225   Drainage Amount None 12/24/22 1225   Number of days: 2          PLAN     Skin Care & Pressure Relief Recommendations  Minimize layers of linen  Turn/reposition approximately every 2 hours  Pillow wedges  Manage incontinence   Promote continence; Skin Protective lotion/cream to buttocks and sacrum daily and as needed with incontinence care  Offload heels pillows    Cruzito 14  Blood Glucose: 116 on 12/26/22                              Albumin: 2.2 on 12/23/22  WBCs: 12.2 on 12/25/22    Nutritionist Consulted: Yes per protocol for wounds  Nutrition Status: TBD    Support Surface: gel surface    Physician/Provider notified:   Recommendations: venous stasis to RLE with dry eschar to lateral aspect. Apply Xeroform gauze to open area and cover daily, see dressing order. Unable to palpate dorsalis pedis or posterior tibial pulses. Foot is warm. NORMA from 12/23/22 indicates moderate PAD with NORMA of 0.74. Dry gangrene noted to right 2nd finger distal.  Apply betadine daily, see dressing order. Patient unsure if wounds to left fingers prior to amputation began the same way or not. Patient should continue to follow with Dr. Gill Castro outpatient for PAD and wound management. Apply Optifoam Border Sacral foam dressing to sacrum every three days to redistribute pressure from bony prominence and prevent pressure and friction injury to skin. Rn is to gently peel back foam dressing for shift integumentary assessment and re-secure if not soiled. Maintain the PrimoFit to manage  incontinence. Use foam wedge to turn q2h at 30 degree angle to offload sacrum. Float right heel with 1-2 pillows lengthwise while in bed for offloading of the heels. Maintain HOB at 30 degrees or less, if not contraindicated, to reduce pressure to buttocks and sacrum. Raise foot of bed to help prevent friction and shear injury from sliding down in the bed. Will continue to follow weekly while in-patient. Discharge Wound Care Needs: Follow up with Dr. Gill Castro for management of PAD and wounds.       Teaching completed with:   [] Patient           [] Family member       [] Caregiver          [] Nursing  [] Other    Patient/Caregiver Teaching:  Level of patient/caregiver understanding able to:   [] Indicates understanding       [] Needs reinforcement  [] Unsuccessful      [] Verbal Understanding  [] Demonstrated understanding       [] No evidence of learning  [] Refused teaching         [] N/A       Electronically signed by Nathalie Hairston RN on 12/26/2022 at 2:14 PM

## 2022-12-27 LAB
ABO + RH BLD: NORMAL
ANION GAP SERPL CALC-SCNC: 10 MMOL/L (ref 5–15)
BASOPHILS # BLD: 0 K/UL (ref 0–0.1)
BASOPHILS NFR BLD: 0 % (ref 0–1)
BLD PROD TYP BPU: NORMAL
BLD PROD TYP BPU: NORMAL
BLOOD GROUP ANTIBODIES SERPL: NEGATIVE
BPU ID: NORMAL
BPU ID: NORMAL
BUN SERPL-MCNC: 33 MG/DL (ref 6–20)
BUN/CREAT SERPL: 14 (ref 12–20)
CA-I BLD-MCNC: 7.8 MG/DL (ref 8.5–10.1)
CHLORIDE SERPL-SCNC: 103 MMOL/L (ref 97–108)
CO2 SERPL-SCNC: 25 MMOL/L (ref 21–32)
CREAT SERPL-MCNC: 2.41 MG/DL (ref 0.7–1.3)
CROSSMATCH RESULT,%XM: NORMAL
CROSSMATCH RESULT,%XM: NORMAL
DIFFERENTIAL METHOD BLD: ABNORMAL
EOSINOPHIL # BLD: 0.2 K/UL (ref 0–0.4)
EOSINOPHIL NFR BLD: 2 % (ref 0–7)
ERYTHROCYTE [DISTWIDTH] IN BLOOD BY AUTOMATED COUNT: 16.5 % (ref 11.5–14.5)
GLUCOSE BLD STRIP.AUTO-MCNC: 110 MG/DL (ref 65–100)
GLUCOSE BLD STRIP.AUTO-MCNC: 114 MG/DL (ref 65–100)
GLUCOSE BLD STRIP.AUTO-MCNC: 140 MG/DL (ref 65–100)
GLUCOSE BLD STRIP.AUTO-MCNC: 140 MG/DL (ref 65–100)
GLUCOSE SERPL-MCNC: 114 MG/DL (ref 65–100)
HCT VFR BLD AUTO: 28 % (ref 36.6–50.3)
HGB BLD-MCNC: 8.7 G/DL (ref 12.1–17)
IMM GRANULOCYTES # BLD AUTO: 0.1 K/UL (ref 0–0.04)
IMM GRANULOCYTES NFR BLD AUTO: 1 % (ref 0–0.5)
LYMPHOCYTES # BLD: 2.4 K/UL (ref 0.8–3.5)
LYMPHOCYTES NFR BLD: 27 % (ref 12–49)
MCH RBC QN AUTO: 25.7 PG (ref 26–34)
MCHC RBC AUTO-ENTMCNC: 31.1 G/DL (ref 30–36.5)
MCV RBC AUTO: 82.8 FL (ref 80–99)
MONOCYTES # BLD: 0.6 K/UL (ref 0–1)
MONOCYTES NFR BLD: 7 % (ref 5–13)
NEUTS SEG # BLD: 5.6 K/UL (ref 1.8–8)
NEUTS SEG NFR BLD: 63 % (ref 32–75)
NRBC # BLD: 0 K/UL (ref 0–0.01)
NRBC BLD-RTO: 0 PER 100 WBC
PERFORMED BY, TECHID: ABNORMAL
PLATELET # BLD AUTO: 649 K/UL (ref 150–400)
PMV BLD AUTO: 9 FL (ref 8.9–12.9)
POTASSIUM SERPL-SCNC: 3.9 MMOL/L (ref 3.5–5.1)
RBC # BLD AUTO: 3.38 M/UL (ref 4.1–5.7)
SODIUM SERPL-SCNC: 138 MMOL/L (ref 136–145)
SPECIMEN EXP DATE BLD: NORMAL
STATUS OF UNIT,%ST: NORMAL
STATUS OF UNIT,%ST: NORMAL
TRANSFUSION STATUS PATIENT QL: NORMAL
TRANSFUSION STATUS PATIENT QL: NORMAL
UNIT DIVISION, %UDIV: 0
UNIT DIVISION, %UDIV: 0
WBC # BLD AUTO: 8.9 K/UL (ref 4.1–11.1)

## 2022-12-27 PROCEDURE — 74011250637 HC RX REV CODE- 250/637: Performed by: SURGERY

## 2022-12-27 PROCEDURE — 86706 HEP B SURFACE ANTIBODY: CPT

## 2022-12-27 PROCEDURE — 74011000258 HC RX REV CODE- 258: Performed by: SURGERY

## 2022-12-27 PROCEDURE — 36415 COLL VENOUS BLD VENIPUNCTURE: CPT

## 2022-12-27 PROCEDURE — 86803 HEPATITIS C AB TEST: CPT

## 2022-12-27 PROCEDURE — 74011000250 HC RX REV CODE- 250: Performed by: SURGERY

## 2022-12-27 PROCEDURE — 82962 GLUCOSE BLOOD TEST: CPT

## 2022-12-27 PROCEDURE — 74011250636 HC RX REV CODE- 250/636: Performed by: SURGERY

## 2022-12-27 PROCEDURE — 80048 BASIC METABOLIC PNL TOTAL CA: CPT

## 2022-12-27 PROCEDURE — 86704 HEP B CORE ANTIBODY TOTAL: CPT

## 2022-12-27 PROCEDURE — 74011250637 HC RX REV CODE- 250/637: Performed by: INTERNAL MEDICINE

## 2022-12-27 PROCEDURE — 85025 COMPLETE CBC W/AUTO DIFF WBC: CPT

## 2022-12-27 PROCEDURE — 65270000029 HC RM PRIVATE

## 2022-12-27 PROCEDURE — 74011636637 HC RX REV CODE- 636/637: Performed by: SURGERY

## 2022-12-27 PROCEDURE — 86705 HEP B CORE ANTIBODY IGM: CPT

## 2022-12-27 RX ADMIN — OXYCODONE HYDROCHLORIDE 10 MG: 10 TABLET, FILM COATED, EXTENDED RELEASE ORAL at 21:55

## 2022-12-27 RX ADMIN — CARVEDILOL 25 MG: 12.5 TABLET, FILM COATED ORAL at 21:55

## 2022-12-27 RX ADMIN — PIPERACILLIN AND TAZOBACTAM 3.38 G: 3; .375 INJECTION, POWDER, FOR SOLUTION INTRAVENOUS at 21:50

## 2022-12-27 RX ADMIN — OXYCODONE HYDROCHLORIDE 10 MG: 10 TABLET, FILM COATED, EXTENDED RELEASE ORAL at 10:52

## 2022-12-27 RX ADMIN — GABAPENTIN 100 MG: 100 CAPSULE ORAL at 21:55

## 2022-12-27 RX ADMIN — GABAPENTIN 100 MG: 100 CAPSULE ORAL at 10:44

## 2022-12-27 RX ADMIN — CARVEDILOL 25 MG: 12.5 TABLET, FILM COATED ORAL at 10:43

## 2022-12-27 RX ADMIN — PIPERACILLIN AND TAZOBACTAM 3.38 G: 3; .375 INJECTION, POWDER, FOR SOLUTION INTRAVENOUS at 09:03

## 2022-12-27 RX ADMIN — LISINOPRIL 5 MG: 5 TABLET ORAL at 10:44

## 2022-12-27 RX ADMIN — TICAGRELOR 90 MG: 90 TABLET ORAL at 10:50

## 2022-12-27 RX ADMIN — MELATONIN TAB 5 MG 5 MG: 5 TAB at 21:55

## 2022-12-27 RX ADMIN — SODIUM CHLORIDE, PRESERVATIVE FREE 10 ML: 5 INJECTION INTRAVENOUS at 21:56

## 2022-12-27 RX ADMIN — SODIUM CHLORIDE, PRESERVATIVE FREE 10 ML: 5 INJECTION INTRAVENOUS at 05:56

## 2022-12-27 RX ADMIN — GABAPENTIN 100 MG: 100 CAPSULE ORAL at 16:43

## 2022-12-27 RX ADMIN — ISOSORBIDE DINITRATE 20 MG: 20 TABLET ORAL at 16:44

## 2022-12-27 RX ADMIN — ISOSORBIDE DINITRATE 20 MG: 20 TABLET ORAL at 21:54

## 2022-12-27 RX ADMIN — HYDRALAZINE HYDROCHLORIDE 25 MG: 25 TABLET, FILM COATED ORAL at 16:43

## 2022-12-27 RX ADMIN — HYDRALAZINE HYDROCHLORIDE 25 MG: 25 TABLET, FILM COATED ORAL at 21:56

## 2022-12-27 RX ADMIN — ATORVASTATIN CALCIUM 40 MG: 40 TABLET, FILM COATED ORAL at 10:43

## 2022-12-27 RX ADMIN — ISOSORBIDE DINITRATE 20 MG: 20 TABLET ORAL at 10:50

## 2022-12-27 RX ADMIN — CALCIUM CARBONATE 200 MG: 500 TABLET, CHEWABLE ORAL at 10:45

## 2022-12-27 RX ADMIN — AMITRIPTYLINE HYDROCHLORIDE 50 MG: 25 TABLET, FILM COATED ORAL at 21:55

## 2022-12-27 RX ADMIN — HEPARIN SODIUM 5000 UNITS: 5000 INJECTION INTRAVENOUS; SUBCUTANEOUS at 21:56

## 2022-12-27 RX ADMIN — INSULIN GLARGINE 10 UNITS: 100 INJECTION, SOLUTION SUBCUTANEOUS at 22:00

## 2022-12-27 RX ADMIN — AMLODIPINE BESYLATE 10 MG: 5 TABLET ORAL at 10:43

## 2022-12-27 RX ADMIN — HYDRALAZINE HYDROCHLORIDE 25 MG: 25 TABLET, FILM COATED ORAL at 10:44

## 2022-12-27 RX ADMIN — HEPARIN SODIUM 5000 UNITS: 5000 INJECTION INTRAVENOUS; SUBCUTANEOUS at 14:39

## 2022-12-27 RX ADMIN — OXYCODONE 5 MG: 5 TABLET ORAL at 14:39

## 2022-12-27 RX ADMIN — SODIUM CHLORIDE, PRESERVATIVE FREE 10 ML: 5 INJECTION INTRAVENOUS at 14:40

## 2022-12-27 RX ADMIN — TICAGRELOR 90 MG: 90 TABLET ORAL at 21:56

## 2022-12-27 RX ADMIN — HEPARIN SODIUM 5000 UNITS: 5000 INJECTION INTRAVENOUS; SUBCUTANEOUS at 05:55

## 2022-12-27 NOTE — PROGRESS NOTES
Spoke to patient and wife about discharge planning. Told him several facilities have accepted pending insurance. Patient would like to go to [facility] [place] on [address]. Patient also dialysis and would need to change center.  Sent referral to [facility] on [address] as well and explained plans     [facility] has been done, waiting for approval

## 2022-12-27 NOTE — PROGRESS NOTES
Bedside shift change report given to 1001 Óscar Choi (oncoming nurse) by Chelle Anaya (offgoing nurse). Report included the following information SBAR.

## 2022-12-27 NOTE — PROGRESS NOTES
Problem: Falls - Risk of  Goal: *Absence of Falls  Description: Document Abdullahi Comment Fall Risk and appropriate interventions in the flowsheet. Outcome: Progressing Towards Goal  Note: Fall Risk Interventions:            Medication Interventions: Evaluate medications/consider consulting pharmacy, Patient to call before getting OOB    Elimination Interventions: Call light in reach, Bed/chair exit alarm              Problem: Pressure Injury - Risk of  Goal: *Prevention of pressure injury  Description: Document Cruzito Scale and appropriate interventions in the flowsheet. Outcome: Progressing Towards Goal  Note: Pressure Injury Interventions:  Sensory Interventions: Check visual cues for pain    Moisture Interventions: Absorbent underpads    Activity Interventions: PT/OT evaluation    Mobility Interventions: HOB 30 degrees or less    Nutrition Interventions: Document food/fluid/supplement intake    Friction and Shear Interventions: Apply protective barrier, creams and emollients                Problem: Falls - Risk of  Goal: *Absence of Falls  Description: Document Maritza Fall Risk and appropriate interventions in the flowsheet. Outcome: Progressing Towards Goal  Note: Fall Risk Interventions:            Medication Interventions: Evaluate medications/consider consulting pharmacy, Patient to call before getting OOB    Elimination Interventions: Call light in reach, Bed/chair exit alarm              Problem: Pressure Injury - Risk of  Goal: *Prevention of pressure injury  Description: Document Cruzito Scale and appropriate interventions in the flowsheet.   Outcome: Progressing Towards Goal  Note: Pressure Injury Interventions:  Sensory Interventions: Check visual cues for pain    Moisture Interventions: Absorbent underpads    Activity Interventions: PT/OT evaluation    Mobility Interventions: HOB 30 degrees or less    Nutrition Interventions: Document food/fluid/supplement intake    Friction and Shear Interventions: Apply protective barrier, creams and emollients

## 2022-12-27 NOTE — PROGRESS NOTES
Vascular History and Physical    Patient: Janay Laird  MRN: [id number]    YOB: 1955  Age: 74 y.o. Sex: male     Chief Complaint:  Chief Complaint   Patient presents with    New Patient     ulcers on legs       History of Present Illness: Janay Laird is a 74 y.o. very pleasant man referral from wound care about left leg wound issues. Patient apparently have this wound for some time now. Patient says a lot of foul-smelling drainage of pus on the left and the area. No fever or chills.     Social History:  Social Connections: Not on file       Past Medical History:  Past Medical History:   Diagnosis Date    A-fib ([facility] [address]     Anemia     Arrhythmia     CAD (coronary artery disease)     Chronic kidney disease     Diabetes ([facility] [address]     DM2    Dialysis patient [facility])     M-W-F Javiernarinder Gordono [phone]    Heart failure ([facility] [address]     Hypertension     STEMI (ST elevation myocardial infarction) ([facility] [address]        Surgical History:  Past Surgical History:   Procedure Laterality Date    HX AMPUTATION Left     5 fingers    HX HEART CATHETERIZATION      IR INSERT NON TUNL CVC OVER 5 YRS  02/23/2021    IR INSERT NON TUNL CVC OVER 5 YRS  04/07/2022    IR INSERT TUNL CVC W/O PORT OVER 5 YR  03/02/2021    IR INSERT TUNL CVC W/O PORT OVER 5 YR  04/12/2022    OR CARDIAC SURG PROCEDURE UNLIST         Allergies:  No Known Allergies    Current Meds:  Facility-Administered Medications Ordered in Other Visits   Medication Dose Route Frequency Provider Last Rate Last Admin    [START ON 12/28/2022] Vancomycin Pre-HD Level Due 12/28 0400 - Please make sure lab is drawn   Other ONCE Hebert Kuhn MD        0.9% sodium chloride infusion 250 mL  250 mL IntraVENous PRN Britany Orona MD        0.9% sodium chloride infusion 250 mL  250 mL IntraVENous PRN Delfina CARBAJAL MD        amitriptyline (ELAVIL) tablet 50 mg  50 mg Oral QHS Delfina CARBAJAL MD   50 mg at 12/26/22 2110    naloxone [facility]) injection 1 mg  1 mg IntraVENous PRN Jenni Parker MD        0.9% sodium chloride infusion 250 mL  250 mL IntraVENous PRN Néstor Faustin MD        sodium chloride (NS) flush 5-40 mL  5-40 mL IntraVENous Q8H Néstor Faustin MD   10 mL at 12/27/22 0556    sodium chloride (NS) flush 5-40 mL  5-40 mL IntraVENous PRN Néstor Faustin MD        ondansetron (ZOFRAN ODT) tablet 4 mg  4 mg Oral Q8H PRN Néstor Faustin MD        Or    [facility]ron [facility]) injection 4 mg  4 mg IntraVENous Q6H PRN Néstor Faustin MD        oxyCODONE ER (OxyCONTIN) tablet 10 mg  10 mg Oral Q12H Néstor Faustin MD   10 mg at 12/26/22 2109    oxyCODONE-acetaminophen (PERCOCET 10)  mg per tablet 1 Tablet  1 Tablet Oral Q6H PRN Néstor Faustin MD   1 Tablet at 12/26/22 0730    heparin (porcine) injection 5,000 Units  5,000 Units SubCUTAneous Q8H Néstor Faustin MD   5,000 Units at 12/27/22 0555    heparin (porcine) 1,000 unit/mL injection 3,800 Units  3,800 Units Hemodialysis DIALYSIS PRN Néstor Faustin MD   3,800 Units at 12/26/22 1106    epoetin luis-epbx (RETACRIT) injection 8,000 Units  8,000 Units IntraVENous DIALYSIS MON, WED & FRI Néstor Faustin MD   8,000 Units at 12/26/22 0931    oxyCODONE IR (ROXICODONE) tablet 5 mg  5 mg Oral Q4H PRN Néstor Faustin MD   5 mg at 12/26/22 1058    amLODIPine (NORVASC) tablet 10 mg  10 mg Oral DAILY Néstor Faustin MD   10 mg at 12/26/22 1448    atorvastatin (LIPITOR) tablet 40 mg  40 mg Oral DAILY Néstor Faustin MD   40 mg at 12/26/22 1448    calcium carbonate (TUMS) chewable tablet 200 mg [elemental]  200 mg Oral DAILY Néstor Faustin MD   200 mg at 12/26/22 1448    carvediloL (COREG) tablet 25 mg  25 mg Oral BID Néstor Faustin MD   25 mg at 12/26/22 2109    gabapentin (NEURONTIN) capsule 100 mg  100 mg Oral TID Néstor Faustin MD   100 mg at 12/26/22 2110    hydrALAZINE (APRESOLINE) tablet 25 mg  25 mg Oral TID Néstor Faustin MD   25 mg at 12/26/22 2110    insulin glargine (LANTUS) injection 10 Units  10 Units SubCUTAneous QHS Kinga Hurley MD        isosorbide dinitrate (ISORDIL) tablet 20 mg  20 mg Oral TID Alec Faustin MD   20 mg at 12/26/22 2117    lisinopriL (PRINIVIL, ZESTRIL) tablet 5 mg  5 mg Oral DAILY Alec Faustin MD   5 mg at 12/26/22 1453    melatonin tablet 5 mg  5 mg Oral QHS Alec Faustin MD   5 mg at 12/26/22 2110    ticagrelor (BRILINTA) tablet 90 mg  90 mg Oral BID Alec Faustin MD   90 mg at 12/26/22 2109    sodium chloride (NS) flush 5-40 mL  5-40 mL IntraVENous PRN Alec Faustin MD        acetaminophen (TYLENOL) tablet 650 mg  650 mg Oral Q6H PRN Alec Faustin MD   650 mg at 12/23/22 1552    Or    acetaminophen (TYLENOL) suppository 650 mg  650 mg Rectal Q6H PRN Alec Faustin MD        polyethylene glycol (MIRALAX) packet 17 g  17 g Oral DAILY PRN Alec Faustin MD        piperacillin-tazobactam (ZOSYN) 3.375 g in 0.9% sodium chloride (MBP/ADV) 100 mL MBP  3.375 g IntraVENous Q12H Alec Faustin MD 25 mL/hr at 12/26/22 2113 3.375 g at 12/26/22 2113    Vancomycin - Pharmacy to Dose   Other Rx Dosing/Monitoring Alec Faustin MD           Review of Systems:  I reviewed the rest of organ systems personally and they are negative. Pertinent findings discussed in HPI.     Physical Examination    Visit Vitals  /66 (BP 1 Location: Right upper arm, BP Patient Position: Sitting, BP Cuff Size: Large adult)   Pulse 98   Temp 97.1 °F (36.2 °C) (Temporal)   Ht 5' 8\" (1.727 m)   SpO2 98%   BMI 20.53 kg/m²     Gen: Well developed, well nourished 74 y.o. male in no acute distress  Head: normocephalic, atraumatic  Mouth: Clear, no overt lesions, oral mucosa pink and moist  Neck: supple, no masses, no adenopathy or carotid bruits, trachea midline  Resp: clear to auscultation bilaterally, no wheeze, rhonchi or rales, excursions normal and symmetrical  Cardio: Regular rate and rhythm, no murmurs, clicks, gallops or rubs, no edema or varicosities  Abdomen: soft, nontender, nondistended, normoactive bowel sounds, no hernias, no hepatosplenomegaly   Neuro: sensation and strength grossly intact and symmetrical  Psych: alert and oriented to person, place and time  Vascular examination: I examined the patient left leg. Patient has a significant contractions large black eschar with purulent drainage the knee area. Skin: warm and moist.    Labs:  No results displayed because visit has over 200 results. Images:  No images are attached to the encounter. Earle Almaguer MD    Assessments:  Patient Active Problem List   Diagnosis Code    [facility] U07.1, J96.01    Chronic systolic congestive heart failure (HCC) I50.22    Goals of care, counseling/discussion Z71.89    DNR (do not resuscitate) discussion Z71.89    JUAN C (acute kidney injury) ([facility] [address] N17.9    Fatigue, unspecified type R53.83    Dysphagia, unspecified type R13.10    Acute CHF (congestive heart failure) ([facility] I50.9    Wet gangrene ([facility] [address] I96       Plans: Patient will need left AKA. Patient has a contractions on the left leg and the significant wound necrosis black eschar wet gangrene up to knee joint level. Patient is reluctant about this. So patient was advised if he wants a second opinion I will make a referral.  But he has a wet gangrene and the need to be addressed right away. So I advised patient to go to the emergency room right away.           Earle Almaguer MD

## 2022-12-27 NOTE — PROGRESS NOTES
Renal Daily Progress Note    Admit Date: 12/22/2022      Subjective:   Patient seen in his room. He tells me that he feels well. He is eating well without vomiting. No dysgeusia. No chest pain or shortness of breath.       Current Facility-Administered Medications   Medication Dose Route Frequency    [START ON 12/28/2022] Vancomycin Pre-HD Level Due 12/28 0400 - Please make sure lab is drawn   Other ONCE    0.9% sodium chloride infusion 250 mL  250 mL IntraVENous PRN    0.9% sodium chloride infusion 250 mL  250 mL IntraVENous PRN    amitriptyline (ELAVIL) tablet 50 mg  50 mg Oral QHS    naloxone (NARCAN) injection 1 mg  1 mg IntraVENous PRN    0.9% sodium chloride infusion 250 mL  250 mL IntraVENous PRN    sodium chloride (NS) flush 5-40 mL  5-40 mL IntraVENous Q8H    sodium chloride (NS) flush 5-40 mL  5-40 mL IntraVENous PRN    ondansetron (ZOFRAN ODT) tablet 4 mg  4 mg Oral Q8H PRN    Or    ondansetron (ZOFRAN) injection 4 mg  4 mg IntraVENous Q6H PRN    oxyCODONE ER (OxyCONTIN) tablet 10 mg  10 mg Oral Q12H    oxyCODONE-acetaminophen (PERCOCET 10)  mg per tablet 1 Tablet  1 Tablet Oral Q6H PRN    heparin (porcine) injection 5,000 Units  5,000 Units SubCUTAneous Q8H    heparin (porcine) 1,000 unit/mL injection 3,800 Units  3,800 Units Hemodialysis DIALYSIS PRN    epoetin luis-epbx (RETACRIT) injection 8,000 Units  8,000 Units IntraVENous DIALYSIS MON, WED & FRI    oxyCODONE IR (ROXICODONE) tablet 5 mg  5 mg Oral Q4H PRN    amLODIPine (NORVASC) tablet 10 mg  10 mg Oral DAILY    atorvastatin (LIPITOR) tablet 40 mg  40 mg Oral DAILY    calcium carbonate (TUMS) chewable tablet 200 mg [elemental]  200 mg Oral DAILY    carvediloL (COREG) tablet 25 mg  25 mg Oral BID    gabapentin (NEURONTIN) capsule 100 mg  100 mg Oral TID    hydrALAZINE (APRESOLINE) tablet 25 mg  25 mg Oral TID    insulin glargine (LANTUS) injection 10 Units  10 Units SubCUTAneous QHS    isosorbide dinitrate (ISORDIL) tablet 20 mg  20 mg Oral TID    lisinopriL (PRINIVIL, ZESTRIL) tablet 5 mg  5 mg Oral DAILY    melatonin tablet 5 mg  5 mg Oral QHS    ticagrelor (BRILINTA) tablet 90 mg  90 mg Oral BID    sodium chloride (NS) flush 5-40 mL  5-40 mL IntraVENous PRN    acetaminophen (TYLENOL) tablet 650 mg  650 mg Oral Q6H PRN    Or    acetaminophen (TYLENOL) suppository 650 mg  650 mg Rectal Q6H PRN    polyethylene glycol (MIRALAX) packet 17 g  17 g Oral DAILY PRN    piperacillin-tazobactam (ZOSYN) 3.375 g in 0.9% sodium chloride (MBP/ADV) 100 mL MBP  3.375 g IntraVENous Q12H    Vancomycin - Pharmacy to Dose   Other Rx Dosing/Monitoring        Review of Systems    Review of Systems   Respiratory:  Negative for shortness of breath. Cardiovascular:  Negative for chest pain. Gastrointestinal:  Negative for abdominal pain. Neurological:  Negative for headaches. Objective:     Patient Vitals for the past 8 hrs:   BP Temp Pulse Resp SpO2   12/27/22 1434 (!) 148/81 97.3 °F (36.3 °C) (!) 102 22 99 %     No intake/output data recorded. 12/25 1901 - 12/27 0700  In: 1361.7 [P.O.:480; I.V.:100]  Out: 1900 [Urine:900]    Physical Exam:   Physical Exam  Cardiovascular:      Rate and Rhythm: Regular rhythm. Pulmonary:      Breath sounds: Normal breath sounds. Abdominal:      General: Bowel sounds are normal.      Palpations: Abdomen is soft. Neurological:      Mental Status: He is alert.       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 No results for input(s): INR, INREXT, INREXT in the last 72 hours. 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 We will dialyze him again on 29 December. Volume status is acceptable    Anemia of chronic disease    Plan:   No indication for hemodialysis today.   Will follow

## 2022-12-28 LAB
BACTERIA SPEC CULT: NORMAL
BASOPHILS # BLD: 0.1 K/UL (ref 0–0.1)
BASOPHILS NFR BLD: 1 % (ref 0–1)
DIFFERENTIAL METHOD BLD: ABNORMAL
EOSINOPHIL # BLD: 0.2 K/UL (ref 0–0.4)
EOSINOPHIL NFR BLD: 2 % (ref 0–7)
ERYTHROCYTE [DISTWIDTH] IN BLOOD BY AUTOMATED COUNT: 17.3 % (ref 11.5–14.5)
GLUCOSE BLD STRIP.AUTO-MCNC: 163 MG/DL (ref 65–100)
GLUCOSE BLD STRIP.AUTO-MCNC: 191 MG/DL (ref 65–100)
GLUCOSE BLD STRIP.AUTO-MCNC: 80 MG/DL (ref 65–100)
GLUCOSE BLD STRIP.AUTO-MCNC: 93 MG/DL (ref 65–100)
HBV CORE IGM SER QL: NONREACTIVE
HBV SURFACE AB SER QL: REACTIVE
HBV SURFACE AB SER-ACNC: 376.23 MIU/ML
HCT VFR BLD AUTO: 28.6 % (ref 36.6–50.3)
HCV AB SER IA-ACNC: >11 INDEX
HCV AB SERPL QL IA: REACTIVE
HGB BLD-MCNC: 8.9 G/DL (ref 12.1–17)
IMM GRANULOCYTES # BLD AUTO: 0.1 K/UL (ref 0–0.04)
IMM GRANULOCYTES NFR BLD AUTO: 1 % (ref 0–0.5)
LYMPHOCYTES # BLD: 2.3 K/UL (ref 0.8–3.5)
LYMPHOCYTES NFR BLD: 26 % (ref 12–49)
MCH RBC QN AUTO: 25.7 PG (ref 26–34)
MCHC RBC AUTO-ENTMCNC: 31.1 G/DL (ref 30–36.5)
MCV RBC AUTO: 82.7 FL (ref 80–99)
MONOCYTES # BLD: 0.6 K/UL (ref 0–1)
MONOCYTES NFR BLD: 7 % (ref 5–13)
NEUTS SEG # BLD: 5.5 K/UL (ref 1.8–8)
NEUTS SEG NFR BLD: 63 % (ref 32–75)
NRBC # BLD: 0 K/UL (ref 0–0.01)
NRBC BLD-RTO: 0 PER 100 WBC
PERFORMED BY, TECHID: ABNORMAL
PERFORMED BY, TECHID: ABNORMAL
PERFORMED BY, TECHID: NORMAL
PERFORMED BY, TECHID: NORMAL
PLATELET # BLD AUTO: 675 K/UL (ref 150–400)
PMV BLD AUTO: 9 FL (ref 8.9–12.9)
RBC # BLD AUTO: 3.46 M/UL (ref 4.1–5.7)
SPECIAL REQUESTS,SREQ: NORMAL
VANCOMYCIN SERPL-MCNC: 3.6 UG/ML
WBC # BLD AUTO: 8.7 K/UL (ref 4.1–11.1)

## 2022-12-28 PROCEDURE — 74011250636 HC RX REV CODE- 250/636: Performed by: SURGERY

## 2022-12-28 PROCEDURE — 74011000258 HC RX REV CODE- 258: Performed by: SURGERY

## 2022-12-28 PROCEDURE — 74011250637 HC RX REV CODE- 250/637: Performed by: SURGERY

## 2022-12-28 PROCEDURE — 97530 THERAPEUTIC ACTIVITIES: CPT

## 2022-12-28 PROCEDURE — 65270000029 HC RM PRIVATE

## 2022-12-28 PROCEDURE — 74011636637 HC RX REV CODE- 636/637: Performed by: SURGERY

## 2022-12-28 PROCEDURE — 80202 ASSAY OF VANCOMYCIN: CPT

## 2022-12-28 PROCEDURE — 74011250637 HC RX REV CODE- 250/637: Performed by: INTERNAL MEDICINE

## 2022-12-28 PROCEDURE — 97165 OT EVAL LOW COMPLEX 30 MIN: CPT

## 2022-12-28 PROCEDURE — 36415 COLL VENOUS BLD VENIPUNCTURE: CPT

## 2022-12-28 PROCEDURE — 74011000250 HC RX REV CODE- 250: Performed by: SURGERY

## 2022-12-28 PROCEDURE — 97162 PT EVAL MOD COMPLEX 30 MIN: CPT

## 2022-12-28 PROCEDURE — 85025 COMPLETE CBC W/AUTO DIFF WBC: CPT

## 2022-12-28 PROCEDURE — 82962 GLUCOSE BLOOD TEST: CPT

## 2022-12-28 RX ORDER — HEPARIN SODIUM 5000 [USP'U]/ML
5000 INJECTION, SOLUTION INTRAVENOUS; SUBCUTANEOUS EVERY 8 HOURS
Qty: 30 ML | Refills: 0 | Status: SHIPPED | OUTPATIENT
Start: 2022-12-28

## 2022-12-28 RX ORDER — MUPIROCIN 20 MG/G
1 OINTMENT TOPICAL DAILY
Qty: 22 G | Refills: 0 | Status: SHIPPED | OUTPATIENT
Start: 2022-12-29 | End: 2023-01-03

## 2022-12-28 RX ORDER — OXYCODONE HCL 10 MG/1
10 TABLET, FILM COATED, EXTENDED RELEASE ORAL EVERY 12 HOURS
Qty: 10 TABLET | Refills: 0 | Status: SHIPPED | OUTPATIENT
Start: 2022-12-28 | End: 2023-01-27

## 2022-12-28 RX ORDER — POLYETHYLENE GLYCOL 3350 17 G/17G
17 POWDER, FOR SOLUTION ORAL DAILY
Qty: 30 PACKET | Refills: 0 | Status: SHIPPED | OUTPATIENT
Start: 2022-12-28

## 2022-12-28 RX ORDER — OXYCODONE HYDROCHLORIDE 5 MG/1
5 TABLET ORAL
Qty: 10 TABLET | Refills: 0 | Status: SHIPPED | OUTPATIENT
Start: 2022-12-28 | End: 2022-12-31

## 2022-12-28 RX ORDER — MUPIROCIN 20 MG/G
OINTMENT TOPICAL DAILY
Status: DISPENSED | OUTPATIENT
Start: 2022-12-29 | End: 2023-01-03

## 2022-12-28 RX ORDER — AMITRIPTYLINE HYDROCHLORIDE 50 MG/1
50 TABLET, FILM COATED ORAL
Qty: 30 TABLET | Refills: 0 | Status: SHIPPED | OUTPATIENT
Start: 2022-12-28

## 2022-12-28 RX ORDER — NALOXONE HYDROCHLORIDE 1 MG/ML
1 INJECTION INTRAMUSCULAR; INTRAVENOUS; SUBCUTANEOUS AS NEEDED
Qty: 1 ML | Refills: 0 | Status: SHIPPED | OUTPATIENT
Start: 2022-12-28

## 2022-12-28 RX ORDER — CARVEDILOL 25 MG/1
25 TABLET ORAL 2 TIMES DAILY
Qty: 60 TABLET | Refills: 0 | Status: SHIPPED | OUTPATIENT
Start: 2022-12-28

## 2022-12-28 RX ADMIN — OXYCODONE AND ACETAMINOPHEN 1 TABLET: 10; 325 TABLET ORAL at 12:13

## 2022-12-28 RX ADMIN — OXYCODONE HYDROCHLORIDE 10 MG: 10 TABLET, FILM COATED, EXTENDED RELEASE ORAL at 20:22

## 2022-12-28 RX ADMIN — HEPARIN SODIUM 5000 UNITS: 5000 INJECTION INTRAVENOUS; SUBCUTANEOUS at 14:17

## 2022-12-28 RX ADMIN — AMLODIPINE BESYLATE 10 MG: 5 TABLET ORAL at 09:22

## 2022-12-28 RX ADMIN — VANCOMYCIN HYDROCHLORIDE 1250 MG: 1.25 INJECTION, POWDER, LYOPHILIZED, FOR SOLUTION INTRAVENOUS at 12:09

## 2022-12-28 RX ADMIN — MELATONIN TAB 5 MG 5 MG: 5 TAB at 21:45

## 2022-12-28 RX ADMIN — HYDRALAZINE HYDROCHLORIDE 25 MG: 25 TABLET, FILM COATED ORAL at 16:46

## 2022-12-28 RX ADMIN — PIPERACILLIN AND TAZOBACTAM 3.38 G: 3; .375 INJECTION, POWDER, FOR SOLUTION INTRAVENOUS at 18:53

## 2022-12-28 RX ADMIN — HEPARIN SODIUM 5000 UNITS: 5000 INJECTION INTRAVENOUS; SUBCUTANEOUS at 05:46

## 2022-12-28 RX ADMIN — ISOSORBIDE DINITRATE 20 MG: 20 TABLET ORAL at 16:46

## 2022-12-28 RX ADMIN — ISOSORBIDE DINITRATE 20 MG: 20 TABLET ORAL at 09:22

## 2022-12-28 RX ADMIN — AMITRIPTYLINE HYDROCHLORIDE 50 MG: 25 TABLET, FILM COATED ORAL at 21:45

## 2022-12-28 RX ADMIN — GABAPENTIN 100 MG: 100 CAPSULE ORAL at 09:22

## 2022-12-28 RX ADMIN — SODIUM CHLORIDE, PRESERVATIVE FREE 10 ML: 5 INJECTION INTRAVENOUS at 05:47

## 2022-12-28 RX ADMIN — GABAPENTIN 100 MG: 100 CAPSULE ORAL at 16:46

## 2022-12-28 RX ADMIN — PIPERACILLIN AND TAZOBACTAM 3.38 G: 3; .375 INJECTION, POWDER, FOR SOLUTION INTRAVENOUS at 05:48

## 2022-12-28 RX ADMIN — GABAPENTIN 100 MG: 100 CAPSULE ORAL at 21:44

## 2022-12-28 RX ADMIN — ATORVASTATIN CALCIUM 40 MG: 40 TABLET, FILM COATED ORAL at 09:22

## 2022-12-28 RX ADMIN — OXYCODONE HYDROCHLORIDE 10 MG: 10 TABLET, FILM COATED, EXTENDED RELEASE ORAL at 09:22

## 2022-12-28 RX ADMIN — CARVEDILOL 25 MG: 12.5 TABLET, FILM COATED ORAL at 09:22

## 2022-12-28 RX ADMIN — LISINOPRIL 5 MG: 5 TABLET ORAL at 09:22

## 2022-12-28 RX ADMIN — CARVEDILOL 25 MG: 12.5 TABLET, FILM COATED ORAL at 20:28

## 2022-12-28 RX ADMIN — INSULIN GLARGINE 10 UNITS: 100 INJECTION, SOLUTION SUBCUTANEOUS at 21:51

## 2022-12-28 RX ADMIN — TICAGRELOR 90 MG: 90 TABLET ORAL at 09:22

## 2022-12-28 RX ADMIN — ISOSORBIDE DINITRATE 20 MG: 20 TABLET ORAL at 21:44

## 2022-12-28 RX ADMIN — HYDRALAZINE HYDROCHLORIDE 25 MG: 25 TABLET, FILM COATED ORAL at 21:45

## 2022-12-28 RX ADMIN — CALCIUM CARBONATE 200 MG: 500 TABLET, CHEWABLE ORAL at 09:22

## 2022-12-28 RX ADMIN — HYDRALAZINE HYDROCHLORIDE 25 MG: 25 TABLET, FILM COATED ORAL at 09:22

## 2022-12-28 RX ADMIN — HEPARIN SODIUM 5000 UNITS: 5000 INJECTION INTRAVENOUS; SUBCUTANEOUS at 21:44

## 2022-12-28 RX ADMIN — TICAGRELOR 90 MG: 90 TABLET ORAL at 20:28

## 2022-12-28 NOTE — DISCHARGE SUMMARY
Discharge Summary     Patient: Rosendo Harden MRN: [id number]  SSN: [id number]    YOB: 1955  Age: 74 y.o. Sex: male       Admit Date: 12/22/2022    Discharge Date: 12/28/2022      Admission Diagnoses: Wet gangrene [facility]) Lorenza Decent    Discharge Diagnoses:   Problem List as of 12/28/2022 Date Reviewed: 12/27/2022            Codes Class Noted - Resolved    Wet gangrene ([facility] ICD-10-CM: K25  ICD-9-CM: 785.4  12/22/2022 - Present        Acute CHF (congestive heart failure) ([facility] ICD-10-CM: I50.9  ICD-9-CM: 428.0  4/6/2022 - Present        Dysphagia, unspecified type ICD-10-CM: R13.10  ICD-9-CM: 787.20  Unknown - Present        Fatigue, unspecified type ICD-10-CM: R53.83  ICD-9-CM: 780.79  Unknown - Present        Chronic systolic congestive heart failure ([facility] ICD-10-CM: I50.22  ICD-9-CM: 428.22, 428.0  2/16/2021 - Present        Goals of care, counseling/discussion ICD-10-CM: Z71.89  ICD-9-CM: V65.49  Unknown - Present        DNR (do not resuscitate) discussion ICD-10-CM: Z71.89  ICD-9-CM: V65.49  Unknown - Present        JUAN C (acute kidney injury) ([facility] ICD-10-CM: N17.9  ICD-9-CM: 859. 9  Unknown - Present        Acute hypoxemic respiratory failure due to COVID-19 [facility]) ICD-10-CM: U07.1, J96.01  ICD-9-CM: 518.81, 079.89, 799.02  1/30/2021 - Present        RESOLVED: Atypical chest pain ICD-10-CM: R07.89  ICD-9-CM: 786.59  4/6/2022 - 4/13/2022            Discharge Condition: Good    Hospital Course: 74years old patient with multiple medical issues including A. fib anemia arrhythmia CAD admitted for infected knee scheduled for surgery patient was seen by nephrology for dialysis management. He was placed on IV vancomycin Zosyn however with the amputation patient has been discontinued.   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 Qty: 30 Tablet, Refills: 0      epoetin luis-epbx (RETACRIT) 4,000 unit/mL injection 2 mL by SubCUTAneous route DIALYSIS MON, WED & FRI. Indications: anemia due to kidney failure  Qty: 12 Each, Refills: 0      heparin sodium,porcine (heparin, porcine,) 5,000 unit/mL injection 1 mL by SubCUTAneous route every eight (8) hours. Qty: 30 mL, Refills: 0      mupirocin (BACTROBAN) 2 % ointment Apply 1 g to affected area daily for 5 doses. Qty: 22 g, Refills: 0      naloxone (NARCAN) 1 mg/mL injection 1 mL by IntraVENous route as needed for Overdose or Respiratory Distress. Qty: 1 mL, Refills: 0      oxyCODONE IR (ROXICODONE) 5 mg immediate release tablet Take 1 Tablet by mouth every four (4) hours as needed for Pain for up to 3 days. Max Daily Amount: 30 mg.  Qty: 10 Tablet, Refills: 0    Associated Diagnoses: Pain      oxyCODONE ER (OxyCONTIN) 10 mg ER tablet Take 1 Tablet by mouth every twelve (12) hours for 30 days. Max Daily Amount: 20 mg.  Qty: 10 Tablet, Refills: 0    Associated Diagnoses: Pain      polyethylene glycol (MIRALAX) 17 gram packet Take 1 Packet by mouth daily. Qty: 30 Packet, Refills: 0           CONTINUE these medications which have CHANGED    Details   carvediloL (COREG) 25 mg tablet Take 1 Tablet by mouth two (2) times a day. Qty: 60 Tablet, Refills: 0           CONTINUE these medications which have NOT CHANGED    Details   ticagrelor (BRILINTA) 90 mg tablet Take 90 mg by mouth two (2) times a day. isosorbide dinitrate (ISORDIL) 20 mg tablet Take 20 mg by mouth three (3) times daily. lisinopriL (PRINIVIL, ZESTRIL) 5 mg tablet Take 5 mg by mouth daily. melatonin 5 mg cap capsule Take 5 mg by mouth nightly. atorvastatin (LIPITOR) 80 mg tablet Take 0.5 Tabs by mouth daily.   Qty: 30 Tab, Refills: 0      hydrALAZINE (APRESOLINE) 25 mg tablet Take 1 Tab by mouth three (3) times daily. Qty: 90 Tab, Refills: 0      aspirin 81 mg chewable tablet Take 81 mg by mouth daily. gabapentin (NEURONTIN) 100 mg capsule Take 1 Capsule by mouth three (3) times daily. Max Daily Amount: 300 mg. Qty: 15 Capsule, Refills: 0    Associated Diagnoses: Open wound of both lower extremities, initial encounter      calcium carbonate (TUMS) 200 mg calcium (500 mg) chew Take 1 Tablet by mouth in the morning. insulin glargine (Lantus U-100 Insulin) 100 unit/mL injection 10 Units by SubCUTAneous route nightly. Qty: 1 mL, Refills: 0      amLODIPine (NORVASC) 10 mg tablet Take 1 Tab by mouth daily.   Qty: 30 Tab, Refills: 0           STOP taking these medications       collagenase (SANTYL) 250 unit/gram ointment Comments:   Reason for Stopping:         acetaminophen (TYLENOL) 325 mg tablet Comments:   Reason for Stopping:               Activity: PT/OT Eval and Treat  Diet: Cardiac Diet and Renal Diet  Wound Care: As directed    Follow-up Appointments   Procedures    FOLLOW UP VISIT Appointment in: 3 - 5 Days     Standing Status:   Standing     Number of Occurrences:   1     Order Specific Question:   Appointment in     Answer:   3 - 5 Days       Signed By: Ange Banegas MD     December 28, 2022

## 2022-12-28 NOTE — PROGRESS NOTES
Vancomycin Dosing Consult  Minnie Cobian is a 74 y.o. male with sepsis, left knee gangrene, osteomyelitis. Pharmacy was consulted by Dr. Sharon Azar to dose and monitor vancomycin. Today is day 7. Antibiotic regimen: Vancomycin + Pip/Tazo    Temp (24hrs), Av.7 °F (36.5 °C), Min:97.2 °F (36.2 °C), Max:98.2 °F (36.8 °C)    Recent Labs     22  0221 22  0317 22  0618   WBC 8.7 8.9 9.4   CREA  --  2.41* 3.42*   BUN  --  33* 54*     Est CrCl: ESRD on HD (Monday , Thursday)  Concomitant nephrotoxic drugs: None    Cultures:    Blood: ngtd, prelim    Blood x 2: ngtd, prelim    MRSA Swab: Detected     Target range: Trough 21-24 mcg/mL (Intermittent hemodialysis, invasive MRSA infection or sepsis)    Recent level history:  Date/Time Dose & Interval Measured Level (mcg/mL)    0307 1250 mg IV x 1 dose 17.9    0618 750 mg IV x 1 dose 6.7    @ 0221 1000 mg X1 3.6      Assessment/Plan:   Afebrile, leukocytosis, lactic acidosis on admission, ESR & PCT elevated  ESRD  the dialysis days was changed from MWF to HD on Monday and Thursday ( twice per week). Level resulted  is subtherapeutic at 3.6   Will order vancomycin 1,250 mg IV x 1 dose today this am. Pt will be dialyzed on 22. Pre-HD level scheduled for  @  0400 am pre HD.   Antimicrobial stop date TBD

## 2022-12-28 NOTE — PROGRESS NOTES
Progress Note    Patient: Lamin Catherine MRN: [id number]  SSN: [id number]    YOB: 1955  Age: 74 y.o. Sex: male      Admit Date: 12/22/2022    LOS: 5 days     Subjective:     74years old with recent above-knee amputation for osteomyelitis hemoglobin is down to 6.1 g  Patient has just completed his dialysis for the day  12/27  Waiting for placement  Objective:     Vitals:    12/26/22 2108 12/27/22 0250 12/27/22 0752 12/27/22 1434   BP: 132/71 133/68 (!) 163/82 (!) 148/81   Pulse: 85 82 69 (!) 102   Resp: 18 18 16 22   Temp: 98.7 °F (37.1 °C) 98.3 °F (36.8 °C) 98.1 °F (36.7 °C) 97.3 °F (36.3 °C)   TempSrc:       SpO2: 97% 98% 100% 99%   Weight:       Height:            Intake and Output:  Current Shift: No intake/output data recorded. Last three shifts: 12/26 0701 - 12/27 1900  In: 781.7   Out: 1000     Physical Exam:   General:  Alert, cooperative, no distress, appears stated age. Eyes:  Conjunctivae/corneas clear. PERRL, EOMs intact. Fundi benign   Ears:  Normal TMs and external ear canals both ears. Nose: Nares normal. Septum midline. Mucosa normal. No drainage or sinus tenderness. Mouth/Throat: Lips, mucosa, and tongue normal. Teeth and gums normal.   Neck: Supple, symmetrical, trachea midline, no adenopathy, thyroid: no enlargment/tenderness/nodules, no carotid bruit and no JVD. Back:   Symmetric, no curvature. ROM normal. No CVA tenderness. Lungs:   Clear to auscultation bilaterally. Heart:  Regular rate and rhythm, S1, S2 normal, no murmur, click, rub or gallop. Abdomen:   Soft, non-tender. Bowel sounds normal. No masses,  No organomegaly. Extremities: Above-knee amputation left side   Pulses: 2+ and symmetric all extremities. Skin: Skin color, texture, turgor normal. No rashes or lesions   Lymph nodes: Cervical, supraclavicular, and axillary nodes normal.   Neurologic: CNII-XII intact. Normal strength, sensation and reflexes throughout. Lab/Data Review:   All lab results for the last 24 hours reviewed. left  Recent Results (from the past 24 hour(s))   GLUCOSE, POC    Collection Time: 12/26/22  9:41 PM   Result Value Ref Range    Glucose (POC) 166 (H) 65 - 100 mg/dL    Performed by Sunitha Rinaldi    METABOLIC PANEL, BASIC    Collection Time: 12/27/22  3:17 AM   Result Value Ref Range    Sodium 138 136 - 145 mmol/L    Potassium 3.9 3.5 - 5.1 mmol/L    Chloride 103 97 - 108 mmol/L    CO2 25 21 - 32 mmol/L    Anion gap 10 5 - 15 mmol/L    Glucose 114 (H) 65 - 100 mg/dL    BUN 33 (H) 6 - 20 mg/dL    Creatinine 2.41 (H) 0.70 - 1.30 mg/dL    BUN/Creatinine ratio 14 12 - 20      eGFR 29 (L) >60 ml/min/1.73m2    Calcium 7.8 (L) 8.5 - 10.1 mg/dL   CBC WITH AUTOMATED DIFF    Collection Time: 12/27/22  3:17 AM   Result Value Ref Range    WBC 8.9 4.1 - 11.1 K/uL    RBC 3.38 (L) 4.10 - 5.70 M/uL    HGB 8.7 (L) 12.1 - 17.0 g/dL    HCT 28.0 (L) 36.6 - 50.3 %    MCV 82.8 80.0 - 99.0 FL    MCH 25.7 (L) 26.0 - 34.0 PG    MCHC 31.1 30.0 - 36.5 g/dL    RDW 16.5 (H) 11.5 - 14.5 %    PLATELET 399 (H) 430 - 400 K/uL    MPV 9.0 8.9 - 12.9 FL    NRBC 0.0 0.0  WBC    ABSOLUTE NRBC 0.00 0.00 - 0.01 K/uL    NEUTROPHILS 63 32 - 75 %    LYMPHOCYTES 27 12 - 49 %    MONOCYTES 7 5 - 13 %    EOSINOPHILS 2 0 - 7 %    BASOPHILS 0 0 - 1 %    IMMATURE GRANULOCYTES 1 (H) 0 - 0.5 %    ABS. NEUTROPHILS 5.6 1.8 - 8.0 K/UL    ABS. LYMPHOCYTES 2.4 0.8 - 3.5 K/UL    ABS. MONOCYTES 0.6 0.0 - 1.0 K/UL    ABS. EOSINOPHILS 0.2 0.0 - 0.4 K/UL    ABS. BASOPHILS 0.0 0.0 - 0.1 K/UL    ABS. IMM.  GRANS. 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

## 2022-12-28 NOTE — PROGRESS NOTES
Problem: Falls - Risk of  Goal: *Absence of Falls  Description: Document Laura Erps Fall Risk and appropriate interventions in the flowsheet. Outcome: Progressing Towards Goal  Note: Fall Risk Interventions:            Medication Interventions: Evaluate medications/consider consulting pharmacy, Patient to call before getting OOB    Elimination Interventions: Call light in reach, Bed/chair exit alarm              Problem: Risk for Spread of Infection  Goal: Prevent transmission of infectious organism to others  Description: Prevent the transmission of infectious organisms to other patients, staff members, and visitors.   Outcome: Progressing Towards Goal

## 2022-12-28 NOTE — PROGRESS NOTES
Renal Daily Progress Note    Admit Date: 12/22/2022      Subjective:   Patient seen in his room. He is eating well. He denies dysgeusia or nausea or vomiting. Urine output is good according to the patient. Current Facility-Administered Medications   Medication Dose Route Frequency    [START ON 12/29/2022] Vancomycin random level to be drawn on 12/29/22 at 0400 am pre HD.    Other ONCE    Vancomycin Pre-HD Level Due 12/28 0400 - Please make sure lab is drawn   Other ONCE    0.9% sodium chloride infusion 250 mL  250 mL IntraVENous PRN    0.9% sodium chloride infusion 250 mL  250 mL IntraVENous PRN    amitriptyline (ELAVIL) tablet 50 mg  50 mg Oral QHS    naloxone (NARCAN) injection 1 mg  1 mg IntraVENous PRN    0.9% sodium chloride infusion 250 mL  250 mL IntraVENous PRN    sodium chloride (NS) flush 5-40 mL  5-40 mL IntraVENous PRN    ondansetron (ZOFRAN ODT) tablet 4 mg  4 mg Oral Q8H PRN    Or    ondansetron (ZOFRAN) injection 4 mg  4 mg IntraVENous Q6H PRN    oxyCODONE ER (OxyCONTIN) tablet 10 mg  10 mg Oral Q12H    oxyCODONE-acetaminophen (PERCOCET 10)  mg per tablet 1 Tablet  1 Tablet Oral Q6H PRN    heparin (porcine) injection 5,000 Units  5,000 Units SubCUTAneous Q8H    heparin (porcine) 1,000 unit/mL injection 3,800 Units  3,800 Units Hemodialysis DIALYSIS PRN    epoetin luis-epbx (RETACRIT) injection 8,000 Units  8,000 Units IntraVENous DIALYSIS MON, WED & FRI    oxyCODONE IR (ROXICODONE) tablet 5 mg  5 mg Oral Q4H PRN    amLODIPine (NORVASC) tablet 10 mg  10 mg Oral DAILY    atorvastatin (LIPITOR) tablet 40 mg  40 mg Oral DAILY    calcium carbonate (TUMS) chewable tablet 200 mg [elemental]  200 mg Oral DAILY    carvediloL (COREG) tablet 25 mg  25 mg Oral BID    gabapentin (NEURONTIN) capsule 100 mg  100 mg Oral TID    hydrALAZINE (APRESOLINE) tablet 25 mg  25 mg Oral TID    insulin glargine (LANTUS) injection 10 Units  10 Units SubCUTAneous QHS    isosorbide dinitrate (ISORDIL) tablet 20 mg  20 mg Oral TID    lisinopriL (PRINIVIL, ZESTRIL) tablet 5 mg  5 mg Oral DAILY    melatonin tablet 5 mg  5 mg Oral QHS    ticagrelor (BRILINTA) tablet 90 mg  90 mg Oral BID    sodium chloride (NS) flush 5-40 mL  5-40 mL IntraVENous PRN    acetaminophen (TYLENOL) tablet 650 mg  650 mg Oral Q6H PRN    Or    acetaminophen (TYLENOL) suppository 650 mg  650 mg Rectal Q6H PRN    polyethylene glycol (MIRALAX) packet 17 g  17 g Oral DAILY PRN    piperacillin-tazobactam (ZOSYN) 3.375 g in 0.9% sodium chloride (MBP/ADV) 100 mL MBP  3.375 g IntraVENous Q12H    Vancomycin - Pharmacy to Dose   Other Rx Dosing/Monitoring        Review of Systems    Review of Systems   Respiratory:  Negative for shortness of breath. Cardiovascular:  Negative for chest pain. Gastrointestinal:  Negative for abdominal pain. Neurological:  Negative for headaches. Objective:     Patient Vitals for the past 8 hrs:   BP Temp Pulse Resp SpO2   12/28/22 1436 139/80 98 °F (36.7 °C) 89 18 99 %   12/28/22 0734 (!) 154/82 97.9 °F (36.6 °C) 97 18 99 %       12/28 0701 - 12/28 1900  In: -   Out: 729 [WBNFA:159]  12/26 1901 - 12/28 0700  In: 771.7 [P.O.:200; I.V.:100]  Out: 300 [Urine:300]    Physical Exam:   Physical Exam  Cardiovascular:      Rate and Rhythm: Regular rhythm. Pulmonary:      Breath sounds: Normal breath sounds. Abdominal:      General: Bowel sounds are normal.      Palpations: Abdomen is soft. Neurological:      Mental Status: He is alert.       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 No results for input(s): INR, INREXT, INREXT, INREXT in the last 72 hours. 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 We will dialyze him again on 29 December. Volume status is acceptable    Anemia of chronic disease    Plan:   No indication for hemodialysis today. Hemodialysis tomorrow morning.   Check labs tomorrow

## 2022-12-28 NOTE — PROGRESS NOTES
Bedside shift change report given to [employer]   (oncoming nurse) by Adrian Olson RN   (offgoing nurse). Report included the following information SBAR and MAR.

## 2022-12-28 NOTE — PROGRESS NOTES
OCCUPATIONAL THERAPY EVALUATION  Patient: Jen Cordero (2 y.o. male)  Date: 12/28/2022  Primary Diagnosis: Wet gangrene ([address] Antelmo Medal  Procedure(s) (LRB):  LEFT AMPUTATION KNEE(AKA) (Left) 4 Days Post-Op   Precautions:  Contact      ASSESSMENT  Pt is a 74 y.o. male presenting to [facility] with c/o leg pain, admitted 12/22/22 and currently being treated for knee infection, osteomyelitis, and sepsis. Pt currently s/p L AKA completed 12/24/22 by Dr. Naveen Yuan. Pt has a hx of a complete digit amputation of the LUE. See below for home and PLOF information. Pt received semi-supine in bed upon arrival, AXO x4, and agreeable to OT evaluation. Based on current observations, pt presents with deficits in generalized strength/AROM, bed mobility, balance, functional activity tolerance, coordination, and pain currently impacting overall performance of ADLs and functional transfers/mobility (see below for objective details and assist levels). Overall, pt tolerates session fair with pt completing bed mobility and sitting EOB with min-mod A overall. Pt declined sit>stand attempts d/t reports of 9/10 pain in LLE. Pt washed face with set up A while supine in bed. Total A req'd for posterior carlene care while rolling side to side in bed. Hospital gown doffed/donned while supine with max A to dethread and thread BUE and guide BUE through sleeves. Pt would benefit from continued skilled OT services to address current impairments and improve IND and safety with self cares and functional transfers/mobility. Current OT d/c recommendation Inpatient Rehabilitation Facility  once medically appropriate. Other factors to consider for discharge: family/social support, DME, time since onset, severity of deficits, functional baseline     Patient will benefit from skilled therapy intervention to address the above noted impairments.        PLAN :  Recommendations and Planned Interventions: self care training, functional mobility training, therapeutic exercise, balance training, therapeutic activities, endurance activities, patient education, home safety training, and family training/education    Recommend with staff: Encourage HEP in prep for ADLs/mobility and Frequent repositioning to prevent skin breakdown    Recommend next session: LB dressing  and Seated grooming    Frequency/Duration: Patient will be followed by occupational therapy:  3-5x/week to address goals. Recommendation for discharge: (in order for the patient to meet his/her long term goals)  [address]     This discharge recommendation:  Has been made in collaboration with the attending provider and/or case management    IF patient discharges home will need the following DME: TBD       SUBJECTIVE:   Patient stated I can't do it, my leg hurts.     OBJECTIVE DATA SUMMARY:   HISTORY:   Past Medical History:   Diagnosis Date    A-fib ([facility] [place] [address]     Anemia     Arrhythmia     CAD (coronary artery disease)     Chronic kidney disease     Diabetes ([facility] [place] [address]     DM2    Dialysis patient [facility])     M-W-F [facility] [place] [phone]    Heart failure ([facility] [place] [address]     Hypertension     STEMI (ST elevation myocardial infarction) ([facility] [place] [address]      Past Surgical History:   Procedure Laterality Date    HX AMPUTATION Left     5 fingers    HX HEART CATHETERIZATION      IR INSERT NON TUNL CVC OVER 5 YRS  02/23/2021    IR INSERT NON TUNL CVC OVER 5 YRS  04/07/2022    IR INSERT TUNL CVC W/O PORT OVER 5 YR  03/02/2021    IR INSERT TUNL CVC W/O PORT OVER 5 YR  04/12/2022    ND CARDIAC SURG PROCEDURE UNLIST         Per pt:   Home Situation  Home Environment: [address] [place] [address] Name: [address] [place] [address] Ave: Spouse/Significant Other  Patient Expects to be Discharged to[de-identified] Skilled nursing facility  Current DME Used/Available at Home:  (hemiwalker)    Hand dominance: Right    EXAMINATION OF PERFORMANCE DEFICITS:  Cognitive/Behavioral Status:  Neurologic State: Alert  Orientation Level: Oriented X4  Cognition: Follows commands    Hearing: Auditory  Auditory Impairment: None    Range of Motion:  AROM: Generally decreased, functional  PROM: Generally decreased, functional    Strength:  Strength: Generally decreased, functional    Coordination:  Fine Motor Skills-Upper: Left Impaired;Right Intact (S/p complete digit amputation)    Gross Motor Skills-Upper: Left Intact; Right Intact    Balance:  Sitting: Intact; Without support    Functional Mobility and Transfers for ADLs:  Bed Mobility:  Rolling: Minimum assistance  Supine to Sit: Minimum assistance  Sit to Supine: Minimum assistance  Scooting: Moderate assistance    Transfers:  Sit to Stand:  (attempted but unable to complete due to pain)      ADL Intervention and task modifications:  Grooming  Position Performed: Other (comment) (Supine)  Washing Face: Set-up (Using RUE)    Lower Body Dressing Assistance  Socks: Total assistance (dependent)  Position Performed: Supine    Therapeutic Exercise:  Pt would benefit from UE HEP in prep for ADLs and functional mobility/transfers. Functional Measure:    [employer] AM-PACTM \"6 Clicks\"                                                       Daily Activity Inpatient Short Form  How much help from another person does the patient currently need. .. Total; A Lot A Little None   1. Putting on and taking off regular lower body clothing? [x]  1 []  2 []  3 []  4   2. Bathing (including washing, rinsing, drying)? [x]  1 []  2 []  3 []  4   3. Toileting, which includes using toilet, bedpan or urinal? [x] 1 []  2 []  3 []  4   4. Putting on and taking off regular upper body clothing? []  1 [x]  2 []  3 []  4   5. Taking care of personal grooming such as brushing teeth? []  1 [x]  2 []  3 []  4   6. Eating meals? []  1 [x]  2 []  3 []  4   © 2007, Trustees of [employer], under license to [employer].  All rights reserved     Score: 9/24     Interpretation of Tool:  Represents clinically-significant functional categories (i.e. Activities of daily living). Percentage of Impairment CH    0%   CI    1-19% CJ    20-39% CK    40-59% CL    60-79% CM    80-99% CN     100%   [facility]  Score 6-24 24 23 20-22 15-19 10-14 7-9 6     Occupational Therapy Evaluation Charge Determination   History Examination Decision-Making   LOW Complexity : Brief history review  MEDIUM Complexity : 3-5 performance deficits relating to physical, cognitive , or psychosocial skils that result in activity limitations and / or participation restrictions MEDIUM Complexity : Patient may present with comorbidities that affect occupational performnce. Miniml to moderate modification of tasks or assistance (eg, physical or verbal ) with assesment(s) is necessary to enable patient to complete evaluation       Based on the above components, the patient evaluation is determined to be of the following complexity level: LOW     Pain Ratin/10 LLE with activity    Activity Tolerance:   Fair and requires rest breaks    After treatment patient left in no apparent distress:    Supine in bed, Call bell within reach, Bed / chair alarm activated, and Side rails x 3, bed locked and in lowest position    COMMUNICATION/EDUCATION:   The patients plan of care was discussed with: Physical therapist and Registered nurse. Patient/family agree to work toward stated goals and plan of care. This patients plan of care is appropriate for delegation to KADEN. PT/OT sessions occurred together for increased safety of pt and clinician. Thank you for this referral.  Hoa Moise OT  Time Calculation: 45 mins    Problem: Self Care Deficits Care Plan (Adult)  Goal: *Acute Goals and Plan of Care (Insert Text)  Description:   FUNCTIONAL STATUS PRIOR TO ADMISSION: Patient was modified independent using a pyeton walker for functional mobility. Patient was modified independent for basic and instrumental ADLs.      HOME SUPPORT: Patient most recently resided at [facility] since October for rehab services. Prior to [place] pt was residing with his wife at home. Occupational Therapy Goals  Initiated 12/28/2022  Patient Goal: Walk and get to the bathroom. 1.  Patient will perform lower body dressing with modified independence within 7 day(s). 2.  Patient will perform grooming with modified independence within 7 day(s). 3.  Patient will perform bathing with modified independence within 7 day(s). 4.  Patient will perform toilet transfers with modified independence within 7 day(s). 5.  Patient will perform all aspects of toileting with modified independence within 7 day(s). 6.  Patient will participate in upper extremity therapeutic exercise/activities with modified independence within 7 day(s).     Outcome: Not Met

## 2022-12-28 NOTE — PROGRESS NOTES
PHYSICAL THERAPY EVALUATION  Patient: Julia Ramsey (89 y.o. male)  Date: 12/28/2022  Primary Diagnosis: Wet gangrene ([address] Jacinto Morton  Procedure(s) (LRB):  LEFT AMPUTATION KNEE(AKA) (Left) 4 Days Post-Op   Precautions: falls, previous left complete digit amputation      ASSESSMENT  Pt is a 74 y.o. male admitted on 12/22/2022 for left knee pain; pt currently being treated for wet gangrene, left knee infection, osteomyelitis, and sepsis. Pt underwent left above the knee amputation on 12/24/22 completed by Dr. Batsheva St. Pt semi-supine in bed upon PT arrival, agreeable to evaluation. Pt A&O x 4. Pt also reports h/o complete digital amputation on left 2-3 weeks ago at [facility], pt previously ambulated with peyton-walker due to digit amputation, however, due to new AKA left platform walker obtained for OOB mobility. Pt education regarding use of platform. Based on the objective data described, the patient presents with generalized weakness, impaired functional mobility, impaired amb, impaired balance, and decreased activity tolerance. (See below for objective details and assist levels). Overall pt tolerated session fair today with c/o 9-10/10 left LE pain (reports burning throbbing pain). Pt required increased time and assistance for all mobility, attempted sit to stand transfers x 2 but unable to complete due to residual limp pain increasing to 10/10 with noted hip flexor spasms EOB and once returned to supine position (residual limp hip flexion close to 90 deg). Pt noted to be soiled after completing sit to supine transfers, min A with additional time required for bed mobility, see OT note for toileting details. Extensive pt education given regarding gentle supine left hip flexor stretching in order to decrease muscle spasms, nsg made aware of muscle spasms that occurred during session today. Pt will benefit from continued skilled PT to address above deficits and return to PLOF.  Current PT DC recommendation Inpatient Rehabilitation Facility  once medically appropriate. Current Level of Function Impacting Discharge (mobility/balance): min to mod A    Other factors to consider for discharge: severity of deficits, acute medical state      PLAN :  Recommendations and Planned Interventions: bed mobility training, transfer training, gait training, therapeutic exercises, patient and family training/education, and therapeutic activities      Recommend for staff: Encourage HEP in prep for ADLs/mobility and Frequent repositioning to prevent skin breakdown    Frequency/Duration: Patient will be followed by physical therapy:  3-5x/week to address goals. Recommendation for discharge: (in order for the patient to meet his/her long term goals)  [facility]     This discharge recommendation:  Has been made in collaboration with the attending provider and/or case management         SUBJECTIVE:   Patient stated I really want to walk again.     OBJECTIVE DATA SUMMARY:   HISTORY:    Past Medical History:   Diagnosis Date    A-fib ([facility] [address]     Anemia     Arrhythmia     CAD (coronary artery disease)     Chronic kidney disease     Diabetes ([facility] [address]     DM2    Dialysis patient [facility])     M-W-F [facility] [place] [phone]    Heart failure ([facility] [address]     Hypertension     STEMI (ST elevation myocardial infarction) ([facility] [address]      Past Surgical History:   Procedure Laterality Date    HX AMPUTATION Left     5 fingers    HX HEART CATHETERIZATION      IR INSERT NON TUNL CVC OVER 5 YRS  02/23/2021    IR INSERT NON TUNL CVC OVER 5 YRS  04/07/2022    IR INSERT TUNL CVC W/O PORT OVER 5 YR  03/02/2021    IR INSERT TUNL CVC W/O PORT OVER 5 YR  04/12/2022    CO CARDIAC SURG PROCEDURE UNLIST         Home Situation  Home Environment: [address] [place] [address] Name: [address] [place] [address] Ave: Spouse/Significant Other  Patient Expects to be Discharged to[de-identified] Skilled nursing facility  Current DME Used/Available at Home: (hemiwalker)    EXAMINATION/PRESENTATION/DECISION MAKING:   Critical Behavior:  Neurologic State: Alert  Orientation Level: Oriented X4  Cognition: Follows commands     Hearing: Auditory  Auditory Impairment: None  Skin:  ace wrap noted on left residual limp, bandage on left UE  Edema: none noted   Range Of Motion:  AROM: Generally decreased, functional           PROM: Generally decreased, functional           Strength:    Strength: Generally decreased, functional              Functional Mobility:  Bed Mobility:  Rolling: Minimum assistance  Supine to Sit: Minimum assistance  Sit to Supine: Minimum assistance  Scooting: Moderate assistance  Transfers:  Sit to Stand:  (attempted but unable to complete due to pain)       Balance:   Sitting: Intact; Without support  Ambulation/Gait Training:   NA         Therapeutic Exercises:   Pt educated on LE HEP to include left hip flexor stretch, instructed to complete throughout the day to improve ROM and strength with good understanding verbalized and demonstrated. Functional Measure:  [facility] AM-PAC 6 Clicks         Basic Mobility Inpatient Short Form  How much difficulty does the patient currently have. .. Unable A Lot A Little None   1. Turning over in bed (including adjusting bedclothes, sheets and blankets)? [] 1   [] 2   [x] 3   [] 4   2. Sitting down on and standing up from a chair with arms ( e.g., wheelchair, bedside commode, etc.)   [] 1   [x] 2   [] 3   [] 4   3. Moving from lying on back to sitting on the side of the bed? [] 1   [] 2   [x] 3   [] 4          How much help from another person does the patient currently need. .. Total A Lot A Little None   4. Moving to and from a bed to a chair (including a wheelchair)? [] 1   [x] 2   [] 3   [] 4   5. Need to walk in hospital room? [] 1   [x] 2   [] 3   [] 4   6. Climbing 3-5 steps with a railing?    [x] 1   [] 2   [] 3   [] 4   © 2007, Trustees of [facility], under license to [employer]. All rights reserved     Score:  Initial:  Most Recent: X (Date: 22 )   Interpretation of Tool:  Represents activities that are increasingly more difficult (i.e. Bed mobility, Transfers, Gait). Score 24 23 22-20 19-15 14-10 9-7 6   Modifier CH CI CJ CK CL CM CN         Physical Therapy Evaluation Charge Determination   History Examination Presentation Decision-Making   HIGH Complexity :3+ comorbidities / personal factors will impact the outcome/ POC  HIGH Complexity : 4+ Standardized tests and measures addressing body structure, function, activity limitation and / or participation in recreation  MEDIUM Complexity : Evolving with changing characteristics  Other outcome measures ampac 6  mod      Based on the above components, the patient evaluation is determined to be of the following complexity level: MEDIUM    Pain Ratin-10/10 left residual limp    Activity Tolerance:   Fair, requires frequent rest breaks, and elevated pain with mobility     After treatment patient left in no apparent distress:   Bed locked and in lowest position Supine in bed, Call bell within reach, Bed / chair alarm activated, and Side rails x 3 and nsg updated. GOALS:    Problem: Mobility Impaired (Adult and Pediatric)  Goal: *Acute Goals and Plan of Care (Insert Text)  Description: FUNCTIONAL STATUS PRIOR TO ADMISSION: Patient was modified independent using a peyton walker for functional mobility. Patient required setup assistance for basic and instrumental ADLs. HOME SUPPORT PRIOR TO ADMISSION: Pt at short term rehab at [place] since October. Prior to admission in October pt was living with his wife. Physical Therapy Goals  Initiated 2022  Pt stated goal: to get better, would eventually like to use a prosthesis on LLE. Pt will be I with LE HEP in 7 days. Pt will perform bed mobility with mod I in 7 days. Pt will perform transfers with mod I in 7 days.    Pt will amb 5-10 feet with LRAD safely with min Ax1 in 7 days. Outcome: Not Met       COMMUNICATION/EDUCATION:   The patients plan of care was discussed with: Occupational therapist, Registered nurse, and Case management. Fall prevention education was provided and the patient/caregiver indicated understanding., Patient/family have participated as able in goal setting and plan of care. , and Patient/family agree to work toward stated goals and plan of care. PT/OT sessions occurred together for increased safety of pt and clinician.      Thank you for this referral.  Rossy Sorto, PT, DPT   Time Calculation: 43 mins

## 2022-12-28 NOTE — PROGRESS NOTES
Problem: Falls - Risk of  Goal: *Absence of Falls  Description: Document Reinbeck Fall Risk and appropriate interventions in the flowsheet. Outcome: Progressing Towards Goal  Note: Fall Risk Interventions:            Medication Interventions: Evaluate medications/consider consulting pharmacy    Elimination Interventions: Call light in reach              Problem: Patient Education: Go to Patient Education Activity  Goal: Patient/Family Education  Outcome: Progressing Towards Goal     Problem: Pressure Injury - Risk of  Goal: *Prevention of pressure injury  Description: Document Cruzito Scale and appropriate interventions in the flowsheet. Outcome: Progressing Towards Goal  Note: Pressure Injury Interventions:  Sensory Interventions: Assess changes in LOC, Keep linens dry and wrinkle-free    Moisture Interventions: Absorbent underpads    Activity Interventions: PT/OT evaluation, Increase time out of bed    Mobility Interventions: HOB 30 degrees or less, Float heels    Nutrition Interventions: Document food/fluid/supplement intake    Friction and Shear Interventions: Apply protective barrier, creams and emollients, Minimize layers                Problem: Patient Education: Go to Patient Education Activity  Goal: Patient/Family Education  Outcome: Progressing Towards Goal     Problem: Risk for Spread of Infection  Goal: Prevent transmission of infectious organism to others  Description: Prevent the transmission of infectious organisms to other patients, staff members, and visitors.   Outcome: Progressing Towards Goal     Problem: Patient Education:  Go to Education Activity  Goal: Patient/Family Education  Outcome: Progressing Towards Goal

## 2022-12-29 LAB
ALBUMIN SERPL-MCNC: 2 G/DL (ref 3.5–5)
ANION GAP SERPL CALC-SCNC: 10 MMOL/L (ref 5–15)
BACTERIA SPEC CULT: NORMAL
BACTERIA SPEC CULT: NORMAL
BASOPHILS # BLD: 0 K/UL (ref 0–0.1)
BASOPHILS NFR BLD: 1 % (ref 0–1)
BUN SERPL-MCNC: 45 MG/DL (ref 6–20)
BUN/CREAT SERPL: 17 (ref 12–20)
CA-I BLD-MCNC: 8.5 MG/DL (ref 8.5–10.1)
CHLORIDE SERPL-SCNC: 109 MMOL/L (ref 97–108)
CO2 SERPL-SCNC: 24 MMOL/L (ref 21–32)
CREAT SERPL-MCNC: 2.7 MG/DL (ref 0.7–1.3)
DIFFERENTIAL METHOD BLD: ABNORMAL
EOSINOPHIL # BLD: 0.2 K/UL (ref 0–0.4)
EOSINOPHIL NFR BLD: 2 % (ref 0–7)
ERYTHROCYTE [DISTWIDTH] IN BLOOD BY AUTOMATED COUNT: 17.5 % (ref 11.5–14.5)
GLUCOSE BLD STRIP.AUTO-MCNC: 116 MG/DL (ref 65–100)
GLUCOSE BLD STRIP.AUTO-MCNC: 137 MG/DL (ref 65–100)
GLUCOSE BLD STRIP.AUTO-MCNC: 76 MG/DL (ref 65–100)
GLUCOSE BLD STRIP.AUTO-MCNC: 83 MG/DL (ref 65–100)
GLUCOSE SERPL-MCNC: 99 MG/DL (ref 65–100)
HBV CORE AB SERPL QL IA: POSITIVE
HCT VFR BLD AUTO: 28.1 % (ref 36.6–50.3)
HGB BLD-MCNC: 8.6 G/DL (ref 12.1–17)
IMM GRANULOCYTES # BLD AUTO: 0 K/UL (ref 0–0.04)
IMM GRANULOCYTES NFR BLD AUTO: 1 % (ref 0–0.5)
LYMPHOCYTES # BLD: 1.9 K/UL (ref 0.8–3.5)
LYMPHOCYTES NFR BLD: 24 % (ref 12–49)
MCH RBC QN AUTO: 25.7 PG (ref 26–34)
MCHC RBC AUTO-ENTMCNC: 30.6 G/DL (ref 30–36.5)
MCV RBC AUTO: 84.1 FL (ref 80–99)
MONOCYTES # BLD: 0.5 K/UL (ref 0–1)
MONOCYTES NFR BLD: 7 % (ref 5–13)
NEUTS SEG # BLD: 5.2 K/UL (ref 1.8–8)
NEUTS SEG NFR BLD: 65 % (ref 32–75)
NRBC # BLD: 0 K/UL (ref 0–0.01)
NRBC BLD-RTO: 0 PER 100 WBC
PERFORMED BY, TECHID: ABNORMAL
PERFORMED BY, TECHID: ABNORMAL
PERFORMED BY, TECHID: NORMAL
PERFORMED BY, TECHID: NORMAL
PHOSPHATE SERPL-MCNC: 4.1 MG/DL (ref 2.6–4.7)
PLATELET # BLD AUTO: 555 K/UL (ref 150–400)
PMV BLD AUTO: 9 FL (ref 8.9–12.9)
POTASSIUM SERPL-SCNC: 4 MMOL/L (ref 3.5–5.1)
RBC # BLD AUTO: 3.34 M/UL (ref 4.1–5.7)
SODIUM SERPL-SCNC: 143 MMOL/L (ref 136–145)
SPECIAL REQUESTS,SREQ: NORMAL
SPECIAL REQUESTS,SREQ: NORMAL
VANCOMYCIN SERPL-MCNC: 20.2 UG/ML
WBC # BLD AUTO: 7.8 K/UL (ref 4.1–11.1)

## 2022-12-29 PROCEDURE — 74011250637 HC RX REV CODE- 250/637: Performed by: SURGERY

## 2022-12-29 PROCEDURE — 82962 GLUCOSE BLOOD TEST: CPT

## 2022-12-29 PROCEDURE — 74011000258 HC RX REV CODE- 258: Performed by: SURGERY

## 2022-12-29 PROCEDURE — 80202 ASSAY OF VANCOMYCIN: CPT

## 2022-12-29 PROCEDURE — 97530 THERAPEUTIC ACTIVITIES: CPT

## 2022-12-29 PROCEDURE — 74011636637 HC RX REV CODE- 636/637: Performed by: SURGERY

## 2022-12-29 PROCEDURE — 90935 HEMODIALYSIS ONE EVALUATION: CPT

## 2022-12-29 PROCEDURE — 85025 COMPLETE CBC W/AUTO DIFF WBC: CPT

## 2022-12-29 PROCEDURE — 74011250637 HC RX REV CODE- 250/637: Performed by: INTERNAL MEDICINE

## 2022-12-29 PROCEDURE — 80069 RENAL FUNCTION PANEL: CPT

## 2022-12-29 PROCEDURE — 65270000029 HC RM PRIVATE

## 2022-12-29 PROCEDURE — 36415 COLL VENOUS BLD VENIPUNCTURE: CPT

## 2022-12-29 PROCEDURE — 74011250636 HC RX REV CODE- 250/636: Performed by: SURGERY

## 2022-12-29 RX ORDER — VANCOMYCIN/0.9 % SOD CHLORIDE 1.5G/250ML
1500 PLASTIC BAG, INJECTION (ML) INTRAVENOUS ONCE
Status: COMPLETED | OUTPATIENT
Start: 2022-12-29 | End: 2022-12-29

## 2022-12-29 RX ADMIN — HEPARIN SODIUM 5000 UNITS: 5000 INJECTION INTRAVENOUS; SUBCUTANEOUS at 22:37

## 2022-12-29 RX ADMIN — TICAGRELOR 90 MG: 90 TABLET ORAL at 20:34

## 2022-12-29 RX ADMIN — GABAPENTIN 100 MG: 100 CAPSULE ORAL at 22:37

## 2022-12-29 RX ADMIN — HEPARIN SODIUM 5000 UNITS: 5000 INJECTION INTRAVENOUS; SUBCUTANEOUS at 06:08

## 2022-12-29 RX ADMIN — ISOSORBIDE DINITRATE 20 MG: 20 TABLET ORAL at 18:05

## 2022-12-29 RX ADMIN — GABAPENTIN 100 MG: 100 CAPSULE ORAL at 18:05

## 2022-12-29 RX ADMIN — ISOSORBIDE DINITRATE 20 MG: 20 TABLET ORAL at 09:55

## 2022-12-29 RX ADMIN — OXYCODONE HYDROCHLORIDE 10 MG: 10 TABLET, FILM COATED, EXTENDED RELEASE ORAL at 09:55

## 2022-12-29 RX ADMIN — HEPARIN SODIUM 3800 UNITS: 1000 INJECTION, SOLUTION INTRAVENOUS; SUBCUTANEOUS at 16:19

## 2022-12-29 RX ADMIN — PIPERACILLIN AND TAZOBACTAM 3.38 G: 3; .375 INJECTION, POWDER, FOR SOLUTION INTRAVENOUS at 06:09

## 2022-12-29 RX ADMIN — HYDRALAZINE HYDROCHLORIDE 25 MG: 25 TABLET, FILM COATED ORAL at 09:55

## 2022-12-29 RX ADMIN — PIPERACILLIN AND TAZOBACTAM 3.38 G: 3; .375 INJECTION, POWDER, FOR SOLUTION INTRAVENOUS at 20:32

## 2022-12-29 RX ADMIN — AMITRIPTYLINE HYDROCHLORIDE 50 MG: 25 TABLET, FILM COATED ORAL at 22:36

## 2022-12-29 RX ADMIN — HYDRALAZINE HYDROCHLORIDE 25 MG: 25 TABLET, FILM COATED ORAL at 18:05

## 2022-12-29 RX ADMIN — AMLODIPINE BESYLATE 10 MG: 5 TABLET ORAL at 09:55

## 2022-12-29 RX ADMIN — LISINOPRIL 5 MG: 5 TABLET ORAL at 09:55

## 2022-12-29 RX ADMIN — CALCIUM CARBONATE 200 MG: 500 TABLET, CHEWABLE ORAL at 09:55

## 2022-12-29 RX ADMIN — Medication 1500 MG: at 18:26

## 2022-12-29 RX ADMIN — ISOSORBIDE DINITRATE 20 MG: 20 TABLET ORAL at 22:37

## 2022-12-29 RX ADMIN — CARVEDILOL 25 MG: 12.5 TABLET, FILM COATED ORAL at 20:34

## 2022-12-29 RX ADMIN — TICAGRELOR 90 MG: 90 TABLET ORAL at 09:55

## 2022-12-29 RX ADMIN — OXYCODONE HYDROCHLORIDE 10 MG: 10 TABLET, FILM COATED, EXTENDED RELEASE ORAL at 20:35

## 2022-12-29 RX ADMIN — CARVEDILOL 25 MG: 12.5 TABLET, FILM COATED ORAL at 09:55

## 2022-12-29 RX ADMIN — GABAPENTIN 100 MG: 100 CAPSULE ORAL at 09:55

## 2022-12-29 RX ADMIN — MELATONIN TAB 5 MG 5 MG: 5 TAB at 22:37

## 2022-12-29 RX ADMIN — INSULIN GLARGINE 10 UNITS: 100 INJECTION, SOLUTION SUBCUTANEOUS at 22:36

## 2022-12-29 RX ADMIN — OXYCODONE 5 MG: 5 TABLET ORAL at 18:05

## 2022-12-29 RX ADMIN — ATORVASTATIN CALCIUM 40 MG: 40 TABLET, FILM COATED ORAL at 09:55

## 2022-12-29 RX ADMIN — HYDRALAZINE HYDROCHLORIDE 25 MG: 25 TABLET, FILM COATED ORAL at 22:37

## 2022-12-29 NOTE — PROGRESS NOTES
Renal Daily Progress Note    Admit Date: 12/22/2022      Subjective:   Patient seen on hemodialysis today. He has been eating well. He denies chest pain or shortness of breath. Creatinine today was 2.7 mg.    Current Facility-Administered Medications   Medication Dose Route Frequency    vancomycin (VANCOCIN) 1500 mg in  ml infusion  1,500 mg IntraVENous ONCE    Vancomycin random level to be drawn on 12/29/22 at 0400 am pre HD.    Other ONCE    mupirocin (BACTROBAN) 2 % ointment   Topical DAILY    0.9% sodium chloride infusion 250 mL  250 mL IntraVENous PRN    0.9% sodium chloride infusion 250 mL  250 mL IntraVENous PRN    amitriptyline (ELAVIL) tablet 50 mg  50 mg Oral QHS    naloxone (NARCAN) injection 1 mg  1 mg IntraVENous PRN    0.9% sodium chloride infusion 250 mL  250 mL IntraVENous PRN    sodium chloride (NS) flush 5-40 mL  5-40 mL IntraVENous PRN    ondansetron (ZOFRAN ODT) tablet 4 mg  4 mg Oral Q8H PRN    Or    ondansetron (ZOFRAN) injection 4 mg  4 mg IntraVENous Q6H PRN    oxyCODONE ER (OxyCONTIN) tablet 10 mg  10 mg Oral Q12H    oxyCODONE-acetaminophen (PERCOCET 10)  mg per tablet 1 Tablet  1 Tablet Oral Q6H PRN    heparin (porcine) injection 5,000 Units  5,000 Units SubCUTAneous Q8H    heparin (porcine) 1,000 unit/mL injection 3,800 Units  3,800 Units Hemodialysis DIALYSIS PRN    epoetin luis-epbx (RETACRIT) injection 8,000 Units  8,000 Units IntraVENous DIALYSIS MON, WED & FRI    oxyCODONE IR (ROXICODONE) tablet 5 mg  5 mg Oral Q4H PRN    amLODIPine (NORVASC) tablet 10 mg  10 mg Oral DAILY    atorvastatin (LIPITOR) tablet 40 mg  40 mg Oral DAILY    calcium carbonate (TUMS) chewable tablet 200 mg [elemental]  200 mg Oral DAILY    carvediloL (COREG) tablet 25 mg  25 mg Oral BID    gabapentin (NEURONTIN) capsule 100 mg  100 mg Oral TID    hydrALAZINE (APRESOLINE) tablet 25 mg  25 mg Oral TID    insulin glargine (LANTUS) injection 10 Units  10 Units SubCUTAneous QHS    isosorbide dinitrate (ISORDIL) tablet 20 mg  20 mg Oral TID    lisinopriL (PRINIVIL, ZESTRIL) tablet 5 mg  5 mg Oral DAILY    melatonin tablet 5 mg  5 mg Oral QHS    ticagrelor (BRILINTA) tablet 90 mg  90 mg Oral BID    sodium chloride (NS) flush 5-40 mL  5-40 mL IntraVENous PRN    acetaminophen (TYLENOL) tablet 650 mg  650 mg Oral Q6H PRN    Or    acetaminophen (TYLENOL) suppository 650 mg  650 mg Rectal Q6H PRN    polyethylene glycol (MIRALAX) packet 17 g  17 g Oral DAILY PRN    piperacillin-tazobactam (ZOSYN) 3.375 g in 0.9% sodium chloride (MBP/ADV) 100 mL MBP  3.375 g IntraVENous Q12H    Vancomycin - Pharmacy to Dose   Other Rx Dosing/Monitoring        Review of Systems    Review of Systems   Respiratory:  Negative for shortness of breath. Cardiovascular:  Negative for chest pain. Gastrointestinal:  Negative for abdominal pain. Neurological:  Negative for headaches. Objective:     Patient Vitals for the past 8 hrs:   BP Temp Temp src Pulse Resp   12/29/22 1440 131/75 -- -- 87 --   12/29/22 1410 127/70 -- -- 90 --   12/29/22 1340 125/75 98.9 °F (37.2 °C) Oral 96 18       No intake/output data recorded. 12/27 1901 - 12/29 0700  In: 1250 [P.O.:1150; I.V.:100]  Out: 1225 [Urine:1225]    Physical Exam:   Physical Exam  Cardiovascular:      Rate and Rhythm: Regular rhythm. Pulmonary:      Breath sounds: Normal breath sounds. Abdominal:      General: Bowel sounds are normal.      Palpations: Abdomen is soft. Neurological:      Mental Status: He is alert.       Right IJ tunneled dialysis catheter  Left AKA and amputation of left hand  No edema on the right      LOWER EXT ART PVR MULT LEVEL SEG PRESSURES   Final Result           Data Review   Recent Labs     12/29/22  0338 12/28/22  0221 12/27/22 0317   WBC 7.8 8.7 8.9   HGB 8.6* 8.9* 8.7*   HCT 28.1* 28.6* 28.0*   * 675* 649*       Recent Labs     12/29/22  0338 12/27/22  0317    138   K 4.0 3.9   * 103   CO2 24 25   GLU 99 114*   BUN 45* 33*   CREA 2.70* 2.41*   CA 8.5 7.8*   PHOS 4.1  --    ALB 2.0*  --        No components found for: GLPOC  No results for input(s): PH, PCO2, PO2, HCO3, FIO2 in the last 72 hours. No results for input(s): INR, INREXT, INREXT, INREXT in the last 72 hours. 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 Volume status is acceptable    Anemia of chronic disease    Plan:   He is stable on hemodialysis. Decrease dialysis time to 2-1/2 hours twice a week  Okay for discharge from a renal standpoint.

## 2022-12-29 NOTE — PROGRESS NOTES
Problem: Falls - Risk of  Goal: *Absence of Falls  Description: Document Shraddha Silva Fall Risk and appropriate interventions in the flowsheet. Outcome: Progressing Towards Goal  Note: Fall Risk Interventions:            Medication Interventions: Evaluate medications/consider consulting pharmacy, Patient to call before getting OOB, Bed/chair exit alarm    Elimination Interventions: Call light in reach              Problem: Patient Education: Go to Patient Education Activity  Goal: Patient/Family Education  Outcome: Progressing Towards Goal     Problem: Pressure Injury - Risk of  Goal: *Prevention of pressure injury  Description: Document Cruzito Scale and appropriate interventions in the flowsheet. Outcome: Progressing Towards Goal  Note: Pressure Injury Interventions:  Sensory Interventions: Assess changes in LOC, Minimize linen layers    Moisture Interventions: Absorbent underpads, Minimize layers    Activity Interventions: PT/OT evaluation, Increase time out of bed    Mobility Interventions: HOB 30 degrees or less, Float heels    Nutrition Interventions: Document food/fluid/supplement intake    Friction and Shear Interventions: Apply protective barrier, creams and emollients, Minimize layers                Problem: Patient Education: Go to Patient Education Activity  Goal: Patient/Family Education  Outcome: Progressing Towards Goal     Problem: Risk for Spread of Infection  Goal: Prevent transmission of infectious organism to others  Description: Prevent the transmission of infectious organisms to other patients, staff members, and visitors.   Outcome: Progressing Towards Goal     Problem: Patient Education:  Go to Education Activity  Goal: Patient/Family Education  Outcome: Progressing Towards Goal     Problem: Patient Education: Go to Patient Education Activity  Goal: Patient/Family Education  Outcome: Progressing Towards Goal     Problem: Patient Education: Go to Patient Education Activity  Goal: Patient/Family Education  Outcome: Progressing Towards Goal

## 2022-12-29 NOTE — PROGRESS NOTES
Per [facility] they have requested the patient remain at his current dialysis location, [facility]. [address] [facility] has accepted the patient, however, they do not have a CMS required contract with that dialysis center. ROBERT reached out to [facility] and spoke with Jon. She stated she is helping facilitate this and placed the contract request in to their  yesterday. It is a \"Nursing Home Transfer Agreement\" that the nursing facility requires. Per Jon it is a quick turn around time and she anticipates hearing back tomorrow, 12/30. ROBERT has also reached out to the other accepting SNF's to see if they have a contract. If not, and the contract is not approved tomorrow, patient will need to return to [facility] where he came from and have this transfer facilitated from the community.

## 2022-12-29 NOTE — PROGRESS NOTES
Vancomycin Dosing Consult  Odalys Hines is a 74 y.o. male with sepsis, left knee gangrene, osteomyelitis. Pharmacy was consulted by Dr. Ryan Moody to dose and monitor vancomycin. Today is day 8    Antibiotic regimen: Vancomycin + Pip/Tazo    Temp (24hrs), Av.7 °F (36.5 °C), Min:97.2 °F (36.2 °C), Max:98.2 °F (36.8 °C)    Recent Labs     22  0221 22  0317 22  0618   WBC 7.8 8.7 8.9 9.4   CREA   --  2.41* 3.42*   BUN   --  33* 54*     Est CrCl: ESRD on HD (Monday , Thursday)  Concomitant nephrotoxic drugs: None    Cultures:    Blood: ngtd, prelim    Blood x 2: ngtd, prelim    MRSA Swab: Detected     Target range: Trough 21-24 mcg/mL (Intermittent hemodialysis, invasive MRSA infection or sepsis)    Recent level history:  Date/Time Dose & Interval Measured Level (mcg/mL)    0307 1250 mg IV x 1 dose 17.9    0618 750 mg IV x 1 dose 6.7    @ 0221 1000 mg X1 3.6    @0338 1000 mg X1 20.2      Assessment/Plan:   Afebrile, leukocytosis resolved. ESRD  the dialysis on Monday and Thursday ( twice per week). Level resulted  is subtherapeutic at 20.2  Will order vancomycin 1,500 mg IV x 1 dose today. Pre-HD level scheduled for 23 @  0400 am pre HD.   Antimicrobial stop date TBD

## 2022-12-29 NOTE — DISCHARGE SUMMARY
Discharge Summary     Patient: Siri Wilson MRN: [id number]  SSN: [id number]    YOB: 1955  Age: 74 y.o. Sex: male       Admit Date: 12/22/2022    Discharge Date: 12/29/2022      Admission Diagnoses: Wet gangrene [facility]) Sandi Mason    Discharge Diagnoses:   Problem List as of 12/29/2022 Date Reviewed: 12/27/2022            Codes Class Noted - Resolved    Wet gangrene ([facility] ICD-10-CM: V93  ICD-9-CM: 785.4  12/22/2022 - Present        Acute CHF (congestive heart failure) ([facility] ICD-10-CM: I50.9  ICD-9-CM: 428.0  4/6/2022 - Present        Dysphagia, unspecified type ICD-10-CM: R13.10  ICD-9-CM: 787.20  Unknown - Present        Fatigue, unspecified type ICD-10-CM: R53.83  ICD-9-CM: 780.79  Unknown - Present        Chronic systolic congestive heart failure ([facility] ICD-10-CM: I50.22  ICD-9-CM: 428.22, 428.0  2/16/2021 - Present        Goals of care, counseling/discussion ICD-10-CM: Z71.89  ICD-9-CM: V65.49  Unknown - Present        DNR (do not resuscitate) discussion ICD-10-CM: Z71.89  ICD-9-CM: V65.49  Unknown - Present        JUAN C (acute kidney injury) ([facility] ICD-10-CM: N17.9  ICD-9-CM: 394. 9  Unknown - Present        Acute hypoxemic respiratory failure due to COVID-19 [facility]) ICD-10-CM: U07.1, J96.01  ICD-9-CM: 518.81, 079.89, 799.02  1/30/2021 - Present        RESOLVED: Atypical chest pain ICD-10-CM: R07.89  ICD-9-CM: 786.59  4/6/2022 - 4/13/2022            Discharge Condition: Good    Hospital Course: 74years old patient with multiple medical issues including A. fib anemia arrhythmia CAD admitted for infected knee scheduled for surgery patient was seen by nephrology for dialysis management. He was placed on IV vancomycin Zosyn however with the amputation patient has been discontinued.   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 Qty: 30 Tablet, Refills: 0      epoetin luis-epbx (RETACRIT) 4,000 unit/mL injection 2 mL by SubCUTAneous route DIALYSIS MON, WED & FRI. Indications: anemia due to kidney failure  Qty: 12 Each, Refills: 0      heparin sodium,porcine (heparin, porcine,) 5,000 unit/mL injection 1 mL by SubCUTAneous route every eight (8) hours. Qty: 30 mL, Refills: 0      mupirocin (BACTROBAN) 2 % ointment Apply 1 g to affected area daily for 5 doses. Qty: 22 g, Refills: 0      naloxone (NARCAN) 1 mg/mL injection 1 mL by IntraVENous route as needed for Overdose or Respiratory Distress. Qty: 1 mL, Refills: 0      oxyCODONE IR (ROXICODONE) 5 mg immediate release tablet Take 1 Tablet by mouth every four (4) hours as needed for Pain for up to 3 days. Max Daily Amount: 30 mg.  Qty: 10 Tablet, Refills: 0    Associated Diagnoses: Pain      oxyCODONE ER (OxyCONTIN) 10 mg ER tablet Take 1 Tablet by mouth every twelve (12) hours for 30 days. Max Daily Amount: 20 mg.  Qty: 10 Tablet, Refills: 0    Associated Diagnoses: Pain      polyethylene glycol (MIRALAX) 17 gram packet Take 1 Packet by mouth daily. Qty: 30 Packet, Refills: 0           CONTINUE these medications which have CHANGED    Details   carvediloL (COREG) 25 mg tablet Take 1 Tablet by mouth two (2) times a day. Qty: 60 Tablet, Refills: 0           CONTINUE these medications which have NOT CHANGED    Details   ticagrelor (BRILINTA) 90 mg tablet Take 90 mg by mouth two (2) times a day. isosorbide dinitrate (ISORDIL) 20 mg tablet Take 20 mg by mouth three (3) times daily. lisinopriL (PRINIVIL, ZESTRIL) 5 mg tablet Take 5 mg by mouth daily. melatonin 5 mg cap capsule Take 5 mg by mouth nightly. atorvastatin (LIPITOR) 80 mg tablet Take 0.5 Tabs by mouth daily.   Qty: 30 Tab, Refills: 0      hydrALAZINE (APRESOLINE) 25 mg tablet Take 1 Tab by mouth three (3) times daily. Qty: 90 Tab, Refills: 0      aspirin 81 mg chewable tablet Take 81 mg by mouth daily. gabapentin (NEURONTIN) 100 mg capsule Take 1 Capsule by mouth three (3) times daily. Max Daily Amount: 300 mg. Qty: 15 Capsule, Refills: 0    Associated Diagnoses: Open wound of both lower extremities, initial encounter      calcium carbonate (TUMS) 200 mg calcium (500 mg) chew Take 1 Tablet by mouth in the morning. insulin glargine (Lantus U-100 Insulin) 100 unit/mL injection 10 Units by SubCUTAneous route nightly. Qty: 1 mL, Refills: 0      amLODIPine (NORVASC) 10 mg tablet Take 1 Tab by mouth daily.   Qty: 30 Tab, Refills: 0           STOP taking these medications       collagenase (SANTYL) 250 unit/gram ointment Comments:   Reason for Stopping:         acetaminophen (TYLENOL) 325 mg tablet Comments:   Reason for Stopping:               Activity: PT/OT Eval and Treat  Diet: Cardiac Diet and Renal Diet  Wound Care: As directed    Follow-up Appointments   Procedures    FOLLOW UP VISIT Appointment in: 3 - 5 Days     Standing Status:   Standing     Number of Occurrences:   1     Order Specific Question:   Appointment in     Answer:   3 - 5 Days     Waiting for bed  Signed By: Elmo Patterson MD     December 29, 2022

## 2022-12-29 NOTE — PROGRESS NOTES
OCCUPATIONAL THERAPY TREATMENT  Patient: Deanne Bustillo (12 y.o. male)  Date: 12/29/2022  Diagnosis: Wet gangrene ([facility] Collette Proper <principal problem not specified>  Procedure(s) (LRB):  LEFT AMPUTATION KNEE(AKA) (Left) 5 Days Post-Op  Precautions:    Chart, occupational therapy assessment, plan of care, and goals were reviewed. ASSESSMENT  Pt continues with skilled OT/PT services and is progressing towards goals. Pt received semi-supine in bed upon arrival, AXO x4 and agreeable to DURHAM/ PTA tx at this time. Overall, pt continues to present with deficits in generalized strength/AROM, coordination, bed mobility, static/dynamic sitting and standing balance and functional activity tolerance during performance of ADLs/mobility (see below for objective details and assist levels). Pt tolerated session well. Pt appears very motivated to improve functional status. Pt sat eob and completed light grooming and U/L body therex, see PTA note for LE. Pt completed UE therex to increase strength/ endurance , see grid below, to increase strength/ endurance to aid in adl performance. Pt completed washing of face/ hands and oral care. Pt required total assist in removing/replacing cap to toothpaste and container opened on breakfast tray. Education provided on HEP, pt vocalized good understanding. Pt encouraged to complete HEP 3-4 times/ day on his own. Will continue to progress. Recommend d/c to [address]  once medically appropriate. Other factors to consider for discharge: PLOF, time since onset, severity of deficits, decline from functional baseline. PLAN :  Patient continues to benefit from skilled intervention to address the above impairments. Continue treatment per established plan of care. to address goals.     Recommend with staff: Encourage HEP in prep for ADLs/mobility    Recommend next session: Seated grooming    Recommendation for discharge: (in order for the patient to meet his/her long term goals)  Inpatient Rehabilitation Facility     This discharge recommendation:  Has been made in collaboration with the attending provider and/or case management    IF patient discharges home will need the following DME: TBD       SUBJECTIVE:   Patient stated I will try.     OBJECTIVE DATA SUMMARY:   Cognitive/Behavioral Status:  Neurologic State: Alert  Orientation Level: Oriented X4  Cognition: Follows commands     Functional Mobility and Transfers for ADLs:  Bed Mobility:  Rolling: Stand-by assistance; Additional time  Supine to Sit: Stand-by assistance;Bed Modified; Additional time (use of side rails.)  Sit to Supine: Stand-by assistance;Bed Modified; Additional time  Scooting: Stand-by assistance; Additional time    Transfers:  Sit to Stand: Maximum assistance;Assist x2; Additional time; Adaptive equipment    Balance:  Sitting: Intact; Without support  Standing: Impaired; With support;Pull to stand  Standing - Static: Poor    ADL Intervention:  Feeding  Container Management: Total assistance (dependent)    Grooming  Position Performed: Seated edge of bed  Washing Face: Set-up  Washing Hands: Set-up  Brushing Teeth: Set-up (required total A w/ toothpaste lid.)      Therapeutic Exercises:   Exercise Sets Reps AROM AAROM PROM Self PROM Comments   Shoulder flex/ext 1 10 [x] [] [] []    Elbow flex/ext 1 10 [x] [] [] []    Wrist flex/ext 1 10 [x] [] [] []    Hand flex/ ext R UE 1 10 [x] [] [] []        Pain:  8/10 L hand at rest, 10/10 with mobility  9/10 L LE, 10/10 with mobility    Activity Tolerance:   Fair and requires rest breaks    After treatment patient left in no apparent distress:   Supine in bed, Call bell within reach, Bed / chair alarm activated, and Side rails x 3, bed locked and in lowest position    COMMUNICATION/COLLABORATION:   The patients plan of care was discussed with: Physical therapy assistant and Registered nurse. PT/OT sessions occurred together for increased safety of pt and clinician. KADEN Crouch  Time Calculation: 31 mins     Problem: Self Care Deficits Care Plan (Adult)  Goal: *Acute Goals and Plan of Care (Insert Text)  Description:   FUNCTIONAL STATUS PRIOR TO ADMISSION: Patient was modified independent using a peyton walker for functional mobility. Patient was modified independent for basic and instrumental ADLs. HOME SUPPORT: Patient most recently resided at [facility] since October for rehab services. Prior to [place] pt was residing with his wife at home. Occupational Therapy Goals  Initiated 12/28/2022  Patient Goal: Walk and get to the bathroom. 1.  Patient will perform lower body dressing with modified independence within 7 day(s). 2.  Patient will perform grooming with modified independence within 7 day(s). 3.  Patient will perform bathing with modified independence within 7 day(s). 4.  Patient will perform toilet transfers with modified independence within 7 day(s). 5.  Patient will perform all aspects of toileting with modified independence within 7 day(s). 6.  Patient will participate in upper extremity therapeutic exercise/activities with modified independence within 7 day(s).     Outcome: Progressing Towards Goal

## 2022-12-29 NOTE — PROGRESS NOTES
PHYSICAL THERAPY TREATMENT  Patient: Delma Brewer (62 y.o. male)  Date: 12/29/2022  Diagnosis: Wet gangrene ([address] Myriam Sandhu <principal problem not specified>  Procedure(s) (LRB):  LEFT AMPUTATION KNEE(AKA) (Left) 5 Days Post-Op  Precautions:    Chart, physical therapy assessment, plan of care and goals were reviewed. ASSESSMENT  Patient continues with skilled PT services and is progressing towards goals. Pt in bed upon PT arrival, agreeable to session however reported pain, nursing reporting pt got pain meds earlier. Pt demos SBA for all bed mobility but req increased time to complete mobility. Patient demos good EOB sitting balance and completed ADL tasks with DURHAM. Patient req max A x2 sit<>Stand with flexed posture noted but stood approx 1 minute prior to needing to sit down. Pain increased with mobility. Rec d/c to IRF for improved functional mobility. Educated LE therex and set up with breakfast. Overall pt tolerated session well today with improved mobility and pt remains motivated despite pain. Will continue to benefit from skilled PT services, and will continue to progress as tolerated. Current Level of Function Impacting Discharge (mobility/balance): slow mobility, pain, A x2 OOB    Other factors to consider for discharge: safety, weakness, new surgeries, assist at home         PLAN :  Patient continues to benefit from skilled intervention to address the above impairments. Continue treatment per established plan of care to address goals. Recommendation for discharge: (in order for the patient to meet his/her long term goals)  [facility]     This discharge recommendation:  Has been made in collaboration with the attending provider and/or case management    IF patient discharges home will need the following DME: to be determined (TBD)       SUBJECTIVE:   Patient stated It hurts right now.     OBJECTIVE DATA SUMMARY:   Critical Behavior:  Neurologic State: Alert  Orientation Level: Oriented X4  Cognition: Follows commands     Functional Mobility Training:  Bed Mobility:  Rolling: Stand-by assistance; Additional time  Supine to Sit: Stand-by assistance;Bed Modified; Additional time (use of side rails.)  Sit to Supine: Stand-by assistance;Bed Modified; Additional time  Scooting: Stand-by assistance; Additional time    Transfers:  Sit to Stand: Maximum assistance;Assist x2; Additional time; Adaptive equipment  Stand to Sit: Maximum assistance;Assist x2; Additional time    Balance:  Sitting: Intact; Without support  Standing: Impaired; With support;Pull to stand  Standing - Static: Poor    Therapeutic Exercises:   Pt reviewed LE therex including RLE marches, LAQ, AP, LLE SLR and hip abd/add along with stetching hip flexors. Pt demonstrated all exercises and educated to continue to complete throughout day. Pain Ratin/10 LUE rest  9/10 LLE rest  10/10 with mobility    Activity Tolerance:   Fair and requires rest breaks    After treatment patient left in no apparent distress:   Bed locked and returned to lowest position, Supine in bed, Call bell within reach, and breakfast set up    COMMUNICATION/COLLABORATION:   The patients plan of care was discussed with: Occupational therapy assistant and Registered nurse. Cotx with DURHAM for increased safety and assist.      Km Dai, PT   Time Calculation: 27 mins         Problem: Mobility Impaired (Adult and Pediatric)  Goal: *Acute Goals and Plan of Care (Insert Text)  Description: FUNCTIONAL STATUS PRIOR TO ADMISSION: Patient was modified independent using a peyton walker for functional mobility. Patient required setup assistance for basic and instrumental ADLs. HOME SUPPORT PRIOR TO ADMISSION: Pt at short term rehab at [place] since October. Prior to admission in October pt was living with his wife.     Physical Therapy Goals  Initiated 2022  Pt stated goal: to get better, would eventually like to use a prosthesis on EDITHE.  Pt will be I with LE HEP in 7 days. Pt will perform bed mobility with mod I in 7 days. Pt will perform transfers with mod I in 7 days. Pt will amb 5-10 feet with LRAD safely with min Ax1 in 7 days.        Outcome: Progressing Towards Goal

## 2022-12-29 NOTE — DIALYSIS
Pt tolerated treatment well. No complaints voiced. No fluid removed this treatment.  Pt discharged back to room via hospital transportation and report called to primary nurse [place] on [address].

## 2022-12-29 NOTE — PROGRESS NOTES
Problem: Falls - Risk of  Goal: *Absence of Falls  Description: Document Feliberto Brock Fall Risk and appropriate interventions in the flowsheet. Outcome: Progressing Towards Goal  Note: Fall Risk Interventions:            Medication Interventions: Evaluate medications/consider consulting pharmacy    Elimination Interventions: Call light in reach, Bed/chair exit alarm              Problem: Pressure Injury - Risk of  Goal: *Prevention of pressure injury  Description: Document Cruzito Scale and appropriate interventions in the flowsheet.   Outcome: Progressing Towards Goal  Note: Pressure Injury Interventions:  Sensory Interventions: Assess changes in LOC, Discuss PT/OT consult with provider    Moisture Interventions: Absorbent underpads, Minimize layers    Activity Interventions: PT/OT evaluation    Mobility Interventions: HOB 30 degrees or less, Float heels    Nutrition Interventions: Document food/fluid/supplement intake, Offer support with meals,snacks and hydration    Friction and Shear Interventions: Apply protective barrier, creams and emollients, Minimize layers, HOB 30 degrees or less

## 2022-12-29 NOTE — PROGRESS NOTES
Bedside, Verbal, and Written shift change report given to  [employer]   (oncoming nurse) by Millie Valladares RN   (offgoing nurse). Report included the following information SBAR and MAR.

## 2022-12-30 LAB
GLUCOSE BLD STRIP.AUTO-MCNC: 105 MG/DL (ref 65–100)
GLUCOSE BLD STRIP.AUTO-MCNC: 110 MG/DL (ref 65–100)
GLUCOSE BLD STRIP.AUTO-MCNC: 119 MG/DL (ref 65–100)
GLUCOSE BLD STRIP.AUTO-MCNC: 97 MG/DL (ref 65–100)
PERFORMED BY, TECHID: ABNORMAL
PERFORMED BY, TECHID: NORMAL

## 2022-12-30 PROCEDURE — 82962 GLUCOSE BLOOD TEST: CPT

## 2022-12-30 PROCEDURE — 97530 THERAPEUTIC ACTIVITIES: CPT

## 2022-12-30 PROCEDURE — 65270000029 HC RM PRIVATE

## 2022-12-30 PROCEDURE — 74011000258 HC RX REV CODE- 258: Performed by: SURGERY

## 2022-12-30 PROCEDURE — 74011250636 HC RX REV CODE- 250/636: Performed by: SURGERY

## 2022-12-30 PROCEDURE — 74011250637 HC RX REV CODE- 250/637: Performed by: SURGERY

## 2022-12-30 PROCEDURE — 74011250637 HC RX REV CODE- 250/637: Performed by: INTERNAL MEDICINE

## 2022-12-30 RX ADMIN — ISOSORBIDE DINITRATE 20 MG: 20 TABLET ORAL at 21:13

## 2022-12-30 RX ADMIN — HYDRALAZINE HYDROCHLORIDE 25 MG: 25 TABLET, FILM COATED ORAL at 21:13

## 2022-12-30 RX ADMIN — ISOSORBIDE DINITRATE 20 MG: 20 TABLET ORAL at 18:35

## 2022-12-30 RX ADMIN — CARVEDILOL 25 MG: 12.5 TABLET, FILM COATED ORAL at 09:50

## 2022-12-30 RX ADMIN — HEPARIN SODIUM 5000 UNITS: 5000 INJECTION INTRAVENOUS; SUBCUTANEOUS at 05:12

## 2022-12-30 RX ADMIN — MELATONIN TAB 5 MG 5 MG: 5 TAB at 21:13

## 2022-12-30 RX ADMIN — OXYCODONE HYDROCHLORIDE 10 MG: 10 TABLET, FILM COATED, EXTENDED RELEASE ORAL at 21:12

## 2022-12-30 RX ADMIN — HEPARIN SODIUM 5000 UNITS: 5000 INJECTION INTRAVENOUS; SUBCUTANEOUS at 21:11

## 2022-12-30 RX ADMIN — OXYCODONE 5 MG: 5 TABLET ORAL at 09:49

## 2022-12-30 RX ADMIN — PIPERACILLIN AND TAZOBACTAM 3.38 G: 3; .375 INJECTION, POWDER, FOR SOLUTION INTRAVENOUS at 18:32

## 2022-12-30 RX ADMIN — MUPIROCIN: 20 OINTMENT TOPICAL at 09:50

## 2022-12-30 RX ADMIN — CALCIUM CARBONATE 200 MG: 500 TABLET, CHEWABLE ORAL at 09:50

## 2022-12-30 RX ADMIN — TICAGRELOR 90 MG: 90 TABLET ORAL at 09:48

## 2022-12-30 RX ADMIN — GABAPENTIN 100 MG: 100 CAPSULE ORAL at 21:12

## 2022-12-30 RX ADMIN — HYDRALAZINE HYDROCHLORIDE 25 MG: 25 TABLET, FILM COATED ORAL at 09:48

## 2022-12-30 RX ADMIN — TICAGRELOR 90 MG: 90 TABLET ORAL at 21:13

## 2022-12-30 RX ADMIN — PIPERACILLIN AND TAZOBACTAM 3.38 G: 3; .375 INJECTION, POWDER, FOR SOLUTION INTRAVENOUS at 05:08

## 2022-12-30 RX ADMIN — OXYCODONE AND ACETAMINOPHEN 1 TABLET: 10; 325 TABLET ORAL at 18:27

## 2022-12-30 RX ADMIN — GABAPENTIN 100 MG: 100 CAPSULE ORAL at 18:27

## 2022-12-30 RX ADMIN — HEPARIN SODIUM 5000 UNITS: 5000 INJECTION INTRAVENOUS; SUBCUTANEOUS at 18:27

## 2022-12-30 RX ADMIN — GABAPENTIN 100 MG: 100 CAPSULE ORAL at 09:49

## 2022-12-30 RX ADMIN — ATORVASTATIN CALCIUM 40 MG: 40 TABLET, FILM COATED ORAL at 09:50

## 2022-12-30 RX ADMIN — AMLODIPINE BESYLATE 10 MG: 5 TABLET ORAL at 09:48

## 2022-12-30 RX ADMIN — HYDRALAZINE HYDROCHLORIDE 25 MG: 25 TABLET, FILM COATED ORAL at 18:27

## 2022-12-30 RX ADMIN — LISINOPRIL 5 MG: 5 TABLET ORAL at 09:51

## 2022-12-30 RX ADMIN — OXYCODONE HYDROCHLORIDE 10 MG: 10 TABLET, FILM COATED, EXTENDED RELEASE ORAL at 09:49

## 2022-12-30 RX ADMIN — AMITRIPTYLINE HYDROCHLORIDE 50 MG: 25 TABLET, FILM COATED ORAL at 21:13

## 2022-12-30 RX ADMIN — ISOSORBIDE DINITRATE 20 MG: 20 TABLET ORAL at 09:55

## 2022-12-30 RX ADMIN — CARVEDILOL 25 MG: 12.5 TABLET, FILM COATED ORAL at 21:13

## 2022-12-30 NOTE — DISCHARGE SUMMARY
Discharge Summary     Patient: Margaret Sampson MRN: [id number]  SSN: [id number]    YOB: 1955  Age: 74 y.o. Sex: male       Admit Date: 12/22/2022    Discharge Date: 12/30/2022      Admission Diagnoses: Wet gangrene [facility]) Abby Orlando    Discharge Diagnoses:   Problem List as of 12/30/2022 Date Reviewed: 12/27/2022            Codes Class Noted - Resolved    Wet gangrene ([facility] ICD-10-CM: Z62  ICD-9-CM: 785.4  12/22/2022 - Present        Acute CHF (congestive heart failure) ([facility] ICD-10-CM: I50.9  ICD-9-CM: 428.0  4/6/2022 - Present        Dysphagia, unspecified type ICD-10-CM: R13.10  ICD-9-CM: 787.20  Unknown - Present        Fatigue, unspecified type ICD-10-CM: R53.83  ICD-9-CM: 780.79  Unknown - Present        Chronic systolic congestive heart failure ([facility] ICD-10-CM: I50.22  ICD-9-CM: 428.22, 428.0  2/16/2021 - Present        Goals of care, counseling/discussion ICD-10-CM: Z71.89  ICD-9-CM: V65.49  Unknown - Present        DNR (do not resuscitate) discussion ICD-10-CM: Z71.89  ICD-9-CM: V65.49  Unknown - Present        JUAN C (acute kidney injury) ([facility] ICD-10-CM: N17.9  ICD-9-CM: 259. 9  Unknown - Present        Acute hypoxemic respiratory failure due to COVID-19 [facility]) ICD-10-CM: U07.1, J96.01  ICD-9-CM: 518.81, 079.89, 799.02  1/30/2021 - Present        RESOLVED: Atypical chest pain ICD-10-CM: R07.89  ICD-9-CM: 786.59  4/6/2022 - 4/13/2022            Discharge Condition: Good    Hospital Course: 74years old patient with multiple medical issues including A. fib anemia arrhythmia CAD admitted for infected knee scheduled for surgery patient was seen by nephrology for dialysis management. He was placed on IV vancomycin Zosyn however with the amputation patient has been discontinued.   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 Qty: 30 Tablet, Refills: 0      epoetin luis-epbx (RETACRIT) 4,000 unit/mL injection 2 mL by SubCUTAneous route DIALYSIS MON, WED & FRI. Indications: anemia due to kidney failure  Qty: 12 Each, Refills: 0      heparin sodium,porcine (heparin, porcine,) 5,000 unit/mL injection 1 mL by SubCUTAneous route every eight (8) hours. Qty: 30 mL, Refills: 0      mupirocin (BACTROBAN) 2 % ointment Apply 1 g to affected area daily for 5 doses. Qty: 22 g, Refills: 0      naloxone (NARCAN) 1 mg/mL injection 1 mL by IntraVENous route as needed for Overdose or Respiratory Distress. Qty: 1 mL, Refills: 0      oxyCODONE IR (ROXICODONE) 5 mg immediate release tablet Take 1 Tablet by mouth every four (4) hours as needed for Pain for up to 3 days. Max Daily Amount: 30 mg.  Qty: 10 Tablet, Refills: 0    Associated Diagnoses: Pain      oxyCODONE ER (OxyCONTIN) 10 mg ER tablet Take 1 Tablet by mouth every twelve (12) hours for 30 days. Max Daily Amount: 20 mg.  Qty: 10 Tablet, Refills: 0    Associated Diagnoses: Pain      polyethylene glycol (MIRALAX) 17 gram packet Take 1 Packet by mouth daily. Qty: 30 Packet, Refills: 0           CONTINUE these medications which have CHANGED    Details   carvediloL (COREG) 25 mg tablet Take 1 Tablet by mouth two (2) times a day. Qty: 60 Tablet, Refills: 0           CONTINUE these medications which have NOT CHANGED    Details   ticagrelor (BRILINTA) 90 mg tablet Take 90 mg by mouth two (2) times a day. isosorbide dinitrate (ISORDIL) 20 mg tablet Take 20 mg by mouth three (3) times daily. lisinopriL (PRINIVIL, ZESTRIL) 5 mg tablet Take 5 mg by mouth daily. melatonin 5 mg cap capsule Take 5 mg by mouth nightly. atorvastatin (LIPITOR) 80 mg tablet Take 0.5 Tabs by mouth daily.   Qty: 30 Tab, Refills: 0      hydrALAZINE (APRESOLINE) 25 mg tablet Take 1 Tab by mouth three (3) times daily. Qty: 90 Tab, Refills: 0      aspirin 81 mg chewable tablet Take 81 mg by mouth daily. gabapentin (NEURONTIN) 100 mg capsule Take 1 Capsule by mouth three (3) times daily. Max Daily Amount: 300 mg. Qty: 15 Capsule, Refills: 0    Associated Diagnoses: Open wound of both lower extremities, initial encounter      calcium carbonate (TUMS) 200 mg calcium (500 mg) chew Take 1 Tablet by mouth in the morning. insulin glargine (Lantus U-100 Insulin) 100 unit/mL injection 10 Units by SubCUTAneous route nightly. Qty: 1 mL, Refills: 0      amLODIPine (NORVASC) 10 mg tablet Take 1 Tab by mouth daily.   Qty: 30 Tab, Refills: 0           STOP taking these medications       collagenase (SANTYL) 250 unit/gram ointment Comments:   Reason for Stopping:         acetaminophen (TYLENOL) 325 mg tablet Comments:   Reason for Stopping:               Activity: PT/OT Eval and Treat  Diet: Cardiac Diet and Renal Diet  Wound Care: As directed    Follow-up Appointments   Procedures    FOLLOW UP VISIT Appointment in: 3 - 5 Days     Standing Status:   Standing     Number of Occurrences:   1     Order Specific Question:   Appointment in     Answer:   3 - 5 Days     Waiting for bed  Signed By: Clover Manriquez MD     December 30, 2022

## 2022-12-30 NOTE — PROGRESS NOTES
Problem: Falls - Risk of  Goal: *Absence of Falls  Description: Document Candelario Ortiz Fall Risk and appropriate interventions in the flowsheet. Outcome: Progressing Towards Goal  Note: Fall Risk Interventions:            Medication Interventions: Evaluate medications/consider consulting pharmacy, Patient to call before getting OOB, Bed/chair exit alarm    Elimination Interventions: Call light in reach              Problem: Pressure Injury - Risk of  Goal: *Prevention of pressure injury  Description: Document Cruzito Scale and appropriate interventions in the flowsheet.   Outcome: Progressing Towards Goal  Note: Pressure Injury Interventions:  Sensory Interventions: Assess changes in LOC    Moisture Interventions: Absorbent underpads    Activity Interventions: PT/OT evaluation    Mobility Interventions: HOB 30 degrees or less    Nutrition Interventions: Document food/fluid/supplement intake    Friction and Shear Interventions: Apply protective barrier, creams and emollients, Minimize layers

## 2022-12-30 NOTE — PROGRESS NOTES
OCCUPATIONAL THERAPY TREATMENT  Patient: Dina Tariq (36 y.o. male)  Date: 12/30/2022  Diagnosis: Wet gangrene ([address] Amy Murray <principal problem not specified>  Procedure(s) (LRB):  LEFT AMPUTATION KNEE(AKA) (Left) 6 Days Post-Op  Precautions:    Chart, occupational therapy assessment, plan of care, and goals were reviewed. ASSESSMENT  Pt continues with skilled OT services and is progressing towards goals. Upon DURHAM arrival, pt semi supine in bed and agreeable to tx session at this time. Overall, pt continues to present with deficits in generalized strength/AROM, coordination, bed mobility, static/dynamic sitting and standing balance and functional activity tolerance during performance of ADLs/mobility (see below for objective details and assist levels). Pt noted with continued improvement in overall mobility this date. Pt completed bed mobility with SBA and additional time for completion. Pt stating he would like to complete mobility with less assist due to increased pain when assisted. Pt completed transfers with less assist this date, able to complete with Min/ModA x2 but able to remain standing for less time this date. Pt declined further mobility at this time due to pain. Pt required total A for bowel hygiene in standing due to decreased standing balance and coordination. Pt required total A with LB dressing at this time while at EOB. Pt required setup A overall for self feeding once returned to supine. Reviewed UE HEP and pt verbalized understanding, pt able to demonstrate few reps. Pt left semi supine in bed with call bell within reach and needs met. Recommend d/c to [address]  once medically appropriate. Other factors to consider for discharge: family/social support, DME, time since onset, severity of deficits, decline from functional baseline         PLAN :  Patient continues to benefit from skilled intervention to address the above impairments.   Continue treatment per established plan of care to address goals. Recommendation for discharge: (in order for the patient to meet his/her long term goals)  [address]     This discharge recommendation:  Has been made in collaboration with the attending provider and/or case management    IF patient discharges home will need the following DME: TBD       SUBJECTIVE:   Patient stated My leg is throbbing.     OBJECTIVE DATA SUMMARY:   Cognitive/Behavioral Status:  Neurologic State: Alert  Orientation Level: Oriented X4  Cognition: Follows commands    Functional Mobility and Transfers for ADLs:  Bed Mobility:  Rolling: Stand-by assistance; Additional time  Supine to Sit: Stand-by assistance; Additional time  Sit to Supine: Stand-by assistance; Additional time  Scooting: Stand-by assistance; Additional time    Transfers:  Sit to Stand: Moderate assistance;Minimum assistance;Assist x2    Balance:  Sitting: Intact; Without support  Standing: Impaired; With support;Pull to stand  Standing - Static: Constant support;Poor    ADL Intervention:  Feeding  Feeding Assistance: Set-up  Container Management: Total assistance (dependent)  Food to Mouth: Stand-by assistance    Lower Body Dressing Assistance  Socks: Total assistance (dependent)    Toileting  Bowel Hygiene: Total assistance (dependent)    Therapeutic Exercises:   Exercise Sets Reps AROM AAROM PROM Self PROM Comments   Scapular protraction/retraction 1 3 [x] [] [] []      Pain:  10/10 LLE pain  7-8/10 L hand pain    Activity Tolerance:   Fair and requires rest breaks  Please refer to the flowsheet for vital signs taken during this treatment.     After treatment patient left in no apparent distress:   Bed locked and in lowest position  Supine in bed, Call bell within reach, Bed / chair alarm activated, and Side rails x 3    COMMUNICATION/COLLABORATION:   The patients plan of care was discussed with: Physical therapy assistant, Registered nurse, and Certified nursing assistant/patient care technician. [place] with PT for increased safety for pt and clinician. HERMINIO Alvarez  Time Calculation: 24 mins    Problem: Self Care Deficits Care Plan (Adult)  Goal: *Acute Goals and Plan of Care (Insert Text)  Description:   FUNCTIONAL STATUS PRIOR TO ADMISSION: Patient was modified independent using a peyton walker for functional mobility. Patient was modified independent for basic and instrumental ADLs. HOME SUPPORT: Patient most recently resided at [facility] since October for rehab services. Prior to [place] pt was residing with his wife at home. Occupational Therapy Goals  Initiated 12/28/2022  Patient Goal: Walk and get to the bathroom. 1.  Patient will perform lower body dressing with modified independence within 7 day(s). 2.  Patient will perform grooming with modified independence within 7 day(s). 3.  Patient will perform bathing with modified independence within 7 day(s). 4.  Patient will perform toilet transfers with modified independence within 7 day(s). 5.  Patient will perform all aspects of toileting with modified independence within 7 day(s). 6.  Patient will participate in upper extremity therapeutic exercise/activities with modified independence within 7 day(s).     Outcome: Progressing Towards Goal

## 2022-12-30 NOTE — PROGRESS NOTES
Renal Daily Progress Note    Admit Date: 12/22/2022      Subjective:   Patient seen  today. He has been eating well. He denies chest pain or shortness of breath.   Creatinine yesterday was 2.7 mg.  He dialyzed yesterday for 2-1/2 hours with no fluid removal.    Current Facility-Administered Medications   Medication Dose Route Frequency    mupirocin (BACTROBAN) 2 % ointment   Topical DAILY    0.9% sodium chloride infusion 250 mL  250 mL IntraVENous PRN    0.9% sodium chloride infusion 250 mL  250 mL IntraVENous PRN    amitriptyline (ELAVIL) tablet 50 mg  50 mg Oral QHS    naloxone (NARCAN) injection 1 mg  1 mg IntraVENous PRN    0.9% sodium chloride infusion 250 mL  250 mL IntraVENous PRN    sodium chloride (NS) flush 5-40 mL  5-40 mL IntraVENous PRN    ondansetron (ZOFRAN ODT) tablet 4 mg  4 mg Oral Q8H PRN    Or    ondansetron (ZOFRAN) injection 4 mg  4 mg IntraVENous Q6H PRN    oxyCODONE ER (OxyCONTIN) tablet 10 mg  10 mg Oral Q12H    oxyCODONE-acetaminophen (PERCOCET 10)  mg per tablet 1 Tablet  1 Tablet Oral Q6H PRN    heparin (porcine) injection 5,000 Units  5,000 Units SubCUTAneous Q8H    heparin (porcine) 1,000 unit/mL injection 3,800 Units  3,800 Units Hemodialysis DIALYSIS PRN    epoetin luis-epbx (RETACRIT) injection 8,000 Units  8,000 Units IntraVENous DIALYSIS MON, WED & FRI    oxyCODONE IR (ROXICODONE) tablet 5 mg  5 mg Oral Q4H PRN    amLODIPine (NORVASC) tablet 10 mg  10 mg Oral DAILY    atorvastatin (LIPITOR) tablet 40 mg  40 mg Oral DAILY    calcium carbonate (TUMS) chewable tablet 200 mg [elemental]  200 mg Oral DAILY    carvediloL (COREG) tablet 25 mg  25 mg Oral BID    gabapentin (NEURONTIN) capsule 100 mg  100 mg Oral TID    hydrALAZINE (APRESOLINE) tablet 25 mg  25 mg Oral TID    insulin glargine (LANTUS) injection 10 Units  10 Units SubCUTAneous QHS    isosorbide dinitrate (ISORDIL) tablet 20 mg  20 mg Oral TID    lisinopriL (PRINIVIL, ZESTRIL) tablet 5 mg  5 mg Oral DAILY    melatonin tablet 5 mg  5 mg Oral QHS    ticagrelor (BRILINTA) tablet 90 mg  90 mg Oral BID    sodium chloride (NS) flush 5-40 mL  5-40 mL IntraVENous PRN    acetaminophen (TYLENOL) tablet 650 mg  650 mg Oral Q6H PRN    Or    acetaminophen (TYLENOL) suppository 650 mg  650 mg Rectal Q6H PRN    polyethylene glycol (MIRALAX) packet 17 g  17 g Oral DAILY PRN    piperacillin-tazobactam (ZOSYN) 3.375 g in 0.9% sodium chloride (MBP/ADV) 100 mL MBP  3.375 g IntraVENous Q12H    Vancomycin - Pharmacy to Dose   Other Rx Dosing/Monitoring        Review of Systems    Review of Systems   Respiratory:  Negative for shortness of breath. Cardiovascular:  Negative for chest pain. Gastrointestinal:  Negative for abdominal pain. Neurological:  Negative for headaches. Objective:     Patient Vitals for the past 8 hrs:   BP Temp Pulse Resp SpO2   12/30/22 1529 139/74 98.8 °F (37.1 °C) 90 18 100 %       No intake/output data recorded. 12/28 1901 - 12/30 0700  In: 1150 [P.O.:1050; I.V.:100]  Out: 1300 [Urine:1300]    Physical Exam:   Physical Exam  Cardiovascular:      Rate and Rhythm: Regular rhythm. Pulmonary:      Breath sounds: Normal breath sounds. Abdominal:      General: Bowel sounds are normal.      Palpations: Abdomen is soft. Neurological:      Mental Status: He is alert. 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 No results for input(s): INR, INREXT, INREXT, INREXT in the last 72 hours.       Assessment:           Active Problems:    Wet gangrene ([facility] (12/22/2022)  Renal failure on twice a week dialysis. Monday and Thursdays    Volume status is acceptable    Anemia of chronic disease    Plan:   Next dialysis on Monday, 1/1/2023  Decreased dialysis time to 2-1/2 hours twice a week  Okay for discharge from a renal standpoint.

## 2022-12-30 NOTE — PROGRESS NOTES
ROBERT reached out to [facility] and received no call back regarding contract. [facility] informed CM they obtained auth but declined the patient now due to no contract with the dialysis center. Unable to get a response from any other facilities at this time. ROBERT has reached out to [facility] to see if they can accept the patient back since we have not been able to find him another facility. No response at this time.

## 2022-12-30 NOTE — PROGRESS NOTES
Bedside shift change report given to  Phyllis RN   (oncoming nurse) by Randall Burgess RN   (offgoing nurse). Report included the following information SBAR and MAR.

## 2022-12-30 NOTE — PROGRESS NOTES
PHYSICAL THERAPY TREATMENT  Patient: Darlene Tavares (70 y.o. male)  Date: 12/30/2022  Diagnosis: Wet gangrene ([address] Dallie Malady <principal problem not specified>  Procedure(s) (LRB):  LEFT AMPUTATION KNEE(AKA) (Left) 6 Days Post-Op  Precautions:    Chart, physical therapy assessment, plan of care and goals were reviewed. ASSESSMENT  Patient continues with skilled PT services and is progressing towards goals. Pt seated in the bed upon PT arrival, agreeable to session but c/o increased pain. Pt cont to be SBA for bed mobility but req additional time to complete. Pt prefers to perform mobility IND as it is less painful. Pt improved with transfers to standing however due to pain did not stand as long as previously noted, approx 30-45 sec. Pt declined further transfers due to pain and pt demonstrated IND LE therex and was educated to complete throughout the day in additional to hip flexor stretching. (See below for objective details and assist levels). Overall pt tolerated session well today with improved mobility. Will continue to benefit from skilled PT services, and will continue to progress as tolerated. Rec d/c to IRF. Current Level of Function Impacting Discharge (mobility/balance): Ax2 transfers    Other factors to consider for discharge: safety, weakness, PLOF          PLAN :  Patient continues to benefit from skilled intervention to address the above impairments. Continue treatment per established plan of care to address goals.     Recommendation for discharge: (in order for the patient to meet his/her long term goals)  [address] [facility]     This discharge recommendation:  Has been made in collaboration with the attending provider and/or case management    IF patient discharges home will need the following DME: to be determined (TBD)       SUBJECTIVE:   Patient stated My leg is throbbing\"    OBJECTIVE DATA SUMMARY:   Critical Behavior:  Neurologic State: Alert  Orientation Level: Oriented X4  Cognition: Follows commands     Functional Mobility Training:  Bed Mobility:  Rolling: Stand-by assistance; Additional time  Supine to Sit: Stand-by assistance; Additional time  Sit to Supine: Stand-by assistance; Additional time  Scooting: Stand-by assistance; Additional time    Transfers:  Sit to Stand: Moderate assistance;Minimum assistance;Assist x2  Stand to Sit: Minimum assistance; Moderate assistance;Assist x2    Balance:  Sitting: Intact; Without support  Standing: Impaired; With support;Pull to stand  Standing - Static: Constant support;Poor      Therapeutic Exercises:   Pt completed 5 reps of SLR, hip abd/add BLE and 5x heel slides on RLE and was educated throughout the day to perform 10-12 reps    Pain Rating:  10/10 LLE  7-8/10 L hand    Activity Tolerance:   Fair and requires rest breaks    After treatment patient left in no apparent distress:   Bed locked and returned to lowest position, Supine in bed, Call bell within reach, Bed / chair alarm activated, Side rails x 3, and breakfast set up      COMMUNICATION/COLLABORATION:   The patients plan of care was discussed with: Occupational therapy assistant and Registered nurse. Cotx with DURHAM for increased safety and assist.      Tom Torres, PT   Time Calculation: 25 mins         Problem: Mobility Impaired (Adult and Pediatric)  Goal: *Acute Goals and Plan of Care (Insert Text)  Description: FUNCTIONAL STATUS PRIOR TO ADMISSION: Patient was modified independent using a peyton walker for functional mobility. Patient required setup assistance for basic and instrumental ADLs. HOME SUPPORT PRIOR TO ADMISSION: Pt at short term rehab at [place] since October. Prior to admission in October pt was living with his wife. Physical Therapy Goals  Initiated 12/28/2022  Pt stated goal: to get better, would eventually like to use a prosthesis on LLE. Pt will be I with LE HEP in 7 days. Pt will perform bed mobility with mod I in 7 days.   Pt will perform transfers with mod I in 7 days. Pt will amb 5-10 feet with LRAD safely with min Ax1 in 7 days.        Outcome: Progressing Towards Goal

## 2022-12-31 LAB
GLUCOSE BLD STRIP.AUTO-MCNC: 131 MG/DL (ref 65–100)
GLUCOSE BLD STRIP.AUTO-MCNC: 137 MG/DL (ref 65–100)
PERFORMED BY, TECHID: ABNORMAL
PERFORMED BY, TECHID: ABNORMAL

## 2022-12-31 PROCEDURE — 82962 GLUCOSE BLOOD TEST: CPT

## 2022-12-31 PROCEDURE — 74011250637 HC RX REV CODE- 250/637: Performed by: SURGERY

## 2022-12-31 PROCEDURE — 74011250637 HC RX REV CODE- 250/637: Performed by: INTERNAL MEDICINE

## 2022-12-31 PROCEDURE — 74011250636 HC RX REV CODE- 250/636: Performed by: SURGERY

## 2022-12-31 PROCEDURE — 65270000029 HC RM PRIVATE

## 2022-12-31 PROCEDURE — 74011000258 HC RX REV CODE- 258: Performed by: SURGERY

## 2022-12-31 PROCEDURE — 74011636637 HC RX REV CODE- 636/637: Performed by: SURGERY

## 2022-12-31 RX ADMIN — TICAGRELOR 90 MG: 90 TABLET ORAL at 21:27

## 2022-12-31 RX ADMIN — PIPERACILLIN AND TAZOBACTAM 3.38 G: 3; .375 INJECTION, POWDER, FOR SOLUTION INTRAVENOUS at 05:42

## 2022-12-31 RX ADMIN — TICAGRELOR 90 MG: 90 TABLET ORAL at 09:16

## 2022-12-31 RX ADMIN — HYDRALAZINE HYDROCHLORIDE 25 MG: 25 TABLET, FILM COATED ORAL at 15:07

## 2022-12-31 RX ADMIN — AMLODIPINE BESYLATE 10 MG: 5 TABLET ORAL at 09:17

## 2022-12-31 RX ADMIN — MELATONIN TAB 5 MG 5 MG: 5 TAB at 21:27

## 2022-12-31 RX ADMIN — HYDRALAZINE HYDROCHLORIDE 25 MG: 25 TABLET, FILM COATED ORAL at 09:17

## 2022-12-31 RX ADMIN — OXYCODONE HYDROCHLORIDE 10 MG: 10 TABLET, FILM COATED, EXTENDED RELEASE ORAL at 21:27

## 2022-12-31 RX ADMIN — LISINOPRIL 5 MG: 5 TABLET ORAL at 09:17

## 2022-12-31 RX ADMIN — GABAPENTIN 100 MG: 100 CAPSULE ORAL at 09:16

## 2022-12-31 RX ADMIN — HEPARIN SODIUM 5000 UNITS: 5000 INJECTION INTRAVENOUS; SUBCUTANEOUS at 15:07

## 2022-12-31 RX ADMIN — CARVEDILOL 25 MG: 12.5 TABLET, FILM COATED ORAL at 09:16

## 2022-12-31 RX ADMIN — INSULIN GLARGINE 10 UNITS: 100 INJECTION, SOLUTION SUBCUTANEOUS at 21:35

## 2022-12-31 RX ADMIN — ATORVASTATIN CALCIUM 40 MG: 40 TABLET, FILM COATED ORAL at 09:16

## 2022-12-31 RX ADMIN — PIPERACILLIN AND TAZOBACTAM 3.38 G: 3; .375 INJECTION, POWDER, FOR SOLUTION INTRAVENOUS at 21:30

## 2022-12-31 RX ADMIN — OXYCODONE HYDROCHLORIDE 10 MG: 10 TABLET, FILM COATED, EXTENDED RELEASE ORAL at 09:16

## 2022-12-31 RX ADMIN — GABAPENTIN 100 MG: 100 CAPSULE ORAL at 21:26

## 2022-12-31 RX ADMIN — ISOSORBIDE DINITRATE 20 MG: 20 TABLET ORAL at 09:16

## 2022-12-31 RX ADMIN — ISOSORBIDE DINITRATE 20 MG: 20 TABLET ORAL at 15:07

## 2022-12-31 RX ADMIN — OXYCODONE AND ACETAMINOPHEN 1 TABLET: 10; 325 TABLET ORAL at 15:07

## 2022-12-31 RX ADMIN — OXYCODONE AND ACETAMINOPHEN 1 TABLET: 10; 325 TABLET ORAL at 05:49

## 2022-12-31 RX ADMIN — AMITRIPTYLINE HYDROCHLORIDE 50 MG: 25 TABLET, FILM COATED ORAL at 21:27

## 2022-12-31 RX ADMIN — CALCIUM CARBONATE 200 MG: 500 TABLET, CHEWABLE ORAL at 09:16

## 2022-12-31 RX ADMIN — GABAPENTIN 100 MG: 100 CAPSULE ORAL at 15:07

## 2022-12-31 RX ADMIN — HEPARIN SODIUM 5000 UNITS: 5000 INJECTION INTRAVENOUS; SUBCUTANEOUS at 21:27

## 2022-12-31 RX ADMIN — HEPARIN SODIUM 5000 UNITS: 5000 INJECTION INTRAVENOUS; SUBCUTANEOUS at 05:42

## 2022-12-31 RX ADMIN — MUPIROCIN: 20 OINTMENT TOPICAL at 09:17

## 2022-12-31 NOTE — PROGRESS NOTES
Discharge Summary     Patient: Minnie Cobian MRN: [id number]  SSN: [id number]    YOB: 1955  Age: 74 y.o. Sex: male       Admit Date: 12/22/2022    Discharge Date: 12/31/2022      Admission Diagnoses: Wet gangrene [facility]) [place]    Discharge Diagnoses:   Problem List as of 12/31/2022 Date Reviewed: 12/27/2022            Codes Class Noted - Resolved    Wet gangrene ([facility] ICD-10-CM: L49  ICD-9-CM: 785.4  12/22/2022 - Present        Acute CHF (congestive heart failure) ([facility] ICD-10-CM: I50.9  ICD-9-CM: 428.0  4/6/2022 - Present        Dysphagia, unspecified type ICD-10-CM: R13.10  ICD-9-CM: 787.20  Unknown - Present        Fatigue, unspecified type ICD-10-CM: R53.83  ICD-9-CM: 780.79  Unknown - Present        Chronic systolic congestive heart failure ([facility] ICD-10-CM: I50.22  ICD-9-CM: 428.22, 428.0  2/16/2021 - Present        Goals of care, counseling/discussion ICD-10-CM: Z71.89  ICD-9-CM: V65.49  Unknown - Present        DNR (do not resuscitate) discussion ICD-10-CM: Z71.89  ICD-9-CM: V65.49  Unknown - Present        JUAN C (acute kidney injury) ([facility] ICD-10-CM: N17.9  ICD-9-CM: 345. 9  Unknown - Present        Acute hypoxemic respiratory failure due to COVID-19 [facility]) ICD-10-CM: U07.1, J96.01  ICD-9-CM: 518.81, 079.89, 799.02  1/30/2021 - Present        RESOLVED: Atypical chest pain ICD-10-CM: R07.89  ICD-9-CM: 786.59  4/6/2022 - 4/13/2022            Discharge Condition: Good    Hospital Course: 74years old patient with multiple medical issues including A. fib anemia arrhythmia CAD admitted for infected knee scheduled for surgery patient was seen by nephrology for dialysis management. He was placed on IV vancomycin Zosyn however with the amputation patient has been discontinued.   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 Qty: 30 Tablet, Refills: 0      epoetin luis-epbx (RETACRIT) 4,000 unit/mL injection 2 mL by SubCUTAneous route DIALYSIS MON, WED & FRI. Indications: anemia due to kidney failure  Qty: 12 Each, Refills: 0      heparin sodium,porcine (heparin, porcine,) 5,000 unit/mL injection 1 mL by SubCUTAneous route every eight (8) hours. Qty: 30 mL, Refills: 0      mupirocin (BACTROBAN) 2 % ointment Apply 1 g to affected area daily for 5 doses. Qty: 22 g, Refills: 0      naloxone (NARCAN) 1 mg/mL injection 1 mL by IntraVENous route as needed for Overdose or Respiratory Distress. Qty: 1 mL, Refills: 0      oxyCODONE IR (ROXICODONE) 5 mg immediate release tablet Take 1 Tablet by mouth every four (4) hours as needed for Pain for up to 3 days. Max Daily Amount: 30 mg.  Qty: 10 Tablet, Refills: 0    Associated Diagnoses: Pain      oxyCODONE ER (OxyCONTIN) 10 mg ER tablet Take 1 Tablet by mouth every twelve (12) hours for 30 days. Max Daily Amount: 20 mg.  Qty: 10 Tablet, Refills: 0    Associated Diagnoses: Pain      polyethylene glycol (MIRALAX) 17 gram packet Take 1 Packet by mouth daily. Qty: 30 Packet, Refills: 0           CONTINUE these medications which have CHANGED    Details   carvediloL (COREG) 25 mg tablet Take 1 Tablet by mouth two (2) times a day. Qty: 60 Tablet, Refills: 0           CONTINUE these medications which have NOT CHANGED    Details   ticagrelor (BRILINTA) 90 mg tablet Take 90 mg by mouth two (2) times a day. isosorbide dinitrate (ISORDIL) 20 mg tablet Take 20 mg by mouth three (3) times daily. lisinopriL (PRINIVIL, ZESTRIL) 5 mg tablet Take 5 mg by mouth daily. melatonin 5 mg cap capsule Take 5 mg by mouth nightly. atorvastatin (LIPITOR) 80 mg tablet Take 0.5 Tabs by mouth daily.   Qty: 30 Tab, Refills: 0      hydrALAZINE (APRESOLINE) 25 mg tablet Take 1 Tab by mouth three (3) times daily. Qty: 90 Tab, Refills: 0      aspirin 81 mg chewable tablet Take 81 mg by mouth daily. gabapentin (NEURONTIN) 100 mg capsule Take 1 Capsule by mouth three (3) times daily. Max Daily Amount: 300 mg. Qty: 15 Capsule, Refills: 0    Associated Diagnoses: Open wound of both lower extremities, initial encounter      calcium carbonate (TUMS) 200 mg calcium (500 mg) chew Take 1 Tablet by mouth in the morning. insulin glargine (Lantus U-100 Insulin) 100 unit/mL injection 10 Units by SubCUTAneous route nightly. Qty: 1 mL, Refills: 0      amLODIPine (NORVASC) 10 mg tablet Take 1 Tab by mouth daily.   Qty: 30 Tab, Refills: 0           STOP taking these medications       collagenase (SANTYL) 250 unit/gram ointment Comments:   Reason for Stopping:         acetaminophen (TYLENOL) 325 mg tablet Comments:   Reason for Stopping:               Activity: PT/OT Eval and Treat  Diet: Cardiac Diet and Renal Diet  Wound Care: As directed    Follow-up Appointments   Procedures    FOLLOW UP VISIT Appointment in: 3 - 5 Days     Standing Status:   Standing     Number of Occurrences:   1     Order Specific Question:   Appointment in     Answer:   3 - 5 Days     Waiting for bed  Signed By: Marly Junior MD     December 31, 2022

## 2022-12-31 NOTE — PROGRESS NOTES
Renal Daily Progress Note    Admit Date: 12/22/2022      Subjective:   Patient is seen and examined in the room   On HD 2 times/week. .  .  For HD in a.m   To receive HD -out pt at [facility] facility in [place].     Current Facility-Administered Medications   Medication Dose Route Frequency    mupirocin (BACTROBAN) 2 % ointment   Topical DAILY    0.9% sodium chloride infusion 250 mL  250 mL IntraVENous PRN    0.9% sodium chloride infusion 250 mL  250 mL IntraVENous PRN    amitriptyline (ELAVIL) tablet 50 mg  50 mg Oral QHS    naloxone (NARCAN) injection 1 mg  1 mg IntraVENous PRN    0.9% sodium chloride infusion 250 mL  250 mL IntraVENous PRN    sodium chloride (NS) flush 5-40 mL  5-40 mL IntraVENous PRN    ondansetron (ZOFRAN ODT) tablet 4 mg  4 mg Oral Q8H PRN    Or    ondansetron (ZOFRAN) injection 4 mg  4 mg IntraVENous Q6H PRN    oxyCODONE ER (OxyCONTIN) tablet 10 mg  10 mg Oral Q12H    oxyCODONE-acetaminophen (PERCOCET 10)  mg per tablet 1 Tablet  1 Tablet Oral Q6H PRN    heparin (porcine) injection 5,000 Units  5,000 Units SubCUTAneous Q8H    heparin (porcine) 1,000 unit/mL injection 3,800 Units  3,800 Units Hemodialysis DIALYSIS PRN    epoetin luis-epbx (RETACRIT) injection 8,000 Units  8,000 Units IntraVENous DIALYSIS MON, WED & FRI    oxyCODONE IR (ROXICODONE) tablet 5 mg  5 mg Oral Q4H PRN    amLODIPine (NORVASC) tablet 10 mg  10 mg Oral DAILY    atorvastatin (LIPITOR) tablet 40 mg  40 mg Oral DAILY    calcium carbonate (TUMS) chewable tablet 200 mg [elemental]  200 mg Oral DAILY    carvediloL (COREG) tablet 25 mg  25 mg Oral BID    gabapentin (NEURONTIN) capsule 100 mg  100 mg Oral TID    hydrALAZINE (APRESOLINE) tablet 25 mg  25 mg Oral TID    insulin glargine (LANTUS) injection 10 Units  10 Units SubCUTAneous QHS    isosorbide dinitrate (ISORDIL) tablet 20 mg  20 mg Oral TID    lisinopriL (PRINIVIL, ZESTRIL) tablet 5 mg  5 mg Oral DAILY    melatonin tablet 5 mg  5 mg Oral QHS    ticagrelor (BRILINTA) tablet 90 mg  90 mg Oral BID    sodium chloride (NS) flush 5-40 mL  5-40 mL IntraVENous PRN    acetaminophen (TYLENOL) tablet 650 mg  650 mg Oral Q6H PRN    Or    acetaminophen (TYLENOL) suppository 650 mg  650 mg Rectal Q6H PRN    polyethylene glycol (MIRALAX) packet 17 g  17 g Oral DAILY PRN    piperacillin-tazobactam (ZOSYN) 3.375 g in 0.9% sodium chloride (MBP/ADV) 100 mL MBP  3.375 g IntraVENous Q12H    Vancomycin - Pharmacy to Dose   Other Rx Dosing/Monitoring        Review of Systems    Review of Systems   Respiratory:  Negative for shortness of breath. Cardiovascular:  Negative for chest pain. Gastrointestinal:  Negative for abdominal pain. Neurological:  Negative for headaches. Objective:     Patient Vitals for the past 8 hrs:   BP Temp Pulse Resp SpO2   01/01/23 1512 (!) 140/82 98 °F (36.7 °C) 82 18 98 %     No intake/output data recorded. 12/29 1901 - 12/31 0700  In: 100 [I.V.:100]  Out: 400 [Urine:400]    Physical Exam:   Physical Exam  Cardiovascular:      Rate and Rhythm: Regular rhythm. Pulmonary:      Breath sounds: Normal breath sounds. Abdominal:      General: Bowel sounds are normal.      Palpations: Abdomen is soft. Neurological:      Mental Status: He is alert. 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 Recent Labs     12/29/22  0338      K 4.0   *   CO2 24   GLU 99   BUN 45*   CREA 2.70*   CA 8.5   PHOS 4.1   ALB 2.0*       No components found for: GLPOC  No results for input(s): PH, PCO2, PO2, HCO3, FIO2 in the last 72 hours. No results for input(s): INR, INREXT, INREXT, INREXT in the last 72 hours. Assessment:       Active Problems:    Wet gangrene ([facility] (12/22/2022)  Renal failure on twice a week dialysis.   Monday and Thursdays    Volume status is acceptable    Anemia of chronic disease  HTN - B.P is o.k    Plan:   Next dialysis on Monday, 1/1/2023  HD for 2.30 hrs in a.m

## 2022-12-31 NOTE — PROGRESS NOTES
Problem: Pressure Injury - Risk of  Goal: *Prevention of pressure injury  Description: Document Cruzito Scale and appropriate interventions in the flowsheet.   Note: Pressure Injury Interventions:  Sensory Interventions: Assess changes in LOC    Moisture Interventions: Absorbent underpads    Activity Interventions: PT/OT evaluation    Mobility Interventions: HOB 30 degrees or less    Nutrition Interventions: Document food/fluid/supplement intake    Friction and Shear Interventions: Apply protective barrier, creams and emollients

## 2022-12-31 NOTE — PROGRESS NOTES
Problem: Falls - Risk of  Goal: *Absence of Falls  Description: Document Vicente Patton Fall Risk and appropriate interventions in the flowsheet.   Outcome: Progressing Towards Goal  Note: Fall Risk Interventions:            Medication Interventions: Evaluate medications/consider consulting pharmacy    Elimination Interventions: Call light in reach              Problem: Patient Education: Go to Patient Education Activity  Goal: Patient/Family Education  Outcome: Progressing Towards Goal

## 2022-12-31 NOTE — PROGRESS NOTES
7994: Discharge order noted. CM has attempted to contact [facility] ED with [address] [phone] speaking with Irma Almodovar. Irma Almodovar will try to reach [facility] to have her call CM regarding having a contract with [facility]. CM has reopened referral to [facility] asking if they can now accept patient with said contract in place. Awaiting responses. 0920: CM received call back from [facility] with [address] She explained they have a contract pending signature, via Shahbaz Burger at [facility]. Contract was sent to [facility] yesterday afternoon. Per [employer], [address] will return Tuesday, January 2.

## 2023-01-01 LAB
GLUCOSE BLD STRIP.AUTO-MCNC: 189 MG/DL (ref 65–100)
GLUCOSE BLD STRIP.AUTO-MCNC: 79 MG/DL (ref 65–100)
GLUCOSE BLD STRIP.AUTO-MCNC: 90 MG/DL (ref 65–100)
PERFORMED BY, TECHID: ABNORMAL
PERFORMED BY, TECHID: NORMAL
PERFORMED BY, TECHID: NORMAL

## 2023-01-01 PROCEDURE — 74011250636 HC RX REV CODE- 250/636: Performed by: SURGERY

## 2023-01-01 PROCEDURE — 65270000029 HC RM PRIVATE

## 2023-01-01 PROCEDURE — 97530 THERAPEUTIC ACTIVITIES: CPT

## 2023-01-01 PROCEDURE — 74011000258 HC RX REV CODE- 258: Performed by: SURGERY

## 2023-01-01 PROCEDURE — 74011636637 HC RX REV CODE- 636/637: Performed by: SURGERY

## 2023-01-01 PROCEDURE — 82962 GLUCOSE BLOOD TEST: CPT

## 2023-01-01 PROCEDURE — 74011250637 HC RX REV CODE- 250/637: Performed by: INTERNAL MEDICINE

## 2023-01-01 PROCEDURE — 74011250637 HC RX REV CODE- 250/637: Performed by: SURGERY

## 2023-01-01 RX ADMIN — MELATONIN TAB 5 MG 5 MG: 5 TAB at 22:01

## 2023-01-01 RX ADMIN — HYDRALAZINE HYDROCHLORIDE 25 MG: 25 TABLET, FILM COATED ORAL at 10:12

## 2023-01-01 RX ADMIN — HYDRALAZINE HYDROCHLORIDE 25 MG: 25 TABLET, FILM COATED ORAL at 22:00

## 2023-01-01 RX ADMIN — ISOSORBIDE DINITRATE 20 MG: 20 TABLET ORAL at 10:12

## 2023-01-01 RX ADMIN — CARVEDILOL 25 MG: 12.5 TABLET, FILM COATED ORAL at 22:00

## 2023-01-01 RX ADMIN — ISOSORBIDE DINITRATE 20 MG: 20 TABLET ORAL at 17:12

## 2023-01-01 RX ADMIN — AMLODIPINE BESYLATE 10 MG: 5 TABLET ORAL at 10:12

## 2023-01-01 RX ADMIN — HEPARIN SODIUM 5000 UNITS: 5000 INJECTION INTRAVENOUS; SUBCUTANEOUS at 14:16

## 2023-01-01 RX ADMIN — HEPARIN SODIUM 5000 UNITS: 5000 INJECTION INTRAVENOUS; SUBCUTANEOUS at 22:00

## 2023-01-01 RX ADMIN — TICAGRELOR 90 MG: 90 TABLET ORAL at 10:12

## 2023-01-01 RX ADMIN — OXYCODONE AND ACETAMINOPHEN 1 TABLET: 10; 325 TABLET ORAL at 14:46

## 2023-01-01 RX ADMIN — GABAPENTIN 100 MG: 100 CAPSULE ORAL at 10:12

## 2023-01-01 RX ADMIN — OXYCODONE HYDROCHLORIDE 10 MG: 10 TABLET, FILM COATED, EXTENDED RELEASE ORAL at 22:01

## 2023-01-01 RX ADMIN — GABAPENTIN 100 MG: 100 CAPSULE ORAL at 17:12

## 2023-01-01 RX ADMIN — CARVEDILOL 25 MG: 12.5 TABLET, FILM COATED ORAL at 10:12

## 2023-01-01 RX ADMIN — CALCIUM CARBONATE 200 MG: 500 TABLET, CHEWABLE ORAL at 10:13

## 2023-01-01 RX ADMIN — HYDRALAZINE HYDROCHLORIDE 25 MG: 25 TABLET, FILM COATED ORAL at 17:12

## 2023-01-01 RX ADMIN — HEPARIN SODIUM 5000 UNITS: 5000 INJECTION INTRAVENOUS; SUBCUTANEOUS at 05:34

## 2023-01-01 RX ADMIN — TICAGRELOR 90 MG: 90 TABLET ORAL at 22:01

## 2023-01-01 RX ADMIN — INSULIN GLARGINE 10 UNITS: 100 INJECTION, SOLUTION SUBCUTANEOUS at 22:01

## 2023-01-01 RX ADMIN — AMITRIPTYLINE HYDROCHLORIDE 50 MG: 25 TABLET, FILM COATED ORAL at 22:00

## 2023-01-01 RX ADMIN — OXYCODONE HYDROCHLORIDE 10 MG: 10 TABLET, FILM COATED, EXTENDED RELEASE ORAL at 10:13

## 2023-01-01 RX ADMIN — LISINOPRIL 5 MG: 5 TABLET ORAL at 10:12

## 2023-01-01 RX ADMIN — GABAPENTIN 100 MG: 100 CAPSULE ORAL at 22:00

## 2023-01-01 RX ADMIN — MUPIROCIN: 20 OINTMENT TOPICAL at 10:13

## 2023-01-01 RX ADMIN — ISOSORBIDE DINITRATE 20 MG: 20 TABLET ORAL at 22:01

## 2023-01-01 RX ADMIN — PIPERACILLIN AND TAZOBACTAM 3.38 G: 3; .375 INJECTION, POWDER, FOR SOLUTION INTRAVENOUS at 19:13

## 2023-01-01 RX ADMIN — ATORVASTATIN CALCIUM 40 MG: 40 TABLET, FILM COATED ORAL at 10:12

## 2023-01-01 RX ADMIN — OXYCODONE AND ACETAMINOPHEN 1 TABLET: 10; 325 TABLET ORAL at 05:37

## 2023-01-01 RX ADMIN — PIPERACILLIN AND TAZOBACTAM 3.38 G: 3; .375 INJECTION, POWDER, FOR SOLUTION INTRAVENOUS at 08:12

## 2023-01-01 NOTE — PROGRESS NOTES
Discharge Summary     Patient: Tevin Sol MRN: [id number]  SSN: [id number]    YOB: 1955  Age: 74 y.o. Sex: male       Admit Date: 12/22/2022    Discharge Date: 1/1/2023      Admission Diagnoses: Wet gangrene [facility]) [place]    Discharge Diagnoses:   Problem List as of 1/1/2023 Date Reviewed: 12/27/2022            Codes Class Noted - Resolved    Wet gangrene ([facility] ICD-10-CM: W71  ICD-9-CM: 785.4  12/22/2022 - Present        Acute CHF (congestive heart failure) ([facility] ICD-10-CM: I50.9  ICD-9-CM: 428.0  4/6/2022 - Present        Dysphagia, unspecified type ICD-10-CM: R13.10  ICD-9-CM: 787.20  Unknown - Present        Fatigue, unspecified type ICD-10-CM: R53.83  ICD-9-CM: 780.79  Unknown - Present        Chronic systolic congestive heart failure ([facility] ICD-10-CM: I50.22  ICD-9-CM: 428.22, 428.0  2/16/2021 - Present        Goals of care, counseling/discussion ICD-10-CM: Z71.89  ICD-9-CM: V65.49  Unknown - Present        DNR (do not resuscitate) discussion ICD-10-CM: Z71.89  ICD-9-CM: V65.49  Unknown - Present        JUAN C (acute kidney injury) ([facility] ICD-10-CM: N17.9  ICD-9-CM: 669. 9  Unknown - Present        Acute hypoxemic respiratory failure due to COVID-19 [facility]) ICD-10-CM: U07.1, J96.01  ICD-9-CM: 518.81, 079.89, 799.02  1/30/2021 - Present        RESOLVED: Atypical chest pain ICD-10-CM: R07.89  ICD-9-CM: 786.59  4/6/2022 - 4/13/2022            Discharge Condition: Good    Hospital Course: 74years old patient with multiple medical issues including A. fib anemia arrhythmia CAD admitted for infected knee scheduled for surgery patient was seen by nephrology for dialysis management. He was placed on IV vancomycin Zosyn however with the amputation patient has been discontinued.   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 Qty: 30 Tablet, Refills: 0      epoetin luis-epbx (RETACRIT) 4,000 unit/mL injection 2 mL by SubCUTAneous route DIALYSIS MON, WED & FRI. Indications: anemia due to kidney failure  Qty: 12 Each, Refills: 0      heparin sodium,porcine (heparin, porcine,) 5,000 unit/mL injection 1 mL by SubCUTAneous route every eight (8) hours. Qty: 30 mL, Refills: 0      mupirocin (BACTROBAN) 2 % ointment Apply 1 g to affected area daily for 5 doses. Qty: 22 g, Refills: 0      naloxone (NARCAN) 1 mg/mL injection 1 mL by IntraVENous route as needed for Overdose or Respiratory Distress. Qty: 1 mL, Refills: 0      oxyCODONE IR (ROXICODONE) 5 mg immediate release tablet Take 1 Tablet by mouth every four (4) hours as needed for Pain for up to 3 days. Max Daily Amount: 30 mg.  Qty: 10 Tablet, Refills: 0    Associated Diagnoses: Pain      oxyCODONE ER (OxyCONTIN) 10 mg ER tablet Take 1 Tablet by mouth every twelve (12) hours for 30 days. Max Daily Amount: 20 mg.  Qty: 10 Tablet, Refills: 0    Associated Diagnoses: Pain      polyethylene glycol (MIRALAX) 17 gram packet Take 1 Packet by mouth daily. Qty: 30 Packet, Refills: 0           CONTINUE these medications which have CHANGED    Details   carvediloL (COREG) 25 mg tablet Take 1 Tablet by mouth two (2) times a day. Qty: 60 Tablet, Refills: 0           CONTINUE these medications which have NOT CHANGED    Details   ticagrelor (BRILINTA) 90 mg tablet Take 90 mg by mouth two (2) times a day. isosorbide dinitrate (ISORDIL) 20 mg tablet Take 20 mg by mouth three (3) times daily. lisinopriL (PRINIVIL, ZESTRIL) 5 mg tablet Take 5 mg by mouth daily. melatonin 5 mg cap capsule Take 5 mg by mouth nightly. atorvastatin (LIPITOR) 80 mg tablet Take 0.5 Tabs by mouth daily.   Qty: 30 Tab, Refills: 0      hydrALAZINE (APRESOLINE) 25 mg tablet Take 1 Tab by mouth three (3) times daily. Qty: 90 Tab, Refills: 0      aspirin 81 mg chewable tablet Take 81 mg by mouth daily. gabapentin (NEURONTIN) 100 mg capsule Take 1 Capsule by mouth three (3) times daily. Max Daily Amount: 300 mg. Qty: 15 Capsule, Refills: 0    Associated Diagnoses: Open wound of both lower extremities, initial encounter      calcium carbonate (TUMS) 200 mg calcium (500 mg) chew Take 1 Tablet by mouth in the morning. insulin glargine (Lantus U-100 Insulin) 100 unit/mL injection 10 Units by SubCUTAneous route nightly. Qty: 1 mL, Refills: 0      amLODIPine (NORVASC) 10 mg tablet Take 1 Tab by mouth daily.   Qty: 30 Tab, Refills: 0           STOP taking these medications       collagenase (SANTYL) 250 unit/gram ointment Comments:   Reason for Stopping:         acetaminophen (TYLENOL) 325 mg tablet Comments:   Reason for Stopping:               Activity: PT/OT Eval and Treat  Diet: Cardiac Diet and Renal Diet  Wound Care: As directed    Follow-up Appointments   Procedures    FOLLOW UP VISIT Appointment in: 3 - 5 Days     Standing Status:   Standing     Number of Occurrences:   1     Order Specific Question:   Appointment in     Answer:   3 - 5 Days     Waiting for bed  Signed By: Cathy Guzman MD     January 1, 2023

## 2023-01-01 NOTE — PROGRESS NOTES
OCCUPATIONAL THERAPY TREATMENT  Patient: Thang Mckeon (89 y.o. male)  Date: 1/1/2023  Diagnosis: Wet gangrene ([address] Shanta Galvan <principal problem not specified>  Procedure(s) (LRB):  LEFT AMPUTATION KNEE(AKA) (Left) 8 Days Post-Op  Precautions: FALL  Chart, occupational therapy assessment, plan of care, and goals were reviewed. ASSESSMENT  Pt continues with skilled OT services and is progressing towards goals. Pt received semi-supine in bed upon arrival, AXO x4 and agreeable to DURHAM/PTA tx at this time. Pt cooperative and demonstrated good effort during activities. Pt reporting having a bowel movement, SBA for rolling R/L and TA for toileting hygiene and new maxwell pad. Pt presents difficulty donning R sock and needed assistance to don over toes d/t decreased LUE function. Pt improved with bed mobility> EOB with additional time d/t decreased L UE function. Pt able to perform simple self care/UB dressing with assistance with good sitting balance. Pt continues to need max A x2 for STS and vc's for platform walker mgmt and proper technique. Unsafe to ambulate d/t increased assistance and LE/UE weakness. Pt engaged in jenn UE exercises with vc's to maximize ROM, see grid below. Pain continues to limited pt's performance in functional tf's. Overall, pt continues to present with deficits in generalized strength/AROM,  static/dynamic standing balance, pain and functional activity tolerance during performance of ADLs/mobility (see below for objective details and assist levels). Will continue to progress. Recommend d/c to IRF once medically appropriate. Other factors to consider for discharge: Time of onset, medical prognosis/diagnosis, severity of deficits, PLOF, functional baseline, home environment, and family support          PLAN :  Patient continues to benefit from skilled intervention to address the above impairments. Continue treatment per established plan of care. to address goals.     Recommend with staff: Encourage HEP in prep for ADLs/mobility and Frequent repositioning to prevent skin breakdown    Recommend next session: Seated grooming    Recommendation for discharge: (in order for the patient to meet his/her long term goals)  [address]     This discharge recommendation:  Has been made in collaboration with the attending provider and/or case management       IF patient discharges home will need the following DME: TBD       SUBJECTIVE:   Patient stated The pain in my leg, it's painful.     OBJECTIVE DATA SUMMARY:   Cognitive/Behavioral Status:  Neurologic State: Alert  Orientation Level: Oriented X4  Cognition: Follows commands             Functional Mobility and Transfers for ADLs:  Bed Mobility:  Rolling: Stand-by assistance  Supine to Sit: Stand-by assistance; Additional time  Sit to Supine: Stand-by assistance  Scooting: Stand-by assistance    Transfers:  Sit to Stand: Maximum assistance;Assist x2; Additional time          Balance:  Sitting: Intact  Standing: Impaired; With support  Standing - Static: Constant support;Poor    ADL Intervention:       Grooming  Grooming Assistance: Set-up  Position Performed: Seated edge of bed  Washing Face: Set-up       Upper Body Dressing Assistance  Dressing Assistance: Set-up; Total assistance(dependent) (for L UE only)  Hospital Gown: Set-up; Total assistance (dependent)    Lower Body Dressing Assistance  Dressing Assistance: Minimum assistance  Socks: Minimum assistance (R LE only d/t L LE amputee)  Position Performed: Long sitting on bed  Cues: Verbal cues provided    Toileting  Toileting Assistance: Total assistance(dependent)  Bowel Hygiene:  Total assistance (dependent)           Exercise Sets Reps AROM AAROM PROM Self PROM Comments   Chest press 1 10 [x] [] [] [] EOB   Ceiling punches 1 10 [x] [] [] []         Pain:  8-9/10 at rest and 10/10 with movement of L AKA and UE    Activity Tolerance:   Fair    After treatment patient left in no apparent distress:   Supine in bed, Call bell within reach, Bed / chair alarm activated, and Side rails x 3, bed locked and in lowest position    COMMUNICATION/COLLABORATION:   The patients plan of care was discussed with: Physical therapy assistant and Registered nurse. HERMINIO Torres  Time Calculation: 29 mins     Problem: Self Care Deficits Care Plan (Adult)  Goal: *Acute Goals and Plan of Care (Insert Text)  Description:   FUNCTIONAL STATUS PRIOR TO ADMISSION: Patient was modified independent using a peyton walker for functional mobility. Patient was modified independent for basic and instrumental ADLs. HOME SUPPORT: Patient most [facility] at [facility] since October for rehab services. Prior to [facility] pt was residing with his wife at home. Occupational Therapy Goals  Initiated 12/28/2022  Patient Goal: Walk and get to the bathroom. 1.  Patient will perform lower body dressing with modified independence within 7 day(s). 2.  Patient will perform grooming with modified independence within 7 day(s). 3.  Patient will perform bathing with modified independence within 7 day(s). 4.  Patient will perform toilet transfers with modified independence within 7 day(s). 5.  Patient will perform all aspects of toileting with modified independence within 7 day(s). 6.  Patient will participate in upper extremity therapeutic exercise/activities with modified independence within 7 day(s).     Outcome: Progressing Towards Goal

## 2023-01-01 NOTE — PROGRESS NOTES
Problem: Mobility Impaired (Adult and Pediatric)  Goal: *Acute Goals and Plan of Care (Insert Text)  Description: FUNCTIONAL STATUS PRIOR TO ADMISSION: Patient was modified independent using a peyton walker for functional mobility. Patient required setup assistance for basic and instrumental ADLs. HOME SUPPORT PRIOR TO ADMISSION: Pt at short term rehab at [facility] since October. Prior to admission in October pt was living with his wife. Physical Therapy Goals  Initiated 12/28/2022  Pt stated goal: to get better, would eventually like to use a prosthesis on LLE. Pt will be I with LE HEP in 7 days. Pt will perform bed mobility with mod I in 7 days. Pt will perform transfers with mod I in 7 days. Pt will amb 5-10 feet with LRAD safely with min Ax1 in 7 days. Outcome: Progressing Towards Goal   PHYSICAL THERAPY TREATMENT  Patient: Thang Mckeon (26 y.o. male)  Date: 1/1/2023  Diagnosis: Wet gangrene ([address] Shanta Galvan <principal problem not specified>  Procedure(s) (LRB):  LEFT AMPUTATION KNEE(AKA) (Left) 8 Days Post-Op  Precautions:    Chart, physical therapy assessment, plan of care and goals were reviewed. ASSESSMENT  Patient continues with skilled PT services and is progressing towards goals. Pt received supine  upon PT/OT arrival, agreeable to session. (See below for objective details and assist levels). Overall pt tolerated session fair today with standing. Improvement noted with bed mobility. Pt. Tolerated sitting EOB approx 8  min for exercises and ADLs. Pt. Limited with standing due to 10/10 pain L residual limb. Pt. Was given pain meds prior to session. Pt. Stood 2 times with platform walker and  Max A X 2. Pt. Only stood approx 15 seconds each time. Pt. Unable to hop due to pain and pt. Wanted to go back to bed. Will continue to benefit from skilled PT services, and will continue to progress as tolerated. Current Level of Function Impacting Discharge (mobility/balance):  Max A X 2 for mobility (Tfs)    Other factors to consider for discharge: decreased mobility/A X2 needed/PLOF/Decreased function L UE         PLAN :  Patient continues to benefit from skilled intervention to address the above impairments. Continue treatment per established plan of care to address goals. Recommend with staff: Encourage HEP in prep for ADLs/mobility, Frequent repositioning to prevent skin breakdown, Use of bed/chair alarm for safety, and LE elevation for management of edema    Recommendation for discharge: (in order for the patient to meet his/her long term goals)  1 [facility]     This discharge recommendation:  Has been made in collaboration with the attending provider and/or case management    IF patient discharges home will need the following DME: to be determined (TBD)       SUBJECTIVE:   Patient stated I'm just in so much pain in my leg. \" \"I feel like it's still there. \" My arm hurts too and feels like it's there. \"    OBJECTIVE DATA SUMMARY:   Critical Behavior:  Neurologic State: Alert  Orientation Level: Oriented X4  Cognition: Follows commands     Functional Mobility Training:  Bed Mobility:  Rolling: Stand-by assistance  Supine to Sit: Stand-by assistance; Additional time  Sit to Supine: Stand-by assistance  Scooting: Stand-by assistance        Transfers:  Sit to Stand: Maximum assistance;Assist x2; Additional time  Stand to Sit: Maximum assistance;Assist x2; Additional time                       Balance:  Sitting: Intact  Standing: Impaired; With support  Standing - Static: Constant support;Poor  Ambulation/Gait Training:                             Therapeutic Exercises:       EXERCISE   Sets   Reps   Active Active Assist   Passive Self ROM   Comments   Ankle Pumps  20 [x] [] [] [] R LE   Quad Sets/Glut Sets   [] [] [] []    Hamstring Sets   [] [] [] []    Short Arc Quads   [] [] [] []    Heel Slides   [] [] [] []    Straight Leg Raises  10 [x] [] [] [] R LE/L LE   Hip abd/add  10 [x] [] [] [] R LE/L LE   Long Arc Quads  15 [x] [] [] [] R LE   Marching  15 [x] [] [] [] R LE      [] [] [] []     Instructed pt. With performing LE exercises in bed 2 times a day/Pt. Understood and agreed. Discussed proper positioning of residual limb  with pt. Pain Rating:10/10 with mobility      Activity Tolerance:   Fair and requires frequent rest breaks    After treatment patient left in no apparent distress:   Bed locked and returned to lowest position, Supine in bed, Call bell within reach, Bed / chair alarm activated, Side rails x 3, and Notified RN of session. COMMUNICATION/COLLABORATION:[place] with OT for increased safety and mobility for pt and clinician. The patients plan of care was discussed with: Occupational therapy assistant and Registered nurse.      Elin Eduardo, PT   Time Calculation: 29 mins

## 2023-01-01 NOTE — PROGRESS NOTES
CM noted discharge order. Patient is pending acceptance at [facility]. Master Thomas has sent contract to [facility] so that patient can have dialysis closer to SNF. Per Master they are awaiting signature from [facility], but they will not be back in the building until Tuesday, Jan 2. CM will continue to follow.

## 2023-01-02 LAB
GLUCOSE BLD STRIP.AUTO-MCNC: 134 MG/DL (ref 65–100)
GLUCOSE BLD STRIP.AUTO-MCNC: 136 MG/DL (ref 65–100)
GLUCOSE BLD STRIP.AUTO-MCNC: 76 MG/DL (ref 65–100)
GLUCOSE BLD STRIP.AUTO-MCNC: 92 MG/DL (ref 65–100)
PERFORMED BY, TECHID: ABNORMAL
PERFORMED BY, TECHID: ABNORMAL
PERFORMED BY, TECHID: NORMAL
PERFORMED BY, TECHID: NORMAL
VANCOMYCIN SERPL-MCNC: 14.9 UG/ML

## 2023-01-02 PROCEDURE — 90935 HEMODIALYSIS ONE EVALUATION: CPT

## 2023-01-02 PROCEDURE — 74011636637 HC RX REV CODE- 636/637: Performed by: SURGERY

## 2023-01-02 PROCEDURE — 74011250636 HC RX REV CODE- 250/636: Performed by: SURGERY

## 2023-01-02 PROCEDURE — 74011250637 HC RX REV CODE- 250/637: Performed by: SURGERY

## 2023-01-02 PROCEDURE — 74011250636 HC RX REV CODE- 250/636: Performed by: INTERNAL MEDICINE

## 2023-01-02 PROCEDURE — 82962 GLUCOSE BLOOD TEST: CPT

## 2023-01-02 PROCEDURE — 65270000029 HC RM PRIVATE

## 2023-01-02 PROCEDURE — 80202 ASSAY OF VANCOMYCIN: CPT

## 2023-01-02 PROCEDURE — 74011250637 HC RX REV CODE- 250/637: Performed by: INTERNAL MEDICINE

## 2023-01-02 PROCEDURE — 36415 COLL VENOUS BLD VENIPUNCTURE: CPT

## 2023-01-02 RX ORDER — VANCOMYCIN/0.9 % SOD CHLORIDE 1.5G/250ML
1500 PLASTIC BAG, INJECTION (ML) INTRAVENOUS ONCE
Status: COMPLETED | OUTPATIENT
Start: 2023-01-02 | End: 2023-01-02

## 2023-01-02 RX ORDER — HEPARIN SODIUM 1000 [USP'U]/ML
INJECTION, SOLUTION INTRAVENOUS; SUBCUTANEOUS
Status: DISPENSED
Start: 2023-01-02 | End: 2023-01-02

## 2023-01-02 RX ADMIN — OXYCODONE HYDROCHLORIDE 10 MG: 10 TABLET, FILM COATED, EXTENDED RELEASE ORAL at 11:52

## 2023-01-02 RX ADMIN — MUPIROCIN: 20 OINTMENT TOPICAL at 11:53

## 2023-01-02 RX ADMIN — OXYCODONE HYDROCHLORIDE 10 MG: 10 TABLET, FILM COATED, EXTENDED RELEASE ORAL at 21:09

## 2023-01-02 RX ADMIN — HEPARIN SODIUM 5000 UNITS: 5000 INJECTION INTRAVENOUS; SUBCUTANEOUS at 21:10

## 2023-01-02 RX ADMIN — OXYCODONE 5 MG: 5 TABLET ORAL at 16:05

## 2023-01-02 RX ADMIN — GABAPENTIN 100 MG: 100 CAPSULE ORAL at 21:09

## 2023-01-02 RX ADMIN — HYDRALAZINE HYDROCHLORIDE 25 MG: 25 TABLET, FILM COATED ORAL at 16:04

## 2023-01-02 RX ADMIN — AMLODIPINE BESYLATE 10 MG: 5 TABLET ORAL at 11:52

## 2023-01-02 RX ADMIN — GABAPENTIN 100 MG: 100 CAPSULE ORAL at 16:05

## 2023-01-02 RX ADMIN — HYDRALAZINE HYDROCHLORIDE 25 MG: 25 TABLET, FILM COATED ORAL at 21:09

## 2023-01-02 RX ADMIN — OXYCODONE AND ACETAMINOPHEN 1 TABLET: 10; 325 TABLET ORAL at 13:52

## 2023-01-02 RX ADMIN — ISOSORBIDE DINITRATE 20 MG: 20 TABLET ORAL at 16:04

## 2023-01-02 RX ADMIN — AMITRIPTYLINE HYDROCHLORIDE 50 MG: 25 TABLET, FILM COATED ORAL at 21:09

## 2023-01-02 RX ADMIN — HYDRALAZINE HYDROCHLORIDE 25 MG: 25 TABLET, FILM COATED ORAL at 11:52

## 2023-01-02 RX ADMIN — LISINOPRIL 5 MG: 5 TABLET ORAL at 11:52

## 2023-01-02 RX ADMIN — EPOETIN ALFA-EPBX 8000 UNITS: 4000 INJECTION, SOLUTION INTRAVENOUS; SUBCUTANEOUS at 10:33

## 2023-01-02 RX ADMIN — ISOSORBIDE DINITRATE 20 MG: 20 TABLET ORAL at 11:52

## 2023-01-02 RX ADMIN — ATORVASTATIN CALCIUM 40 MG: 40 TABLET, FILM COATED ORAL at 11:52

## 2023-01-02 RX ADMIN — CARVEDILOL 25 MG: 12.5 TABLET, FILM COATED ORAL at 11:52

## 2023-01-02 RX ADMIN — TICAGRELOR 90 MG: 90 TABLET ORAL at 21:09

## 2023-01-02 RX ADMIN — CALCIUM CARBONATE 200 MG: 500 TABLET, CHEWABLE ORAL at 11:52

## 2023-01-02 RX ADMIN — MELATONIN TAB 5 MG 5 MG: 5 TAB at 21:09

## 2023-01-02 RX ADMIN — INSULIN GLARGINE 10 UNITS: 100 INJECTION, SOLUTION SUBCUTANEOUS at 22:06

## 2023-01-02 RX ADMIN — GABAPENTIN 100 MG: 100 CAPSULE ORAL at 11:52

## 2023-01-02 RX ADMIN — ISOSORBIDE DINITRATE 20 MG: 20 TABLET ORAL at 21:10

## 2023-01-02 RX ADMIN — HEPARIN SODIUM 5000 UNITS: 5000 INJECTION INTRAVENOUS; SUBCUTANEOUS at 06:38

## 2023-01-02 RX ADMIN — CARVEDILOL 25 MG: 12.5 TABLET, FILM COATED ORAL at 21:09

## 2023-01-02 RX ADMIN — Medication 1500 MG: at 14:43

## 2023-01-02 RX ADMIN — HEPARIN SODIUM 5000 UNITS: 5000 INJECTION INTRAVENOUS; SUBCUTANEOUS at 13:48

## 2023-01-02 RX ADMIN — OXYCODONE AND ACETAMINOPHEN 1 TABLET: 10; 325 TABLET ORAL at 06:41

## 2023-01-02 RX ADMIN — TICAGRELOR 90 MG: 90 TABLET ORAL at 11:52

## 2023-01-02 NOTE — PROGRESS NOTES
Tx initiated via a patent right upper chest catheter. Dressing clean dry and intact. No s/s of infection or skin breakdown. Net fluid removal goal is 1kgs and 2.5 hours.  Epo 8000 units given

## 2023-01-02 NOTE — ROUTINE PROCESS
Bedside shift change report given to [employer] (oncoming nurse) by Julia Giordano RN (offgoing nurse). Report included the following information SBAR, Intake/Output, MAR, and Recent Results.

## 2023-01-02 NOTE — PROGRESS NOTES
Problem: Falls - Risk of  Goal: *Absence of Falls  Description: Document Jacobo Kerline Fall Risk and appropriate interventions in the flowsheet. Outcome: Progressing Towards Goal  Note: Fall Risk Interventions:            Medication Interventions: Bed/chair exit alarm    Elimination Interventions: Call light in reach, Patient to call for help with toileting needs              Problem: Pressure Injury - Risk of  Goal: *Prevention of pressure injury  Description: Document Cruzito Scale and appropriate interventions in the flowsheet.   Outcome: Progressing Towards Goal  Note: Pressure Injury Interventions:  Sensory Interventions: Keep linens dry and wrinkle-free    Moisture Interventions: Absorbent underpads    Activity Interventions: PT/OT evaluation    Mobility Interventions: HOB 30 degrees or less    Nutrition Interventions: Document food/fluid/supplement intake    Friction and Shear Interventions: Minimize layers

## 2023-01-02 NOTE — PROGRESS NOTES
OT attempted to see pt at 9:45 am, however pt off floor receiving dialysis. Will try again at a later time. Thanks!

## 2023-01-02 NOTE — DISCHARGE SUMMARY
Discharge Summary     Patient: Justin Dobson MRN: [id number]  SSN: [id number]    YOB: 1955  Age: 74 y.o. Sex: male       Admit Date: 12/22/2022    Discharge Date: 1/2/2023      Admission Diagnoses: Wet gangrene [facility]) Hilda Calabrese    Discharge Diagnoses:   Problem List as of 1/2/2023 Date Reviewed: 12/27/2022            Codes Class Noted - Resolved    Wet gangrene ([facility] ICD-10-CM: J42  ICD-9-CM: 785.4  12/22/2022 - Present        Acute CHF (congestive heart failure) ([facility] ICD-10-CM: I50.9  ICD-9-CM: 428.0  4/6/2022 - Present        Dysphagia, unspecified type ICD-10-CM: R13.10  ICD-9-CM: 787.20  Unknown - Present        Fatigue, unspecified type ICD-10-CM: R53.83  ICD-9-CM: 780.79  Unknown - Present        Chronic systolic congestive heart failure ([facility] ICD-10-CM: I50.22  ICD-9-CM: 428.22, 428.0  2/16/2021 - Present        Goals of care, counseling/discussion ICD-10-CM: Z71.89  ICD-9-CM: V65.49  Unknown - Present        DNR (do not resuscitate) discussion ICD-10-CM: Z71.89  ICD-9-CM: V65.49  Unknown - Present        JUAN C (acute kidney injury) ([facility] ICD-10-CM: N17.9  ICD-9-CM: 888. 9  Unknown - Present        Acute hypoxemic respiratory failure due to COVID-19 [facility]) ICD-10-CM: U07.1, J96.01  ICD-9-CM: 518.81, 079.89, 799.02  1/30/2021 - Present        RESOLVED: Atypical chest pain ICD-10-CM: R07.89  ICD-9-CM: 786.59  4/6/2022 - 4/13/2022            Discharge Condition: Good    Hospital Course: 74years old patient with multiple medical issues including A. fib anemia arrhythmia CAD admitted for infected knee scheduled for surgery patient was seen by nephrology for dialysis management. He was placed on IV vancomycin Zosyn however with the amputation patient has been discontinued.   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 Qty: 30 Tablet, Refills: 0      epoetin luis-epbx (RETACRIT) 4,000 unit/mL injection 2 mL by SubCUTAneous route DIALYSIS MON, WED & FRI. Indications: anemia due to kidney failure  Qty: 12 Each, Refills: 0      heparin sodium,porcine (heparin, porcine,) 5,000 unit/mL injection 1 mL by SubCUTAneous route every eight (8) hours. Qty: 30 mL, Refills: 0      mupirocin (BACTROBAN) 2 % ointment Apply 1 g to affected area daily for 5 doses. Qty: 22 g, Refills: 0      naloxone (NARCAN) 1 mg/mL injection 1 mL by IntraVENous route as needed for Overdose or Respiratory Distress. Qty: 1 mL, Refills: 0      oxyCODONE IR (ROXICODONE) 5 mg immediate release tablet Take 1 Tablet by mouth every four (4) hours as needed for Pain for up to 3 days. Max Daily Amount: 30 mg.  Qty: 10 Tablet, Refills: 0    Associated Diagnoses: Pain      oxyCODONE ER (OxyCONTIN) 10 mg ER tablet Take 1 Tablet by mouth every twelve (12) hours for 30 days. Max Daily Amount: 20 mg.  Qty: 10 Tablet, Refills: 0    Associated Diagnoses: Pain      polyethylene glycol (MIRALAX) 17 gram packet Take 1 Packet by mouth daily. Qty: 30 Packet, Refills: 0           CONTINUE these medications which have CHANGED    Details   carvediloL (COREG) 25 mg tablet Take 1 Tablet by mouth two (2) times a day. Qty: 60 Tablet, Refills: 0           CONTINUE these medications which have NOT CHANGED    Details   ticagrelor (BRILINTA) 90 mg tablet Take 90 mg by mouth two (2) times a day. isosorbide dinitrate (ISORDIL) 20 mg tablet Take 20 mg by mouth three (3) times daily. lisinopriL (PRINIVIL, ZESTRIL) 5 mg tablet Take 5 mg by mouth daily. melatonin 5 mg cap capsule Take 5 mg by mouth nightly. atorvastatin (LIPITOR) 80 mg tablet Take 0.5 Tabs by mouth daily.   Qty: 30 Tab, Refills: 0      hydrALAZINE (APRESOLINE) 25 mg tablet Take 1 Tab by mouth three (3) times daily. Qty: 90 Tab, Refills: 0      aspirin 81 mg chewable tablet Take 81 mg by mouth daily. gabapentin (NEURONTIN) 100 mg capsule Take 1 Capsule by mouth three (3) times daily. Max Daily Amount: 300 mg. Qty: 15 Capsule, Refills: 0    Associated Diagnoses: Open wound of both lower extremities, initial encounter      calcium carbonate (TUMS) 200 mg calcium (500 mg) chew Take 1 Tablet by mouth in the morning. insulin glargine (Lantus U-100 Insulin) 100 unit/mL injection 10 Units by SubCUTAneous route nightly. Qty: 1 mL, Refills: 0      amLODIPine (NORVASC) 10 mg tablet Take 1 Tab by mouth daily.   Qty: 30 Tab, Refills: 0           STOP taking these medications       collagenase (SANTYL) 250 unit/gram ointment Comments:   Reason for Stopping:         acetaminophen (TYLENOL) 325 mg tablet Comments:   Reason for Stopping:               Activity: PT/OT Eval and Treat  Diet: Cardiac Diet and Renal Diet  Wound Care: As directed    Follow-up Appointments   Procedures    FOLLOW UP VISIT Appointment in: 3 - 5 Days     Standing Status:   Standing     Number of Occurrences:   1     Order Specific Question:   Appointment in     Answer:   3 - 5 Days     Waiting for bed  Signed By: Jonothan Curling, MD     January 2, 2023

## 2023-01-02 NOTE — PROGRESS NOTES
Renal Daily Progress Note    Admit Date: 12/22/2022      Subjective:   Patient is seen and examined in the room   Received HD today   Received BRYANNA 8,000 units with HD. Pt is stable renal point of view.       Current Facility-Administered Medications   Medication Dose Route Frequency    vancomycin (VANCOCIN) 1500 mg in  ml infusion  1,500 mg IntraVENous ONCE    [START ON 1/5/2023] Vancomycin random level 1/5 at 04:00   Other ONCE    mupirocin (BACTROBAN) 2 % ointment   Topical DAILY    0.9% sodium chloride infusion 250 mL  250 mL IntraVENous PRN    0.9% sodium chloride infusion 250 mL  250 mL IntraVENous PRN    amitriptyline (ELAVIL) tablet 50 mg  50 mg Oral QHS    naloxone (NARCAN) injection 1 mg  1 mg IntraVENous PRN    0.9% sodium chloride infusion 250 mL  250 mL IntraVENous PRN    sodium chloride (NS) flush 5-40 mL  5-40 mL IntraVENous PRN    ondansetron (ZOFRAN ODT) tablet 4 mg  4 mg Oral Q8H PRN    Or    ondansetron (ZOFRAN) injection 4 mg  4 mg IntraVENous Q6H PRN    oxyCODONE ER (OxyCONTIN) tablet 10 mg  10 mg Oral Q12H    oxyCODONE-acetaminophen (PERCOCET 10)  mg per tablet 1 Tablet  1 Tablet Oral Q6H PRN    heparin (porcine) injection 5,000 Units  5,000 Units SubCUTAneous Q8H    heparin (porcine) 1,000 unit/mL injection 3,800 Units  3,800 Units Hemodialysis DIALYSIS PRN    epoetin luis-epbx (RETACRIT) injection 8,000 Units  8,000 Units IntraVENous DIALYSIS MON, WED & FRI    oxyCODONE IR (ROXICODONE) tablet 5 mg  5 mg Oral Q4H PRN    amLODIPine (NORVASC) tablet 10 mg  10 mg Oral DAILY    atorvastatin (LIPITOR) tablet 40 mg  40 mg Oral DAILY    calcium carbonate (TUMS) chewable tablet 200 mg [elemental]  200 mg Oral DAILY    carvediloL (COREG) tablet 25 mg  25 mg Oral BID    gabapentin (NEURONTIN) capsule 100 mg  100 mg Oral TID    hydrALAZINE (APRESOLINE) tablet 25 mg  25 mg Oral TID    insulin glargine (LANTUS) injection 10 Units  10 Units SubCUTAneous QHS    isosorbide dinitrate (ISORDIL) tablet 20 mg  20 mg Oral TID    lisinopriL (PRINIVIL, ZESTRIL) tablet 5 mg  5 mg Oral DAILY    melatonin tablet 5 mg  5 mg Oral QHS    ticagrelor (BRILINTA) tablet 90 mg  90 mg Oral BID    sodium chloride (NS) flush 5-40 mL  5-40 mL IntraVENous PRN    acetaminophen (TYLENOL) tablet 650 mg  650 mg Oral Q6H PRN    Or    acetaminophen (TYLENOL) suppository 650 mg  650 mg Rectal Q6H PRN    polyethylene glycol (MIRALAX) packet 17 g  17 g Oral DAILY PRN    piperacillin-tazobactam (ZOSYN) 3.375 g in 0.9% sodium chloride (MBP/ADV) 100 mL MBP  3.375 g IntraVENous Q12H    Vancomycin - Pharmacy to Dose   Other Rx Dosing/Monitoring        Review of Systems    Review of Systems   Respiratory:  Negative for shortness of breath. Cardiovascular:  Negative for chest pain. Gastrointestinal:  Negative for abdominal pain. Neurological:  Negative for headaches. Objective:     Patient Vitals for the past 8 hrs:   BP Temp Temp src Pulse Resp SpO2   01/02/23 1045 (!) 158/79 -- -- 95 -- --   01/02/23 1030 (!) 143/78 -- -- 93 -- --   01/02/23 1000 (!) 149/78 -- -- 93 -- --   01/02/23 0930 136/71 -- -- 80 -- --   01/02/23 0900 137/72 -- -- 86 -- --   01/02/23 0830 139/85 -- -- 80 -- --   01/02/23 0815 (!) 150/87 97.8 °F (36.6 °C) Oral 82 -- --   01/02/23 0715 (!) 153/78 97.8 °F (36.6 °C) -- 77 18 99 %       01/02 0701 - 01/02 1900  In: -   Out: 1000   12/31 1901 - 01/02 0700  In: 580 [P.O.:450; I.V.:130]  Out: -     Physical Exam:   Physical Exam  Cardiovascular:      Rate and Rhythm: Regular rhythm. Pulmonary:      Breath sounds: Normal breath sounds. Abdominal:      General: Bowel sounds are normal.      Palpations: Abdomen is soft. Neurological:      Mental Status: He is alert.       Right IJ tunneled dialysis catheter  Left AKA and amputation of left hand  No edema on the right      LOWER EXT ART PVR MULT LEVEL SEG PRESSURES   Final Result           Intake/Output Summary (Last 24 hours) at 1/2/2023 0892  Last data filed at 1/2/2023 1045  Gross per 24 hour   Intake 580 ml   Output 1000 ml   Net -420 ml        Data Review   No results for input(s): WBC, HGB, HGBEXT, HCT, HCTEXT, PLT, PLTEXT, HGBEXT, HCTEXT, PLTEXT, HGBEXT, HCTEXT, PLTEXT in the last 72 hours. No results for input(s): NA, K, CL, CO2, GLU, BUN, CREA, CA, MG, PHOS, ALB, TBIL, ALT, INR, INREXT, INREXT, INREXT in the last 72 hours. No lab exists for component: SGOT, PTHBB    No components found for: GLPOC  No results for input(s): PH, PCO2, PO2, HCO3, FIO2 in the last 72 hours. No results for input(s): INR, INREXT, INREXT, INREXT in the last 72 hours. Assessment:       Active Problems:    Wet gangrene ([address] (12/22/2022)  Renal failure on twice a week dialysis. Monday and Thursdays  1 liter fluid was removed   Volume status is acceptable  Anemia of chronic disease- received 8,000 units retacrit. HTN - B.P is o.k    Plan:   C/w HD 2 times /week. No change in rx.

## 2023-01-02 NOTE — PROGRESS NOTES
Problem: Falls - Risk of  Goal: *Absence of Falls  Description: Document Jolayne Levels Fall Risk and appropriate interventions in the flowsheet.   Outcome: Progressing Towards Goal  Note: Fall Risk Interventions:            Medication Interventions: Bed/chair exit alarm    Elimination Interventions: Call light in reach, Patient to call for help with toileting needs

## 2023-01-02 NOTE — PROGRESS NOTES
Comprehensive Nutrition Assessment    Type and Reason for Visit: (P) Reassess (goal)    Nutrition Recommendations/Plan:   Continue current diet  Continue ensure enlive 2x/day, Pablito 2x/day  Monitor and record PO intakes, supplement acceptance, and Bms in I/Os     Malnutrition Assessment:  Malnutrition Status:  Mild malnutrition (12/26/22 1432)    Context:  Acute illness     Findings of the 6 clinical characteristics of malnutrition:   Energy Intake:  No significant decrease in energy intake  Weight Loss:  Greater than 7.5% over 3 months (?veracity of amount)     Body Fat Loss:  Unable to assess (contact iso),     Muscle Mass Loss:  Unable to assess,    Fluid Accumulation:  No significant fluid accumulation,     Strength:  Not performed     Nutrition Assessment:    (P) Admitted for L knee gangrene. S/p L AKA. Plan to transfuse PRBCs. Noted consult for wounds. Plan to add ONS to support wound healing and help meet needs. Noted significant weight loss per EMAR. (1/2) Discharge pending outpt dialysis/SNF. Min med updates. Intakes good, >50% of meals. Labs: Na 143, K 4.0, BUN 45, Creat 2.7, Gluc 99, Alb 2.0. Meds: atorvastatin, calcium carbonate, imnsulin glargine    Nutrition Related Findings:    (P) NFPE deferred d/t contact iso. No n/v, d/c, or problems chewing/swallowing. No edema. BM 1/1. Wound Type: Multiple, Open wounds, Surgical incision, Full thickness    Current Nutrition Intake & Therapies:  Average Meal Intake: (P) 51-75%  Average Supplement Intake: (P) 51-75%  ADULT DIET Regular  ADULT ORAL NUTRITION SUPPLEMENT Breakfast, Dinner; Standard High Calorie/High Protein  ADULT ORAL NUTRITION SUPPLEMENT Lunch, Breakfast; Wound Healing Supplement    Anthropometric Measures:  Height: (P) 5' 7.99\" (172.7 cm)  Ideal Body Weight (IBW): (P) 154 lbs ((P) 70 kg)  Current Body Wt:  (P) 59.2 kg (130 lb 8.2 oz), (P) 84.7 % IBW.  (P) Bed scale  Current BMI (kg/m2): (P) 19.8  Weight Adjustment: Amputation  Total Amputation Percentage: 10.8  Adjusted Ideal Body Weight (lbs) (Calculated): 137.4  Adjusted Ideal Body Weight (kg) (Calculated): 62.45 kg  Adjusted % IBW (Calculated): 95  Adjusted BMI (Calculated): 21.9  BMI Category: Underweight (BMI less than 22) age over 72    Estimated Daily Nutrient Needs:  Energy Requirements Based On: Kcal/kg  Weight Used for Energy Requirements: Current  Energy (kcal/day): 1776kcal (30kcal/kg)  Weight Used for Protein Requirements: Current  Protein (g/day): 89g (1.5g/kg)  Method Used for Fluid Requirements: Standard renal  Fluid (ml/day): 1500mL    Nutrition Diagnosis:   Inadequate protein-energy intake related to increased demand for energy/nutrients as evidenced by wounds, weight loss    Nutrition Interventions:   Food and/or Nutrient Delivery: (P) Continue current diet, Continue oral nutrition supplement  Nutrition Education/Counseling: (P) No recommendations at this time  Coordination of Nutrition Care: (P) Continue to monitor while inpatient, Feeding assistance/environmental change    Goals:  Previous Goal Met: (P) Goal(s) achieved  Goals: (P) PO intake 50% or greater, by next RD assessment  Specify Other Goals: +signs of wound healing    Nutrition Monitoring and Evaluation:   Behavioral-Environmental Outcomes: (P) None identified  Food/Nutrient Intake Outcomes: (P) Food and nutrient intake, Supplement intake  Physical Signs/Symptoms Outcomes: (P) Meal time behavior, Skin, Weight    Discharge Planning:    (P) Continue oral nutrition supplement    Nicole Benson RD  Contact: [phone]

## 2023-01-02 NOTE — PROGRESS NOTES
DC order noted. See CM note from 1/1/23. CM will follow-up with SNF tomorrow to see if they will sign the contract with [facility].

## 2023-01-02 NOTE — PROGRESS NOTES
Vancomycin Dosing Consult  Katherine Fuller is a 74 y.o. male with sepsis, left knee gangrene, osteomyelitis. Pharmacy was consulted by Dr. Kae Cagle to dose and monitor vancomycin. Today is day 12. Antibiotic regimen: Vancomycin + Pip/Tazo    Temp (24hrs), Av °F (36.7 °C), Min:97.8 °F (36.6 °C), Max:98.2 °F (36.8 °C)    Recent Labs     22  0221 22  0317 22  0618   WBC 7.8 8.7 8.9 9.4   CREA   --  2.41* 3.42*   BUN   --  33* 54*     Est CrCl: ESRD on HD (Monday , Thursday)  Concomitant nephrotoxic drugs: None    Cultures:    Blood: NG final   Blood x 2: NG final    MRSA Swab: Detected     Target range: Trough 21-24 mcg/mL (Intermittent hemodialysis, invasive MRSA infection or sepsis)    Recent level history:  Date/Time Dose & Interval Measured Level (mcg/mL)    0307 1250 mg IV x 1 dose 17.9    0618 750 mg IV x 1 dose 6.7    @ 0221 1000 mg X1 3.6    @0338 1000 mg X1 20.2    @ 0351 1500 mg x 1 14.9      Assessment/Plan:   Afebrile, leukocytosis resolved. ESRD  the dialysis on Monday and Thursday ( twice per week). Level today 14.9 pre-HD  Will order vancomycin 1500 mg IV x 1 dose today. Pre-HD level scheduled for  @  0400 am pre HD.   Antimicrobial stop date TBD

## 2023-01-03 LAB
GLUCOSE BLD STRIP.AUTO-MCNC: 119 MG/DL (ref 65–100)
GLUCOSE BLD STRIP.AUTO-MCNC: 126 MG/DL (ref 65–100)
GLUCOSE BLD STRIP.AUTO-MCNC: 150 MG/DL (ref 65–100)
GLUCOSE BLD STRIP.AUTO-MCNC: 78 MG/DL (ref 65–100)
PERFORMED BY, TECHID: ABNORMAL
PERFORMED BY, TECHID: NORMAL

## 2023-01-03 PROCEDURE — 74011250637 HC RX REV CODE- 250/637: Performed by: INTERNAL MEDICINE

## 2023-01-03 PROCEDURE — 97530 THERAPEUTIC ACTIVITIES: CPT

## 2023-01-03 PROCEDURE — 74011250637 HC RX REV CODE- 250/637: Performed by: SURGERY

## 2023-01-03 PROCEDURE — 74011250636 HC RX REV CODE- 250/636: Performed by: SURGERY

## 2023-01-03 PROCEDURE — 74011636637 HC RX REV CODE- 636/637: Performed by: SURGERY

## 2023-01-03 PROCEDURE — 65270000029 HC RM PRIVATE

## 2023-01-03 PROCEDURE — 74011000258 HC RX REV CODE- 258: Performed by: SURGERY

## 2023-01-03 PROCEDURE — 82962 GLUCOSE BLOOD TEST: CPT

## 2023-01-03 RX ADMIN — GABAPENTIN 100 MG: 100 CAPSULE ORAL at 16:17

## 2023-01-03 RX ADMIN — TICAGRELOR 90 MG: 90 TABLET ORAL at 08:30

## 2023-01-03 RX ADMIN — OXYCODONE AND ACETAMINOPHEN 1 TABLET: 10; 325 TABLET ORAL at 03:03

## 2023-01-03 RX ADMIN — ISOSORBIDE DINITRATE 20 MG: 20 TABLET ORAL at 08:29

## 2023-01-03 RX ADMIN — ISOSORBIDE DINITRATE 20 MG: 20 TABLET ORAL at 22:11

## 2023-01-03 RX ADMIN — AMITRIPTYLINE HYDROCHLORIDE 50 MG: 25 TABLET, FILM COATED ORAL at 22:17

## 2023-01-03 RX ADMIN — HEPARIN SODIUM 5000 UNITS: 5000 INJECTION INTRAVENOUS; SUBCUTANEOUS at 22:10

## 2023-01-03 RX ADMIN — CARVEDILOL 25 MG: 12.5 TABLET, FILM COATED ORAL at 22:12

## 2023-01-03 RX ADMIN — GABAPENTIN 100 MG: 100 CAPSULE ORAL at 22:11

## 2023-01-03 RX ADMIN — AMLODIPINE BESYLATE 10 MG: 5 TABLET ORAL at 08:29

## 2023-01-03 RX ADMIN — HYDRALAZINE HYDROCHLORIDE 25 MG: 25 TABLET, FILM COATED ORAL at 08:43

## 2023-01-03 RX ADMIN — OXYCODONE AND ACETAMINOPHEN 1 TABLET: 10; 325 TABLET ORAL at 16:17

## 2023-01-03 RX ADMIN — INSULIN GLARGINE 10 UNITS: 100 INJECTION, SOLUTION SUBCUTANEOUS at 22:10

## 2023-01-03 RX ADMIN — ATORVASTATIN CALCIUM 40 MG: 40 TABLET, FILM COATED ORAL at 08:30

## 2023-01-03 RX ADMIN — HEPARIN SODIUM 5000 UNITS: 5000 INJECTION INTRAVENOUS; SUBCUTANEOUS at 16:17

## 2023-01-03 RX ADMIN — MELATONIN TAB 5 MG 5 MG: 5 TAB at 22:11

## 2023-01-03 RX ADMIN — HYDRALAZINE HYDROCHLORIDE 25 MG: 25 TABLET, FILM COATED ORAL at 16:16

## 2023-01-03 RX ADMIN — CALCIUM CARBONATE 200 MG: 500 TABLET, CHEWABLE ORAL at 08:29

## 2023-01-03 RX ADMIN — CARVEDILOL 25 MG: 12.5 TABLET, FILM COATED ORAL at 08:29

## 2023-01-03 RX ADMIN — OXYCODONE HYDROCHLORIDE 10 MG: 10 TABLET, FILM COATED, EXTENDED RELEASE ORAL at 08:44

## 2023-01-03 RX ADMIN — TICAGRELOR 90 MG: 90 TABLET ORAL at 22:11

## 2023-01-03 RX ADMIN — OXYCODONE HYDROCHLORIDE 10 MG: 10 TABLET, FILM COATED, EXTENDED RELEASE ORAL at 22:11

## 2023-01-03 RX ADMIN — HYDRALAZINE HYDROCHLORIDE 25 MG: 25 TABLET, FILM COATED ORAL at 22:11

## 2023-01-03 RX ADMIN — PIPERACILLIN AND TAZOBACTAM 3.38 G: 3; .375 INJECTION, POWDER, FOR SOLUTION INTRAVENOUS at 08:43

## 2023-01-03 RX ADMIN — HEPARIN SODIUM 5000 UNITS: 5000 INJECTION INTRAVENOUS; SUBCUTANEOUS at 06:02

## 2023-01-03 RX ADMIN — LISINOPRIL 5 MG: 5 TABLET ORAL at 16:21

## 2023-01-03 RX ADMIN — GABAPENTIN 100 MG: 100 CAPSULE ORAL at 08:30

## 2023-01-03 RX ADMIN — PIPERACILLIN AND TAZOBACTAM 3.38 G: 3; .375 INJECTION, POWDER, FOR SOLUTION INTRAVENOUS at 20:04

## 2023-01-03 NOTE — DISCHARGE SUMMARY
Discharge Summary     Patient: Minnie Cobian MRN: [id number]  SSN: [id number]    YOB: 1955  Age: 74 y.o. Sex: male       Admit Date: 12/22/2022    Discharge Date: 1/3/2023      Admission Diagnoses: Wet gangrene [facility]) [place]t [place]    Discharge Diagnoses:   Problem List as of 1/3/2023 Date Reviewed: 12/27/2022            Codes Class Noted - Resolved    Wet gangrene ([facility] ICD-10-CM: V09  ICD-9-CM: 785.4  12/22/2022 - Present        Acute CHF (congestive heart failure) ([facility] ICD-10-CM: I50.9  ICD-9-CM: 428.0  4/6/2022 - Present        Dysphagia, unspecified type ICD-10-CM: R13.10  ICD-9-CM: 787.20  Unknown - Present        Fatigue, unspecified type ICD-10-CM: R53.83  ICD-9-CM: 780.79  Unknown - Present        Chronic systolic congestive heart failure ([facility] ICD-10-CM: I50.22  ICD-9-CM: 428.22, 428.0  2/16/2021 - Present        Goals of care, counseling/discussion ICD-10-CM: Z71.89  ICD-9-CM: V65.49  Unknown - Present        DNR (do not resuscitate) discussion ICD-10-CM: Z71.89  ICD-9-CM: V65.49  Unknown - Present        JUAN C (acute kidney injury) ([facility] ICD-10-CM: N17.9  ICD-9-CM: 537. 9  Unknown - Present        Acute hypoxemic respiratory failure due to COVID-19 [facility]) ICD-10-CM: U07.1, J96.01  ICD-9-CM: 518.81, 079.89, 799.02  1/30/2021 - Present        RESOLVED: Atypical chest pain ICD-10-CM: R07.89  ICD-9-CM: 786.59  4/6/2022 - 4/13/2022            Discharge Condition: Good    Hospital Course: 74years old patient with multiple medical issues including A. fib anemia arrhythmia CAD admitted for infected knee scheduled for surgery patient was seen by nephrology for dialysis management. He was placed on IV vancomycin Zosyn however with the amputation patient has been discontinued.   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 Qty: 30 Tablet, Refills: 0      epoetin luis-epbx (RETACRIT) 4,000 unit/mL injection 2 mL by SubCUTAneous route DIALYSIS MON, WED & FRI. Indications: anemia due to kidney failure  Qty: 12 Each, Refills: 0      heparin sodium,porcine (heparin, porcine,) 5,000 unit/mL injection 1 mL by SubCUTAneous route every eight (8) hours. Qty: 30 mL, Refills: 0      mupirocin (BACTROBAN) 2 % ointment Apply 1 g to affected area daily for 5 doses. Qty: 22 g, Refills: 0      naloxone (NARCAN) 1 mg/mL injection 1 mL by IntraVENous route as needed for Overdose or Respiratory Distress. Qty: 1 mL, Refills: 0      oxyCODONE IR (ROXICODONE) 5 mg immediate release tablet Take 1 Tablet by mouth every four (4) hours as needed for Pain for up to 3 days. Max Daily Amount: 30 mg.  Qty: 10 Tablet, Refills: 0    Associated Diagnoses: Pain      oxyCODONE ER (OxyCONTIN) 10 mg ER tablet Take 1 Tablet by mouth every twelve (12) hours for 30 days. Max Daily Amount: 20 mg.  Qty: 10 Tablet, Refills: 0    Associated Diagnoses: Pain      polyethylene glycol (MIRALAX) 17 gram packet Take 1 Packet by mouth daily. Qty: 30 Packet, Refills: 0           CONTINUE these medications which have CHANGED    Details   carvediloL (COREG) 25 mg tablet Take 1 Tablet by mouth two (2) times a day. Qty: 60 Tablet, Refills: 0           CONTINUE these medications which have NOT CHANGED    Details   ticagrelor (BRILINTA) 90 mg tablet Take 90 mg by mouth two (2) times a day. isosorbide dinitrate (ISORDIL) 20 mg tablet Take 20 mg by mouth three (3) times daily. lisinopriL (PRINIVIL, ZESTRIL) 5 mg tablet Take 5 mg by mouth daily. melatonin 5 mg cap capsule Take 5 mg by mouth nightly. atorvastatin (LIPITOR) 80 mg tablet Take 0.5 Tabs by mouth daily.   Qty: 30 Tab, Refills: 0      hydrALAZINE (APRESOLINE) 25 mg tablet Take 1 Tab by mouth three (3) times daily. Qty: 90 Tab, Refills: 0      aspirin 81 mg chewable tablet Take 81 mg by mouth daily. gabapentin (NEURONTIN) 100 mg capsule Take 1 Capsule by mouth three (3) times daily. Max Daily Amount: 300 mg. Qty: 15 Capsule, Refills: 0    Associated Diagnoses: Open wound of both lower extremities, initial encounter      calcium carbonate (TUMS) 200 mg calcium (500 mg) chew Take 1 Tablet by mouth in the morning. insulin glargine (Lantus U-100 Insulin) 100 unit/mL injection 10 Units by SubCUTAneous route nightly. Qty: 1 mL, Refills: 0      amLODIPine (NORVASC) 10 mg tablet Take 1 Tab by mouth daily.   Qty: 30 Tab, Refills: 0           STOP taking these medications       collagenase (SANTYL) 250 unit/gram ointment Comments:   Reason for Stopping:         acetaminophen (TYLENOL) 325 mg tablet Comments:   Reason for Stopping:               Activity: PT/OT Eval and Treat  Diet: Cardiac Diet and Renal Diet  Wound Care: As directed    Follow-up Appointments   Procedures    FOLLOW UP VISIT Appointment in: 3 - 5 Days     Standing Status:   Standing     Number of Occurrences:   1     Order Specific Question:   Appointment in     Answer:   3 - 5 Days     Waiting for bed  Signed By: Glynn Curry MD     January 3, 2023

## 2023-01-03 NOTE — PROGRESS NOTES
Physician Progress Note      PATIENT:               Daniel Ghosh  CSN #:                  [id number]  :                       1955  ADMIT DATE:       2022 1:41 PM  [address] [place] DATE:  RESPONDING  PROVIDER #:        Kathlynn Rubinstein MD          QUERY TEXT:    Patient admitted with gangrene. Documentation reflects sepsis in the H&P but not subsequent notes. If possible, please document in the progress notes and discharge summary if sepsis was: The medical record reflects the following:  Risk Factors: 75 yo male with ESRD, gangrene, DM  Clinical Indicators: WBC 9.9  Sed Rate 83  Lactic Acid 2.3  CRP 9.5  Procalcitonin 0.44 HR 93, 91  Treatment: IV Vancomycin, Zosyn    Thank you,  Rosy Pérez RN, CCDS  Options provided:  -- Sepsis confirmed after study  -- Sepsis ruled out after study  -- Other - I will add my own diagnosis  -- Disagree - Not applicable / Not valid  -- Disagree - Clinically unable to determine / Unknown  -- Refer to Clinical Documentation Reviewer    PROVIDER RESPONSE TEXT:    Sepsis confirmed after study.     Query created by: Rigo Leary on 2022 12:18 PM      Electronically signed by:  Kathlynn Rubinstein MD 1/3/2023 8:12 AM

## 2023-01-03 NOTE — PROGRESS NOTES
Bedside, Verbal, and Written shift change report given to Phyllis RN (oncoming nurse) by Jonathan Leon RN (offgoing nurse). Report included the following information SBAR.

## 2023-01-03 NOTE — PROGRESS NOTES
Problem: Mobility Impaired (Adult and Pediatric)  Goal: *Acute Goals and Plan of Care (Insert Text)  Description: FUNCTIONAL STATUS PRIOR TO ADMISSION: Patient was modified independent using a peyton walker for functional mobility. Patient required setup assistance for basic and instrumental ADLs. HOME SUPPORT PRIOR TO ADMISSION: Pt at short term rehab at [place] since October. Prior to admission in October pt was living with his wife. Physical Therapy Goals  Initiated 12/28/2022  Pt stated goal: to get better, would eventually like to use a prosthesis on LLE. Pt will be I with LE HEP in 7 days. Pt will perform bed mobility with mod I in 7 days. Pt will perform transfers with mod I in 7 days. Pt will amb 5-10 feet with LRAD safely with min Ax1 in 7 days. Outcome: Progressing Towards Goal   PHYSICAL THERAPY TREATMENT  Patient: Luis Alcaraz (89 y.o. male)  Date: 1/3/2023  Diagnosis: Wet gangrene ([address] Scot Linen <principal problem not specified>  Procedure(s) (LRB):  LEFT AMPUTATION KNEE(AKA) (Left) 10 Days Post-Op  Precautions:    Chart, physical therapy assessment, plan of care and goals were reviewed. ASSESSMENT  Patient continues with skilled PT services and is progressing towards goals. Pt found semi supine upon PTA/DURHAM arrival, agreeable to session. (See below for objective details and assist levels). Overall pt tolerated session fair today with bed mobility and transfers. Pt continues to demo improved functional mobility requiring less A, able to stand x3 ~30 sec each trial. In standing pt demo's mins sway and UE fatigue requiring increased time for seated rest breaks. Reviewed HEP and added glute set, pt demo's verbal understanding. Pt demo's great rehab potential, is highly motivated to achieve I with less A, improve functional transfers and decrease risk for further decline. Will continue to benefit from skilled PT services, and will continue to progress as tolerated.     Current Level of Function Impacting Discharge (mobility/balance): functional mobility     Other factors to consider for discharge: amount of A needed for mobility and PLOF          PLAN :  Patient continues to benefit from skilled intervention to address the above impairments. Continue treatment per established plan of care to address goals. Recommend with staff: Out of bed to chair for meals and Encourage HEP in prep for ADLs/mobility    Recommendation for discharge: (in order for the patient to meet his/her long term goals)  [facility]     This discharge recommendation:  Has been made in collaboration with the attending provider and/or case management    IF patient discharges home will need the following DME: to be determined (TBD)       SUBJECTIVE:   Patient stated Don't pull me up.     OBJECTIVE DATA SUMMARY:   Critical Behavior:  Neurologic State: Alert  Orientation Level: Oriented X4  Cognition: Follows commands     Functional Mobility Training:  Bed Mobility:  Rolling: Contact guard assistance  Supine to Sit: Contact guard assistance  Sit to Supine: Contact guard assistance  Scooting: Contact guard assistance  Transfers:  Sit to Stand: Minimum assistance;Assist x2  Stand to Sit: Minimum assistance; Moderate assistance;Assist x2  Balance:  Sitting: Intact; Without support  Standing: Impaired; With support  Standing - Static: Constant support; Fair  Ambulation/Gait Training:      Therapeutic Exercises:       EXERCISE   Sets   Reps   Active Active Assist   Passive Self ROM   Comments   Ankle Pumps   [] [] [] []    Quad Sets/Glut Sets   [x] [] [] []    Hamstring Sets   [] [] [] []    Short Arc Quads   [] [] [] []    Heel Slides   [] [] [] []    Straight Leg Raises   [] [] [] []    Hip abd/add   [] [] [] []    Long Arc Quads   [] [] [] []    Marching   [] [] [] []       [] [] [] []       Pain Ratin/10pre mobility 10/10 with mobility end of session     Activity Tolerance:   Fair and requires rest breaks    After treatment patient left in no apparent distress:   Bed locked and returned to lowest position, Supine in bed and Call bell within reach        COMMUNICATION/COLLABORATION:   The patients plan of care was discussed with: Occupational therapy assistant and Registered nurse.      PTA/DURHAMAJ avery to maximize pt and clinician safety     Ad Cyr PTA, PT   Time Calculation: 28 mins

## 2023-01-03 NOTE — PROGRESS NOTES
OCCUPATIONAL THERAPY TREATMENT  Patient: Siri Wilson (9 y.o. male)  Date: 1/3/2023  Diagnosis: Wet gangrene ([address] Sandi American <principal problem not specified>  Procedure(s) (LRB):  LEFT AMPUTATION KNEE(AKA) (Left) 10 Days Post-Op  Precautions:    Chart, occupational therapy assessment, plan of care, and goals were reviewed. ASSESSMENT  Pt continues with skilled OT services and is progressing towards goals. Upon DURHAM arrival, pt semi supine in bed and agreeable to tx session at this time. Overall, pt continues to present with deficits in generalized strength/AROM, coordination, bed mobility, static/dynamic sitting and standing balance and functional activity tolerance during performance of ADLs/mobility (see below for objective details and assist levels). Pt noted with continued progress with overall mobility this date. Pt completed bed mobility with CGA overall and increased time. Pt completed sit>stand from EOB x3 trials for ~30 seconds each trial with Marianela x2 using platform walker for balance upon standing. Pt required rest breaks between each standing trial due to fatigue, weakness, and pain. Pt educated on initiating glutes when coming to full stand. Pt required total A for donning of sock to R foot. Pt educated on HEP and sitting up throughout day with assist and verbalized understanding. Pt reporting 10/10 pain at end of session, RN notified. Recommend d/c to [facility]  once medically appropriate. Other factors to consider for discharge: family/social support, DME, time since onset, severity of deficits, decline from functional baseline         PLAN :  Patient continues to benefit from skilled intervention to address the above impairments. Continue treatment per established plan of care to address goals.     Recommendation for discharge: (in order for the patient to meet his/her long term goals)  [facility] [employer] [facility]     This discharge recommendation:  Has been made in collaboration with the attending provider and/or case management    IF patient discharges home will need the following DME: TBD       SUBJECTIVE:   Patient stated I want to try to push myself.     OBJECTIVE DATA SUMMARY:   Cognitive/Behavioral Status:  Neurologic State: Alert  Orientation Level: Oriented X4  Cognition: Follows commands    Functional Mobility and Transfers for ADLs:  Bed Mobility:  Rolling: Contact guard assistance  Supine to Sit: Contact guard assistance  Sit to Supine: Contact guard assistance  Scooting: Contact guard assistance    Transfers:  Sit to Stand: Minimum assistance;Assist x2    Balance:  Sitting: Intact; Without support  Standing: Impaired; With support  Standing - Static: Constant support; Fair    ADL Intervention:  Lower Body Dressing Assistance  Socks: Total assistance (dependent)    Pain:  8-9/10 L lower residual limb pain at beginning of session  10/10 L lower residual limb pain at end of session    Activity Tolerance:   Fair and requires rest breaks  Please refer to the flowsheet for vital signs taken during this treatment. After treatment patient left in no apparent distress:   Bed locked and in lowest position  Supine in bed, Call bell within reach, Bed / chair alarm activated, and Side rails x 3    COMMUNICATION/COLLABORATION:   The patients plan of care was discussed with: Physical therapy assistant and Registered nurse. [place] with PT for increased safety for pt and clinician. HERMINIO Alvarez  Time Calculation: 28 mins    Problem: Self Care Deficits Care Plan (Adult)  Goal: *Acute Goals and Plan of Care (Insert Text)  Description:   FUNCTIONAL STATUS PRIOR TO ADMISSION: Patient was modified independent using a peyton walker for functional mobility. Patient was modified independent for basic and instrumental ADLs. HOME SUPPORT: Patient most recently resided at [facility] since October for rehab services.  Prior to [place] pt was residing with his wife at home. Occupational Therapy Goals  Initiated 12/28/2022  Patient Goal: Walk and get to the bathroom. 1.  Patient will perform lower body dressing with modified independence within 7 day(s). 2.  Patient will perform grooming with modified independence within 7 day(s). 3.  Patient will perform bathing with modified independence within 7 day(s). 4.  Patient will perform toilet transfers with modified independence within 7 day(s). 5.  Patient will perform all aspects of toileting with modified independence within 7 day(s). 6.  Patient will participate in upper extremity therapeutic exercise/activities with modified independence within 7 day(s).     Outcome: Progressing Towards Goal

## 2023-01-03 NOTE — PROGRESS NOTES
Renal Daily Progress Note    Admit Date: 12/22/2022      Subjective:   Patient is seen in his room . He is eating well. He has not noticed a decline in his urine output. He dialyzed yesterday uneventfully.         Current Facility-Administered Medications   Medication Dose Route Frequency    [START ON 1/5/2023] Vancomycin random level 1/5 at 04:00   Other ONCE    0.9% sodium chloride infusion 250 mL  250 mL IntraVENous PRN    0.9% sodium chloride infusion 250 mL  250 mL IntraVENous PRN    amitriptyline (ELAVIL) tablet 50 mg  50 mg Oral QHS    naloxone (NARCAN) injection 1 mg  1 mg IntraVENous PRN    0.9% sodium chloride infusion 250 mL  250 mL IntraVENous PRN    sodium chloride (NS) flush 5-40 mL  5-40 mL IntraVENous PRN    ondansetron (ZOFRAN ODT) tablet 4 mg  4 mg Oral Q8H PRN    Or    ondansetron (ZOFRAN) injection 4 mg  4 mg IntraVENous Q6H PRN    oxyCODONE ER (OxyCONTIN) tablet 10 mg  10 mg Oral Q12H    oxyCODONE-acetaminophen (PERCOCET 10)  mg per tablet 1 Tablet  1 Tablet Oral Q6H PRN    heparin (porcine) injection 5,000 Units  5,000 Units SubCUTAneous Q8H    heparin (porcine) 1,000 unit/mL injection 3,800 Units  3,800 Units Hemodialysis DIALYSIS PRN    epoetin luis-epbx (RETACRIT) injection 8,000 Units  8,000 Units IntraVENous DIALYSIS MON, WED & FRI    oxyCODONE IR (ROXICODONE) tablet 5 mg  5 mg Oral Q4H PRN    amLODIPine (NORVASC) tablet 10 mg  10 mg Oral DAILY    atorvastatin (LIPITOR) tablet 40 mg  40 mg Oral DAILY    calcium carbonate (TUMS) chewable tablet 200 mg [elemental]  200 mg Oral DAILY    carvediloL (COREG) tablet 25 mg  25 mg Oral BID    gabapentin (NEURONTIN) capsule 100 mg  100 mg Oral TID    hydrALAZINE (APRESOLINE) tablet 25 mg  25 mg Oral TID    insulin glargine (LANTUS) injection 10 Units  10 Units SubCUTAneous QHS    isosorbide dinitrate (ISORDIL) tablet 20 mg  20 mg Oral TID    lisinopriL (PRINIVIL, ZESTRIL) tablet 5 mg  5 mg Oral DAILY    melatonin tablet 5 mg  5 mg Oral QHS ticagrelor (BRILINTA) tablet 90 mg  90 mg Oral BID    sodium chloride (NS) flush 5-40 mL  5-40 mL IntraVENous PRN    acetaminophen (TYLENOL) tablet 650 mg  650 mg Oral Q6H PRN    Or    acetaminophen (TYLENOL) suppository 650 mg  650 mg Rectal Q6H PRN    polyethylene glycol (MIRALAX) packet 17 g  17 g Oral DAILY PRN    piperacillin-tazobactam (ZOSYN) 3.375 g in 0.9% sodium chloride (MBP/ADV) 100 mL MBP  3.375 g IntraVENous Q12H    Vancomycin - Pharmacy to Dose   Other Rx Dosing/Monitoring        Review of Systems    Review of Systems   Respiratory:  Negative for shortness of breath. Cardiovascular:  Negative for chest pain. Gastrointestinal:  Negative for abdominal pain. Neurological:  Negative for headaches. Objective:     Patient Vitals for the past 8 hrs:   BP Pulse Resp   01/03/23 0843 (!) 169/89 76 16       No intake/output data recorded. 01/01 1901 - 01/03 0700  In: -   Out: 1000     Physical Exam:   Physical Exam  Cardiovascular:      Rate and Rhythm: Regular rhythm. Pulmonary:      Breath sounds: Normal breath sounds. Abdominal:      General: Bowel sounds are normal.      Palpations: Abdomen is soft. Neurological:      Mental Status: He is alert. Right IJ tunneled dialysis catheter  Left AKA and amputation of left hand  No edema on the right      LOWER EXT ART PVR MULT LEVEL SEG PRESSURES   Final Result         No intake or output data in the 24 hours ending 01/03/23 1557     Data Review   No results for input(s): WBC, HGB, HGBEXT, HCT, HCTEXT, PLT, PLTEXT, HGBEXT, HCTEXT, PLTEXT, HGBEXT, HCTEXT, PLTEXT in the last 72 hours. No results for input(s): NA, K, CL, CO2, GLU, BUN, CREA, CA, MG, PHOS, ALB, TBIL, ALT, INR, INREXT, INREXT, INREXT in the last 72 hours. No lab exists for component: SGOT, PTHBB    No components found for: GLPOC  No results for input(s): PH, PCO2, PO2, HCO3, FIO2 in the last 72 hours.   No results for input(s): INR, INREXT, INREXT, INREXT in the last 72 hours.      Assessment:       Active Problems:    Wet gangrene ([address] (12/22/2022)  Renal failure on twice a week dialysis.   Monday and Thursdays    Volume status is acceptable    Anemia of chronic disease- on  8,000 units Retacrit with each dialysis    HTN     Plan:   Next dialysis will be on Thursday of this week  If he is still in the hospital, will check labs with his next dialysis

## 2023-01-04 LAB
GLUCOSE BLD STRIP.AUTO-MCNC: 105 MG/DL (ref 65–100)
GLUCOSE BLD STRIP.AUTO-MCNC: 151 MG/DL (ref 65–100)
GLUCOSE BLD STRIP.AUTO-MCNC: 69 MG/DL (ref 65–100)
GLUCOSE BLD STRIP.AUTO-MCNC: 94 MG/DL (ref 65–100)
PERFORMED BY, TECHID: ABNORMAL
PERFORMED BY, TECHID: ABNORMAL
PERFORMED BY, TECHID: NORMAL
PERFORMED BY, TECHID: NORMAL

## 2023-01-04 PROCEDURE — 65270000029 HC RM PRIVATE

## 2023-01-04 PROCEDURE — 74011250637 HC RX REV CODE- 250/637: Performed by: SURGERY

## 2023-01-04 PROCEDURE — 74011636637 HC RX REV CODE- 636/637: Performed by: SURGERY

## 2023-01-04 PROCEDURE — 74011250636 HC RX REV CODE- 250/636: Performed by: SURGERY

## 2023-01-04 PROCEDURE — 82962 GLUCOSE BLOOD TEST: CPT

## 2023-01-04 PROCEDURE — 74011250637 HC RX REV CODE- 250/637: Performed by: INTERNAL MEDICINE

## 2023-01-04 RX ADMIN — TICAGRELOR 90 MG: 90 TABLET ORAL at 21:28

## 2023-01-04 RX ADMIN — OXYCODONE HYDROCHLORIDE 10 MG: 10 TABLET, FILM COATED, EXTENDED RELEASE ORAL at 11:49

## 2023-01-04 RX ADMIN — CARVEDILOL 25 MG: 12.5 TABLET, FILM COATED ORAL at 11:49

## 2023-01-04 RX ADMIN — HYDRALAZINE HYDROCHLORIDE 25 MG: 25 TABLET, FILM COATED ORAL at 18:35

## 2023-01-04 RX ADMIN — CALCIUM CARBONATE 200 MG: 500 TABLET, CHEWABLE ORAL at 11:49

## 2023-01-04 RX ADMIN — AMITRIPTYLINE HYDROCHLORIDE 50 MG: 25 TABLET, FILM COATED ORAL at 21:28

## 2023-01-04 RX ADMIN — ISOSORBIDE DINITRATE 20 MG: 20 TABLET ORAL at 21:30

## 2023-01-04 RX ADMIN — OXYCODONE AND ACETAMINOPHEN 1 TABLET: 10; 325 TABLET ORAL at 11:49

## 2023-01-04 RX ADMIN — OXYCODONE HYDROCHLORIDE 10 MG: 10 TABLET, FILM COATED, EXTENDED RELEASE ORAL at 21:29

## 2023-01-04 RX ADMIN — GABAPENTIN 100 MG: 100 CAPSULE ORAL at 18:35

## 2023-01-04 RX ADMIN — CARVEDILOL 25 MG: 12.5 TABLET, FILM COATED ORAL at 21:28

## 2023-01-04 RX ADMIN — HEPARIN SODIUM 5000 UNITS: 5000 INJECTION INTRAVENOUS; SUBCUTANEOUS at 05:23

## 2023-01-04 RX ADMIN — MELATONIN TAB 5 MG 5 MG: 5 TAB at 21:28

## 2023-01-04 RX ADMIN — ATORVASTATIN CALCIUM 40 MG: 40 TABLET, FILM COATED ORAL at 11:49

## 2023-01-04 RX ADMIN — GABAPENTIN 100 MG: 100 CAPSULE ORAL at 11:49

## 2023-01-04 RX ADMIN — TICAGRELOR 90 MG: 90 TABLET ORAL at 11:49

## 2023-01-04 RX ADMIN — ISOSORBIDE DINITRATE 20 MG: 20 TABLET ORAL at 11:49

## 2023-01-04 RX ADMIN — LISINOPRIL 5 MG: 5 TABLET ORAL at 11:54

## 2023-01-04 RX ADMIN — OXYCODONE 5 MG: 5 TABLET ORAL at 20:12

## 2023-01-04 RX ADMIN — GABAPENTIN 100 MG: 100 CAPSULE ORAL at 21:28

## 2023-01-04 RX ADMIN — ISOSORBIDE DINITRATE 20 MG: 20 TABLET ORAL at 18:35

## 2023-01-04 RX ADMIN — AMLODIPINE BESYLATE 10 MG: 5 TABLET ORAL at 11:48

## 2023-01-04 RX ADMIN — HEPARIN SODIUM 5000 UNITS: 5000 INJECTION INTRAVENOUS; SUBCUTANEOUS at 18:35

## 2023-01-04 RX ADMIN — INSULIN GLARGINE 10 UNITS: 100 INJECTION, SOLUTION SUBCUTANEOUS at 22:05

## 2023-01-04 RX ADMIN — HYDRALAZINE HYDROCHLORIDE 25 MG: 25 TABLET, FILM COATED ORAL at 21:30

## 2023-01-04 RX ADMIN — HEPARIN SODIUM 5000 UNITS: 5000 INJECTION INTRAVENOUS; SUBCUTANEOUS at 21:30

## 2023-01-04 RX ADMIN — OXYCODONE AND ACETAMINOPHEN 1 TABLET: 10; 325 TABLET ORAL at 18:39

## 2023-01-04 RX ADMIN — HYDRALAZINE HYDROCHLORIDE 25 MG: 25 TABLET, FILM COATED ORAL at 11:49

## 2023-01-04 NOTE — PROGRESS NOTES
Problem: Pressure Injury - Risk of  Goal: *Prevention of pressure injury  Description: Document Cruzito Scale and appropriate interventions in the flowsheet.   Outcome: Progressing Towards Goal  Note: Pressure Injury Interventions:  Sensory Interventions: Minimize linen layers, Maintain/enhance activity level    Moisture Interventions: Absorbent underpads, Minimize layers    Activity Interventions: Increase time out of bed, PT/OT evaluation    Mobility Interventions: HOB 30 degrees or less, PT/OT evaluation    Nutrition Interventions: Offer support with meals,snacks and hydration, Document food/fluid/supplement intake    Friction and Shear Interventions: HOB 30 degrees or less, Minimize layers                Problem: Patient Education: Go to Patient Education Activity  Goal: Patient/Family Education  Outcome: Progressing Towards Goal

## 2023-01-04 NOTE — PROGRESS NOTES
Renal Daily Progress Note    Admit Date: 12/22/2022      Subjective:   Patient is seen in his room . He has no complaints. He is awaiting placement.   .        Current Facility-Administered Medications   Medication Dose Route Frequency    0.9% sodium chloride infusion 250 mL  250 mL IntraVENous PRN    0.9% sodium chloride infusion 250 mL  250 mL IntraVENous PRN    amitriptyline (ELAVIL) tablet 50 mg  50 mg Oral QHS    naloxone (NARCAN) injection 1 mg  1 mg IntraVENous PRN    0.9% sodium chloride infusion 250 mL  250 mL IntraVENous PRN    sodium chloride (NS) flush 5-40 mL  5-40 mL IntraVENous PRN    ondansetron (ZOFRAN ODT) tablet 4 mg  4 mg Oral Q8H PRN    Or    ondansetron (ZOFRAN) injection 4 mg  4 mg IntraVENous Q6H PRN    oxyCODONE ER (OxyCONTIN) tablet 10 mg  10 mg Oral Q12H    oxyCODONE-acetaminophen (PERCOCET 10)  mg per tablet 1 Tablet  1 Tablet Oral Q6H PRN    heparin (porcine) injection 5,000 Units  5,000 Units SubCUTAneous Q8H    heparin (porcine) 1,000 unit/mL injection 3,800 Units  3,800 Units Hemodialysis DIALYSIS PRN    epoetin luis-epbx (RETACRIT) injection 8,000 Units  8,000 Units IntraVENous DIALYSIS MON, WED & FRI    oxyCODONE IR (ROXICODONE) tablet 5 mg  5 mg Oral Q4H PRN    amLODIPine (NORVASC) tablet 10 mg  10 mg Oral DAILY    atorvastatin (LIPITOR) tablet 40 mg  40 mg Oral DAILY    calcium carbonate (TUMS) chewable tablet 200 mg [elemental]  200 mg Oral DAILY    carvediloL (COREG) tablet 25 mg  25 mg Oral BID    gabapentin (NEURONTIN) capsule 100 mg  100 mg Oral TID    hydrALAZINE (APRESOLINE) tablet 25 mg  25 mg Oral TID    insulin glargine (LANTUS) injection 10 Units  10 Units SubCUTAneous QHS    isosorbide dinitrate (ISORDIL) tablet 20 mg  20 mg Oral TID    lisinopriL (PRINIVIL, ZESTRIL) tablet 5 mg  5 mg Oral DAILY    melatonin tablet 5 mg  5 mg Oral QHS    ticagrelor (BRILINTA) tablet 90 mg  90 mg Oral BID    sodium chloride (NS) flush 5-40 mL  5-40 mL IntraVENous PRN acetaminophen (TYLENOL) tablet 650 mg  650 mg Oral Q6H PRN    Or    acetaminophen (TYLENOL) suppository 650 mg  650 mg Rectal Q6H PRN    polyethylene glycol (MIRALAX) packet 17 g  17 g Oral DAILY PRN        Review of Systems    Review of Systems   Respiratory:  Negative for shortness of breath. Cardiovascular:  Negative for chest pain. Gastrointestinal:  Negative for abdominal pain. Neurological:  Negative for headaches. Objective:     Patient Vitals for the past 8 hrs:   BP Temp Pulse Resp SpO2   01/04/23 1558 135/71 98.9 °F (37.2 °C) 82 19 100 %   01/04/23 0826 (!) 163/80 98.4 °F (36.9 °C) 87 17 98 %       01/04 0701 - 01/04 1900  In: -   Out: 607 [JWKXZ:567]  01/02 1901 - 01/04 0700  In: 600 [P.O.:400; I.V.:200]  Out: 850 [Urine:850]    Physical Exam:   Physical Exam  Cardiovascular:      Rate and Rhythm: Regular rhythm. Pulmonary:      Breath sounds: Normal breath sounds. Abdominal:      General: Bowel sounds are normal.      Palpations: Abdomen is soft. Neurological:      Mental Status: He is alert. 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 No results for input(s): NA, K, CL, CO2, GLU, BUN, CREA, CA, MG, PHOS, ALB, TBIL, ALT, INR, INREXT, INREXT, INREXT in the last 72 hours. No lab exists for component: SGOT, PTHBB    No components found for: GLPOC  No results for input(s): PH, PCO2, PO2, HCO3, FIO2 in the last 72 hours. No results for input(s): INR, INREXT, INREXT, INREXT in the last 72 hours.       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 Monday and Thursdays    Volume status is acceptable    Anemia of chronic disease- on  8,000 units Retacrit with each dialysis    HTN     Plan:   Hemodialysis tomorrow.   Check labs

## 2023-01-04 NOTE — PROGRESS NOTES
Bedside shift change report given to Phyllis RN   (oncoming nurse) by Soha Vyas RN   (offgoing nurse). Report included the following information SBAR and MAR.

## 2023-01-04 NOTE — PROGRESS NOTES
Spoke to patient to inform him of what we are waiting on and recommendations from pt/ot for IRF. Explained if his goal is to get home IRF can help with that and therapy agreed. Explained insurance would have to authorize it as well since he is out of skilled days with his medicare. Patient understood. Referrals sent to IRFs in [place] with goal to go home. Back up plan is SNF in [place].

## 2023-01-05 LAB
ALBUMIN SERPL-MCNC: 2.3 G/DL (ref 3.5–5)
ANION GAP SERPL CALC-SCNC: 6 MMOL/L (ref 5–15)
BASOPHILS # BLD: 0 K/UL (ref 0–0.1)
BASOPHILS NFR BLD: 0 % (ref 0–1)
BUN SERPL-MCNC: 41 MG/DL (ref 6–20)
BUN/CREAT SERPL: 13 (ref 12–20)
CA-I BLD-MCNC: 8.6 MG/DL (ref 8.5–10.1)
CHLORIDE SERPL-SCNC: 110 MMOL/L (ref 97–108)
CO2 SERPL-SCNC: 26 MMOL/L (ref 21–32)
CREAT SERPL-MCNC: 3.14 MG/DL (ref 0.7–1.3)
DIFFERENTIAL METHOD BLD: ABNORMAL
EOSINOPHIL # BLD: 0.1 K/UL (ref 0–0.4)
EOSINOPHIL NFR BLD: 1 % (ref 0–7)
ERYTHROCYTE [DISTWIDTH] IN BLOOD BY AUTOMATED COUNT: 20.9 % (ref 11.5–14.5)
GLUCOSE BLD STRIP.AUTO-MCNC: 107 MG/DL (ref 65–100)
GLUCOSE BLD STRIP.AUTO-MCNC: 121 MG/DL (ref 65–100)
GLUCOSE BLD STRIP.AUTO-MCNC: 209 MG/DL (ref 65–100)
GLUCOSE BLD STRIP.AUTO-MCNC: 86 MG/DL (ref 65–100)
GLUCOSE SERPL-MCNC: 158 MG/DL (ref 65–100)
HCT VFR BLD AUTO: 30.1 % (ref 36.6–50.3)
HGB BLD-MCNC: 9.1 G/DL (ref 12.1–17)
IMM GRANULOCYTES # BLD AUTO: 0 K/UL (ref 0–0.04)
IMM GRANULOCYTES NFR BLD AUTO: 0 % (ref 0–0.5)
LYMPHOCYTES # BLD: 1.4 K/UL (ref 0.8–3.5)
LYMPHOCYTES NFR BLD: 14 % (ref 12–49)
MCH RBC QN AUTO: 25.9 PG (ref 26–34)
MCHC RBC AUTO-ENTMCNC: 30.2 G/DL (ref 30–36.5)
MCV RBC AUTO: 85.8 FL (ref 80–99)
MONOCYTES # BLD: 0.5 K/UL (ref 0–1)
MONOCYTES NFR BLD: 5 % (ref 5–13)
NEUTS SEG # BLD: 7.9 K/UL (ref 1.8–8)
NEUTS SEG NFR BLD: 80 % (ref 32–75)
NRBC # BLD: 0 K/UL (ref 0–0.01)
NRBC BLD-RTO: 0 PER 100 WBC
PERFORMED BY, TECHID: ABNORMAL
PERFORMED BY, TECHID: NORMAL
PHOSPHATE SERPL-MCNC: 4.6 MG/DL (ref 2.6–4.7)
PLATELET # BLD AUTO: 370 K/UL (ref 150–400)
PMV BLD AUTO: 9.7 FL (ref 8.9–12.9)
POTASSIUM SERPL-SCNC: 4 MMOL/L (ref 3.5–5.1)
RBC # BLD AUTO: 3.51 M/UL (ref 4.1–5.7)
SODIUM SERPL-SCNC: 142 MMOL/L (ref 136–145)
WBC # BLD AUTO: 10 K/UL (ref 4.1–11.1)

## 2023-01-05 PROCEDURE — 82962 GLUCOSE BLOOD TEST: CPT

## 2023-01-05 PROCEDURE — 90935 HEMODIALYSIS ONE EVALUATION: CPT

## 2023-01-05 PROCEDURE — 36415 COLL VENOUS BLD VENIPUNCTURE: CPT

## 2023-01-05 PROCEDURE — 74011250636 HC RX REV CODE- 250/636: Performed by: SURGERY

## 2023-01-05 PROCEDURE — 97530 THERAPEUTIC ACTIVITIES: CPT

## 2023-01-05 PROCEDURE — 85025 COMPLETE CBC W/AUTO DIFF WBC: CPT

## 2023-01-05 PROCEDURE — 74011636637 HC RX REV CODE- 636/637: Performed by: SURGERY

## 2023-01-05 PROCEDURE — 74011250637 HC RX REV CODE- 250/637: Performed by: SURGERY

## 2023-01-05 PROCEDURE — 99024 POSTOP FOLLOW-UP VISIT: CPT | Performed by: SURGERY

## 2023-01-05 PROCEDURE — 74011250636 HC RX REV CODE- 250/636: Performed by: INTERNAL MEDICINE

## 2023-01-05 PROCEDURE — 74011250637 HC RX REV CODE- 250/637: Performed by: INTERNAL MEDICINE

## 2023-01-05 PROCEDURE — 65270000029 HC RM PRIVATE

## 2023-01-05 PROCEDURE — 80069 RENAL FUNCTION PANEL: CPT

## 2023-01-05 RX ORDER — HEPARIN SODIUM 1000 [USP'U]/ML
INJECTION, SOLUTION INTRAVENOUS; SUBCUTANEOUS
Status: DISPENSED
Start: 2023-01-05 | End: 2023-01-06

## 2023-01-05 RX ADMIN — ISOSORBIDE DINITRATE 20 MG: 20 TABLET ORAL at 16:54

## 2023-01-05 RX ADMIN — EPOETIN ALFA-EPBX 8000 UNITS: 4000 INJECTION, SOLUTION INTRAVENOUS; SUBCUTANEOUS at 14:53

## 2023-01-05 RX ADMIN — OXYCODONE HYDROCHLORIDE 10 MG: 10 TABLET, FILM COATED, EXTENDED RELEASE ORAL at 08:58

## 2023-01-05 RX ADMIN — GABAPENTIN 100 MG: 100 CAPSULE ORAL at 21:01

## 2023-01-05 RX ADMIN — MELATONIN TAB 5 MG 5 MG: 5 TAB at 21:01

## 2023-01-05 RX ADMIN — HEPARIN SODIUM 3800 UNITS: 1000 INJECTION, SOLUTION INTRAVENOUS; SUBCUTANEOUS at 14:52

## 2023-01-05 RX ADMIN — GABAPENTIN 100 MG: 100 CAPSULE ORAL at 16:53

## 2023-01-05 RX ADMIN — LISINOPRIL 5 MG: 5 TABLET ORAL at 08:58

## 2023-01-05 RX ADMIN — HEPARIN SODIUM 5000 UNITS: 5000 INJECTION INTRAVENOUS; SUBCUTANEOUS at 21:00

## 2023-01-05 RX ADMIN — HYDRALAZINE HYDROCHLORIDE 25 MG: 25 TABLET, FILM COATED ORAL at 08:57

## 2023-01-05 RX ADMIN — ISOSORBIDE DINITRATE 20 MG: 20 TABLET ORAL at 08:58

## 2023-01-05 RX ADMIN — AMITRIPTYLINE HYDROCHLORIDE 50 MG: 25 TABLET, FILM COATED ORAL at 21:01

## 2023-01-05 RX ADMIN — CALCIUM CARBONATE 200 MG: 500 TABLET, CHEWABLE ORAL at 08:58

## 2023-01-05 RX ADMIN — CARVEDILOL 25 MG: 12.5 TABLET, FILM COATED ORAL at 08:57

## 2023-01-05 RX ADMIN — TICAGRELOR 90 MG: 90 TABLET ORAL at 08:57

## 2023-01-05 RX ADMIN — ISOSORBIDE DINITRATE 20 MG: 20 TABLET ORAL at 21:06

## 2023-01-05 RX ADMIN — ATORVASTATIN CALCIUM 40 MG: 40 TABLET, FILM COATED ORAL at 08:57

## 2023-01-05 RX ADMIN — HYDRALAZINE HYDROCHLORIDE 25 MG: 25 TABLET, FILM COATED ORAL at 21:01

## 2023-01-05 RX ADMIN — TICAGRELOR 90 MG: 90 TABLET ORAL at 20:55

## 2023-01-05 RX ADMIN — CARVEDILOL 25 MG: 12.5 TABLET, FILM COATED ORAL at 20:55

## 2023-01-05 RX ADMIN — HYDRALAZINE HYDROCHLORIDE 25 MG: 25 TABLET, FILM COATED ORAL at 16:53

## 2023-01-05 RX ADMIN — AMLODIPINE BESYLATE 10 MG: 5 TABLET ORAL at 08:57

## 2023-01-05 RX ADMIN — OXYCODONE HYDROCHLORIDE 10 MG: 10 TABLET, FILM COATED, EXTENDED RELEASE ORAL at 20:55

## 2023-01-05 RX ADMIN — INSULIN GLARGINE 10 UNITS: 100 INJECTION, SOLUTION SUBCUTANEOUS at 21:08

## 2023-01-05 RX ADMIN — HEPARIN SODIUM 5000 UNITS: 5000 INJECTION INTRAVENOUS; SUBCUTANEOUS at 06:48

## 2023-01-05 RX ADMIN — OXYCODONE AND ACETAMINOPHEN 1 TABLET: 10; 325 TABLET ORAL at 16:53

## 2023-01-05 RX ADMIN — GABAPENTIN 100 MG: 100 CAPSULE ORAL at 08:58

## 2023-01-05 NOTE — DIALYSIS
Pt maria luz 2.5 hour hemodialysis well. No fluid removed. Epogen 8000 units given IV.    Report given to [facility]

## 2023-01-05 NOTE — PROGRESS NOTES
Patient examined this morning. Patient was comfortable. I changed the dressing on the left AKA. Wounds are clean and dry. I covered the area with Xeroform gauze Kerlix and Ace wrap. Patient also has a severe PAD on the right leg. At some point we need to address this with angiogram.    Patient currently waiting for placement. Please have this patient come see me for follow-up outpatient in 7 to 10 days for suture removal on the left AKA. And I will arrange for right leg angiogram as outpatient.

## 2023-01-05 NOTE — PROGRESS NOTES
6440: Chart reviewed.  has sent messages to pending IRF facilities asking if they are able to accept patient. Insurance will require [address].  will continue to follow patient and recs of medical team.    1293: [facility] has accepted patient, but does not have a bed available at this time and therefore will not start [address].    [id number]: Message sent again to [facility] IRF asking if they can accept. 1320:  left with [facility] IRF Liaison asking if they can accept patient. 1500:  spoke with [facility] Liaison via phone and in person. [facility] will start [address].

## 2023-01-05 NOTE — PROGRESS NOTES
Renal Daily Progress Note    Admit Date: 12/22/2022      Subjective:   Patient is seen in the dialysis unit today . He has no complaints. He is awaiting placement. Labs drawn and results pending.         Current Facility-Administered Medications   Medication Dose Route Frequency    epoetin luis-epbx (RETACRIT) injection 8,000 Units  8,000 Units IntraVENous ONCE    0.9% sodium chloride infusion 250 mL  250 mL IntraVENous PRN    0.9% sodium chloride infusion 250 mL  250 mL IntraVENous PRN    amitriptyline (ELAVIL) tablet 50 mg  50 mg Oral QHS    naloxone (NARCAN) injection 1 mg  1 mg IntraVENous PRN    0.9% sodium chloride infusion 250 mL  250 mL IntraVENous PRN    sodium chloride (NS) flush 5-40 mL  5-40 mL IntraVENous PRN    ondansetron (ZOFRAN ODT) tablet 4 mg  4 mg Oral Q8H PRN    Or    ondansetron (ZOFRAN) injection 4 mg  4 mg IntraVENous Q6H PRN    oxyCODONE ER (OxyCONTIN) tablet 10 mg  10 mg Oral Q12H    oxyCODONE-acetaminophen (PERCOCET 10)  mg per tablet 1 Tablet  1 Tablet Oral Q6H PRN    heparin (porcine) injection 5,000 Units  5,000 Units SubCUTAneous Q8H    heparin (porcine) 1,000 unit/mL injection 3,800 Units  3,800 Units Hemodialysis DIALYSIS PRN    oxyCODONE IR (ROXICODONE) tablet 5 mg  5 mg Oral Q4H PRN    amLODIPine (NORVASC) tablet 10 mg  10 mg Oral DAILY    atorvastatin (LIPITOR) tablet 40 mg  40 mg Oral DAILY    calcium carbonate (TUMS) chewable tablet 200 mg [elemental]  200 mg Oral DAILY    carvediloL (COREG) tablet 25 mg  25 mg Oral BID    gabapentin (NEURONTIN) capsule 100 mg  100 mg Oral TID    hydrALAZINE (APRESOLINE) tablet 25 mg  25 mg Oral TID    insulin glargine (LANTUS) injection 10 Units  10 Units SubCUTAneous QHS    isosorbide dinitrate (ISORDIL) tablet 20 mg  20 mg Oral TID    lisinopriL (PRINIVIL, ZESTRIL) tablet 5 mg  5 mg Oral DAILY    melatonin tablet 5 mg  5 mg Oral QHS    ticagrelor (BRILINTA) tablet 90 mg  90 mg Oral BID    sodium chloride (NS) flush 5-40 mL  5-40 mL IntraVENous PRN    acetaminophen (TYLENOL) tablet 650 mg  650 mg Oral Q6H PRN    Or    acetaminophen (TYLENOL) suppository 650 mg  650 mg Rectal Q6H PRN    polyethylene glycol (MIRALAX) packet 17 g  17 g Oral DAILY PRN        Review of Systems    Review of Systems   Respiratory:  Negative for shortness of breath. Cardiovascular:  Negative for chest pain. Gastrointestinal:  Negative for abdominal pain. Neurological:  Negative for headaches. Objective:     Patient Vitals for the past 8 hrs:   BP Temp Pulse Resp SpO2   01/05/23 0904 (!) 170/85 98 °F (36.7 °C) 100 17 100 %       No intake/output data recorded. 01/03 1901 - 01/05 0700  In: 1000 [P.O.:800; I.V.:200]  Out: 1450 [Urine:1450]    Physical Exam:   Physical Exam  Cardiovascular:      Rate and Rhythm: Regular rhythm. Pulmonary:      Breath sounds: Normal breath sounds. Abdominal:      General: Bowel sounds are normal.      Palpations: Abdomen is soft. Neurological:      Mental Status: He is alert. 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 No results for input(s): NA, K, CL, CO2, GLU, BUN, CREA, CA, MG, PHOS, ALB, TBIL, ALT, INR, INREXT, INREXT, INREXT in the last 72 hours. No lab exists for component: SGOT, PTHBB    No components found for: GLPOC  No results for input(s): PH, PCO2, PO2, HCO3, FIO2 in the last 72 hours. No results for input(s): INR, INREXT, INREXT, INREXT in the last 72 hours.       Assessment:       Active Problems:    Wet gangrene ([address] (12/22/2022)  Renal failure on twice a week dialysis. Monday and Thursdays    Volume status is acceptable    Anemia of chronic disease- on  8,000 units Retacrit with each dialysis    HTN     Plan:   Hemodialysis today  Await lab results.

## 2023-01-05 NOTE — PROGRESS NOTES
OCCUPATIONAL THERAPY TREATMENT  Patient: Deanne Bustillo (81 y.o. male)  Date: 1/5/2023  Diagnosis: Wet gangrene ([facility] [address] Collette Proper <principal problem not specified>  Procedure(s) (LRB):  LEFT AMPUTATION KNEE(AKA) (Left) 12 Days Post-Op  Precautions:    Chart, occupational therapy assessment, plan of care, and goals were reviewed. ASSESSMENT  Pt continues with skilled OT services and is progressing towards goals. Upon DURHAM arrival, pt semi supine in bed and agreeable to tx session at this time. Overall, pt continues to present with deficits in generalized strength/AROM, coordination, bed mobility, static/dynamic sitting and standing balance and functional activity tolerance during performance of ADLs/mobility (see below for objective details and assist levels). Pt noted with continued improvement in overall mobility this date. Pt completed bed mobility with SBA this date and noted with intact balance while seated at EOB. Pt completed transfer from EOB with Marianela x1-2 using platform walker. Pt required ModA x2 overall for transfer from EOB>recliner requiring max verbal cueing for RLE placement and for maintaining upright standing posture. Pt noted with posterior lean and R lateral lean throughout transfer requiring increased assist from therapists for correction and to maintain upright standing posture. Pt required total A for LB dressing and setup A for seated grooming. Pt able to complete self feeding in chair with setup/SBA overall and assist with container management. Pt educated on UE HEP and verbalized understanding. Pt reporting increased pain throughout session, RN notified. Recommend d/c to [address]  once medically appropriate. Other factors to consider for discharge: family/social support, DME, time since onset, severity of deficits, decline from functional baseline         PLAN :  Patient continues to benefit from skilled intervention to address the above impairments. Continue treatment per established plan of care to address goals. Recommendation for discharge: (in order for the patient to meet his/her long term goals)  [address]     This discharge recommendation:  Has been made in collaboration with the attending provider and/or case management    IF patient discharges home will need the following DME: TBD       SUBJECTIVE:   Patient stated I am tired, I am still waking up.     OBJECTIVE DATA SUMMARY:   Cognitive/Behavioral Status:  Neurologic State: Alert  Orientation Level: Oriented X4  Cognition: Follows commands    Functional Mobility and Transfers for ADLs:  Bed Mobility:  Rolling: Stand-by assistance  Supine to Sit: Stand-by assistance  Scooting: Stand-by assistance    Transfers:  Sit to Stand: Minimum assistance; Additional time     Bed to Chair: Moderate assistance;Assist x2; Additional time    Balance:  Sitting: Intact; Without support  Standing: Impaired; With support  Standing - Static: Fair;Constant support  Standing - Dynamic : Poor;Constant support    ADL Intervention:  Feeding  Container Management: Total assistance (dependent)  Utensil Management: Stand-by assistance  Food to Mouth: Stand-by assistance  Drink to Mouth: Stand-by assistance    Grooming  Position Performed: Seated in chair  Washing Face: Set-up    Lower Body Dressing Assistance  Socks: Total assistance (dependent)    Pain:  9-10/10 L residual limb and L hand pain    Activity Tolerance:   Fair, requires rest breaks, and pain limiting mobility  Please refer to the flowsheet for vital signs taken during this treatment. After treatment patient left in no apparent distress:   Bed locked and in lowest position  Sitting in chair, Call bell within reach, and RN in room    COMMUNICATION/COLLABORATION:   The patients plan of care was discussed with: Physical therapist, Registered nurse, and Case management. [place] with PT for increased safety for pt and clinician.     Tiny HERMINIO Sanchez  Time Calculation: 21 mins    Problem: Self Care Deficits Care Plan (Adult)  Goal: *Acute Goals and Plan of Care (Insert Text)  Description:   FUNCTIONAL STATUS PRIOR TO ADMISSION: Patient was modified independent using a peyton walker for functional mobility. Patient was modified independent for basic and instrumental ADLs. HOME SUPPORT: Patient most recently resided at [facility] since October for rehab services. Prior to [place] pt was residing with his wife at home. Occupational Therapy Goals  Initiated 12/28/2022  Patient Goal: Walk and get to the bathroom. 1.  Patient will perform lower body dressing with modified independence within 7 day(s). 2.  Patient will perform grooming with modified independence within 7 day(s). 3.  Patient will perform bathing with modified independence within 7 day(s). 4.  Patient will perform toilet transfers with modified independence within 7 day(s). 5.  Patient will perform all aspects of toileting with modified independence within 7 day(s). 6.  Patient will participate in upper extremity therapeutic exercise/activities with modified independence within 7 day(s).     Outcome: Progressing Towards Goal

## 2023-01-05 NOTE — PROGRESS NOTES
PHYSICAL THERAPY REEVALUATION  Patient: Alfredo Messer (81 y.o. male)  Date: 1/5/2023  Primary Diagnosis: Wet gangrene ([address] Bertram Repine  Procedure(s) (LRB):  LEFT AMPUTATION KNEE(AKA) (Left) 12 Days Post-Op   Precautions: falls         ASSESSMENT  Patient initially seen for PT evaluation 12/28/22 and 5 skilled PT sessions since evalution. Patient seen today for PT reevaluation s/t LOS. Patient A&O x4. Pt semi-supine in bed upon PT/OT arrival, agreeable to session. Based on the objective data described, the patient presents with generalized weakness, impaired functional mobility, impaired amb, impaired balance, and decreased activity tolerance. (See below for objective details and assist levels). Overall pt tolerated session fair today, currently with c/o 9-10/10 left residual limp pain at rest and with mobility. Pt demonstrates ability to complete 3 foot hop through gt pattern from bed to bedside recliner, required max verbal cues for right LE placement and to maintain upright posture in standing, no right knee buckling noted though pt did demonstrates heavy posterior and right sided lean requiring assistance from PT to maintain stability with RW. Pt will benefit from continued skilled PT to address above deficits and return to PLOF, PT goals and POC reviewed on this date and continue to remain appropriate at this time. Current PT DC recommendation Inpatient Rehabilitation Facility  once medically appropriate. Current Level of Function Impacting Discharge (mobility/balance): mod Ax2 for OOB mobility     Other factors to consider for discharge: severity of deficits, LOS, acute medical state     Patient will benefit from skilled therapy intervention to address the above noted impairments.        PLAN :  Recommendations and Planned Interventions: bed mobility training, transfer training, gait training, therapeutic exercises, patient and family training/education, and therapeutic activities      Recommend for staff: Out of bed to chair for meals, Amb to bathroom for toileting with gt belt and AD, and stand pivot with platform walker, gt belt and mod A    Frequency/Duration: Patient will be followed by physical therapy: 3-5x/week to address goals. Recommendation for discharge: (in order for the patient to meet his/her long term goals)  [address]     This discharge recommendation:  Has been made in collaboration with the attending provider and/or case management         SUBJECTIVE:   Patient stated i'm hurting but I really want to get out of bed.     OBJECTIVE DATA SUMMARY:   HISTORY:    Past Medical History:   Diagnosis Date    A-fib ([facility] [address]     Anemia     Arrhythmia     CAD (coronary artery disease)     Chronic kidney disease     Diabetes ([facility] [address]     DM2    Dialysis patient [facility])     M-W-F Javiernarinder Gordono [phone]    Heart failure ([facility] [address]     Hypertension     STEMI (ST elevation myocardial infarction) ([facility] [address]      Past Surgical History:   Procedure Laterality Date    HX AMPUTATION Left     5 fingers    HX HEART CATHETERIZATION      IR INSERT NON TUNL CVC OVER 5 YRS  02/23/2021    IR INSERT NON TUNL CVC OVER 5 YRS  04/07/2022    IR INSERT TUNL CVC W/O PORT OVER 5 YR  03/02/2021    IR INSERT TUNL CVC W/O PORT OVER 5 YR  04/12/2022    [place] CARDIAC SURG PROCEDURE UNLIST         Home Situation  Home Environment: [address] [place] [address] Name: [address] [place] [address] Drive  One/Two Story Residence: One story  Living Alone: No  Support Systems: Spouse/Significant Other  Patient Expects to be Discharged to[de-identified] [address]  Current DME Used/Available at Home: Other (comment)    EXAMINATION/PRESENTATION/DECISION MAKING:   Critical Behavior:  Neurologic State: Alert  Orientation Level: Oriented X4  Cognition: Follows commands     Hearing:   Auditory  Auditory Impairment: None  Skin:  intact where visible  Edema: none noted   Range Of Motion:  AROM: Generally decreased, functional           PROM: Generally decreased, functional           Strength:    Strength: Generally decreased, functional     Functional Mobility:  Bed Mobility:  Rolling: Stand-by assistance  Supine to Sit: Stand-by assistance     Scooting: Stand-by assistance  Transfers:  Sit to Stand: Minimum assistance; Additional time  Stand to Sit: Minimum assistance; Moderate assistance;Assist x2        Bed to Chair: Moderate assistance;Assist x2; Additional time              Balance:   Sitting: Intact; Without support  Standing: Impaired; With support  Standing - Static: Fair;Constant support  Standing - Dynamic : Poor;Constant support  Ambulation/Gait Training:  Distance (ft): 3 Feet (ft)  Assistive Device: Gait belt; Other (comment) (left platform walker)  Ambulation - Level of Assistance: Moderate assistance;Assist x2     Gait Description (WDL): Exceptions to WDL      Therapeutic Exercises:   Pt would benefit from LE HEP to improve overall LE AROM/strength and can be further educated in next treatment session. Functional Measure:  [employer] AM-PAC 6 Clicks         Basic Mobility Inpatient Short Form  How much difficulty does the patient currently have. .. Unable A Lot A Little None   1. Turning over in bed (including adjusting bedclothes, sheets and blankets)? [] 1   [] 2   [] 3   [x] 4   2. Sitting down on and standing up from a chair with arms ( e.g., wheelchair, bedside commode, etc.)   [] 1   [x] 2   [] 3   [] 4   3. Moving from lying on back to sitting on the side of the bed? [] 1   [] 2   [] 3   [x] 4          How much help from another person does the patient currently need. .. Total A Lot A Little None   4. Moving to and from a bed to a chair (including a wheelchair)? [] 1   [x] 2   [] 3   [] 4   5. Need to walk in hospital room? [] 1   [x] 2   [] 3   [] 4   6. Climbing 3-5 steps with a railing? [x] 1   [] 2   [] 3   [] 4   © 2007, Trustees of [employer], under license to [employer].  All rights reserved     Score:  Initial: 15/24 Most Recent: X (Date: 23 )   Interpretation of Tool:  Represents activities that are increasingly more difficult (i.e. Bed mobility, Transfers, Gait). Score 24 23 22-20 19-15 14-10 9-7 6   Modifier CH CI CJ CK CL CM CN       Pain Ratin-10/10 left residual limp    Activity Tolerance:   Fair, requires rest breaks, and pain limiting mobility     After treatment patient left in no apparent distress:   Bed locked and in lowest position Sitting in chair and Call bell within reach and nsg updated. Goals:    Problem: Mobility Impaired (Adult and Pediatric)  Goal: *Acute Goals and Plan of Care (Insert Text)  Description: FUNCTIONAL STATUS PRIOR TO ADMISSION: Patient was modified independent using a peyton walker for functional mobility. Patient required setup assistance for basic and instrumental ADLs. HOME SUPPORT PRIOR TO ADMISSION: Pt at short term rehab at [place] since October. Prior to admission in October pt was living with his wife. Physical Therapy Goals  Initiated 2022  Pt stated goal: to get better, would eventually like to use a prosthesis on LLE. Pt will be I with LE HEP in 7 days. Pt will perform bed mobility with mod I in 7 days. Pt will perform transfers with mod I in 7 days. Pt will amb 5-10 feet with LRAD safely with min Ax1 in 7 days. Outcome: Progressing Towards Goal       COMMUNICATION/EDUCATION:   The patients plan of care was discussed with: Occupational therapist, Registered nurse, and Case management. Fall prevention education was provided and the patient/caregiver indicated understanding., Patient/family have participated as able in goal setting and plan of care. , and Patient/family agree to work toward stated goals and plan of care. PT/OT sessions occurred together for increased safety of pt and clinician.     Thank you for this referral.  Errol Orellana, PT, DPT   Time Calculation: 19 mins

## 2023-01-06 LAB
GLUCOSE BLD STRIP.AUTO-MCNC: 112 MG/DL (ref 65–100)
GLUCOSE BLD STRIP.AUTO-MCNC: 71 MG/DL (ref 65–100)
GLUCOSE BLD STRIP.AUTO-MCNC: 91 MG/DL (ref 65–100)
PERFORMED BY, TECHID: ABNORMAL
PERFORMED BY, TECHID: NORMAL
PERFORMED BY, TECHID: NORMAL

## 2023-01-06 PROCEDURE — 74011250637 HC RX REV CODE- 250/637: Performed by: SURGERY

## 2023-01-06 PROCEDURE — 74011250637 HC RX REV CODE- 250/637: Performed by: INTERNAL MEDICINE

## 2023-01-06 PROCEDURE — 82962 GLUCOSE BLOOD TEST: CPT

## 2023-01-06 PROCEDURE — 65270000029 HC RM PRIVATE

## 2023-01-06 PROCEDURE — 97530 THERAPEUTIC ACTIVITIES: CPT

## 2023-01-06 PROCEDURE — 74011250636 HC RX REV CODE- 250/636: Performed by: SURGERY

## 2023-01-06 PROCEDURE — 74011636637 HC RX REV CODE- 636/637: Performed by: SURGERY

## 2023-01-06 RX ADMIN — OXYCODONE HYDROCHLORIDE 10 MG: 10 TABLET, FILM COATED, EXTENDED RELEASE ORAL at 22:34

## 2023-01-06 RX ADMIN — AMLODIPINE BESYLATE 10 MG: 5 TABLET ORAL at 08:27

## 2023-01-06 RX ADMIN — GABAPENTIN 100 MG: 100 CAPSULE ORAL at 08:27

## 2023-01-06 RX ADMIN — MELATONIN TAB 5 MG 5 MG: 5 TAB at 21:49

## 2023-01-06 RX ADMIN — GABAPENTIN 100 MG: 100 CAPSULE ORAL at 15:07

## 2023-01-06 RX ADMIN — HYDRALAZINE HYDROCHLORIDE 25 MG: 25 TABLET, FILM COATED ORAL at 08:27

## 2023-01-06 RX ADMIN — OXYCODONE AND ACETAMINOPHEN 1 TABLET: 10; 325 TABLET ORAL at 11:38

## 2023-01-06 RX ADMIN — TICAGRELOR 90 MG: 90 TABLET ORAL at 21:50

## 2023-01-06 RX ADMIN — GABAPENTIN 100 MG: 100 CAPSULE ORAL at 21:49

## 2023-01-06 RX ADMIN — ISOSORBIDE DINITRATE 20 MG: 20 TABLET ORAL at 08:31

## 2023-01-06 RX ADMIN — ISOSORBIDE DINITRATE 20 MG: 20 TABLET ORAL at 15:06

## 2023-01-06 RX ADMIN — HYDRALAZINE HYDROCHLORIDE 25 MG: 25 TABLET, FILM COATED ORAL at 15:07

## 2023-01-06 RX ADMIN — AMITRIPTYLINE HYDROCHLORIDE 50 MG: 25 TABLET, FILM COATED ORAL at 21:49

## 2023-01-06 RX ADMIN — HYDRALAZINE HYDROCHLORIDE 25 MG: 25 TABLET, FILM COATED ORAL at 21:49

## 2023-01-06 RX ADMIN — OXYCODONE HYDROCHLORIDE 10 MG: 10 TABLET, FILM COATED, EXTENDED RELEASE ORAL at 08:27

## 2023-01-06 RX ADMIN — LISINOPRIL 5 MG: 5 TABLET ORAL at 08:27

## 2023-01-06 RX ADMIN — CALCIUM CARBONATE 200 MG: 500 TABLET, CHEWABLE ORAL at 08:27

## 2023-01-06 RX ADMIN — HEPARIN SODIUM 5000 UNITS: 5000 INJECTION INTRAVENOUS; SUBCUTANEOUS at 21:49

## 2023-01-06 RX ADMIN — CARVEDILOL 25 MG: 12.5 TABLET, FILM COATED ORAL at 21:49

## 2023-01-06 RX ADMIN — CARVEDILOL 25 MG: 12.5 TABLET, FILM COATED ORAL at 08:27

## 2023-01-06 RX ADMIN — INSULIN GLARGINE 10 UNITS: 100 INJECTION, SOLUTION SUBCUTANEOUS at 21:48

## 2023-01-06 RX ADMIN — TICAGRELOR 90 MG: 90 TABLET ORAL at 08:27

## 2023-01-06 RX ADMIN — ISOSORBIDE DINITRATE 20 MG: 20 TABLET ORAL at 21:50

## 2023-01-06 RX ADMIN — HEPARIN SODIUM 5000 UNITS: 5000 INJECTION INTRAVENOUS; SUBCUTANEOUS at 05:53

## 2023-01-06 RX ADMIN — ATORVASTATIN CALCIUM 40 MG: 40 TABLET, FILM COATED ORAL at 08:27

## 2023-01-06 RX ADMIN — HEPARIN SODIUM 5000 UNITS: 5000 INJECTION INTRAVENOUS; SUBCUTANEOUS at 15:06

## 2023-01-06 NOTE — PROGRESS NOTES
Discharge Summary     Patient: Justin Dobson MRN: [id number]  SSN: [id number]    YOB: 1955  Age: 74 y.o. Sex: male       Admit Date: 12/22/2022    Discharge Date: 1/6/2023      Admission Diagnoses: Wet gangrene [facility]) Hilda Calabrese    Discharge Diagnoses:   Problem List as of 1/6/2023 Date Reviewed: 12/27/2022            Codes Class Noted - Resolved    Wet gangrene ([facility] ICD-10-CM: A76  ICD-9-CM: 785.4  12/22/2022 - Present        Acute CHF (congestive heart failure) ([facility] ICD-10-CM: I50.9  ICD-9-CM: 428.0  4/6/2022 - Present        Dysphagia, unspecified type ICD-10-CM: R13.10  ICD-9-CM: 787.20  Unknown - Present        Fatigue, unspecified type ICD-10-CM: R53.83  ICD-9-CM: 780.79  Unknown - Present        Chronic systolic congestive heart failure ([facility] ICD-10-CM: I50.22  ICD-9-CM: 428.22, 428.0  2/16/2021 - Present        Goals of care, counseling/discussion ICD-10-CM: Z71.89  ICD-9-CM: V65.49  Unknown - Present        DNR (do not resuscitate) discussion ICD-10-CM: Z71.89  ICD-9-CM: V65.49  Unknown - Present        JUAN C (acute kidney injury) ([facility] ICD-10-CM: N17.9  ICD-9-CM: 736. 9  Unknown - Present        Acute hypoxemic respiratory failure due to COVID-19 [facility]) ICD-10-CM: U07.1, J96.01  ICD-9-CM: 518.81, 079.89, 799.02  1/30/2021 - Present        RESOLVED: Atypical chest pain ICD-10-CM: R07.89  ICD-9-CM: 786.59  4/6/2022 - 4/13/2022            Discharge Condition: Good    Hospital Course: 74years old patient with multiple medical issues including A. fib anemia arrhythmia CAD admitted for infected knee scheduled for surgery patient was seen by nephrology for dialysis management. He was placed on IV vancomycin Zosyn however with the amputation patient has been discontinued.   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 Qty: 30 Tablet, Refills: 0      epoetin luis-epbx (RETACRIT) 4,000 unit/mL injection 2 mL by SubCUTAneous route DIALYSIS MON, WED & FRI. Indications: anemia due to kidney failure  Qty: 12 Each, Refills: 0      heparin sodium,porcine (heparin, porcine,) 5,000 unit/mL injection 1 mL by SubCUTAneous route every eight (8) hours. Qty: 30 mL, Refills: 0      mupirocin (BACTROBAN) 2 % ointment Apply 1 g to affected area daily for 5 doses. Qty: 22 g, Refills: 0      naloxone (NARCAN) 1 mg/mL injection 1 mL by IntraVENous route as needed for Overdose or Respiratory Distress. Qty: 1 mL, Refills: 0      oxyCODONE IR (ROXICODONE) 5 mg immediate release tablet Take 1 Tablet by mouth every four (4) hours as needed for Pain for up to 3 days. Max Daily Amount: 30 mg.  Qty: 10 Tablet, Refills: 0    Associated Diagnoses: Pain      oxyCODONE ER (OxyCONTIN) 10 mg ER tablet Take 1 Tablet by mouth every twelve (12) hours for 30 days. Max Daily Amount: 20 mg.  Qty: 10 Tablet, Refills: 0    Associated Diagnoses: Pain      polyethylene glycol (MIRALAX) 17 gram packet Take 1 Packet by mouth daily. Qty: 30 Packet, Refills: 0           CONTINUE these medications which have CHANGED    Details   carvediloL (COREG) 25 mg tablet Take 1 Tablet by mouth two (2) times a day. Qty: 60 Tablet, Refills: 0           CONTINUE these medications which have NOT CHANGED    Details   ticagrelor (BRILINTA) 90 mg tablet Take 90 mg by mouth two (2) times a day. isosorbide dinitrate (ISORDIL) 20 mg tablet Take 20 mg by mouth three (3) times daily. lisinopriL (PRINIVIL, ZESTRIL) 5 mg tablet Take 5 mg by mouth daily. melatonin 5 mg cap capsule Take 5 mg by mouth nightly. atorvastatin (LIPITOR) 80 mg tablet Take 0.5 Tabs by mouth daily.   Qty: 30 Tab, Refills: 0      hydrALAZINE (APRESOLINE) 25 mg tablet Take 1 Tab by mouth three (3) times daily. Qty: 90 Tab, Refills: 0      aspirin 81 mg chewable tablet Take 81 mg by mouth daily. gabapentin (NEURONTIN) 100 mg capsule Take 1 Capsule by mouth three (3) times daily. Max Daily Amount: 300 mg. Qty: 15 Capsule, Refills: 0    Associated Diagnoses: Open wound of both lower extremities, initial encounter      calcium carbonate (TUMS) 200 mg calcium (500 mg) chew Take 1 Tablet by mouth in the morning. insulin glargine (Lantus U-100 Insulin) 100 unit/mL injection 10 Units by SubCUTAneous route nightly. Qty: 1 mL, Refills: 0      amLODIPine (NORVASC) 10 mg tablet Take 1 Tab by mouth daily.   Qty: 30 Tab, Refills: 0           STOP taking these medications       collagenase (SANTYL) 250 unit/gram ointment Comments:   Reason for Stopping:         acetaminophen (TYLENOL) 325 mg tablet Comments:   Reason for Stopping:               Activity: PT/OT Eval and Treat  Diet: Cardiac Diet and Renal Diet  Wound Care: As directed    Follow-up Appointments   Procedures    FOLLOW UP VISIT Appointment in: 3 - 5 Days     Standing Status:   Standing     Number of Occurrences:   1     Order Specific Question:   Appointment in     Answer:   3 - 5 Days     Waiting for bed  Signed By: Low Breen MD     January 6, 2023

## 2023-01-06 NOTE — PROGRESS NOTES
Problem: Mobility Impaired (Adult and Pediatric)  Goal: *Acute Goals and Plan of Care (Insert Text)  Description: FUNCTIONAL STATUS PRIOR TO ADMISSION: Patient was modified independent using a peyton walker for functional mobility. Patient required setup assistance for basic and instrumental ADLs. HOME SUPPORT PRIOR TO ADMISSION: Pt at short term rehab at [place] since October. Prior to admission in October pt was living with his wife. Physical Therapy Goals  Initiated 12/28/2022  Pt stated goal: to get better, would eventually like to use a prosthesis on LLE. Pt will be I with LE HEP in 7 days. Pt will perform bed mobility with mod I in 7 days. Pt will perform transfers with mod I in 7 days. Pt will amb 5-10 feet with LRAD safely with min Ax1 in 7 days. Outcome: Progressing Towards Goal   PHYSICAL THERAPY TREATMENT  Patient: Julia Ramsey (14 y.o. male)  Date: 1/6/2023  Diagnosis: Wet gangrene ([address] Jacinto Morton <principal problem not specified>  Procedure(s) (LRB):  LEFT AMPUTATION KNEE(AKA) (Left) 13 Days Post-Op  Precautions:    Chart, physical therapy assessment, plan of care and goals were reviewed. ASSESSMENT  Patient continues with skilled PT services and is progressing towards goals. Pt found semi supine upon PTA/DURHAM arrival, agreeable to session. (See below for objective details and assist levels). Overall pt tolerated session fair today with bed mobility and transfers. Pt continues to demo great progression towards functional goals requiring less A. Reviewed HEP in supine upon entry. Pt able to transfer to EOB with SBA mostly however req CGA due to posterior LOB, pt able to self correct. At EOB pt performed x3-4 sit<>stand with min-mod A x2 and split hand placement. Pt required Ax2 to stabilize RW and RLE due to sliding with stand. In standing pt demo'd L lateral lean and forward flexed posture despite cues for neutral position.  Pt complained of dizziness in standing, BP within normal limits, pt required additional time throughout session for rest breaks. Pt filemon's great rehab potential is highly motivated to improve functional transfers and balance to promote functional independence. Will continue to benefit from skilled PT services, and will continue to progress as tolerated. Current Level of Function Impacting Discharge (mobility/balance): functional mobility     Other factors to consider for discharge: Amount of A needed for mobility and PLOF         PLAN :  Patient continues to benefit from skilled intervention to address the above impairments. Continue treatment per established plan of care to address goals. Recommend with staff: Out of bed to chair for meals and Encourage HEP in prep for ADLs/mobility    Recommendation for discharge: (in order for the patient to meet his/her long term goals)  [address]     This discharge recommendation:  Has been made in collaboration with the attending provider and/or case management    IF patient discharges home will need the following DME: to be determined (TBD)       SUBJECTIVE:   Patient stated \" I just finished doing my exercises\" Pt reports upon entry   I feel like my left leg is on the floor.   \"Are yall helping me up\". OBJECTIVE DATA SUMMARY:   Critical Behavior:  Neurologic State: Alert  Orientation Level: Oriented X4  Cognition: Follows commands, Appropriate decision making, Appropriate for age attention/concentration     Functional Mobility Training:  Bed Mobility:  Rolling: Stand-by assistance  Supine to Sit: Contact guard assistance  Sit to Supine: Stand-by assistance  Scooting: Stand-by assistance  Transfers:  Sit to Stand: Minimum assistance; Moderate assistance;Assist x2; Additional time; Adaptive equipment  Stand to Sit: Minimum assistance;Assist x2; Additional time; Adaptive equipment  Balance:  Sitting: Intact; Without support  Standing: Impaired; With support  Standing - Static: Fair;Constant support  Standing - Dynamic : Poor;Constant support       Pain Ratin/10 LUE/LLE    Activity Tolerance:   Fair and requires rest breaks    After treatment patient left in no apparent distress:   Bed locked and returned to lowest position, Supine in bed and Call bell within reach      COMMUNICATION/COLLABORATION:   The patients plan of care was discussed with: Occupational therapy assistant and Registered nurse.      PTA/DURHAM cotreat to maximize pt and clinician safety     Asia Rousseau PTA, PT   Time Calculation: 24 mins

## 2023-01-06 NOTE — PROGRESS NOTES
Working on Mickeal Mediate to [facility] in [place]. Verbal conversation with liaison this morning stated he is on the list for insurance authorization. Still actively looking for SNF/LTC as well that can accomodates him and his dialysis chair.

## 2023-01-06 NOTE — PROGRESS NOTES
OCCUPATIONAL THERAPY TREATMENT  Patient: Justin Dobson (65 y.o. male)  Date: 1/6/2023  Diagnosis: Wet gangrene ([address] Hilda Chard <principal problem not specified>  Procedure(s) (LRB):  LEFT AMPUTATION KNEE(AKA) (Left) 13 Days Post-Op  Precautions:    Chart, occupational therapy assessment, plan of care, and goals were reviewed. ASSESSMENT  Pt continues with skilled OT services and is progressing towards goals. Upon DURHAM arrival, pt semi supine in bed and agreeable to tx session at this time. Overall, pt continues to present with deficits in generalized strength/AROM, coordination, bed mobility, static/dynamic sitting and standing balance and functional activity tolerance during performance of ADLs/mobility (see below for objective details and assist levels). Pt noted with continued improvement in overall mobility. Pt completed bed mobility with overall SBA/CGA. Pt completed x3-4 sit>stands from EOB with Min/ModA x2 for balance, blocking of R foot, and holding RW in place. Pt declined sitting in recliner or standard chair this date due discomfort when sitting too long day prior. Pt required MaxA for donning of sock to RLE due to limited use with LUE. Pt returned to supine with SBA overall. Pt required additional time for all mobility due to pain and fatigue. Recommend d/c to [address] [employer] [address]  once medically appropriate. Other factors to consider for discharge: family/social support, DME, time since onset, severity of deficits, decline from functional baseline         PLAN :  Patient continues to benefit from skilled intervention to address the above impairments. Continue treatment per established plan of care to address goals.     Recommendation for discharge: (in order for the patient to meet his/her long term goals)  [id number]     This discharge recommendation:  Has been made in collaboration with the attending provider and/or case management    IF patient discharges home will need the following DME: TBD       SUBJECTIVE:   Patient stated I do not want to get into the chair today.     OBJECTIVE DATA SUMMARY:   Cognitive/Behavioral Status:  Neurologic State: Alert     Cognition: Follows commands; Appropriate decision making; Appropriate for age attention/concentration    Functional Mobility and Transfers for ADLs:  Bed Mobility:  Rolling: Stand-by assistance  Supine to Sit: Contact guard assistance  Sit to Supine: Stand-by assistance  Scooting: Stand-by assistance    Transfers:  Sit to Stand: Minimum assistance; Moderate assistance;Assist x2; Additional time; Adaptive equipment    Balance:  Sitting: Intact; Without support  Standing: Impaired; With support  Standing - Static: Fair;Constant support  Standing - Dynamic : Poor;Constant support    ADL Intervention:  Lower Body Dressing Assistance  Socks: Maximum assistance    Pain:  9/10 L residual limb and L hand pain    Activity Tolerance:   Fair and requires rest breaks  Please refer to the flowsheet for vital signs taken during this treatment. After treatment patient left in no apparent distress:   Bed locked and in lowest position  Supine in bed, Call bell within reach, Bed / chair alarm activated, and Side rails x 3    COMMUNICATION/COLLABORATION:   The patients plan of care was discussed with: Physical therapy assistant, Registered nurse, and Case management. Cotreat with PT for increased safety for pt and clinician. HERMINIO Alvarez  Time Calculation: 24 mins    Problem: Self Care Deficits Care Plan (Adult)  Goal: *Acute Goals and Plan of Care (Insert Text)  Description:   FUNCTIONAL STATUS PRIOR TO ADMISSION: Patient was modified independent using a peyton walker for functional mobility. Patient was modified independent for basic and instrumental ADLs. HOME SUPPORT: Patient most recently resided at [facility] since October for rehab services.  Prior to [place] pt was residing with his wife at home. Occupational Therapy Goals  Initiated 12/28/2022  Patient Goal: Walk and get to the bathroom. 1.  Patient will perform lower body dressing with modified independence within 7 day(s). 2.  Patient will perform grooming with modified independence within 7 day(s). 3.  Patient will perform bathing with modified independence within 7 day(s). 4.  Patient will perform toilet transfers with modified independence within 7 day(s). 5.  Patient will perform all aspects of toileting with modified independence within 7 day(s). 6.  Patient will participate in upper extremity therapeutic exercise/activities with modified independence within 7 day(s).     Outcome: Progressing Towards Goal

## 2023-01-06 NOTE — PROGRESS NOTES
Problem: Falls - Risk of  Goal: *Absence of Falls  Description: Document Carol Diop Fall Risk and appropriate interventions in the flowsheet. Outcome: Progressing Towards Goal  Note: Fall Risk Interventions:  Mobility Interventions: Bed/chair exit alarm         Medication Interventions: Bed/chair exit alarm    Elimination Interventions: Call light in reach              Problem: Pressure Injury - Risk of  Goal: *Prevention of pressure injury  Description: Document Cruzito Scale and appropriate interventions in the flowsheet.   Outcome: Progressing Towards Goal  Note: Pressure Injury Interventions:  Sensory Interventions: Assess changes in LOC, Check visual cues for pain    Moisture Interventions: Absorbent underpads, Apply protective barrier, creams and emollients    Activity Interventions: PT/OT evaluation    Mobility Interventions: HOB 30 degrees or less    Nutrition Interventions: Document food/fluid/supplement intake    Friction and Shear Interventions: Apply protective barrier, creams and emollients

## 2023-01-07 LAB
GLUCOSE BLD STRIP.AUTO-MCNC: 104 MG/DL (ref 65–100)
GLUCOSE BLD STRIP.AUTO-MCNC: 107 MG/DL (ref 65–100)
GLUCOSE BLD STRIP.AUTO-MCNC: 129 MG/DL (ref 65–100)
GLUCOSE BLD STRIP.AUTO-MCNC: 63 MG/DL (ref 65–100)
GLUCOSE BLD STRIP.AUTO-MCNC: 67 MG/DL (ref 65–100)
GLUCOSE BLD STRIP.AUTO-MCNC: 89 MG/DL (ref 65–100)
PERFORMED BY, TECHID: ABNORMAL
PERFORMED BY, TECHID: NORMAL
PERFORMED BY, TECHID: NORMAL

## 2023-01-07 PROCEDURE — 74011250636 HC RX REV CODE- 250/636: Performed by: SURGERY

## 2023-01-07 PROCEDURE — 97530 THERAPEUTIC ACTIVITIES: CPT

## 2023-01-07 PROCEDURE — 74011250637 HC RX REV CODE- 250/637: Performed by: SURGERY

## 2023-01-07 PROCEDURE — 82962 GLUCOSE BLOOD TEST: CPT

## 2023-01-07 PROCEDURE — 65270000029 HC RM PRIVATE

## 2023-01-07 PROCEDURE — 74011250637 HC RX REV CODE- 250/637: Performed by: INTERNAL MEDICINE

## 2023-01-07 RX ADMIN — HYDRALAZINE HYDROCHLORIDE 25 MG: 25 TABLET, FILM COATED ORAL at 21:36

## 2023-01-07 RX ADMIN — MELATONIN TAB 5 MG 5 MG: 5 TAB at 21:36

## 2023-01-07 RX ADMIN — OXYCODONE AND ACETAMINOPHEN 1 TABLET: 10; 325 TABLET ORAL at 17:31

## 2023-01-07 RX ADMIN — GABAPENTIN 100 MG: 100 CAPSULE ORAL at 18:38

## 2023-01-07 RX ADMIN — HYDRALAZINE HYDROCHLORIDE 25 MG: 25 TABLET, FILM COATED ORAL at 18:38

## 2023-01-07 RX ADMIN — LISINOPRIL 5 MG: 5 TABLET ORAL at 10:51

## 2023-01-07 RX ADMIN — CARVEDILOL 25 MG: 12.5 TABLET, FILM COATED ORAL at 21:36

## 2023-01-07 RX ADMIN — HEPARIN SODIUM 5000 UNITS: 5000 INJECTION INTRAVENOUS; SUBCUTANEOUS at 18:38

## 2023-01-07 RX ADMIN — CARVEDILOL 25 MG: 12.5 TABLET, FILM COATED ORAL at 10:51

## 2023-01-07 RX ADMIN — HEPARIN SODIUM 5000 UNITS: 5000 INJECTION INTRAVENOUS; SUBCUTANEOUS at 05:54

## 2023-01-07 RX ADMIN — OXYCODONE HYDROCHLORIDE 10 MG: 10 TABLET, FILM COATED, EXTENDED RELEASE ORAL at 10:51

## 2023-01-07 RX ADMIN — ACETAMINOPHEN 650 MG: 325 TABLET, FILM COATED ORAL at 18:44

## 2023-01-07 RX ADMIN — ISOSORBIDE DINITRATE 20 MG: 20 TABLET ORAL at 10:51

## 2023-01-07 RX ADMIN — TICAGRELOR 90 MG: 90 TABLET ORAL at 21:36

## 2023-01-07 RX ADMIN — AMITRIPTYLINE HYDROCHLORIDE 50 MG: 25 TABLET, FILM COATED ORAL at 21:36

## 2023-01-07 RX ADMIN — ISOSORBIDE DINITRATE 20 MG: 20 TABLET ORAL at 21:36

## 2023-01-07 RX ADMIN — TICAGRELOR 90 MG: 90 TABLET ORAL at 10:51

## 2023-01-07 RX ADMIN — GABAPENTIN 100 MG: 100 CAPSULE ORAL at 10:50

## 2023-01-07 RX ADMIN — GABAPENTIN 100 MG: 100 CAPSULE ORAL at 21:36

## 2023-01-07 RX ADMIN — AMLODIPINE BESYLATE 10 MG: 5 TABLET ORAL at 10:49

## 2023-01-07 RX ADMIN — OXYCODONE HYDROCHLORIDE 10 MG: 10 TABLET, FILM COATED, EXTENDED RELEASE ORAL at 21:36

## 2023-01-07 RX ADMIN — HYDRALAZINE HYDROCHLORIDE 25 MG: 25 TABLET, FILM COATED ORAL at 10:51

## 2023-01-07 RX ADMIN — HEPARIN SODIUM 5000 UNITS: 5000 INJECTION INTRAVENOUS; SUBCUTANEOUS at 21:36

## 2023-01-07 RX ADMIN — ISOSORBIDE DINITRATE 20 MG: 20 TABLET ORAL at 18:38

## 2023-01-07 RX ADMIN — ATORVASTATIN CALCIUM 40 MG: 40 TABLET, FILM COATED ORAL at 10:51

## 2023-01-07 RX ADMIN — CALCIUM CARBONATE 200 MG: 500 TABLET, CHEWABLE ORAL at 10:51

## 2023-01-07 NOTE — PROGRESS NOTES
Bedside shift change report given to Warren-Feldman Squibb   (oncoming nurse) by Timi De Oliveira RN   (offgoing nurse). Report included the following information SBAR and MAR.

## 2023-01-07 NOTE — PROGRESS NOTES
Problem: Self Care Deficits Care Plan (Adult)  Goal: *Acute Goals and Plan of Care (Insert Text)  Description:   FUNCTIONAL STATUS PRIOR TO ADMISSION: Patient was modified independent using a peyton walker for functional mobility. Patient was modified independent for basic and instrumental ADLs. HOME SUPPORT: Patient most recently resided at [facility] since October for rehab services. Prior to [place] pt was residing with his wife at home. Occupational Therapy Goals  Initiated 12/28/2022  Patient Goal: Walk and get to the bathroom. 1.  Patient will perform lower body dressing with modified independence within 7 day(s). 2.  Patient will perform grooming with modified independence within 7 day(s). 3.  Patient will perform bathing with modified independence within 7 day(s). 4.  Patient will perform toilet transfers with modified independence within 7 day(s). 5.  Patient will perform all aspects of toileting with modified independence within 7 day(s). 6.  Patient will participate in upper extremity therapeutic exercise/activities with modified independence within 7 day(s). 1/7/2023 0900 by Jas Hercules  Outcome: Progressing Towards Goal  1/7/2023 0900 by Jas Hercules  Outcome: Progressing Towards Goal     Problem: Patient Education: Go to Patient Education Activity  Goal: Patient/Family Education  1/7/2023 0900 by Jas Hercules  Outcome: Progressing Towards Goal  1/7/2023 0900 by Jas Hercules  Outcome: Progressing Towards Goal    OCCUPATIONAL THERAPY TREATMENT  Patient: Luis Alcaraz (14 y.o. male)  Date: 1/7/2023  Diagnosis: Wet gangrene ([address] Scot Linen <principal problem not specified>  Procedure(s) (LRB):  LEFT AMPUTATION KNEE(AKA) (Left) 14 Days Post-Op  Precautions:    Chart, occupational therapy assessment, plan of care, and goals were reviewed. ASSESSMENT  Pt continues with skilled OT services and is progressing towards goals.  Pt received semi-supine in bed upon arrival, AXO x4 and agreeable to OT tx at this time. Overall, pt continues to present with deficits in generalized strength/AROM, coordination, bed mobility, static/dynamic sitting and standing balance and functional activity tolerance during performance of ADLs/mobility (see below for objective details and assist levels). Pt received supine in bed agreeable to participate with encouragement. Pt given verbal cues for use of bed rail to get to EOB and pt reporting slight dizziness. After several minutes of rest break, pt completed STS from EOB with mod Ax1 and min Ax1 to perform total A bowel hygiene in standing. Pt sat back down to rest and reported more dizziness which he stated improved after resting and drinking water. Pt then completed second STS from EOB min Ax1 and mod Ax1 to hop several steps to recliner. Pt unable to hop backwards towards chair so recliner brought forward for him with constant cueing on use of UE to promote offloading of leg to assist with hopping. Pt returned demonstration. Pt left semi supine in recliner with call bell within reach and all needs met. Will continue to progress. Recommend d/c to [address]  once medically appropriate. Other factors to consider for discharge: PLOF, Ax2         PLAN :  Patient continues to benefit from skilled intervention to address the above impairments. Continue treatment per established plan of care. to address goals. Recommend with staff: Out of bed to chair for meals and Encourage HEP in prep for ADLs/mobility    Recommend next session:  Toileting    Recommendation for discharge: (in order for the patient to meet his/her long term goals)  [address]     This discharge recommendation:  Has been made in collaboration with the attending provider and/or case management    IF patient discharges home will need the following DME: TBD       SUBJECTIVE:   Patient stated I get dizzy when I stand.    OBJECTIVE DATA SUMMARY:   Cognitive/Behavioral Status:  Neurologic State: Alert  Orientation Level: Oriented X4  Cognition: Follows commands    Functional Mobility and Transfers for ADLs:  Bed Mobility:  Rolling: Stand-by assistance  Supine to Sit: Stand-by assistance  Scooting: Stand-by assistance    Transfers:  Sit to Stand: Minimum assistance; Moderate assistance;Assist x2; Additional time    Balance:  Sitting: Intact  Standing: Impaired; With support  Standing - Static: Fair;Constant support  Standing - Dynamic : Poor;Constant support    ADL Intervention:    Toileting  Bowel Hygiene: Maximum assistance      Pain:  0/10    Activity Tolerance:   Fair and requires rest breaks    After treatment patient left in no apparent distress:   Sitting in chair, Heels elevated for pressure relief, and Call bell within reach, bed locked and in lowest position    COMMUNICATION/COLLABORATION:   The patients plan of care was discussed with: Physical therapy assistant and Registered nurse. Co treatment provided with PTA this date for increased clinician and patient safety due to functional limitations of patient.      Jcarlos De La Torre  Time Calculation: 31 mins

## 2023-01-07 NOTE — PROGRESS NOTES
Problem: Mobility Impaired (Adult and Pediatric)  Goal: *Acute Goals and Plan of Care (Insert Text)  Description: FUNCTIONAL STATUS PRIOR TO ADMISSION: Patient was modified independent using a peyton walker for functional mobility. Patient required setup assistance for basic and instrumental ADLs. HOME SUPPORT PRIOR TO ADMISSION: Pt at short term rehab at [facility] since October. Prior to admission in October pt was living with his wife. Physical Therapy Goals  Initiated 12/28/2022  Pt stated goal: to get better, would eventually like to use a prosthesis on LLE. Pt will be I with LE HEP in 7 days. Pt will perform bed mobility with mod I in 7 days. Pt will perform transfers with mod I in 7 days. Pt will amb 5-10 feet with LRAD safely with min Ax1 in 7 days. Outcome: Progressing Towards Goal   PHYSICAL THERAPY TREATMENT  Patient: Delma Brewer (34 y.o. male)  Date: 1/7/2023  Diagnosis: Wet gangrene ([address] Myriam Sandhu <principal problem not specified>  Procedure(s) (LRB):  LEFT AMPUTATION KNEE(AKA) (Left) 14 Days Post-Op  Precautions:    Chart, physical therapy assessment, plan of care and goals were reviewed. ASSESSMENT  Patient continues with skilled PT services and is progressing towards goals. Pt semi-supine upon PT arrival, agreeable to session. Co-treat with OT due to level of assist for OOB mobility and transfer to recliner. (See below for objective details and assist levels). Overall pt tolerated session fair today with bed mobility performed, sit<>stand 2x and transfer to recliner to sit up. Patient needing to be cleaned up so PT stood with patient with platform walker and modA to maintain standing balance but patient with posterior lean despite cues for anterior weight shift to improve COG. Some dizziness in standing but improved with prolonged seated rest break and pushing fluids.  Performed transfer to recliner with modA x 2 and incr time as well as cues for sequencing and AD management . Will continue to benefit from skilled PT services, and will continue to progress as tolerated. Current Level of Function Impacting Discharge (mobility/balance): decr strength, decr balance, decr mobility    Other factors to consider for discharge: L AKA, diabetic, level of assist, fall risj         PLAN :  Patient continues to benefit from skilled intervention to address the above impairments. Continue treatment per established plan of care to address goals. Recommend with staff: Out of bed to chair for meals, Encourage HEP in prep for ADLs/mobility, Frequent repositioning to prevent skin breakdown, Use of bed/chair alarm for safety, and LE elevation for management of edema    Recommendation for discharge: (in order for the patient to meet his/her long term goals)  [facility]     This discharge recommendation:  Has been made in collaboration with the attending provider and/or case management    IF patient discharges home will need the following DME: rolling walker and to be determined (TBD)       SUBJECTIVE:   Patient stated I need to pee but nothing is coming out.     OBJECTIVE DATA SUMMARY:   Critical Behavior:  Neurologic State: Alert  Orientation Level: Oriented X4  Cognition: Follows commands     Functional Mobility Training:  Bed Mobility:  Rolling: Stand-by assistance  Supine to Sit: Stand-by assistance     Scooting: Stand-by assistance        Transfers:  Sit to Stand: Minimum assistance; Moderate assistance;Assist x2; Additional time  Stand to Sit: Minimum assistance; Moderate assistance;Assist x2; Additional time    Balance:  Sitting: Intact  Standing: Impaired; With support  Standing - Static: Fair;Constant support  Standing - Dynamic : Poor;Constant support  Ambulation/Gait Training:  Distance (ft): 3 Feet (ft)  Assistive Device: Gait belt;Walker, rolling (L platform walker)  Ambulation - Level of Assistance: Moderate assistance;Assist x2; Additional time; Adaptive equipment      Therapeutic Exercises:       EXERCISE   Sets   Reps   Active Active Assist   Passive Self ROM   Comments   Ankle Pumps   [] [] [] []    Quad Sets/Glut Sets   [] [] [] []    Hamstring Sets   [] [] [] []    Short Arc Quads   [] [] [] []    Heel Slides   [] [] [] []    Straight Leg Raises   [] [] [] []    Hip abd/add   [] [] [] []    Long Arc Quads 1 10 [] [] [] [] RLE only in sitting   Marching   [] [] [] []       [] [] [] []       Pain Rating:  No pain reported    Activity Tolerance:   Fair, SpO2 stable on RA, requires frequent rest breaks, and observed SOB with activity    After treatment patient left in no apparent distress:   Bed locked and returned to lowest position, Sitting in chair, Heels elevated for pressure relief, and Call bell within reach    COMMUNICATION/COLLABORATION:   The patients plan of care was discussed with: Physical therapist, Occupational therapy assistant, and Registered nurse.      Vera Saleh, PT   Time Calculation: 31 mins

## 2023-01-07 NOTE — PROGRESS NOTES
General Daily Progress Note          Patient Name:   Lamin Catherine       YOB: 1955       Age:  74 y.o. Admit Date: 12/22/2022      Subjective:         Seen resting the bed alert awake no complaints        Objective:     Visit Vitals  BP (!) 150/74 (BP 1 Location: Left upper arm, BP Patient Position: At rest;Supine)   Pulse 86   Temp 98.1 °F (36.7 °C)   Resp 20   Ht 5' 7.99\" (1.727 m)   Wt 57.3 kg (126 lb 5.2 oz)   SpO2 97%   BMI 19.21 kg/m²        Recent Results (from the past 24 hour(s))   GLUCOSE, POC    Collection Time: 01/06/23  9:17 PM   Result Value Ref Range    Glucose (POC) 91 65 - 100 mg/dL    Performed by Benjamin Alves, POC    Collection Time: 01/07/23  7:07 AM   Result Value Ref Range    Glucose (POC) 63 (L) 65 - 100 mg/dL    Performed by [facility])    GLUCOSE, POC    Collection Time: 01/07/23  7:20 AM   Result Value Ref Range    Glucose (POC) 67 65 - 100 mg/dL    Performed by [facility])    GLUCOSE, POC    Collection Time: 01/07/23  8:43 AM   Result Value Ref Range    Glucose (POC) 89 65 - 100 mg/dL    Performed by [facility])    GLUCOSE, POC    Collection Time: 01/07/23 11:41 AM   Result Value Ref Range    Glucose (POC) 104 (H) 65 - 100 mg/dL    Performed by [facility])      [unfilled]      Review of Systems    Constitutional: Negative for chills and fever. HENT: Negative. Eyes: Negative. Respiratory: Negative. Cardiovascular: Negative. Gastrointestinal: Negative for abdominal pain and nausea. Skin: Negative. Neurological: Negative. Physical Exam:      Constitutional: pt is oriented to person, place, and time. HENT:   Head: Normocephalic and atraumatic. Eyes: Pupils are equal, round, and reactive to light. EOM are normal.   Cardiovascular: Normal rate, regular rhythm and normal heart sounds. Pulmonary/Chest: Breath sounds normal. No wheezes. No rales.    Exhibits no tenderness. Abdominal: Soft. Bowel sounds are normal. There is no abdominal tenderness. There is no rebound and no guarding. Musculoskeletal: Normal range of motion. Neurological: pt is alert and oriented to person, place, and time. LOWER EXT ART PVR MULT LEVEL SEG PRESSURES   Final Result           Recent Results (from the past 24 hour(s))   GLUCOSE, POC    Collection Time: 01/06/23  9:17 PM   Result Value Ref Range    Glucose (POC) 91 65 - 100 mg/dL    Performed by Benjamin Alves, POC    Collection Time: 01/07/23  7:07 AM   Result Value Ref Range    Glucose (POC) 63 (L) 65 - 100 mg/dL    Performed by [facility])    GLUCOSE, POC    Collection Time: 01/07/23  7:20 AM   Result Value Ref Range    Glucose (POC) 67 65 - 100 mg/dL    Performed by [facility])    GLUCOSE, POC    Collection Time: 01/07/23  8:43 AM   Result Value Ref Range    Glucose (POC) 89 65 - 100 mg/dL    Performed by [facility])    GLUCOSE, POC    Collection Time: 01/07/23 11:41 AM   Result Value Ref Range    Glucose (POC) 104 (H) 65 - 100 mg/dL    Performed by [facility])        Results       ** No results found for the last 336 hours. **             Labs:     Recent Labs     01/05/23  1210   WBC 10.0   HGB 9.1*   HCT 30.1*        Recent Labs     01/05/23  1210      K 4.0   *   CO2 26   BUN 41*   CREA 3.14*   *   CA 8.6   PHOS 4.6     Recent Labs     01/05/23  1210   ALB 2.3*     No results for input(s): INR, PTP, APTT, INREXT in the last 72 hours. No results for input(s): FE, TIBC, PSAT, FERR in the last 72 hours. Lab Results   Component Value Date/Time    Folate 17.5 02/23/2021 03:59 AM      No results for input(s): PH, PCO2, PO2 in the last 72 hours. No results for input(s): CPK, CKNDX, TROIQ in the last 72 hours.     No lab exists for component: CPKMB  Lab Results   Component Value Date/Time    Cholesterol, total 115 02/26/2010 05:20 PM    HDL Cholesterol 31 (L) 02/26/2010 05:20 PM    LDL, calculated 46.2 02/26/2010 05:20 PM    Triglyceride 189 02/26/2010 05:20 PM    CHOL/HDL Ratio 3.7 02/26/2010 05:20 PM     Lab Results   Component Value Date/Time    Glucose (POC) 104 (H) 01/07/2023 11:41 AM    Glucose (POC) 89 01/07/2023 08:43 AM    Glucose (POC) 67 01/07/2023 07:20 AM    Glucose (POC) 63 (L) 01/07/2023 07:07 AM    Glucose (POC) 91 01/06/2023 09:17 PM     Lab Results   Component Value Date/Time    Color YELLOW/STRAW 04/06/2022 08:52 PM    Appearance CLEAR 04/06/2022 08:52 PM    Specific gravity 1.012 04/06/2022 08:52 PM    Specific gravity 1.015 02/26/2010 12:32 PM    pH (UA) 6.0 04/06/2022 08:52 PM    Protein 300 (A) 04/06/2022 08:52 PM    Glucose Negative 04/06/2022 08:52 PM    Ketone Negative 04/06/2022 08:52 PM    Bilirubin Negative 04/06/2022 08:52 PM    Urobilinogen 0.2 04/06/2022 08:52 PM    Nitrites Negative 04/06/2022 08:52 PM    Leukocyte Esterase Negative 04/06/2022 08:52 PM    Epithelial cells FEW 04/06/2022 08:52 PM    Bacteria Negative 04/06/2022 08:52 PM    WBC 5-10 04/06/2022 08:52 PM    RBC 10-20 04/06/2022 08:52 PM         Assessment:     Wet gangrene  End-stage renal disease on hemodialysis  Anemia of chronic disease  Hypertension  Type 2 diabetes      Plan:       Sinew current medications      Current Facility-Administered Medications:     0.9% sodium chloride infusion 250 mL, 250 mL, IntraVENous, PRN, Judson Flor MD    0.9% sodium chloride infusion 250 mL, 250 mL, IntraVENous, PRN, Ad Fuentes MD    amitriptyline (ELAVIL) tablet 50 mg, 50 mg, Oral, QHS, Ad Fuentes MD, 50 mg at 01/06/23 2149    naloxone (NARCAN) injection 1 mg, 1 mg, IntraVENous, PRN, Ad Fuentes MD    0.9% sodium chloride infusion 250 mL, 250 mL, IntraVENous, PRN, Raquel Faustin MD    sodium chloride (NS) flush 5-40 mL, 5-40 mL, IntraVENous, PRN, Raquel Faustin MD    ondansetron (ZOFRAN ODT) tablet 4 mg, 4 mg, Oral, Q8H PRN **OR** ondansetron (ZOFRAN) injection 4 mg, 4 mg, IntraVENous, Q6H PRN, Jun, Trudi Merlin, MD    oxyCODONE ER (OxyCONTIN) tablet 10 mg, 10 mg, Oral, Q12H, Jun, Trudi Merlin, MD, 10 mg at 01/07/23 1051    oxyCODONE-acetaminophen (PERCOCET 10)  mg per tablet 1 Tablet, 1 Tablet, Oral, Q6H PRN, Jun, Trudi Merlin, MD, 1 Tablet at 01/06/23 1138    heparin (porcine) injection 5,000 Units, 5,000 Units, SubCUTAneous, Q8H, Jun, Trudi Merlin, MD, 5,000 Units at 01/07/23 0554    heparin (porcine) 1,000 unit/mL injection 3,800 Units, 3,800 Units, Hemodialysis, DIALYSIS PRN, Jun, Trudi Merlin, MD, 3,800 Units at 01/05/23 1452    oxyCODONE IR (ROXICODONE) tablet 5 mg, 5 mg, Oral, Q4H PRN, Jun, Trudi Merlin, MD, 5 mg at 01/04/23 2012    amLODIPine (NORVASC) tablet 10 mg, 10 mg, Oral, DAILY, Jun, Trudi Merlin, MD, 10 mg at 01/07/23 1049    atorvastatin (LIPITOR) tablet 40 mg, 40 mg, Oral, DAILY, Jun, Trudi Merlin, MD, 40 mg at 01/07/23 1051    calcium carbonate (TUMS) chewable tablet 200 mg [elemental], 200 mg, Oral, DAILY, Jun, Trudi Merlin, MD, 200 mg at 01/07/23 1051    carvediloL (COREG) tablet 25 mg, 25 mg, Oral, BID, Jun, Trudi Merlin, MD, 25 mg at 01/07/23 1051    gabapentin (NEURONTIN) capsule 100 mg, 100 mg, Oral, TID, Jun, Trudi Merlin, MD, 100 mg at 01/07/23 1050    hydrALAZINE (APRESOLINE) tablet 25 mg, 25 mg, Oral, TID, Jun, Trudi Merlin, MD, 25 mg at 01/07/23 1051    insulin glargine (LANTUS) injection 10 Units, 10 Units, SubCUTAneous, QHS, Jun, Trudi Merlin, MD, 10 Units at 01/06/23 2148    isosorbide dinitrate (ISORDIL) tablet 20 mg, 20 mg, Oral, TID, Jun, Trudi Merlin, MD, 20 mg at 01/07/23 1051    lisinopriL (PRINIVIL, ZESTRIL) tablet 5 mg, 5 mg, Oral, DAILY, Jun, Trudi Merlin, MD, 5 mg at 01/07/23 1051    melatonin tablet 5 mg, 5 mg, Oral, QHS, Jun, Trudi Merlin, MD, 5 mg at 01/06/23 2149    ticagrelor (BRILINTA) tablet 90 mg, 90 mg, Oral, BID, Jun, Trudi Merlin, MD, 90 mg at 01/07/23 1051    sodium chloride (NS) flush 5-40 mL, 5-40 mL, IntraVENous, PRN, Daniel Faustin MD    acetaminophen (TYLENOL) tablet 650 mg, 650 mg, Oral, Q6H PRN, 650 mg at 12/23/22 1552 **OR** acetaminophen (TYLENOL) suppository 650 mg, 650 mg, Rectal, Q6H PRN, Daniel Faustin MD    polyethylene glycol (MIRALAX) packet 17 g, 17 g, Oral, DAILY PRN, Daneil Faustin MD

## 2023-01-07 NOTE — PROGRESS NOTES
Problem: Falls - Risk of  Goal: *Absence of Falls  Description: Document George Anthony Fall Risk and appropriate interventions in the flowsheet. Outcome: Progressing Towards Goal  Note: Fall Risk Interventions:  Mobility Interventions: Bed/chair exit alarm         Medication Interventions: Bed/chair exit alarm    Elimination Interventions: Call light in reach              Problem: Pressure Injury - Risk of  Goal: *Prevention of pressure injury  Description: Document Cruzito Scale and appropriate interventions in the flowsheet. Outcome: Progressing Towards Goal  Note: Pressure Injury Interventions:  Sensory Interventions: Assess changes in LOC    Moisture Interventions: Absorbent underpads, Apply protective barrier, creams and emollients, Maintain skin hydration (lotion/cream), Minimize layers    Activity Interventions: PT/OT evaluation    Mobility Interventions: HOB 30 degrees or less    Nutrition Interventions: Document food/fluid/supplement intake, Offer support with meals,snacks and hydration    Friction and Shear Interventions: Apply protective barrier, creams and emollients, Minimize layers                Problem: Patient Education: Go to Patient Education Activity  Goal: Patient/Family Education  Outcome: Progressing Towards Goal     Problem: Risk for Spread of Infection  Goal: Prevent transmission of infectious organism to others  Description: Prevent the transmission of infectious organisms to other patients, staff members, and visitors.   Outcome: Progressing Towards Goal     Problem: Patient Education:  Go to Education Activity  Goal: Patient/Family Education  Outcome: Progressing Towards Goal

## 2023-01-07 NOTE — PROGRESS NOTES
CM reviewed chart. Patient with discharge order. Pending auth to [facility] in [place] while also looking for SNF for acceptance as well. Auth was started Friday 1/6/2023.  Auth ref # [id number]

## 2023-01-08 LAB
GLUCOSE BLD STRIP.AUTO-MCNC: 105 MG/DL (ref 65–100)
GLUCOSE BLD STRIP.AUTO-MCNC: 120 MG/DL (ref 65–100)
GLUCOSE BLD STRIP.AUTO-MCNC: 94 MG/DL (ref 65–100)
GLUCOSE BLD STRIP.AUTO-MCNC: 97 MG/DL (ref 65–100)
PERFORMED BY, TECHID: ABNORMAL
PERFORMED BY, TECHID: ABNORMAL
PERFORMED BY, TECHID: NORMAL
PERFORMED BY, TECHID: NORMAL

## 2023-01-08 PROCEDURE — 65270000029 HC RM PRIVATE

## 2023-01-08 PROCEDURE — 74011250637 HC RX REV CODE- 250/637: Performed by: SURGERY

## 2023-01-08 PROCEDURE — 82962 GLUCOSE BLOOD TEST: CPT

## 2023-01-08 PROCEDURE — 74011250636 HC RX REV CODE- 250/636: Performed by: SURGERY

## 2023-01-08 PROCEDURE — 74011250637 HC RX REV CODE- 250/637: Performed by: INTERNAL MEDICINE

## 2023-01-08 RX ADMIN — GABAPENTIN 100 MG: 100 CAPSULE ORAL at 16:00

## 2023-01-08 RX ADMIN — LISINOPRIL 5 MG: 5 TABLET ORAL at 09:48

## 2023-01-08 RX ADMIN — CALCIUM CARBONATE 200 MG: 500 TABLET, CHEWABLE ORAL at 09:48

## 2023-01-08 RX ADMIN — HYDRALAZINE HYDROCHLORIDE 25 MG: 25 TABLET, FILM COATED ORAL at 17:05

## 2023-01-08 RX ADMIN — OXYCODONE HYDROCHLORIDE 10 MG: 10 TABLET, FILM COATED, EXTENDED RELEASE ORAL at 09:48

## 2023-01-08 RX ADMIN — GABAPENTIN 100 MG: 100 CAPSULE ORAL at 09:48

## 2023-01-08 RX ADMIN — ATORVASTATIN CALCIUM 40 MG: 40 TABLET, FILM COATED ORAL at 09:48

## 2023-01-08 RX ADMIN — HEPARIN SODIUM 5000 UNITS: 5000 INJECTION INTRAVENOUS; SUBCUTANEOUS at 22:18

## 2023-01-08 RX ADMIN — OXYCODONE AND ACETAMINOPHEN 1 TABLET: 10; 325 TABLET ORAL at 17:05

## 2023-01-08 RX ADMIN — HYDRALAZINE HYDROCHLORIDE 25 MG: 25 TABLET, FILM COATED ORAL at 09:48

## 2023-01-08 RX ADMIN — MELATONIN TAB 5 MG 5 MG: 5 TAB at 22:19

## 2023-01-08 RX ADMIN — ISOSORBIDE DINITRATE 20 MG: 20 TABLET ORAL at 17:05

## 2023-01-08 RX ADMIN — GABAPENTIN 100 MG: 100 CAPSULE ORAL at 22:18

## 2023-01-08 RX ADMIN — CARVEDILOL 25 MG: 12.5 TABLET, FILM COATED ORAL at 09:48

## 2023-01-08 RX ADMIN — ISOSORBIDE DINITRATE 20 MG: 20 TABLET ORAL at 22:19

## 2023-01-08 RX ADMIN — OXYCODONE HYDROCHLORIDE 10 MG: 10 TABLET, FILM COATED, EXTENDED RELEASE ORAL at 22:23

## 2023-01-08 RX ADMIN — HEPARIN SODIUM 5000 UNITS: 5000 INJECTION INTRAVENOUS; SUBCUTANEOUS at 05:07

## 2023-01-08 RX ADMIN — CARVEDILOL 25 MG: 12.5 TABLET, FILM COATED ORAL at 22:23

## 2023-01-08 RX ADMIN — HYDRALAZINE HYDROCHLORIDE 25 MG: 25 TABLET, FILM COATED ORAL at 22:19

## 2023-01-08 RX ADMIN — TICAGRELOR 90 MG: 90 TABLET ORAL at 22:24

## 2023-01-08 RX ADMIN — HEPARIN SODIUM 5000 UNITS: 5000 INJECTION INTRAVENOUS; SUBCUTANEOUS at 17:05

## 2023-01-08 RX ADMIN — AMITRIPTYLINE HYDROCHLORIDE 50 MG: 25 TABLET, FILM COATED ORAL at 22:19

## 2023-01-08 RX ADMIN — OXYCODONE AND ACETAMINOPHEN 1 TABLET: 10; 325 TABLET ORAL at 05:12

## 2023-01-08 RX ADMIN — ISOSORBIDE DINITRATE 20 MG: 20 TABLET ORAL at 09:48

## 2023-01-08 RX ADMIN — TICAGRELOR 90 MG: 90 TABLET ORAL at 09:47

## 2023-01-08 RX ADMIN — AMLODIPINE BESYLATE 10 MG: 5 TABLET ORAL at 09:48

## 2023-01-08 NOTE — PROGRESS NOTES
Renal Daily Progress Note    Admit Date: 12/22/2022      Subjective:   He has no complaints. He is awaiting placement.         Current Facility-Administered Medications   Medication Dose Route Frequency    0.9% sodium chloride infusion 250 mL  250 mL IntraVENous PRN    0.9% sodium chloride infusion 250 mL  250 mL IntraVENous PRN    amitriptyline (ELAVIL) tablet 50 mg  50 mg Oral QHS    naloxone (NARCAN) injection 1 mg  1 mg IntraVENous PRN    0.9% sodium chloride infusion 250 mL  250 mL IntraVENous PRN    sodium chloride (NS) flush 5-40 mL  5-40 mL IntraVENous PRN    ondansetron (ZOFRAN ODT) tablet 4 mg  4 mg Oral Q8H PRN    Or    ondansetron (ZOFRAN) injection 4 mg  4 mg IntraVENous Q6H PRN    oxyCODONE ER (OxyCONTIN) tablet 10 mg  10 mg Oral Q12H    oxyCODONE-acetaminophen (PERCOCET 10)  mg per tablet 1 Tablet  1 Tablet Oral Q6H PRN    heparin (porcine) injection 5,000 Units  5,000 Units SubCUTAneous Q8H    heparin (porcine) 1,000 unit/mL injection 3,800 Units  3,800 Units Hemodialysis DIALYSIS PRN    oxyCODONE IR (ROXICODONE) tablet 5 mg  5 mg Oral Q4H PRN    amLODIPine (NORVASC) tablet 10 mg  10 mg Oral DAILY    atorvastatin (LIPITOR) tablet 40 mg  40 mg Oral DAILY    calcium carbonate (TUMS) chewable tablet 200 mg [elemental]  200 mg Oral DAILY    carvediloL (COREG) tablet 25 mg  25 mg Oral BID    gabapentin (NEURONTIN) capsule 100 mg  100 mg Oral TID    hydrALAZINE (APRESOLINE) tablet 25 mg  25 mg Oral TID    insulin glargine (LANTUS) injection 10 Units  10 Units SubCUTAneous QHS    isosorbide dinitrate (ISORDIL) tablet 20 mg  20 mg Oral TID    lisinopriL (PRINIVIL, ZESTRIL) tablet 5 mg  5 mg Oral DAILY    melatonin tablet 5 mg  5 mg Oral QHS    ticagrelor (BRILINTA) tablet 90 mg  90 mg Oral BID    sodium chloride (NS) flush 5-40 mL  5-40 mL IntraVENous PRN    acetaminophen (TYLENOL) tablet 650 mg  650 mg Oral Q6H PRN    Or    acetaminophen (TYLENOL) suppository 650 mg  650 mg Rectal Q6H PRN polyethylene glycol (MIRALAX) packet 17 g  17 g Oral DAILY PRN        Review of Systems    Review of Systems   Respiratory:  Negative for shortness of breath. Cardiovascular:  Negative for chest pain. Gastrointestinal:  Negative for abdominal pain. Neurological:  Negative for headaches. Objective:     Patient Vitals for the past 8 hrs:   BP Temp Pulse Resp SpO2   01/08/23 0705 (!) 148/82 97.7 °F (36.5 °C) 76 18 100 %       No intake/output data recorded. 01/06 1901 - 01/08 0700  In: 1080 [P.O.:1080]  Out: 925 [Urine:925]    Physical Exam:   Physical Exam  Cardiovascular:      Rate and Rhythm: Regular rhythm. Pulmonary:      Breath sounds: Normal breath sounds. Abdominal:      General: Bowel sounds are normal.      Palpations: Abdomen is soft. Neurological:      Mental Status: He is alert. Right IJ tunneled dialysis catheter  Left AKA and amputation of left hand  No edema in the right leg      LOWER EXT ART PVR MULT LEVEL SEG PRESSURES   Final Result           Intake/Output Summary (Last 24 hours) at 1/8/2023 1146  Last data filed at 1/8/2023 0510  Gross per 24 hour   Intake 480 ml   Output 375 ml   Net 105 ml        Data Review   Recent Labs     01/05/23  1210   WBC 10.0   HGB 9.1*   HCT 30.1*          Recent Labs     01/05/23  1210      K 4.0   *   CO2 26   *   BUN 41*   CREA 3.14*   CA 8.6   PHOS 4.6   ALB 2.3*       No components found for: GLPOC  No results for input(s): PH, PCO2, PO2, HCO3, FIO2 in the last 72 hours. No results for input(s): INR, INREXT, INREXT, INREXT in the last 72 hours. Assessment:       Active Problems:    Wet gangrene ([facility] [address] (12/22/2022)  Renal failure on twice a week dialysis.   Monday and Thursdays    Volume status is acceptable    Anemia of chronic disease- on  8,000 units Retacrit with each dialysis    HTN     Plan:   Continued twice weekly dialysis  Awaiting placement

## 2023-01-08 NOTE — PROGRESS NOTES
General Daily Progress Note          Patient Name:   Marilu Radford       YOB: 1955       Age:  74 y.o. Admit Date: 12/22/2022      Subjective:         Seen resting the bed alert awake no complaints        Objective:     Visit Vitals  BP (!) 148/82 (BP 1 Location: Right upper arm, BP Patient Position: At rest;Supine)   Pulse 76   Temp 97.7 °F (36.5 °C)   Resp 18   Ht 5' 7.99\" (1.727 m)   Wt 57.3 kg (126 lb 5.2 oz)   SpO2 100%   BMI 19.21 kg/m²        Recent Results (from the past 24 hour(s))   GLUCOSE, POC    Collection Time: 01/07/23 11:41 AM   Result Value Ref Range    Glucose (POC) 104 (H) 65 - 100 mg/dL    Performed by [facility])    GLUCOSE, POC    Collection Time: 01/07/23  5:06 PM   Result Value Ref Range    Glucose (POC) 129 (H) 65 - 100 mg/dL    Performed by [facility])    GLUCOSE, POC    Collection Time: 01/07/23  9:48 PM   Result Value Ref Range    Glucose (POC) 107 (H) 65 - 100 mg/dL    Performed by Jory Glaser    GLUCOSE, POC    Collection Time: 01/08/23  7:14 AM   Result Value Ref Range    Glucose (POC) 94 65 - 100 mg/dL    Performed by [facility])      [unfilled]      Review of Systems    Constitutional: Negative for chills and fever. HENT: Negative. Eyes: Negative. Respiratory: Negative. Cardiovascular: Negative. Gastrointestinal: Negative for abdominal pain and nausea. Skin: Negative. Neurological: Negative. Physical Exam:      Constitutional: pt is oriented to person, place, and time. HENT:   Head: Normocephalic and atraumatic. Eyes: Pupils are equal, round, and reactive to light. EOM are normal.   Cardiovascular: Normal rate, regular rhythm and normal heart sounds. Pulmonary/Chest: Breath sounds normal. No wheezes. No rales. Exhibits no tenderness. Abdominal: Soft. Bowel sounds are normal. There is no abdominal tenderness. There is no rebound and no guarding.    Musculoskeletal: Left above-knee amputation  Neurological: pt is alert and oriented to person, place, and time. LOWER EXT ART PVR MULT LEVEL SEG PRESSURES   Final Result           Recent Results (from the past 24 hour(s))   GLUCOSE, POC    Collection Time: 01/07/23 11:41 AM   Result Value Ref Range    Glucose (POC) 104 (H) 65 - 100 mg/dL    Performed by [facility])    GLUCOSE, POC    Collection Time: 01/07/23  5:06 PM   Result Value Ref Range    Glucose (POC) 129 (H) 65 - 100 mg/dL    Performed by [facility])    GLUCOSE, POC    Collection Time: 01/07/23  9:48 PM   Result Value Ref Range    Glucose (POC) 107 (H) 65 - 100 mg/dL    Performed by Tova Jackson    GLUCOSE, POC    Collection Time: 01/08/23  7:14 AM   Result Value Ref Range    Glucose (POC) 94 65 - 100 mg/dL    Performed by [facility])        Results       ** No results found for the last 336 hours. **             Labs:     Recent Labs     01/05/23  1210   WBC 10.0   HGB 9.1*   HCT 30.1*          Recent Labs     01/05/23  1210      K 4.0   *   CO2 26   BUN 41*   CREA 3.14*   *   CA 8.6   PHOS 4.6       Recent Labs     01/05/23  1210   ALB 2.3*       No results for input(s): INR, PTP, APTT, INREXT, INREXT in the last 72 hours. No results for input(s): FE, TIBC, PSAT, FERR in the last 72 hours. Lab Results   Component Value Date/Time    Folate 17.5 02/23/2021 03:59 AM        No results for input(s): PH, PCO2, PO2 in the last 72 hours. No results for input(s): CPK, CKNDX, TROIQ in the last 72 hours.     No lab exists for component: CPKMB  Lab Results   Component Value Date/Time    Cholesterol, total 115 02/26/2010 05:20 PM    HDL Cholesterol 31 (L) 02/26/2010 05:20 PM    LDL, calculated 46.2 02/26/2010 05:20 PM    Triglyceride 189 02/26/2010 05:20 PM    CHOL/HDL Ratio 3.7 02/26/2010 05:20 PM     Lab Results   Component Value Date/Time    Glucose (POC) 94 01/08/2023 07:14 AM    Glucose (POC) 107 (H) 01/07/2023 09:48 PM    Glucose (POC) 129 (H) 01/07/2023 05:06 PM    Glucose (POC) 104 (H) 01/07/2023 11:41 AM    Glucose (POC) 89 01/07/2023 08:43 AM     Lab Results   Component Value Date/Time    Color YELLOW/STRAW 04/06/2022 08:52 PM    Appearance CLEAR 04/06/2022 08:52 PM    Specific gravity 1.012 04/06/2022 08:52 PM    Specific gravity 1.015 02/26/2010 12:32 PM    pH (UA) 6.0 04/06/2022 08:52 PM    Protein 300 (A) 04/06/2022 08:52 PM    Glucose Negative 04/06/2022 08:52 PM    Ketone Negative 04/06/2022 08:52 PM    Bilirubin Negative 04/06/2022 08:52 PM    Urobilinogen 0.2 04/06/2022 08:52 PM    Nitrites Negative 04/06/2022 08:52 PM    Leukocyte Esterase Negative 04/06/2022 08:52 PM    Epithelial cells FEW 04/06/2022 08:52 PM    Bacteria Negative 04/06/2022 08:52 PM    WBC 5-10 04/06/2022 08:52 PM    RBC 10-20 04/06/2022 08:52 PM         Assessment:     Wet gangrene  End-stage renal disease on hemodialysis  Anemia of chronic disease  Hypertension  Type 2 diabetes      Plan:       Sinew current medications      Current Facility-Administered Medications:     0.9% sodium chloride infusion 250 mL, 250 mL, IntraVENous, PRN, Judson Flor MD    0.9% sodium chloride infusion 250 mL, 250 mL, IntraVENous, PRN, Ad Paz MD    amitriptyline (ELAVIL) tablet 50 mg, 50 mg, Oral, QHS, Ad Fuentes MD, 50 mg at 01/07/23 2136    naloxone (NARCAN) injection 1 mg, 1 mg, IntraVENous, PRN, Ad Fuentes MD    0.9% sodium chloride infusion 250 mL, 250 mL, IntraVENous, PRN, Sangeetha Faustin MD    sodium chloride (NS) flush 5-40 mL, 5-40 mL, IntraVENous, PRN, RaminSangeetha MD    ondansetron (ZOFRAN ODT) tablet 4 mg, 4 mg, Oral, Q8H PRN **OR** ondansetron (ZOFRAN) injection 4 mg, 4 mg, IntraVENous, Q6H PRN, Sangeetha Faustin MD    oxyCODONE ER (OxyCONTIN) tablet 10 mg, 10 mg, Oral, Q12H, Sangeetha Faustin MD, 10 mg at 01/08/23 0948    oxyCODONE-acetaminophen (PERCOCET 10)  mg per tablet 1 Tablet, 1 Tablet, Oral, Q6H PRN, Corky Faustin MD, 1 Tablet at 01/08/23 8985    heparin (porcine) injection 5,000 Units, 5,000 Units, SubCUTAneous, Q8H, Corky Faustin MD, 5,000 Units at 01/08/23 0507    heparin (porcine) 1,000 unit/mL injection 3,800 Units, 3,800 Units, Hemodialysis, DIALYSIS PRN, Corky Faustin MD, 3,800 Units at 01/05/23 1452    oxyCODONE IR (ROXICODONE) tablet 5 mg, 5 mg, Oral, Q4H PRN, Corky Faustin MD, 5 mg at 01/04/23 2012    amLODIPine (NORVASC) tablet 10 mg, 10 mg, Oral, DAILY, Corky Faustin MD, 10 mg at 01/08/23 0948    atorvastatin (LIPITOR) tablet 40 mg, 40 mg, Oral, DAILY, Corky Faustin MD, 40 mg at 01/08/23 0948    calcium carbonate (TUMS) chewable tablet 200 mg [elemental], 200 mg, Oral, DAILY, Corky Faustin MD, 200 mg at 01/08/23 0948    carvediloL (COREG) tablet 25 mg, 25 mg, Oral, BID, Corky Faustin MD, 25 mg at 01/08/23 0948    gabapentin (NEURONTIN) capsule 100 mg, 100 mg, Oral, TID, Corky Faustin MD, 100 mg at 01/08/23 0948    hydrALAZINE (APRESOLINE) tablet 25 mg, 25 mg, Oral, TID, Corky Faustin MD, 25 mg at 01/08/23 0948    insulin glargine (LANTUS) injection 10 Units, 10 Units, SubCUTAneous, QHS, Corky Faustin MD, 10 Units at 01/06/23 2148    isosorbide dinitrate (ISORDIL) tablet 20 mg, 20 mg, Oral, TID, Corky Faustin MD, 20 mg at 01/08/23 0948    lisinopriL (PRINIVIL, ZESTRIL) tablet 5 mg, 5 mg, Oral, DAILY, Corky Fasutin MD, 5 mg at 01/08/23 0948    melatonin tablet 5 mg, 5 mg, Oral, QHS, Ramin, Corky Potter MD, 5 mg at 01/07/23 2136    ticagrelor (BRILINTA) tablet 90 mg, 90 mg, Oral, BID, RaminCorky MD, 90 mg at 01/08/23 0947    sodium chloride (NS) flush 5-40 mL, 5-40 mL, IntraVENous, PRN, Corky Faustin MD    acetaminophen (TYLENOL) tablet 650 mg, 650 mg, Oral, Q6H PRN, 650 mg at 01/07/23 1844 **OR** acetaminophen (TYLENOL) suppository 650 mg, 650 mg, Rectal, Q6H PRN, Corky Faustin MD    polyethylene glycol (MIRALAX) packet 17 g, 17 g, Oral, DAILY PRN, Ramin, Pam Farley MD

## 2023-01-08 NOTE — PROGRESS NOTES
DC order noted. Chart reviewed    Per previous CM note of 0107  Pending auth to [facility] in [address] while also looking for SNF for acceptance as well. Auth was started Friday 1/6/2023.  Auth ref # [id number]        Delay entered

## 2023-01-08 NOTE — PROGRESS NOTES
Problem: Falls - Risk of  Goal: *Absence of Falls  Description: Document Dylan Feldman Fall Risk and appropriate interventions in the flowsheet.   Outcome: Progressing Towards Goal  Note: Fall Risk Interventions:  Mobility Interventions: Bed/chair exit alarm         Medication Interventions: Bed/chair exit alarm    Elimination Interventions: Call light in reach

## 2023-01-09 LAB
ALBUMIN SERPL-MCNC: 2.2 G/DL (ref 3.5–5)
ALBUMIN/GLOB SERPL: 0.5 (ref 1.1–2.2)
ALP SERPL-CCNC: 71 U/L (ref 45–117)
ALT SERPL-CCNC: 26 U/L (ref 12–78)
ANION GAP SERPL CALC-SCNC: 6 MMOL/L (ref 5–15)
AST SERPL W P-5'-P-CCNC: 20 U/L (ref 15–37)
BILIRUB SERPL-MCNC: 0.2 MG/DL (ref 0.2–1)
BUN SERPL-MCNC: 50 MG/DL (ref 6–20)
BUN/CREAT SERPL: 16 (ref 12–20)
CA-I BLD-MCNC: 8.6 MG/DL (ref 8.5–10.1)
CHLORIDE SERPL-SCNC: 110 MMOL/L (ref 97–108)
CO2 SERPL-SCNC: 24 MMOL/L (ref 21–32)
CREAT SERPL-MCNC: 3.11 MG/DL (ref 0.7–1.3)
ERYTHROCYTE [DISTWIDTH] IN BLOOD BY AUTOMATED COUNT: 21.5 % (ref 11.5–14.5)
GLOBULIN SER CALC-MCNC: 4.6 G/DL (ref 2–4)
GLUCOSE BLD STRIP.AUTO-MCNC: 107 MG/DL (ref 65–100)
GLUCOSE BLD STRIP.AUTO-MCNC: 83 MG/DL (ref 65–100)
GLUCOSE BLD STRIP.AUTO-MCNC: 99 MG/DL (ref 65–100)
GLUCOSE SERPL-MCNC: 94 MG/DL (ref 65–100)
HCT VFR BLD AUTO: 29.2 % (ref 36.6–50.3)
HGB BLD-MCNC: 8.8 G/DL (ref 12.1–17)
MCH RBC QN AUTO: 26.3 PG (ref 26–34)
MCHC RBC AUTO-ENTMCNC: 30.1 G/DL (ref 30–36.5)
MCV RBC AUTO: 87.4 FL (ref 80–99)
NRBC # BLD: 0 K/UL (ref 0–0.01)
NRBC BLD-RTO: 0 PER 100 WBC
PERFORMED BY, TECHID: ABNORMAL
PERFORMED BY, TECHID: NORMAL
PERFORMED BY, TECHID: NORMAL
PLATELET # BLD AUTO: 318 K/UL (ref 150–400)
PMV BLD AUTO: 9.8 FL (ref 8.9–12.9)
POTASSIUM SERPL-SCNC: 4.6 MMOL/L (ref 3.5–5.1)
PROT SERPL-MCNC: 6.8 G/DL (ref 6.4–8.2)
RBC # BLD AUTO: 3.34 M/UL (ref 4.1–5.7)
SODIUM SERPL-SCNC: 140 MMOL/L (ref 136–145)
WBC # BLD AUTO: 5.9 K/UL (ref 4.1–11.1)

## 2023-01-09 PROCEDURE — 36415 COLL VENOUS BLD VENIPUNCTURE: CPT

## 2023-01-09 PROCEDURE — 90935 HEMODIALYSIS ONE EVALUATION: CPT

## 2023-01-09 PROCEDURE — 74011250636 HC RX REV CODE- 250/636: Performed by: SURGERY

## 2023-01-09 PROCEDURE — 65270000029 HC RM PRIVATE

## 2023-01-09 PROCEDURE — 74011250636 HC RX REV CODE- 250/636

## 2023-01-09 PROCEDURE — 74011250637 HC RX REV CODE- 250/637: Performed by: SURGERY

## 2023-01-09 PROCEDURE — 80053 COMPREHEN METABOLIC PANEL: CPT

## 2023-01-09 PROCEDURE — 82962 GLUCOSE BLOOD TEST: CPT

## 2023-01-09 PROCEDURE — 74011250637 HC RX REV CODE- 250/637: Performed by: INTERNAL MEDICINE

## 2023-01-09 PROCEDURE — 85027 COMPLETE CBC AUTOMATED: CPT

## 2023-01-09 RX ORDER — HEPARIN SODIUM 1000 [USP'U]/ML
INJECTION, SOLUTION INTRAVENOUS; SUBCUTANEOUS
Status: COMPLETED
Start: 2023-01-09 | End: 2023-01-09

## 2023-01-09 RX ORDER — FUROSEMIDE 40 MG/1
40 TABLET ORAL DAILY
Status: DISCONTINUED | OUTPATIENT
Start: 2023-01-10 | End: 2023-01-17 | Stop reason: HOSPADM

## 2023-01-09 RX ADMIN — OXYCODONE AND ACETAMINOPHEN 1 TABLET: 10; 325 TABLET ORAL at 06:06

## 2023-01-09 RX ADMIN — GABAPENTIN 100 MG: 100 CAPSULE ORAL at 21:18

## 2023-01-09 RX ADMIN — AMLODIPINE BESYLATE 10 MG: 5 TABLET ORAL at 11:56

## 2023-01-09 RX ADMIN — HEPARIN SODIUM 5000 UNITS: 5000 INJECTION INTRAVENOUS; SUBCUTANEOUS at 17:17

## 2023-01-09 RX ADMIN — HYDRALAZINE HYDROCHLORIDE 25 MG: 25 TABLET, FILM COATED ORAL at 17:17

## 2023-01-09 RX ADMIN — CARVEDILOL 25 MG: 12.5 TABLET, FILM COATED ORAL at 11:55

## 2023-01-09 RX ADMIN — ISOSORBIDE DINITRATE 20 MG: 20 TABLET ORAL at 11:56

## 2023-01-09 RX ADMIN — ATORVASTATIN CALCIUM 40 MG: 40 TABLET, FILM COATED ORAL at 11:56

## 2023-01-09 RX ADMIN — OXYCODONE 5 MG: 5 TABLET ORAL at 10:27

## 2023-01-09 RX ADMIN — HEPARIN SODIUM 3800 UNITS: 1000 INJECTION, SOLUTION INTRAVENOUS; SUBCUTANEOUS at 11:16

## 2023-01-09 RX ADMIN — HEPARIN SODIUM 5000 UNITS: 5000 INJECTION INTRAVENOUS; SUBCUTANEOUS at 06:06

## 2023-01-09 RX ADMIN — LISINOPRIL 5 MG: 5 TABLET ORAL at 11:56

## 2023-01-09 RX ADMIN — TICAGRELOR 90 MG: 90 TABLET ORAL at 11:56

## 2023-01-09 RX ADMIN — HEPARIN SODIUM 5000 UNITS: 5000 INJECTION INTRAVENOUS; SUBCUTANEOUS at 21:18

## 2023-01-09 RX ADMIN — ISOSORBIDE DINITRATE 20 MG: 20 TABLET ORAL at 17:16

## 2023-01-09 RX ADMIN — OXYCODONE HYDROCHLORIDE 10 MG: 10 TABLET, FILM COATED, EXTENDED RELEASE ORAL at 11:59

## 2023-01-09 RX ADMIN — ISOSORBIDE DINITRATE 20 MG: 20 TABLET ORAL at 21:18

## 2023-01-09 RX ADMIN — CARVEDILOL 25 MG: 12.5 TABLET, FILM COATED ORAL at 21:18

## 2023-01-09 RX ADMIN — CALCIUM CARBONATE 200 MG: 500 TABLET, CHEWABLE ORAL at 11:56

## 2023-01-09 RX ADMIN — OXYCODONE HYDROCHLORIDE 10 MG: 10 TABLET, FILM COATED, EXTENDED RELEASE ORAL at 21:18

## 2023-01-09 RX ADMIN — HYDRALAZINE HYDROCHLORIDE 25 MG: 25 TABLET, FILM COATED ORAL at 11:56

## 2023-01-09 RX ADMIN — GABAPENTIN 100 MG: 100 CAPSULE ORAL at 11:56

## 2023-01-09 RX ADMIN — HYDRALAZINE HYDROCHLORIDE 25 MG: 25 TABLET, FILM COATED ORAL at 21:18

## 2023-01-09 RX ADMIN — TICAGRELOR 90 MG: 90 TABLET ORAL at 21:18

## 2023-01-09 RX ADMIN — MELATONIN TAB 5 MG 5 MG: 5 TAB at 21:18

## 2023-01-09 RX ADMIN — GABAPENTIN 100 MG: 100 CAPSULE ORAL at 17:16

## 2023-01-09 RX ADMIN — AMITRIPTYLINE HYDROCHLORIDE 50 MG: 25 TABLET, FILM COATED ORAL at 21:18

## 2023-01-09 NOTE — PROGRESS NOTES
Pt FMS observed to out of place. Pt had large soft stool. Incontinence care provided. FMS not replaced. [place] Counseling: I discussed with the patient the risks of ixekizumab including but not limited to immunosuppression, serious infections, worsening of inflammatory bowel disease and drug reactions.  The patient understands that monitoring is required including a PPD at baseline and must alert us or the primary physician if symptoms of infection or other concerning signs are noted.

## 2023-01-09 NOTE — DISCHARGE SUMMARY
Discharge Summary     Patient: Vandana Gaona MRN: [id number]  SSN: [id number]    YOB: 1955  Age: 74 y.o. Sex: male       Admit Date: 12/22/2022    Discharge Date: 1/9/2023      Admission Diagnoses: Wet gangrene [facility]) Melany Duel    Discharge Diagnoses:   Problem List as of 1/9/2023 Date Reviewed: 12/27/2022            Codes Class Noted - Resolved    Wet gangrene ([facility] ICD-10-CM: W92  ICD-9-CM: 785.4  12/22/2022 - Present        Acute CHF (congestive heart failure) ([facility] ICD-10-CM: I50.9  ICD-9-CM: 428.0  4/6/2022 - Present        Dysphagia, unspecified type ICD-10-CM: R13.10  ICD-9-CM: 787.20  Unknown - Present        Fatigue, unspecified type ICD-10-CM: R53.83  ICD-9-CM: 780.79  Unknown - Present        Chronic systolic congestive heart failure ([facility] ICD-10-CM: I50.22  ICD-9-CM: 428.22, 428.0  2/16/2021 - Present        Goals of care, counseling/discussion ICD-10-CM: Z71.89  ICD-9-CM: V65.49  Unknown - Present        DNR (do not resuscitate) discussion ICD-10-CM: Z71.89  ICD-9-CM: V65.49  Unknown - Present        JUAN C (acute kidney injury) ([facility] ICD-10-CM: N17.9  ICD-9-CM: 440. 9  Unknown - Present        Acute hypoxemic respiratory failure due to COVID-19 [facility]) ICD-10-CM: U07.1, J96.01  ICD-9-CM: 518.81, 079.89, 799.02  1/30/2021 - Present        RESOLVED: Atypical chest pain ICD-10-CM: R07.89  ICD-9-CM: 786.59  4/6/2022 - 4/13/2022            Discharge Condition: Good    Hospital Course: 74years old patient with multiple medical issues including A. fib anemia arrhythmia CAD admitted for infected knee scheduled for surgery patient was seen by nephrology for dialysis management. He was placed on IV vancomycin Zosyn however with the amputation patient has been discontinued.   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 Qty: 30 Tablet, Refills: 0      epoetin luis-epbx (RETACRIT) 4,000 unit/mL injection 2 mL by SubCUTAneous route DIALYSIS MON, WED & FRI. Indications: anemia due to kidney failure  Qty: 12 Each, Refills: 0      heparin sodium,porcine (heparin, porcine,) 5,000 unit/mL injection 1 mL by SubCUTAneous route every eight (8) hours. Qty: 30 mL, Refills: 0      mupirocin (BACTROBAN) 2 % ointment Apply 1 g to affected area daily for 5 doses. Qty: 22 g, Refills: 0      naloxone (NARCAN) 1 mg/mL injection 1 mL by IntraVENous route as needed for Overdose or Respiratory Distress. Qty: 1 mL, Refills: 0      oxyCODONE IR (ROXICODONE) 5 mg immediate release tablet Take 1 Tablet by mouth every four (4) hours as needed for Pain for up to 3 days. Max Daily Amount: 30 mg.  Qty: 10 Tablet, Refills: 0    Associated Diagnoses: Pain      oxyCODONE ER (OxyCONTIN) 10 mg ER tablet Take 1 Tablet by mouth every twelve (12) hours for 30 days. Max Daily Amount: 20 mg.  Qty: 10 Tablet, Refills: 0    Associated Diagnoses: Pain      polyethylene glycol (MIRALAX) 17 gram packet Take 1 Packet by mouth daily. Qty: 30 Packet, Refills: 0           CONTINUE these medications which have CHANGED    Details   carvediloL (COREG) 25 mg tablet Take 1 Tablet by mouth two (2) times a day. Qty: 60 Tablet, Refills: 0           CONTINUE these medications which have NOT CHANGED    Details   ticagrelor (BRILINTA) 90 mg tablet Take 90 mg by mouth two (2) times a day. isosorbide dinitrate (ISORDIL) 20 mg tablet Take 20 mg by mouth three (3) times daily. lisinopriL (PRINIVIL, ZESTRIL) 5 mg tablet Take 5 mg by mouth daily. melatonin 5 mg cap capsule Take 5 mg by mouth nightly. atorvastatin (LIPITOR) 80 mg tablet Take 0.5 Tabs by mouth daily.   Qty: 30 Tab, Refills: 0      hydrALAZINE (APRESOLINE) 25 mg tablet Take 1 Tab by mouth three (3) times daily. Qty: 90 Tab, Refills: 0      aspirin 81 mg chewable tablet Take 81 mg by mouth daily. gabapentin (NEURONTIN) 100 mg capsule Take 1 Capsule by mouth three (3) times daily. Max Daily Amount: 300 mg. Qty: 15 Capsule, Refills: 0    Associated Diagnoses: Open wound of both lower extremities, initial encounter      calcium carbonate (TUMS) 200 mg calcium (500 mg) chew Take 1 Tablet by mouth in the morning. insulin glargine (Lantus U-100 Insulin) 100 unit/mL injection 10 Units by SubCUTAneous route nightly. Qty: 1 mL, Refills: 0      amLODIPine (NORVASC) 10 mg tablet Take 1 Tab by mouth daily.   Qty: 30 Tab, Refills: 0           STOP taking these medications       collagenase (SANTYL) 250 unit/gram ointment Comments:   Reason for Stopping:         acetaminophen (TYLENOL) 325 mg tablet Comments:   Reason for Stopping:               Activity: PT/OT Eval and Treat  Diet: Cardiac Diet and Renal Diet  Wound Care: As directed    Follow-up Appointments   Procedures    FOLLOW UP VISIT Appointment in: 3 - 5 Days     Standing Status:   Standing     Number of Occurrences:   1     Order Specific Question:   Appointment in     Answer:   3 - 5 Days     Waiting for bed  Signed By: Clover Manriquez MD     January 9, 2023

## 2023-01-09 NOTE — DIALYSIS
[facility]CS 15  pt alert and bright today. Dialysis initiated via right chest tunneled perm, good blood return. Plan for 2. 5h with 0 fluid removal.  Dr Kai Win notified of Potassium of 4.6, orders received.

## 2023-01-09 NOTE — DIALYSIS
[facility] 15 report called to Boris Jones via [employer] and [employer]. Pt completed 2.5h dialysis with 520 ml fluid removal as per Dr Elliott Zarate who visited with pt while on dialysis.

## 2023-01-09 NOTE — PROGRESS NOTES
Renal Daily Progress Note    Admit Date: 12/22/2022      Subjective:   He was seen on hemodialysis today. He denies shortness of breath. Appetite is good. Urine output yesterday documented at 400 mL. Blood pressures are in the 936G to 948L systolic, on hemodialysis.         Current Facility-Administered Medications   Medication Dose Route Frequency    0.9% sodium chloride infusion 250 mL  250 mL IntraVENous PRN    0.9% sodium chloride infusion 250 mL  250 mL IntraVENous PRN    amitriptyline (ELAVIL) tablet 50 mg  50 mg Oral QHS    naloxone (NARCAN) injection 1 mg  1 mg IntraVENous PRN    0.9% sodium chloride infusion 250 mL  250 mL IntraVENous PRN    sodium chloride (NS) flush 5-40 mL  5-40 mL IntraVENous PRN    ondansetron (ZOFRAN ODT) tablet 4 mg  4 mg Oral Q8H PRN    Or    ondansetron (ZOFRAN) injection 4 mg  4 mg IntraVENous Q6H PRN    oxyCODONE ER (OxyCONTIN) tablet 10 mg  10 mg Oral Q12H    oxyCODONE-acetaminophen (PERCOCET 10)  mg per tablet 1 Tablet  1 Tablet Oral Q6H PRN    heparin (porcine) injection 5,000 Units  5,000 Units SubCUTAneous Q8H    heparin (porcine) 1,000 unit/mL injection 3,800 Units  3,800 Units Hemodialysis DIALYSIS PRN    oxyCODONE IR (ROXICODONE) tablet 5 mg  5 mg Oral Q4H PRN    amLODIPine (NORVASC) tablet 10 mg  10 mg Oral DAILY    atorvastatin (LIPITOR) tablet 40 mg  40 mg Oral DAILY    calcium carbonate (TUMS) chewable tablet 200 mg [elemental]  200 mg Oral DAILY    carvediloL (COREG) tablet 25 mg  25 mg Oral BID    gabapentin (NEURONTIN) capsule 100 mg  100 mg Oral TID    hydrALAZINE (APRESOLINE) tablet 25 mg  25 mg Oral TID    insulin glargine (LANTUS) injection 10 Units  10 Units SubCUTAneous QHS    isosorbide dinitrate (ISORDIL) tablet 20 mg  20 mg Oral TID    lisinopriL (PRINIVIL, ZESTRIL) tablet 5 mg  5 mg Oral DAILY    melatonin tablet 5 mg  5 mg Oral QHS    ticagrelor (BRILINTA) tablet 90 mg  90 mg Oral BID    sodium chloride (NS) flush 5-40 mL  5-40 mL IntraVENous PRN    acetaminophen (TYLENOL) tablet 650 mg  650 mg Oral Q6H PRN    Or    acetaminophen (TYLENOL) suppository 650 mg  650 mg Rectal Q6H PRN    polyethylene glycol (MIRALAX) packet 17 g  17 g Oral DAILY PRN        Review of Systems    Review of Systems   Respiratory:  Negative for shortness of breath. Cardiovascular:  Negative for chest pain. Gastrointestinal:  Negative for abdominal pain. Neurological:  Negative for headaches. Objective:     Patient Vitals for the past 8 hrs:   BP Temp Temp src Pulse Resp SpO2   01/09/23 1110 (!) 160/82 98.4 °F (36.9 °C) Oral 87 16 --   01/09/23 1040 (!) 152/76 -- -- 83 16 --   01/09/23 1010 (!) 162/76 -- -- 80 16 --   01/09/23 0940 (!) 149/74 -- -- 83 16 --   01/09/23 0910 (!) 148/68 -- -- 77 16 --   01/09/23 0840 (!) 149/71 98.6 °F (37 °C) Oral 81 16 --   01/09/23 0807 (!) 140/75 98.2 °F (36.8 °C) -- 80 -- 98 %       01/09 0701 - 01/09 1900  In: 460 [P.O.:460]  Out: 720 [Urine:200]  01/07 1901 - 01/09 0700  In: 1320 [P.O.:1320]  Out: 775 [Urine:775]    Physical Exam:   Physical Exam  Cardiovascular:      Rate and Rhythm: Regular rhythm. Pulmonary:      Breath sounds: Normal breath sounds. Abdominal:      General: Bowel sounds are normal.      Palpations: Abdomen is soft. Neurological:      Mental Status: He is alert. Right IJ tunneled dialysis catheter functioning well.   Left AKA and amputation of left hand  No edema in the right leg      LOWER EXT ART PVR MULT LEVEL SEG PRESSURES   Final Result           Intake/Output Summary (Last 24 hours) at 1/9/2023 1132  Last data filed at 1/9/2023 1110  Gross per 24 hour   Intake 1300 ml   Output 1120 ml   Net 180 ml        Data Review   Recent Labs     01/09/23  0710   WBC 5.9   HGB 8.8*   HCT 29.2*            Recent Labs     01/09/23  0710      K 4.6   *   CO2 24   GLU 94   BUN 50*   CREA 3.11*   CA 8.6   ALB 2.2*   ALT 26       No components found for: GLPOC  No results for input(s): PH, PCO2, PO2, HCO3, FIO2 in the last 72 hours. No results for input(s): INR, INREXT, INREXT, INREXT in the last 72 hours. Assessment:       Active Problems:    Wet gangrene ([address] (12/22/2022)  Renal failure on twice a week dialysis. Monday and Thursdays    Volume status is acceptable    Anemia of chronic disease- on  8,000 units Retacrit with each dialysis    HTN -BP higher than usual.  Will remove half a liter of fluids on hemodialysis today. Plan:   Continue twice weekly dialysis  Creatinine has been staying at 3.1 mg. Therefore cannot take him off dialysis completely.   Awaiting placement

## 2023-01-09 NOTE — PROGRESS NOTES
Nutrition Assessment     Type and Reason for Visit: (P) Reassess (goal)    Nutrition Recommendations/Plan:   Continue current diet  Continue ensure enlive 2x/day, kinza 2x/day  Monitor and record PO intakes, supplement acceptance, and Bms in I/Os     Nutrition Assessment:  (P) Admitted for L knee gangrene. S/p L AKA. Plan to transfuse PRBCs. Noted consult for wounds. Plan to add ONS to support wound healing and help meet needs. Noted significant weight loss per EMAR. (1/2) Discharge pending outpt dialysis/SNF. Min med updates. Intakes good, >50% of meals. (1/9) Discharge continues pending IRF/SNF, min med updates. Intakes remain good, >50%. Labs: Na 140, K 4.6, BUN 50, Creat 3.11, Gluc 94, Alb 2.2. Meds: atorvastatin, calcium carbonate, insulin glargine    Malnutrition Assessment:  Malnutrition Status: Mild malnutrition     Estimated Daily Nutrient Needs:  Energy (kcal):  1776kcal (30kcal/kg)  Protein (g):  89g (1.5g/kg)       Fluid (ml/day):  1500mL    Nutrition Related Findings:  (P) NFPE deferred d/t contact iso. No n/v, d/c, or problems chewing/swallowing. No edema. BM 1/7.     Current Nutrition Therapies:  ADULT DIET Regular  ADULT ORAL NUTRITION SUPPLEMENT Breakfast, Dinner; Standard High Calorie/High Protein  ADULT ORAL NUTRITION SUPPLEMENT Lunch, Breakfast; Wound Healing Supplement    Anthropometric Measures:  Height:  (P) 5' 7.99\" (172.7 cm)  Current Body Wt:  (P) 57.3 kg (126 lb 5.2 oz)  BMI: (P) 19.2    Nutrition Diagnosis:   Inadequate protein-energy intake related to increased demand for energy/nutrients as evidenced by wounds, weight loss    Nutrition Interventions:   Food and/or Nutrient Delivery: (P) Continue current diet, Continue oral nutrition supplement  Nutrition Education/Counseling: (P) No recommendations at this time  Coordination of Nutrition Care: (P) Continue to monitor while inpatient       Goals:  Previous Goal Met: (P) Goal(s) achieved  Goals: (P) PO intake 50% or greater, by next RD assessment  Specify Other Goals: +signs of wound healing    Nutrition Monitoring and Evaluation:   Behavioral-Environmental Outcomes: (P) None identified  Food/Nutrient Intake Outcomes: (P) Food and nutrient intake, Supplement intake  Physical Signs/Symptoms Outcomes: (P) Meal time behavior, Skin, Weight    Discharge Planning:    (P) Continue oral nutrition supplement    Werner Lorenzana RD  Contact: [phone]

## 2023-01-09 NOTE — PROGRESS NOTES
Problem: Pressure Injury - Risk of  Goal: *Prevention of pressure injury  Description: Document Cruzito Scale and appropriate interventions in the flowsheet.   Outcome: Progressing Towards Goal  Note: Pressure Injury Interventions:  Sensory Interventions: Discuss PT/OT consult with provider    Moisture Interventions: Apply protective barrier, creams and emollients    Activity Interventions: Assess need for specialty bed    Mobility Interventions: Assess need for specialty bed    Nutrition Interventions: Document food/fluid/supplement intake    Friction and Shear Interventions: Apply protective barrier, creams and emollients

## 2023-01-09 NOTE — PROGRESS NOTES
Received report from dialysis 500 cc was removed in 2.5 hours. Patient ate breakfast while in dialysis.

## 2023-01-09 NOTE — PROGRESS NOTES
Spiritual Care Assessment/Progress Note  [facility]      NAME: Aletha Gottron      MRN: [id number]  AGE: 74 y.o.  SEX: male  Sabianism Affiliation: Quaker   Language: English     1/9/2023     Total Time (in minutes): 31     Spiritual Assessment begun in [facility] through conversation with:         [x]Patient        [] Family    [] Friend(s)        Reason for Consult: Initial/Spiritual assessment, patient floor     Spiritual beliefs: (Please include comment if needed)     [x] Identifies with a andrez tradition: Quaker        [] Supported by a andrez community:            [] Claims no spiritual orientation:           [] Seeking spiritual identity:                [] Adheres to an individual form of spirituality:           [] Not able to assess:                           Identified resources for coping:      [x] Prayer                               [] Music                  [] Guided Imagery     [x] Family/friends                 [] Pet visits     [] Devotional reading                         [] Unknown     [x] Other: Congregation                                            Interventions offered during this visit: (See comments for more details)    Patient Interventions: Affirmation of andrez, Affirmation of emotions/emotional suffering, Prayer (actual), Iconic (affirming the presence of God/Higher Power), Initial/Spiritual assessment, patient floor           Plan of Care:     [] Support spiritual and/or cultural needs    [] Support AMD and/or advance care planning process      [] Support grieving process   [] Coordinate Rites and/or Rituals    [] Coordination with community clergy   [x] No spiritual needs identified at this time   [] Detailed Plan of Care below (See Comments)  [] Make referral to Music Therapy  [] Make referral to Pet Therapy     [] Make referral to Addiction services  [] Make referral to [facility]  [] Make referral to Spiritual Care Partner  [] No future visits requested        [] Contact Spiritual Care for further referrals     Comments: I provided spiritual support to Mr. Ranjeet Childs during this initial spiritual assessment. He noted that his  from [employer] in [place] had visited. I provided an affirming presence as he shared with me about his medical challenges and how his andrez in God has guided him and given him strength. I offered a prayer at the conclusion of the visit and informed him of the ongoing availability of chaplains if needed. He expressed appreciation for the support. Spiritual Care remains available if needed. Rev. Tracy Ocoha M.Div, [address] [place] [address]

## 2023-01-09 NOTE — PROGRESS NOTES
OT tx session attempted at 0900, however pt off floor at [facility]. Will continue to follow pt and attempt tx session at a later time as time allows. Thank you.

## 2023-01-09 NOTE — PROGRESS NOTES
Patent chart reviewed. Still pending insurance auth to [facility] in [place]. Back up plan is SNF, no rehab ability due to using all his SNF days.

## 2023-01-10 LAB
GLUCOSE BLD STRIP.AUTO-MCNC: 83 MG/DL (ref 65–100)
GLUCOSE BLD STRIP.AUTO-MCNC: 94 MG/DL (ref 65–100)
GLUCOSE BLD STRIP.AUTO-MCNC: 98 MG/DL (ref 65–100)
PERFORMED BY, TECHID: NORMAL

## 2023-01-10 PROCEDURE — 97530 THERAPEUTIC ACTIVITIES: CPT

## 2023-01-10 PROCEDURE — 74011250637 HC RX REV CODE- 250/637: Performed by: SURGERY

## 2023-01-10 PROCEDURE — 82962 GLUCOSE BLOOD TEST: CPT

## 2023-01-10 PROCEDURE — 65270000029 HC RM PRIVATE

## 2023-01-10 PROCEDURE — 74011250636 HC RX REV CODE- 250/636: Performed by: SURGERY

## 2023-01-10 PROCEDURE — 74011250637 HC RX REV CODE- 250/637: Performed by: INTERNAL MEDICINE

## 2023-01-10 PROCEDURE — 99024 POSTOP FOLLOW-UP VISIT: CPT | Performed by: SURGERY

## 2023-01-10 RX ADMIN — ATORVASTATIN CALCIUM 40 MG: 40 TABLET, FILM COATED ORAL at 08:49

## 2023-01-10 RX ADMIN — TICAGRELOR 90 MG: 90 TABLET ORAL at 08:51

## 2023-01-10 RX ADMIN — GABAPENTIN 100 MG: 100 CAPSULE ORAL at 08:49

## 2023-01-10 RX ADMIN — ISOSORBIDE DINITRATE 20 MG: 20 TABLET ORAL at 15:48

## 2023-01-10 RX ADMIN — HYDRALAZINE HYDROCHLORIDE 25 MG: 25 TABLET, FILM COATED ORAL at 22:50

## 2023-01-10 RX ADMIN — HEPARIN SODIUM 5000 UNITS: 5000 INJECTION INTRAVENOUS; SUBCUTANEOUS at 22:50

## 2023-01-10 RX ADMIN — MELATONIN TAB 5 MG 5 MG: 5 TAB at 22:50

## 2023-01-10 RX ADMIN — ISOSORBIDE DINITRATE 20 MG: 20 TABLET ORAL at 22:50

## 2023-01-10 RX ADMIN — AMITRIPTYLINE HYDROCHLORIDE 50 MG: 25 TABLET, FILM COATED ORAL at 22:50

## 2023-01-10 RX ADMIN — HEPARIN SODIUM 5000 UNITS: 5000 INJECTION INTRAVENOUS; SUBCUTANEOUS at 13:32

## 2023-01-10 RX ADMIN — FUROSEMIDE 40 MG: 40 TABLET ORAL at 08:49

## 2023-01-10 RX ADMIN — LISINOPRIL 5 MG: 5 TABLET ORAL at 08:49

## 2023-01-10 RX ADMIN — CALCIUM CARBONATE 200 MG: 500 TABLET, CHEWABLE ORAL at 08:49

## 2023-01-10 RX ADMIN — CARVEDILOL 25 MG: 12.5 TABLET, FILM COATED ORAL at 08:49

## 2023-01-10 RX ADMIN — HYDRALAZINE HYDROCHLORIDE 25 MG: 25 TABLET, FILM COATED ORAL at 08:49

## 2023-01-10 RX ADMIN — ISOSORBIDE DINITRATE 20 MG: 20 TABLET ORAL at 09:01

## 2023-01-10 RX ADMIN — HEPARIN SODIUM 5000 UNITS: 5000 INJECTION INTRAVENOUS; SUBCUTANEOUS at 06:04

## 2023-01-10 RX ADMIN — OXYCODONE AND ACETAMINOPHEN 1 TABLET: 10; 325 TABLET ORAL at 13:37

## 2023-01-10 RX ADMIN — GABAPENTIN 100 MG: 100 CAPSULE ORAL at 22:50

## 2023-01-10 RX ADMIN — OXYCODONE HYDROCHLORIDE 10 MG: 10 TABLET, FILM COATED, EXTENDED RELEASE ORAL at 08:51

## 2023-01-10 RX ADMIN — OXYCODONE HYDROCHLORIDE 10 MG: 10 TABLET, FILM COATED, EXTENDED RELEASE ORAL at 22:53

## 2023-01-10 RX ADMIN — GABAPENTIN 100 MG: 100 CAPSULE ORAL at 15:48

## 2023-01-10 RX ADMIN — AMLODIPINE BESYLATE 10 MG: 5 TABLET ORAL at 08:49

## 2023-01-10 RX ADMIN — HYDRALAZINE HYDROCHLORIDE 25 MG: 25 TABLET, FILM COATED ORAL at 15:48

## 2023-01-10 NOTE — PROGRESS NOTES
Problem: Mobility Impaired (Adult and Pediatric)  Goal: *Acute Goals and Plan of Care (Insert Text)  Description: FUNCTIONAL STATUS PRIOR TO ADMISSION: Patient was modified independent using a peyton walker for functional mobility. Patient required setup assistance for basic and instrumental ADLs. HOME SUPPORT PRIOR TO ADMISSION: Pt at short term rehab at [place] since October. Prior to admission in October pt was living with his wife. Physical Therapy Goals  Initiated 12/28/2022  Pt stated goal: to get better, would eventually like to use a prosthesis on LLE. Pt will be I with LE HEP in 7 days. Pt will perform bed mobility with mod I in 7 days. Pt will perform transfers with mod I in 7 days. Pt will amb 5-10 feet with LRAD safely with min Ax1 in 7 days. Outcome: Progressing Towards Goal   PHYSICAL THERAPY TREATMENT  Patient: Deanne Bustillo (76 y.o. male)  Date: 1/10/2023  Diagnosis: Wet gangrene ([facility] [address] Collette Proper <principal problem not specified>  Procedure(s) (LRB):  LEFT AMPUTATION KNEE(AKA) (Left) 17 Days Post-Op  Precautions:    Chart, physical therapy assessment, plan of care and goals were reviewed. ASSESSMENT  Patient continues with skilled PT services and is progressing towards goals. Pt found semi supine upon PTA/DURHAM arrival, agreeable to session. (See below for objective details and assist levels). Overall pt tolerated session fair today with bed mobility and transfers. Pt reports completing therex throughout previous evening causing inability to transfer to EOB I, required mod A for trunk support. Pt able to tolerate x5 sit<>stand with min A x2, pt required VC for proper LLE placement and UE push off abl;le to stand ~30 secs. In standing pt demo'd forward flexed posture, VC/TC for neutral position, fair carry over. Pt educated on decreasing frequency of therex and activity tolerance, demo's verbal understanding.  Pt demo's great rehab potential to return to PLOF/Mod I, reports goals of wanting to ambulate and play with grandchildren. Will continue to benefit from skilled PT services, and will continue to progress as tolerated. Current Level of Function Impacting Discharge (mobility/balance): functional mobility     Other factors to consider for discharge: PLOF, fall risk and amount of A needed for mobility          PLAN :  Patient continues to benefit from skilled intervention to address the above impairments. Continue treatment per established plan of care to address goals. Recommend with staff: Out of bed to chair for meals and Encourage HEP in prep for ADLs/mobility    Recommendation for discharge: (in order for the patient to meet his/her long term goals)  [facility]     This discharge recommendation:  Has been made in collaboration with the attending provider and/or case management    IF patient discharges home will need the following DME: to be determined (TBD)       SUBJECTIVE:   Patient stated I did all my exercises las night that's why I'm sore today.     OBJECTIVE DATA SUMMARY:   Critical Behavior:  Neurologic State: Alert  Orientation Level: Oriented X4  Cognition: Follows commands     Functional Mobility Training:  Bed Mobility:  Rolling: Contact guard assistance  Supine to Sit: Moderate assistance  Scooting: Minimum assistance  Transfers:  Sit to Stand: Minimum assistance;Assist x2  Stand to Sit: Minimum assistance;Assist x2  Balance:  Sitting: Impaired; With support  Sitting - Static: Good (unsupported)  Sitting - Dynamic: Fair (occasional)  Standing: Impaired; With support  Standing - Static: Constant support; Fair  Ambulation/Gait Training:       Pain Rating:  10/10 LUE/LLE    Activity Tolerance:   Fair, requires rest breaks, and requires frequent rest breaks    After treatment patient left in no apparent distress:   Bed locked and returned to lowest position, Call bell within reach and sitting EOB    COMMUNICATION/COLLABORATION:   The patients plan of care was discussed with: Occupational therapy assistant and Registered nurse.      PTA/DURHAM cotreat to maximize pt and clinician safety     Davian Goldberg, TYRON, PT   Time Calculation: 39 mins

## 2023-01-10 NOTE — PROGRESS NOTES
Problem: Falls - Risk of  Goal: *Absence of Falls  Description: Document Bertrum Mast Fall Risk and appropriate interventions in the flowsheet. Outcome: Progressing Towards Goal  Note: Fall Risk Interventions:  Mobility Interventions: Patient to call before getting OOB, PT Consult for mobility concerns, PT Consult for assist device competence         Medication Interventions: Bed/chair exit alarm    Elimination Interventions: Call light in reach              Problem: Risk for Spread of Infection  Goal: Prevent transmission of infectious organism to others  Description: Prevent the transmission of infectious organisms to other patients, staff members, and visitors.   Outcome: Progressing Towards Goal     Problem: Pain  Goal: *Control of Pain  Outcome: Progressing Towards Goal

## 2023-01-10 NOTE — PROGRESS NOTES
Problem: Falls - Risk of  Goal: *Absence of Falls  Description: Document Carol Diop Fall Risk and appropriate interventions in the flowsheet.   1/10/2023 0803 by Nicole Diaz RN  Outcome: Progressing Towards Goal  Note: Fall Risk Interventions:  Mobility Interventions: Patient to call before getting OOB, PT Consult for mobility concerns, PT Consult for assist device competence         Medication Interventions: Bed/chair exit alarm    Elimination Interventions: Call light in reach           1/10/2023 0802 by Nicole Diaz RN  Outcome: Progressing Towards Goal  Note: Fall Risk Interventions:  Mobility Interventions: Patient to call before getting OOB, PT Consult for mobility concerns, PT Consult for assist device competence         Medication Interventions: Bed/chair exit alarm    Elimination Interventions: Call light in reach              Problem: Pain  Goal: *Control of Pain  1/10/2023 0803 by Nicole Diaz RN  Outcome: Progressing Towards Goal  1/10/2023 0802 by Nicole Diaz RN  Outcome: Progressing Towards Goal

## 2023-01-10 NOTE — PROGRESS NOTES
OCCUPATIONAL THERAPY TREATMENT  Patient: Maru Lara (35 y.o. male)  Date: 1/10/2023  Diagnosis: Wet gangrene ([address] Nicolasa Lorenzo <principal problem not specified>  Procedure(s) (LRB):  LEFT AMPUTATION KNEE(AKA) (Left) 17 Days Post-Op  Precautions:    Chart, occupational therapy assessment, plan of care, and goals were reviewed. ASSESSMENT  Pt continues with skilled OT services and is progressing towards goals. Upon DURHAM arrival, pt semi supine in bed and agreeable to tx session at this time. Overall, pt continues to present with deficits in generalized strength/AROM, coordination, bed mobility, static/dynamic sitting and standing balance and functional activity tolerance during performance of ADLs/mobility (see below for objective details and assist levels). Pt noted with continued improvement in overall mobility. Pt completed bed mobility this date requiring increased assist due to fatigue reported. Pt completed x5 trials of sit>stand from EOB with Marianela x2 overall requiring tactile cueing for fully erect posture and pulling glutes underneath torso. Pt able to remain standing ~15-30 seconds each standing trial.  Pt required Max-total A for donning of R sock due to limited reach. Pt able to complete self feeding of drink>mouth with overall setup A requiring total A for container management. Pt left seated at EOB for eating lunch with appropriate balance noted, RN notified. Recommend d/c to [facility]  once medically appropriate. Other factors to consider for discharge: family/social support, DME, time since onset, severity of deficits, decline from functional baseline         PLAN :  Patient continues to benefit from skilled intervention to address the above impairments. Continue treatment per established plan of care to address goals.     Recommendation for discharge: (in order for the patient to meet his/her long term goals)  [facility]     This discharge recommendation:  Has been made in collaboration with the attending provider and/or case management    IF patient discharges home will need the following DME: TBD       SUBJECTIVE:   Patient stated I can't bend that far to put my sock on today.     OBJECTIVE DATA SUMMARY:   Cognitive/Behavioral Status:  Neurologic State: Alert  Orientation Level: Oriented X4  Cognition: Follows commands    Functional Mobility and Transfers for ADLs:  Bed Mobility:  Rolling: Contact guard assistance  Supine to Sit: Moderate assistance  Scooting: Minimum assistance    Transfers:  Sit to Stand: Minimum assistance;Assist x2    Balance:  Sitting: Impaired; With support  Sitting - Static: Good (unsupported)  Sitting - Dynamic: Fair (occasional)  Standing: Impaired; With support  Standing - Static: Constant support; Fair    ADL Intervention:  Feeding  Container Management: Total assistance (dependent)  Drink to Mouth: Set-up    Lower Body Dressing Assistance  Socks: Maximum assistance    Pain:  10/10 pain in L residual limb with mobility    Activity Tolerance:   Fair and requires rest breaks  Please refer to the flowsheet for vital signs taken during this treatment. After treatment patient left in no apparent distress:   Bed locked and in lowest position  Call bell within reach and sitting at EOB    COMMUNICATION/COLLABORATION:   The patients plan of care was discussed with: Physical therapy assistant and Registered nurse. Cotreat with PT for increased safety for pt and clinician. HERMINIO Alvarez  Time Calculation: 39 mins    Problem: Self Care Deficits Care Plan (Adult)  Goal: *Acute Goals and Plan of Care (Insert Text)  Description:   FUNCTIONAL STATUS PRIOR TO ADMISSION: Patient was modified independent using a peyton walker for functional mobility. Patient was modified independent for basic and instrumental ADLs. HOME SUPPORT: Patient most recently resided at [facility] since October for rehab services.  Prior [facility] pt was residing with his wife at home. Occupational Therapy Goals  Initiated 12/28/2022  Patient Goal: Walk and get to the bathroom. 1.  Patient will perform lower body dressing with modified independence within 7 day(s). 2.  Patient will perform grooming with modified independence within 7 day(s). 3.  Patient will perform bathing with modified independence within 7 day(s). 4.  Patient will perform toilet transfers with modified independence within 7 day(s). 5.  Patient will perform all aspects of toileting with modified independence within 7 day(s). 6.  Patient will participate in upper extremity therapeutic exercise/activities with modified independence within 7 day(s).     Outcome: Progressing Towards Goal

## 2023-01-10 NOTE — DISCHARGE INSTRUCTIONS
Left BKA wound dressing needs to be done daily: Apply Xeroform gauze on the incision, 4 x 4 gauze, 2 rolls of Kerlix and Ace wrap. And also elevate left BKA.

## 2023-01-10 NOTE — PROGRESS NOTES
Patient examined this morning. Vital signs stable. No fever. I changed the dressing on the left BKA wounds are clean and dry. Patient can be discharged to rehab today.

## 2023-01-10 NOTE — PROGRESS NOTES
CM reviewed chart. Insurance denial to IRF. Back up plan remains LTC/SNF in [place]. [facility] rehab has accepted but needs to discuss with family about financial information before they can accept. Spoke to patient and wife and both are in agreement. Reached out to [facility] to proceed with plan. Gave wife number to call and to discuss about acceptance.

## 2023-01-10 NOTE — PROGRESS NOTES
Discharge Summary     Patient: Lamin Catherine MRN: [id number]  SSN: [id number]    YOB: 1955  Age: 74 y.o. Sex: male       Admit Date: 12/22/2022    Discharge Date: 1/10/2023      Admission Diagnoses: Wet gangrene [facility]) Nataly Vega    Discharge Diagnoses:   Problem List as of 1/10/2023 Date Reviewed: 12/27/2022            Codes Class Noted - Resolved    Wet gangrene ([facility] ICD-10-CM: R72  ICD-9-CM: 785.4  12/22/2022 - Present        Acute CHF (congestive heart failure) ([facility] ICD-10-CM: I50.9  ICD-9-CM: 428.0  4/6/2022 - Present        Dysphagia, unspecified type ICD-10-CM: R13.10  ICD-9-CM: 787.20  Unknown - Present        Fatigue, unspecified type ICD-10-CM: R53.83  ICD-9-CM: 780.79  Unknown - Present        Chronic systolic congestive heart failure ([facility] ICD-10-CM: I50.22  ICD-9-CM: 428.22, 428.0  2/16/2021 - Present        Goals of care, counseling/discussion ICD-10-CM: Z71.89  ICD-9-CM: V65.49  Unknown - Present        DNR (do not resuscitate) discussion ICD-10-CM: Z71.89  ICD-9-CM: V65.49  Unknown - Present        JUAN C (acute kidney injury) ([facility] ICD-10-CM: N17.9  ICD-9-CM: 649. 9  Unknown - Present        Acute hypoxemic respiratory failure due to COVID-19 [facility]) ICD-10-CM: U07.1, J96.01  ICD-9-CM: 518.81, 079.89, 799.02  1/30/2021 - Present        RESOLVED: Atypical chest pain ICD-10-CM: R07.89  ICD-9-CM: 786.59  4/6/2022 - 4/13/2022            Discharge Condition: Good    Hospital Course: 74years old patient with multiple medical issues including A. fib anemia arrhythmia CAD admitted for infected knee scheduled for surgery patient was seen by nephrology for dialysis management. He was placed on IV vancomycin Zosyn however with the amputation patient has been discontinued.   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 Qty: 30 Tablet, Refills: 0      epoetin luis-epbx (RETACRIT) 4,000 unit/mL injection 2 mL by SubCUTAneous route DIALYSIS MON, WED & FRI. Indications: anemia due to kidney failure  Qty: 12 Each, Refills: 0      heparin sodium,porcine (heparin, porcine,) 5,000 unit/mL injection 1 mL by SubCUTAneous route every eight (8) hours. Qty: 30 mL, Refills: 0      mupirocin (BACTROBAN) 2 % ointment Apply 1 g to affected area daily for 5 doses. Qty: 22 g, Refills: 0      naloxone (NARCAN) 1 mg/mL injection 1 mL by IntraVENous route as needed for Overdose or Respiratory Distress. Qty: 1 mL, Refills: 0      oxyCODONE IR (ROXICODONE) 5 mg immediate release tablet Take 1 Tablet by mouth every four (4) hours as needed for Pain for up to 3 days. Max Daily Amount: 30 mg.  Qty: 10 Tablet, Refills: 0    Associated Diagnoses: Pain      oxyCODONE ER (OxyCONTIN) 10 mg ER tablet Take 1 Tablet by mouth every twelve (12) hours for 30 days. Max Daily Amount: 20 mg.  Qty: 10 Tablet, Refills: 0    Associated Diagnoses: Pain      polyethylene glycol (MIRALAX) 17 gram packet Take 1 Packet by mouth daily. Qty: 30 Packet, Refills: 0           CONTINUE these medications which have CHANGED    Details   carvediloL (COREG) 25 mg tablet Take 1 Tablet by mouth two (2) times a day. Qty: 60 Tablet, Refills: 0           CONTINUE these medications which have NOT CHANGED    Details   ticagrelor (BRILINTA) 90 mg tablet Take 90 mg by mouth two (2) times a day. isosorbide dinitrate (ISORDIL) 20 mg tablet Take 20 mg by mouth three (3) times daily. lisinopriL (PRINIVIL, ZESTRIL) 5 mg tablet Take 5 mg by mouth daily. melatonin 5 mg cap capsule Take 5 mg by mouth nightly. atorvastatin (LIPITOR) 80 mg tablet Take 0.5 Tabs by mouth daily.   Qty: 30 Tab, Refills: 0      hydrALAZINE (APRESOLINE) 25 mg tablet Take 1 Tab by mouth three (3) times daily. Qty: 90 Tab, Refills: 0      aspirin 81 mg chewable tablet Take 81 mg by mouth daily. gabapentin (NEURONTIN) 100 mg capsule Take 1 Capsule by mouth three (3) times daily. Max Daily Amount: 300 mg. Qty: 15 Capsule, Refills: 0    Associated Diagnoses: Open wound of both lower extremities, initial encounter      calcium carbonate (TUMS) 200 mg calcium (500 mg) chew Take 1 Tablet by mouth in the morning. insulin glargine (Lantus U-100 Insulin) 100 unit/mL injection 10 Units by SubCUTAneous route nightly. Qty: 1 mL, Refills: 0      amLODIPine (NORVASC) 10 mg tablet Take 1 Tab by mouth daily.   Qty: 30 Tab, Refills: 0           STOP taking these medications       collagenase (SANTYL) 250 unit/gram ointment Comments:   Reason for Stopping:         acetaminophen (TYLENOL) 325 mg tablet Comments:   Reason for Stopping:               Activity: PT/OT Eval and Treat  Diet: Cardiac Diet and Renal Diet  Wound Care: As directed    Follow-up Appointments   Procedures    FOLLOW UP VISIT Appointment in: 3 - 5 Days     Standing Status:   Standing     Number of Occurrences:   1     Order Specific Question:   Appointment in     Answer:   3 - 5 Days     Waiting for bed  Signed By: Edil Willson MD     January 10, 2023

## 2023-01-11 LAB
GLUCOSE BLD STRIP.AUTO-MCNC: 104 MG/DL (ref 65–100)
GLUCOSE BLD STRIP.AUTO-MCNC: 122 MG/DL (ref 65–100)
GLUCOSE BLD STRIP.AUTO-MCNC: 84 MG/DL (ref 65–100)
PERFORMED BY, TECHID: ABNORMAL
PERFORMED BY, TECHID: ABNORMAL
PERFORMED BY, TECHID: NORMAL

## 2023-01-11 PROCEDURE — 74011250637 HC RX REV CODE- 250/637: Performed by: INTERNAL MEDICINE

## 2023-01-11 PROCEDURE — 65270000029 HC RM PRIVATE

## 2023-01-11 PROCEDURE — 82962 GLUCOSE BLOOD TEST: CPT

## 2023-01-11 PROCEDURE — 74011250637 HC RX REV CODE- 250/637: Performed by: SURGERY

## 2023-01-11 PROCEDURE — 74011250636 HC RX REV CODE- 250/636: Performed by: SURGERY

## 2023-01-11 RX ADMIN — HYDRALAZINE HYDROCHLORIDE 25 MG: 25 TABLET, FILM COATED ORAL at 13:43

## 2023-01-11 RX ADMIN — OXYCODONE AND ACETAMINOPHEN 1 TABLET: 10; 325 TABLET ORAL at 18:06

## 2023-01-11 RX ADMIN — CARVEDILOL 25 MG: 12.5 TABLET, FILM COATED ORAL at 20:18

## 2023-01-11 RX ADMIN — CALCIUM CARBONATE 200 MG: 500 TABLET, CHEWABLE ORAL at 13:42

## 2023-01-11 RX ADMIN — LISINOPRIL 5 MG: 5 TABLET ORAL at 13:43

## 2023-01-11 RX ADMIN — GABAPENTIN 100 MG: 100 CAPSULE ORAL at 13:42

## 2023-01-11 RX ADMIN — HYDRALAZINE HYDROCHLORIDE 25 MG: 25 TABLET, FILM COATED ORAL at 23:17

## 2023-01-11 RX ADMIN — TICAGRELOR 90 MG: 90 TABLET ORAL at 13:43

## 2023-01-11 RX ADMIN — OXYCODONE HYDROCHLORIDE 10 MG: 10 TABLET, FILM COATED, EXTENDED RELEASE ORAL at 20:18

## 2023-01-11 RX ADMIN — HEPARIN SODIUM 5000 UNITS: 5000 INJECTION INTRAVENOUS; SUBCUTANEOUS at 05:53

## 2023-01-11 RX ADMIN — TICAGRELOR 90 MG: 90 TABLET ORAL at 20:18

## 2023-01-11 RX ADMIN — FUROSEMIDE 40 MG: 40 TABLET ORAL at 13:43

## 2023-01-11 RX ADMIN — ISOSORBIDE DINITRATE 20 MG: 20 TABLET ORAL at 13:42

## 2023-01-11 RX ADMIN — OXYCODONE HYDROCHLORIDE 10 MG: 10 TABLET, FILM COATED, EXTENDED RELEASE ORAL at 13:42

## 2023-01-11 RX ADMIN — AMITRIPTYLINE HYDROCHLORIDE 50 MG: 25 TABLET, FILM COATED ORAL at 23:17

## 2023-01-11 RX ADMIN — OXYCODONE AND ACETAMINOPHEN 1 TABLET: 10; 325 TABLET ORAL at 05:56

## 2023-01-11 RX ADMIN — HEPARIN SODIUM 5000 UNITS: 5000 INJECTION INTRAVENOUS; SUBCUTANEOUS at 23:17

## 2023-01-11 RX ADMIN — AMLODIPINE BESYLATE 10 MG: 5 TABLET ORAL at 13:42

## 2023-01-11 RX ADMIN — GABAPENTIN 100 MG: 100 CAPSULE ORAL at 23:17

## 2023-01-11 RX ADMIN — MELATONIN TAB 5 MG 5 MG: 5 TAB at 23:17

## 2023-01-11 RX ADMIN — HEPARIN SODIUM 5000 UNITS: 5000 INJECTION INTRAVENOUS; SUBCUTANEOUS at 13:43

## 2023-01-11 RX ADMIN — CARVEDILOL 25 MG: 12.5 TABLET, FILM COATED ORAL at 13:42

## 2023-01-11 RX ADMIN — ISOSORBIDE DINITRATE 20 MG: 20 TABLET ORAL at 23:17

## 2023-01-11 RX ADMIN — ATORVASTATIN CALCIUM 40 MG: 40 TABLET, FILM COATED ORAL at 13:42

## 2023-01-11 NOTE — PROGRESS NOTES
Problem: Falls - Risk of  Goal: *Absence of Falls  Description: Document Paras Graft Fall Risk and appropriate interventions in the flowsheet. Outcome: Progressing Towards Goal  Note: Fall Risk Interventions:  Mobility Interventions: PT Consult for mobility concerns         Medication Interventions: Bed/chair exit alarm    Elimination Interventions: Call light in reach              Problem: Pressure Injury - Risk of  Goal: *Prevention of pressure injury  Description: Document Cruzito Scale and appropriate interventions in the flowsheet. Outcome: Progressing Towards Goal  Note: Pressure Injury Interventions:  Sensory Interventions: Assess changes in LOC, Keep linens dry and wrinkle-free, Minimize linen layers    Moisture Interventions: Absorbent underpads, Minimize layers    Activity Interventions: Increase time out of bed, PT/OT evaluation    Mobility Interventions: HOB 30 degrees or less, PT/OT evaluation    Nutrition Interventions: Document food/fluid/supplement intake    Friction and Shear Interventions: HOB 30 degrees or less, Minimize layers                Problem: Risk for Spread of Infection  Goal: Prevent transmission of infectious organism to others  Description: Prevent the transmission of infectious organisms to other patients, staff members, and visitors.   Outcome: Progressing Towards Goal

## 2023-01-11 NOTE — PROGRESS NOTES
Dressing changes to patients right lower leg completed, patient tolerated well, betadine to right pointer finger applied.

## 2023-01-11 NOTE — PROGRESS NOTES
Attempted PT/OT at 1140, however pt declined due to fatigue and pain greater than 10. Will continue to follow and attempt at a later time. Thank you.

## 2023-01-11 NOTE — PROGRESS NOTES
Waiting for acceptance at SNF for LTC. Per facility, they are having issues getting in touch with wife. Waiting for final acceptance.

## 2023-01-11 NOTE — DISCHARGE SUMMARY
Discharge Summary     Patient: Minnie Cobian MRN: [id number]  SSN: [id number]    YOB: 1955  Age: 74 y.o. Sex: male       Admit Date: 12/22/2022    Discharge Date: 1/11/2023      Admission Diagnoses: Wet gangrene [facility]) [facility]    Discharge Diagnoses:   Problem List as of 1/11/2023 Date Reviewed: 12/27/2022            Codes Class Noted - Resolved    Wet gangrene ([email or web address] 75.) ICD-10-CM: Z14  ICD-9-CM: 785.4  12/22/2022 - Present        Acute CHF (congestive heart failure) ([facility] [[email or web address] 75.) ICD-10-CM: I50.9  ICD-9-CM: 428.0  4/6/2022 - Present        Dysphagia, unspecified type ICD-10-CM: R13.10  ICD-9-CM: 787.20  Unknown - Present        Fatigue, unspecified type ICD-10-CM: R53.83  ICD-9-CM: 780.79  Unknown - Present        Chronic systolic congestive heart failure ([facility] [[email or web address] 75.) ICD-10-CM: I50.22  ICD-9-CM: 428.22, 428.0  2/16/2021 - Present        Goals of care, counseling/discussion ICD-10-CM: Z71.89  ICD-9-CM: V65.49  Unknown - Present        DNR (do not resuscitate) discussion ICD-10-CM: Z71.89  ICD-9-CM: V65.49  Unknown - Present        JUAN C (acute kidney injury) ([facility] [[email or web address] 75.) ICD-10-CM: N17.9  ICD-9-CM: 083. 9  Unknown - Present        Acute hypoxemic respiratory failure due to COVID-19 [facility]) ICD-10-CM: U07.1, J96.01  ICD-9-CM: 518.81, 079.89, 799.02  1/30/2021 - Present        RESOLVED: Atypical chest pain ICD-10-CM: R07.89  ICD-9-CM: 786.59  4/6/2022 - 4/13/2022            Discharge Condition: Good    Hospital Course: 74years old patient with multiple medical issues including A. fib anemia arrhythmia CAD admitted for infected knee scheduled for surgery patient was seen by nephrology for dialysis management. He was placed on IV vancomycin Zosyn however with the amputation patient has been discontinued.   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 Qty: 30 Tablet, Refills: 0      epoetin luis-epbx (RETACRIT) 4,000 unit/mL injection 2 mL by SubCUTAneous route DIALYSIS MON, WED & FRI. Indications: anemia due to kidney failure  Qty: 12 Each, Refills: 0      heparin sodium,porcine (heparin, porcine,) 5,000 unit/mL injection 1 mL by SubCUTAneous route every eight (8) hours. Qty: 30 mL, Refills: 0      mupirocin (BACTROBAN) 2 % ointment Apply 1 g to affected area daily for 5 doses. Qty: 22 g, Refills: 0      naloxone (NARCAN) 1 mg/mL injection 1 mL by IntraVENous route as needed for Overdose or Respiratory Distress. Qty: 1 mL, Refills: 0      oxyCODONE IR (ROXICODONE) 5 mg immediate release tablet Take 1 Tablet by mouth every four (4) hours as needed for Pain for up to 3 days. Max Daily Amount: 30 mg.  Qty: 10 Tablet, Refills: 0    Associated Diagnoses: Pain      oxyCODONE ER (OxyCONTIN) 10 mg ER tablet Take 1 Tablet by mouth every twelve (12) hours for 30 days. Max Daily Amount: 20 mg.  Qty: 10 Tablet, Refills: 0    Associated Diagnoses: Pain      polyethylene glycol (MIRALAX) 17 gram packet Take 1 Packet by mouth daily. Qty: 30 Packet, Refills: 0           CONTINUE these medications which have CHANGED    Details   carvediloL (COREG) 25 mg tablet Take 1 Tablet by mouth two (2) times a day. Qty: 60 Tablet, Refills: 0           CONTINUE these medications which have NOT CHANGED    Details   ticagrelor (BRILINTA) 90 mg tablet Take 90 mg by mouth two (2) times a day. isosorbide dinitrate (ISORDIL) 20 mg tablet Take 20 mg by mouth three (3) times daily. lisinopriL (PRINIVIL, ZESTRIL) 5 mg tablet Take 5 mg by mouth daily. melatonin 5 mg cap capsule Take 5 mg by mouth nightly. atorvastatin (LIPITOR) 80 mg tablet Take 0.5 Tabs by mouth daily.   Qty: 30 Tab, Refills: 0      hydrALAZINE (APRESOLINE) 25 mg tablet Take 1 Tab by mouth three (3) times daily. Qty: 90 Tab, Refills: 0      aspirin 81 mg chewable tablet Take 81 mg by mouth daily. gabapentin (NEURONTIN) 100 mg capsule Take 1 Capsule by mouth three (3) times daily. Max Daily Amount: 300 mg. Qty: 15 Capsule, Refills: 0    Associated Diagnoses: Open wound of both lower extremities, initial encounter      calcium carbonate (TUMS) 200 mg calcium (500 mg) chew Take 1 Tablet by mouth in the morning. insulin glargine (Lantus U-100 Insulin) 100 unit/mL injection 10 Units by SubCUTAneous route nightly. Qty: 1 mL, Refills: 0      amLODIPine (NORVASC) 10 mg tablet Take 1 Tab by mouth daily.   Qty: 30 Tab, Refills: 0           STOP taking these medications       collagenase (SANTYL) 250 unit/gram ointment Comments:   Reason for Stopping:         acetaminophen (TYLENOL) 325 mg tablet Comments:   Reason for Stopping:               Activity: PT/OT Eval and Treat  Diet: Cardiac Diet and Renal Diet  Wound Care: As directed    Follow-up Appointments   Procedures    FOLLOW UP VISIT Appointment in: 3 - 5 Days     Standing Status:   Standing     Number of Occurrences:   1     Order Specific Question:   Appointment in     Answer:   3 - 5 Days     Waiting for bed  Signed By: Bess Campoverde MD     January 11, 2023

## 2023-01-11 NOTE — PROGRESS NOTES
Pt approached for OT treatment session at 11:40 AM, however, pt requesting therapy return another time d/t reported 10/10 pain and fatigue. Nsg notified. OT will attempt treatment at a later time. Thank you.

## 2023-01-12 LAB
GLUCOSE BLD STRIP.AUTO-MCNC: 103 MG/DL (ref 65–100)
GLUCOSE BLD STRIP.AUTO-MCNC: 110 MG/DL (ref 65–100)
GLUCOSE BLD STRIP.AUTO-MCNC: 87 MG/DL (ref 65–100)
GLUCOSE BLD STRIP.AUTO-MCNC: 98 MG/DL (ref 65–100)
PERFORMED BY, TECHID: ABNORMAL
PERFORMED BY, TECHID: ABNORMAL
PERFORMED BY, TECHID: NORMAL
PERFORMED BY, TECHID: NORMAL

## 2023-01-12 PROCEDURE — 65270000029 HC RM PRIVATE

## 2023-01-12 PROCEDURE — 74011250637 HC RX REV CODE- 250/637: Performed by: INTERNAL MEDICINE

## 2023-01-12 PROCEDURE — 74011250636 HC RX REV CODE- 250/636: Performed by: SURGERY

## 2023-01-12 PROCEDURE — 82962 GLUCOSE BLOOD TEST: CPT

## 2023-01-12 PROCEDURE — 74011250637 HC RX REV CODE- 250/637: Performed by: SURGERY

## 2023-01-12 PROCEDURE — 36415 COLL VENOUS BLD VENIPUNCTURE: CPT

## 2023-01-12 PROCEDURE — 90935 HEMODIALYSIS ONE EVALUATION: CPT

## 2023-01-12 RX ORDER — HEPARIN SODIUM 1000 [USP'U]/ML
INJECTION, SOLUTION INTRAVENOUS; SUBCUTANEOUS
Status: DISPENSED
Start: 2023-01-12 | End: 2023-01-12

## 2023-01-12 RX ADMIN — CALCIUM CARBONATE 200 MG: 500 TABLET, CHEWABLE ORAL at 11:08

## 2023-01-12 RX ADMIN — AMITRIPTYLINE HYDROCHLORIDE 50 MG: 25 TABLET, FILM COATED ORAL at 21:47

## 2023-01-12 RX ADMIN — OXYCODONE AND ACETAMINOPHEN 1 TABLET: 10; 325 TABLET ORAL at 05:29

## 2023-01-12 RX ADMIN — GABAPENTIN 100 MG: 100 CAPSULE ORAL at 21:48

## 2023-01-12 RX ADMIN — AMLODIPINE BESYLATE 10 MG: 5 TABLET ORAL at 11:07

## 2023-01-12 RX ADMIN — OXYCODONE AND ACETAMINOPHEN 1 TABLET: 10; 325 TABLET ORAL at 17:27

## 2023-01-12 RX ADMIN — OXYCODONE HYDROCHLORIDE 10 MG: 10 TABLET, FILM COATED, EXTENDED RELEASE ORAL at 11:07

## 2023-01-12 RX ADMIN — HEPARIN SODIUM 5000 UNITS: 5000 INJECTION INTRAVENOUS; SUBCUTANEOUS at 21:49

## 2023-01-12 RX ADMIN — TICAGRELOR 90 MG: 90 TABLET ORAL at 11:07

## 2023-01-12 RX ADMIN — HEPARIN SODIUM 5000 UNITS: 5000 INJECTION INTRAVENOUS; SUBCUTANEOUS at 13:12

## 2023-01-12 RX ADMIN — LISINOPRIL 5 MG: 5 TABLET ORAL at 11:08

## 2023-01-12 RX ADMIN — ATORVASTATIN CALCIUM 40 MG: 40 TABLET, FILM COATED ORAL at 11:07

## 2023-01-12 RX ADMIN — OXYCODONE HYDROCHLORIDE 10 MG: 10 TABLET, FILM COATED, EXTENDED RELEASE ORAL at 21:48

## 2023-01-12 RX ADMIN — MELATONIN TAB 5 MG 5 MG: 5 TAB at 21:46

## 2023-01-12 RX ADMIN — GABAPENTIN 100 MG: 100 CAPSULE ORAL at 17:25

## 2023-01-12 RX ADMIN — HYDRALAZINE HYDROCHLORIDE 25 MG: 25 TABLET, FILM COATED ORAL at 21:47

## 2023-01-12 RX ADMIN — HYDRALAZINE HYDROCHLORIDE 25 MG: 25 TABLET, FILM COATED ORAL at 17:25

## 2023-01-12 RX ADMIN — HYDRALAZINE HYDROCHLORIDE 25 MG: 25 TABLET, FILM COATED ORAL at 11:07

## 2023-01-12 RX ADMIN — GABAPENTIN 100 MG: 100 CAPSULE ORAL at 11:08

## 2023-01-12 RX ADMIN — CARVEDILOL 25 MG: 12.5 TABLET, FILM COATED ORAL at 21:47

## 2023-01-12 RX ADMIN — ISOSORBIDE DINITRATE 20 MG: 20 TABLET ORAL at 21:47

## 2023-01-12 RX ADMIN — ISOSORBIDE DINITRATE 20 MG: 20 TABLET ORAL at 17:25

## 2023-01-12 RX ADMIN — ISOSORBIDE DINITRATE 20 MG: 20 TABLET ORAL at 11:08

## 2023-01-12 RX ADMIN — HEPARIN SODIUM 5000 UNITS: 5000 INJECTION INTRAVENOUS; SUBCUTANEOUS at 05:29

## 2023-01-12 RX ADMIN — FUROSEMIDE 40 MG: 40 TABLET ORAL at 11:08

## 2023-01-12 RX ADMIN — CARVEDILOL 25 MG: 12.5 TABLET, FILM COATED ORAL at 11:07

## 2023-01-12 RX ADMIN — TICAGRELOR 90 MG: 90 TABLET ORAL at 21:47

## 2023-01-12 RX ADMIN — OXYCODONE AND ACETAMINOPHEN 1 TABLET: 10; 325 TABLET ORAL at 11:14

## 2023-01-12 NOTE — PROGRESS NOTES
PT tx attempted at 0815 however pt off the floor in HD. Will continue to follow patient and attempt PT at a later time. Thank you.

## 2023-01-12 NOTE — PROGRESS NOTES
Tx initiated via a patent right upper chest catheter. Dressing changed per protocol. No s/s of infection or skin breakdown noted. Patient alert and oriented x3. No complaints verbalized. Dialysis Rx: 4k/2.5 Ca/138 NA/ 35 HC03/ 300 BFR/600 DFR. Yudi Marks  HBsAG negative as of 12/27/2022

## 2023-01-12 NOTE — PROGRESS NOTES
Discharge Summary     Patient: Jen Cordero MRN: [id number]  SSN: [id number]    YOB: 1955  Age: 74 y.o. Sex: male       Admit Date: 12/22/2022    Discharge Date: 1/12/2023      Admission Diagnoses: Wet gangrene [facility]) [facility]    Discharge Diagnoses:   Problem List as of 1/12/2023 Date Reviewed: 12/27/2022            Codes Class Noted - Resolved    Wet gangrene ([facility] 75.) ICD-10-CM: V88  ICD-9-CM: 785.4  12/22/2022 - Present        Acute CHF (congestive heart failure) ([facility] 75.) ICD-10-CM: I50.9  ICD-9-CM: 428.0  4/6/2022 - Present        Dysphagia, unspecified type ICD-10-CM: R13.10  ICD-9-CM: 787.20  Unknown - Present        Fatigue, unspecified type ICD-10-CM: R53.83  ICD-9-CM: 780.79  Unknown - Present        Chronic systolic congestive heart failure ([facility] 75.) ICD-10-CM: I50.22  ICD-9-CM: 428.22, 428.0  2/16/2021 - Present        Goals of care, counseling/discussion ICD-10-CM: Z71.89  ICD-9-CM: V65.49  Unknown - Present        DNR (do not resuscitate) discussion ICD-10-CM: Z71.89  ICD-9-CM: V65.49  Unknown - Present        JUAN C (acute kidney injury) ([facility] 75.) ICD-10-CM: N17.9  ICD-9-CM: 672. 9  Unknown - Present        Acute hypoxemic respiratory failure due to COVID-19 [facility]) ICD-10-CM: U07.1, J96.01  ICD-9-CM: 518.81, 079.89, 799.02  1/30/2021 - Present        RESOLVED: Atypical chest pain ICD-10-CM: R07.89  ICD-9-CM: 786.59  4/6/2022 - 4/13/2022            Discharge Condition: Good    Hospital Course: 74years old patient with multiple medical issues including A. fib anemia arrhythmia CAD admitted for infected knee scheduled for surgery patient was seen by nephrology for dialysis management. He was placed on IV vancomycin Zosyn however with the amputation patient has been discontinued.   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 Qty: 30 Tablet, Refills: 0      epoetin luis-epbx (RETACRIT) 4,000 unit/mL injection 2 mL by SubCUTAneous route DIALYSIS MON, WED & FRI. Indications: anemia due to kidney failure  Qty: 12 Each, Refills: 0      heparin sodium,porcine (heparin, porcine,) 5,000 unit/mL injection 1 mL by SubCUTAneous route every eight (8) hours. Qty: 30 mL, Refills: 0      mupirocin (BACTROBAN) 2 % ointment Apply 1 g to affected area daily for 5 doses. Qty: 22 g, Refills: 0      naloxone (NARCAN) 1 mg/mL injection 1 mL by IntraVENous route as needed for Overdose or Respiratory Distress. Qty: 1 mL, Refills: 0      oxyCODONE IR (ROXICODONE) 5 mg immediate release tablet Take 1 Tablet by mouth every four (4) hours as needed for Pain for up to 3 days. Max Daily Amount: 30 mg.  Qty: 10 Tablet, Refills: 0    Associated Diagnoses: Pain      oxyCODONE ER (OxyCONTIN) 10 mg ER tablet Take 1 Tablet by mouth every twelve (12) hours for 30 days. Max Daily Amount: 20 mg.  Qty: 10 Tablet, Refills: 0    Associated Diagnoses: Pain      polyethylene glycol (MIRALAX) 17 gram packet Take 1 Packet by mouth daily. Qty: 30 Packet, Refills: 0           CONTINUE these medications which have CHANGED    Details   carvediloL (COREG) 25 mg tablet Take 1 Tablet by mouth two (2) times a day. Qty: 60 Tablet, Refills: 0           CONTINUE these medications which have NOT CHANGED    Details   ticagrelor (BRILINTA) 90 mg tablet Take 90 mg by mouth two (2) times a day. isosorbide dinitrate (ISORDIL) 20 mg tablet Take 20 mg by mouth three (3) times daily. lisinopriL (PRINIVIL, ZESTRIL) 5 mg tablet Take 5 mg by mouth daily. melatonin 5 mg cap capsule Take 5 mg by mouth nightly. atorvastatin (LIPITOR) 80 mg tablet Take 0.5 Tabs by mouth daily.   Qty: 30 Tab, Refills: 0      hydrALAZINE (APRESOLINE) 25 mg tablet Take 1 Tab by mouth three (3) times daily. Qty: 90 Tab, Refills: 0      aspirin 81 mg chewable tablet Take 81 mg by mouth daily. gabapentin (NEURONTIN) 100 mg capsule Take 1 Capsule by mouth three (3) times daily. Max Daily Amount: 300 mg. Qty: 15 Capsule, Refills: 0    Associated Diagnoses: Open wound of both lower extremities, initial encounter      calcium carbonate (TUMS) 200 mg calcium (500 mg) chew Take 1 Tablet by mouth in the morning. insulin glargine (Lantus U-100 Insulin) 100 unit/mL injection 10 Units by SubCUTAneous route nightly. Qty: 1 mL, Refills: 0      amLODIPine (NORVASC) 10 mg tablet Take 1 Tab by mouth daily.   Qty: 30 Tab, Refills: 0           STOP taking these medications       collagenase (SANTYL) 250 unit/gram ointment Comments:   Reason for Stopping:         acetaminophen (TYLENOL) 325 mg tablet Comments:   Reason for Stopping:               Activity: PT/OT Eval and Treat  Diet: Cardiac Diet and Renal Diet  Wound Care: As directed    Follow-up Appointments   Procedures    FOLLOW UP VISIT Appointment in: 3 - 5 Days     Standing Status:   Standing     Number of Occurrences:   1     Order Specific Question:   Appointment in     Answer:   3 - 5 Days     Waiting for bed  Signed By: Jonothan Curling, MD     January 12, 2023

## 2023-01-12 NOTE — PROGRESS NOTES
1411 Patient's dressing changed to RLE. Cleansed with normal saline, Xeroform applied, covered with abd pad, and wrapped with kerlix. Patient tolerated dressing change well.

## 2023-01-12 NOTE — PROGRESS NOTES
Renal Daily Progress Note    Admit Date: 12/22/2022      Subjective:   He was seen on hemodialysis today. He has no complaints of chest pain or shortness of breath. Appetite is good.         Current Facility-Administered Medications   Medication Dose Route Frequency    heparin (porcine) 1,000 unit/mL injection        furosemide (LASIX) tablet 40 mg  40 mg Oral DAILY    0.9% sodium chloride infusion 250 mL  250 mL IntraVENous PRN    0.9% sodium chloride infusion 250 mL  250 mL IntraVENous PRN    amitriptyline (ELAVIL) tablet 50 mg  50 mg Oral QHS    naloxone (NARCAN) injection 1 mg  1 mg IntraVENous PRN    0.9% sodium chloride infusion 250 mL  250 mL IntraVENous PRN    sodium chloride (NS) flush 5-40 mL  5-40 mL IntraVENous PRN    ondansetron (ZOFRAN ODT) tablet 4 mg  4 mg Oral Q8H PRN    Or    ondansetron (ZOFRAN) injection 4 mg  4 mg IntraVENous Q6H PRN    oxyCODONE ER (OxyCONTIN) tablet 10 mg  10 mg Oral Q12H    oxyCODONE-acetaminophen (PERCOCET 10)  mg per tablet 1 Tablet  1 Tablet Oral Q6H PRN    heparin (porcine) injection 5,000 Units  5,000 Units SubCUTAneous Q8H    heparin (porcine) 1,000 unit/mL injection 3,800 Units  3,800 Units Hemodialysis DIALYSIS PRN    oxyCODONE IR (ROXICODONE) tablet 5 mg  5 mg Oral Q4H PRN    amLODIPine (NORVASC) tablet 10 mg  10 mg Oral DAILY    atorvastatin (LIPITOR) tablet 40 mg  40 mg Oral DAILY    calcium carbonate (TUMS) chewable tablet 200 mg [elemental]  200 mg Oral DAILY    carvediloL (COREG) tablet 25 mg  25 mg Oral BID    gabapentin (NEURONTIN) capsule 100 mg  100 mg Oral TID    hydrALAZINE (APRESOLINE) tablet 25 mg  25 mg Oral TID    insulin glargine (LANTUS) injection 10 Units  10 Units SubCUTAneous QHS    isosorbide dinitrate (ISORDIL) tablet 20 mg  20 mg Oral TID    lisinopriL (PRINIVIL, ZESTRIL) tablet 5 mg  5 mg Oral DAILY    melatonin tablet 5 mg  5 mg Oral QHS    ticagrelor (BRILINTA) tablet 90 mg  90 mg Oral BID    sodium chloride (NS) flush 5-40 mL  5-40 mL IntraVENous PRN    acetaminophen (TYLENOL) tablet 650 mg  650 mg Oral Q6H PRN    Or    acetaminophen (TYLENOL) suppository 650 mg  650 mg Rectal Q6H PRN    polyethylene glycol (MIRALAX) packet 17 g  17 g Oral DAILY PRN        Review of Systems    Review of Systems   Respiratory:  Negative for shortness of breath. Cardiovascular:  Negative for chest pain. Gastrointestinal:  Negative for abdominal pain. Neurological:  Negative for headaches. Objective:     Patient Vitals for the past 8 hrs:   BP Temp Temp src Pulse Resp SpO2   01/12/23 1045 (!) 143/70 98.4 °F (36.9 °C) -- 77 14 98 %   01/12/23 1000 129/79 (!) 81 °F (27.2 °C) -- 81 -- --   01/12/23 0930 117/69 -- -- 71 -- --   01/12/23 0900 115/69 -- -- 72 -- --   01/12/23 0830 113/69 -- -- 70 -- --   01/12/23 0800 112/72 -- -- 69 -- --   01/12/23 0730 111/71 98.3 °F (36.8 °C) Oral 71 -- --       01/12 0701 - 01/12 1900  In: -   Out: 2317 [Urine:575]  01/10 1901 - 01/12 0700  In: 1780 [P.O.:1780]  Out: 0221 [Urine:1750]    Physical Exam:   Physical Exam  Cardiovascular:      Rate and Rhythm: Regular rhythm. Pulmonary:      Breath sounds: Normal breath sounds. Abdominal:      General: Bowel sounds are normal.      Palpations: Abdomen is soft. Neurological:      Mental Status: He is alert. Right IJ tunneled dialysis catheter functioning well. 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 No results for input(s): NA, K, CL, CO2, GLU, BUN, CREA, CA, MG, PHOS, ALB, TBIL, ALT, INR, INREXT, INREXT, INREXT in the last 72 hours.     No lab exists for component: SGOT, PTHBB    No components found for: GLPOC  No results for input(s): PH, PCO2, PO2, HCO3, FIO2 in the last 72 hours. No results for input(s): INR, INREXT, INREXT, INREXT in the last 72 hours. Assessment:       Active Problems:    Wet gangrene ([facility] (12/22/2022)  Renal failure on twice a week dialysis. Monday and Thursdays    Volume status is acceptable    Anemia of chronic disease- on  8,000 units Retacrit with each dialysis    HTN   Plan:   Continue twice weekly dialysis  Creatinine has been staying at 3.1 mg. Therefore cannot take him off dialysis completely.   Awaiting placement  Will recheck labs if he continues to stay in the hospital.

## 2023-01-13 LAB
GLUCOSE BLD STRIP.AUTO-MCNC: 102 MG/DL (ref 65–100)
GLUCOSE BLD STRIP.AUTO-MCNC: 79 MG/DL (ref 65–100)
GLUCOSE BLD STRIP.AUTO-MCNC: 91 MG/DL (ref 65–100)
PERFORMED BY, TECHID: ABNORMAL
PERFORMED BY, TECHID: NORMAL
PERFORMED BY, TECHID: NORMAL

## 2023-01-13 PROCEDURE — 82962 GLUCOSE BLOOD TEST: CPT

## 2023-01-13 PROCEDURE — 74011636637 HC RX REV CODE- 636/637: Performed by: SURGERY

## 2023-01-13 PROCEDURE — 74011250637 HC RX REV CODE- 250/637: Performed by: SURGERY

## 2023-01-13 PROCEDURE — 97530 THERAPEUTIC ACTIVITIES: CPT

## 2023-01-13 PROCEDURE — 74011250636 HC RX REV CODE- 250/636: Performed by: SURGERY

## 2023-01-13 PROCEDURE — 74011250637 HC RX REV CODE- 250/637: Performed by: INTERNAL MEDICINE

## 2023-01-13 PROCEDURE — 65270000029 HC RM PRIVATE

## 2023-01-13 RX ADMIN — HYDRALAZINE HYDROCHLORIDE 25 MG: 25 TABLET, FILM COATED ORAL at 15:00

## 2023-01-13 RX ADMIN — HEPARIN SODIUM 5000 UNITS: 5000 INJECTION INTRAVENOUS; SUBCUTANEOUS at 14:42

## 2023-01-13 RX ADMIN — AMITRIPTYLINE HYDROCHLORIDE 50 MG: 25 TABLET, FILM COATED ORAL at 21:32

## 2023-01-13 RX ADMIN — HEPARIN SODIUM 5000 UNITS: 5000 INJECTION INTRAVENOUS; SUBCUTANEOUS at 06:17

## 2023-01-13 RX ADMIN — HYDRALAZINE HYDROCHLORIDE 25 MG: 25 TABLET, FILM COATED ORAL at 09:19

## 2023-01-13 RX ADMIN — TICAGRELOR 90 MG: 90 TABLET ORAL at 21:34

## 2023-01-13 RX ADMIN — HEPARIN SODIUM 5000 UNITS: 5000 INJECTION INTRAVENOUS; SUBCUTANEOUS at 21:33

## 2023-01-13 RX ADMIN — ISOSORBIDE DINITRATE 20 MG: 20 TABLET ORAL at 15:00

## 2023-01-13 RX ADMIN — ISOSORBIDE DINITRATE 20 MG: 20 TABLET ORAL at 21:33

## 2023-01-13 RX ADMIN — INSULIN GLARGINE 10 UNITS: 100 INJECTION, SOLUTION SUBCUTANEOUS at 22:00

## 2023-01-13 RX ADMIN — CARVEDILOL 25 MG: 12.5 TABLET, FILM COATED ORAL at 09:19

## 2023-01-13 RX ADMIN — MELATONIN TAB 5 MG 5 MG: 5 TAB at 21:33

## 2023-01-13 RX ADMIN — GABAPENTIN 100 MG: 100 CAPSULE ORAL at 21:33

## 2023-01-13 RX ADMIN — OXYCODONE HYDROCHLORIDE 10 MG: 10 TABLET, FILM COATED, EXTENDED RELEASE ORAL at 09:18

## 2023-01-13 RX ADMIN — OXYCODONE HYDROCHLORIDE 10 MG: 10 TABLET, FILM COATED, EXTENDED RELEASE ORAL at 21:34

## 2023-01-13 RX ADMIN — HYDRALAZINE HYDROCHLORIDE 25 MG: 25 TABLET, FILM COATED ORAL at 21:33

## 2023-01-13 RX ADMIN — ATORVASTATIN CALCIUM 40 MG: 40 TABLET, FILM COATED ORAL at 09:18

## 2023-01-13 RX ADMIN — OXYCODONE 5 MG: 5 TABLET ORAL at 14:42

## 2023-01-13 RX ADMIN — CARVEDILOL 25 MG: 12.5 TABLET, FILM COATED ORAL at 21:34

## 2023-01-13 RX ADMIN — ISOSORBIDE DINITRATE 20 MG: 20 TABLET ORAL at 09:20

## 2023-01-13 RX ADMIN — OXYCODONE AND ACETAMINOPHEN 1 TABLET: 10; 325 TABLET ORAL at 06:17

## 2023-01-13 RX ADMIN — FUROSEMIDE 40 MG: 40 TABLET ORAL at 09:19

## 2023-01-13 RX ADMIN — GABAPENTIN 100 MG: 100 CAPSULE ORAL at 09:19

## 2023-01-13 RX ADMIN — LISINOPRIL 5 MG: 5 TABLET ORAL at 09:19

## 2023-01-13 RX ADMIN — GABAPENTIN 100 MG: 100 CAPSULE ORAL at 15:00

## 2023-01-13 RX ADMIN — CALCIUM CARBONATE 200 MG: 500 TABLET, CHEWABLE ORAL at 09:18

## 2023-01-13 RX ADMIN — AMLODIPINE BESYLATE 10 MG: 5 TABLET ORAL at 09:18

## 2023-01-13 RX ADMIN — TICAGRELOR 90 MG: 90 TABLET ORAL at 09:19

## 2023-01-13 NOTE — PROGRESS NOTES
Discussed with patient for him to go anywhere with his medicaid he would have a bill every month due to using his medicare up at this time. Patient didn't understand . Went into more detail with patient and explained it doesn't matter where he goes it will be the same at each facility. His two options are SNF/medicaid with finical responsibility or home with home health and for family to setup PCAs. Patient is calling wife to think about decision. Will return to talk with patient after conversation. 1540: followed up with patient about decision. Again explained either Home or SNF. He final agreed to SNF and them taking his check. Reached out to [address] [facility] to inform them of his decision and gave them his number to call to confirm. Will need confirmation from facility before they accept.

## 2023-01-13 NOTE — PROGRESS NOTES
Problem: Falls - Risk of  Goal: *Absence of Falls  Description: Document Micah Quinones Fall Risk and appropriate interventions in the flowsheet.   Outcome: Progressing Towards Goal  Note: Fall Risk Interventions:  Mobility Interventions: PT Consult for mobility concerns         Medication Interventions: Bed/chair exit alarm    Elimination Interventions: Call light in reach

## 2023-01-13 NOTE — DISCHARGE SUMMARY
Discharge Summary     Patient: Rosendo Harden MRN: [id number]  SSN: [id number]    YOB: 1955  Age: 74 y.o. Sex: male       Admit Date: 12/22/2022    Discharge Date: 1/13/2023      Admission Diagnoses: Wet gangrene [facility]) Lorenza Decent    Discharge Diagnoses:   Problem List as of 1/13/2023 Date Reviewed: 12/27/2022            Codes Class Noted - Resolved    Wet gangrene ([facility] 75.) ICD-10-CM: X26  ICD-9-CM: 785.4  12/22/2022 - Present        Acute CHF (congestive heart failure) ([facility] 75.) ICD-10-CM: I50.9  ICD-9-CM: 428.0  4/6/2022 - Present        Dysphagia, unspecified type ICD-10-CM: R13.10  ICD-9-CM: 787.20  Unknown - Present        Fatigue, unspecified type ICD-10-CM: R53.83  ICD-9-CM: 780.79  Unknown - Present        Chronic systolic congestive heart failure ([facility] 75.) ICD-10-CM: I50.22  ICD-9-CM: 428.22, 428.0  2/16/2021 - Present        Goals of care, counseling/discussion ICD-10-CM: Z71.89  ICD-9-CM: V65.49  Unknown - Present        DNR (do not resuscitate) discussion ICD-10-CM: Z71.89  ICD-9-CM: V65.49  Unknown - Present        JUAN C (acute kidney injury) ([facility] 75.) ICD-10-CM: N17.9  ICD-9-CM: 187. 9  Unknown - Present        Acute hypoxemic respiratory failure due to COVID-19 [facility]) ICD-10-CM: U07.1, J96.01  ICD-9-CM: 518.81, 079.89, 799.02  1/30/2021 - Present        RESOLVED: Atypical chest pain ICD-10-CM: R07.89  ICD-9-CM: 786.59  4/6/2022 - 4/13/2022            Discharge Condition: Good    Hospital Course: 74years old patient with multiple medical issues including A. fib anemia arrhythmia CAD admitted for infected knee scheduled for surgery patient was seen by nephrology for dialysis management. He was placed on IV vancomycin Zosyn however with the amputation patient has been discontinued.   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 Qty: 30 Tablet, Refills: 0      epoetin luis-epbx (RETACRIT) 4,000 unit/mL injection 2 mL by SubCUTAneous route DIALYSIS MON, WED & FRI. Indications: anemia due to kidney failure  Qty: 12 Each, Refills: 0      heparin sodium,porcine (heparin, porcine,) 5,000 unit/mL injection 1 mL by SubCUTAneous route every eight (8) hours. Qty: 30 mL, Refills: 0      mupirocin (BACTROBAN) 2 % ointment Apply 1 g to affected area daily for 5 doses. Qty: 22 g, Refills: 0      naloxone (NARCAN) 1 mg/mL injection 1 mL by IntraVENous route as needed for Overdose or Respiratory Distress. Qty: 1 mL, Refills: 0      oxyCODONE IR (ROXICODONE) 5 mg immediate release tablet Take 1 Tablet by mouth every four (4) hours as needed for Pain for up to 3 days. Max Daily Amount: 30 mg.  Qty: 10 Tablet, Refills: 0    Associated Diagnoses: Pain      oxyCODONE ER (OxyCONTIN) 10 mg ER tablet Take 1 Tablet by mouth every twelve (12) hours for 30 days. Max Daily Amount: 20 mg.  Qty: 10 Tablet, Refills: 0    Associated Diagnoses: Pain      polyethylene glycol (MIRALAX) 17 gram packet Take 1 Packet by mouth daily. Qty: 30 Packet, Refills: 0           CONTINUE these medications which have CHANGED    Details   carvediloL (COREG) 25 mg tablet Take 1 Tablet by mouth two (2) times a day. Qty: 60 Tablet, Refills: 0           CONTINUE these medications which have NOT CHANGED    Details   ticagrelor (BRILINTA) 90 mg tablet Take 90 mg by mouth two (2) times a day. isosorbide dinitrate (ISORDIL) 20 mg tablet Take 20 mg by mouth three (3) times daily. lisinopriL (PRINIVIL, ZESTRIL) 5 mg tablet Take 5 mg by mouth daily. melatonin 5 mg cap capsule Take 5 mg by mouth nightly. atorvastatin (LIPITOR) 80 mg tablet Take 0.5 Tabs by mouth daily.   Qty: 30 Tab, Refills: 0      hydrALAZINE (APRESOLINE) 25 mg tablet Take 1 Tab by mouth three (3) times daily. Qty: 90 Tab, Refills: 0      aspirin 81 mg chewable tablet Take 81 mg by mouth daily. gabapentin (NEURONTIN) 100 mg capsule Take 1 Capsule by mouth three (3) times daily. Max Daily Amount: 300 mg. Qty: 15 Capsule, Refills: 0    Associated Diagnoses: Open wound of both lower extremities, initial encounter      calcium carbonate (TUMS) 200 mg calcium (500 mg) chew Take 1 Tablet by mouth in the morning. insulin glargine (Lantus U-100 Insulin) 100 unit/mL injection 10 Units by SubCUTAneous route nightly. Qty: 1 mL, Refills: 0      amLODIPine (NORVASC) 10 mg tablet Take 1 Tab by mouth daily.   Qty: 30 Tab, Refills: 0           STOP taking these medications       collagenase (SANTYL) 250 unit/gram ointment Comments:   Reason for Stopping:         acetaminophen (TYLENOL) 325 mg tablet Comments:   Reason for Stopping:               Activity: PT/OT Eval and Treat  Diet: Cardiac Diet and Renal Diet  Wound Care: As directed    Follow-up Appointments   Procedures    FOLLOW UP VISIT Appointment in: 3 - 5 Days     Standing Status:   Standing     Number of Occurrences:   1     Order Specific Question:   Appointment in     Answer:   3 - 5 Days     Waiting for bed  Signed By: Roney Camargo MD     January 13, 2023

## 2023-01-13 NOTE — PROGRESS NOTES
OCCUPATIONAL THERAPY WEEKLY REASSESSMENT  Patient: Julia Ramsey (8 y.o. male)  Date: 1/13/2023  Diagnosis: Wet gangrene ([address] Jacinto Morton <principal problem not specified>  Procedure(s) (LRB):  LEFT AMPUTATION KNEE(AKA) (Left) 20 Days Post-Op  Precautions: Ax2 OOB, fall risk, contact   Chart, occupational therapy assessment, plan of care, and goals were reviewed. ASSESSMENT  Patient continues with skilled OT services and is progressing towards goals. Pt has been seen for 8 skilled OT sessions since initial evaluation completed by writer on 12/28/22. Pt is currently due for a RA d/t LOS. Per initial evaluation, pt is a 74 y.o M admitted 12/22/22 and being treated for a knee infection, osteomyelitis, and sepsis. Pt is s/p L AKA completed 12/24/22 by Dr. Batsheva St and has a hx of a complete digit amputation of the LUE. Pt received semi-supine in bed upon OT/PT arrival, AXO x4, and agreeable to OT/PT RA at this time. Based on current observations, pt continues to present with deficits in generalized strength, balance, functional activity tolerance, and pain impacting overall performance of ADLs and functional transfers/mobility (see below for objective details and assist levels). Overall, pt tolerates session fair today with pt able to complete bed mobility, sit<>stand transfers, and hopping toward [facility] with SBA-min Ax2 overall. Pt demo'ing improvement with bed mobility from initial evaluation as pt currently able to complete with SBA. Pt demo'ing improvement with functional mobility and activity tolerance from evaluation as pt able to complete sit<>stand transfers and hopping at EOB this date. UB dressing to don a pullover shirt completed with mod I and to don a hospital gown with set up A while seated EOB. Pt able to recall UB dressing strategies such as dressing affected UE first without cuing from therapist. Pt educated to push from/reach back for bed during sit<>stand transfers with pt demo'ing understanding.  Pt reporting 9/10 pain in LLE with activity as well as dizziness upon standing so further mobility deferred. Pt continues to benefit from skilled OT services while at [facility] to increase IND and safety with ADLs and functional mobility/transfers. Will continue to progress towards goals as tolerated. Recommending d/c to IRF once pt is medically appropriate. Current Level of Function Impacting Discharge (ADLs): min Ax2 standing, total A LB dressing    Other factors to consider for discharge: Home support, PLOF, severity of deficits         PLAN :  Patient continues to benefit from skilled intervention to address the above impairments. Continue treatment per established plan of care. to address goals. Recommendation for discharge: (in order for the patient to meet his/her long term goals)  [facility]     This discharge recommendation:  Has been made in collaboration with the attending provider and/or case management    IF patient discharges home will need the following DME: TBD       SUBJECTIVE:   Patient stated I want to walk.     OBJECTIVE DATA SUMMARY:   Cognitive/Behavioral Status:  Neurologic State: Alert  Orientation Level: Oriented X4  Cognition: Appropriate decision making; Appropriate for age attention/concentration; Follows commands        Safety/Judgement: Awareness of environment; Insight into deficits    Functional Mobility and Transfers for ADLs:  Bed Mobility:  Rolling: Stand-by assistance  Supine to Sit: Stand-by assistance  Sit to Supine: Stand-by assistance  Scooting: Stand-by assistance    Transfers:  Sit to Stand: Minimum assistance;Assist x2     Bed to Chair: Minimum assistance;Assist x2 (Simulated side steps/hopping at EOB)    Balance:  Sitting: Impaired  Sitting - Static: Good (unsupported)  Sitting - Dynamic: Fair (occasional)  Standing: Impaired; With support  Standing - Static: Poor;Constant support  Standing - Dynamic : Poor;Constant support    ADL Intervention:  Upper Body [address]: Set-up (Handing pt hospital gown)  Pullover Shirt: Modified independent (Able to recall UB dressing strategies)    Lower Body Dressing Assistance  Socks: Total assistance (dependent) (Onto RLE)  Position Performed: Supine    Cognitive Retraining  Safety/Judgement: Awareness of environment; Insight into deficits    Therapeutic Exercises:   Pt would benefit from UE HEP in prep for ADLs and functional mobility/transfers. Functional Measure:    [facility] AM-PACTM \"6 Clicks\"                                                       Daily Activity Inpatient Short Form  How much help from another person does the patient currently need. .. Total; A Lot A Little None   1. Putting on and taking off regular lower body clothing? [x]  1 []  2 []  3 []  4   2. Bathing (including washing, rinsing, drying)? []  1 [x]  2 []  3 []  4   3. Toileting, which includes using toilet, bedpan or urinal? [] 1 [x]  2 []  3 []  4   4. Putting on and taking off regular upper body clothing? []  1 []  2 []  3 [x]  4   5. Taking care of personal grooming such as brushing teeth? []  1 []  2 [x]  3 []  4   6. Eating meals? []  1 []  2 []  3 [x]  4   © 2007, Trustees of [facility], under license to [employer]. All rights reserved     Score: 16/24     Interpretation of Tool:  Represents clinically-significant functional categories (i.e. Activities of daily living). Percentage of Impairment CH    0%   CI    1-19% CJ    20-39% CK    40-59% CL    60-79% CM    80-99% CN     100%   [facility]  Score 6-24 24 23 20-22 15-19 10-14 7-9 6     Pain:  9/10 LLE    Activity Tolerance:   Fair and requires rest breaks  Please refer to the flowsheet for vital signs taken during this treatment.     After treatment patient left in no apparent distress:   Supine in bed, Call bell within reach, and side rails x2 with bed locked and in lowest position    COMMUNICATION/COLLABORATION:   The patients plan of care was discussed with: Physical therapist and Registered nurse. PT/OT sessions occurred together for increased safety of pt and clinician. Bobby Malik OT  Time Calculation: 24 mins    Problem: Self Care Deficits Care Plan (Adult)  Goal: *Acute Goals and Plan of Care (Insert Text)  Description:   FUNCTIONAL STATUS PRIOR TO ADMISSION: Patient was modified independent using a peyton walker for functional mobility. Patient was modified independent for basic and instrumental ADLs. HOME SUPPORT: Patient most recently resided at [facility] since October for rehab services. Prior to [place] pt was residing with his wife at home. Occupational Therapy Goals  Initiated 12/28/2022  Patient Goal: Walk and get to the bathroom. 1.  Patient will perform lower body dressing with modified independence within 7 day(s). 2.  Patient will perform grooming with modified independence within 7 day(s). 3.  Patient will perform bathing with modified independence within 7 day(s). 4.  Patient will perform toilet transfers with modified independence within 7 day(s). 5.  Patient will perform all aspects of toileting with modified independence within 7 day(s). 6.  Patient will participate in upper extremity therapeutic exercise/activities with modified independence within 7 day(s).     Outcome: Progressing Towards Goal

## 2023-01-13 NOTE — PROGRESS NOTES
PHYSICAL THERAPY REEVALUATION  Patient: Rosendo Harden (65 y.o. male)  Date: 1/13/2023  Primary Diagnosis: Wet gangrene ([address] Lorenza Marinelli  Procedure(s) (LRB):  LEFT AMPUTATION KNEE(AKA) (Left) 20 Days Post-Op   Precautions: falls, skin         ASSESSMENT  Patient initially seen for PT evaluation 12/28/22 and 8 skilled PT sessions since evalution. Patient seen today for PT reevaluation s/t LOS. Patient A&O x4. Pt semi-supine in bed upon PT/OT arrival, agreeable to session. Based on the objective data described, the patient presents with generalized weakness, impaired functional mobility, impaired amb, impaired balance, and decreased activity tolerance. (See below for objective details and assist levels). Overall pt tolerated session fair today, currently with c/o 8-9/10 residual limb pain throughout session. Session today focused on standing posture due to pt continuing to demonstrates heavy posterior lean with posterior center of gravity. Pt requires sit to stand x 2 due to dizziness with standing, pt able to stand x 2-3 min with more upright posture focusing on anterior translation of pelvis to improve standing. Pt hops 3 feet HOB with mod to max Ax2 with no LOB or knee buckling noted. Pt will benefit from continued skilled PT to address above deficits and return to PLOF, PT goals and POC reviewed on this date and continue to remain appropriate at this time. Current PT DC recommendation Inpatient Rehabilitation Facility  once medically appropriate. Current Level of Function Impacting Discharge (mobility/balance): mod to max Ax2 for OOB mobility     Other factors to consider for discharge: severity of deficits, acute medical state     Patient will benefit from skilled therapy intervention to address the above noted impairments.        PLAN :  Recommendations and Planned Interventions: bed mobility training, transfer training, gait training, therapeutic exercises, patient and family training/education, and therapeutic activities      Recommend for staff: Frequent repositioning to prevent skin breakdown and sit EOB for meals, stand pivot to [facility]    Frequency/Duration: Patient will be followed by physical therapy: 3-5x/week to address goals. Recommendation for discharge: (in order for the patient to meet his/her long term goals)  [address]     This discharge recommendation:  Has been made in collaboration with the attending provider and/or case management         SUBJECTIVE:   Patient stated I don't want to get to the chair today.     OBJECTIVE DATA SUMMARY:   HISTORY:    Past Medical History:   Diagnosis Date    A-fib ([address]     Anemia     Arrhythmia     CAD (coronary artery disease)     Chronic kidney disease     Diabetes ([address]     DM2    Dialysis patient [facility])     M-W-F [facility] [phone]    Heart failure (HCC)     Hypertension     STEMI (ST elevation myocardial infarction) ([address]      Past Surgical History:   Procedure Laterality Date    HX AMPUTATION Left     5 fingers    HX HEART CATHETERIZATION      IR INSERT NON TUNL CVC OVER 5 YRS  02/23/2021    IR INSERT NON TUNL CVC OVER 5 YRS  04/07/2022    IR INSERT TUNL CVC W/O PORT OVER 5 YR  03/02/2021    IR INSERT TUNL CVC W/O PORT OVER 5 YR  04/12/2022    OH CARDIAC SURG PROCEDURE UNLIST         Home Situation  Home Environment: [address] [place] [address] Name: [facility]  One/Two Story Residence: One story  Living Alone: No  Support Systems: Spouse/Significant Other  Patient Expects to be Discharged to[de-identified] Long Term Care  Current DME Used/Available at Home: Other (comment)    EXAMINATION/PRESENTATION/DECISION MAKING:   Critical Behavior:  Neurologic State: Alert  Orientation Level: Oriented X4  Cognition: Appropriate decision making, Appropriate for age attention/concentration, Follows commands  Safety/Judgement: Awareness of environment, Insight into deficits  Hearing:   Auditory  Auditory Impairment: None  Skin: bandages left residual limb and right lower extremity  Edema: intact where visible  Range Of Motion:  AROM: Within functional limits                       Strength:    Strength: Generally decreased, functional          Functional Mobility:  Bed Mobility:  Rolling: Stand-by assistance  Supine to Sit: Stand-by assistance  Sit to Supine: Stand-by assistance  Scooting: Stand-by assistance  Transfers:  Sit to Stand: Minimum assistance;Assist x2  Stand to Sit: Minimum assistance;Assist x2        Bed to Chair: Minimum assistance;Assist x2 (Simulated side steps/hopping at EOB)              Balance:   Sitting: Impaired  Sitting - Static: Good (unsupported)  Sitting - Dynamic: Fair (occasional)  Standing: Impaired; With support  Standing - Static: Poor;Constant support  Standing - Dynamic : Poor;Constant support  Ambulation/Gait Training:  Distance (ft): 3 Feet (ft) (hopping HOB)  Assistive Device: Gait belt; Other (comment) (platform walker)  Ambulation - Level of Assistance: Moderate assistance;Assist x2     Gait Description (WDL): Exceptions to WDL      Therapeutic Exercises:   Pt would benefit from LE HEP to improve overall LE AROM/strength and can be further educated in next treatment session. Functional Measure:  [employer] AM-[employer]AC[employer] 6 Clicks         Basic Mobility Inpatient Short Form  How much difficulty does the patient currently have. .. Unable A Lot A Little None   1. Turning over in bed (including adjusting bedclothes, sheets and blankets)? [] 1   [] 2   [] 3   [x] 4   2. Sitting down on and standing up from a chair with arms ( e.g., wheelchair, bedside commode, etc.)   [] 1   [x] 2   [] 3   [] 4   3. Moving from lying on back to sitting on the side of the bed? [] 1   [] 2   [] 3   [x] 4          How much help from another person does the patient currently need. .. Total A Lot A Little None   4. Moving to and from a bed to a chair (including a wheelchair)? [] 1   [x] 2   [] 3   [] 4   5.   Need to walk in hospital room? [] 1   [x] 2   [] 3   [] 4   6. Climbing 3-5 steps with a railing? [x] 1   [] 2   [] 3   [] 4   © , Trustees of [facility] [employer], under license to [employer]. All rights reserved     Score:  Initial: 15/24 Most Recent: X (Date: 2023 )   Interpretation of Tool:  Represents activities that are increasingly more difficult (i.e. Bed mobility, Transfers, Gait). Score 24 23 22-20 19-15 14-10 9-7 6   Modifier CH CI CJ CK CL CM CN       Pain Ratin-9/10 left residual limb    Activity Tolerance:   Fair, requires rest breaks, and signs and symptoms of orthostatic hypotension    After treatment patient left in no apparent distress:   Bed locked and in lowest position Supine in bed, Call bell within reach, and Side rails x 3 and nsg updated. Goals:    Problem: Mobility Impaired (Adult and Pediatric)  Goal: *Acute Goals and Plan of Care (Insert Text)  Description: FUNCTIONAL STATUS PRIOR TO ADMISSION: Patient was modified independent using a peyton walker for functional mobility. Patient required setup assistance for basic and instrumental ADLs. HOME SUPPORT PRIOR TO ADMISSION: Pt at short term rehab at [place] since October. Prior to admission in October pt was living with his wife. Physical Therapy Goals  Initiated 2022  Pt stated goal: to get better, would eventually like to use a prosthesis on LLE. Pt will be I with LE HEP in 7 days. Pt will perform bed mobility with mod I in 7 days. Pt will perform transfers with mod I in 7 days. Pt will amb 5-10 feet with LRAD safely with min Ax1 in 7 days. Outcome: Progressing Towards Goal       COMMUNICATION/EDUCATION:   The patients plan of care was discussed with: Occupational therapist, Registered nurse, and Case management. Fall prevention education was provided and the patient/caregiver indicated understanding., Patient/family have participated as able in goal setting and plan of care. , and Patient/family agree to work toward stated goals and plan of care. PT/OT sessions occurred together for increased safety of pt and clinician.     Thank you for this referral.  Errol Orellana, PT, DPT   Time Calculation: 23 mins

## 2023-01-14 LAB
GLUCOSE BLD STRIP.AUTO-MCNC: 93 MG/DL (ref 65–100)
PERFORMED BY, TECHID: NORMAL

## 2023-01-14 PROCEDURE — 97530 THERAPEUTIC ACTIVITIES: CPT

## 2023-01-14 PROCEDURE — 74011250636 HC RX REV CODE- 250/636: Performed by: SURGERY

## 2023-01-14 PROCEDURE — 74011636637 HC RX REV CODE- 636/637: Performed by: SURGERY

## 2023-01-14 PROCEDURE — 74011250637 HC RX REV CODE- 250/637: Performed by: SURGERY

## 2023-01-14 PROCEDURE — 74011250637 HC RX REV CODE- 250/637: Performed by: INTERNAL MEDICINE

## 2023-01-14 PROCEDURE — 82962 GLUCOSE BLOOD TEST: CPT

## 2023-01-14 PROCEDURE — 65270000029 HC RM PRIVATE

## 2023-01-14 RX ADMIN — OXYCODONE HYDROCHLORIDE 10 MG: 10 TABLET, FILM COATED, EXTENDED RELEASE ORAL at 22:03

## 2023-01-14 RX ADMIN — ISOSORBIDE DINITRATE 20 MG: 20 TABLET ORAL at 15:00

## 2023-01-14 RX ADMIN — ISOSORBIDE DINITRATE 20 MG: 20 TABLET ORAL at 22:03

## 2023-01-14 RX ADMIN — CARVEDILOL 25 MG: 12.5 TABLET, FILM COATED ORAL at 08:21

## 2023-01-14 RX ADMIN — ATORVASTATIN CALCIUM 40 MG: 40 TABLET, FILM COATED ORAL at 08:21

## 2023-01-14 RX ADMIN — ISOSORBIDE DINITRATE 20 MG: 20 TABLET ORAL at 08:21

## 2023-01-14 RX ADMIN — GABAPENTIN 100 MG: 100 CAPSULE ORAL at 22:03

## 2023-01-14 RX ADMIN — CARVEDILOL 25 MG: 12.5 TABLET, FILM COATED ORAL at 22:03

## 2023-01-14 RX ADMIN — HYDRALAZINE HYDROCHLORIDE 25 MG: 25 TABLET, FILM COATED ORAL at 22:05

## 2023-01-14 RX ADMIN — MELATONIN TAB 5 MG 5 MG: 5 TAB at 22:03

## 2023-01-14 RX ADMIN — LISINOPRIL 5 MG: 5 TABLET ORAL at 08:21

## 2023-01-14 RX ADMIN — GABAPENTIN 100 MG: 100 CAPSULE ORAL at 15:00

## 2023-01-14 RX ADMIN — HEPARIN SODIUM 5000 UNITS: 5000 INJECTION INTRAVENOUS; SUBCUTANEOUS at 14:59

## 2023-01-14 RX ADMIN — TICAGRELOR 90 MG: 90 TABLET ORAL at 08:21

## 2023-01-14 RX ADMIN — HYDRALAZINE HYDROCHLORIDE 25 MG: 25 TABLET, FILM COATED ORAL at 15:01

## 2023-01-14 RX ADMIN — OXYCODONE HYDROCHLORIDE 10 MG: 10 TABLET, FILM COATED, EXTENDED RELEASE ORAL at 08:21

## 2023-01-14 RX ADMIN — HEPARIN SODIUM 5000 UNITS: 5000 INJECTION INTRAVENOUS; SUBCUTANEOUS at 22:02

## 2023-01-14 RX ADMIN — OXYCODONE AND ACETAMINOPHEN 1 TABLET: 10; 325 TABLET ORAL at 14:57

## 2023-01-14 RX ADMIN — TICAGRELOR 90 MG: 90 TABLET ORAL at 22:03

## 2023-01-14 RX ADMIN — INSULIN GLARGINE 10 UNITS: 100 INJECTION, SOLUTION SUBCUTANEOUS at 22:02

## 2023-01-14 RX ADMIN — AMITRIPTYLINE HYDROCHLORIDE 50 MG: 25 TABLET, FILM COATED ORAL at 22:03

## 2023-01-14 RX ADMIN — GABAPENTIN 100 MG: 100 CAPSULE ORAL at 08:22

## 2023-01-14 RX ADMIN — HYDRALAZINE HYDROCHLORIDE 25 MG: 25 TABLET, FILM COATED ORAL at 08:22

## 2023-01-14 RX ADMIN — HEPARIN SODIUM 5000 UNITS: 5000 INJECTION INTRAVENOUS; SUBCUTANEOUS at 05:54

## 2023-01-14 RX ADMIN — FUROSEMIDE 40 MG: 40 TABLET ORAL at 08:22

## 2023-01-14 RX ADMIN — CALCIUM CARBONATE 200 MG: 500 TABLET, CHEWABLE ORAL at 08:21

## 2023-01-14 RX ADMIN — AMLODIPINE BESYLATE 10 MG: 5 TABLET ORAL at 08:21

## 2023-01-14 NOTE — PROGRESS NOTES
PHYSICAL THERAPY TREATMENT  Patient: Roxanne Fuentes (47 y.o. male)  Date: 1/14/2023  Diagnosis: Wet gangrene ([address] Deisy Carver <principal problem not specified>  Procedure(s) (LRB):  LEFT AMPUTATION KNEE(AKA) (Left) 21 Days Post-Op  Precautions:    Chart, physical therapy assessment, plan of care and goals were reviewed. ASSESSMENT  Patient continues with skilled PT services and is progressing towards goals. Pt seated in bed upon PT arrival, agreeable to session. Patient improving with mobility today and was able to improve with bed mobility and increased sit<>Stand transfers. Patient performed 6x sit to stand transfers and required min A x2 overall, improving with assistance. Pt was not able to take steps today and requested platform walker be adjusted again today as pt was leaning to the left. Cueing needed for upright posture and shifting weight anteriorly for appropriate posture and position. (See below for objective details and assist levels). Overall pt tolerated session well today with improved mobility. Will continue to benefit from skilled PT services, and will continue to progress as tolerated. Current Level of Function Impacting Discharge (mobility/balance):  safety, weakness, A x2 transfers    Other factors to consider for discharge: safety, assist needed, post surgical     PLAN :  Patient continues to benefit from skilled intervention to address the above impairments. Continue treatment per established plan of care to address goals.     Recommendation for discharge: (in order for the patient to meet his/her long term goals)  [facility]     This discharge recommendation:  Has been made in collaboration with the attending provider and/or case management    IF patient discharges home will need the following DME: to be determined (TBD)       SUBJECTIVE:   Patient stated Now it will be a 6th time    OBJECTIVE DATA SUMMARY:   Critical Behavior:  Neurologic State: Alert  Orientation Level: Oriented X4  Cognition: Appropriate decision making  Safety/Judgement: Awareness of environment, Insight into deficits  Functional Mobility Training:  Bed Mobility:  Rolling: Stand-by assistance; Additional time  Supine to Sit: Stand-by assistance; Additional time  Sit to Supine: Stand-by assistance; Additional time  Scooting: Stand-by assistance; Additional time    Transfers:  Sit to Stand: Minimum assistance;Assist x2  Stand to Sit: Minimum assistance;Assist x2    Balance:  Sitting: Intact  Sitting - Static: Good (unsupported)  Sitting - Dynamic: Good (unsupported)  Standing: Impaired; With support;Pull to stand  Standing - Static: Constant support; Fair  Standing - Dynamic : Constant support; Fair    Pain Ratin/10 LLE    Activity Tolerance:   Fair and requires rest breaks    After treatment patient left in no apparent distress:   Bed locked and returned to lowest position, Supine in bed and Call bell within reach      COMMUNICATION/COLLABORATION:   The patients plan of care was discussed with: Occupational therapy assistant. Cotx with [place] for increased safety and assistance. Km Dai, PT   Time Calculation: 25 mins         Problem: Mobility Impaired (Adult and Pediatric)  Goal: *Acute Goals and Plan of Care (Insert Text)  Description: FUNCTIONAL STATUS PRIOR TO ADMISSION: Patient was modified independent using a peyton walker for functional mobility. Patient required setup assistance for basic and instrumental ADLs. HOME SUPPORT PRIOR TO ADMISSION: Pt at short term rehab at [place] since October. Prior to admission in October pt was living with his wife. Physical Therapy Goals  Initiated 2022  Pt stated goal: to get better, would eventually like to use a prosthesis on LLE. Pt will be I with LE HEP in 7 days. Pt will perform bed mobility with mod I in 7 days. Pt will perform transfers with mod I in 7 days.    Pt will amb 5-10 feet with LRAD safely with min Ax1 in 7 days.       Outcome: Progressing Towards Goal

## 2023-01-14 NOTE — PROGRESS NOTES
Bedside, Verbal, and Written shift change report given to [place]   (oncoming nurse) by Veena Tidwell RN   (offgoing nurse). Report included the following information SBAR, Intake/Output, MAR, Recent Results, Procedure Verification, and Quality Measures.

## 2023-01-14 NOTE — PROGRESS NOTES
Problem: Falls - Risk of  Goal: *Absence of Falls  Description: Document Gee Aburto Fall Risk and appropriate interventions in the flowsheet. 1/14/2023 0951 by Vicki Tran LPN  Outcome: Progressing Towards Goal  Note: Fall Risk Interventions:  Mobility Interventions: PT Consult for mobility concerns, Strengthening exercises (ROM-active/passive), Patient to call before getting OOB         Medication Interventions: Patient to call before getting OOB    Elimination Interventions: Call light in reach           1/14/2023 0951 by Vicki Tran LPN  Outcome: Progressing Towards Goal  Note: Fall Risk Interventions:  Mobility Interventions: PT Consult for mobility concerns, Strengthening exercises (ROM-active/passive), Patient to call before getting OOB         Medication Interventions: Patient to call before getting OOB    Elimination Interventions: Call light in reach              Problem: Patient Education: Go to Patient Education Activity  Goal: Patient/Family Education  1/14/2023 0951 by Vicki Tran LPN  Outcome: Progressing Towards Goal  1/14/2023 0951 by Vicki Tran LPN  Outcome: Progressing Towards Goal     Problem: Pressure Injury - Risk of  Goal: *Prevention of pressure injury  Description: Document Cruzito Scale and appropriate interventions in the flowsheet.   1/14/2023 0951 by Vicki Tran LPN  Outcome: Progressing Towards Goal  Note: Pressure Injury Interventions:  Sensory Interventions: Minimize linen layers, Keep linens dry and wrinkle-free, Discuss PT/OT consult with provider, Maintain/enhance activity level    Moisture Interventions: Absorbent underpads, Maintain skin hydration (lotion/cream), Minimize layers, Moisture barrier    Activity Interventions: Increase time out of bed    Mobility Interventions: HOB 30 degrees or less    Nutrition Interventions: Document food/fluid/supplement intake    Friction and Shear Interventions: HOB 30 degrees or less, Minimize layers 1/14/2023 0951 by Craig Hirsch LPN  Outcome: Progressing Towards Goal  Note: Pressure Injury Interventions:  Sensory Interventions: Minimize linen layers, Keep linens dry and wrinkle-free, Discuss PT/OT consult with provider, Maintain/enhance activity level    Moisture Interventions: Absorbent underpads, Maintain skin hydration (lotion/cream), Minimize layers, Moisture barrier    Activity Interventions: Increase time out of bed    Mobility Interventions: HOB 30 degrees or less    Nutrition Interventions: Document food/fluid/supplement intake    Friction and Shear Interventions: HOB 30 degrees or less, Minimize layers                Problem: Patient Education: Go to Patient Education Activity  Goal: Patient/Family Education  1/14/2023 0951 by Craig Hirsch LPN  Outcome: Progressing Towards Goal  1/14/2023 0951 by Craig Hirsch LPN  Outcome: Progressing Towards Goal     Problem: Risk for Spread of Infection  Goal: Prevent transmission of infectious organism to others  Description: Prevent the transmission of infectious organisms to other patients, staff members, and visitors.   1/14/2023 0951 by Craig Hirsch LPN  Outcome: Progressing Towards Goal  1/14/2023 0951 by Craig Hirsch LPN  Outcome: Progressing Towards Goal     Problem: Patient Education:  Go to Education Activity  Goal: Patient/Family Education  1/14/2023 0951 by Craig Hirsch LPN  Outcome: Progressing Towards Goal  1/14/2023 0951 by Craig Hirsch LPN  Outcome: Progressing Towards Goal     Problem: Patient Education: Go to Patient Education Activity  Goal: Patient/Family Education  1/14/2023 0951 by Craig Hirsch LPN  Outcome: Progressing Towards Goal  1/14/2023 0951 by Craig Hirsch LPN  Outcome: Progressing Towards Goal     Problem: Patient Education: Go to Patient Education Activity  Goal: Patient/Family Education  1/14/2023 0951 by Craig Hirsch LPN  Outcome: Progressing Towards Goal  1/14/2023 0951 by Adia Starr LPN  Outcome: Progressing Towards Goal     Problem: Pain  Goal: *Control of Pain  Outcome: Progressing Towards Goal     Problem: Patient Education: Go to Patient Education Activity  Goal: Patient/Family Education  Outcome: Progressing Towards Goal

## 2023-01-14 NOTE — PROGRESS NOTES
General Daily Progress Note          Patient Name:   Dina Tariq       YOB: 1955       Age:  74 y.o. Admit Date: 12/22/2022      Subjective:         Seen resting the bed alert awake no complaints        Objective:     Visit Vitals  /70 (BP 1 Location: Right upper arm, BP Patient Position: At rest)   Pulse 75   Temp 98.1 °F (36.7 °C)   Resp 18   Ht 5' 7.99\" (1.727 m)   Wt 57.3 kg (126 lb 5.2 oz)   SpO2 99%   BMI 19.21 kg/m²        Recent Results (from the past 24 hour(s))   GLUCOSE, POC    Collection Time: 01/13/23  4:10 PM   Result Value Ref Range    Glucose (POC) 102 (H) 65 - 100 mg/dL    Performed by Raphael Lee, POC    Collection Time: 01/13/23  9:07 PM   Result Value Ref Range    Glucose (POC) 91 65 - 100 mg/dL    Performed by Tevin Zayas      [unfilled]      Review of Systems    Constitutional: Negative for chills and fever. HENT: Negative. Eyes: Negative. Respiratory: Negative. Cardiovascular: Negative. Gastrointestinal: Negative for abdominal pain and nausea. Skin: Negative. Neurological: Negative. Physical Exam:      Constitutional: pt is oriented to person, place, and time. HENT:   Head: Normocephalic and atraumatic. Eyes: Pupils are equal, round, and reactive to light. EOM are normal.   Cardiovascular: Normal rate, regular rhythm and normal heart sounds. Pulmonary/Chest: Breath sounds normal. No wheezes. No rales. Exhibits no tenderness. Abdominal: Soft. Bowel sounds are normal. There is no abdominal tenderness. There is no rebound and no guarding. Musculoskeletal: Left above-knee amputation  Neurological: pt is alert and oriented to person, place, and time.      LOWER EXT ART PVR MULT LEVEL SEG PRESSURES   Final Result           Recent Results (from the past 24 hour(s))   GLUCOSE, POC    Collection Time: 01/13/23  4:10 PM   Result Value Ref Range    Glucose (POC) 102 (H) 65 - 100 mg/dL    Performed by Hernandez Wray GLUCOSE, POC    Collection Time: 01/13/23  9:07 PM   Result Value Ref Range    Glucose (POC) 91 65 - 100 mg/dL    Performed by Abdullahi Mtahews        Results       ** No results found for the last 336 hours. **             Labs:     No results for input(s): WBC, HGB, HCT, PLT, HGBEXT, HCTEXT, PLTEXT, HGBEXT, HCTEXT, PLTEXT in the last 72 hours. No results for input(s): NA, K, CL, CO2, BUN, CREA, GLU, CA, MG, PHOS, URICA in the last 72 hours. No results for input(s): ALT, AP, TBIL, TBILI, TP, ALB, GLOB, GGT, AML, LPSE in the last 72 hours. No lab exists for component: SGOT, GPT, AMYP, HLPSE    No results for input(s): INR, PTP, APTT, INREXT, INREXT in the last 72 hours. No results for input(s): FE, TIBC, PSAT, FERR in the last 72 hours. Lab Results   Component Value Date/Time    Folate 17.5 02/23/2021 03:59 AM        No results for input(s): PH, PCO2, PO2 in the last 72 hours. No results for input(s): CPK, CKNDX, TROIQ in the last 72 hours.     No lab exists for component: CPKMB  Lab Results   Component Value Date/Time    Cholesterol, total 115 02/26/2010 05:20 PM    HDL Cholesterol 31 (L) 02/26/2010 05:20 PM    LDL, calculated 46.2 02/26/2010 05:20 PM    Triglyceride 189 02/26/2010 05:20 PM    CHOL/HDL Ratio 3.7 02/26/2010 05:20 PM     Lab Results   Component Value Date/Time    Glucose (POC) 91 01/13/2023 09:07 PM    Glucose (POC) 102 (H) 01/13/2023 04:10 PM    Glucose (POC) 79 01/13/2023 06:26 AM    Glucose (POC) 87 01/12/2023 09:53 PM    Glucose (POC) 98 01/12/2023 05:07 PM     Lab Results   Component Value Date/Time    Color YELLOW/STRAW 04/06/2022 08:52 PM    Appearance CLEAR 04/06/2022 08:52 PM    Specific gravity 1.012 04/06/2022 08:52 PM    Specific gravity 1.015 02/26/2010 12:32 PM    pH (UA) 6.0 04/06/2022 08:52 PM    Protein 300 (A) 04/06/2022 08:52 PM    Glucose Negative 04/06/2022 08:52 PM    Ketone Negative 04/06/2022 08:52 PM    Bilirubin Negative 04/06/2022 08:52 PM    Urobilinogen 0.2 04/06/2022 08:52 PM    Nitrites Negative 04/06/2022 08:52 PM    Leukocyte Esterase Negative 04/06/2022 08:52 PM    Epithelial cells FEW 04/06/2022 08:52 PM    Bacteria Negative 04/06/2022 08:52 PM    WBC 5-10 04/06/2022 08:52 PM    RBC 10-20 04/06/2022 08:52 PM         Assessment:     Wet gangrene  End-stage renal disease on hemodialysis  Anemia of chronic disease  Hypertension  Type 2 diabetes      Plan:       Continue current medication    Current Facility-Administered Medications:     furosemide (LASIX) tablet 40 mg, 40 mg, Oral, DAILY, Eliezer Adams MD, 40 mg at 01/14/23 4179    0.9% sodium chloride infusion 250 mL, 250 mL, IntraVENous, PRN, Chauncey Flor MD    0.9% sodium chloride infusion 250 mL, 250 mL, IntraVENous, PRN, Ad Saenz MD    amitriptyline (ELAVIL) tablet 50 mg, 50 mg, Oral, QHS, AgadaAd MD, 50 mg at 01/13/23 2132    naloxone (NARCAN) injection 1 mg, 1 mg, IntraVENous, PRN, Shweta Gutierrez MD    0.9% sodium chloride infusion 250 mL, 250 mL, IntraVENous, PRN, Charlotte Faustin MD    sodium chloride (NS) flush 5-40 mL, 5-40 mL, IntraVENous, PRN, Charlotte Faustin MD    ondansetron (ZOFRAN ODT) tablet 4 mg, 4 mg, Oral, Q8H PRN **OR** ondansetron (ZOFRAN) injection 4 mg, 4 mg, IntraVENous, Q6H PRN, Charlotte Faustin MD    oxyCODONE ER (OxyCONTIN) tablet 10 mg, 10 mg, Oral, Q12H, Charlotte Faustin MD, 10 mg at 01/14/23 5683    oxyCODONE-acetaminophen (PERCOCET 10)  mg per tablet 1 Tablet, 1 Tablet, Oral, Q6H PRN, Charlotte Faustin MD, 1 Tablet at 01/13/23 0617    heparin (porcine) injection 5,000 Units, 5,000 Units, SubCUTAneous, Q8H, Charlotte Faustin MD, 5,000 Units at 01/14/23 0554    heparin (porcine) 1,000 unit/mL injection 3,800 Units, 3,800 Units, Hemodialysis, DIALYSIS PRN, Charlotte Faustin MD, 3,800 Units at 01/09/23 1116    oxyCODONE IR (ROXICODONE) tablet 5 mg, 5 mg, Oral, Q4H PRN, Charlotte Faustin MD, 5 mg at 01/13/23 1442    amLODIPine (NORVASC) tablet 10 mg, 10 mg, Oral, DAILY, Bladimir Faustin MD, 10 mg at 01/14/23 5809    atorvastatin (LIPITOR) tablet 40 mg, 40 mg, Oral, DAILY, Bladimir Faustin MD, 40 mg at 01/14/23 6815    calcium carbonate (TUMS) chewable tablet 200 mg [elemental], 200 mg, Oral, DAILY, Bladimir Faustin MD, 200 mg at 01/14/23 2152    carvediloL (COREG) tablet 25 mg, 25 mg, Oral, BID, Bladimir Faustin MD, 25 mg at 01/14/23 3855    gabapentin (NEURONTIN) capsule 100 mg, 100 mg, Oral, TID, Bladimir Faustin MD, 100 mg at 01/14/23 4276    hydrALAZINE (APRESOLINE) tablet 25 mg, 25 mg, Oral, TID, Bladimir Faustin MD, 25 mg at 01/14/23 3428    insulin glargine (LANTUS) injection 10 Units, 10 Units, SubCUTAneous, QHS, Bladimir Faustin MD, 10 Units at 01/13/23 2200    isosorbide dinitrate (ISORDIL) tablet 20 mg, 20 mg, Oral, TID, Bladimir Faustin MD, 20 mg at 01/14/23 1389    lisinopriL (PRINIVIL, ZESTRIL) tablet 5 mg, 5 mg, Oral, DAILY, Bladimir Faustin MD, 5 mg at 01/14/23 2075    melatonin tablet 5 mg, 5 mg, Oral, QHS, Bladimir Faustin MD, 5 mg at 01/13/23 2133    ticagrelor (BRILINTA) tablet 90 mg, 90 mg, Oral, BID, Bladimir Faustin MD, 90 mg at 01/14/23 4967    sodium chloride (NS) flush 5-40 mL, 5-40 mL, IntraVENous, PRN, Bladimir Faustin MD    acetaminophen (TYLENOL) tablet 650 mg, 650 mg, Oral, Q6H PRN, 650 mg at 01/07/23 1844 **OR** acetaminophen (TYLENOL) suppository 650 mg, 650 mg, Rectal, Q6H PRN, Bladimir Faustin MD    polyethylene glycol (MIRALAX) packet 17 g, 17 g, Oral, DAILY PRN, Ramin, Bladimir Mosqueda MD

## 2023-01-14 NOTE — PROGRESS NOTES
DC order noted. Chart reviewed. [facility] currently is working with patient and spouse to accept. Last message in Gina Gimenez from Adam with [facility] at [employer], \"As we discussed via telephone, patient has signed financial form. Were just awaiting 3 months of bank statements from his spouse. \"    CM called SNF to see why bank statements are needed to accept patient as patient already has Medicaid, but CM informed that admission office is closed on weekends. CM called patients spouse, Nikolai Rincon @ [phone] to discuss the above. Spouse states that her and the patient have different bank accounts and she doesn't have access to his account. She states that she was only able to find 2 statements and the bank won't give her a copy of the 3rd since she is not on his account. CM discussed having patient sign into his bank account online to print the 3rd statement out. Spouse doesn't think that the patient has signed up for online bank account access. CM to help patient sign up for online bank account access to print the 3rd statement and turn into the SNF on Monday. Spouse agreed with plan. CM will follow-up Monday.

## 2023-01-14 NOTE — PROGRESS NOTES
Problem: Pressure Injury - Risk of  Goal: *Prevention of pressure injury  Description: Document Cruzito Scale and appropriate interventions in the flowsheet. Outcome: Progressing Towards Goal  Note: Pressure Injury Interventions:  Sensory Interventions: Minimize linen layers, Keep linens dry and wrinkle-free, Maintain/enhance activity level, Discuss PT/OT consult with provider    Moisture Interventions: Absorbent underpads, Maintain skin hydration (lotion/cream), Minimize layers, Moisture barrier    Activity Interventions: Increase time out of bed, PT/OT evaluation    Mobility Interventions: HOB 30 degrees or less, PT/OT evaluation    Nutrition Interventions: Document food/fluid/supplement intake    Friction and Shear Interventions: HOB 30 degrees or less, Minimize layers                Problem: Risk for Spread of Infection  Goal: Prevent transmission of infectious organism to others  Description: Prevent the transmission of infectious organisms to other patients, staff members, and visitors.   Outcome: Progressing Towards Goal     Problem: Pain  Goal: *Control of Pain  Outcome: Progressing Towards Goal

## 2023-01-14 NOTE — PROGRESS NOTES
OCCUPATIONAL THERAPY TREATMENT  Patient: Vannesa Nolasco (62 y.o. male)  Date: 1/14/2023  Diagnosis: Wet gangrene ([address] Jaky Renetta <principal problem not specified>  Procedure(s) (LRB):  LEFT AMPUTATION KNEE(AKA) (Left) 21 Days Post-Op  Precautions:    Chart, occupational therapy assessment, plan of care, and goals were reviewed. ASSESSMENT  Pt continues with skilled OT services and is progressing towards goals. Upon [facility] arrival, pt semi supine in bed and agreeable to tx session at this time. Overall, pt continues to present with deficits in generalized strength/AROM, coordination, bed mobility, static/dynamic sitting and standing balance and functional activity tolerance during performance of ADLs/mobility (see below for objective details and assist levels). Pt noted with continued improvement with mobility this date. Pt completed bed mobility with overall SBS and additional time. Pt able to improve with sit>stand tranfers this date completing x6 trials of sit>stand transfers with Marianela x2 overall and blocking of R foot to avoid sliding. Pt unable to take steps at this time, required cueing for upright standing posture and shifting weight anteriorly for appropriate posture and position. Pt overall tolerated session fair. Recommend d/c to [facility]  once medically appropriate. Other factors to consider for discharge: family/social support, DME, time since onset, severity of deficits, decline from functional baseline         PLAN :  Patient continues to benefit from skilled intervention to address the above impairments. Continue treatment per established plan of care to address goals.     Recommendation for discharge: (in order for the patient to meet his/her long term goals)  [address]     This discharge recommendation:  Has been made in collaboration with the attending provider and/or case management    IF patient discharges home will need the following DME: TBD SUBJECTIVE:   Patient stated I am going to break my record and stand for a 6th time.     OBJECTIVE DATA SUMMARY:   Cognitive/Behavioral Status:  Neurologic State: Alert  Orientation Level: Oriented X4  Cognition: Appropriate decision making    Functional Mobility and Transfers for ADLs:  Bed Mobility:  Rolling: Stand-by assistance; Additional time  Supine to Sit: Stand-by assistance; Additional time  Sit to Supine: Stand-by assistance; Additional time  Scooting: Stand-by assistance; Additional time    Transfers:  Sit to Stand: Minimum assistance;Assist x2    Balance:  Sitting: Intact  Sitting - Static: Good (unsupported)  Sitting - Dynamic: Good (unsupported)  Standing: Impaired; With support;Pull to stand  Standing - Static: Constant support; Fair  Standing - Dynamic : Constant support; Fair    Pain:  9/10 L residual limb pain    Activity Tolerance:   Fair and requires rest breaks  Please refer to the flowsheet for vital signs taken during this treatment. After treatment patient left in no apparent distress:   Bed locked and in lowest position  Supine in bed, Call bell within reach, and Side rails x 3    COMMUNICATION/COLLABORATION:   The patients plan of care was discussed with: Physical therapy assistant. [place] with PT for increased safety for pt and clinician. HERMINIO Alvarez  Time Calculation: 27 mins    Problem: Self Care Deficits Care Plan (Adult)  Goal: *Acute Goals and Plan of Care (Insert Text)  Description:   FUNCTIONAL STATUS PRIOR TO ADMISSION: Patient was modified independent using a peyton walker for functional mobility. Patient was modified independent for basic and instrumental ADLs. HOME SUPPORT: Patient most recently resided at [facility] since October for rehab services. Prior to [place] pt was residing with his wife at home. Occupational Therapy Goals  Initiated 12/28/2022  Patient Goal: Walk and get to the bathroom.   1.  Patient will perform lower body dressing with modified independence within 7 day(s). 2.  Patient will perform grooming with modified independence within 7 day(s). 3.  Patient will perform bathing with modified independence within 7 day(s). 4.  Patient will perform toilet transfers with modified independence within 7 day(s). 5.  Patient will perform all aspects of toileting with modified independence within 7 day(s). 6.  Patient will participate in upper extremity therapeutic exercise/activities with modified independence within 7 day(s).     Outcome: Progressing Towards Goal

## 2023-01-15 LAB
GLUCOSE BLD STRIP.AUTO-MCNC: 105 MG/DL (ref 65–100)
GLUCOSE BLD STRIP.AUTO-MCNC: 122 MG/DL (ref 65–100)
GLUCOSE BLD STRIP.AUTO-MCNC: 72 MG/DL (ref 65–100)
GLUCOSE BLD STRIP.AUTO-MCNC: 91 MG/DL (ref 65–100)
PERFORMED BY, TECHID: ABNORMAL
PERFORMED BY, TECHID: ABNORMAL
PERFORMED BY, TECHID: NORMAL
PERFORMED BY, TECHID: NORMAL

## 2023-01-15 PROCEDURE — 74011636637 HC RX REV CODE- 636/637: Performed by: SURGERY

## 2023-01-15 PROCEDURE — 82962 GLUCOSE BLOOD TEST: CPT

## 2023-01-15 PROCEDURE — 74011250637 HC RX REV CODE- 250/637: Performed by: SURGERY

## 2023-01-15 PROCEDURE — 74011250637 HC RX REV CODE- 250/637: Performed by: INTERNAL MEDICINE

## 2023-01-15 PROCEDURE — 74011250636 HC RX REV CODE- 250/636: Performed by: SURGERY

## 2023-01-15 PROCEDURE — 65270000029 HC RM PRIVATE

## 2023-01-15 RX ADMIN — TICAGRELOR 90 MG: 90 TABLET ORAL at 08:48

## 2023-01-15 RX ADMIN — ISOSORBIDE DINITRATE 20 MG: 20 TABLET ORAL at 15:19

## 2023-01-15 RX ADMIN — CALCIUM CARBONATE 200 MG: 500 TABLET, CHEWABLE ORAL at 08:44

## 2023-01-15 RX ADMIN — FUROSEMIDE 40 MG: 40 TABLET ORAL at 08:45

## 2023-01-15 RX ADMIN — GABAPENTIN 100 MG: 100 CAPSULE ORAL at 15:19

## 2023-01-15 RX ADMIN — AMLODIPINE BESYLATE 10 MG: 5 TABLET ORAL at 08:44

## 2023-01-15 RX ADMIN — HYDRALAZINE HYDROCHLORIDE 25 MG: 25 TABLET, FILM COATED ORAL at 15:19

## 2023-01-15 RX ADMIN — GABAPENTIN 100 MG: 100 CAPSULE ORAL at 21:51

## 2023-01-15 RX ADMIN — AMITRIPTYLINE HYDROCHLORIDE 50 MG: 25 TABLET, FILM COATED ORAL at 21:52

## 2023-01-15 RX ADMIN — OXYCODONE 5 MG: 5 TABLET ORAL at 15:16

## 2023-01-15 RX ADMIN — OXYCODONE HYDROCHLORIDE 10 MG: 10 TABLET, FILM COATED, EXTENDED RELEASE ORAL at 08:44

## 2023-01-15 RX ADMIN — HEPARIN SODIUM 5000 UNITS: 5000 INJECTION INTRAVENOUS; SUBCUTANEOUS at 21:51

## 2023-01-15 RX ADMIN — ISOSORBIDE DINITRATE 20 MG: 20 TABLET ORAL at 08:48

## 2023-01-15 RX ADMIN — HEPARIN SODIUM 5000 UNITS: 5000 INJECTION INTRAVENOUS; SUBCUTANEOUS at 15:19

## 2023-01-15 RX ADMIN — MELATONIN TAB 5 MG 5 MG: 5 TAB at 21:52

## 2023-01-15 RX ADMIN — TICAGRELOR 90 MG: 90 TABLET ORAL at 21:52

## 2023-01-15 RX ADMIN — INSULIN GLARGINE 10 UNITS: 100 INJECTION, SOLUTION SUBCUTANEOUS at 22:00

## 2023-01-15 RX ADMIN — CARVEDILOL 25 MG: 12.5 TABLET, FILM COATED ORAL at 21:52

## 2023-01-15 RX ADMIN — HYDRALAZINE HYDROCHLORIDE 25 MG: 25 TABLET, FILM COATED ORAL at 08:45

## 2023-01-15 RX ADMIN — CARVEDILOL 25 MG: 12.5 TABLET, FILM COATED ORAL at 08:44

## 2023-01-15 RX ADMIN — HEPARIN SODIUM 5000 UNITS: 5000 INJECTION INTRAVENOUS; SUBCUTANEOUS at 06:25

## 2023-01-15 RX ADMIN — ISOSORBIDE DINITRATE 20 MG: 20 TABLET ORAL at 21:51

## 2023-01-15 RX ADMIN — LISINOPRIL 5 MG: 5 TABLET ORAL at 08:45

## 2023-01-15 RX ADMIN — ATORVASTATIN CALCIUM 40 MG: 40 TABLET, FILM COATED ORAL at 08:44

## 2023-01-15 RX ADMIN — HYDRALAZINE HYDROCHLORIDE 25 MG: 25 TABLET, FILM COATED ORAL at 21:51

## 2023-01-15 RX ADMIN — OXYCODONE HYDROCHLORIDE 10 MG: 10 TABLET, FILM COATED, EXTENDED RELEASE ORAL at 21:51

## 2023-01-15 RX ADMIN — GABAPENTIN 100 MG: 100 CAPSULE ORAL at 08:44

## 2023-01-15 NOTE — PROGRESS NOTES
Problem: Falls - Risk of  Goal: *Absence of Falls  Description: Document Se Cross Fall Risk and appropriate interventions in the flowsheet. Outcome: Progressing Towards Goal  Note: Fall Risk Interventions:  Mobility Interventions: PT Consult for mobility concerns, Strengthening exercises (ROM-active/passive), Patient to call before getting OOB         Medication Interventions: Patient to call before getting OOB    Elimination Interventions: Call light in reach              Problem: Pressure Injury - Risk of  Goal: *Prevention of pressure injury  Description: Document Cruzito Scale and appropriate interventions in the flowsheet.   Outcome: Progressing Towards Goal  Note: Pressure Injury Interventions:  Sensory Interventions: Minimize linen layers, Keep linens dry and wrinkle-free    Moisture Interventions: Absorbent underpads, Maintain skin hydration (lotion/cream), Minimize layers    Activity Interventions: Increase time out of bed, PT/OT evaluation    Mobility Interventions: HOB 30 degrees or less    Nutrition Interventions: Document food/fluid/supplement intake    Friction and Shear Interventions: Minimize layers, HOB 30 degrees or less                Problem: Patient Education:  Go to Education Activity  Goal: Patient/Family Education  Outcome: Progressing Towards Goal     Problem: Pain  Goal: *Control of Pain  Outcome: Progressing Towards Goal

## 2023-01-15 NOTE — PROGRESS NOTES
Problem: Falls - Risk of  Goal: *Absence of Falls  Description: Document Blane Chang Fall Risk and appropriate interventions in the flowsheet. Outcome: Progressing Towards Goal  Note: Fall Risk Interventions:  Mobility Interventions: PT Consult for mobility concerns         Medication Interventions: Patient to call before getting OOB    Elimination Interventions: Call light in reach              Problem: Patient Education: Go to Patient Education Activity  Goal: Patient/Family Education  Outcome: Progressing Towards Goal     Problem: Pressure Injury - Risk of  Goal: *Prevention of pressure injury  Description: Document Cruzito Scale and appropriate interventions in the flowsheet. Outcome: Progressing Towards Goal  Note: Pressure Injury Interventions:  Sensory Interventions: Minimize linen layers    Moisture Interventions: Absorbent underpads    Activity Interventions: Increase time out of bed    Mobility Interventions: HOB 30 degrees or less    Nutrition Interventions: Document food/fluid/supplement intake    Friction and Shear Interventions: HOB 30 degrees or less, Minimize layers                Problem: Patient Education: Go to Patient Education Activity  Goal: Patient/Family Education  Outcome: Progressing Towards Goal     Problem: Risk for Spread of Infection  Goal: Prevent transmission of infectious organism to others  Description: Prevent the transmission of infectious organisms to other patients, staff members, and visitors.   Outcome: Progressing Towards Goal     Problem: Patient Education:  Go to Education Activity  Goal: Patient/Family Education  Outcome: Progressing Towards Goal     Problem: Patient Education: Go to Patient Education Activity  Goal: Patient/Family Education  Outcome: Progressing Towards Goal     Problem: Patient Education: Go to Patient Education Activity  Goal: Patient/Family Education  Outcome: Progressing Towards Goal     Problem: Pain  Goal: *Control of Pain  Outcome: Progressing Towards Goal     Problem: Patient Education: Go to Patient Education Activity  Goal: Patient/Family Education  Outcome: Progressing Towards Goal

## 2023-01-15 NOTE — PROGRESS NOTES
General Daily Progress Note          Patient Name:   Pramod Clinton       YOB: 1955       Age:  74 y.o. Admit Date: 12/22/2022      Subjective:         Seen resting the bed alert awake no complaints        Objective:     Visit Vitals  BP (!) 145/80 (BP 1 Location: Left upper arm, BP Patient Position: At rest;Supine)   Pulse (!) 53   Temp 98.2 °F (36.8 °C)   Resp 18   Ht 5' 7.99\" (1.727 m)   Wt 57.3 kg (126 lb 5.2 oz)   SpO2 100%   BMI 19.21 kg/m²        Recent Results (from the past 24 hour(s))   GLUCOSE, POC    Collection Time: 01/14/23 10:15 PM   Result Value Ref Range    Glucose (POC) 93 65 - 100 mg/dL    Performed by [employer], POC    Collection Time: 01/15/23  7:14 AM   Result Value Ref Range    Glucose (POC) 72 65 - 100 mg/dL    Performed by Idris Aden    GLUCOSE, POC    Collection Time: 01/15/23 11:50 AM   Result Value Ref Range    Glucose (POC) 91 65 - 100 mg/dL    Performed by Idris Aden      [unfilled]      Review of Systems    Constitutional: Negative for chills and fever. HENT: Negative. Eyes: Negative. Respiratory: Negative. Cardiovascular: Negative. Gastrointestinal: Negative for abdominal pain and nausea. Skin: Negative. Neurological: Negative. Physical Exam:      Constitutional: pt is oriented to person, place, and time. HENT:   Head: Normocephalic and atraumatic. Eyes: Pupils are equal, round, and reactive to light. EOM are normal.   Cardiovascular: Normal rate, regular rhythm and normal heart sounds. Pulmonary/Chest: Breath sounds normal. No wheezes. No rales. Exhibits no tenderness. Abdominal: Soft. Bowel sounds are normal. There is no abdominal tenderness. There is no rebound and no guarding. Musculoskeletal: Left above-knee amputation  Neurological: pt is alert and oriented to person, place, and time.      LOWER EXT ART PVR MULT LEVEL SEG PRESSURES   Final Result           Recent Results (from the past 24 hour(s)) GLUCOSE, POC    Collection Time: 01/14/23 10:15 PM   Result Value Ref Range    Glucose (POC) 93 65 - 100 mg/dL    Performed by Terri Kawasaki    GLUCOSE, POC    Collection Time: 01/15/23  7:14 AM   Result Value Ref Range    Glucose (POC) 72 65 - 100 mg/dL    Performed by Dorla Home    GLUCOSE, POC    Collection Time: 01/15/23 11:50 AM   Result Value Ref Range    Glucose (POC) 91 65 - 100 mg/dL    Performed by [facility]        Results       ** No results found for the last 336 hours. **             Labs:     No results for input(s): WBC, HGB, HCT, PLT, HGBEXT, HCTEXT, PLTEXT, HGBEXT, HCTEXT, PLTEXT in the last 72 hours. No results for input(s): NA, K, CL, CO2, BUN, CREA, GLU, CA, MG, PHOS, URICA in the last 72 hours. No results for input(s): ALT, AP, TBIL, TBILI, TP, ALB, GLOB, GGT, AML, LPSE in the last 72 hours. No lab exists for component: SGOT, GPT, AMYP, HLPSE    No results for input(s): INR, PTP, APTT, INREXT, INREXT in the last 72 hours. No results for input(s): FE, TIBC, PSAT, FERR in the last 72 hours. Lab Results   Component Value Date/Time    Folate 17.5 02/23/2021 03:59 AM        No results for input(s): PH, PCO2, PO2 in the last 72 hours. No results for input(s): CPK, CKNDX, TROIQ in the last 72 hours.     No lab exists for component: CPKMB  Lab Results   Component Value Date/Time    Cholesterol, total 115 02/26/2010 05:20 PM    HDL Cholesterol 31 (L) 02/26/2010 05:20 PM    LDL, calculated 46.2 02/26/2010 05:20 PM    Triglyceride 189 02/26/2010 05:20 PM    CHOL/HDL Ratio 3.7 02/26/2010 05:20 PM     Lab Results   Component Value Date/Time    Glucose (POC) 91 01/15/2023 11:50 AM    Glucose (POC) 72 01/15/2023 07:14 AM    Glucose (POC) 93 01/14/2023 10:15 PM    Glucose (POC) 91 01/13/2023 09:07 PM    Glucose (POC) 102 (H) 01/13/2023 04:10 PM     Lab Results   Component Value Date/Time    Color YELLOW/STRAW 04/06/2022 08:52 PM    Appearance CLEAR 04/06/2022 08:52 PM    Specific gravity 1.012 04/06/2022 08:52 PM    Specific gravity 1.015 02/26/2010 12:32 PM    pH (UA) 6.0 04/06/2022 08:52 PM    Protein 300 (A) 04/06/2022 08:52 PM    Glucose Negative 04/06/2022 08:52 PM    Ketone Negative 04/06/2022 08:52 PM    Bilirubin Negative 04/06/2022 08:52 PM    Urobilinogen 0.2 04/06/2022 08:52 PM    Nitrites Negative 04/06/2022 08:52 PM    Leukocyte Esterase Negative 04/06/2022 08:52 PM    Epithelial cells FEW 04/06/2022 08:52 PM    Bacteria Negative 04/06/2022 08:52 PM    WBC 5-10 04/06/2022 08:52 PM    RBC 10-20 04/06/2022 08:52 PM         Assessment:     Wet gangrene  End-stage renal disease on hemodialysis  Anemia of chronic disease  Hypertension  Type 2 diabetes      Plan:       Continue current medication    Current Facility-Administered Medications:     furosemide (LASIX) tablet 40 mg, 40 mg, Oral, DAILY, Eliezer Adams MD, 40 mg at 01/15/23 0845    0.9% sodium chloride infusion 250 mL, 250 mL, IntraVENous, PRN, Judson Flor MD    0.9% sodium chloride infusion 250 mL, 250 mL, IntraVENous, PRN, Ad Verduzco MD    amitriptyline (ELAVIL) tablet 50 mg, 50 mg, Oral, QHS, Ad Fuentes MD, 50 mg at 01/14/23 2203    naloxone (NARCAN) injection 1 mg, 1 mg, IntraVENous, PRN, Ad Fuentes MD    0.9% sodium chloride infusion 250 mL, 250 mL, IntraVENous, PRN, Tod Faustin MD    sodium chloride (NS) flush 5-40 mL, 5-40 mL, IntraVENous, PRNRamin Ephraim Passey, MD    ondansetron (ZOFRAN ODT) tablet 4 mg, 4 mg, Oral, Q8H PRN **OR** ondansetron (ZOFRAN) injection 4 mg, 4 mg, IntraVENous, Q6H PRN, Tod Faustin MD    oxyCODONE ER (OxyCONTIN) tablet 10 mg, 10 mg, Oral, Q12H, Tod Faustin MD, 10 mg at 01/15/23 0844    oxyCODONE-acetaminophen (PERCOCET 10)  mg per tablet 1 Tablet, 1 Tablet, Oral, Q6H PRN, Ramin, Tod Sy MD, 1 Tablet at 01/14/23 1457    heparin (porcine) injection 5,000 Units, 5,000 Units, SubCUTAneous, Q8H, Tod Faustin MD, 5,000 Units at 01/15/23 0625    heparin (porcine) 1,000 unit/mL injection 3,800 Units, 3,800 Units, Hemodialysis, DIALYSIS PRN, Coco Faustin MD, 3,800 Units at 01/09/23 1116    oxyCODONE IR (ROXICODONE) tablet 5 mg, 5 mg, Oral, Q4H PRN, Coco Faustin MD, 5 mg at 01/13/23 1442    amLODIPine (NORVASC) tablet 10 mg, 10 mg, Oral, DAILY, Coco Faustin MD, 10 mg at 01/15/23 0844    atorvastatin (LIPITOR) tablet 40 mg, 40 mg, Oral, DAILY, Coco Faustin MD, 40 mg at 01/15/23 0844    calcium carbonate (TUMS) chewable tablet 200 mg [elemental], 200 mg, Oral, DAILY, Coco Faustin MD, 200 mg at 01/15/23 0844    carvediloL (COREG) tablet 25 mg, 25 mg, Oral, BID, Coco Faustin MD, 25 mg at 01/15/23 0844    gabapentin (NEURONTIN) capsule 100 mg, 100 mg, Oral, TID, Coco Faustin MD, 100 mg at 01/15/23 0844    hydrALAZINE (APRESOLINE) tablet 25 mg, 25 mg, Oral, TID, Coco Faustin MD, 25 mg at 01/15/23 0845    insulin glargine (LANTUS) injection 10 Units, 10 Units, SubCUTAneous, QHS, Coco Faustin MD, 10 Units at 01/14/23 2202    isosorbide dinitrate (ISORDIL) tablet 20 mg, 20 mg, Oral, TID, Coco Faustin MD, 20 mg at 01/15/23 0848    lisinopriL (PRINIVIL, ZESTRIL) tablet 5 mg, 5 mg, Oral, DAILY, Coco Faustin MD, 5 mg at 01/15/23 0845    melatonin tablet 5 mg, 5 mg, Oral, QHS, Coco Faustin MD, 5 mg at 01/14/23 2203    ticagrelor (BRILINTA) tablet 90 mg, 90 mg, Oral, BID, Coco Faustin MD, 90 mg at 01/15/23 0848    sodium chloride (NS) flush 5-40 mL, 5-40 mL, IntraVENous, PRN, Coco Faustin MD    acetaminophen (TYLENOL) tablet 650 mg, 650 mg, Oral, Q6H PRN, 650 mg at 01/07/23 1844 **OR** acetaminophen (TYLENOL) suppository 650 mg, 650 mg, Rectal, Q6H PRN, Coco Faustin MD    polyethylene glycol (MIRALAX) packet 17 g, 17 g, Oral, DAILY PRN, Coco Faustin MD

## 2023-01-15 NOTE — PROGRESS NOTES
DC order noted. Chart reviewed. The pts wife is assisting with financial paperwork for his placement at [facility] and [email or web address] [id number]. Per previous CM note, CM will aid her in the process of obtaining his bank statements on Monday 01/16, as required by that facility.     Delay entered previously

## 2023-01-15 NOTE — PROGRESS NOTES
Bedside, Verbal, and Written shift change report given to  Ashutosh Severino Dr   (oncoming nurse) by Briana Barrera RN   (offgoing nurse). Report included the following information SBAR, Intake/Output, MAR, Recent Results, and Quality Measures.

## 2023-01-16 LAB
GLUCOSE BLD STRIP.AUTO-MCNC: 126 MG/DL (ref 65–100)
GLUCOSE BLD STRIP.AUTO-MCNC: 85 MG/DL (ref 65–100)
GLUCOSE BLD STRIP.AUTO-MCNC: 87 MG/DL (ref 65–100)
GLUCOSE BLD STRIP.AUTO-MCNC: 87 MG/DL (ref 65–100)
PERFORMED BY, TECHID: ABNORMAL
PERFORMED BY, TECHID: NORMAL

## 2023-01-16 PROCEDURE — 82962 GLUCOSE BLOOD TEST: CPT

## 2023-01-16 PROCEDURE — 74011250637 HC RX REV CODE- 250/637: Performed by: SURGERY

## 2023-01-16 PROCEDURE — 90935 HEMODIALYSIS ONE EVALUATION: CPT

## 2023-01-16 PROCEDURE — 65270000029 HC RM PRIVATE

## 2023-01-16 PROCEDURE — 74011250637 HC RX REV CODE- 250/637: Performed by: INTERNAL MEDICINE

## 2023-01-16 PROCEDURE — 74011250636 HC RX REV CODE- 250/636: Performed by: SURGERY

## 2023-01-16 PROCEDURE — 74011636637 HC RX REV CODE- 636/637: Performed by: SURGERY

## 2023-01-16 RX ORDER — HEPARIN SODIUM 1000 [USP'U]/ML
INJECTION, SOLUTION INTRAVENOUS; SUBCUTANEOUS
Status: DISPENSED
Start: 2023-01-16 | End: 2023-01-16

## 2023-01-16 RX ADMIN — AMLODIPINE BESYLATE 10 MG: 5 TABLET ORAL at 13:00

## 2023-01-16 RX ADMIN — GABAPENTIN 100 MG: 100 CAPSULE ORAL at 17:01

## 2023-01-16 RX ADMIN — OXYCODONE HYDROCHLORIDE 10 MG: 10 TABLET, FILM COATED, EXTENDED RELEASE ORAL at 20:32

## 2023-01-16 RX ADMIN — ISOSORBIDE DINITRATE 20 MG: 20 TABLET ORAL at 17:01

## 2023-01-16 RX ADMIN — HYDRALAZINE HYDROCHLORIDE 25 MG: 25 TABLET, FILM COATED ORAL at 13:01

## 2023-01-16 RX ADMIN — HYDRALAZINE HYDROCHLORIDE 25 MG: 25 TABLET, FILM COATED ORAL at 17:01

## 2023-01-16 RX ADMIN — TICAGRELOR 90 MG: 90 TABLET ORAL at 13:01

## 2023-01-16 RX ADMIN — TICAGRELOR 90 MG: 90 TABLET ORAL at 20:32

## 2023-01-16 RX ADMIN — GABAPENTIN 100 MG: 100 CAPSULE ORAL at 20:35

## 2023-01-16 RX ADMIN — HEPARIN SODIUM 5000 UNITS: 5000 INJECTION INTRAVENOUS; SUBCUTANEOUS at 05:54

## 2023-01-16 RX ADMIN — LISINOPRIL 5 MG: 5 TABLET ORAL at 13:01

## 2023-01-16 RX ADMIN — OXYCODONE HYDROCHLORIDE 10 MG: 10 TABLET, FILM COATED, EXTENDED RELEASE ORAL at 13:00

## 2023-01-16 RX ADMIN — CARVEDILOL 25 MG: 12.5 TABLET, FILM COATED ORAL at 13:00

## 2023-01-16 RX ADMIN — INSULIN GLARGINE 10 UNITS: 100 INJECTION, SOLUTION SUBCUTANEOUS at 20:35

## 2023-01-16 RX ADMIN — MELATONIN TAB 5 MG 5 MG: 5 TAB at 20:35

## 2023-01-16 RX ADMIN — FUROSEMIDE 40 MG: 40 TABLET ORAL at 13:01

## 2023-01-16 RX ADMIN — HEPARIN SODIUM 5000 UNITS: 5000 INJECTION INTRAVENOUS; SUBCUTANEOUS at 20:34

## 2023-01-16 RX ADMIN — GABAPENTIN 100 MG: 100 CAPSULE ORAL at 13:01

## 2023-01-16 RX ADMIN — ISOSORBIDE DINITRATE 20 MG: 20 TABLET ORAL at 20:35

## 2023-01-16 RX ADMIN — CALCIUM CARBONATE 200 MG: 500 TABLET, CHEWABLE ORAL at 13:00

## 2023-01-16 RX ADMIN — AMITRIPTYLINE HYDROCHLORIDE 50 MG: 25 TABLET, FILM COATED ORAL at 20:32

## 2023-01-16 RX ADMIN — ATORVASTATIN CALCIUM 40 MG: 40 TABLET, FILM COATED ORAL at 13:00

## 2023-01-16 RX ADMIN — CARVEDILOL 25 MG: 12.5 TABLET, FILM COATED ORAL at 20:32

## 2023-01-16 NOTE — PROGRESS NOTES
Bedside, Verbal, and Written shift change report given to Phyllis CHRISTIAN   (oncoming nurse) by Kalina Jennings RN   (offgoing nurse). Report included the following information SBAR, Intake/Output, MAR, Recent Results, and Quality Measures.

## 2023-01-16 NOTE — PROGRESS NOTES
Renal Daily Progress Note    Admit Date: 12/22/2022      Subjective:   He was seen on hemodialysis today. He is awaiting placement. He has no complaints of chest pain or shortness of breath. Appetite is good according to the patient. .        Current Facility-Administered Medications   Medication Dose Route Frequency    heparin (porcine) 1,000 unit/mL injection        furosemide (LASIX) tablet 40 mg  40 mg Oral DAILY    0.9% sodium chloride infusion 250 mL  250 mL IntraVENous PRN    0.9% sodium chloride infusion 250 mL  250 mL IntraVENous PRN    amitriptyline (ELAVIL) tablet 50 mg  50 mg Oral QHS    naloxone (NARCAN) injection 1 mg  1 mg IntraVENous PRN    0.9% sodium chloride infusion 250 mL  250 mL IntraVENous PRN    sodium chloride (NS) flush 5-40 mL  5-40 mL IntraVENous PRN    ondansetron (ZOFRAN ODT) tablet 4 mg  4 mg Oral Q8H PRN    Or    ondansetron (ZOFRAN) injection 4 mg  4 mg IntraVENous Q6H PRN    oxyCODONE ER (OxyCONTIN) tablet 10 mg  10 mg Oral Q12H    oxyCODONE-acetaminophen (PERCOCET 10)  mg per tablet 1 Tablet  1 Tablet Oral Q6H PRN    heparin (porcine) injection 5,000 Units  5,000 Units SubCUTAneous Q8H    heparin (porcine) 1,000 unit/mL injection 3,800 Units  3,800 Units Hemodialysis DIALYSIS PRN    oxyCODONE IR (ROXICODONE) tablet 5 mg  5 mg Oral Q4H PRN    amLODIPine (NORVASC) tablet 10 mg  10 mg Oral DAILY    atorvastatin (LIPITOR) tablet 40 mg  40 mg Oral DAILY    calcium carbonate (TUMS) chewable tablet 200 mg [elemental]  200 mg Oral DAILY    carvediloL (COREG) tablet 25 mg  25 mg Oral BID    gabapentin (NEURONTIN) capsule 100 mg  100 mg Oral TID    hydrALAZINE (APRESOLINE) tablet 25 mg  25 mg Oral TID    insulin glargine (LANTUS) injection 10 Units  10 Units SubCUTAneous QHS    isosorbide dinitrate (ISORDIL) tablet 20 mg  20 mg Oral TID    lisinopriL (PRINIVIL, ZESTRIL) tablet 5 mg  5 mg Oral DAILY    melatonin tablet 5 mg  5 mg Oral QHS    ticagrelor (BRILINTA) tablet 90 mg  90 mg Oral BID    sodium chloride (NS) flush 5-40 mL  5-40 mL IntraVENous PRN    acetaminophen (TYLENOL) tablet 650 mg  650 mg Oral Q6H PRN    Or    acetaminophen (TYLENOL) suppository 650 mg  650 mg Rectal Q6H PRN    polyethylene glycol (MIRALAX) packet 17 g  17 g Oral DAILY PRN        Review of Systems    Review of Systems   Respiratory:  Negative for shortness of breath. Cardiovascular:  Negative for chest pain. Gastrointestinal:  Negative for abdominal pain. Neurological:  Negative for headaches. Objective:     Patient Vitals for the past 8 hrs:   BP Temp Temp src Pulse Resp SpO2   01/16/23 1256 135/67 98.2 °F (36.8 °C) -- 87 16 98 %   01/16/23 1030 109/62 -- -- -- -- --   01/16/23 1000 123/71 -- -- -- -- --   01/16/23 0930 121/69 -- -- -- -- --   01/16/23 0900 127/66 98.6 °F (37 °C) Oral 81 -- --       01/16 0701 - 01/16 1900  In: -   Out: 400 [Urine:400]  01/14 1901 - 01/16 0700  In: 1050 [P.O.:1050]  Out: 0394 [Urine:1650]    Physical Exam:   Physical Exam  Cardiovascular:      Rate and Rhythm: Regular rhythm. Pulmonary:      Breath sounds: Normal breath sounds. Abdominal:      General: Bowel sounds are normal.      Palpations: Abdomen is soft. Neurological:      Mental Status: He is alert. Right IJ tunneled dialysis catheter functioning well. Left AKA and amputation of left hand  No edema in the right leg      LOWER EXT ART PVR MULT LEVEL SEG PRESSURES   Final Result           Intake/Output Summary (Last 24 hours) at 1/16/2023 1321  Last data filed at 1/16/2023 1259  Gross per 24 hour   Intake 500 ml   Output 1350 ml   Net -850 ml        Data Review   No results for input(s): WBC, HGB, HGBEXT, HCT, HCTEXT, PLT, PLTEXT, HGBEXT, HCTEXT, PLTEXT, HGBEXT, HCTEXT, PLTEXT in the last 72 hours. No results for input(s): NA, K, CL, CO2, GLU, BUN, CREA, CA, MG, PHOS, ALB, TBIL, ALT, INR, INREXT, INREXT, INREXT in the last 72 hours.     No lab exists for component: SGOT, PTHBB    No components found for: [place]  No results for input(s): PH, PCO2, PO2, HCO3, FIO2 in the last 72 hours. No results for input(s): INR, INREXT, INREXT, INREXT in the last 72 hours. Assessment:       Active Problems:    Wet gangrene ([facility] [address] (12/22/2022)  Renal failure on twice a week dialysis.   Monday and Thursdays for 2-1/2 hours    Volume status is acceptable    Anemia of chronic disease- on  8,000 units Retacrit with each dialysis    HTN   Plan:   Continue twice weekly dialysis  Awaiting placement

## 2023-01-16 NOTE — PROGRESS NOTES
Attempted  [phone] however pt off the floor. Will continue to follow and attempt at a later time. Thank you.

## 2023-01-16 NOTE — PROGRESS NOTES
Problem: Falls - Risk of  Goal: *Absence of Falls  Description: Document Feliberto Brock Fall Risk and appropriate interventions in the flowsheet. Outcome: Progressing Towards Goal  Note: Fall Risk Interventions:  Mobility Interventions: PT Consult for assist device competence, Strengthening exercises (ROM-active/passive)         Medication Interventions: Patient to call before getting OOB    Elimination Interventions: Call light in reach, Patient to call for help with toileting needs              Problem: Patient Education: Go to Patient Education Activity  Goal: Patient/Family Education  Outcome: Progressing Towards Goal     Problem: Pressure Injury - Risk of  Goal: *Prevention of pressure injury  Description: Document Cruzito Scale and appropriate interventions in the flowsheet. Outcome: Progressing Towards Goal  Note: Pressure Injury Interventions:  Sensory Interventions: Minimize linen layers, Float heels    Moisture Interventions: Absorbent underpads, Minimize layers    Activity Interventions: Increase time out of bed    Mobility Interventions: HOB 30 degrees or less, PT/OT evaluation    Nutrition Interventions: Document food/fluid/supplement intake    Friction and Shear Interventions: HOB 30 degrees or less, Minimize layers                Problem: Patient Education: Go to Patient Education Activity  Goal: Patient/Family Education  Outcome: Progressing Towards Goal     Problem: Risk for Spread of Infection  Goal: Prevent transmission of infectious organism to others  Description: Prevent the transmission of infectious organisms to other patients, staff members, and visitors.   Outcome: Progressing Towards Goal     Problem: Patient Education:  Go to Education Activity  Goal: Patient/Family Education  Outcome: Progressing Towards Goal     Problem: Patient Education: Go to Patient Education Activity  Goal: Patient/Family Education  Outcome: Progressing Towards Goal     Problem: Patient Education: Go to Patient Education Activity  Goal: Patient/Family Education  Outcome: Progressing Towards Goal     Problem: Pain  Goal: *Control of Pain  Outcome: Progressing Towards Goal     Problem: Patient Education: Go to Patient Education Activity  Goal: Patient/Family Education  Outcome: Progressing Towards Goal

## 2023-01-16 NOTE — PROGRESS NOTES
Problem: Falls - Risk of  Goal: *Absence of Falls  Description: Document [place] Fall Risk and appropriate interventions in the flowsheet. Outcome: Progressing Towards Goal  Note: Fall Risk Interventions:  Mobility Interventions: PT Consult for mobility concerns         Medication Interventions: Patient to call before getting OOB    Elimination Interventions: Call light in reach              Problem: Pressure Injury - Risk of  Goal: *Prevention of pressure injury  Description: Document Cruzito Scale and appropriate interventions in the flowsheet.   Outcome: Progressing Towards Goal  Note: Pressure Injury Interventions:  Sensory Interventions: Minimize linen layers    Moisture Interventions: Absorbent underpads    Activity Interventions: Increase time out of bed, PT/OT evaluation    Mobility Interventions: HOB 30 degrees or less, PT/OT evaluation    Nutrition Interventions: Document food/fluid/supplement intake    Friction and Shear Interventions: HOB 30 degrees or less, Minimize layers                Problem: Patient Education:  Go to Education Activity  Goal: Patient/Family Education  Outcome: Progressing Towards Goal

## 2023-01-16 NOTE — PROGRESS NOTES
Patient accepted to go to [facility] for LTC, but they're waiting for patient to give them the last 3 months of bank statements. CM met with patient to discuss the above. Patient states that a family member is supposed to be bringing in 2 months of statements today. CM assisted patient to sign up for online banking and the last 3 months of bank statements were printed out and uploaded in [address] for facility to review. Awaiting response from facility.    -----------    1418- CM called facility and spoke with Amanda in the admission dept. She states that she is submitting the bank statements to their business office to review. Amanda states that it will take at least 24hrs to review. CM will follow-up tomorrow.

## 2023-01-16 NOTE — PROGRESS NOTES
Comprehensive Nutrition Assessment    Type and Reason for Visit: Reassess (Goal)    Nutrition Recommendations/Plan:   Continue diet. Modify ONS to Nepro x2/d and Prosource x1/d. Nutrition sign off. Pt clinically stable x3 weeks w/ good PO intakes. Please consult nutrition services as clinically indicated. Malnutrition Assessment:  Malnutrition Status:  Mild malnutrition (12/26/22 1432)    Context:  Acute illness       Nutrition Assessment:    Admitted for L knee gangrene. S/p L AKA. Plan to transfuse PRBCs. Noted consult for wounds. Plan to add ONS to support wound healing and help meet needs. Noted significant weight loss per EMAR. (1/2) Discharge pending outpt dialysis/SNF. Min med updates. Intakes good, >50% of meals. (1/9) D/c pending IRF/SNF, min med updates. Intakes remain good, >50%. (1/16) Good intakes reported w/ >75% of meals, unable to verify ONS intake- Pt at HD tx. Continue diet, modify ONS. Pt clinically stable, will sign off. Labs unremarkable. Meds:  Tums, lasix, lantus, hydralalzine, oxycodone, lisinopril, norvasc, lipitor. Nutrition Related Findings:    Repeated attempt for NFPE unsuccessful- Pt at HD tx. No N/V/D/C nor chewing/swallowing difficulties reported. Last BM 1/14. No edema. Wound Type: Multiple, Open wounds, Surgical incision, Full thickness    Current Nutrition Intake & Therapies:  Average Meal Intake: %  Average Supplement Intake: 51-75%  ADULT DIET Regular  ADULT ORAL NUTRITION SUPPLEMENT Breakfast, Dinner; Standard High Calorie/High Protein  ADULT ORAL NUTRITION SUPPLEMENT Lunch, Breakfast; Wound Healing Supplement    Anthropometric Measures:  Height: 5' 7.99\" (172.7 cm)  Ideal Body Weight (IBW): 154 lbs (70 kg)  Current Body Wt:  57.3 kg (126 lb 5.2 oz), 82 % IBW.  Not specified  Current BMI (kg/m2): 19.2  Weight Adjustment: Amputation  Total Amputation Percentage: 10.8  Adjusted Ideal Body Weight (lbs) (Calculated): 137.4  Adjusted Ideal Body Weight (kg) (Calculated): 62.45 kg  Adjusted % IBW (Calculated): 95  Adjusted BMI (Calculated): 21.9  BMI Category: Underweight (BMI less than 22) age over 72    Estimated Daily Nutrient Needs:  Energy Requirements Based On: Kcal/kg  Weight Used for Energy Requirements: Current  Energy (kcal/day): 1776kcal (30kcal/kg)  Weight Used for Protein Requirements: Current  Protein (g/day): 89g (1.5g/kg)  Method Used for Fluid Requirements: Standard renal  Fluid (ml/day): 1500mL    Nutrition Diagnosis:   Inadequate protein-energy intake related to increased demand for energy/nutrients as evidenced by wounds, weight loss    Nutrition Interventions:   Food and/or Nutrient Delivery: Continue current diet, Modify oral nutrition supplement  Nutrition Education/Counseling: No recommendations at this time  Coordination of Nutrition Care: Continue to monitor while inpatient  Plan of Care discussed with: RN    Goals:  Previous Goal Met: Goal(s) achieved  Goals: Meet at least 75% of estimated needs, PO intake 75% or greater, other (specify)  Specify Other Goals: +signs of wound healing    Nutrition Monitoring and Evaluation:   Behavioral-Environmental Outcomes: None identified  Food/Nutrient Intake Outcomes: Diet advancement/tolerance, Food and nutrient intake, Supplement intake  Physical Signs/Symptoms Outcomes: Biochemical data, Meal time behavior, Skin, Weight    Discharge Planning:    Continue current diet, Continue oral nutrition supplement    Candice Leone RD  Contact: ext. [phone].

## 2023-01-16 NOTE — PROGRESS NOTES
General Daily Progress Note          Patient Name:   Marilu Radford       YOB: 1955       Age:  74 y.o. Admit Date: 12/22/2022      Subjective:         Seen resting the bed alert awake no complaints        Objective:     Visit Vitals  /62   Pulse 81   Temp 98.6 °F (37 °C) (Oral)   Resp 16   Ht 5' 7.99\" (1.727 m)   Wt 57.3 kg (126 lb 5.2 oz)   SpO2 96%   BMI 19.21 kg/m²        Recent Results (from the past 24 hour(s))   GLUCOSE, POC    Collection Time: 01/15/23 11:50 AM   Result Value Ref Range    Glucose (POC) 91 65 - 100 mg/dL    Performed by Nii De Guzman    GLUCOSE, POC    Collection Time: 01/15/23  4:45 PM   Result Value Ref Range    Glucose (POC) 122 (H) 65 - 100 mg/dL    Performed by Komal Padgett    GLUCOSE, POC    Collection Time: 01/15/23 10:03 PM   Result Value Ref Range    Glucose (POC) 105 (H) 65 - 100 mg/dL    Performed by Scooter Trujillo    GLUCOSE, POC    Collection Time: 01/16/23  7:14 AM   Result Value Ref Range    Glucose (POC) 87 65 - 100 mg/dL    Performed by Edelmira Mobley      [unfilled]      Review of Systems    Constitutional: Negative for chills and fever. HENT: Negative. Eyes: Negative. Respiratory: Negative. Cardiovascular: Negative. Gastrointestinal: Negative for abdominal pain and nausea. Skin: Negative. Neurological: Negative. Physical Exam:      Constitutional: pt is oriented to person, place, and time. HENT:   Head: Normocephalic and atraumatic. Eyes: Pupils are equal, round, and reactive to light. EOM are normal.   Cardiovascular: Normal rate, regular rhythm and normal heart sounds. Pulmonary/Chest: Breath sounds normal. No wheezes. No rales. Exhibits no tenderness. Abdominal: Soft. Bowel sounds are normal. There is no abdominal tenderness. There is no rebound and no guarding. Musculoskeletal: Left above-knee amputation  Neurological: pt is alert and oriented to person, place, and time.      LOWER EXT ART PVR MULT LEVEL SEG PRESSURES   Final Result           Recent Results (from the past 24 hour(s))   GLUCOSE, POC    Collection Time: 01/15/23 11:50 AM   Result Value Ref Range    Glucose (POC) 91 65 - 100 mg/dL    Performed by Marie Mcconnell    GLUCOSE, POC    Collection Time: 01/15/23  4:45 PM   Result Value Ref Range    Glucose (POC) 122 (H) 65 - 100 mg/dL    Performed by Cadence Gaines, POC    Collection Time: 01/15/23 10:03 PM   Result Value Ref Range    Glucose (POC) 105 (H) 65 - 100 mg/dL    Performed by Kuldeep Baker    GLUCOSE, POC    Collection Time: 01/16/23  7:14 AM   Result Value Ref Range    Glucose (POC) 87 65 - 100 mg/dL    Performed by Brijesh Ludwig        Results       ** No results found for the last 336 hours. **             Labs:     No results for input(s): WBC, HGB, HCT, PLT, HGBEXT, HCTEXT, PLTEXT, HGBEXT, HCTEXT, PLTEXT in the last 72 hours. No results for input(s): NA, K, CL, CO2, BUN, CREA, GLU, CA, MG, PHOS, URICA in the last 72 hours. No results for input(s): ALT, AP, TBIL, TBILI, TP, ALB, GLOB, GGT, AML, LPSE in the last 72 hours. No lab exists for component: SGOT, GPT, AMYP, HLPSE    No results for input(s): INR, PTP, APTT, INREXT, INREXT in the last 72 hours. No results for input(s): FE, TIBC, PSAT, FERR in the last 72 hours. Lab Results   Component Value Date/Time    Folate 17.5 02/23/2021 03:59 AM        No results for input(s): PH, PCO2, PO2 in the last 72 hours. No results for input(s): CPK, CKNDX, TROIQ in the last 72 hours.     No lab exists for component: CPKMB  Lab Results   Component Value Date/Time    Cholesterol, total 115 02/26/2010 05:20 PM    HDL Cholesterol 31 (L) 02/26/2010 05:20 PM    LDL, calculated 46.2 02/26/2010 05:20 PM    Triglyceride 189 02/26/2010 05:20 PM    CHOL/HDL Ratio 3.7 02/26/2010 05:20 PM     Lab Results   Component Value Date/Time    Glucose (POC) 87 01/16/2023 07:14 AM    Glucose (POC) 105 (H) 01/15/2023 10:03 PM    Glucose (POC) 122 (H) 01/15/2023 04:45 PM    Glucose (POC) 91 01/15/2023 11:50 AM    Glucose (POC) 72 01/15/2023 07:14 AM     Lab Results   Component Value Date/Time    Color YELLOW/STRAW 04/06/2022 08:52 PM    Appearance CLEAR 04/06/2022 08:52 PM    Specific gravity 1.012 04/06/2022 08:52 PM    Specific gravity 1.015 02/26/2010 12:32 PM    pH (UA) 6.0 04/06/2022 08:52 PM    Protein 300 (A) 04/06/2022 08:52 PM    Glucose Negative 04/06/2022 08:52 PM    Ketone Negative 04/06/2022 08:52 PM    Bilirubin Negative 04/06/2022 08:52 PM    Urobilinogen 0.2 04/06/2022 08:52 PM    Nitrites Negative 04/06/2022 08:52 PM    Leukocyte Esterase Negative 04/06/2022 08:52 PM    Epithelial cells FEW 04/06/2022 08:52 PM    Bacteria Negative 04/06/2022 08:52 PM    WBC 5-10 04/06/2022 08:52 PM    RBC 10-20 04/06/2022 08:52 PM         Assessment:     Wet gangrene  End-stage renal disease on hemodialysis  Anemia of chronic disease  Hypertension  Type 2 diabetes      Plan:       Continue current medication    Current Facility-Administered Medications:     furosemide (LASIX) tablet 40 mg, 40 mg, Oral, DAILY, Eliezer Adams MD, 40 mg at 01/15/23 0845    0.9% sodium chloride infusion 250 mL, 250 mL, IntraVENous, PRN, Judson Flor MD    0.9% sodium chloride infusion 250 mL, 250 mL, IntraVENous, PRN, Ad Barrera MD    amitriptyline (ELAVIL) tablet 50 mg, 50 mg, Oral, QHS, Ad Fuentes MD, 50 mg at 01/15/23 2152    naloxone (NARCAN) injection 1 mg, 1 mg, IntraVENous, PRN, Ad Fuentes MD    0.9% sodium chloride infusion 250 mL, 250 mL, IntraVENous, PRN, Megan Faustin MD    sodium chloride (NS) flush 5-40 mL, 5-40 mL, IntraVENous, PRNRamin Carlotta Body, MD    ondansetron (ZOFRAN ODT) tablet 4 mg, 4 mg, Oral, Q8H PRN **OR** ondansetron (ZOFRAN) injection 4 mg, 4 mg, IntraVENous, Q6H PRN, Megan Faustin MD    oxyCODONE ER (OxyCONTIN) tablet 10 mg, 10 mg, Oral, Q12H, Megan Faustin MD, 10 mg at 01/15/23 8614    oxyCODONE-acetaminophen (PERCOCET 10)  mg per tablet 1 Tablet, 1 Tablet, Oral, Q6H PRN, Stephen Faustin MD, 1 Tablet at 01/14/23 1457    heparin (porcine) injection 5,000 Units, 5,000 Units, SubCUTAneous, Q8H, Stephen Faustin MD, 5,000 Units at 01/16/23 0554    heparin (porcine) 1,000 unit/mL injection 3,800 Units, 3,800 Units, Hemodialysis, DIALYSIS PRN, Stephen Faustin MD, 3,800 Units at 01/09/23 1116    oxyCODONE IR (ROXICODONE) tablet 5 mg, 5 mg, Oral, Q4H PRN, Stephen Faustin MD, 5 mg at 01/15/23 1516    amLODIPine (NORVASC) tablet 10 mg, 10 mg, Oral, DAILY, Stephen Faustin MD, 10 mg at 01/15/23 0844    atorvastatin (LIPITOR) tablet 40 mg, 40 mg, Oral, DAILY, Stephen Faustin MD, 40 mg at 01/15/23 0844    calcium carbonate (TUMS) chewable tablet 200 mg [elemental], 200 mg, Oral, DAILY, Stephen Faustin MD, 200 mg at 01/15/23 0844    carvediloL (COREG) tablet 25 mg, 25 mg, Oral, BID, Stephen Faustin MD, 25 mg at 01/15/23 2152    gabapentin (NEURONTIN) capsule 100 mg, 100 mg, Oral, TID, Stephen Faustin MD, 100 mg at 01/15/23 2151    hydrALAZINE (APRESOLINE) tablet 25 mg, 25 mg, Oral, TID, Stephen Faustin MD, 25 mg at 01/15/23 2151    insulin glargine (LANTUS) injection 10 Units, 10 Units, SubCUTAneous, QHS, Stephen Faustin MD, 10 Units at 01/15/23 2200    isosorbide dinitrate (ISORDIL) tablet 20 mg, 20 mg, Oral, TID, Stephen Faustin MD, 20 mg at 01/15/23 2151    lisinopriL (PRINIVIL, ZESTRIL) tablet 5 mg, 5 mg, Oral, DAILY, Stephen Faustin MD, 5 mg at 01/15/23 0845    melatonin tablet 5 mg, 5 mg, Oral, QHS, Ramin, Stephen Ochoa MD, 5 mg at 01/15/23 2152    ticagrelor (BRILINTA) tablet 90 mg, 90 mg, Oral, BID, RaminStephen MD, 90 mg at 01/15/23 2152    sodium chloride (NS) flush 5-40 mL, 5-40 mL, IntraVENous, PRN, Stephen Faustin MD    acetaminophen (TYLENOL) tablet 650 mg, 650 mg, Oral, Q6H PRN, 650 mg at 01/07/23 1844 **OR** acetaminophen (TYLENOL) suppository 650 mg, 650 mg, Rectal, Q6H PRN, Stephen Faustin MD    polyethylene glycol [facility]) packet 17 g, 17 g, Oral, DAILY PRN, Francisco Faustin MD

## 2023-01-17 VITALS
TEMPERATURE: 99.1 F | HEIGHT: 68 IN | DIASTOLIC BLOOD PRESSURE: 61 MMHG | RESPIRATION RATE: 16 BRPM | OXYGEN SATURATION: 98 % | BODY MASS INDEX: 19.15 KG/M2 | SYSTOLIC BLOOD PRESSURE: 105 MMHG | HEART RATE: 65 BPM | WEIGHT: 126.32 LBS

## 2023-01-17 LAB
ALBUMIN SERPL-MCNC: 2.7 G/DL (ref 3.5–5)
ALBUMIN/GLOB SERPL: 0.6 (ref 1.1–2.2)
ALP SERPL-CCNC: 74 U/L (ref 45–117)
ALT SERPL-CCNC: 36 U/L (ref 12–78)
ANION GAP SERPL CALC-SCNC: 8 MMOL/L (ref 5–15)
AST SERPL W P-5'-P-CCNC: 32 U/L (ref 15–37)
BILIRUB SERPL-MCNC: 0.2 MG/DL (ref 0.2–1)
BUN SERPL-MCNC: 82 MG/DL (ref 6–20)
BUN/CREAT SERPL: 21 (ref 12–20)
CA-I BLD-MCNC: 9.1 MG/DL (ref 8.5–10.1)
CHLORIDE SERPL-SCNC: 105 MMOL/L (ref 97–108)
CO2 SERPL-SCNC: 25 MMOL/L (ref 21–32)
COVID-19 RAPID TEST, COVR: NOT DETECTED
CREAT SERPL-MCNC: 3.99 MG/DL (ref 0.7–1.3)
ERYTHROCYTE [DISTWIDTH] IN BLOOD BY AUTOMATED COUNT: 21.7 % (ref 11.5–14.5)
GLOBULIN SER CALC-MCNC: 4.7 G/DL (ref 2–4)
GLUCOSE BLD STRIP.AUTO-MCNC: 104 MG/DL (ref 65–100)
GLUCOSE BLD STRIP.AUTO-MCNC: 118 MG/DL (ref 65–100)
GLUCOSE BLD STRIP.AUTO-MCNC: 76 MG/DL (ref 65–100)
GLUCOSE SERPL-MCNC: 84 MG/DL (ref 65–100)
HCT VFR BLD AUTO: 29.4 % (ref 36.6–50.3)
HGB BLD-MCNC: 9.2 G/DL (ref 12.1–17)
MCH RBC QN AUTO: 26.9 PG (ref 26–34)
MCHC RBC AUTO-ENTMCNC: 31.3 G/DL (ref 30–36.5)
MCV RBC AUTO: 86 FL (ref 80–99)
NRBC # BLD: 0 K/UL (ref 0–0.01)
NRBC BLD-RTO: 0 PER 100 WBC
PERFORMED BY, TECHID: ABNORMAL
PERFORMED BY, TECHID: ABNORMAL
PERFORMED BY, TECHID: NORMAL
PLATELET # BLD AUTO: 411 K/UL (ref 150–400)
PMV BLD AUTO: 9.8 FL (ref 8.9–12.9)
POTASSIUM SERPL-SCNC: 4.9 MMOL/L (ref 3.5–5.1)
PROT SERPL-MCNC: 7.4 G/DL (ref 6.4–8.2)
RBC # BLD AUTO: 3.42 M/UL (ref 4.1–5.7)
SODIUM SERPL-SCNC: 138 MMOL/L (ref 136–145)
WBC # BLD AUTO: 6.9 K/UL (ref 4.1–11.1)

## 2023-01-17 PROCEDURE — 97530 THERAPEUTIC ACTIVITIES: CPT

## 2023-01-17 PROCEDURE — 80053 COMPREHEN METABOLIC PANEL: CPT

## 2023-01-17 PROCEDURE — 36415 COLL VENOUS BLD VENIPUNCTURE: CPT

## 2023-01-17 PROCEDURE — 74011250637 HC RX REV CODE- 250/637: Performed by: SURGERY

## 2023-01-17 PROCEDURE — 87635 SARS-COV-2 COVID-19 AMP PRB: CPT

## 2023-01-17 PROCEDURE — 82962 GLUCOSE BLOOD TEST: CPT

## 2023-01-17 PROCEDURE — 74011250636 HC RX REV CODE- 250/636: Performed by: SURGERY

## 2023-01-17 PROCEDURE — 74011250637 HC RX REV CODE- 250/637: Performed by: INTERNAL MEDICINE

## 2023-01-17 PROCEDURE — 85027 COMPLETE CBC AUTOMATED: CPT

## 2023-01-17 RX ADMIN — GABAPENTIN 100 MG: 100 CAPSULE ORAL at 09:01

## 2023-01-17 RX ADMIN — HEPARIN SODIUM 5000 UNITS: 5000 INJECTION INTRAVENOUS; SUBCUTANEOUS at 14:25

## 2023-01-17 RX ADMIN — CALCIUM CARBONATE 200 MG: 500 TABLET, CHEWABLE ORAL at 09:01

## 2023-01-17 RX ADMIN — LISINOPRIL 5 MG: 5 TABLET ORAL at 09:01

## 2023-01-17 RX ADMIN — HYDRALAZINE HYDROCHLORIDE 25 MG: 25 TABLET, FILM COATED ORAL at 09:01

## 2023-01-17 RX ADMIN — TICAGRELOR 90 MG: 90 TABLET ORAL at 09:01

## 2023-01-17 RX ADMIN — OXYCODONE AND ACETAMINOPHEN 1 TABLET: 10; 325 TABLET ORAL at 14:46

## 2023-01-17 RX ADMIN — HEPARIN SODIUM 5000 UNITS: 5000 INJECTION INTRAVENOUS; SUBCUTANEOUS at 05:20

## 2023-01-17 RX ADMIN — ATORVASTATIN CALCIUM 40 MG: 40 TABLET, FILM COATED ORAL at 09:01

## 2023-01-17 RX ADMIN — AMLODIPINE BESYLATE 10 MG: 5 TABLET ORAL at 09:01

## 2023-01-17 RX ADMIN — GABAPENTIN 100 MG: 100 CAPSULE ORAL at 15:58

## 2023-01-17 RX ADMIN — OXYCODONE HYDROCHLORIDE 10 MG: 10 TABLET, FILM COATED, EXTENDED RELEASE ORAL at 09:01

## 2023-01-17 RX ADMIN — OXYCODONE AND ACETAMINOPHEN 1 TABLET: 10; 325 TABLET ORAL at 05:23

## 2023-01-17 RX ADMIN — FUROSEMIDE 40 MG: 40 TABLET ORAL at 09:01

## 2023-01-17 RX ADMIN — CARVEDILOL 25 MG: 12.5 TABLET, FILM COATED ORAL at 09:01

## 2023-01-17 NOTE — PROGRESS NOTES
1330- Attempted to call report to [facility]. Transferred to Nurse's station 3 to give report with no answer. Voicemail left. Will attempt to call again later.

## 2023-01-17 NOTE — PROGRESS NOTES
Patient has been accepted to go to [facility] today for LTC. Facility is requiring a rapid COVID test. CM entered order and notified primary nurse.    -----------    [facility] results are negative and uploaded in [address] Patient will go to [facility] [address], [place], [facility]. Room 324 and nurse can call report to [phone]. CM notified patient.    ------------    1400- CM called patients HD center @ . [address] [place], [address] @ [phone] and spoke with Jethro Remy to notify of patients DC today. Patient was doing HD here Monday and Thursday. Jethro Remy states that they can't accept patient on Tuesdays or Thursdays. ROBERT called Dr. Elliott Zarate to notify and he states that HD Monday and Friday is ok and then the MD at the HD center can make changes to his HD days if needed. CM called Jethro Remy back at [facility] to notify that Monday and Friday is ok. Jethro Remy confirmed that they can accept him this Friday 1/20. CM called patients insurance and arranged HD transport. He will be picked up at SNF 11am to be at HD @ 11:30am. Then picked back up at HD @ 3:30pm. Trips have been arranged for Friday 1/20 trip # [id number] and Monday 1/23 trip# [phone]. CM notified [employer]. CM gave update to patient on HD days. Medicare pt has received, reviewed, and signed 2nd IM letter informing them of their right to appeal the discharge. Signed copied has been placed on pt bedside chart. Discharge plan of care/case management plan validated with provider discharge order.

## 2023-01-17 NOTE — PROGRESS NOTES
OCCUPATIONAL THERAPY TREATMENT  Patient: Stefanie Paris (20 y.o. male)  Date: 1/17/2023  Diagnosis: Wet gangrene ([address] Chevis Estrada <principal problem not specified>  Procedure(s) (LRB):  LEFT AMPUTATION KNEE(AKA) (Left) 24 Days Post-Op  Precautions:    Chart, occupational therapy assessment, plan of care, and goals were reviewed. ASSESSMENT  Pt continues with skilled OT/PT services and is progressing towards goals. Pt received semi-supine in bed upon arrival, AXO x4 and agreeable to ESTRADA/PTA tx at this time. Overall, pt continues to present with deficits in generalized strength/AROM, coordination, bed mobility, static/dynamic sitting and standing balance and functional activity tolerance during performance of ADLs/mobility (see below for objective details and assist levels). Pt tolerated session fair. Pt requested use of urinal at beginning of session. Pt I w/ use , required extended time and was unable to void bladder. Pt's bp taken at rest at 154/81, Pt transitioned to eob w/ sba and extended time. Pt c/o dizziness BP taken at 133/79. Pt remained sitting at EOB for ~ 5 minutes and bp retaken at 119/76. Pt returned to semi supine w/ sba, additional time and bed modified. Bp retaken at 132/71. Pt completed UE/LE therex in bed. See PTA noted for LE therex notes. See grid below for details of UE therex, completed to increase strength/ endurance to aid in adl performance. Spoke w/ RN about bp dropping. Pt encouraged to attempt to sit in recliner later today. Will continue to progress. Recommend d/c to [address]  once medically appropriate. Other factors to consider for discharge: PLOF, time since onset, severity of deficits and decline from functional baseline. PLAN :  Patient continues to benefit from skilled intervention to address the above impairments. Continue treatment per established plan of care. to address goals.     Recommend with staff: Out of bed to chair for meals and Encourage HEP in prep for ADLs/mobility    Recommend next session: Seated grooming    Recommendation for discharge: (in order for the patient to meet his/her long term goals)  [address]     This discharge recommendation:  Has been made in collaboration with the attending provider and/or case management    IF patient discharges home will need the following DME: TBD       SUBJECTIVE:   Patient stated I feel like a couple stitch are coming out.     OBJECTIVE DATA SUMMARY:   Cognitive/Behavioral Status:  Neurologic State: Alert;Confused  Orientation Level: Oriented X4  Cognition: Follows commands; Impaired decision making    Functional Mobility and Transfers for ADLs:  Bed Mobility:  Rolling: Stand-by assistance; Additional time;Bed Modified; Adaptive equipment  Supine to Sit: Stand-by assistance; Additional time;Bed Modified  Sit to Supine: Stand-by assistance; Additional time; Adaptive equipment;Bed Modified  Scooting: Contact guard assistance    Balance:  Sitting: Intact; Without support  Sitting - Static: Good (unsupported)  Sitting - Dynamic: Good (unsupported)    ADL Intervention:     Toileting  Bladder Hygiene: Independent (w/ use of urinal)    Therapeutic Exercises:   Exercise Sets Reps AROM AAROM PROM Self PROM Comments   Shoulder flex/ext 1 10 [x] [] [] []    Elbow flex/ext 1 10 [x] [] [] []    Wrist flex/ext 1 10 [x] R UE [] [] []    Hand flex/ ext 1 10 [x] R UE [] [] []        Pain:  8/10    Activity Tolerance:   Fair and requires rest breaks    After treatment patient left in no apparent distress:   Supine in bed, Call bell within reach, Bed / chair alarm activated, and Side rails x 3, bed locked and in lowest position    COMMUNICATION/COLLABORATION:   The patients plan of care was discussed with: Physical therapy assistant and Registered nurse. PT/OT sessions occurred together for increased safety of pt and clinician.      KADEN Kelley  Time Calculation: 38 mins     Problem: Self Care Deficits Care Plan (Adult)  Goal: *Acute Goals and Plan of Care (Insert Text)  Description:   FUNCTIONAL STATUS PRIOR TO ADMISSION: Patient was modified independent using a peyton walker for functional mobility. Patient was modified independent for basic and instrumental ADLs. HOME SUPPORT: Patient most recently resided at [facility] since October for rehab services. Prior to [facility] pt was residing with his wife at home. Occupational Therapy Goals  Initiated 12/28/2022  Patient Goal: Walk and get to the bathroom. 1.  Patient will perform lower body dressing with modified independence within 7 day(s). 2.  Patient will perform grooming with modified independence within 7 day(s). 3.  Patient will perform bathing with modified independence within 7 day(s). 4.  Patient will perform toilet transfers with modified independence within 7 day(s). 5.  Patient will perform all aspects of toileting with modified independence within 7 day(s). 6.  Patient will participate in upper extremity therapeutic exercise/activities with modified independence within 7 day(s).     Outcome: Progressing Towards Goal

## 2023-01-17 NOTE — PROGRESS NOTES
Problem: Mobility Impaired (Adult and Pediatric)  Goal: *Acute Goals and Plan of Care (Insert Text)  Description: FUNCTIONAL STATUS PRIOR TO ADMISSION: Patient was modified independent using a peyton walker for functional mobility. Patient required setup assistance for basic and instrumental ADLs. HOME SUPPORT PRIOR TO ADMISSION: Pt at short term rehab at [place] since October. Prior to admission in October pt was living with his wife. Physical Therapy Goals  Initiated 12/28/2022  Pt stated goal: to get better, would eventually like to use a prosthesis on LLE. Pt will be I with LE HEP in 7 days. Pt will perform bed mobility with mod I in 7 days. Pt will perform transfers with mod I in 7 days. Pt will amb 5-10 feet with LRAD safely with min Ax1 in 7 days. Outcome: Progressing Towards Goal   PHYSICAL THERAPY TREATMENT  Patient: Dina Tariq (13 y.o. male)  Date: 1/17/2023  Diagnosis: Wet gangrene ([address] Amy Murray <principal problem not specified>  Procedure(s) (LRB):  LEFT AMPUTATION KNEE(AKA) (Left) 24 Days Post-Op  Precautions:    Chart, physical therapy assessment, plan of care and goals were reviewed. ASSESSMENT  Patient continues with skilled PT services and is slowly progressing towards goals. Pt found semi supine upon PTA/DURHAM arrival, agreeable to session. (See below for objective details and assist levels). Overall pt tolerated session fair today with bed mobility, limited by activity tolerance and endurance. Pt demo's bed mobility with SBA, additional time and HOB elevated ~90 degrees. Pt received extensive education regarding decreasing height of bed to promote mobility, needs reinforcement. Pt unable to tolerate to transfer to recliner due to BP, at rest 154/81, seated /79 with increased time sitting 119/76 pt repositioned to supine, end of session 132/71. Reviewed HEP and educated on increase in reps, demo'd verbal understanding.  Will continue to benefit from skilled PT services, and will continue to progress as tolerated. Current Level of Function Impacting Discharge (mobility/balance): medical     Other factors to consider for discharge: PLOF and amount of A needed for mobility          PLAN :  Patient continues to benefit from skilled intervention to address the above impairments. Continue treatment per established plan of care to address goals. Recommend with staff: Out of bed to chair for meals and Encourage HEP in prep for ADLs/mobility    Recommendation for discharge: (in order for the patient to meet his/her long term goals)  [facility]     This discharge recommendation:  Has been made in collaboration with the attending provider and/or case management    IF patient discharges home will need the following DME: to be determined (TBD)       SUBJECTIVE:   Patient stated Yall aren't spinning any more.     OBJECTIVE DATA SUMMARY:   Critical Behavior:  Neurologic State: Alert, Confused  Orientation Level: Oriented X4  Cognition: Follows commands, Impaired decision making  Safety/Judgement: Awareness of environment, Insight into deficits  Functional Mobility Training:  Bed Mobility:  Rolling: Stand-by assistance; Additional time;Bed Modified; Adaptive equipment  Supine to Sit: Stand-by assistance; Additional time;Bed Modified  Sit to Supine: Stand-by assistance; Additional time; Adaptive equipment;Bed Modified  Scooting: Contact guard assistance  Transfers:  Balance:  Sitting: Intact; Without support  Sitting - Static: Good (unsupported)  Sitting - Dynamic: Good (unsupported)  Ambulation/Gait Training:    Therapeutic Exercises:       EXERCISE   Sets   Reps   Active Active Assist   Passive Self ROM   Comments   Ankle Pumps   [] [] [] []    Quad Sets/Glut Sets   [] [] [] []    Hamstring Sets   [] [] [] []    Short Arc Quads 1 10 [x] [] [] []    Heel Slides 1 10 [x] [] [] []    Straight Leg Raises 1 10 [x] [] [] []    Hip abd/add 1 10 [x] [] [] []    Long Arc Quads   [] [] [] []    Marching   [] [] [] []       [] [] [] []       Pain Ratin/10 LLE    Activity Tolerance:   Fair and signs and symptoms of orthostatic hypotension    After treatment patient left in no apparent distress:   Bed locked and returned to lowest position, Supine in bed and Call bell within reach    COMMUNICATION/COLLABORATION:   The patients plan of care was discussed with: Occupational therapy assistant and Registered nurse.      PTA/DURHAM cotreat to maximize pt and clinician safety     Kika Mtz, PTA, PT   Time Calculation: 38 mins

## 2023-01-17 NOTE — PROGRESS NOTES
Problem: Falls - Risk of  Goal: *Absence of Falls  Description: Document Dylan Gaston Fall Risk and appropriate interventions in the flowsheet. Outcome: Progressing Towards Goal  Note: Fall Risk Interventions:  Mobility Interventions: PT Consult for assist device competence         Medication Interventions: Patient to call before getting OOB    Elimination Interventions: Call light in reach, Bed/chair exit alarm              Problem: Patient Education: Go to Patient Education Activity  Goal: Patient/Family Education  Outcome: Progressing Towards Goal     Problem: Pressure Injury - Risk of  Goal: *Prevention of pressure injury  Description: Document Cruzito Scale and appropriate interventions in the flowsheet. Outcome: Progressing Towards Goal  Note: Pressure Injury Interventions:  Sensory Interventions: Minimize linen layers, Discuss PT/OT consult with provider    Moisture Interventions: Absorbent underpads    Activity Interventions: Increase time out of bed    Mobility Interventions: HOB 30 degrees or less, Float heels    Nutrition Interventions: Document food/fluid/supplement intake    Friction and Shear Interventions: Minimize layers, HOB 30 degrees or less                Problem: Patient Education: Go to Patient Education Activity  Goal: Patient/Family Education  Outcome: Progressing Towards Goal     Problem: Risk for Spread of Infection  Goal: Prevent transmission of infectious organism to others  Description: Prevent the transmission of infectious organisms to other patients, staff members, and visitors.   Outcome: Progressing Towards Goal     Problem: Patient Education:  Go to Education Activity  Goal: Patient/Family Education  Outcome: Progressing Towards Goal     Problem: Patient Education: Go to Patient Education Activity  Goal: Patient/Family Education  Outcome: Progressing Towards Goal     Problem: Patient Education: Go to Patient Education Activity  Goal: Patient/Family Education  Outcome: Progressing Towards Goal     Problem: Pain  Goal: *Control of Pain  Outcome: Progressing Towards Goal     Problem: Patient Education: Go to Patient Education Activity  Goal: Patient/Family Education  Outcome: Progressing Towards Goal

## 2023-01-17 NOTE — PROGRESS NOTES
Called again to give report to [facility] and when transferred to nurse's station, I was sent to [place]. A-T Advancement Flap Text: The defect edges were debeveled with a #15 scalpel blade.  Given the location of the defect, shape of the defect and the proximity to free margins an A-T advancement flap was deemed most appropriate.  Using a sterile surgical marker, an appropriate advancement flap was drawn incorporating the defect and placing the expected incisions within the relaxed skin tension lines where possible.    The area thus outlined was incised deep to adipose tissue with a #15 scalpel blade.  The skin margins were undermined to an appropriate distance in all directions utilizing iris scissors.

## 2023-01-17 NOTE — PROGRESS NOTES
Problem: Falls - Risk of  Goal: *Absence of Falls  Description: Document Lenord Corners Fall Risk and appropriate interventions in the flowsheet.   Outcome: Progressing Towards Goal  Note: Fall Risk Interventions:  Mobility Interventions: PT Consult for assist device competence, Strengthening exercises (ROM-active/passive)         Medication Interventions: Patient to call before getting OOB    Elimination Interventions: Call light in reach, Patient to call for help with toileting needs

## 2023-01-17 NOTE — DISCHARGE SUMMARY
Discharge Summary     Patient: Maru Lara MRN: [id number]  SSN: [id number]    YOB: 1955  Age: 74 y.o. Sex: male       Admit Date: 12/22/2022    Discharge Date: 1/17/2023      Admission Diagnoses: Wet gangrene [facility]) Nicolasa Lorenzo    Discharge Diagnoses:   Problem List as of 1/17/2023 Date Reviewed: 12/27/2022            Codes Class Noted - Resolved    Wet gangrene ([facility] ICD-10-CM: H42  ICD-9-CM: 785.4  12/22/2022 - Present        Acute CHF (congestive heart failure) ([facility] ICD-10-CM: I50.9  ICD-9-CM: 428.0  4/6/2022 - Present        Dysphagia, unspecified type ICD-10-CM: R13.10  ICD-9-CM: 787.20  Unknown - Present        Fatigue, unspecified type ICD-10-CM: R53.83  ICD-9-CM: 780.79  Unknown - Present        Chronic systolic congestive heart failure ([facility] ICD-10-CM: I50.22  ICD-9-CM: 428.22, 428.0  2/16/2021 - Present        Goals of care, counseling/discussion ICD-10-CM: Z71.89  ICD-9-CM: V65.49  Unknown - Present        DNR (do not resuscitate) discussion ICD-10-CM: Z71.89  ICD-9-CM: V65.49  Unknown - Present        JUAN C (acute kidney injury) ([facility] ICD-10-CM: N17.9  ICD-9-CM: 514. 9  Unknown - Present        Acute hypoxemic respiratory failure due to COVID-19 [facility]) ICD-10-CM: U07.1, J96.01  ICD-9-CM: 518.81, 079.89, 799.02  1/30/2021 - Present        RESOLVED: Atypical chest pain ICD-10-CM: R07.89  ICD-9-CM: 786.59  4/6/2022 - 4/13/2022            Discharge Condition: Good    Hospital Course: 74years old patient with multiple medical issues including A. fib anemia arrhythmia CAD admitted for infected knee scheduled for surgery patient was seen by nephrology for dialysis management. He was placed on IV vancomycin Zosyn however with the amputation patient has been discontinued.   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 Qty: 30 Tablet, Refills: 0      epoetin luis-epbx (RETACRIT) 4,000 unit/mL injection 2 mL by SubCUTAneous route DIALYSIS MON, WED & FRI. Indications: anemia due to kidney failure  Qty: 12 Each, Refills: 0      heparin sodium,porcine (heparin, porcine,) 5,000 unit/mL injection 1 mL by SubCUTAneous route every eight (8) hours. Qty: 30 mL, Refills: 0      mupirocin (BACTROBAN) 2 % ointment Apply 1 g to affected area daily for 5 doses. Qty: 22 g, Refills: 0      naloxone (NARCAN) 1 mg/mL injection 1 mL by IntraVENous route as needed for Overdose or Respiratory Distress. Qty: 1 mL, Refills: 0      oxyCODONE IR (ROXICODONE) 5 mg immediate release tablet Take 1 Tablet by mouth every four (4) hours as needed for Pain for up to 3 days. Max Daily Amount: 30 mg.  Qty: 10 Tablet, Refills: 0    Associated Diagnoses: Pain      oxyCODONE ER (OxyCONTIN) 10 mg ER tablet Take 1 Tablet by mouth every twelve (12) hours for 30 days. Max Daily Amount: 20 mg.  Qty: 10 Tablet, Refills: 0    Associated Diagnoses: Pain      polyethylene glycol (MIRALAX) 17 gram packet Take 1 Packet by mouth daily. Qty: 30 Packet, Refills: 0           CONTINUE these medications which have CHANGED    Details   carvediloL (COREG) 25 mg tablet Take 1 Tablet by mouth two (2) times a day. Qty: 60 Tablet, Refills: 0           CONTINUE these medications which have NOT CHANGED    Details   ticagrelor (BRILINTA) 90 mg tablet Take 90 mg by mouth two (2) times a day. isosorbide dinitrate (ISORDIL) 20 mg tablet Take 20 mg by mouth three (3) times daily. lisinopriL (PRINIVIL, ZESTRIL) 5 mg tablet Take 5 mg by mouth daily. melatonin 5 mg cap capsule Take 5 mg by mouth nightly. atorvastatin (LIPITOR) 80 mg tablet Take 0.5 Tabs by mouth daily.   Qty: 30 Tab, Refills: 0      hydrALAZINE (APRESOLINE) 25 mg tablet Take 1 Tab by mouth three (3) times daily. Qty: 90 Tab, Refills: 0      aspirin 81 mg chewable tablet Take 81 mg by mouth daily. gabapentin (NEURONTIN) 100 mg capsule Take 1 Capsule by mouth three (3) times daily. Max Daily Amount: 300 mg. Qty: 15 Capsule, Refills: 0    Associated Diagnoses: Open wound of both lower extremities, initial encounter      calcium carbonate (TUMS) 200 mg calcium (500 mg) chew Take 1 Tablet by mouth in the morning. insulin glargine (Lantus U-100 Insulin) 100 unit/mL injection 10 Units by SubCUTAneous route nightly. Qty: 1 mL, Refills: 0      amLODIPine (NORVASC) 10 mg tablet Take 1 Tab by mouth daily.   Qty: 30 Tab, Refills: 0           STOP taking these medications       collagenase (SANTYL) 250 unit/gram ointment Comments:   Reason for Stopping:         acetaminophen (TYLENOL) 325 mg tablet Comments:   Reason for Stopping:               Activity: PT/OT Eval and Treat  Diet: Cardiac Diet and Renal Diet  Wound Care: As directed    Follow-up Appointments   Procedures    FOLLOW UP VISIT Appointment in: 3 - 5 Days     Standing Status:   Standing     Number of Occurrences:   1     Order Specific Question:   Appointment in     Answer:   3 - 5 Days     Waiting for bed  Signed By: Rosa Rosado MD     January 17, 2023

## 2023-02-03 ENCOUNTER — OFFICE VISIT (OUTPATIENT)
Dept: SURGERY | Age: 68
End: 2023-02-03
Payer: MEDICARE

## 2023-02-03 VITALS
TEMPERATURE: 96.7 F | HEART RATE: 81 BPM | DIASTOLIC BLOOD PRESSURE: 68 MMHG | SYSTOLIC BLOOD PRESSURE: 122 MMHG | OXYGEN SATURATION: 90 % | HEIGHT: 68 IN | BODY MASS INDEX: 19.21 KG/M2

## 2023-02-03 DIAGNOSIS — I96 WET GANGRENE (HCC): Primary | ICD-10-CM

## 2023-02-03 PROCEDURE — 99024 POSTOP FOLLOW-UP VISIT: CPT | Performed by: SURGERY

## 2023-02-03 NOTE — PROGRESS NOTES
Identified pt with two pt identifiers (name and ). Reviewed chart in preparation for visit and have obtained necessary documentation. Fernando Hines is a 74 y.o. male  Chief Complaint   Patient presents with    Post OP Follow Up     below knee amputation     Visit Vitals  /68 (BP 1 Location: Left upper arm, BP Patient Position: Sitting, BP Cuff Size: Large adult)   Pulse 81   Temp (!) 96.7 °F (35.9 °C) (Temporal)   Ht 5' 8\" (1.727 m)   SpO2 90%   BMI 19.21 kg/m²       1. Have you been to the ER, urgent care clinic since your last visit? Hospitalized since your last visit? No    2. Have you seen or consulted any other health care providers outside of the [address] [place] [address] since your last visit? Include any pap smears or colon screening.  No

## 2023-02-20 ENCOUNTER — OFFICE VISIT (OUTPATIENT)
Dept: SURGERY | Age: 68
End: 2023-02-20
Payer: MEDICARE

## 2023-02-20 VITALS
OXYGEN SATURATION: 92 % | SYSTOLIC BLOOD PRESSURE: 91 MMHG | HEIGHT: 68 IN | WEIGHT: 126 LBS | TEMPERATURE: 97.8 F | HEART RATE: 83 BPM | RESPIRATION RATE: 12 BRPM | DIASTOLIC BLOOD PRESSURE: 53 MMHG | BODY MASS INDEX: 19.1 KG/M2

## 2023-02-20 DIAGNOSIS — Z09 POSTOPERATIVE EXAMINATION: Primary | ICD-10-CM

## 2023-02-20 PROCEDURE — 99024 POSTOP FOLLOW-UP VISIT: CPT | Performed by: SURGERY

## 2023-02-20 NOTE — PROGRESS NOTES
Identified pt with two pt identifiers (name and ). Reviewed chart in preparation for visit and have obtained necessary documentation. Elvira Dickerson is a 74 y.o. male  Chief Complaint   Patient presents with    Post OP Follow Up     2w f/u-below knee amputation     Visit Vitals  BP (!) 91/53 (BP 1 Location: Right upper arm, BP Patient Position: Sitting, BP Cuff Size: Adult)   Pulse 83   Temp 97.8 °F (36.6 °C) (Temporal)   Resp 12   Ht 5' 8\" (1.727 m)   Wt 126 lb (57.2 kg) Comment: Last Known   SpO2 92%   BMI 19.16 kg/m²       1. Have you been to the ER, urgent care clinic since your last visit? Hospitalized since your last visit? No    2. Have you seen or consulted any other health care providers outside of the [address] [place] [address] since your last visit? Include any pap smears or colon screening.  No

## 2023-02-23 NOTE — PROGRESS NOTES
SUBJECTIVE:  Patient had a left AKA and is here today for follow-up. Patient doing well. Wounds are healed well. Past Medical History:  No date: A-fib ([facility]  No date: Anemia  No date: Arrhythmia  No date: CAD (coronary artery disease)  No date: Chronic kidney disease  No date: Diabetes [facility])      Comment:  DM2  No date: Dialysis patient [facility])      Comment:  M-W- Mastre Russell [phone]  No date: Heart failure ([facility]  No date: Hypertension  No date: STEMI (ST elevation myocardial infarction) ([facility]  Past Surgical History:  No date: HX AMPUTATION; Left      Comment:  5 fingers  2022: HX BELOW KNEE AMPUTATION; Left      Comment:  LEFT AMPUTATION KNEE RONN  No date: HX HEART CATHETERIZATION  2021: IR INSERT NON TUNL CVC OVER 5 YRS  2022: IR INSERT NON TUNL CVC OVER 5 YRS  2021: IR INSERT TUNL CVC W/O PORT OVER 5 YR  2022: IR INSERT TUNL CVC W/O PORT OVER 5 YR  No date: [place] UNLISTED PROCEDURE CARDIAC SURGERY  @[email or web address]@  Social History    Tobacco Use      Smoking status: Former        Packs/day: 0.50        Years: 15.00        Pack years: 7.5        Types: Cigarettes        Quit date:         Years since quittin.1      Smokeless tobacco: Never    Alcohol use: Not Currently    Prior to Admission medications :  Medication carvediloL (COREG) 25 mg tablet, Sig Take 1 Tablet by mouth two (2) times a day., Start Date 22, End Date , Taking? Yes, Authorizing Provider Leslie Cole MD    Medication amitriptyline (ELAVIL) 50 mg tablet, Sig Take 1 Tablet by mouth nightly., Start Date 22, End Date , Taking? Yes, Authorizing Provider Leslie Cole MD    Medication epoetin luis-epbx (RETACRIT) 4,000 unit/mL injection, Sig 2 mL by SubCUTAneous route DIALYSIS MON, WED & FRI. Indications: anemia due to kidney failure, Start Date 22, End Date , Taking?  Yes, Authorizing Provider Leslie Cole MD    Medication heparin sodium,porcine (heparin, porcine,) 5,000 unit/mL injection, Sig 1 mL by SubCUTAneous route every eight (8) hours. , Start Date 12/28/22, End Date , Taking? Yes, Authorizing Provider Michael Holland MD    Medication naloxone [facility]) 1 mg/mL injection, Sig 1 mL by IntraVENous route as needed for Overdose or Respiratory Distress. , Start Date 12/28/22, End Date , Taking? Yes, Authorizing Provider Michael Holland MD    Medication polyethylene glycol (MIRALAX) 17 gram packet, Sig Take 1 Packet by mouth daily. , Start Date 12/28/22, End Date , Taking? Yes, Authorizing Provider Michael Holland MD    Medication ticagrelor (BRILINTA) 90 mg tablet, Sig Take 90 mg by mouth two (2) times a day., Start Date , End Date , Taking? Yes, Authorizing Provider Jessi Gaona MD    Medication isosorbide dinitrate (ISORDIL) 20 mg tablet, Sig Take 20 mg by mouth three (3) times daily. , Start Date , End Date , Taking? Yes, Authorizing Provider Jessi Gaona MD    Medication lisinopriL (PRINIVIL, ZESTRIL) 5 mg tablet, Sig Take 5 mg by mouth daily. , Start Date , End Date , Taking? Yes, Authorizing Provider Jessi Gaona MD    Medication melatonin 5 mg cap capsule, Sig Take 5 mg by mouth nightly., Start Date , End Date , Taking? Yes, Authorizing Provider Jessi Gaona MD    Medication gabapentin (NEURONTIN) 100 mg capsule, Sig Take 1 Capsule by mouth three (3) times daily. Max Daily Amount: 300 mg., Start Date 8/21/22, End Date , Taking? Yes, Authorizing Provider Keyla Booth MD    Medication calcium carbonate (TUMS) 200 mg calcium (500 mg) chew, Sig Take 1 Tablet by mouth in the morning., Start Date , End Date , Taking? Yes, Authorizing Provider Nakia Farley    Medication insulin glargine (Lantus U-100 Insulin) 100 unit/mL injection, Sig 10 Units by SubCUTAneous route nightly., Start Date 4/13/22, End Date , Taking? Yes, Authorizing Provider Tony Sherman MD    Medication atorvastatin (LIPITOR) 80 mg tablet, Sig Take 0.5 Tabs by mouth daily. , Start Date 3/4/21, End Date , Taking? Yes, Authorizing Provider Anayeli Dawn NP    Medication amLODIPine (NORVASC) 10 mg tablet, Sig Take 1 Tab by mouth daily. , Start Date 3/5/21, End Date , Taking? Yes, Authorizing Provider Anayeli Dawn NP    Medication hydrALAZINE (APRESOLINE) 25 mg tablet, Sig Take 1 Tab by mouth three (3) times daily. , Start Date 3/4/21, End Date , Taking? Yes, Authorizing Provider Anayeli Dawn NP    Medication aspirin 81 mg chewable tablet, Sig Take 81 mg by mouth daily. , Start Date , End Date , Taking? Yes, Authorizing Provider Other, MD Jessi      No Known Allergies      OBJECTIVE:  /68 (BP 1 Location: Left upper arm, BP Patient Position: Sitting, BP Cuff Size: Large adult)   Pulse 81   Temp (!) 96.7 °F (35.9 °C) (Temporal)   Ht 5' 8\" (1.727 m)   SpO2 90%   BMI 19.21 kg/m²     PT IS PLEASANT AND AWAKE AND ALERT. WOUND EXAM:  Wounds are very clean and dry. ASSESSMENT:  Problem List Items Addressed This Visit        Other    Wet gangrene ([facility] - Primary       PLAN:  Patient is ready for stump  and the new prosthetic application on the left AKA. Patient also has a poor circulation on the right leg as well. Patient was advised right leg angiogram with interventions at some point. Patient will be reassessed in 2 weeks. I provided prescription for stump  and prosthetic leg application on the left AKA. Patient will be reassessed in 2 weeks and discuss right leg angiogram angioplasty.

## 2023-02-24 PROBLEM — Z09 POSTOPERATIVE EXAMINATION: Status: ACTIVE | Noted: 2023-02-24

## 2023-02-24 NOTE — PROGRESS NOTES
SUBJECTIVE:  Patient is here today left AKA. He is doing well. Wounds are currently closed. Past Medical History:  No date: A-fib ([facility]  No date: Anemia  No date: Arrhythmia  No date: CAD (coronary artery disease)  No date: Chronic kidney disease  No date: Diabetes [facility])      Comment:  DM2  No date: Dialysis patient [facility])      Comment:  M-WADRIENNE Russell [phone]  No date: Heart failure ([facility]  No date: Hypertension  No date: STEMI (ST elevation myocardial infarction) ([facility]  Past Surgical History:  No date: HX AMPUTATION; Left      Comment:  5 fingers  2022: HX BELOW KNEE AMPUTATION; Left      Comment:  LEFT AMPUTATION KNEE RONN  No date: HX HEART CATHETERIZATION  2021: IR INSERT NON TUNL CVC OVER 5 YRS  2022: IR INSERT NON TUNL CVC OVER 5 YRS  2021: IR INSERT TUNL CVC W/O PORT OVER 5 YR  2022: IR INSERT TUNL CVC W/O PORT OVER 5 YR  No date: [place] UNLISTED PROCEDURE CARDIAC SURGERY  @[facility]@  Social History    Tobacco Use      Smoking status: Former        Packs/day: 0.50        Years: 15.00        Pack years: 7.5        Types: Cigarettes        Quit date:         Years since quittin.1      Smokeless tobacco: Never    Alcohol use: Not Currently    Prior to Admission medications :  Medication carvediloL (COREG) 25 mg tablet, Sig Take 1 Tablet by mouth two (2) times a day., Start Date 22, End Date , Taking? , Authorizing Provider Jaylan Ennis MD    Medication amitriptyline (ELAVIL) 50 mg tablet, Sig Take 1 Tablet by mouth nightly., Start Date 22, End Date , Taking? , Authorizing Provider Jaylan Ennis MD    Medication epoetin luis-epbx (RETACRIT) 4,000 unit/mL injection, Sig 2 mL by SubCUTAneous route DIALYSIS MON, WED & FRI.  Indications: anemia due to kidney failure, Start Date 22, End Date , Taking? , Authorizing Provider Jaylan Ennis MD    Medication heparin sodium,porcine (heparin, porcine,) 5,000 unit/mL injection, Sig 1 mL by SubCUTAneous route every eight (8) hours. , Start Date 12/28/22, End Date , Taking? , Authorizing Provider Miracle Gaitan MD    Medication naloxone [facility]) 1 mg/mL injection, Sig 1 mL by IntraVENous route as needed for Overdose or Respiratory Distress. , Start Date 12/28/22, End Date , Taking? , Authorizing Provider Miracle Gaitan MD    Medication polyethylene glycol (MIRALAX) 17 gram packet, Sig Take 1 Packet by mouth daily. , Start Date 12/28/22, End Date , Taking? , Authorizing Provider Miracle Gaitan MD    Medication ticagrelor (BRILINTA) 90 mg tablet, Sig Take 90 mg by mouth two (2) times a day., Start Date , End Date , Taking? , Authorizing Provider Jessi Gaona MD    Medication isosorbide dinitrate (ISORDIL) 20 mg tablet, Sig Take 20 mg by mouth three (3) times daily. , Start Date , End Date , Taking? , Authorizing Provider Jessi Gaona MD    Medication lisinopriL (PRINIVIL, ZESTRIL) 5 mg tablet, Sig Take 5 mg by mouth daily. , Start Date , End Date , Taking? , Authorizing Provider Jessi Gaona MD    Medication melatonin 5 mg cap capsule, Sig Take 5 mg by mouth nightly., Start Date , End Date , Taking? , Authorizing Provider Jessi Gaona MD    Medication gabapentin (NEURONTIN) 100 mg capsule, Sig Take 1 Capsule by mouth three (3) times daily. Max Daily Amount: 300 mg., Start Date 8/21/22, End Date , Taking? , Authorizing Provider Jeanne Caro MD    Medication calcium carbonate (TUMS) 200 mg calcium (500 mg) chew, Sig Take 1 Tablet by mouth in the morning., Start Date , End Date , Taking? , Authorizing Provider Martine Historical    Medication insulin glargine (Lantus U-100 Insulin) 100 unit/mL injection, Sig 10 Units by SubCUTAneous route nightly., Start Date 4/13/22, End Date , Taking? , Authorizing Provider Tony Sherman MD    Medication atorvastatin (LIPITOR) 80 mg tablet, Sig Take 0.5 Tabs by mouth daily. , Start Date 3/4/21, End Date , Taking? , Authorizing Provider Kalin Boone NP    Medication amLODIPine (NORVASC) 10 mg tablet, Sig Take 1 Tab by mouth daily. , Start Date 3/5/21, End Date , Taking? , Authorizing Provider Dolly Han NP    Medication hydrALAZINE (APRESOLINE) 25 mg tablet, Sig Take 1 Tab by mouth three (3) times daily. , Start Date 3/4/21, End Date , Taking? , Authorizing Provider Dolly Han NP    Medication aspirin 81 mg chewable tablet, Sig Take 81 mg by mouth daily. , Start Date , End Date , Taking? , Authorizing Provider Other, MD Jessi      No Known Allergies      OBJECTIVE:  BP (!) 91/53 (BP 1 Location: Right upper arm, BP Patient Position: Sitting, BP Cuff Size: Adult)   Pulse 83   Temp 97.8 °F (36.6 °C) (Temporal)   Resp 12   Ht 5' 8\" (1.727 m)   Wt 126 lb (57.2 kg) Comment: Last Known  SpO2 92%   BMI 19.16 kg/m²     PT IS PLEASANT AND AWAKE AND ALERT. WOUND EXAM:  Wounds are closed. ASSESSMENT:  Problem List Items Addressed This Visit        Other    Postoperative examination - Primary       PLAN:  Patient was provided with a prescription for stump  and new prosthetic leg application for left AKA. Patient also has a severe PAD on the right leg. And we will schedule for this procedure later March 2023. Patient will be reassessed 3 to 4 weeks and discuss this procedure.

## 2023-03-24 ENCOUNTER — DOCUMENTATION ONLY (OUTPATIENT)
Dept: SURGERY | Age: 68
End: 2023-03-24

## 2023-03-26 PROBLEM — Z09 POSTOPERATIVE EXAMINATION: Status: RESOLVED | Noted: 2023-02-24 | Resolved: 2023-03-26

## 2023-05-17 ENCOUNTER — APPOINTMENT (OUTPATIENT)
Facility: HOSPITAL | Age: 68
DRG: 637 | End: 2023-05-17
Payer: MEDICARE

## 2023-05-17 ENCOUNTER — HOSPITAL ENCOUNTER (INPATIENT)
Facility: HOSPITAL | Age: 68
LOS: 5 days | Discharge: NURSING, REHAB OR LONG-TERM CARE | DRG: 637 | End: 2023-05-22
Attending: STUDENT IN AN ORGANIZED HEALTH CARE EDUCATION/TRAINING PROGRAM | Admitting: INTERNAL MEDICINE
Payer: MEDICARE

## 2023-05-17 DIAGNOSIS — A41.9 SEPTICEMIA (HCC): Primary | ICD-10-CM

## 2023-05-17 DIAGNOSIS — E16.2 HYPOGLYCEMIA: ICD-10-CM

## 2023-05-17 DIAGNOSIS — I96 GANGRENE (HCC): ICD-10-CM

## 2023-05-17 LAB
ALBUMIN SERPL-MCNC: 2.4 G/DL (ref 3.5–5)
ALBUMIN/GLOB SERPL: 0.6 (ref 1.1–2.2)
ALP SERPL-CCNC: 73 U/L (ref 45–117)
ALT SERPL-CCNC: 11 U/L (ref 12–78)
ANION GAP BLD CALC-SCNC: 13 (ref 10–20)
ANION GAP SERPL CALC-SCNC: 7 MMOL/L (ref 5–15)
APPEARANCE UR: CLEAR
AST SERPL-CCNC: 18 U/L (ref 15–37)
BACTERIA URNS QL MICRO: ABNORMAL /HPF
BASE EXCESS BLD CALC-SCNC: 1.5 MMOL/L
BASOPHILS # BLD: 0 K/UL (ref 0–0.1)
BASOPHILS NFR BLD: 0 % (ref 0–1)
BILIRUB SERPL-MCNC: 0.7 MG/DL (ref 0.2–1)
BILIRUB UR QL: NEGATIVE
BUN SERPL-MCNC: 56 MG/DL (ref 6–20)
BUN/CREAT SERPL: 12 (ref 12–20)
CA-I BLD-MCNC: 1.12 MMOL/L (ref 1.12–1.32)
CALCIUM SERPL-MCNC: 7.9 MG/DL (ref 8.5–10.1)
CHLORIDE BLD-SCNC: 102 MMOL/L (ref 100–108)
CHLORIDE SERPL-SCNC: 106 MMOL/L (ref 97–108)
CO2 BLD-SCNC: 27 MMOL/L (ref 19–24)
CO2 SERPL-SCNC: 26 MMOL/L (ref 21–32)
COLOR UR: ABNORMAL
COMMENT:: NORMAL
CREAT SERPL-MCNC: 4.69 MG/DL (ref 0.7–1.3)
CREAT UR-MCNC: 5.6 MG/DL (ref 0.6–1.3)
DIFFERENTIAL METHOD BLD: ABNORMAL
EKG ATRIAL RATE: 79 BPM
EKG DIAGNOSIS: NORMAL
EKG P AXIS: 84 DEGREES
EKG P-R INTERVAL: 180 MS
EKG Q-T INTERVAL: 402 MS
EKG QRS DURATION: 100 MS
EKG QTC CALCULATION (BAZETT): 460 MS
EKG R AXIS: 35 DEGREES
EKG T AXIS: 58 DEGREES
EKG VENTRICULAR RATE: 79 BPM
EOSINOPHIL # BLD: 0 K/UL (ref 0–0.4)
EOSINOPHIL NFR BLD: 0 % (ref 0–7)
EPITH CASTS URNS QL MICRO: ABNORMAL /LPF
ERYTHROCYTE [DISTWIDTH] IN BLOOD BY AUTOMATED COUNT: 15.9 % (ref 11.5–14.5)
GLOBULIN SER CALC-MCNC: 4.2 G/DL (ref 2–4)
GLUCOSE BLD STRIP.AUTO-MCNC: 100 MG/DL (ref 65–117)
GLUCOSE BLD STRIP.AUTO-MCNC: 105 MG/DL (ref 65–117)
GLUCOSE BLD STRIP.AUTO-MCNC: 110 MG/DL (ref 65–117)
GLUCOSE BLD STRIP.AUTO-MCNC: 120 MG/DL (ref 65–117)
GLUCOSE BLD STRIP.AUTO-MCNC: 121 MG/DL (ref 65–117)
GLUCOSE BLD STRIP.AUTO-MCNC: 131 MG/DL (ref 65–117)
GLUCOSE BLD STRIP.AUTO-MCNC: 134 MG/DL (ref 65–117)
GLUCOSE BLD STRIP.AUTO-MCNC: 25 MG/DL (ref 74–106)
GLUCOSE SERPL-MCNC: 36 MG/DL (ref 65–100)
GLUCOSE UR STRIP.AUTO-MCNC: NEGATIVE MG/DL
GRAN CASTS URNS QL MICRO: ABNORMAL /LPF
HCO3 BLDA-SCNC: 27 MMOL/L
HCT VFR BLD AUTO: 24.1 % (ref 36.6–50.3)
HGB BLD-MCNC: 7.4 G/DL (ref 12.1–17)
HGB UR QL STRIP: NEGATIVE
HYALINE CASTS URNS QL MICRO: ABNORMAL /LPF (ref 0–5)
IMM GRANULOCYTES # BLD AUTO: 0.1 K/UL (ref 0–0.04)
IMM GRANULOCYTES NFR BLD AUTO: 1 % (ref 0–0.5)
KETONES UR QL STRIP.AUTO: NEGATIVE MG/DL
LACTATE BLD-SCNC: 2.02 MMOL/L (ref 0.4–2)
LACTATE SERPL-SCNC: 0.7 MMOL/L (ref 0.4–2)
LEUKOCYTE ESTERASE UR QL STRIP.AUTO: NEGATIVE
LYMPHOCYTES # BLD: 1.1 K/UL (ref 0.8–3.5)
LYMPHOCYTES NFR BLD: 15 % (ref 12–49)
MCH RBC QN AUTO: 25.7 PG (ref 26–34)
MCHC RBC AUTO-ENTMCNC: 30.7 G/DL (ref 30–36.5)
MCV RBC AUTO: 83.7 FL (ref 80–99)
MONOCYTES # BLD: 0.3 K/UL (ref 0–1)
MONOCYTES NFR BLD: 4 % (ref 5–13)
NEUTS SEG # BLD: 6.2 K/UL (ref 1.8–8)
NEUTS SEG NFR BLD: 80 % (ref 32–75)
NITRITE UR QL STRIP.AUTO: NEGATIVE
NRBC # BLD: 0 K/UL (ref 0–0.01)
NRBC BLD-RTO: 0 PER 100 WBC
PCO2 BLDV: 48 MMHG (ref 41–51)
PH BLDV: 7.36 (ref 7.32–7.42)
PH UR STRIP: 5.5 (ref 5–8)
PLATELET # BLD AUTO: 339 K/UL (ref 150–400)
PMV BLD AUTO: 9.7 FL (ref 8.9–12.9)
PO2 BLDV: 28 MMHG (ref 25–40)
POTASSIUM BLD-SCNC: 3 MMOL/L (ref 3.5–5.5)
POTASSIUM SERPL-SCNC: 2.9 MMOL/L (ref 3.5–5.1)
PROCALCITONIN SERPL-MCNC: 1.69 NG/ML
PROCALCITONIN SERPL-MCNC: 2.26 NG/ML
PROT SERPL-MCNC: 6.6 G/DL (ref 6.4–8.2)
PROT UR STRIP-MCNC: 100 MG/DL
RBC # BLD AUTO: 2.88 M/UL (ref 4.1–5.7)
RBC #/AREA URNS HPF: ABNORMAL /HPF (ref 0–5)
SAO2 % BLD: 48 %
SERVICE CMNT-IMP: ABNORMAL
SERVICE CMNT-IMP: NORMAL
SODIUM BLD-SCNC: 142 MMOL/L (ref 136–145)
SODIUM SERPL-SCNC: 139 MMOL/L (ref 136–145)
SP GR UR REFRACTOMETRY: 1.02
SPECIMEN HOLD: NORMAL
SPECIMEN SITE: ABNORMAL
TROPONIN I SERPL HS-MCNC: 7 NG/L (ref 0–76)
URINE CULTURE IF INDICATED: ABNORMAL
UROBILINOGEN UR QL STRIP.AUTO: 0.2 EU/DL (ref 0.2–1)
WBC # BLD AUTO: 7.8 K/UL (ref 4.1–11.1)
WBC URNS QL MICRO: ABNORMAL /HPF (ref 0–4)

## 2023-05-17 PROCEDURE — 82947 ASSAY GLUCOSE BLOOD QUANT: CPT

## 2023-05-17 PROCEDURE — 2580000003 HC RX 258: Performed by: STUDENT IN AN ORGANIZED HEALTH CARE EDUCATION/TRAINING PROGRAM

## 2023-05-17 PROCEDURE — 36415 COLL VENOUS BLD VENIPUNCTURE: CPT

## 2023-05-17 PROCEDURE — 70450 CT HEAD/BRAIN W/O DYE: CPT

## 2023-05-17 PROCEDURE — 83605 ASSAY OF LACTIC ACID: CPT

## 2023-05-17 PROCEDURE — 84132 ASSAY OF SERUM POTASSIUM: CPT

## 2023-05-17 PROCEDURE — 96374 THER/PROPH/DIAG INJ IV PUSH: CPT

## 2023-05-17 PROCEDURE — 84484 ASSAY OF TROPONIN QUANT: CPT

## 2023-05-17 PROCEDURE — 82330 ASSAY OF CALCIUM: CPT

## 2023-05-17 PROCEDURE — 2500000003 HC RX 250 WO HCPCS: Performed by: STUDENT IN AN ORGANIZED HEALTH CARE EDUCATION/TRAINING PROGRAM

## 2023-05-17 PROCEDURE — 5A1D70Z PERFORMANCE OF URINARY FILTRATION, INTERMITTENT, LESS THAN 6 HOURS PER DAY: ICD-10-PCS | Performed by: INTERNAL MEDICINE

## 2023-05-17 PROCEDURE — 84145 PROCALCITONIN (PCT): CPT

## 2023-05-17 PROCEDURE — 82962 GLUCOSE BLOOD TEST: CPT

## 2023-05-17 PROCEDURE — 71045 X-RAY EXAM CHEST 1 VIEW: CPT

## 2023-05-17 PROCEDURE — 2580000003 HC RX 258: Performed by: INTERNAL MEDICINE

## 2023-05-17 PROCEDURE — 80053 COMPREHEN METABOLIC PANEL: CPT

## 2023-05-17 PROCEDURE — 84295 ASSAY OF SERUM SODIUM: CPT

## 2023-05-17 PROCEDURE — 6360000002 HC RX W HCPCS: Performed by: STUDENT IN AN ORGANIZED HEALTH CARE EDUCATION/TRAINING PROGRAM

## 2023-05-17 PROCEDURE — 82803 BLOOD GASES ANY COMBINATION: CPT

## 2023-05-17 PROCEDURE — 2060000000 HC ICU INTERMEDIATE R&B

## 2023-05-17 PROCEDURE — 99285 EMERGENCY DEPT VISIT HI MDM: CPT

## 2023-05-17 PROCEDURE — 2580000003 HC RX 258

## 2023-05-17 PROCEDURE — 6370000000 HC RX 637 (ALT 250 FOR IP)

## 2023-05-17 PROCEDURE — 87040 BLOOD CULTURE FOR BACTERIA: CPT

## 2023-05-17 PROCEDURE — 6370000000 HC RX 637 (ALT 250 FOR IP): Performed by: INTERNAL MEDICINE

## 2023-05-17 PROCEDURE — 93005 ELECTROCARDIOGRAM TRACING: CPT | Performed by: STUDENT IN AN ORGANIZED HEALTH CARE EDUCATION/TRAINING PROGRAM

## 2023-05-17 PROCEDURE — 51702 INSERT TEMP BLADDER CATH: CPT

## 2023-05-17 PROCEDURE — 81001 URINALYSIS AUTO W/SCOPE: CPT

## 2023-05-17 PROCEDURE — 85025 COMPLETE CBC W/AUTO DIFF WBC: CPT

## 2023-05-17 RX ORDER — DEXTROSE MONOHYDRATE 50 MG/ML
INJECTION, SOLUTION INTRAVENOUS
Status: COMPLETED
Start: 2023-05-17 | End: 2023-05-17

## 2023-05-17 RX ORDER — DEXTROSE MONOHYDRATE 50 MG/ML
INJECTION, SOLUTION INTRAVENOUS
Status: DISCONTINUED
Start: 2023-05-17 | End: 2023-05-17

## 2023-05-17 RX ORDER — DEXTROSE MONOHYDRATE 50 MG/ML
INJECTION, SOLUTION INTRAVENOUS
Status: DISPENSED
Start: 2023-05-17 | End: 2023-05-18

## 2023-05-17 RX ORDER — POTASSIUM CHLORIDE 7.45 MG/ML
10 INJECTION INTRAVENOUS
Status: DISPENSED | OUTPATIENT
Start: 2023-05-18 | End: 2023-05-18

## 2023-05-17 RX ORDER — ACETAMINOPHEN 325 MG/1
650 TABLET ORAL EVERY 4 HOURS PRN
Status: DISCONTINUED | OUTPATIENT
Start: 2023-05-17 | End: 2023-05-22 | Stop reason: HOSPADM

## 2023-05-17 RX ORDER — DEXTROSE MONOHYDRATE 100 MG/ML
INJECTION, SOLUTION INTRAVENOUS CONTINUOUS
Status: DISCONTINUED | OUTPATIENT
Start: 2023-05-17 | End: 2023-05-22 | Stop reason: HOSPADM

## 2023-05-17 RX ORDER — ATORVASTATIN CALCIUM 40 MG/1
40 TABLET, FILM COATED ORAL DAILY
Status: DISCONTINUED | OUTPATIENT
Start: 2023-05-17 | End: 2023-05-22 | Stop reason: HOSPADM

## 2023-05-17 RX ORDER — SODIUM CHLORIDE, SODIUM LACTATE, POTASSIUM CHLORIDE, AND CALCIUM CHLORIDE .6; .31; .03; .02 G/100ML; G/100ML; G/100ML; G/100ML
30 INJECTION, SOLUTION INTRAVENOUS ONCE
Status: COMPLETED | OUTPATIENT
Start: 2023-05-17 | End: 2023-05-17

## 2023-05-17 RX ORDER — ISOSORBIDE DINITRATE 20 MG/1
20 TABLET ORAL 3 TIMES DAILY
Status: DISCONTINUED | OUTPATIENT
Start: 2023-05-17 | End: 2023-05-22 | Stop reason: HOSPADM

## 2023-05-17 RX ORDER — DEXTROSE MONOHYDRATE 25 G/50ML
25 INJECTION, SOLUTION INTRAVENOUS PRN
Status: DISCONTINUED | OUTPATIENT
Start: 2023-05-17 | End: 2023-05-22 | Stop reason: HOSPADM

## 2023-05-17 RX ORDER — ASPIRIN 81 MG/1
81 TABLET, CHEWABLE ORAL DAILY
Status: DISCONTINUED | OUTPATIENT
Start: 2023-05-17 | End: 2023-05-22 | Stop reason: HOSPADM

## 2023-05-17 RX ADMIN — PIPERACILLIN AND TAZOBACTAM 3375 MG: 3; .375 INJECTION, POWDER, LYOPHILIZED, FOR SOLUTION INTRAVENOUS at 13:24

## 2023-05-17 RX ADMIN — ASPIRIN 81 MG: 81 TABLET, CHEWABLE ORAL at 20:15

## 2023-05-17 RX ADMIN — ATORVASTATIN CALCIUM 40 MG: 40 TABLET, FILM COATED ORAL at 20:16

## 2023-05-17 RX ADMIN — ACETAMINOPHEN 650 MG: 325 TABLET ORAL at 20:16

## 2023-05-17 RX ADMIN — DEXTROSE MONOHYDRATE 500 ML: 50 INJECTION, SOLUTION INTRAVENOUS at 13:12

## 2023-05-17 RX ADMIN — DEXTROSE MONOHYDRATE 25 G: 25 INJECTION, SOLUTION INTRAVENOUS at 13:10

## 2023-05-17 RX ADMIN — DEXTROSE MONOHYDRATE: 100 INJECTION, SOLUTION INTRAVENOUS at 23:44

## 2023-05-17 RX ADMIN — SODIUM CHLORIDE, POTASSIUM CHLORIDE, SODIUM LACTATE AND CALCIUM CHLORIDE 1569 ML: 600; 310; 30; 20 INJECTION, SOLUTION INTRAVENOUS at 13:23

## 2023-05-17 RX ADMIN — VANCOMYCIN HYDROCHLORIDE 1250 MG: 10 INJECTION, POWDER, LYOPHILIZED, FOR SOLUTION INTRAVENOUS at 13:29

## 2023-05-17 RX ADMIN — TICAGRELOR 90 MG: 90 TABLET ORAL at 20:16

## 2023-05-17 RX ADMIN — DEXTROSE MONOHYDRATE: 100 INJECTION, SOLUTION INTRAVENOUS at 13:22

## 2023-05-17 ASSESSMENT — LIFESTYLE VARIABLES
HOW OFTEN DO YOU HAVE A DRINK CONTAINING ALCOHOL: NEVER
HOW MANY STANDARD DRINKS CONTAINING ALCOHOL DO YOU HAVE ON A TYPICAL DAY: PATIENT DOES NOT DRINK

## 2023-05-18 LAB
ALBUMIN SERPL-MCNC: 2.1 G/DL (ref 3.5–5)
ALBUMIN/GLOB SERPL: 0.5 (ref 1.1–2.2)
ALP SERPL-CCNC: 59 U/L (ref 45–117)
ALT SERPL-CCNC: 9 U/L (ref 12–78)
ANION GAP SERPL CALC-SCNC: 8 MMOL/L (ref 5–15)
AST SERPL-CCNC: 11 U/L (ref 15–37)
BASOPHILS # BLD: 0 K/UL (ref 0–0.1)
BASOPHILS NFR BLD: 0 % (ref 0–1)
BILIRUB SERPL-MCNC: 0.3 MG/DL (ref 0.2–1)
BUN SERPL-MCNC: 49 MG/DL (ref 6–20)
BUN/CREAT SERPL: 12 (ref 12–20)
CALCIUM SERPL-MCNC: 7.7 MG/DL (ref 8.5–10.1)
CHLORIDE SERPL-SCNC: 101 MMOL/L (ref 97–108)
CO2 SERPL-SCNC: 23 MMOL/L (ref 21–32)
CORTIS AM PEAK SERPL-MCNC: 23.1 UG/DL (ref 4.3–22.45)
CREAT SERPL-MCNC: 3.93 MG/DL (ref 0.7–1.3)
DIFFERENTIAL METHOD BLD: ABNORMAL
EOSINOPHIL # BLD: 0.1 K/UL (ref 0–0.4)
EOSINOPHIL NFR BLD: 2 % (ref 0–7)
ERYTHROCYTE [DISTWIDTH] IN BLOOD BY AUTOMATED COUNT: 15.9 % (ref 11.5–14.5)
GLOBULIN SER CALC-MCNC: 4.1 G/DL (ref 2–4)
GLUCOSE BLD STRIP.AUTO-MCNC: 106 MG/DL (ref 65–117)
GLUCOSE BLD STRIP.AUTO-MCNC: 114 MG/DL (ref 65–117)
GLUCOSE BLD STRIP.AUTO-MCNC: 114 MG/DL (ref 65–117)
GLUCOSE BLD STRIP.AUTO-MCNC: 126 MG/DL (ref 65–117)
GLUCOSE BLD STRIP.AUTO-MCNC: 131 MG/DL (ref 65–117)
GLUCOSE BLD STRIP.AUTO-MCNC: 134 MG/DL (ref 65–117)
GLUCOSE BLD STRIP.AUTO-MCNC: 135 MG/DL (ref 65–117)
GLUCOSE BLD STRIP.AUTO-MCNC: 140 MG/DL (ref 65–117)
GLUCOSE BLD STRIP.AUTO-MCNC: 143 MG/DL (ref 65–117)
GLUCOSE BLD STRIP.AUTO-MCNC: 148 MG/DL (ref 65–117)
GLUCOSE BLD STRIP.AUTO-MCNC: 161 MG/DL (ref 65–117)
GLUCOSE BLD STRIP.AUTO-MCNC: 163 MG/DL (ref 65–117)
GLUCOSE BLD STRIP.AUTO-MCNC: 78 MG/DL (ref 65–117)
GLUCOSE BLD STRIP.AUTO-MCNC: NORMAL MG/DL (ref 65–117)
GLUCOSE SERPL-MCNC: 145 MG/DL (ref 65–100)
HCT VFR BLD AUTO: 23.1 % (ref 36.6–50.3)
HGB BLD-MCNC: 7.1 G/DL (ref 12.1–17)
IMM GRANULOCYTES # BLD AUTO: 0 K/UL (ref 0–0.04)
IMM GRANULOCYTES NFR BLD AUTO: 0 % (ref 0–0.5)
LYMPHOCYTES # BLD: 1.2 K/UL (ref 0.8–3.5)
LYMPHOCYTES NFR BLD: 27 % (ref 12–49)
MAGNESIUM SERPL-MCNC: 2 MG/DL (ref 1.6–2.4)
MCH RBC QN AUTO: 25.6 PG (ref 26–34)
MCHC RBC AUTO-ENTMCNC: 30.7 G/DL (ref 30–36.5)
MCV RBC AUTO: 83.4 FL (ref 80–99)
MONOCYTES # BLD: 0.4 K/UL (ref 0–1)
MONOCYTES NFR BLD: 9 % (ref 5–13)
NEUTS SEG # BLD: 2.8 K/UL (ref 1.8–8)
NEUTS SEG NFR BLD: 62 % (ref 32–75)
NRBC # BLD: 0 K/UL (ref 0–0.01)
NRBC BLD-RTO: 0 PER 100 WBC
PLATELET # BLD AUTO: 275 K/UL (ref 150–400)
PMV BLD AUTO: 9.5 FL (ref 8.9–12.9)
POTASSIUM SERPL-SCNC: 2.8 MMOL/L (ref 3.5–5.1)
PROT SERPL-MCNC: 6.2 G/DL (ref 6.4–8.2)
RBC # BLD AUTO: 2.77 M/UL (ref 4.1–5.7)
SERVICE CMNT-IMP: ABNORMAL
SERVICE CMNT-IMP: NORMAL
SODIUM SERPL-SCNC: 132 MMOL/L (ref 136–145)
VANCOMYCIN SERPL-MCNC: 15.6 UG/ML
WBC # BLD AUTO: 4.5 K/UL (ref 4.1–11.1)

## 2023-05-18 PROCEDURE — 6370000000 HC RX 637 (ALT 250 FOR IP): Performed by: INTERNAL MEDICINE

## 2023-05-18 PROCEDURE — 2580000003 HC RX 258: Performed by: INTERNAL MEDICINE

## 2023-05-18 PROCEDURE — 6360000002 HC RX W HCPCS: Performed by: NURSE PRACTITIONER

## 2023-05-18 PROCEDURE — 92610 EVALUATE SWALLOWING FUNCTION: CPT

## 2023-05-18 PROCEDURE — 6360000002 HC RX W HCPCS: Performed by: INTERNAL MEDICINE

## 2023-05-18 PROCEDURE — 85025 COMPLETE CBC W/AUTO DIFF WBC: CPT

## 2023-05-18 PROCEDURE — 83735 ASSAY OF MAGNESIUM: CPT

## 2023-05-18 PROCEDURE — 36415 COLL VENOUS BLD VENIPUNCTURE: CPT

## 2023-05-18 PROCEDURE — 80053 COMPREHEN METABOLIC PANEL: CPT

## 2023-05-18 PROCEDURE — 82962 GLUCOSE BLOOD TEST: CPT

## 2023-05-18 PROCEDURE — 80202 ASSAY OF VANCOMYCIN: CPT

## 2023-05-18 PROCEDURE — 90935 HEMODIALYSIS ONE EVALUATION: CPT

## 2023-05-18 PROCEDURE — 2060000000 HC ICU INTERMEDIATE R&B

## 2023-05-18 PROCEDURE — 82533 TOTAL CORTISOL: CPT

## 2023-05-18 RX ORDER — HEPARIN SODIUM 5000 [USP'U]/ML
5000 INJECTION, SOLUTION INTRAVENOUS; SUBCUTANEOUS EVERY 8 HOURS SCHEDULED
Status: DISCONTINUED | OUTPATIENT
Start: 2023-05-18 | End: 2023-05-22 | Stop reason: HOSPADM

## 2023-05-18 RX ORDER — HEPARIN SODIUM 1000 [USP'U]/ML
3600 INJECTION, SOLUTION INTRAVENOUS; SUBCUTANEOUS AS NEEDED
Status: DISCONTINUED | OUTPATIENT
Start: 2023-05-18 | End: 2023-05-22 | Stop reason: HOSPADM

## 2023-05-18 RX ORDER — CASTOR OIL AND BALSAM, PERU 788; 87 MG/G; MG/G
OINTMENT TOPICAL 2 TIMES DAILY
Status: DISCONTINUED | OUTPATIENT
Start: 2023-05-18 | End: 2023-05-22 | Stop reason: HOSPADM

## 2023-05-18 RX ORDER — ALBUMIN (HUMAN) 12.5 G/50ML
25 SOLUTION INTRAVENOUS
Status: ACTIVE | OUTPATIENT
Start: 2023-05-18 | End: 2023-05-19

## 2023-05-18 RX ADMIN — HEPARIN SODIUM 5000 UNITS: 5000 INJECTION INTRAVENOUS; SUBCUTANEOUS at 21:06

## 2023-05-18 RX ADMIN — ISOSORBIDE DINITRATE 20 MG: 20 TABLET ORAL at 16:20

## 2023-05-18 RX ADMIN — HEPARIN SODIUM 5000 UNITS: 5000 INJECTION INTRAVENOUS; SUBCUTANEOUS at 16:20

## 2023-05-18 RX ADMIN — ISOSORBIDE DINITRATE 20 MG: 20 TABLET ORAL at 21:07

## 2023-05-18 RX ADMIN — TICAGRELOR 90 MG: 90 TABLET ORAL at 21:06

## 2023-05-18 RX ADMIN — CASTOR OIL AND BALSAM, PERU: 788; 87 OINTMENT TOPICAL at 21:07

## 2023-05-18 RX ADMIN — POTASSIUM CHLORIDE 10 MEQ: 10 INJECTION, SOLUTION INTRAVENOUS at 03:56

## 2023-05-18 RX ADMIN — PIPERACILLIN AND TAZOBACTAM 3375 MG: 3; .375 INJECTION, POWDER, LYOPHILIZED, FOR SOLUTION INTRAVENOUS at 03:42

## 2023-05-18 RX ADMIN — PIPERACILLIN AND TAZOBACTAM 3375 MG: 3; .375 INJECTION, POWDER, LYOPHILIZED, FOR SOLUTION INTRAVENOUS at 11:56

## 2023-05-18 RX ADMIN — PIPERACILLIN AND TAZOBACTAM 3375 MG: 3; .375 INJECTION, POWDER, LYOPHILIZED, FOR SOLUTION INTRAVENOUS at 23:30

## 2023-05-18 RX ADMIN — DEXTROSE MONOHYDRATE: 100 INJECTION, SOLUTION INTRAVENOUS at 09:49

## 2023-05-18 RX ADMIN — CASTOR OIL AND BALSAM, PERU: 788; 87 OINTMENT TOPICAL at 16:29

## 2023-05-18 RX ADMIN — VANCOMYCIN HYDROCHLORIDE 500 MG: 500 INJECTION, POWDER, LYOPHILIZED, FOR SOLUTION INTRAVENOUS at 11:42

## 2023-05-18 ASSESSMENT — PAIN SCALES - GENERAL: PAINLEVEL_OUTOF10: 0

## 2023-05-19 LAB
ALBUMIN SERPL-MCNC: 2.2 G/DL (ref 3.5–5)
ANION GAP SERPL CALC-SCNC: 5 MMOL/L (ref 5–15)
BUN SERPL-MCNC: 22 MG/DL (ref 6–20)
BUN/CREAT SERPL: 9 (ref 12–20)
CALCIUM SERPL-MCNC: 7.4 MG/DL (ref 8.5–10.1)
CHLORIDE SERPL-SCNC: 104 MMOL/L (ref 97–108)
CO2 SERPL-SCNC: 27 MMOL/L (ref 21–32)
CREAT SERPL-MCNC: 2.4 MG/DL (ref 0.7–1.3)
GLUCOSE BLD STRIP.AUTO-MCNC: 105 MG/DL (ref 65–117)
GLUCOSE BLD STRIP.AUTO-MCNC: 83 MG/DL (ref 65–117)
GLUCOSE BLD STRIP.AUTO-MCNC: 92 MG/DL (ref 65–117)
GLUCOSE SERPL-MCNC: 101 MG/DL (ref 65–100)
PHOSPHATE SERPL-MCNC: 1.8 MG/DL (ref 2.6–4.7)
POTASSIUM SERPL-SCNC: 3 MMOL/L (ref 3.5–5.1)
SERVICE CMNT-IMP: NORMAL
SODIUM SERPL-SCNC: 136 MMOL/L (ref 136–145)
VANCOMYCIN SERPL-MCNC: 15.3 UG/ML

## 2023-05-19 PROCEDURE — 2580000003 HC RX 258: Performed by: INTERNAL MEDICINE

## 2023-05-19 PROCEDURE — 90935 HEMODIALYSIS ONE EVALUATION: CPT

## 2023-05-19 PROCEDURE — 82962 GLUCOSE BLOOD TEST: CPT

## 2023-05-19 PROCEDURE — 6360000002 HC RX W HCPCS: Performed by: INTERNAL MEDICINE

## 2023-05-19 PROCEDURE — 80202 ASSAY OF VANCOMYCIN: CPT

## 2023-05-19 PROCEDURE — 80069 RENAL FUNCTION PANEL: CPT

## 2023-05-19 PROCEDURE — 6370000000 HC RX 637 (ALT 250 FOR IP): Performed by: INTERNAL MEDICINE

## 2023-05-19 PROCEDURE — 6360000002 HC RX W HCPCS: Performed by: NURSE PRACTITIONER

## 2023-05-19 PROCEDURE — 36415 COLL VENOUS BLD VENIPUNCTURE: CPT

## 2023-05-19 PROCEDURE — 2060000000 HC ICU INTERMEDIATE R&B

## 2023-05-19 RX ADMIN — DEXTROSE MONOHYDRATE: 100 INJECTION, SOLUTION INTRAVENOUS at 21:16

## 2023-05-19 RX ADMIN — HEPARIN SODIUM 5000 UNITS: 5000 INJECTION INTRAVENOUS; SUBCUTANEOUS at 13:07

## 2023-05-19 RX ADMIN — CASTOR OIL AND BALSAM, PERU: 788; 87 OINTMENT TOPICAL at 21:20

## 2023-05-19 RX ADMIN — CASTOR OIL AND BALSAM, PERU: 788; 87 OINTMENT TOPICAL at 13:06

## 2023-05-19 RX ADMIN — HEPARIN SODIUM 5000 UNITS: 5000 INJECTION INTRAVENOUS; SUBCUTANEOUS at 06:13

## 2023-05-19 RX ADMIN — HEPARIN SODIUM 3600 UNITS: 1000 INJECTION INTRAVENOUS; SUBCUTANEOUS at 12:31

## 2023-05-19 RX ADMIN — TICAGRELOR 90 MG: 90 TABLET ORAL at 13:05

## 2023-05-19 RX ADMIN — PIPERACILLIN AND TAZOBACTAM 3375 MG: 3; .375 INJECTION, POWDER, LYOPHILIZED, FOR SOLUTION INTRAVENOUS at 23:37

## 2023-05-19 RX ADMIN — ASPIRIN 81 MG: 81 TABLET, CHEWABLE ORAL at 13:05

## 2023-05-19 RX ADMIN — ISOSORBIDE DINITRATE 20 MG: 20 TABLET ORAL at 13:11

## 2023-05-19 RX ADMIN — ATORVASTATIN CALCIUM 40 MG: 40 TABLET, FILM COATED ORAL at 13:05

## 2023-05-19 RX ADMIN — EPOETIN ALFA-EPBX 20000 UNITS: 20000 INJECTION, SOLUTION INTRAVENOUS; SUBCUTANEOUS at 14:24

## 2023-05-19 RX ADMIN — ISOSORBIDE DINITRATE 20 MG: 20 TABLET ORAL at 21:15

## 2023-05-19 RX ADMIN — VANCOMYCIN HYDROCHLORIDE 500 MG: 500 INJECTION, POWDER, LYOPHILIZED, FOR SOLUTION INTRAVENOUS at 14:19

## 2023-05-19 RX ADMIN — TICAGRELOR 90 MG: 90 TABLET ORAL at 21:15

## 2023-05-19 RX ADMIN — PIPERACILLIN AND TAZOBACTAM 3375 MG: 3; .375 INJECTION, POWDER, LYOPHILIZED, FOR SOLUTION INTRAVENOUS at 13:05

## 2023-05-19 ASSESSMENT — PAIN SCALES - GENERAL
PAINLEVEL_OUTOF10: 0

## 2023-05-20 LAB
ANION GAP SERPL CALC-SCNC: 5 MMOL/L (ref 5–15)
BASOPHILS # BLD: 0 K/UL (ref 0–0.1)
BASOPHILS NFR BLD: 1 % (ref 0–1)
BUN SERPL-MCNC: 15 MG/DL (ref 6–20)
BUN/CREAT SERPL: 7 (ref 12–20)
CALCIUM SERPL-MCNC: 7.4 MG/DL (ref 8.5–10.1)
CHLORIDE SERPL-SCNC: 106 MMOL/L (ref 97–108)
CO2 SERPL-SCNC: 26 MMOL/L (ref 21–32)
CREAT SERPL-MCNC: 2.27 MG/DL (ref 0.7–1.3)
DIFFERENTIAL METHOD BLD: ABNORMAL
EOSINOPHIL # BLD: 0.1 K/UL (ref 0–0.4)
EOSINOPHIL NFR BLD: 1 % (ref 0–7)
ERYTHROCYTE [DISTWIDTH] IN BLOOD BY AUTOMATED COUNT: 15.9 % (ref 11.5–14.5)
EST. AVERAGE GLUCOSE BLD GHB EST-MCNC: 103 MG/DL
GLUCOSE BLD STRIP.AUTO-MCNC: 139 MG/DL (ref 65–117)
GLUCOSE BLD STRIP.AUTO-MCNC: 153 MG/DL (ref 65–117)
GLUCOSE BLD STRIP.AUTO-MCNC: 165 MG/DL (ref 65–117)
GLUCOSE BLD STRIP.AUTO-MCNC: 173 MG/DL (ref 65–117)
GLUCOSE BLD STRIP.AUTO-MCNC: 76 MG/DL (ref 65–117)
GLUCOSE BLD STRIP.AUTO-MCNC: 94 MG/DL (ref 65–117)
GLUCOSE SERPL-MCNC: 148 MG/DL (ref 65–100)
HBA1C MFR BLD: 5.2 % (ref 4–5.6)
HCT VFR BLD AUTO: 24.1 % (ref 36.6–50.3)
HGB BLD-MCNC: 7.2 G/DL (ref 12.1–17)
IMM GRANULOCYTES # BLD AUTO: 0 K/UL (ref 0–0.04)
IMM GRANULOCYTES NFR BLD AUTO: 1 % (ref 0–0.5)
LYMPHOCYTES # BLD: 1.5 K/UL (ref 0.8–3.5)
LYMPHOCYTES NFR BLD: 36 % (ref 12–49)
MCH RBC QN AUTO: 25.4 PG (ref 26–34)
MCHC RBC AUTO-ENTMCNC: 29.9 G/DL (ref 30–36.5)
MCV RBC AUTO: 84.9 FL (ref 80–99)
MONOCYTES # BLD: 0.4 K/UL (ref 0–1)
MONOCYTES NFR BLD: 9 % (ref 5–13)
NEUTS SEG # BLD: 2.2 K/UL (ref 1.8–8)
NEUTS SEG NFR BLD: 52 % (ref 32–75)
NRBC # BLD: 0 K/UL (ref 0–0.01)
NRBC BLD-RTO: 0 PER 100 WBC
PLATELET # BLD AUTO: 260 K/UL (ref 150–400)
PMV BLD AUTO: 9.9 FL (ref 8.9–12.9)
POTASSIUM SERPL-SCNC: 2.9 MMOL/L (ref 3.5–5.1)
PROCALCITONIN SERPL-MCNC: 6.46 NG/ML
RBC # BLD AUTO: 2.84 M/UL (ref 4.1–5.7)
SERVICE CMNT-IMP: ABNORMAL
SERVICE CMNT-IMP: NORMAL
SERVICE CMNT-IMP: NORMAL
SODIUM SERPL-SCNC: 137 MMOL/L (ref 136–145)
WBC # BLD AUTO: 4.1 K/UL (ref 4.1–11.1)

## 2023-05-20 PROCEDURE — 85025 COMPLETE CBC W/AUTO DIFF WBC: CPT

## 2023-05-20 PROCEDURE — 2580000003 HC RX 258: Performed by: INTERNAL MEDICINE

## 2023-05-20 PROCEDURE — 82962 GLUCOSE BLOOD TEST: CPT

## 2023-05-20 PROCEDURE — 84145 PROCALCITONIN (PCT): CPT

## 2023-05-20 PROCEDURE — 2060000000 HC ICU INTERMEDIATE R&B

## 2023-05-20 PROCEDURE — 36415 COLL VENOUS BLD VENIPUNCTURE: CPT

## 2023-05-20 PROCEDURE — 80048 BASIC METABOLIC PNL TOTAL CA: CPT

## 2023-05-20 PROCEDURE — 83036 HEMOGLOBIN GLYCOSYLATED A1C: CPT

## 2023-05-20 PROCEDURE — 6370000000 HC RX 637 (ALT 250 FOR IP): Performed by: INTERNAL MEDICINE

## 2023-05-20 PROCEDURE — 51798 US URINE CAPACITY MEASURE: CPT

## 2023-05-20 PROCEDURE — 6360000002 HC RX W HCPCS: Performed by: INTERNAL MEDICINE

## 2023-05-20 RX ORDER — CARVEDILOL 12.5 MG/1
12.5 TABLET ORAL 2 TIMES DAILY
Status: DISCONTINUED | OUTPATIENT
Start: 2023-05-21 | End: 2023-05-22 | Stop reason: HOSPADM

## 2023-05-20 RX ORDER — POTASSIUM CHLORIDE 750 MG/1
40 TABLET, FILM COATED, EXTENDED RELEASE ORAL ONCE
Status: COMPLETED | OUTPATIENT
Start: 2023-05-20 | End: 2023-05-20

## 2023-05-20 RX ADMIN — TICAGRELOR 90 MG: 90 TABLET ORAL at 08:47

## 2023-05-20 RX ADMIN — CASTOR OIL AND BALSAM, PERU: 788; 87 OINTMENT TOPICAL at 08:52

## 2023-05-20 RX ADMIN — ATORVASTATIN CALCIUM 40 MG: 40 TABLET, FILM COATED ORAL at 08:50

## 2023-05-20 RX ADMIN — DEXTROSE MONOHYDRATE: 100 INJECTION, SOLUTION INTRAVENOUS at 21:34

## 2023-05-20 RX ADMIN — HEPARIN SODIUM 5000 UNITS: 5000 INJECTION INTRAVENOUS; SUBCUTANEOUS at 13:12

## 2023-05-20 RX ADMIN — ISOSORBIDE DINITRATE 20 MG: 20 TABLET ORAL at 13:12

## 2023-05-20 RX ADMIN — CASTOR OIL AND BALSAM, PERU: 788; 87 OINTMENT TOPICAL at 21:28

## 2023-05-20 RX ADMIN — TICAGRELOR 90 MG: 90 TABLET ORAL at 21:28

## 2023-05-20 RX ADMIN — POTASSIUM CHLORIDE 40 MEQ: 750 TABLET, FILM COATED, EXTENDED RELEASE ORAL at 13:12

## 2023-05-20 RX ADMIN — HEPARIN SODIUM 5000 UNITS: 5000 INJECTION INTRAVENOUS; SUBCUTANEOUS at 21:31

## 2023-05-20 RX ADMIN — DEXTROSE MONOHYDRATE: 100 INJECTION, SOLUTION INTRAVENOUS at 10:53

## 2023-05-20 RX ADMIN — HEPARIN SODIUM 5000 UNITS: 5000 INJECTION INTRAVENOUS; SUBCUTANEOUS at 05:57

## 2023-05-20 RX ADMIN — PIPERACILLIN AND TAZOBACTAM 3375 MG: 3; .375 INJECTION, POWDER, LYOPHILIZED, FOR SOLUTION INTRAVENOUS at 12:08

## 2023-05-20 RX ADMIN — PIPERACILLIN AND TAZOBACTAM 3375 MG: 3; .375 INJECTION, POWDER, LYOPHILIZED, FOR SOLUTION INTRAVENOUS at 23:31

## 2023-05-20 RX ADMIN — ISOSORBIDE DINITRATE 20 MG: 20 TABLET ORAL at 21:28

## 2023-05-20 RX ADMIN — ISOSORBIDE DINITRATE 20 MG: 20 TABLET ORAL at 08:50

## 2023-05-20 RX ADMIN — ASPIRIN 81 MG: 81 TABLET, CHEWABLE ORAL at 08:47

## 2023-05-21 LAB
ABO + RH BLD: NORMAL
ANION GAP SERPL CALC-SCNC: 8 MMOL/L (ref 5–15)
BASOPHILS # BLD: 0 K/UL (ref 0–0.1)
BASOPHILS NFR BLD: 0 % (ref 0–1)
BLOOD GROUP ANTIBODIES SERPL: NORMAL
BUN SERPL-MCNC: 18 MG/DL (ref 6–20)
BUN/CREAT SERPL: 7 (ref 12–20)
CALCIUM SERPL-MCNC: 7.8 MG/DL (ref 8.5–10.1)
CHLORIDE SERPL-SCNC: 97 MMOL/L (ref 97–108)
CO2 SERPL-SCNC: 24 MMOL/L (ref 21–32)
CREAT SERPL-MCNC: 2.59 MG/DL (ref 0.7–1.3)
DIFFERENTIAL METHOD BLD: ABNORMAL
EOSINOPHIL # BLD: 0.1 K/UL (ref 0–0.4)
EOSINOPHIL NFR BLD: 1 % (ref 0–7)
ERYTHROCYTE [DISTWIDTH] IN BLOOD BY AUTOMATED COUNT: 15.7 % (ref 11.5–14.5)
FERRITIN SERPL-MCNC: 565 NG/ML (ref 26–388)
GLUCOSE BLD STRIP.AUTO-MCNC: 115 MG/DL (ref 65–117)
GLUCOSE BLD STRIP.AUTO-MCNC: 116 MG/DL (ref 65–117)
GLUCOSE BLD STRIP.AUTO-MCNC: 118 MG/DL (ref 65–117)
GLUCOSE BLD STRIP.AUTO-MCNC: 119 MG/DL (ref 65–117)
GLUCOSE BLD STRIP.AUTO-MCNC: 122 MG/DL (ref 65–117)
GLUCOSE BLD STRIP.AUTO-MCNC: 125 MG/DL (ref 65–117)
GLUCOSE BLD STRIP.AUTO-MCNC: 130 MG/DL (ref 65–117)
GLUCOSE BLD STRIP.AUTO-MCNC: 133 MG/DL (ref 65–117)
GLUCOSE BLD STRIP.AUTO-MCNC: 136 MG/DL (ref 65–117)
GLUCOSE BLD STRIP.AUTO-MCNC: 136 MG/DL (ref 65–117)
GLUCOSE SERPL-MCNC: 134 MG/DL (ref 65–100)
HCT VFR BLD AUTO: 24.7 % (ref 36.6–50.3)
HGB BLD-MCNC: 7.5 G/DL (ref 12.1–17)
IMM GRANULOCYTES # BLD AUTO: 0 K/UL (ref 0–0.04)
IMM GRANULOCYTES NFR BLD AUTO: 1 % (ref 0–0.5)
IRON SATN MFR SERPL: 34 % (ref 20–50)
IRON SERPL-MCNC: 56 UG/DL (ref 35–150)
LYMPHOCYTES # BLD: 1.6 K/UL (ref 0.8–3.5)
LYMPHOCYTES NFR BLD: 35 % (ref 12–49)
MCH RBC QN AUTO: 25.3 PG (ref 26–34)
MCHC RBC AUTO-ENTMCNC: 30.4 G/DL (ref 30–36.5)
MCV RBC AUTO: 83.4 FL (ref 80–99)
MONOCYTES # BLD: 0.3 K/UL (ref 0–1)
MONOCYTES NFR BLD: 7 % (ref 5–13)
NEUTS SEG # BLD: 2.6 K/UL (ref 1.8–8)
NEUTS SEG NFR BLD: 56 % (ref 32–75)
NRBC # BLD: 0 K/UL (ref 0–0.01)
NRBC BLD-RTO: 0 PER 100 WBC
PLATELET # BLD AUTO: 287 K/UL (ref 150–400)
PMV BLD AUTO: 9.8 FL (ref 8.9–12.9)
POTASSIUM SERPL-SCNC: 3 MMOL/L (ref 3.5–5.1)
RBC # BLD AUTO: 2.96 M/UL (ref 4.1–5.7)
SERVICE CMNT-IMP: ABNORMAL
SERVICE CMNT-IMP: NORMAL
SERVICE CMNT-IMP: NORMAL
SODIUM SERPL-SCNC: 129 MMOL/L (ref 136–145)
SPECIMEN EXP DATE BLD: NORMAL
TIBC SERPL-MCNC: 166 UG/DL (ref 250–450)
WBC # BLD AUTO: 4.7 K/UL (ref 4.1–11.1)

## 2023-05-21 PROCEDURE — 86901 BLOOD TYPING SEROLOGIC RH(D): CPT

## 2023-05-21 PROCEDURE — 85025 COMPLETE CBC W/AUTO DIFF WBC: CPT

## 2023-05-21 PROCEDURE — 6370000000 HC RX 637 (ALT 250 FOR IP): Performed by: INTERNAL MEDICINE

## 2023-05-21 PROCEDURE — 2580000003 HC RX 258: Performed by: INTERNAL MEDICINE

## 2023-05-21 PROCEDURE — 36415 COLL VENOUS BLD VENIPUNCTURE: CPT

## 2023-05-21 PROCEDURE — 82728 ASSAY OF FERRITIN: CPT

## 2023-05-21 PROCEDURE — 86900 BLOOD TYPING SEROLOGIC ABO: CPT

## 2023-05-21 PROCEDURE — 6360000002 HC RX W HCPCS: Performed by: INTERNAL MEDICINE

## 2023-05-21 PROCEDURE — 82962 GLUCOSE BLOOD TEST: CPT

## 2023-05-21 PROCEDURE — 80048 BASIC METABOLIC PNL TOTAL CA: CPT

## 2023-05-21 PROCEDURE — 2060000000 HC ICU INTERMEDIATE R&B

## 2023-05-21 PROCEDURE — 83540 ASSAY OF IRON: CPT

## 2023-05-21 PROCEDURE — 86850 RBC ANTIBODY SCREEN: CPT

## 2023-05-21 PROCEDURE — 83550 IRON BINDING TEST: CPT

## 2023-05-21 RX ADMIN — ISOSORBIDE DINITRATE 20 MG: 20 TABLET ORAL at 08:24

## 2023-05-21 RX ADMIN — DEXTROSE MONOHYDRATE: 100 INJECTION, SOLUTION INTRAVENOUS at 14:23

## 2023-05-21 RX ADMIN — HEPARIN SODIUM 5000 UNITS: 5000 INJECTION INTRAVENOUS; SUBCUTANEOUS at 14:02

## 2023-05-21 RX ADMIN — ATORVASTATIN CALCIUM 40 MG: 40 TABLET, FILM COATED ORAL at 08:24

## 2023-05-21 RX ADMIN — ISOSORBIDE DINITRATE 20 MG: 20 TABLET ORAL at 14:07

## 2023-05-21 RX ADMIN — TICAGRELOR 90 MG: 90 TABLET ORAL at 21:33

## 2023-05-21 RX ADMIN — PIPERACILLIN AND TAZOBACTAM 3375 MG: 3; .375 INJECTION, POWDER, LYOPHILIZED, FOR SOLUTION INTRAVENOUS at 11:52

## 2023-05-21 RX ADMIN — HEPARIN SODIUM 5000 UNITS: 5000 INJECTION INTRAVENOUS; SUBCUTANEOUS at 06:31

## 2023-05-21 RX ADMIN — CARVEDILOL 12.5 MG: 12.5 TABLET, FILM COATED ORAL at 21:33

## 2023-05-21 RX ADMIN — CASTOR OIL AND BALSAM, PERU: 788; 87 OINTMENT TOPICAL at 11:54

## 2023-05-21 RX ADMIN — CASTOR OIL AND BALSAM, PERU: 788; 87 OINTMENT TOPICAL at 21:33

## 2023-05-21 RX ADMIN — CARVEDILOL 12.5 MG: 12.5 TABLET, FILM COATED ORAL at 08:24

## 2023-05-21 RX ADMIN — ASPIRIN 81 MG: 81 TABLET, CHEWABLE ORAL at 08:27

## 2023-05-21 RX ADMIN — ISOSORBIDE DINITRATE 20 MG: 20 TABLET ORAL at 21:35

## 2023-05-21 RX ADMIN — HEPARIN SODIUM 5000 UNITS: 5000 INJECTION INTRAVENOUS; SUBCUTANEOUS at 21:33

## 2023-05-21 RX ADMIN — PIPERACILLIN AND TAZOBACTAM 3375 MG: 3; .375 INJECTION, POWDER, LYOPHILIZED, FOR SOLUTION INTRAVENOUS at 23:41

## 2023-05-21 RX ADMIN — TICAGRELOR 90 MG: 90 TABLET ORAL at 08:24

## 2023-05-22 VITALS
DIASTOLIC BLOOD PRESSURE: 87 MMHG | RESPIRATION RATE: 21 BRPM | SYSTOLIC BLOOD PRESSURE: 160 MMHG | OXYGEN SATURATION: 95 % | HEIGHT: 68 IN | TEMPERATURE: 97.3 F | BODY MASS INDEX: 17.47 KG/M2 | WEIGHT: 115.3 LBS | HEART RATE: 109 BPM

## 2023-05-22 LAB
ANION GAP SERPL CALC-SCNC: 6 MMOL/L (ref 5–15)
BASOPHILS # BLD: 0 K/UL (ref 0–0.1)
BASOPHILS NFR BLD: 0 % (ref 0–1)
BUN SERPL-MCNC: 19 MG/DL (ref 6–20)
BUN/CREAT SERPL: 7 (ref 12–20)
CALCIUM SERPL-MCNC: 8 MG/DL (ref 8.5–10.1)
CHLORIDE SERPL-SCNC: 100 MMOL/L (ref 97–108)
CO2 SERPL-SCNC: 23 MMOL/L (ref 21–32)
CREAT SERPL-MCNC: 2.83 MG/DL (ref 0.7–1.3)
DIFFERENTIAL METHOD BLD: ABNORMAL
EOSINOPHIL # BLD: 0.1 K/UL (ref 0–0.4)
EOSINOPHIL NFR BLD: 1 % (ref 0–7)
ERYTHROCYTE [DISTWIDTH] IN BLOOD BY AUTOMATED COUNT: 15.5 % (ref 11.5–14.5)
GLUCOSE BLD STRIP.AUTO-MCNC: 98 MG/DL (ref 65–117)
GLUCOSE SERPL-MCNC: 106 MG/DL (ref 65–100)
HCT VFR BLD AUTO: 24.2 % (ref 36.6–50.3)
HGB BLD-MCNC: 7.5 G/DL (ref 12.1–17)
IMM GRANULOCYTES # BLD AUTO: 0 K/UL (ref 0–0.04)
IMM GRANULOCYTES NFR BLD AUTO: 1 % (ref 0–0.5)
LYMPHOCYTES # BLD: 1.7 K/UL (ref 0.8–3.5)
LYMPHOCYTES NFR BLD: 29 % (ref 12–49)
MCH RBC QN AUTO: 25.4 PG (ref 26–34)
MCHC RBC AUTO-ENTMCNC: 31 G/DL (ref 30–36.5)
MCV RBC AUTO: 82 FL (ref 80–99)
MONOCYTES # BLD: 0.3 K/UL (ref 0–1)
MONOCYTES NFR BLD: 6 % (ref 5–13)
NEUTS SEG # BLD: 3.8 K/UL (ref 1.8–8)
NEUTS SEG NFR BLD: 63 % (ref 32–75)
NRBC # BLD: 0 K/UL (ref 0–0.01)
NRBC BLD-RTO: 0 PER 100 WBC
PLATELET # BLD AUTO: 283 K/UL (ref 150–400)
PMV BLD AUTO: 9.8 FL (ref 8.9–12.9)
POTASSIUM SERPL-SCNC: 3.1 MMOL/L (ref 3.5–5.1)
PROCALCITONIN SERPL-MCNC: 1.87 NG/ML
RBC # BLD AUTO: 2.95 M/UL (ref 4.1–5.7)
SERVICE CMNT-IMP: NORMAL
SODIUM SERPL-SCNC: 129 MMOL/L (ref 136–145)
VANCOMYCIN SERPL-MCNC: 10.9 UG/ML
WBC # BLD AUTO: 5.9 K/UL (ref 4.1–11.1)

## 2023-05-22 PROCEDURE — 6360000002 HC RX W HCPCS: Performed by: INTERNAL MEDICINE

## 2023-05-22 PROCEDURE — 6370000000 HC RX 637 (ALT 250 FOR IP): Performed by: INTERNAL MEDICINE

## 2023-05-22 PROCEDURE — 36415 COLL VENOUS BLD VENIPUNCTURE: CPT

## 2023-05-22 PROCEDURE — 2580000003 HC RX 258: Performed by: INTERNAL MEDICINE

## 2023-05-22 PROCEDURE — 6360000002 HC RX W HCPCS: Performed by: NURSE PRACTITIONER

## 2023-05-22 PROCEDURE — 80048 BASIC METABOLIC PNL TOTAL CA: CPT

## 2023-05-22 PROCEDURE — 92526 ORAL FUNCTION THERAPY: CPT

## 2023-05-22 PROCEDURE — 84145 PROCALCITONIN (PCT): CPT

## 2023-05-22 PROCEDURE — 82962 GLUCOSE BLOOD TEST: CPT

## 2023-05-22 PROCEDURE — 80202 ASSAY OF VANCOMYCIN: CPT

## 2023-05-22 PROCEDURE — 85025 COMPLETE CBC W/AUTO DIFF WBC: CPT

## 2023-05-22 PROCEDURE — 90935 HEMODIALYSIS ONE EVALUATION: CPT

## 2023-05-22 RX ORDER — POTASSIUM CHLORIDE 750 MG/1
40 TABLET, FILM COATED, EXTENDED RELEASE ORAL
Status: COMPLETED | OUTPATIENT
Start: 2023-05-22 | End: 2023-05-22

## 2023-05-22 RX ORDER — CASTOR OIL AND BALSAM, PERU 788; 87 MG/G; MG/G
OINTMENT TOPICAL 2 TIMES DAILY
Qty: 28.35 G | Refills: 0 | Status: SHIPPED | OUTPATIENT
Start: 2023-05-22

## 2023-05-22 RX ORDER — AMOXICILLIN AND CLAVULANATE POTASSIUM 500; 125 MG/1; MG/1
1 TABLET, FILM COATED ORAL 2 TIMES DAILY
Qty: 4 TABLET | Refills: 0 | Status: SHIPPED | OUTPATIENT
Start: 2023-05-22 | End: 2023-05-24

## 2023-05-22 RX ORDER — CARVEDILOL 12.5 MG/1
12.5 TABLET ORAL 2 TIMES DAILY
Qty: 60 TABLET | Refills: 3 | Status: SHIPPED | OUTPATIENT
Start: 2023-05-22

## 2023-05-22 RX ADMIN — ASPIRIN 81 MG: 81 TABLET, CHEWABLE ORAL at 09:22

## 2023-05-22 RX ADMIN — ISOSORBIDE DINITRATE 20 MG: 20 TABLET ORAL at 09:23

## 2023-05-22 RX ADMIN — POTASSIUM CHLORIDE 40 MEQ: 750 TABLET, FILM COATED, EXTENDED RELEASE ORAL at 12:11

## 2023-05-22 RX ADMIN — POTASSIUM CHLORIDE 40 MEQ: 750 TABLET, FILM COATED, EXTENDED RELEASE ORAL at 10:47

## 2023-05-22 RX ADMIN — HEPARIN SODIUM 5000 UNITS: 5000 INJECTION INTRAVENOUS; SUBCUTANEOUS at 15:22

## 2023-05-22 RX ADMIN — CARVEDILOL 12.5 MG: 12.5 TABLET, FILM COATED ORAL at 09:22

## 2023-05-22 RX ADMIN — ISOSORBIDE DINITRATE 20 MG: 20 TABLET ORAL at 15:23

## 2023-05-22 RX ADMIN — HEPARIN SODIUM 5000 UNITS: 5000 INJECTION INTRAVENOUS; SUBCUTANEOUS at 06:16

## 2023-05-22 RX ADMIN — CASTOR OIL AND BALSAM, PERU: 788; 87 OINTMENT TOPICAL at 09:23

## 2023-05-22 RX ADMIN — PIPERACILLIN AND TAZOBACTAM 3375 MG: 3; .375 INJECTION, POWDER, LYOPHILIZED, FOR SOLUTION INTRAVENOUS at 15:23

## 2023-05-22 RX ADMIN — HEPARIN SODIUM 3600 UNITS: 1000 INJECTION INTRAVENOUS; SUBCUTANEOUS at 15:20

## 2023-05-22 RX ADMIN — ATORVASTATIN CALCIUM 40 MG: 40 TABLET, FILM COATED ORAL at 09:23

## 2023-05-22 RX ADMIN — TICAGRELOR 90 MG: 90 TABLET ORAL at 09:23

## 2023-05-22 ASSESSMENT — PAIN SCALES - GENERAL
PAINLEVEL_OUTOF10: 0

## 2023-05-22 NOTE — PROGRESS NOTES
End of Shift Note    Bedside shift change report given to Meg Jacome (oncoming nurse) by Aníbal Nino RN (offgoing nurse). Report included the following information SBAR, Kardex, Intake/Output, MAR, and Recent Results    Shift worked:  [id number]     Shift summary and any significant changes:     No significant changes. D10 drip weaned off per provider instructions. Pt hopeful for discharge either today or tomorrow. No acute concerns.      Concerns for physician to address:  none     Zone phone for oncoming shift:              Aníbal Nino RN

## 2023-05-22 NOTE — CARE COORDINATION
Transition of Care Plan:    RUR: 17%  \"Medium\"  Prior Level of Functioning:  LTC-Needs assistance  Disposition: Return to [facility]-Report [phone]  Follow up appointments: Per facility  DME needed: None  Transportation at discharge: BLS-[facility] @ 4pm  IM/IMM Medicare/ letter given: Discussed with spouse via phone  Is patient a [facility] and connected with [address]? No  Caregiver Contact:  Yaniv Espinosa  Discharge Caregiver contacted prior to discharge? Yes  Care Conference needed? Not at this time  Barriers to discharge:  None    Transition of Care Plan to SNF/Rehab    Communication to Patient/Family:  Met with patient and family and they are agreeable to the transition plan. The Plan for Transition of Care is related to the following treatment goals: LTC    The Patient and/or patient representative was provided with a choice of provider and agrees  with the discharge plan. Yes [x] No []    A Freedom of choice list was provided with basic dialogue that supports the patient's individualized plan of care/goals and shares the quality data associated with the providers. Yes [x] No []    SNF/Rehab Transition:  Patient has been accepted to [facility] and meets criteria for admission. Patient will transported by *[facility] and expected to leave at *4pm.    Communication to SNF/Rehab:  Bedside RN, Arabella Hernandez, has been notified to update the transition plan to the facility and call report ([phone]). Discharge information has been updated on the AVS. And communicated to facility via vLine/[employer] [facility], or CC link. Discharge instructions to be fax'd to facility at ([phone]). Nursing Please include all hard scripts for controlled substances, med rec and dc summary, and AVS in packet.      Reviewed and confirmed with facility, [facility], can manage the patient care needs for the following:     Roel Martinez with (X) only those

## 2023-05-22 NOTE — PROGRESS NOTES
Hospitalist Progress Note    NAME: Humberto Nolen   : 1955   MRN: [id number]     Date/Time: 2023 10:22 PM  Patient PCP: Genevieve Bartholomew MD    Estimated discharge date:  2023    Barriers:  wean off insulin gtt      Assessment / Plan:    Acute metabolic encephalopathy POA due to  Hypothermia likely due to hypoglycemia POA no formal SIRS criteria  Elevated procalcitonin 6.46  DM type 2 with hypoglycemia  -AMS and hypothermia likely from low BG (25 in ED)   -CT head Age-indeterminate left cerebellar infarct is new since . No intracranial hemorrhage.  -blood cultures NEGATIVE x 2  - pCXR with ? Edema  - UA 0-4 WBC, 0-5 RBC  -hold elavil, neurontin due to AMS  -off lantus    HgBa1c 5.2, hold insulin at discharge   Likely stayed in body with ESRD longer  -agree with empiric IV abx until sepsis is ruled out. 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      Back coreg 12.5 and lisinopril 5 mg in AM  -continue ASA, brilinta and lipitor  -continue isordil   - Volume removal with HD  - PRN hydralazine    Medical Decision Making:   I personally reviewed labs: Yes, BG readings  I personally reviewed imaging:  Toxic drug monitoring:   Discussed case

## 2023-05-22 NOTE — PLAN OF CARE
Pt had a A1C in our office, results were 5 2 on 11/01/2018  Speech LAnguage Pathology TREATMENT/DISCHARGE    Patient: Tiffany Oneal (28 y.o. male)  Date: 5/22/2023  Primary Diagnosis: Gangrene ([facility] Yumi Redmond  Hypoglycemia [E16.2]  Septicemia ([facility] [A41.9]       Precautions: aspiration                    ASSESSMENT :  Based on the objective data described below, the patient presents with grossly functional oropharyngeal swallow. Patient reports no difficulty with p.o. intake (minced and moist + thin liquids) over the weekend. Today seen with regular consistency + thin liquids with slow but adequate bolus manipulation and formation and adequate oral clearance. Patient with cough noted x1 with thin liquids, but was not reduplicated with any other trials. Patient still doesn't have his dentures, but reports that he is fine managing without them. Will plan to upgrade to regular diet + thin liquids. Appears mentation has improved and dysphagia is resolving as mentation has improved. Patient will be discharged from skilled speech-language pathology services at this time. PLAN :  Recommendations and Planned Interventions:  Diet: Regular and thin liquids  --meds as tolerated  --upright for all p.o. intake     Acute SLP Services: No, patient will be discharged from acute skilled speech-language pathology at this time. Discharge Recommendations: None     SUBJECTIVE:   Patient stated, I'm fine.     OBJECTIVE:     Past Medical History:   Diagnosis Date    A-fib ([facility]     Anemia     Arrhythmia     CAD (coronary artery disease)     Chronic kidney disease     Diabetes ([facility]     DM2    Dialysis patient [facility])     M-W-F [facility] [phone]    Heart failure ([facility]     Hypertension     STEMI (ST elevation myocardial infarction) [facility])      Past Surgical History:   Procedure Laterality Date    AMPUTATION Left     5 fingers    CARDIAC CATHETERIZATION      IR NONTUNNELED VASCULAR CATHETER  2/23/2021    IR NONTUNNELED VASCULAR CATHETER 2/23/2021 [facility] RAD ANGIO IR    IR NONTUNNELED

## 2023-05-22 NOTE — PROGRESS NOTES
0700 - report received from cmsu RN.     0800 - patient assessment completed. See flowsheets for assessment data. 1200 - reassessment completed. No changes noted. Dialysis started. 1530 - Dialysis completed. 1350 - Report given to receiving facility. 1630 - [facility] arrived to take patient.

## 2023-05-22 NOTE — PLAN OF CARE
Problem: Discharge Planning  Goal: Discharge to home or other facility with appropriate resources  Outcome: Progressing     Problem: Safety - Adult  Goal: Free from fall injury  Outcome: Progressing     Problem: Pain  Goal: Verbalizes/displays adequate comfort level or baseline comfort level  Outcome: Progressing     Problem: Skin/Tissue Integrity  Goal: Absence of new skin breakdown  Description: 1. Monitor for areas of redness and/or skin breakdown  2. Assess vascular access sites hourly  3. Every 4-6 hours minimum:  Change oxygen saturation probe site  4. Every 4-6 hours:  If on nasal continuous positive airway pressure, respiratory therapy assess nares and determine need for appliance change or resting period.   Outcome: Progressing     Problem: Chronic Conditions and Co-morbidities  Goal: Patient's chronic conditions and co-morbidity symptoms are monitored and maintained or improved  Outcome: Progressing

## 2023-05-22 NOTE — PROGRESS NOTES
Pharmacy Antimicrobial Kinetic Dosing    Indication for Antimicrobials: Sepsis     Current Regimen of Each Antimicrobial:  Vancomycin HD dosing - Start Date ; Day 6 of 7  Zosyn 3.375 g IV q12h - Start Date ; Day 6    Previous Antimicrobial Therapy:    Goal Level: Vancomycin random level < 20     Date Dose & Interval Measured (mcg/mL) Predicted AUC    AM random 1250 mg x1 15.6     500 mg post-HD 15.3     4am 500 mg post-HD 10.9      Significant Cultures:    blood: NGTD    Labs:  Recent Labs     Units 23  0410 23  0522 23  0528   CREATININE MG/DL 2.83* 2.59* 2.27*   BUN MG/DL  15   PROCAL ng/mL 1.87  --  6.46   WBC K/uL 5.9 4.7 4.1     Temp (24hrs), Av.2 °F (36.8 °C), Min:97.5 °F (36.4 °C), Max:98.9 °F (37.2 °C)      Conditions for Dosing Consideration: Hemodialysis    Creatinine Clearance (mL/min): HD MWF    Impression/Plan:   MWF HD  Vanc level was 10.9 this AM. Increase post-HD dose to 750 mg  Continue Zosyn HD regimen  CMP x1 then daily BMP x 12  Antimicrobial stop date -tbd     Pharmacy will follow daily and adjust medications as appropriate for renal function and/or serum levels.     Thank you,  Thomas De La Rosa, [facility]

## 2023-05-22 NOTE — DISCHARGE SUMMARY
Hospitalist Discharge Summary     Patient ID:  Thu Jarrell  [id number]  89 y.o.  1955  5/17/2023    PCP on record: Christian Gauthier MD    Admit date: 5/17/2023  Discharge date and time: 5/22/2023    DISCHARGE DIAGNOSIS:    Metabolic encephalopathy/hypothermia/hypoglycemia/elevated procalcitonin/type 2 diabetes/end-stage renal disease on hemodialysis/anemia/severe peripheral artery disease right lower extremity with chronic dry necrotic wound/chronic necrotic right index finger/status post left AKA/status post left hand amputation/stage III sacral decubitus wound present on admission/chronic systolic heart failure/CAD status post PCI/hypertension    CONSULTATIONS:  IP CONSULT TO NEPHROLOGY  IP CONSULT TO PHARMACY  IP WOUND CARE NURSE CONSULT TO EVAL    Excerpted HPI from H&P of Lia Washington MD:  Thu Jarrell is a 74 y.o. male with a history of ESRD, DM, CAD, CHF and PAD who presents via EMS from the NH because of AMS and low blood glucose. Patient reportedly became altered after breakfast and EMS noted BG in 46s. This improved to 100 after D50 but on arrival to this ED, his BG dropped again down to 25. D10 infusion started. Patient also hypothermic 95F and fabio hugger applied. Blood cultures and IV abx started. We were asked to admit for work up and evaluation of the above problems. ______________________________________________________________________  DISCHARGE SUMMARY/HOSPITAL COURSE:  for full details see H&P, daily progress notes, labs, consult notes.      Patient was subsequently admitted to [facility] for further evaluation as well as management, of note patient was started on dextrose gtt., insulin was held, patient received dialysis as per nephrology recommendations, initially due to elevated procalcitonin patient was started on antibiotics, overall patient's clinical status improved, patient was weaned off of dextrose gtt., following which

## 2023-05-22 NOTE — PROGRESS NOTES
Nephrology Progress Note  [facility], [facility]  [place], [address]  Phone - [phone]  Fax - [phone]                 Patient: Samuel Head                     YOB: 1955        Date- 5/22/2023                                     Admit Date: 5/17/2023   CC: Follow up for ESRD          IMPRESSION & PLAN:   ESRD--MWF at [address] ([place]  Anemia of ckd  Hypertension  Chf  Sec. hyperpara      PLAN-  Hd today  4 k bath  Continue coreg  Epogen for anemia  D/w patient     Subjective: Interval History:   -  bp high  No sob    Objective:   Vitals:    05/21/23 2315 05/22/23 0000 05/22/23 0246 05/22/23 0730   BP: 98/69 132/73 (!) 154/93 (!) 168/95   Pulse: (!) 116 97 97 96   Resp: 16 26 20 16   Temp: 98.2 °F (36.8 °C) 98.3 °F (36.8 °C) 97.5 °F (36.4 °C) 98.2 °F (36.8 °C)   TempSrc: Oral Oral Oral Oral   SpO2: 96%  95% 96%   Weight:       Height:          I/O last 3 completed shifts:  In: -   Out: 500 [Urine:500]  No intake/output data recorded. Physical exam:    GEN: NAD  NECK- no mass  RESP: No wheezing, decreased BS b/l  CVS: S1,S2  RRR  NEURO: Normal speech, Non focal  EXT: No Edema     Chart reviewed. Pertinent Notes reviewed.      Data Review :  Lab Results   Component Value Date/Time     05/22/2023 04:10 AM    K 3.1 05/22/2023 04:10 AM     05/22/2023 04:10 AM    CO2 23 05/22/2023 04:10 AM    BUN 19 05/22/2023 04:10 AM    CREATININE 2.83 05/22/2023 04:10 AM    GLUCOSE 106 05/22/2023 04:10 AM    CALCIUM 8.0 05/22/2023 04:10 AM       Lab Results   Component Value Date    WBC 5.9 05/22/2023    HGB 7.5 (L) 05/22/2023    HCT 24.2 (L) 05/22/2023    MCV 82.0 05/22/2023     05/22/2023      Recent Labs     05/20/23  0528 05/21/23  0522 05/22/23  0410    129* 129*   K 2.9* 3.0* 3.1*    97 100   CO2 26 24 23   GLUCOSE 148* 134* 106*   BUN 15 18 19   CREATININE

## 2023-05-22 NOTE — FLOWSHEET NOTE
05/22/23 1134   Vital Signs   /77   Temp 97.3 °F (36.3 °C)   Pulse 92   Respirations 18   Pain Assessment   Pain Assessment None - Denies Pain   Pain Level 0   Patient's Stated Pain Goal 0 - No pain   Treatment   Time On 1150   Treatment Goal 2000 mL UF goal   Observations & Evaluations   Level of Consciousness 0   Oriented X 2   Respiratory Quality/Effort Unlabored   O2 Device None (Room air)   Bilateral Breath Sounds Clear   Skin Color   (appropriate for ethnicity)   Skin Condition/Temp Warm;Dry   Appetite Fair   Abdomen Inspection Flat;Soft   Technical Checks   Dialysis Machine No. [id number]   RO Machine Number [id number]   Dialyzer Lot No. [id number]   Tubing Lot Number [id number]   All Connections Secure Yes   NS Bag Yes   Saline Line Double Clamped Yes   Dialyzer Revaclear 300   Prime Volume (mL) 200 mL   ICEBOAT I;C;E;B;O;A;T   RO Machine Log Sheet Completed Yes   Machine Alarm Self Test Completed; Passed   Air Foam Detector Tested;Proper Function   Extracorporeal Circuit Tested for Integrity Yes   Machine Conductivity 13.8   Manual Conductivity 13.6   Manual Ph 7.2   Bleach Test (Neg) No   Bath Temperature 97.7 °F (36.5 °C)   Treatment Initiation   Dialyze Hours 3.5   Treatment  Initiation Universal Precautions maintained;Lines secured to patient; Connections secured;Prime given;Venous Parameters set; Arterial Parameters set; Air foam detector engaged;Dialysate;Saline line double clamped; Revaclear Dialyzer   Dialysis Bath   K+ (Potassium)   (3.5)   Ca+ (Calcium) 2.5   Na+ (Sodium) 138   HCO3 (Bicarb) 37   Bicarbonate Concentrate Lot No. [id number]   Acid Concentrate Lot No. [id number]   Hemodialysis Central Access Right Subclavian   No placement date or time found. Present on Admission/Arrival: Yes  Orientation: Right  Access Location: Subclavian   Continued need for line?  Yes   Site Assessment Clean, dry & intact   Venous Lumen Status Brisk blood return;Flushed   Arterial Lumen Status Brisk blood

## 2023-05-23 LAB
BACTERIA SPEC CULT: NORMAL
BACTERIA SPEC CULT: NORMAL
GLUCOSE BLD STRIP.AUTO-MCNC: NORMAL MG/DL (ref 65–117)
SERVICE CMNT-IMP: NORMAL

## 2023-05-25 ENCOUNTER — TELEPHONE (OUTPATIENT)
Age: 68
End: 2023-05-25

## 2023-11-15 NOTE — DISCHARGE SUMMARY
.  This is discharge summary for Dina Tariq, patient's YOB: 1955. Brief Hospital course:  Patient underwent left BKA for left foot gangrene. Postop patient did well. Pain is well managed with oxycodone extended release with Percocet for breakthrough. I changed the dressing this morning wounds are clean and dry. Patient with discharge to rehab today with follow-up Dr. Veronica Roberts in 2 weeks. Calm Patient baseline mental status

## 2023-12-06 NOTE — PROGRESS NOTES
Patient placement at [facility] was declined due to not having contract with [facility]. Per [facility] contract was sent to [facility], waiting for signature. Reached out to [facility] and they said they would reach out to  to see if he has the contract. Waiting for call back. Have asked other SNF to start auth, requesting updated PT/OT notes for insurance authorization. [[id number]] [[id number]]

## 2025-08-01 NOTE — PROGRESS NOTES
Speech Pathology    Chart reviewed; discussed with RN. Patient given meds crushed with teaspoons of nectar thickened juice today (not honey thickened as previously recommended). Rn reports no difficulty evident. Patient currently soiled and needing to be cleaned by nursing. SLP to return bedside when available for further assessment. Tiffanie Abdalla MS, CCC-SLP, BCS-S, CNT [[phone]],[[phone]]

## (undated) DEVICE — SOLUTION IRRIG 500ML 0.9% SOD CHL USP POUR PLAS BTL

## (undated) DEVICE — WET SKIN PREP TRAY: Brand: MEDLINE INDUSTRIES, INC.

## (undated) DEVICE — STOCKINETTE,IMPERVIOUS,12X48,STERILE: Brand: MEDLINE

## (undated) DEVICE — BANDAGE E W6INXL11YD CLP CLSR DBL LEN FLEX-MASTER

## (undated) DEVICE — 3M™ SCOTCHCAST™ WET OR DRY CAST PADDING, WDP6, 6 IN X 4 YD: Brand: 3M™ SCOTCHCAST™

## (undated) DEVICE — DRESSING,GAUZE,XEROFORM,CURAD,5"X9",ST: Brand: CURAD

## (undated) DEVICE — T-DRAPE,EXTREMITY,STERILE: Brand: MEDLINE

## (undated) DEVICE — SOUTHSIDE TURNOVER: Brand: MEDLINE INDUSTRIES, INC.

## (undated) DEVICE — Z INACTIVE USE 2854097 SPONGE GZ W4XL4IN COT 12 PLY TYP VII WVN C FLD DSGN

## (undated) DEVICE — SYRINGE IRRIG 60ML SFT PLIABLE BLB EZ TO GRP 1 HND USE W/

## (undated) DEVICE — SPONGE LAPAROTOMY W18XL18IN WHITE STRUNG RADIOPAQUE STERILE

## (undated) DEVICE — GARMENT,MEDLINE,DVT,INT,CALF,MED, GEN2: Brand: MEDLINE

## (undated) DEVICE — MINOR GENERAL PACK: Brand: MEDLINE INDUSTRIES, INC.

## (undated) DEVICE — DRAPE,REIN 53X77,STERILE: Brand: MEDLINE

## (undated) DEVICE — SUT VCRL + 27IN MO4 VIO --

## (undated) DEVICE — SUTURE PROL SZ 5-0 L30IN NONABSORBABLE BLU L13MM C-1 3/8 8890H

## (undated) DEVICE — SUTURE PERMAHAND SZ 2-0 L18IN NONABSORBABLE BLK L26MM SH C012D

## (undated) DEVICE — BANDAGE COMPR M W6INXL10YD WHT BGE VELC E MTRX HK AND LOOP

## (undated) DEVICE — BANDAGE,GAUZE,BULKEE II,4.5"X4.1YD,STRL: Brand: MEDLINE

## (undated) DEVICE — GLOVE ORANGE PI 7 1/2   MSG9075

## (undated) DEVICE — PREP PAD BNS: Brand: CONVERTORS

## (undated) DEVICE — SUTURE PERMAHAND SZ 0 L18IN NONABSORBABLE BLK SILK BRAID A186H

## (undated) DEVICE — SUTURE ETHLN SZ 2-0 L18IN NONABSORBABLE BLK L26MM FS 3/8 664G

## (undated) DEVICE — INTENDED FOR TISSUE SEPARATION, AND OTHER PROCEDURES THAT REQUIRE A SHARP SURGICAL BLADE TO PUNCTURE OR CUT.: Brand: BARD-PARKER ® CARBON RIB-BACK BLADES

## (undated) DEVICE — STRYKER PERFORMANCE SERIES SAGITTAL BLADE: Brand: STRYKER PERFORMANCE SERIES

## (undated) DEVICE — 3M™ COBAN™ NL STERILE NON-LATEX SELF-ADHERENT WRAP, 2084S, 4 IN X 5 YD (10 CM X 4,5 M), 18 ROLLS/CASE: Brand: 3M™ COBAN™

## (undated) DEVICE — PADDING CAST SPEC 6INX4YD COT --